# Patient Record
Sex: FEMALE | Race: WHITE | NOT HISPANIC OR LATINO | Employment: OTHER | URBAN - METROPOLITAN AREA
[De-identification: names, ages, dates, MRNs, and addresses within clinical notes are randomized per-mention and may not be internally consistent; named-entity substitution may affect disease eponyms.]

---

## 2017-01-01 DIAGNOSIS — E11.9 TYPE 2 DIABETES MELLITUS WITHOUT COMPLICATION: ICD-10-CM

## 2017-01-01 DIAGNOSIS — E11.8 DIABETES MELLITUS WITH COMPLICATION: ICD-10-CM

## 2017-01-01 RX ORDER — METFORMIN HYDROCHLORIDE 500 MG/1
TABLET ORAL
Qty: 180 TABLET | Refills: 0 | Status: SHIPPED | OUTPATIENT
Start: 2017-01-01 | End: 2018-01-01 | Stop reason: SDUPTHER

## 2017-01-27 ENCOUNTER — OFFICE VISIT (OUTPATIENT)
Dept: INTERNAL MEDICINE | Facility: CLINIC | Age: 82
End: 2017-01-27
Payer: MEDICARE

## 2017-01-27 VITALS
SYSTOLIC BLOOD PRESSURE: 118 MMHG | BODY MASS INDEX: 30.69 KG/M2 | HEIGHT: 66 IN | WEIGHT: 190.94 LBS | HEART RATE: 91 BPM | DIASTOLIC BLOOD PRESSURE: 80 MMHG | OXYGEN SATURATION: 97 %

## 2017-01-27 DIAGNOSIS — E11.65 TYPE 2 DIABETES MELLITUS WITH HYPERGLYCEMIA, WITHOUT LONG-TERM CURRENT USE OF INSULIN: ICD-10-CM

## 2017-01-27 DIAGNOSIS — M54.89 LEFT PARASPINAL BACK PAIN: Primary | ICD-10-CM

## 2017-01-27 DIAGNOSIS — R29.3 POOR POSTURE: ICD-10-CM

## 2017-01-27 PROCEDURE — 1159F MED LIST DOCD IN RCRD: CPT | Mod: S$GLB,,, | Performed by: INTERNAL MEDICINE

## 2017-01-27 PROCEDURE — 99202 OFFICE O/P NEW SF 15 MIN: CPT | Mod: 25,S$GLB,, | Performed by: INTERNAL MEDICINE

## 2017-01-27 PROCEDURE — 96372 THER/PROPH/DIAG INJ SC/IM: CPT | Mod: S$GLB,,, | Performed by: INTERNAL MEDICINE

## 2017-01-27 PROCEDURE — 1125F AMNT PAIN NOTED PAIN PRSNT: CPT | Mod: S$GLB,,, | Performed by: INTERNAL MEDICINE

## 2017-01-27 PROCEDURE — 1157F ADVNC CARE PLAN IN RCRD: CPT | Mod: S$GLB,,, | Performed by: INTERNAL MEDICINE

## 2017-01-27 PROCEDURE — 99999 PR PBB SHADOW E&M-EST. PATIENT-LVL IV: CPT | Mod: PBBFAC,,, | Performed by: INTERNAL MEDICINE

## 2017-01-27 PROCEDURE — 1160F RVW MEDS BY RX/DR IN RCRD: CPT | Mod: S$GLB,,, | Performed by: INTERNAL MEDICINE

## 2017-01-27 RX ORDER — KETOROLAC TROMETHAMINE 30 MG/ML
30 INJECTION, SOLUTION INTRAMUSCULAR; INTRAVENOUS
Status: COMPLETED | OUTPATIENT
Start: 2017-01-27 | End: 2017-01-27

## 2017-01-27 RX ORDER — KETOROLAC TROMETHAMINE 30 MG/ML
30 INJECTION, SOLUTION INTRAMUSCULAR; INTRAVENOUS
Status: DISCONTINUED | OUTPATIENT
Start: 2017-01-27 | End: 2017-01-27

## 2017-01-27 RX ADMIN — KETOROLAC TROMETHAMINE 30 MG: 30 INJECTION, SOLUTION INTRAMUSCULAR; INTRAVENOUS at 11:01

## 2017-01-27 NOTE — PROGRESS NOTES
"Portions of this note are generated with voice recognition software. Typographical errors may exist.     SUBJECTIVE:    This is a/an 81 y.o. female here for primary care visit for  Chief Complaint   Patient presents with    Back Pain     left side     Patient states that since last week after mopping she has been having left flank pain.  Patient states that pain is mild to moderate.  States that there is no radiculopathy.  Denies any motor or sensory or coordination deficits of the lower extremities.  She denies hematuria or dysuria.  Denies constitutional symptoms.  Denies any pleuritic chest pain with inspiration.  Self-care measures have been minimal.  Patient comes to the clinic "requesting a shot to take away the pain."  Patient with documented type 2 diabetes mellitus.  Patient without history of chronic kidney disease.  Patient states that she does not have gastritis symptoms or history of peptic ulcer disease or gastrointestinal bleeding.    Medications Reviewed and Updated    Past medical, family, and social histories were reviewed and updated.    Review of Systems negative unless noted otherwise in history of present illness-  General ROS: negative  Hematological and Lymphatic ROS: negative  Endocrine ROS: negative  Musculoskeletal ROS: negative  Neurological ROS: negative    Allergic:  Review of patient's allergies indicates:  No Known Allergies    OBJECTIVE:  BP: 118/80 Pulse: 91    Wt Readings from Last 3 Encounters:   01/27/17 86.6 kg (190 lb 14.7 oz)   06/06/16 86.2 kg (190 lb 0.6 oz)   03/03/16 85.4 kg (188 lb 4.4 oz)    Body mass index is 30.81 kg/(m^2).  Previous Blood Pressure Readings :   BP Readings from Last 3 Encounters:   01/27/17 118/80   06/06/16 134/72   03/03/16 132/78     GEN: No apparent distress  HEENT: sclera non-icteric, conjunctiva clear  CV: no peripheral edema  PULM: breathing non-labored  ABD: Obese, protuberant abdomen.  PSYCH: appropriate affect  MSK: able to rise from chair " without assistance  · Paraspinal muscle pain lumbosacral region left side just inferior to the rib margin midclavicular line.  No palpable muscle spasms or masses.  · Modest point tenderness over posterior ribs at the lower thoracic region.  Left side    SKIN: normal skin turgor    Pertinent Labs Reviewed       ASSESSMENT/PLAN:    Acute flank pain.  Not optimally controlled.  Etiology likely represents muscle strain.  Radiograph today to exclude rib fracture.  Toradol injection today.  Counseling on [place] measures. patient advised if symptoms change or intensify to seek care in the nearest [facility] center.  Defer narcotic prescription to PCP.    Future Appointments  Date Time Provider Department Center   2/8/2017 3:20 PM Octavio Ferrell MD [facility]       Siva Lynn  1/27/2017  12:11 PM

## 2017-01-27 NOTE — MR AVS SNAPSHOT
[facility]   [place]  [place] [address]  Phone: [phone]  Fax: [phone]                  Natalie Parnell   2017 10:40 AM   Office Visit    Descripción:  Female : 1935   Personal Médico:  Siva Lynn MD   Departamento:  [facility]           Razón de la william     Back Pain     Cough     Sinus Problem     Rhinitis           Diagnósticos de Esta Visita        Comentarios    Left paraspinal back pain    -  Primario     Type 2 diabetes mellitus with hyperglycemia, without long-term current use of insulin         Poor posture                Lista de tareas           Citas próximas        Personal Médico Departamento Tfno del dpto    2017 11:30 AM RAFAL KENNER [facility] [phone]    2017 12:00 PM [facility] XR1 300 LB LIMIT [facility] [phone]    2017 3:20 PM Octavio Ferrell MD [facility] [phone]      Metas (5 Years of Data)     Ninguna      [facility] en [facility]     [facility] En [place] L[facility]nea de Enfermeras - Asistencia   Enfermeras registradas de [facility] pueden ayudarle a reservar ian william, proveer educación para la marcos, asesoría clínica, y otros servicios de asesoramiento.   Llame para goyo servicio gratuito a [phone].             Medicamentos           Mensaje sobre Medicamentos     Verificar los cambios y / o adiciones a yancey régimen de medicación son los mismos que discutir con yancey médico. Si cualquiera de estos cambios o adiciones son incorrectos, por favor notifique a yancey proveedor de atención médica.        These medications were administered today        Dose Freq    ketorolac injection 30 mg 30 mg [facility] 1 time    Sig: Inject 30 mg into the muscle one time.    Categoría: Normal    Vía: Intramuscular      DEJAR de prem estos medicamentos     triamcinolone acetonide 0.1% (KENALOG) 0.1 % cream Apply topically 2 (two) times daily.          "  Verifique que la siguiente lista de medicamentos es ian representación exacta de los medicamentos que está tomando actualmente. Si no hay ningunos reportados, la lista puede estar en mckeon. Si no es correcta, por favor póngase en contacto con yancey proveedor de atención médica. Lleve esta lista con usted en eri de emergencia.           Medicamentos Actuales     blood sugar diagnostic (ACCU-CHEK SMARTVIEW TEST STRIP) Strp Inject 1 strip into the skin once daily.    blood-glucose meter Misc 1 Units by Misc.(Non-Drug; Combo Route) route once daily.    lancets (ACCU-CHEK FASTCLIX) Misc Inject 1 lancet into the skin once daily.    metformin (GLUCOPHAGE) 500 MG tablet TOME 1 TABLETA DOS VECES AL RAQUEL CON COMIDAS    albuterol 90 mcg/actuation inhaler Inhale 2 puffs into the lungs every 6 (six) hours as needed for Wheezing.    ammonium lactate 12 % Crea Apply twice daily to lower extremities    benzonatate (TESSALON) 100 MG capsule TOME 1 CAPSULA QUIQUE VECES AL RAQUEL SARIKA SEA NECESARIO PARA LA TOS    calcium carbonate-vitamin D3 600 mg (1,500 mg)-800 unit Chew Take 1 tablet by mouth 2 (two) times daily.    diclofenac sodium 1 % Gel Apply 2 g topically once daily.    fluticasone-salmeterol 100-50 mcg/dose (ADVAIR) 100-50 mcg/dose diskus inhaler Inhale 1 puff into the lungs 2 (two) times daily.    gabapentin (NEURONTIN) 100 MG capsule Take 1 capsule (100 mg total) by mouth 2 (two) times daily.    glipiZIDE (GLUCOTROL) 2.5 MG TR24 TOME IAN TABLETA DIARIAMENTE CON DESAYUNO     silver sulfADIAZINE 1% (SILVADENE) 1 % cream Apply topically 2 (two) times daily.           Información de referencia clínica           Signos vitales - más recientes  Última actualización: 1/27/2017 10:47 AM por Anabel Dey, MA    PS Pulso Turner Peso SpO2 BMI (IM)    118/80 (BP Location: Left arm, Patient Position: Sitting, BP Method: Manual) 91 5' 6" (1.676 m) 86.6 kg (190 lb 14.7 oz) 97% 30.81 kg/m2      Blood Pressure          Most Recent Value "    BP  118/80      Alergias     A partir del:  1/27/2017        No Known Allergies      Vacunas     Administradas en la fecha de la visita:  1/27/2017        None      Orders Placed During Today's Visit      Órdenes normales de esta visita    Ambulatory Referral to Physical/Occupational Therapy     Exámenes/Procedimientos futuros Se espera el Vence    Comprehensive metabolic panel  1/27/2017 4/27/2017    X-Ray Ribs 2 View Left  1/27/2017 1/27/2018      Registrarse para [facility]     La activación de yancey cuenta [facility] es tan fácil jessica 1-2-3!    1) Ir a [email or web address], seleccione Registrarse Ahora, meter el código de activación y yancey fecha de nacimiento, y seleccione Próximo.    [id number]  Expires: 3/13/2017 11:29 AM      2) Crear un nombre de usuario y contraseña para usar cuando se visita [facility] en el futuro y selecciona ian pregunta de seguridad en rei de que pierda yancey contraseña y seleccione Próximo.    3) Introduzca yancey dirección de correo electrónico y mena maya en Registrarse!    Información Adicional  Si tiene alguna pregunta, por favor, e-mail [email or web address] o llame al [phone] para hablar con nuestro personal. Recuerde, Janetruskayleigh no debe ser usada para necesidades urgentes. En eri de emergencia médica, llame al [phone].        Instrucciones      Contracción De Espalda [Sin Lesión] [Back Spasm, No Injury]    La contracción de los músculos de la espalda puede ocurrir luego de ian torcedura mauro de ligamento o torcedura muscular por un movimiento de giro o ian agachada brusca. También la causa puede ser dormir en ian posición deep o en un colchón de deep calidad. Algunas personas responden a la tensión emocional tensando los músculos de la espalda.  El tratamiento descrito abajo generalmente contribuirá que el dolor desaparezca en 5-7 días. El dolor que continua puede requerir ian evaluación mayor u otro tipo de tratamiento jessica la terapia física.  A menos que usted haya tenido ian lesión  física (por ejemplo, ian caída o un accidente de automóvil), no suelen pedirse radiografías (X-rays) para la evaluación inicial del dolor de espalda. Si el dolor persiste y no responde al tratamiento médico, es posible que le soliciten radiografías (X-rays) y otras pruebas más adelante.  Cuidado En [place]:  1. Es posible que necesite permanecer en cama los primeros días. Vivian, tan pronto jessica le sea posible, comience a sentarse o pararse para evitar los problemas que pueden aparecer cuando mesha está en cama mucho tiempo (debilidad de los músculos, mayor dolor y rigidez de la espalda, coágulos de purvi en las piernas).    2. Mientras esté en cama, intente buscar ian posición que le resulte cómoda. Lo mejor es utilizar un colchón firme. Intente acostarse de espalda con almohadas debajo de las rodillas. También puede intentar acostarse de costado con las rodillas flexionadas cerca del pecho y ian almohada entre las rodillas.    3. Evite estar mucho tiempo sentado. Eso causa más tensión en la parte inferior de la espalda que estando de pie o caminando.      4. Chucho los dos primeros días después de la lesión, aplíquese ian COMPRESA DE HIELO sobre el área dolorida chucho 20 minutos cada 2 a 4 horas. Ello reducirá la hinchazón y el dolor. El CALOR (ian ducha caliente, un baño caliente o ian almohadilla térmica) funciona sean para los espasmos musculares. Puede comenzar con el hielo y luego pasar al calor después de dos días. Algunos pacientes se sienten mejor alternando los tratamientos con hielo y calor. Emplee el método que mejor le resulte.    5. Puede usar acetaminofén (acetaminophen) [Tylenol] o ibuprofeno (ibuprofen) [Motrin o Advil] para controlar el dolor, a menos que le hayan recetado otro medicamento. [NOTA: Si tiene ian enfermedad hepática o renal crónica (chronic liver or kidney disease), o ha tenido alguna vez ian úlcera estomacal (stomach ulcer) o sangrado gastrointestinal (GI bleeding), consulte con yancey  médico antes de prem estos medicamentos.]    6. Los estiramientos suaves ayudarán a que yancey espalda sane más rápido. Surendra esta simple rutina de 2-3 veces al día hasta que sienta que yancey espalda está mejor.  ¨ ESTIRAMIENTOS DE LA PARTE BAJA DE LA ESPALDA  § Acuéstese boca ariba con rosa rodillas dobladas y ambos pies sobre el piso.  § Lentamente levante yancey rodilla izquierda hacia yancey pecho aplanando la parte baja de yancey espalda sobre el piso. Sostenga chucho 5 segundos.  § Relájese y repita el ejercicio con yancey rodilla derecha.  § Surendra 10 de estos ejercicios con cada pierna.  § Repita abrazando ambas rodillas y llevándolas hacia yancey pecho al mismo tiempo.      7. Infórmese sobre los métodos que se utilizan para levantar cosas pesadas de manera campuzano y utilícelos. No levante nada que pese más de 15 libras (7 kilos) hasta que haya desaparecido el dolor.  Seguimiento  con yancey médico o en esta institución si rosa síntomas no empiezan a mejorar luego de ian semana. Puede necesitar terapia física.  [NOTA: Si le hicieron ian radiografía, la va a revisar un radiólogo. Le avisarán si encuentran algo que pueda afectar yancey atención]  Busque Prontamente Atención Médica  si algo de lo siguiente ocurre:  · El dolor empeora o se extiende a rosa piernas  · Debilidad o entumecimiento en ian o ambas piernas  · Pérdida del control intestinal o de la vejiga  · Entumecimiento en el área de la paul  · Fiebre repentina superior a 100.4°F (38.0°C)  · Ardor o dolor al orinar  © 9727-2464 [employer]. [address] [place] [address], [place] [address]. Todos los derechos reservados. Esta información no pretende sustituir la atención médica profesional. Sólo yancey médico puede diagnosticar y tratar un problema de marcos.                      Natalie Parnell   2017 10:40 AM   Office Visit    Description:  Female : 1935   Provider:  Siva Lynn MD   Department:  [place] [facility]           Reason for Visit     Back  Pain     Cough     Sinus Problem     Rhinitis           Diagnoses this Visit        Comments    Left paraspinal back pain    -  Primary     Type 2 diabetes mellitus with hyperglycemia, without long-term current use of insulin         Poor posture                To Do List           Future Appointments        Provider Department Dept Phone    1/27/2017 11:30 AM LABDOUG [facility] [phone]    1/27/2017 12:00 PM KEN XR1 300 LB LIMIT [facility] [phone]    2/8/2017 3:20 PM Octavio Ferrell MD [facility] [phone]      Goals     None      [facility]     [facility] Nurse Care Line - 24/7 Assistance  Registered nurses in the [facility] provide clinical advisement, health education, appointment booking, and other advisory services.  Call for this free service at [phone].             Medications           Message regarding Medications     Verify the changes and/or additions to your medication regime listed below are the same as discussed with your clinician today.  If any of these changes or additions are incorrect, please notify your healthcare provider.        These medications were administered today        Dose Freq    ketorolac injection 30 mg 30 mg [facility] 1 time    Sig: Inject 30 mg into the muscle one time.    Class: Normal    Route: Intramuscular      STOP taking these medications     triamcinolone acetonide 0.1% (KENALOG) 0.1 % cream Apply topically 2 (two) times daily.           Verify that the below list of medications is an accurate representation of the medications you are currently taking.  If none reported, the list may be blank. If incorrect, please contact your healthcare provider. Carry this list with you in case of emergency.           Current Medications     blood sugar diagnostic (ACCU-CHEK SMARTVIEW TEST STRIP) Strp Inject 1 strip into the skin once daily.    blood-glucose meter Misc 1 Units by  "Misc.(Non-Drug; Combo Route) route once daily.    lancets (ACCU-CHEK FASTCLIX) Misc Inject 1 lancet into the skin once daily.    metformin (GLUCOPHAGE) 500 MG tablet TOME 1 TABLETA DOS VECES AL RAQUEL CON COMIDAS    albuterol 90 mcg/actuation inhaler Inhale 2 puffs into the lungs every 6 (six) hours as needed for Wheezing.    ammonium lactate 12 % Crea Apply twice daily to lower extremities    benzonatate (TESSALON) 100 MG capsule TOME 1 CAPSULA QUIQUE VECES AL RAQUEL SARIKA SEA NECESARIO PARA LA TOS    calcium carbonate-vitamin D3 600 mg (1,500 mg)-800 unit Chew Take 1 tablet by mouth 2 (two) times daily.    diclofenac sodium 1 % Gel Apply 2 g topically once daily.    fluticasone-salmeterol 100-50 mcg/dose (ADVAIR) 100-50 mcg/dose diskus inhaler Inhale 1 puff into the lungs 2 (two) times daily.    gabapentin (NEURONTIN) 100 MG capsule Take 1 capsule (100 mg total) by mouth 2 (two) times daily.    glipiZIDE (GLUCOTROL) 2.5 MG TR24 TOME INOCENCIA TABLETA DIARIAMENTE CON DESAYUNO     silver sulfADIAZINE 1% (SILVADENE) 1 % cream Apply topically 2 (two) times daily.           Clinical Reference Information           Vital Signs - Last Recorded  Most recent update: 1/27/2017 10:47 AM by Anabel Dey MA    BP Pulse Ht Wt SpO2 BMI    118/80 (BP Location: Left arm, Patient Position: Sitting, BP Method: Manual) 91 5' 6" (1.676 m) 86.6 kg (190 lb 14.7 oz) 97% 30.81 kg/m2      Blood Pressure          Most Recent Value    BP  118/80      Allergies as of 1/27/2017     No Known Allergies      Immunizations Administered on Date of Encounter - 1/27/2017     None      Orders Placed During Today's Visit      Normal Orders This Visit    Ambulatory Referral to Physical/Occupational Therapy     Future Labs/Procedures Expected by Expires    Comprehensive metabolic panel  1/27/2017 4/27/2017    X-Ray Ribs 2 View Left  1/27/2017 1/27/2018      [facility]sk[facility]leigh Sign-Up     Activating your [facility] account is as easy as 1-2-3!     1) Visit [email or web address], " select Sign Up Now, enter this activation code and your date of birth, then select Next.  [email or web address]  Expires: 3/13/2017 11:29 AM      2) Create a username and password to use when you visit [facility] in the future and select a security question in case you lose your password and select Next.    3) Enter your e-mail address and click Sign Up!    Additional Information  If you have questions, please e-mail [email or web address] or call [phone] to talk to our [facility] staff. Remember, [facility] is NOT to be used for urgent needs. For medical emergencies, dial 911.         Instructions      Contracción De Espalda [Sin Lesión] [Back Spasm, No Injury]    La contracción de los músculos de la espalda puede ocurrir luego de ian torcedura mauro de ligamento o torcedura muscular por un movimiento de giro o ian agachada brusca. También la causa puede ser dormir en ian posición deep o en un colchón de deep calidad. Algunas personas responden a la tensión emocional tensando los músculos de la espalda.  El tratamiento descrito abajo generalmente contribuirá que el dolor desaparezca en 5-7 días. El dolor que continua puede requerir ian evaluación mayor u otro tipo de tratamiento jessica la terapia física.  A menos que usted haya tenido ian lesión física (por ejemplo, ian caída o un accidente de automóvil), no suelen pedirse radiografías (X-rays) para la evaluación inicial del dolor de espalda. Si el dolor persiste y no responde al tratamiento médico, es posible que le soliciten radiografías (X-rays) y otras pruebas más adelante.  Cuidado En [place]:  1. Es posible que necesite permanecer en cama los primeros días. Vivian, tan pronto jessica le sea posible, comience a sentarse o pararse para evitar los problemas que pueden aparecer cuando mesha está en cama mucho tiempo (debilidad de los músculos, mayor dolor y rigidez de la espalda, coágulos de purvi en las piernas).    2. Mientras esté en cama, intente buscar ian posición que le  resulte cómoda. Lo mejor es utilizar un colchón firme. Intente acostarse de espalda con almohadas debajo de las rodillas. También puede intentar acostarse de costado con las rodillas flexionadas cerca del pecho y ian almohada entre las rodillas.    3. Evite estar mucho tiempo sentado. Eso causa más tensión en la parte inferior de la espalda que estando de pie o caminando.      4. Chucho los dos primeros días después de la lesión, aplíquese ian COMPRESA DE HIELO sobre el área dolorida chucho 20 minutos cada 2 a 4 horas. Ello reducirá la hinchazón y el dolor. El CALOR (ian ducha caliente, un baño caliente o ian almohadilla térmica) funciona sean para los espasmos musculares. Puede comenzar con el hielo y luego pasar al calor después de dos días. Algunos pacientes se sienten mejor alternando los tratamientos con hielo y calor. Emplee el método que mejor le resulte.    5. Puede usar acetaminofén (acetaminophen) [Tylenol] o ibuprofeno (ibuprofen) [Motrin o Advil] para controlar el dolor, a menos que le hayan recetado otro medicamento. [NOTA: Si tiene ian enfermedad hepática o renal crónica (chronic liver or kidney disease), o ha tenido alguna vez ian úlcera estomacal (stomach ulcer) o sangrado gastrointestinal (GI bleeding), consulte con yancey médico antes de prem estos medicamentos.]    6. Los estiramientos suaves ayudarán a que yancey espalda sane más rápido. Surendra esta simple rutina de 2-3 veces al día hasta que sienta que yancey espalda está mejor.  ¨ ESTIRAMIENTOS DE LA PARTE BAJA DE LA ESPALDA  § Acuéstese boca ariba con rosa rodillas dobladas y ambos pies sobre el piso.  § Lentamente levante yancey rodilla izquierda hacia yancey pecho aplanando la parte baja de yancey espalda sobre el piso. Sostenga chucho 5 segundos.  § Relájese y repita el ejercicio con yancey rodilla derecha.  § Surendra 10 de estos ejercicios con cada pierna.  § Repita abrazando ambas rodillas y llevándolas hacia yancey pecho al mismo tiempo.      7. Infórmese sobre los  métodos que se utilizan para levantar cosas pesadas de manera campuzano y utilícelos. No levante nada que pese más de 15 libras (7 kilos) hasta que haya desaparecido el dolor.  Seguimiento  con yancey médico o en esta institución si rosa síntomas no empiezan a mejorar luego de ian semana. Puede necesitar terapia física.  [NOTA: Si le hicieron ian radiografía, la va a revisar un radiólogo. Le avisarán si encuentran algo que pueda afectar yancey atención]  Busque Prontamente Atención Médica  si algo de lo siguiente ocurre:  · El dolor empeora o se extiende a rosa piernas  · Debilidad o entumecimiento en ian o ambas piernas  · Pérdida del control intestinal o de la vejiga  · Entumecimiento en el área de la paul  · Fiebre repentina superior a 100.4°F (38.0°C)  · Ardor o dolor al orinar  © 8086-4172 [employer]. [address] [place] [address], [place] [address]. Todos los derechos reservados. Esta información no pretende sustituir la atención médica profesional. Sólo yancey médico puede diagnosticar y tratar un problema de mracos.

## 2017-01-28 ENCOUNTER — TELEPHONE (OUTPATIENT)
Dept: FAMILY MEDICINE | Facility: CLINIC | Age: 82
End: 2017-01-28

## 2017-01-28 NOTE — TELEPHONE ENCOUNTER
----- Message from Siva Lynn MD sent at 1/28/2017 11:44 AM CST -----  Regarding: Diabetes follow up  Saw patient for back pain and reevaluated her kidney function. Glucose very elevated. Not sure if we scheduled her to follow up with you.

## 2017-03-08 ENCOUNTER — HOSPITAL ENCOUNTER (OUTPATIENT)
Dept: RADIOLOGY | Facility: HOSPITAL | Age: 82
Discharge: HOME | End: 2017-03-08
Attending: FAMILY MEDICINE
Payer: MEDICARE

## 2017-03-08 ENCOUNTER — OFFICE VISIT (OUTPATIENT)
Dept: FAMILY MEDICINE | Facility: CLINIC | Age: 82
End: 2017-03-08
Payer: MEDICARE

## 2017-03-08 VITALS
WEIGHT: 189.81 LBS | OXYGEN SATURATION: 97 % | SYSTOLIC BLOOD PRESSURE: 138 MMHG | BODY MASS INDEX: 30.51 KG/M2 | HEIGHT: 66 IN | HEART RATE: 80 BPM | DIASTOLIC BLOOD PRESSURE: 76 MMHG

## 2017-03-08 DIAGNOSIS — E11.65 TYPE 2 DIABETES MELLITUS WITH HYPERGLYCEMIA, WITHOUT LONG-TERM CURRENT USE OF INSULIN: ICD-10-CM

## 2017-03-08 DIAGNOSIS — J42 CHRONIC BRONCHITIS, UNSPECIFIED CHRONIC BRONCHITIS TYPE: Primary | ICD-10-CM

## 2017-03-08 DIAGNOSIS — J42 CHRONIC BRONCHITIS, UNSPECIFIED CHRONIC BRONCHITIS TYPE: ICD-10-CM

## 2017-03-08 DIAGNOSIS — J98.01 ACUTE BRONCHOSPASM: ICD-10-CM

## 2017-03-08 DIAGNOSIS — M25.512 SUBSCAPULAR PAIN, LEFT: ICD-10-CM

## 2017-03-08 DIAGNOSIS — Z23 NEED FOR VACCINATION AGAINST STREPTOCOCCUS PNEUMONIAE: ICD-10-CM

## 2017-03-08 PROCEDURE — 99214 OFFICE O/P EST MOD 30 MIN: CPT | Mod: 25,S$GLB,, | Performed by: FAMILY MEDICINE

## 2017-03-08 PROCEDURE — 99499 UNLISTED E&M SERVICE: CPT | Mod: S$GLB,,, | Performed by: FAMILY MEDICINE

## 2017-03-08 PROCEDURE — 1126F AMNT PAIN NOTED NONE PRSNT: CPT | Mod: S$GLB,,, | Performed by: FAMILY MEDICINE

## 2017-03-08 PROCEDURE — 99999 PR PBB SHADOW E&M-EST. PATIENT-LVL IV: CPT | Mod: PBBFAC,,, | Performed by: FAMILY MEDICINE

## 2017-03-08 PROCEDURE — 1160F RVW MEDS BY RX/DR IN RCRD: CPT | Mod: S$GLB,,, | Performed by: FAMILY MEDICINE

## 2017-03-08 PROCEDURE — 1157F ADVNC CARE PLAN IN RCRD: CPT | Mod: S$GLB,,, | Performed by: FAMILY MEDICINE

## 2017-03-08 PROCEDURE — 90670 PCV13 VACCINE IM: CPT | Mod: S$GLB,,, | Performed by: FAMILY MEDICINE

## 2017-03-08 PROCEDURE — 71020 XR CHEST PA AND LATERAL: CPT | Mod: 26,,, | Performed by: RADIOLOGY

## 2017-03-08 PROCEDURE — 71020 XR CHEST PA AND LATERAL: CPT | Mod: TC,PO

## 2017-03-08 PROCEDURE — G0009 ADMIN PNEUMOCOCCAL VACCINE: HCPCS | Mod: S$GLB,,, | Performed by: FAMILY MEDICINE

## 2017-03-08 PROCEDURE — 1159F MED LIST DOCD IN RCRD: CPT | Mod: S$GLB,,, | Performed by: FAMILY MEDICINE

## 2017-03-08 RX ORDER — TIZANIDINE 2 MG/1
2 TABLET ORAL EVERY 8 HOURS PRN
Qty: 30 TABLET | Refills: 0 | Status: SHIPPED | OUTPATIENT
Start: 2017-03-08 | End: 2017-03-18

## 2017-03-08 NOTE — PROGRESS NOTES
Subjective:       Patient ID: Natalie Parnell is a 82 y.o. female.    Chief Complaint: Hospital Follow Up    HPI Comments: 82 years old female who came to the clinic with several episodes of bronchitis for the last several months associated with bronchospasm sometimes.  Patient is doing better for the last couple of weeks.  She was recently evaluated at the emergency room in [place].  Patient is concerned about possible COPD.  She reports smoke secondhand exposure .  Patient with no recent A1c.  She is not able to tolerate glipizide.  No polyuria polydipsia or polyphagia.    Review of Systems   Constitutional: Negative.  Negative for chills and fever.   HENT: Negative.    Eyes: Negative.    Respiratory: Positive for cough and wheezing.    Cardiovascular: Negative.  Negative for chest pain, palpitations and leg swelling.   Gastrointestinal: Negative.    Endocrine: Negative for cold intolerance, heat intolerance, polydipsia, polyphagia and polyuria.   Genitourinary: Negative.    Musculoskeletal: Negative.    Skin: Negative.    Neurological: Negative.    Psychiatric/Behavioral: Negative.        Objective:      Physical Exam   Constitutional: She is oriented to person, place, and time. She appears well-developed and well-nourished. No distress.   HENT:   Head: Normocephalic and atraumatic.   Right Ear: External ear normal.   Left Ear: External ear normal.   Nose: Nose normal.   Mouth/Throat: Oropharynx is clear and moist. No oropharyngeal exudate.   Eyes: Conjunctivae and EOM are normal. Pupils are equal, round, and reactive to light. Right eye exhibits no discharge. Left eye exhibits no discharge. No scleral icterus.   Neck: Normal range of motion. Neck supple. No JVD present. No tracheal deviation present. No thyromegaly present.   Cardiovascular: Normal rate, regular rhythm, normal heart sounds and intact distal pulses.  Exam reveals no gallop and no friction rub.    No murmur heard.  Pulmonary/Chest: Effort normal and  breath sounds normal. No stridor. No respiratory distress. She has no wheezes. She has no rales. She exhibits no tenderness.   Abdominal: Soft. Bowel sounds are normal. She exhibits no distension and no mass. There is no tenderness. There is no rebound and no guarding.   Musculoskeletal: Normal range of motion. She exhibits no edema or tenderness.        Arms:  Lymphadenopathy:     She has no cervical adenopathy.   Neurological: She is alert and oriented to person, place, and time. She has normal reflexes. No cranial nerve deficit. She exhibits normal muscle tone. Coordination normal.   Skin: Skin is warm and dry. No rash noted. She is not diaphoretic. No erythema. No pallor.   Psychiatric: She has a normal mood and affect. Her behavior is normal. Judgment and thought content normal.       Assessment:       1. Chronic bronchitis, unspecified chronic bronchitis type    2. Type 2 diabetes mellitus with hyperglycemia, without long-term current use of insulin    3. Need for vaccination against Streptococcus pneumoniae    4. Subscapular pain, left    5. Acute bronchospasm         Plan:         Natalie was seen today for hospital follow up.    Diagnoses and all orders for this visit:    Chronic bronchitis, unspecified chronic bronchitis type  -     X-Ray Chest PA And Lateral; Future  -     Complete PFT with bronchodilator; Future  -     Ambulatory referral to Pulmonology    Type 2 diabetes mellitus with hyperglycemia, without long-term current use of insulin  -     Comprehensive metabolic panel; Future  -     Lipid panel; Future  -     Hemoglobin A1c; Future  -     Microalbumin/creatinine urine ratio; Future    Need for vaccination against Streptococcus pneumoniae  -     Pneumococcal Conjugate Vaccine (13 Valent) (IM)    Subscapular pain, left  -     tizanidine (ZANAFLEX) 2 MG tablet; Take 1 tablet (2 mg total) by mouth every 8 (eight) hours as needed.    Acute bronchospasm   -     Complete PFT with bronchodilator;  Future    Continue monitoring blood sugar at home,ADA diet.

## 2017-03-08 NOTE — PATIENT INSTRUCTIONS
¿Qué es la bronquitis aguda?     La bronquitis aguda o de corta duración dura dìas o semanas. Se produce cuando los bronquios (vías respiratorias situadas en los pulmones) se irritan a causa de virus, bacterias o alergenos. Esta irritación genera ian tos aguda (de corta duración) o crónica (de larga duración o recurrente) que produce flema amarilla o verdosa.   El interior de unos pulmones sanos    El aire entra y sale de los pulmones a través de las vías respiratorias. El revestimiento de las vías respiratorias produce flema, ian sustancia pegajosa que atrapa las partículas que entran en los pulmones. Unas diminutas estructuras llamadas cilios se encargan de barrer las partículas para expulsarlas de las vías respiratorias.                                Vía respiratoria mark: Las vías respiratorias normalmente están abiertas y wu entrar y salir el aire fácilmente.      Cilios sanos: Los diminutos cilios, que parecen pelos, barren la flema y las partículas hacia arriba para expulsarlas de las vías respiratorias.   Pulmones con bronquitis  La bronquitis suele producirse cuando ian persona tiene un resfriado o gripe. Las vías respiratorias se inflaman (enrojecen y se hinchan) y se forma un exceso de flema, lo que desencadena ian tos profunda y perruna. Otros síntomas pueden incluir:  · sibilancias o un yovana de silbido al respirar  · molestia en el pecho  · dificultad para respirar  · fiebre baja  En estas circunstancias puede producirse ian segunda infección, esta vez de origen bacteriano. Las vías respiratorias irritadas por alergenos o el humo son más propensas a infectarse.     Vía respiratoria inflamada: La inflamación y el exceso de flema estrechan la vía respiratoria y provocan falta de aliento.      Cilios deteriorados: El exceso de flema deteriora los cilios y provoca congestión y sibilancias (silbidos al respirar). El cigarrillo empeora el problema.                                 Cómo se  diagnostica  Un chequeo, preguntas sobre rosa antecedentes de marcos y ciertas pruebas ayudan a yancey proveedor de atención médica a llegar a un diagnóstico.  Antecedentes de marcos  Yancey proveedor de atención médica le hará preguntas sobre rosa síntomas.  El chequeo  Yancey proveedor le escuchará el pecho para cristobal si está congestionado, y quizás le revise también rosa oídos, yancey nariz y yancey garganta.  Posibles pruebas  · Ian prueba de esputo para detectar bacterias; requiere ian muestra de flema expulsada de los pulmones.  · Un exudado nasal o faríngeo (de la garganta) para detectar el virus de la gripe.  · Ian radiografía de tórax, si yancey proveedor de atención médica sospecha que usted tiene neumonía.  · Pruebas para detectar enfermedades que podrían causar bronquitis, jessica alergias, asma o EPOC. Quizás lo remitan a un especialista para estas pruebas.  Tratamiento de la tos  El tratamiento principal de la bronquitis consiste en aliviar los síntomas. Evitar el humo, los alergenos y demás factores que desencadenan la tos suele mejorar la bronquitis. Si la infección es de origen bacteriano, podrían usarse antibióticos. Jacek la enfermedad, es importante y dormir mucho. Para aliviar los síntomas:  · No fume y evite el humo de segunda mano.  · Use un humidificador o inhale vapor de ian ducha caliente para ayudar a aflojar la flema.  · Estee mucha agua y jugos, porque pueden calmar la irritación de la garganta y ayudar a diluir la flema.  · Siéntese o use almohadas adicionales cuando esté en la cama para aliviar la tos y la congestión.  · Pregunte a yancey proveedor sobre el uso de medicamentos para la tos, el dolor y la fiebre, o un descongestionante.  Antibióticos  La mayoría de los casos de bronquitis son causados por virus del resfriado o la gripe. Los antibióticos no tratan las enfermedades virales, y tomarlos cuando no son necesarios podría fomentar la producción de bacterias que son más difíciles de matar. Yancey proveedor le recetará  antibióticos si la causa de la infección fueron bacterias. Si se los recetan:  · [place] rosa antibióticos hasta que se le terminen, aunque le hayan estelle los síntomas. Si no lo hace, la bronquitis podría reaparecer.  · [place] estos medicamentos según las indicaciones. Por ejemplo, algunos medicamentos deben tomarse con la comida.  · Consulte con yancey proveedor o farmacéutico para averiguar los efectos secundarios frecuentes y lo que debe hacer si se le presentan.  Visita de control  Debe visitar de nuevo a yancey proveedor en 2-3 semanas; para adrienne momento rosa síntomas deberían marilin estelle. Ian infección que dura más tiempo podría ser señal de que existe un problema más grave.  Prevención  · Evite el humo del tabaco. Si fuma, abandone el hábito. Aléjese de los ambientes llenos de humo. Pida a rosa amigos y familiares que no fumen en rosa alrededores, ni en yancey hogar o yancey dash.  · Hágase pruebas para detectar alergias.  · Consulte con yancey proveedor sobre la posibilidad de ponerse ian vacuna antigripal (flu shot) todos los años, y probablemente también la antineumocócica.  · Lave rosa eduardo a menudo, para tratar de reducir la posibilidad de contagiarse con virus que causan resfriado y gripe.     Llame a yancey proveedor de atención médica si:  · Le empeoran los síntomas o le aparecen unos nuevos.  · Los problemas respiratorios se vuelven graves.  · Los síntomas no le mejoran en un plazo de ian semana, o al cabo de 3 días de estar tomando antibióticos.   Date Last Reviewed: 6/18/2014  © 7861-6514 [employer]. [address] [place], [place] [address], [place] [address]. Todos los derechos reservados. Esta información no pretende sustituir la atención médica profesional. Sólo yancey médico puede diagnosticar y tratar un problema de marcos.

## 2017-03-08 NOTE — MR AVS SNAPSHOT
[facility]   [place] [address]  Phone: [phone]  Fax: [phone]                  Natalie Parnell   3/8/2017 11:20 AM   Office Visit    Descripción:  Female : 1935   Personal Médico:  Octavio Ferrell MD   Departamento:  [facility]           Razón de la william     Hospital Follow Up           Diagnósticos de Esta Visita        Comentarios    Chronic bronchitis, unspecified chronic bronchitis type    -  Primario     Type 2 diabetes mellitus with hyperglycemia, without long-term current use of insulin         Need for vaccination against Streptococcus pneumoniae         Subscapular pain, left         Acute bronchospasm                Lista de tareas           Citas próximas        Personal Médico Departamento Tfno del dpto    3/8/2017 1:30 PM [facility] XR1 [address] LIMIT [facility] [phone]    3/11/2017 10:30 AM [facility] KENNER [facility] [phone]    3/14/2017 1:00 PM Octavio Ferrell MD [facility] [phone]      Metas (5 Years of Data)     Ninguna      Recetas para recoger        Disp Refills Start End    tizanidine (ZANAFLEX) 2 MG tablet 30 tablet 0 3/8/2017 3/18/2017    Take 1 tablet (2 mg total) by mouth every 8 (eight) hours as needed. - Oral    Farmacia: [id number] - [place] - [address] AT [facility] [address]: #: [phone]         [facility]     [facility] [place] de Enfermeras - Asistencia   Enfermeras registradas de [facility] pueden ayudarle a reservar ian william, proveer educación para la marcos, asesoría clínica, y otros servicios de asesoramiento.   Llame para goyo servicio gratuito a [phone].             Medicamentos           Mensaje sobre Medicamentos     Verificar los cambios y / o adiciones a yancey régimen de medicación son los mismos que discutir con yancey médico. Si cualquiera de estos cambios o  adiciones son incorrectos, por favor notifique a yancey proveedor de atención médica.        EMPEZAR a prem estos medicamentos NUEVOS        Refills    tizanidine (ZANAFLEX) 2 MG tablet 0    Sig: Take 1 tablet (2 mg total) by mouth every 8 (eight) hours as needed.    Categoría: Print    Vía: Oral      DEJAR de prem estos medicamentos     glipiZIDE (GLUCOTROL) 2.5 MG TR24 TOME IAN TABLETA DIARIAMENTE CON DESAYUNO            Verifique que la siguiente lista de medicamentos es ain representación exacta de los medicamentos que está tomando actualmente. Si no hay ningunos reportados, la lista puede estar en mckeon. Si no es correcta, por favor póngase en contacto con yancey proveedor de atención médica. Lleve esta lista con usted en eri de emergencia.           Medicamentos Actuales     albuterol 90 mcg/actuation inhaler Inhale 2 puffs into the lungs every 6 (six) hours as needed for Wheezing.    ammonium lactate 12 % Crea Apply twice daily to lower extremities    benzonatate (TESSALON) 100 MG capsule TOME 1 CAPSULA QUIQUE VECES AL RAQUEL SARIKA SEA NECESARIO PARA LA TOS    blood sugar diagnostic (ACCU-CHEK SMARTVIEW TEST STRIP) Strp Inject 1 strip into the skin once daily.    blood-glucose meter Misc 1 Units by Misc.(Non-Drug; Combo Route) route once daily.    calcium carbonate-vitamin D3 600 mg (1,500 mg)-800 unit Chew Take 1 tablet by mouth 2 (two) times daily.    diclofenac sodium 1 % Gel Apply 2 g topically once daily.    lancets (ACCU-CHEK FASTCLIX) Misc Inject 1 lancet into the skin once daily.    metformin (GLUCOPHAGE) 500 MG tablet TOME 1 TABLETA DOS VECES AL RAQUEL CON COMIDAS    silver sulfADIAZINE 1% (SILVADENE) 1 % cream Apply topically 2 (two) times daily.    fluticasone-salmeterol 100-50 mcg/dose (ADVAIR) 100-50 mcg/dose diskus inhaler Inhale 1 puff into the lungs 2 (two) times daily.    gabapentin (NEURONTIN) 100 MG capsule Take 1 capsule (100 mg total) by mouth 2 (two) times daily.    tizanidine (ZANAFLEX) 2 MG tablet  "Take 1 tablet (2 mg total) by mouth every 8 (eight) hours as needed.           Información de referencia clínica           Ama signos vitales dwayne     PS [place] SpO2 BMI (IMC)    138/76 (BP Location: Right arm, Patient Position: Sitting, BP Method: Manual) 80 5' 6" (1.676 m) 86.1 kg (189 lb 13.1 oz) 97% 30.64 kg/m2      Blood Pressure          Most Recent Value    BP  138/76      Alergias     A partir del:  3/8/2017        No Known Allergies      Vacunas     Administradas en la fecha de la visita:  3/8/2017        Nombre Fecha Dosis Fecha del VIS Vía    Pneumococcal Conjugate - 13 Valent  Incomplete 0.5 mL 2015 Intramuscular      Orders Placed During Today's Visit      Órdenes normales de esta visita    Ambulatory referral to Pulmonology     Pneumococcal Conjugate Vaccine (13 Valent) (IM)     Exámenes/Procedimientos futuros Se espera el Vence    Comprehensive metabolic panel  3/8/2017 2017    Hemoglobin A1c  3/8/2017 2017    Lipid panel  3/8/2017 2017    Microalbumin/creatinine urine ratio  3/8/2017 3/8/2018    X-Ray Chest PA And Lateral  3/8/2017 3/8/2018    Complete PFT with bronchodilator  As directed 2017      Language Assistance Services     ATTENTION: Language assistance services are available, free of charge. Please call [phone].      ATENCIÓN: Si habla español, tiene a yancey disposición servicios gratuitos de asistencia lingüística. Llame al [phone].     CHÚ Ý: N?u b?n nói Ti?ng Vi?t, có các d?ch v? h? tr? ngôn ng? mi?n phí dành cho b?n. G?i s? [phone].         [facility] cumple con las leyes federales aplicables de derechos civiles y no discrimina por motivos de alexandria, color, origen nacional, edad, discapacidad, o sexo.                 Natalie Parnell   3/8/2017 11:20 AM   Office Visit    Description:  Female : 1935   Provider:  Octavio Ferrell MD   Department:  [facility]           Reason for Visit     Hospital " Follow Up           Diagnoses this Visit        Comments    Chronic bronchitis, unspecified chronic bronchitis type    -  Primary     Type 2 diabetes mellitus with hyperglycemia, without long-term current use of insulin         Need for vaccination against Streptococcus pneumoniae         Subscapular pain, left         Acute bronchospasm                To Do List           Future Appointments        Provider Department Dept Phone    3/8/2017 1:30 PM JOHANNE XR1 [address] LIMIT [facility] [phone]    3/11/2017 10:30 AM [place], [place] [facility] [phone]    3/14/2017 1:00 PM Octavio Ferrell MD [place] [facility] [phone]      Goals     None       These Medications        Disp Refills Start End    tizanidine (ZANAFLEX) 2 MG tablet 30 tablet 0 3/8/2017 3/18/2017    Take 1 tablet (2 mg total) by mouth every 8 (eight) hours as needed. - Oral    Pharmacy: [id number] - [place]  [address] AT [facility] Ph #: [phone]         [facility]     [facility] Nurse Care Line - 24/7 Assistance  Registered nurses in the [facility] Center provide clinical advisement, health education, appointment booking, and other advisory services.  Call for this free service at [phone].             Medications           Message regarding Medications     Verify the changes and/or additions to your medication regime listed below are the same as discussed with your clinician today.  If any of these changes or additions are incorrect, please notify your healthcare provider.        START taking these NEW medications        Refills    tizanidine (ZANAFLEX) 2 MG tablet 0    Sig: Take 1 tablet (2 mg total) by mouth every 8 (eight) hours as needed.    Class: Print    Route: Oral      STOP taking these medications     glipiZIDE (GLUCOTROL) 2.5 MG TR24 TOME INOCENCIA TABLETA DIARIAMENTE CON DESAYUNO            Verify that the  "below list of medications is an accurate representation of the medications you are currently taking.  If none reported, the list may be blank. If incorrect, please contact your healthcare provider. Carry this list with you in case of emergency.           Current Medications     albuterol 90 mcg/actuation inhaler Inhale 2 puffs into the lungs every 6 (six) hours as needed for Wheezing.    ammonium lactate 12 % Crea Apply twice daily to lower extremities    benzonatate (TESSALON) 100 MG capsule TOME 1 CAPSULA QUIQUE VECES AL RAQUEL SARIKA SEA NECESARIO PARA LA TOS    blood sugar diagnostic (ACCU-CHEK SMARTVIEW TEST STRIP) Strp Inject 1 strip into the skin once daily.    blood-glucose meter Misc 1 Units by Misc.(Non-Drug; Combo Route) route once daily.    calcium carbonate-vitamin D3 600 mg (1,500 mg)-800 unit Chew Take 1 tablet by mouth 2 (two) times daily.    diclofenac sodium 1 % Gel Apply 2 g topically once daily.    lancets (ACCU-CHEK FASTCLIX) Misc Inject 1 lancet into the skin once daily.    metformin (GLUCOPHAGE) 500 MG tablet TOME 1 TABLETA DOS VECES AL RAQUEL CON COMIDAS    silver sulfADIAZINE 1% (SILVADENE) 1 % cream Apply topically 2 (two) times daily.    fluticasone-salmeterol 100-50 mcg/dose (ADVAIR) 100-50 mcg/dose diskus inhaler Inhale 1 puff into the lungs 2 (two) times daily.    gabapentin (NEURONTIN) 100 MG capsule Take 1 capsule (100 mg total) by mouth 2 (two) times daily.    tizanidine (ZANAFLEX) 2 MG tablet Take 1 tablet (2 mg total) by mouth every 8 (eight) hours as needed.           Clinical Reference Information           Your Vitals Were     BP Pulse Height Weight SpO2 BMI    138/76 (BP Location: Right arm, Patient Position: Sitting, BP Method: Manual) 80 5' 6" (1.676 m) 86.1 kg (189 lb 13.1 oz) 97% 30.64 kg/m2      Blood Pressure          Most Recent Value    BP  138/76      Allergies as of 3/8/2017     No Known Allergies      Immunizations Administered on Date of Encounter - 3/8/2017     Name Date " Dose VIS Date Route    Pneumococcal Conjugate - 13 Valent  Incomplete 0.5 mL 11/5/2015 Intramuscular      Orders Placed During Today's Visit      Normal Orders This Visit    Ambulatory referral to Pulmonology     Pneumococcal Conjugate Vaccine (13 Valent) (IM)     Future Labs/Procedures Expected by Expires    Comprehensive metabolic panel  3/8/2017 6/6/2017    Hemoglobin A1c  3/8/2017 6/6/2017    Lipid panel  3/8/2017 6/6/2017    Microalbumin/creatinine urine ratio  3/8/2017 3/8/2018    X-Ray Chest PA And Lateral  3/8/2017 3/8/2018    Complete PFT with bronchodilator  As directed 6/6/2017      Language Assistance Services     ATTENTION: Language assistance services are available, free of charge. Please call [phone].      ATENCIÓN: Si habla mary ellen, tiene a yancey disposición servicios gratuitos de asistencia lingüística. Llame al [phone].     CHÚ Ý: N?u b?n nói Ti?ng Vi?t, có các d?ch v? h? tr? ngôn ng? mi?n phí dành cho b?n. G?i s? [phone].         [facility] complies with applicable Federal civil rights laws and does not discriminate on the basis of race, color, national origin, age, disability, or sex.

## 2017-03-09 ENCOUNTER — HOSPITAL ENCOUNTER (OUTPATIENT)
Dept: PULMONOLOGY | Facility: CLINIC | Age: 82
Discharge: HOME | End: 2017-03-09
Payer: MEDICARE

## 2017-03-09 ENCOUNTER — OFFICE VISIT (OUTPATIENT)
Dept: PULMONOLOGY | Facility: CLINIC | Age: 82
End: 2017-03-09
Payer: MEDICARE

## 2017-03-09 VITALS
BODY MASS INDEX: 30.16 KG/M2 | HEIGHT: 66 IN | DIASTOLIC BLOOD PRESSURE: 66 MMHG | OXYGEN SATURATION: 97 % | WEIGHT: 187.63 LBS | SYSTOLIC BLOOD PRESSURE: 132 MMHG | HEART RATE: 93 BPM

## 2017-03-09 DIAGNOSIS — R05.9 COUGH: Primary | ICD-10-CM

## 2017-03-09 DIAGNOSIS — J45.20 MILD INTERMITTENT ASTHMA WITHOUT COMPLICATION: Primary | ICD-10-CM

## 2017-03-09 DIAGNOSIS — R06.02 SHORTNESS OF BREATH: Primary | ICD-10-CM

## 2017-03-09 DIAGNOSIS — J45.20 MILD INTERMITTENT ASTHMA WITHOUT COMPLICATION: ICD-10-CM

## 2017-03-09 PROCEDURE — 1126F AMNT PAIN NOTED NONE PRSNT: CPT | Mod: S$GLB,,, | Performed by: INTERNAL MEDICINE

## 2017-03-09 PROCEDURE — 99204 OFFICE O/P NEW MOD 45 MIN: CPT | Mod: S$GLB,,, | Performed by: INTERNAL MEDICINE

## 2017-03-09 PROCEDURE — 1160F RVW MEDS BY RX/DR IN RCRD: CPT | Mod: S$GLB,,, | Performed by: INTERNAL MEDICINE

## 2017-03-09 PROCEDURE — 99499 UNLISTED E&M SERVICE: CPT | Mod: S$GLB,,, | Performed by: INTERNAL MEDICINE

## 2017-03-09 PROCEDURE — 99999 PR PBB SHADOW E&M-EST. PATIENT-LVL III: CPT | Mod: PBBFAC,,, | Performed by: INTERNAL MEDICINE

## 2017-03-09 PROCEDURE — 1159F MED LIST DOCD IN RCRD: CPT | Mod: S$GLB,,, | Performed by: INTERNAL MEDICINE

## 2017-03-09 PROCEDURE — 1157F ADVNC CARE PLAN IN RCRD: CPT | Mod: S$GLB,,, | Performed by: INTERNAL MEDICINE

## 2017-03-09 NOTE — LETTER
March 12, 2017      Octavio Ferrell MD  [address] [place] [address]           [facility]  [address]  [place] [address]  Phone: [phone]          Patient: Natalie Parnell   MR Number: [id number]   YOB: 1935   Date of Visit: 3/9/2017       Dear Dr. Octavio Ferrell:    Thank you for referring Natalie Parnell to me for evaluation. Attached you will find relevant portions of my assessment and plan of care.    If you have questions, please do not hesitate to call me. I look forward to following Natalie Parnell along with you.    Sincerely,    Jorge Bhatia MD    Enclosure  CC:  No Recipients    If you would like to receive this communication electronically, please contact [email or web address] or [phone] to request more information on [employer] Link access.    For providers and/or their staff who would like to refer a patient to [facility], please contact us through our one-stop-shop provider referral line, [facility] , at [phone].    If you feel you have received this communication in error or would no longer like to receive these types of communications, please e-mail [email or web address]

## 2017-03-11 ENCOUNTER — TELEPHONE (OUTPATIENT)
Dept: FAMILY MEDICINE | Facility: CLINIC | Age: 82
End: 2017-03-11

## 2017-03-11 ENCOUNTER — LAB VISIT (OUTPATIENT)
Dept: LAB | Facility: HOSPITAL | Age: 82
End: 2017-03-11
Attending: FAMILY MEDICINE
Payer: MEDICARE

## 2017-03-11 DIAGNOSIS — Z79.4 TYPE 2 DIABETES MELLITUS WITHOUT COMPLICATION, WITH LONG-TERM CURRENT USE OF INSULIN: Primary | ICD-10-CM

## 2017-03-11 DIAGNOSIS — E11.65 TYPE 2 DIABETES MELLITUS WITH HYPERGLYCEMIA, WITHOUT LONG-TERM CURRENT USE OF INSULIN: ICD-10-CM

## 2017-03-11 DIAGNOSIS — E11.9 TYPE 2 DIABETES MELLITUS WITHOUT COMPLICATION: ICD-10-CM

## 2017-03-11 DIAGNOSIS — E11.9 TYPE 2 DIABETES MELLITUS WITHOUT COMPLICATION, WITH LONG-TERM CURRENT USE OF INSULIN: Primary | ICD-10-CM

## 2017-03-11 LAB
ALBUMIN SERPL BCP-MCNC: 3.3 G/DL
ALP SERPL-CCNC: 167 U/L
ALT SERPL W/O P-5'-P-CCNC: 29 U/L
ANION GAP SERPL CALC-SCNC: 9 MMOL/L
AST SERPL-CCNC: 49 U/L
BILIRUB SERPL-MCNC: 1 MG/DL
BUN SERPL-MCNC: 17 MG/DL
CALCIUM SERPL-MCNC: 10 MG/DL
CHLORIDE SERPL-SCNC: 104 MMOL/L
CHOLEST/HDLC SERPL: 4.9 {RATIO}
CHOLEST/HDLC SERPL: 4.9 {RATIO}
CO2 SERPL-SCNC: 26 MMOL/L
CREAT SERPL-MCNC: 1 MG/DL
EST. GFR  (AFRICAN AMERICAN): >60 ML/MIN/1.73 M^2
EST. GFR  (NON AFRICAN AMERICAN): 53.3 ML/MIN/1.73 M^2
GLUCOSE SERPL-MCNC: 234 MG/DL
HDL/CHOLESTEROL RATIO: 20.4 %
HDL/CHOLESTEROL RATIO: 20.4 %
HDLC SERPL-MCNC: 191 MG/DL
HDLC SERPL-MCNC: 191 MG/DL
HDLC SERPL-MCNC: 39 MG/DL
HDLC SERPL-MCNC: 39 MG/DL
LDLC SERPL CALC-MCNC: 126.8 MG/DL
LDLC SERPL CALC-MCNC: 126.8 MG/DL
NONHDLC SERPL-MCNC: 152 MG/DL
NONHDLC SERPL-MCNC: 152 MG/DL
POTASSIUM SERPL-SCNC: 4.7 MMOL/L
PROT SERPL-MCNC: 7 G/DL
SODIUM SERPL-SCNC: 139 MMOL/L
TRIGL SERPL-MCNC: 126 MG/DL
TRIGL SERPL-MCNC: 126 MG/DL

## 2017-03-11 PROCEDURE — 36415 COLL VENOUS BLD VENIPUNCTURE: CPT | Mod: PO

## 2017-03-11 PROCEDURE — 80053 COMPREHEN METABOLIC PANEL: CPT

## 2017-03-11 PROCEDURE — 80061 LIPID PANEL: CPT

## 2017-03-11 PROCEDURE — 83036 HEMOGLOBIN GLYCOSYLATED A1C: CPT

## 2017-03-13 LAB
ESTIMATED AVG GLUCOSE: 240 MG/DL
ESTIMATED AVG GLUCOSE: 240 MG/DL
HBA1C MFR BLD HPLC: 10 %
HBA1C MFR BLD HPLC: 10 %

## 2017-04-03 ENCOUNTER — DOCUMENTATION ONLY (OUTPATIENT)
Dept: ADMINISTRATIVE | Facility: HOSPITAL | Age: 82
End: 2017-04-03

## 2017-04-03 NOTE — PROGRESS NOTES
"Received fax from [facility] for patients eye exam results from 3/22/17. Among results it is stated "there is no evidence of any diabetic retinopathy on exam today". Will send record for scanning.  "

## 2017-04-28 DIAGNOSIS — E11.9 DIABETES MELLITUS WITHOUT COMPLICATION: ICD-10-CM

## 2017-06-06 ENCOUNTER — OFFICE VISIT (OUTPATIENT)
Dept: FAMILY MEDICINE | Facility: CLINIC | Age: 82
End: 2017-06-06
Payer: MEDICARE

## 2017-06-06 ENCOUNTER — HOSPITAL ENCOUNTER (OUTPATIENT)
Dept: RADIOLOGY | Facility: HOSPITAL | Age: 82
Discharge: HOME | End: 2017-06-06
Attending: NURSE PRACTITIONER
Payer: MEDICARE

## 2017-06-06 VITALS
OXYGEN SATURATION: 95 % | SYSTOLIC BLOOD PRESSURE: 146 MMHG | BODY MASS INDEX: 29.94 KG/M2 | TEMPERATURE: 98 F | WEIGHT: 185.5 LBS | DIASTOLIC BLOOD PRESSURE: 78 MMHG | RESPIRATION RATE: 16 BRPM | HEART RATE: 77 BPM

## 2017-06-06 DIAGNOSIS — M25.461 SWELLING OF JOINT, KNEE, RIGHT: ICD-10-CM

## 2017-06-06 DIAGNOSIS — M25.561 ACUTE PAIN OF RIGHT KNEE: ICD-10-CM

## 2017-06-06 DIAGNOSIS — M17.11 TRICOMPARTMENT OSTEOARTHRITIS OF RIGHT KNEE: Primary | ICD-10-CM

## 2017-06-06 PROCEDURE — 73562 X-RAY EXAM OF KNEE 3: CPT | Mod: 26,RT,, | Performed by: RADIOLOGY

## 2017-06-06 PROCEDURE — 99214 OFFICE O/P EST MOD 30 MIN: CPT | Mod: S$GLB,,, | Performed by: NURSE PRACTITIONER

## 2017-06-06 PROCEDURE — 73562 X-RAY EXAM OF KNEE 3: CPT | Mod: TC,PO,RT

## 2017-06-06 PROCEDURE — 1159F MED LIST DOCD IN RCRD: CPT | Mod: S$GLB,,, | Performed by: NURSE PRACTITIONER

## 2017-06-06 PROCEDURE — 1125F AMNT PAIN NOTED PAIN PRSNT: CPT | Mod: S$GLB,,, | Performed by: NURSE PRACTITIONER

## 2017-06-06 PROCEDURE — 99999 PR PBB SHADOW E&M-EST. PATIENT-LVL V: CPT | Mod: PBBFAC,,, | Performed by: NURSE PRACTITIONER

## 2017-06-06 RX ORDER — TRAMADOL HYDROCHLORIDE 50 MG/1
50 TABLET ORAL EVERY 6 HOURS PRN
Qty: 30 TABLET | Refills: 0 | Status: SHIPPED | OUTPATIENT
Start: 2017-06-06 | End: 2017-06-16

## 2017-06-06 NOTE — PATIENT INSTRUCTIONS
Artralgia [Arthralgia]    Artralgia es el término para definir el dolor en o alrededor de las articulaciones. No es ian enfermedad  sino un síntoma. Puede abarcar ian o más articulaciones. Algunas veces las artralgias se mudan de  articulación en articulación.  Hay muchas causas del dolor de articulaciones. Éstas incluyen:  · Lesión  · Osteoartritis (por el desgaste de la superficie de la articulación)  · Artritis reumatoidea (ian enfermedad auto inmune)  · Gota (inflamación de la articulación por mu en el líquido de la articulación)  · Infección dentro de la articulación  · Infección en otras partes del cuerpo  · Bursitis (inflamación de los sacos de líquido alrededor de la articulación)  · Lupus y otras enfermedades Colágeno-Vascular  [place]:  1. Descanse la articulación o articulaciones afectadas hasta que mejoren los síntomas.  2. Puede usar acetaminofén (Tylenol) o ibuprofeno (Motrin, Advil) para controlar el dolor, a menos que  3. le receten otro medicamento para el dolor. [NOTA: Si sufre de enfermedad del hígado o riñones, o alguna vez tuvo ian úlcera estomacal o sangrado gastrointestinal, hable con yancey médico antes de usar estos medicamentos.]  Seguimiento  con yancey médico o de acuerdo a lo indicado por nuestro personal.  Busque Prontamente Atención Médica Si Algo De Lo Siguiente Ocurre:  · El dolor aumenta  · El dolor se muda a otras articulaciones  · Aparece ian nueva erupción  · Fiebre de 100.4°F (38°C) o más liborio, o jessica le haya indicado yancey proveedor de atención médica  Date Last Reviewed: 4/26/2015  © 5460-7553 [employer]. [address] [place] [address], [place] [address]. Todos los derechos reservados. Esta información no pretende sustituir la atención médica profesional. Sólo yancey médico puede diagnosticar y tratar un problema de marcos.

## 2017-06-06 NOTE — PROGRESS NOTES
Subjective:       Patient ID: Natalie Parnell is a 82 y.o. female.    Chief Complaint: Knee Pain (with swelling x's 12 days)    Knee Pain    The incident occurred more than 1 week ago. The incident occurred at home. There was no injury mechanism. The pain is present in the right knee. The quality of the pain is described as aching. The pain is at a severity of 7/10. The pain is moderate. The pain has been constant since onset. Pertinent negatives include no inability to bear weight, loss of motion, loss of sensation, muscle weakness, numbness or tingling. She reports no foreign bodies present. The symptoms are aggravated by movement, palpation and weight bearing. She has tried nothing for the symptoms.     Review of Systems   Constitutional: Negative for chills, diaphoresis, fatigue and fever.   HENT: Negative for congestion, nosebleeds, postnasal drip, rhinorrhea, sinus pressure and sneezing.    Respiratory: Negative for cough, chest tightness, shortness of breath and wheezing.    Cardiovascular: Negative for chest pain, palpitations and leg swelling.   Gastrointestinal: Negative for abdominal pain, constipation, diarrhea, nausea and vomiting.   Genitourinary: Negative for dysuria, vaginal discharge and vaginal pain.   Musculoskeletal: Positive for arthralgias (right knee), gait problem (hurts to walk on right knee) and joint swelling (right knee). Negative for back pain and myalgias.   Skin: Negative for color change and rash.   Neurological: Negative for dizziness, tingling, weakness, light-headedness, numbness and headaches.       Objective:      Physical Exam   Constitutional: She is oriented to person, place, and time. Vital signs are normal. She appears well-developed and well-nourished.   Cardiovascular: Normal rate, regular rhythm and normal heart sounds.    Pulmonary/Chest: Effort normal and breath sounds normal.   Musculoskeletal:        Right knee: She exhibits swelling and bony tenderness. She exhibits  normal range of motion, no effusion, no deformity, no laceration, no erythema and normal alignment. Tenderness found. Medial joint line and lateral joint line tenderness noted.   Neurological: She is alert and oriented to person, place, and time.   Skin: Skin is warm, dry and intact.   Psychiatric: She has a normal mood and affect.       Assessment:       1. Tricompartment osteoarthritis of right knee    2. Acute pain of right knee    3. Swelling of joint, knee, right        Plan:       Natalie was seen today for knee pain.    Diagnoses and all orders for this visit:    Tricompartment osteoarthritis of right knee  -     tramadol (ULTRAM) 50 mg tablet; Take 1 tablet (50 mg total) by mouth every 6 (six) hours as needed for Pain.  -     Ambulatory referral to Orthopedics    Acute pain of right knee  -     tramadol (ULTRAM) 50 mg tablet; Take 1 tablet (50 mg total) by mouth every 6 (six) hours as needed for Pain.  -     X-Ray Knee 3 View Right; Future  -     Ambulatory referral to Orthopedics    Swelling of joint, knee, right  -     tramadol (ULTRAM) 50 mg tablet; Take 1 tablet (50 mg total) by mouth every 6 (six) hours as needed for Pain.  -     X-Ray Knee 3 View Right; Future  -     Ambulatory referral to Orthopedics        Patient discharged to home in stable condition with instructions to:   1. Please take all meds as prescribed.  2. Follow-up with your primary care doctor   3. Return precautions discussed and patient and/or family/caretaker understands to return to the emergency room for any concerns including worsening of your current symptoms, fever, chills, night sweats, worsening pain, chest pain, shortness of breath, nausea, vomiting, diarrhea, bleeding, headache, difficulty talking, visual disturbances, weakness, numbness or any other acute concerns

## 2017-06-08 ENCOUNTER — TELEPHONE (OUTPATIENT)
Dept: FAMILY MEDICINE | Facility: CLINIC | Age: 82
End: 2017-06-08

## 2017-06-08 NOTE — TELEPHONE ENCOUNTER
Was trying to get in touch with patient and reference about X-ray results , no one answer , left message .

## 2017-06-08 NOTE — TELEPHONE ENCOUNTER
SPOKE WITH PATIENT CONCERNING XRAY RESULTS. PATIENT DOES NOT SPEAK ENGLISH AND PUT HER GRANDSON ON THE PHONE. PATIENT WAS REMINDED TO KEEP APPT WITH ORTHOPEDICS TOMORROW. PATIENT VERBALIZED UNDERSTANDING. INSTRUCTED PATIENT TO CALL THE CLINIC IF SHE HAS ANY FURTHER QUESTIONS OR CONCERNS.

## 2017-06-08 NOTE — TELEPHONE ENCOUNTER
----- Message from Alberto Kathleen NP sent at 6/6/2017  3:12 PM CDT -----  Please Notify patient that  Her xray show mild tricompartmental degenerative changes.  please continue with current recommendation

## 2017-06-09 ENCOUNTER — HOSPITAL ENCOUNTER (OUTPATIENT)
Dept: RADIOLOGY | Facility: HOSPITAL | Age: 82
Discharge: HOME | End: 2017-06-09
Attending: ORTHOPAEDIC SURGERY
Payer: MEDICARE

## 2017-06-09 ENCOUNTER — OFFICE VISIT (OUTPATIENT)
Dept: ORTHOPEDICS | Facility: CLINIC | Age: 82
End: 2017-06-09
Payer: MEDICARE

## 2017-06-09 VITALS — WEIGHT: 183.31 LBS | BODY MASS INDEX: 29.46 KG/M2 | HEIGHT: 66 IN

## 2017-06-09 DIAGNOSIS — M17.11 PRIMARY OSTEOARTHRITIS OF RIGHT KNEE: Primary | ICD-10-CM

## 2017-06-09 DIAGNOSIS — M25.561 RIGHT KNEE PAIN, UNSPECIFIED CHRONICITY: ICD-10-CM

## 2017-06-09 PROCEDURE — 99999 PR PBB SHADOW E&M-EST. PATIENT-LVL III: CPT | Mod: PBBFAC,,, | Performed by: PHYSICIAN ASSISTANT

## 2017-06-09 PROCEDURE — 1159F MED LIST DOCD IN RCRD: CPT | Mod: S$GLB,,, | Performed by: PHYSICIAN ASSISTANT

## 2017-06-09 PROCEDURE — 73560 X-RAY EXAM OF KNEE 1 OR 2: CPT | Mod: 26,50,, | Performed by: RADIOLOGY

## 2017-06-09 PROCEDURE — 99203 OFFICE O/P NEW LOW 30 MIN: CPT | Mod: 25,S$GLB,, | Performed by: PHYSICIAN ASSISTANT

## 2017-06-09 PROCEDURE — 20610 DRAIN/INJ JOINT/BURSA W/O US: CPT | Mod: RT,S$GLB,, | Performed by: PHYSICIAN ASSISTANT

## 2017-06-09 PROCEDURE — 73560 X-RAY EXAM OF KNEE 1 OR 2: CPT | Mod: 50,TC

## 2017-06-09 RX ORDER — METHYLPREDNISOLONE ACETATE 80 MG/ML
80 INJECTION, SUSPENSION INTRA-ARTICULAR; INTRALESIONAL; INTRAMUSCULAR; SOFT TISSUE
Status: COMPLETED | OUTPATIENT
Start: 2017-06-09 | End: 2017-06-09

## 2017-06-09 RX ORDER — MELOXICAM 15 MG/1
15 TABLET ORAL DAILY
Qty: 30 TABLET | Refills: 1 | Status: SHIPPED | OUTPATIENT
Start: 2017-06-09 | End: 2017-07-09

## 2017-06-09 RX ADMIN — METHYLPREDNISOLONE ACETATE 80 MG: 80 INJECTION, SUSPENSION INTRA-ARTICULAR; INTRALESIONAL; INTRAMUSCULAR; SOFT TISSUE at 09:06

## 2017-06-09 NOTE — LETTER
June 9, 2017      Alberto Kathleen, AMBIKA  [address]  [place] [address]           [facility]  [address]  [place] [address]  Phone: [phone]          Patient: Natalie Parnell   MR Number: [id number]   YOB: 1935   Date of Visit: 6/9/2017       Dear Alberto Kathleen:    Thank you for referring Natalie Parnell to me for evaluation. Attached you will find relevant portions of my assessment and plan of care.    If you have questions, please do not hesitate to call me. I look forward to following Natalie Parnell along with you.    Sincerely,    Brianne Locke PA-C    Enclosure  CC:  No Recipients    If you would like to receive this communication electronically, please contact [email or web address] or [phone] to request more information on [employer] access.    For providers and/or their staff who would like to refer a patient to [facility], please contact us through our one-stop-shop provider referral line, [facility], at [phone].    If you feel you have received this communication in error or would no longer like to receive these types of communications, please e-mail [email or web address]

## 2017-06-09 NOTE — PROGRESS NOTES
Subjective:      Patient ID: Natalie Parnell is a 82 y.o. female.    Chief Complaint: No chief complaint on file.    HPI  82 year old female presents with chief complaint of right knee pain x 12 days. She denies trauma. Pain is medial. It is worse with walking. She is taking tramadol. She does not take any nsaids. She reports swelling and cracking. She does not use assistive devices.   Review of Systems   Constitution: Negative for chills, fever and night sweats.   Cardiovascular: Negative for chest pain.   Respiratory: Negative for cough and shortness of breath.    Hematologic/Lymphatic: Does not bruise/bleed easily.   Skin: Negative for color change.   Gastrointestinal: Negative for heartburn.   Genitourinary: Negative for dysuria.   Neurological: Negative for numbness and paresthesias.   Psychiatric/Behavioral: Negative for altered mental status.   Allergic/Immunologic: Negative for persistent infections.         Objective:            General    Vitals reviewed.  Constitutional: She is oriented to person, place, and time. She appears well-developed and well-nourished.   Cardiovascular: Normal rate.    Neurological: She is alert and oriented to person, place, and time.             Right Knee Exam:  ROM 0-120 degrees  +crepitus  No effusion  TTP medial and lateral joint line      X-ray: ordered and reviewed by myself. DJD present.       Assessment:       Encounter Diagnosis   Name Primary?    Primary osteoarthritis of right knee Yes          Plan:       Discussed treatment options with patient. She would like to try a cortisone injection. Prescription for mobic sent to pharmacy. RTC prn.     PROCEDURE:  I have explained the risks, benefits, and alternatives of the procedure in detail.  The patient voices understanding and all questions have been answered.  The patient agrees to proceed as planned. So after I performed a sterile prep of the skin in the normal fashion the right knee is injected using a 22 gauge needle  from the anteromedial approach with a combination of 4cc 1% plain lidocaine and 80 mg of depo medrol.  The patient is cautioned and immediate relief of pain is secondary to the local anesthetic and will be temporary.  After the anesthetic wears off there may be a increase in pain that may last for a few hours or a few days and they should use ice to help alleviate this flair up of pain.

## 2017-06-19 ENCOUNTER — OFFICE VISIT (OUTPATIENT)
Dept: FAMILY MEDICINE | Facility: CLINIC | Age: 82
End: 2017-06-19
Payer: MEDICARE

## 2017-06-19 VITALS
DIASTOLIC BLOOD PRESSURE: 32 MMHG | SYSTOLIC BLOOD PRESSURE: 128 MMHG | HEIGHT: 66 IN | TEMPERATURE: 98 F | BODY MASS INDEX: 29.48 KG/M2 | HEART RATE: 76 BPM | OXYGEN SATURATION: 96 % | WEIGHT: 183.44 LBS

## 2017-06-19 DIAGNOSIS — H10.13 ALLERGIC CONJUNCTIVITIS, BILATERAL: Primary | ICD-10-CM

## 2017-06-19 DIAGNOSIS — E11.65 TYPE 2 DIABETES MELLITUS WITH HYPERGLYCEMIA, WITHOUT LONG-TERM CURRENT USE OF INSULIN: ICD-10-CM

## 2017-06-19 PROCEDURE — 99214 OFFICE O/P EST MOD 30 MIN: CPT | Mod: S$GLB,,, | Performed by: NURSE PRACTITIONER

## 2017-06-19 PROCEDURE — 99999 PR PBB SHADOW E&M-EST. PATIENT-LVL V: CPT | Mod: PBBFAC,,, | Performed by: NURSE PRACTITIONER

## 2017-06-19 PROCEDURE — 99499 UNLISTED E&M SERVICE: CPT | Mod: S$GLB,,, | Performed by: NURSE PRACTITIONER

## 2017-06-19 PROCEDURE — 1159F MED LIST DOCD IN RCRD: CPT | Mod: S$GLB,,, | Performed by: NURSE PRACTITIONER

## 2017-06-19 RX ORDER — NEOMYCIN SULFATE, POLYMYXIN B SULFATE AND DEXAMETHASONE 3.5; 10000; 1 MG/ML; [USP'U]/ML; MG/ML
SUSPENSION/ DROPS OPHTHALMIC
Refills: 2 | COMMUNITY
Start: 2017-06-10 | End: 2018-01-01

## 2017-06-19 RX ORDER — OLOPATADINE HYDROCHLORIDE 1 MG/ML
1 SOLUTION/ DROPS OPHTHALMIC 2 TIMES DAILY
Qty: 5 ML | Refills: 0 | Status: SHIPPED | OUTPATIENT
Start: 2017-06-19 | End: 2018-01-01

## 2017-06-19 NOTE — PROGRESS NOTES
"History of Present Illness   Natalie Parnell is a 82 y.o. woman with history of Diabetes Mellitus Type 2 who presents today for evaluation of itchy, watery eyes. Symptoms have been present for about one week. She complains of morning crusting with watery drainage throughout the day. She also complains of eye redness with itching and foreign body sensation in eyes. She was seen by PCP for similar symptoms and was given antibiotic drops with no relief. She has no additional complaints and is otherwise healthy on today's visit.    History reviewed. No pertinent past medical history.    History reviewed. No pertinent surgical history.    Social History     Social History    Marital status: Single     Spouse name: N/A    Number of children: N/A    Years of education: N/A     Social History Main Topics    Smoking status: Never Smoker    Smokeless tobacco: Never Used    Alcohol use None    Drug use: Unknown    Sexual activity: Not Asked     Other Topics Concern    None     Social History Narrative    None       History reviewed. No pertinent family history.    Review of Systems  Review of Systems   Constitutional: Negative for chills and fever.   HENT: Negative for congestion, ear pain and sore throat.    Eyes: Positive for pain, discharge and redness. Negative for blurred vision.   Endo/Heme/Allergies: Positive for environmental allergies.     A complete review of systems was otherwise negative.    Physical Exam  BP (!) 128/32 (BP Location: Right arm, Patient Position: Sitting, BP Method: Manual)   Pulse 76   Temp 98.2 °F (36.8 °C) (Oral)   Ht 5' 6" (1.676 m)   Wt 83.2 kg (183 lb 6.8 oz)   SpO2 96%   BMI 29.61 kg/m²   General appearance: alert, appears stated age, cooperative and no distress  Eyes: positive findings: conjunctiva: 2+ allergic conjunctivitis  Nose: Nares normal. Septum midline. Mucosa normal. No drainage or sinus tenderness.  Throat: lips, mucosa, and tongue normal; teeth and gums normal  Skin: " Skin color, texture, turgor normal. No rashes or lesions  Lymph nodes: Cervical, supraclavicular, and axillary nodes normal.  Neurologic: Grossly normal    Assessment/Plan  Allergic conjunctivitis, bilateral  Appears allergic.  Treat with Patanol twice daily.  May use Zyrtec, Claritin, or Allegra as needed.  -     olopatadine (PATANOL) 0.1 % ophthalmic solution; Place 1 drop into both eyes 2 (two) times daily.  Dispense: 5 mL; Refill: 0  -     Ambulatory referral to Ophthalmology    Type 2 diabetes mellitus with hyperglycemia, without long-term current use of insulin  The current medical regimen is effective;  continue present plan and medications.  Due for diabetic eye exam, referral placed today.  -     Ambulatory referral to Ophthalmology    She has verbalized understanding and is in agreement with plan of care.  Return if symptoms worsen or fail to improve.

## 2017-06-19 NOTE — PATIENT INSTRUCTIONS
Conjunctivitis, Allergic    Conjunctivitis is an irritation of a thin membrane in the eye. This membrane is called the conjunctiva. It covers the white of the eye and the inside of the eyelid. The condition is often known as pink eye or red eye because the eye looks pink or red. The eye can also be swollen. A thick fluid may leak from the eyelid. The eye may itch and burn, and feel gritty or scratchy.  Allergic conjunctivitis is caused by an allergen. Allergens are substances that cause the body to react with certain symptoms. Allergens that cause eye irritation include things such as house dust or pollen in the air. This can occur seasonally, most often in the spring.  Home care  · Eye drops may be prescribed to reduce itching and redness. Use these as directed. Otherwise, over-the-counter decongestant eye drops may be used.  · Apply a cool compress (towel soaked in cool water) to the affected eye 3 to 4 times a day to reduce swelling and itching.  · It is common to have mucus drainage during the night, causing the eyelids to become crusted by morning. Use a warm, wet cloth to wipe this away. You may also use saline irrigating solution or artificial tears to rinse away mucus in the eye. Do not patch the eye.  · You may use acetaminophen or ibuprofen to control pain, unless another medicine was prescribed. (Note: If you have chronic liver or kidney disease, or if you have ever had a stomach ulcer or gastrointestinal bleeding, talk with your healthcare provider before using these medicines.)  · Do not wear contact lenses until your eyes have healed and all symptoms are gone.  Follow-up care  Follow up with your healthcare provider, or as advised.  When to seek medical advice  Call your healthcare provider right away if any of these occur:  · Increased eyelid swelling  · New or worsening drainage from the eye  · Increasing redness around the eye  · Facial swelling  Date Last Reviewed: 6/14/2015  © 0863-0509 [employer]. [address] [place] [address], [place] [address]. All rights reserved. This information is not intended as a substitute for professional medical care. Always follow your healthcare professional's instructions.

## 2017-06-28 ENCOUNTER — OFFICE VISIT (OUTPATIENT)
Dept: OPTOMETRY | Facility: CLINIC | Age: 82
End: 2017-06-28
Payer: MEDICARE

## 2017-06-28 DIAGNOSIS — E11.9 TYPE 2 DIABETES MELLITUS WITHOUT RETINOPATHY: ICD-10-CM

## 2017-06-28 DIAGNOSIS — H52.7 REFRACTIVE ERROR: ICD-10-CM

## 2017-06-28 DIAGNOSIS — H18.413 ARCUS SENILIS OF BOTH CORNEAS: ICD-10-CM

## 2017-06-28 DIAGNOSIS — Z96.1 PSEUDOPHAKIA, BOTH EYES: ICD-10-CM

## 2017-06-28 DIAGNOSIS — H10.13 ALLERGIC CONJUNCTIVITIS, BILATERAL: Primary | ICD-10-CM

## 2017-06-28 PROCEDURE — 92004 COMPRE OPH EXAM NEW PT 1/>: CPT | Mod: S$GLB,,, | Performed by: OPTOMETRIST

## 2017-06-28 PROCEDURE — 99499 UNLISTED E&M SERVICE: CPT | Mod: S$GLB,,, | Performed by: OPTOMETRIST

## 2017-06-28 PROCEDURE — 92015 DETERMINE REFRACTIVE STATE: CPT | Mod: S$GLB,,, | Performed by: OPTOMETRIST

## 2017-06-28 PROCEDURE — 99999 PR PBB SHADOW E&M-EST. PATIENT-LVL II: CPT | Mod: PBBFAC,,, | Performed by: OPTOMETRIST

## 2017-06-28 RX ORDER — AZELASTINE HYDROCHLORIDE 0.5 MG/ML
1 SOLUTION/ DROPS OPHTHALMIC 2 TIMES DAILY
Qty: 6 ML | Refills: 6 | Status: ON HOLD | OUTPATIENT
Start: 2017-06-28 | End: 2018-01-01 | Stop reason: HOSPADM

## 2017-06-28 NOTE — LETTER
June 28, 2017      Caroline Hernandez, AMBIKA  [address]  [place] [address]           [facility]  [address]  [place] [address]  Phone: [phone]  Fax: [phone]          Patient: Natalie Parnell   MR Number: [id number]   YOB: 1935   Date of Visit: 6/28/2017       Dear Caroline Hernandez:    Thank you for referring Natalie Parnell to me for evaluation. Attached you will find relevant portions of my assessment and plan of care.    If you have questions, please do not hesitate to call me. I look forward to following Natalie Parnell along with you.    Sincerely,    Florence Jacques, OD    Enclosure  CC:  No Recipients    If you would like to receive this communication electronically, please contact [email or web address] or [phone] to request more information on [employer] Link access.    For providers and/or their staff who would like to refer a patient to [facility], please contact us through our one-stop-shop provider referral line, [facility], at [phone].    If you feel you have received this communication in error or would no longer like to receive these types of communications, please e-mail [email or web address]

## 2017-06-28 NOTE — PROGRESS NOTES
Subjective:       Patient ID: Natalie Parnell is a 82 y.o. female      Chief Complaint   Patient presents with    Eye Problem    Diabetic Eye Exam     NIDDM 2, A1c = 10.0 (3/11/17), LBS unknown     History of Present Illness  Dls: 6 months Dr. max    Pt Faroese speaking, daughter here as     Pt states x 3 months  itching os > od mucous (white) ou red ou pain ou   9-10. Pt was seen by Dr. Max x 3 months ago was given kenny/poly/ dex gtts ou qid no improvement pt stopped gtts x 2weeks ago. Pt saw NP last week, was rx patanol drops, did not . Was also recommended to use oral antihistamine PRN.     Eye meds:   None    Hemoglobin A1C       Date                     Value               Ref Range           Status                03/11/2017               10.0 (H)            4.5 - 6.2 %         Final                  03/11/2017               10.0 (H)            4.5 - 6.2 %         Final                  06/01/2016               8.2 (H)             4.5 - 6.2 %         Final            ----------        Assessment/Plan:     1. Allergic conjunctivitis, bilateral  Discussed with pt. Was given patanol BID OU by NP but did not fill (? Cost per patient). Optivar BID OU sent to pharmacy, if pt unable to get drops, can use alaway/zaditor BID OU OTC PRN for itchiness. Pt to supplement with AT QID OU, especially if taking oral anti-histamines. Daughter verbalized understanding.   - azelastine (OPTIVAR) 0.05 % ophthalmic solution; Place 1 drop into both eyes 2 (two) times daily.  Dispense: 6 mL; Refill: 6    2. Type 2 diabetes mellitus without retinopathy  No diabetic retinopathy. Educated patient on importance of good blood sugar control, compliance with meds, and follow up care with PCP. Return in 1 year for dilated eye exam, sooner PRN.    3. Arcus senilis of both corneas  Reassured pt. Monitor.    4. Pseudophakia, both eyes  Monovision IOL. OD near, OS distance. Well-centered. Clear.     5. Refractive error  Pt  requested glasses. Discussed with her that she has monovision IOL. Educated patient on refractive error and discussed lens options. Dispensed updated spectacle Rx. Educated about adaptation period to new specs.    Eyeglass Final Rx     Eyeglass Final Rx       Sphere Cylinder Axis Add    Right -1.50 +1.00 020 +2.50    Left -0.50 +1.00 180 +2.50    Type:  Bifocal    Expiration Date:  6/29/2018                Return in about 1 year (around 6/28/2018) for Diabetic Eye Exam.

## 2017-07-07 DIAGNOSIS — E11.8 DIABETES MELLITUS WITH COMPLICATION: ICD-10-CM

## 2017-07-07 RX ORDER — METFORMIN HYDROCHLORIDE 500 MG/1
500 TABLET ORAL 2 TIMES DAILY WITH MEALS
Qty: 180 TABLET | Refills: 3 | Status: SHIPPED | OUTPATIENT
Start: 2017-07-07 | End: 2017-01-01 | Stop reason: SDUPTHER

## 2017-07-07 NOTE — TELEPHONE ENCOUNTER
----- Message from Vida Laguerre sent at 7/7/2017  8:08 AM CDT -----  Contact: [phone]  Pt requesting a refill on rx metformin (GLUCOPHAGE) 500 MG tablet sent to [facility] [phone] . Pt needs medication today , states she is out of medication .Please advise

## 2018-01-01 ENCOUNTER — TELEPHONE (OUTPATIENT)
Dept: CARDIOLOGY | Facility: CLINIC | Age: 83
End: 2018-01-01

## 2018-01-01 ENCOUNTER — TELEPHONE (OUTPATIENT)
Dept: NEUROLOGY | Facility: HOSPITAL | Age: 83
End: 2018-01-01

## 2018-01-01 ENCOUNTER — TELEPHONE (OUTPATIENT)
Dept: PRIMARY CARE CLINIC | Facility: CLINIC | Age: 83
End: 2018-01-01

## 2018-01-01 ENCOUNTER — TELEPHONE (OUTPATIENT)
Dept: ADMINISTRATIVE | Facility: CLINIC | Age: 83
End: 2018-01-01

## 2018-01-01 ENCOUNTER — HOSPITAL ENCOUNTER (OUTPATIENT)
Facility: HOSPITAL | Age: 83
Discharge: HOME | End: 2018-03-02
Attending: EMERGENCY MEDICINE | Admitting: INTERNAL MEDICINE
Payer: MEDICARE

## 2018-01-01 ENCOUNTER — HOSPITAL ENCOUNTER (INPATIENT)
Facility: HOSPITAL | Age: 83
LOS: 1 days | DRG: 293 | End: 2018-09-11
Attending: FAMILY MEDICINE | Admitting: FAMILY MEDICINE
Payer: OTHER MISCELLANEOUS

## 2018-01-01 ENCOUNTER — OFFICE VISIT (OUTPATIENT)
Dept: PRIMARY CARE CLINIC | Facility: CLINIC | Age: 83
End: 2018-01-01
Payer: MEDICARE

## 2018-01-01 ENCOUNTER — TELEPHONE (OUTPATIENT)
Dept: HEPATOLOGY | Facility: CLINIC | Age: 83
End: 2018-01-01

## 2018-01-01 ENCOUNTER — CONFERENCE (OUTPATIENT)
Dept: TRANSPLANT | Facility: CLINIC | Age: 83
End: 2018-01-01

## 2018-01-01 ENCOUNTER — CLINICAL SUPPORT (OUTPATIENT)
Dept: DIABETES | Facility: CLINIC | Age: 83
End: 2018-01-01
Payer: MEDICARE

## 2018-01-01 ENCOUNTER — TELEPHONE (OUTPATIENT)
Dept: FAMILY MEDICINE | Facility: CLINIC | Age: 83
End: 2018-01-01

## 2018-01-01 ENCOUNTER — OFFICE VISIT (OUTPATIENT)
Dept: FAMILY MEDICINE | Facility: CLINIC | Age: 83
End: 2018-01-01
Payer: MEDICARE

## 2018-01-01 ENCOUNTER — INITIAL CONSULT (OUTPATIENT)
Dept: CARDIOLOGY | Facility: CLINIC | Age: 83
End: 2018-01-01
Payer: MEDICARE

## 2018-01-01 ENCOUNTER — OFFICE VISIT (OUTPATIENT)
Dept: CARDIOLOGY | Facility: CLINIC | Age: 83
End: 2018-01-01
Payer: MEDICARE

## 2018-01-01 ENCOUNTER — DOCUMENTATION ONLY (OUTPATIENT)
Dept: TRANSPLANT | Facility: CLINIC | Age: 83
End: 2018-01-01

## 2018-01-01 ENCOUNTER — HOSPITAL ENCOUNTER (INPATIENT)
Facility: HOSPITAL | Age: 83
LOS: 2 days | Discharge: HOME HEALTH | DRG: 280 | End: 2018-07-17
Attending: EMERGENCY MEDICINE | Admitting: INTERNAL MEDICINE
Payer: MEDICARE

## 2018-01-01 ENCOUNTER — PATIENT MESSAGE (OUTPATIENT)
Dept: CARDIOLOGY | Facility: CLINIC | Age: 83
End: 2018-01-01

## 2018-01-01 ENCOUNTER — OFFICE VISIT (OUTPATIENT)
Dept: NEUROLOGY | Facility: HOSPITAL | Age: 83
End: 2018-01-01
Attending: INTERNAL MEDICINE
Payer: MEDICARE

## 2018-01-01 ENCOUNTER — PES CALL (OUTPATIENT)
Dept: ADMINISTRATIVE | Facility: CLINIC | Age: 83
End: 2018-01-01

## 2018-01-01 ENCOUNTER — ANESTHESIA EVENT (OUTPATIENT)
Dept: ENDOSCOPY | Facility: HOSPITAL | Age: 83
DRG: 280 | End: 2018-01-01
Payer: MEDICARE

## 2018-01-01 ENCOUNTER — ANESTHESIA (OUTPATIENT)
Dept: ENDOSCOPY | Facility: HOSPITAL | Age: 83
DRG: 280 | End: 2018-01-01
Payer: MEDICARE

## 2018-01-01 ENCOUNTER — ANESTHESIA (OUTPATIENT)
Dept: ENDOSCOPY | Facility: HOSPITAL | Age: 83
DRG: 377 | End: 2018-01-01
Payer: MEDICARE

## 2018-01-01 ENCOUNTER — HOSPITAL ENCOUNTER (INPATIENT)
Facility: HOSPITAL | Age: 83
LOS: 2 days | Discharge: HOME HEALTH | DRG: 377 | End: 2018-07-11
Attending: EMERGENCY MEDICINE | Admitting: INTERNAL MEDICINE
Payer: MEDICARE

## 2018-01-01 ENCOUNTER — TELEPHONE (OUTPATIENT)
Dept: CARDIOTHORACIC SURGERY | Facility: CLINIC | Age: 83
End: 2018-01-01

## 2018-01-01 ENCOUNTER — CLINICAL SUPPORT (OUTPATIENT)
Dept: CARDIOLOGY | Facility: CLINIC | Age: 83
End: 2018-01-01
Attending: INTERNAL MEDICINE
Payer: MEDICARE

## 2018-01-01 ENCOUNTER — HOSPITAL ENCOUNTER (INPATIENT)
Facility: HOSPITAL | Age: 83
LOS: 7 days | Discharge: HOSPICE | DRG: 280 | End: 2018-09-11
Attending: EMERGENCY MEDICINE | Admitting: INTERNAL MEDICINE
Payer: MEDICARE

## 2018-01-01 ENCOUNTER — PATIENT OUTREACH (OUTPATIENT)
Dept: ADMINISTRATIVE | Facility: CLINIC | Age: 83
End: 2018-01-01

## 2018-01-01 ENCOUNTER — HOSPITAL ENCOUNTER (OUTPATIENT)
Dept: PULMONOLOGY | Facility: CLINIC | Age: 83
Discharge: HOME | End: 2018-03-23
Payer: MEDICARE

## 2018-01-01 ENCOUNTER — HOSPITAL ENCOUNTER (INPATIENT)
Facility: HOSPITAL | Age: 83
LOS: 6 days | Discharge: HOME HEALTH | DRG: 216 | End: 2018-08-02
Admitting: INTERNAL MEDICINE
Payer: MEDICARE

## 2018-01-01 ENCOUNTER — OUTPATIENT CASE MANAGEMENT (OUTPATIENT)
Dept: ADMINISTRATIVE | Facility: OTHER | Age: 83
End: 2018-01-01

## 2018-01-01 ENCOUNTER — NURSE TRIAGE (OUTPATIENT)
Dept: ADMINISTRATIVE | Facility: CLINIC | Age: 83
End: 2018-01-01

## 2018-01-01 ENCOUNTER — TELEPHONE (OUTPATIENT)
Dept: TRANSPLANT | Facility: CLINIC | Age: 83
End: 2018-01-01

## 2018-01-01 ENCOUNTER — LAB VISIT (OUTPATIENT)
Dept: LAB | Facility: HOSPITAL | Age: 83
End: 2018-01-01
Attending: FAMILY MEDICINE
Payer: MEDICARE

## 2018-01-01 ENCOUNTER — HOSPITAL ENCOUNTER (INPATIENT)
Facility: HOSPITAL | Age: 83
LOS: 18 days | Discharge: HOME HEALTH | DRG: 280 | End: 2018-08-21
Attending: EMERGENCY MEDICINE | Admitting: HOSPITALIST
Payer: MEDICARE

## 2018-01-01 ENCOUNTER — ANESTHESIA EVENT (OUTPATIENT)
Dept: ENDOSCOPY | Facility: HOSPITAL | Age: 83
DRG: 377 | End: 2018-01-01
Payer: MEDICARE

## 2018-01-01 VITALS
DIASTOLIC BLOOD PRESSURE: 80 MMHG | HEART RATE: 85 BPM | SYSTOLIC BLOOD PRESSURE: 120 MMHG | OXYGEN SATURATION: 97 % | WEIGHT: 176.38 LBS | HEIGHT: 64 IN | BODY MASS INDEX: 30.11 KG/M2

## 2018-01-01 VITALS
RESPIRATION RATE: 16 BRPM | SYSTOLIC BLOOD PRESSURE: 93 MMHG | OXYGEN SATURATION: 98 % | HEIGHT: 66 IN | WEIGHT: 160.5 LBS | HEART RATE: 80 BPM | DIASTOLIC BLOOD PRESSURE: 51 MMHG | BODY MASS INDEX: 25.79 KG/M2 | TEMPERATURE: 98 F

## 2018-01-01 VITALS
BODY MASS INDEX: 29.17 KG/M2 | HEART RATE: 103 BPM | WEIGHT: 175.06 LBS | HEIGHT: 65 IN | SYSTOLIC BLOOD PRESSURE: 84 MMHG | DIASTOLIC BLOOD PRESSURE: 48 MMHG

## 2018-01-01 VITALS
SYSTOLIC BLOOD PRESSURE: 114 MMHG | DIASTOLIC BLOOD PRESSURE: 53 MMHG | WEIGHT: 181.69 LBS | HEART RATE: 84 BPM | OXYGEN SATURATION: 95 % | HEIGHT: 64 IN | BODY MASS INDEX: 31.02 KG/M2

## 2018-01-01 VITALS
BODY MASS INDEX: 30.48 KG/M2 | WEIGHT: 178.56 LBS | DIASTOLIC BLOOD PRESSURE: 60 MMHG | HEART RATE: 82 BPM | HEIGHT: 64 IN | SYSTOLIC BLOOD PRESSURE: 110 MMHG

## 2018-01-01 VITALS
HEART RATE: 80 BPM | WEIGHT: 173 LBS | HEIGHT: 65 IN | BODY MASS INDEX: 28.82 KG/M2 | DIASTOLIC BLOOD PRESSURE: 60 MMHG | SYSTOLIC BLOOD PRESSURE: 90 MMHG

## 2018-01-01 VITALS
HEIGHT: 62 IN | OXYGEN SATURATION: 99 % | HEART RATE: 80 BPM | WEIGHT: 182 LBS | SYSTOLIC BLOOD PRESSURE: 96 MMHG | BODY MASS INDEX: 33.49 KG/M2 | DIASTOLIC BLOOD PRESSURE: 64 MMHG

## 2018-01-01 VITALS
WEIGHT: 180.75 LBS | HEIGHT: 64 IN | WEIGHT: 181 LBS | HEART RATE: 84 BPM | HEART RATE: 97 BPM | BODY MASS INDEX: 30.9 KG/M2 | BODY MASS INDEX: 30.86 KG/M2 | SYSTOLIC BLOOD PRESSURE: 119 MMHG | DIASTOLIC BLOOD PRESSURE: 66 MMHG | SYSTOLIC BLOOD PRESSURE: 123 MMHG | DIASTOLIC BLOOD PRESSURE: 68 MMHG | RESPIRATION RATE: 20 BRPM | HEIGHT: 64 IN | TEMPERATURE: 98 F

## 2018-01-01 VITALS
HEART RATE: 81 BPM | TEMPERATURE: 99 F | BODY MASS INDEX: 29.12 KG/M2 | RESPIRATION RATE: 20 BRPM | WEIGHT: 181.19 LBS | DIASTOLIC BLOOD PRESSURE: 62 MMHG | SYSTOLIC BLOOD PRESSURE: 126 MMHG | HEIGHT: 66 IN | OXYGEN SATURATION: 95 %

## 2018-01-01 VITALS
HEIGHT: 66 IN | WEIGHT: 173.5 LBS | HEART RATE: 91 BPM | OXYGEN SATURATION: 96 % | WEIGHT: 176.38 LBS | RESPIRATION RATE: 18 BRPM | TEMPERATURE: 97 F | BODY MASS INDEX: 27.88 KG/M2 | DIASTOLIC BLOOD PRESSURE: 52 MMHG | BODY MASS INDEX: 28.34 KG/M2 | DIASTOLIC BLOOD PRESSURE: 59 MMHG | OXYGEN SATURATION: 98 % | HEART RATE: 90 BPM | RESPIRATION RATE: 16 BRPM | TEMPERATURE: 98 F | HEIGHT: 66 IN | SYSTOLIC BLOOD PRESSURE: 105 MMHG | SYSTOLIC BLOOD PRESSURE: 95 MMHG

## 2018-01-01 VITALS
OXYGEN SATURATION: 97 % | HEART RATE: 89 BPM | HEIGHT: 64 IN | BODY MASS INDEX: 30.39 KG/M2 | DIASTOLIC BLOOD PRESSURE: 72 MMHG | WEIGHT: 178 LBS | SYSTOLIC BLOOD PRESSURE: 113 MMHG

## 2018-01-01 VITALS
OXYGEN SATURATION: 98 % | BODY MASS INDEX: 29.49 KG/M2 | WEIGHT: 177 LBS | HEIGHT: 65 IN | HEART RATE: 53 BPM | DIASTOLIC BLOOD PRESSURE: 46 MMHG | SYSTOLIC BLOOD PRESSURE: 67 MMHG

## 2018-01-01 VITALS
WEIGHT: 188.25 LBS | HEART RATE: 80 BPM | BODY MASS INDEX: 34.64 KG/M2 | HEIGHT: 62 IN | RESPIRATION RATE: 25 BRPM | TEMPERATURE: 98 F | SYSTOLIC BLOOD PRESSURE: 118 MMHG | DIASTOLIC BLOOD PRESSURE: 58 MMHG | OXYGEN SATURATION: 95 %

## 2018-01-01 VITALS
WEIGHT: 185.19 LBS | SYSTOLIC BLOOD PRESSURE: 93 MMHG | OXYGEN SATURATION: 89 % | HEIGHT: 65 IN | TEMPERATURE: 98 F | BODY MASS INDEX: 30.85 KG/M2 | HEART RATE: 123 BPM | DIASTOLIC BLOOD PRESSURE: 56 MMHG | RESPIRATION RATE: 41 BRPM

## 2018-01-01 VITALS
OXYGEN SATURATION: 94 % | SYSTOLIC BLOOD PRESSURE: 110 MMHG | DIASTOLIC BLOOD PRESSURE: 58 MMHG | BODY MASS INDEX: 28.11 KG/M2 | WEIGHT: 174.19 LBS | HEART RATE: 80 BPM | TEMPERATURE: 99 F

## 2018-01-01 VITALS — SYSTOLIC BLOOD PRESSURE: 66 MMHG | DIASTOLIC BLOOD PRESSURE: 40 MMHG | OXYGEN SATURATION: 54 %

## 2018-01-01 VITALS — WEIGHT: 181 LBS | HEIGHT: 63 IN | BODY MASS INDEX: 32.07 KG/M2

## 2018-01-01 VITALS — WEIGHT: 182 LBS | BODY MASS INDEX: 31.24 KG/M2

## 2018-01-01 DIAGNOSIS — I50.30 HEART FAILURE WITH PRESERVED EJECTION FRACTION: ICD-10-CM

## 2018-01-01 DIAGNOSIS — E11.65 TYPE 2 DIABETES MELLITUS WITH HYPERGLYCEMIA, WITHOUT LONG-TERM CURRENT USE OF INSULIN: ICD-10-CM

## 2018-01-01 DIAGNOSIS — R57.0 CARDIOGENIC SHOCK: Primary | ICD-10-CM

## 2018-01-01 DIAGNOSIS — I82.412 DVT OF DEEP FEMORAL VEIN, LEFT: ICD-10-CM

## 2018-01-01 DIAGNOSIS — J98.01 ACUTE BRONCHOSPASM: Primary | ICD-10-CM

## 2018-01-01 DIAGNOSIS — E11.8 DIABETES MELLITUS WITH COMPLICATION: ICD-10-CM

## 2018-01-01 DIAGNOSIS — I50.9 CONGESTIVE HEART FAILURE, UNSPECIFIED CONGESTIVE HEART FAILURE CHRONICITY, UNSPECIFIED CONGESTIVE HEART FAILURE TYPE: ICD-10-CM

## 2018-01-01 DIAGNOSIS — I50.9 ACUTE ON CHRONIC CONGESTIVE HEART FAILURE, UNSPECIFIED HEART FAILURE TYPE: Primary | ICD-10-CM

## 2018-01-01 DIAGNOSIS — I25.10 CORONARY ARTERY DISEASE INVOLVING NATIVE CORONARY ARTERY OF NATIVE HEART WITHOUT ANGINA PECTORIS: ICD-10-CM

## 2018-01-01 DIAGNOSIS — K92.2 GASTROINTESTINAL HEMORRHAGE, UNSPECIFIED GASTROINTESTINAL HEMORRHAGE TYPE: ICD-10-CM

## 2018-01-01 DIAGNOSIS — R06.02 SHORTNESS OF BREATH: ICD-10-CM

## 2018-01-01 DIAGNOSIS — I50.23 ACUTE ON CHRONIC SYSTOLIC CONGESTIVE HEART FAILURE: ICD-10-CM

## 2018-01-01 DIAGNOSIS — I50.22 CHRONIC SYSTOLIC CONGESTIVE HEART FAILURE: ICD-10-CM

## 2018-01-01 DIAGNOSIS — I35.0 SEVERE AORTIC VALVE STENOSIS: Primary | ICD-10-CM

## 2018-01-01 DIAGNOSIS — K74.60 CIRRHOSIS OF LIVER WITHOUT ASCITES, UNSPECIFIED HEPATIC CIRRHOSIS TYPE: ICD-10-CM

## 2018-01-01 DIAGNOSIS — I50.20 SYSTOLIC CONGESTIVE HEART FAILURE, UNSPECIFIED CONGESTIVE HEART FAILURE CHRONICITY: Primary | ICD-10-CM

## 2018-01-01 DIAGNOSIS — E11.65 TYPE 2 DIABETES MELLITUS WITH HYPERGLYCEMIA, WITHOUT LONG-TERM CURRENT USE OF INSULIN: Primary | ICD-10-CM

## 2018-01-01 DIAGNOSIS — R07.9 CHEST PAIN IN ADULT: Primary | ICD-10-CM

## 2018-01-01 DIAGNOSIS — R07.9 ACUTE CHEST PAIN: ICD-10-CM

## 2018-01-01 DIAGNOSIS — K92.1 MELENA: ICD-10-CM

## 2018-01-01 DIAGNOSIS — I35.0 SEVERE AORTIC STENOSIS: Primary | ICD-10-CM

## 2018-01-01 DIAGNOSIS — E11.8 TYPE 2 DIABETES MELLITUS WITH COMPLICATION, WITHOUT LONG-TERM CURRENT USE OF INSULIN: ICD-10-CM

## 2018-01-01 DIAGNOSIS — N17.9 ACUTE RENAL FAILURE SUPERIMPOSED ON STAGE 3 CHRONIC KIDNEY DISEASE, UNSPECIFIED ACUTE RENAL FAILURE TYPE: ICD-10-CM

## 2018-01-01 DIAGNOSIS — I35.0 NONRHEUMATIC AORTIC VALVE STENOSIS: ICD-10-CM

## 2018-01-01 DIAGNOSIS — I50.23 ACUTE ON CHRONIC SYSTOLIC (CONGESTIVE) HEART FAILURE: ICD-10-CM

## 2018-01-01 DIAGNOSIS — R00.0 TACHYCARDIA: ICD-10-CM

## 2018-01-01 DIAGNOSIS — R57.9 SHOCK: Primary | ICD-10-CM

## 2018-01-01 DIAGNOSIS — I21.4 NSTEMI (NON-ST ELEVATED MYOCARDIAL INFARCTION): ICD-10-CM

## 2018-01-01 DIAGNOSIS — R79.89 ELEVATED TROPONIN: ICD-10-CM

## 2018-01-01 DIAGNOSIS — I48.91 A-FIB: ICD-10-CM

## 2018-01-01 DIAGNOSIS — N18.3 ACUTE RENAL FAILURE SUPERIMPOSED ON STAGE 3 CHRONIC KIDNEY DISEASE, UNSPECIFIED ACUTE RENAL FAILURE TYPE: ICD-10-CM

## 2018-01-01 DIAGNOSIS — I50.9 HEART FAILURE: ICD-10-CM

## 2018-01-01 DIAGNOSIS — I35.0 AORTIC VALVE STENOSIS, SEVERE: ICD-10-CM

## 2018-01-01 DIAGNOSIS — M79.89 LEG SWELLING: ICD-10-CM

## 2018-01-01 DIAGNOSIS — R07.9 CHEST PAIN, UNSPECIFIED TYPE: ICD-10-CM

## 2018-01-01 DIAGNOSIS — I35.0 AORTIC VALVE STENOSIS, ETIOLOGY OF CARDIAC VALVE DISEASE UNSPECIFIED: ICD-10-CM

## 2018-01-01 DIAGNOSIS — J81.0 ACUTE PULMONARY EDEMA: ICD-10-CM

## 2018-01-01 DIAGNOSIS — I35.0 NONRHEUMATIC AORTIC VALVE STENOSIS: Primary | ICD-10-CM

## 2018-01-01 DIAGNOSIS — I50.9 ACUTE ON CHRONIC CONGESTIVE HEART FAILURE: ICD-10-CM

## 2018-01-01 DIAGNOSIS — I35.0 SEVERE AORTIC STENOSIS: ICD-10-CM

## 2018-01-01 DIAGNOSIS — K92.2 GI BLEED: ICD-10-CM

## 2018-01-01 DIAGNOSIS — I50.30 DIASTOLIC CONGESTIVE HEART FAILURE, UNSPECIFIED CONGESTIVE HEART FAILURE CHRONICITY: ICD-10-CM

## 2018-01-01 DIAGNOSIS — I35.0 AORTIC VALVE STENOSIS, SEVERE: Primary | ICD-10-CM

## 2018-01-01 DIAGNOSIS — I50.42 CHRONIC COMBINED SYSTOLIC AND DIASTOLIC HEART FAILURE DUE TO VALVULAR DISEASE: Primary | ICD-10-CM

## 2018-01-01 DIAGNOSIS — I50.31 ACUTE DIASTOLIC CONGESTIVE HEART FAILURE: Primary | ICD-10-CM

## 2018-01-01 DIAGNOSIS — R07.9 CHEST PAIN: ICD-10-CM

## 2018-01-01 DIAGNOSIS — K74.60 HEPATIC CIRRHOSIS, UNSPECIFIED HEPATIC CIRRHOSIS TYPE, UNSPECIFIED WHETHER ASCITES PRESENT: ICD-10-CM

## 2018-01-01 DIAGNOSIS — I48.91 ATRIAL FIBRILLATION: ICD-10-CM

## 2018-01-01 DIAGNOSIS — Z86.718 HISTORY OF DVT (DEEP VEIN THROMBOSIS): ICD-10-CM

## 2018-01-01 DIAGNOSIS — R57.0 CARDIOGENIC SHOCK: ICD-10-CM

## 2018-01-01 DIAGNOSIS — I35.0 AORTIC VALVE STENOSIS, ETIOLOGY OF CARDIAC VALVE DISEASE UNSPECIFIED: Primary | ICD-10-CM

## 2018-01-01 DIAGNOSIS — I50.23 ACUTE ON CHRONIC SYSTOLIC (CONGESTIVE) HEART FAILURE: Primary | ICD-10-CM

## 2018-01-01 DIAGNOSIS — Z87.19 H/O: GI BLEED: ICD-10-CM

## 2018-01-01 DIAGNOSIS — I50.30 HEART FAILURE WITH PRESERVED EJECTION FRACTION: Primary | ICD-10-CM

## 2018-01-01 DIAGNOSIS — I95.9 HYPOTENSION, UNSPECIFIED HYPOTENSION TYPE: ICD-10-CM

## 2018-01-01 DIAGNOSIS — E87.1 HYPONATREMIA: Primary | ICD-10-CM

## 2018-01-01 DIAGNOSIS — B96.81 ACUTE H. PYLORI GASTRIC ULCER WITH HEMORRHAGE: ICD-10-CM

## 2018-01-01 DIAGNOSIS — E11.9 TYPE 2 DIABETES MELLITUS WITHOUT COMPLICATIONS: ICD-10-CM

## 2018-01-01 DIAGNOSIS — D62 ACUTE BLOOD LOSS ANEMIA: ICD-10-CM

## 2018-01-01 DIAGNOSIS — K25.0 ACUTE H. PYLORI GASTRIC ULCER WITH HEMORRHAGE: ICD-10-CM

## 2018-01-01 DIAGNOSIS — I82.411 DVT OF DEEP FEMORAL VEIN, RIGHT: ICD-10-CM

## 2018-01-01 DIAGNOSIS — I50.9 CONGESTIVE HEART FAILURE, UNSPECIFIED HF CHRONICITY, UNSPECIFIED HEART FAILURE TYPE: ICD-10-CM

## 2018-01-01 DIAGNOSIS — M15.9 OSTEOARTHRITIS OF MULTIPLE JOINTS, UNSPECIFIED OSTEOARTHRITIS TYPE: ICD-10-CM

## 2018-01-01 DIAGNOSIS — R19.7 DIARRHEA, UNSPECIFIED TYPE: ICD-10-CM

## 2018-01-01 DIAGNOSIS — I38 CHRONIC COMBINED SYSTOLIC AND DIASTOLIC HEART FAILURE DUE TO VALVULAR DISEASE: ICD-10-CM

## 2018-01-01 DIAGNOSIS — J18.9 PNEUMONIA OF RIGHT LUNG DUE TO INFECTIOUS ORGANISM, UNSPECIFIED PART OF LUNG: ICD-10-CM

## 2018-01-01 DIAGNOSIS — I38 CHRONIC COMBINED SYSTOLIC AND DIASTOLIC HEART FAILURE DUE TO VALVULAR DISEASE: Primary | ICD-10-CM

## 2018-01-01 DIAGNOSIS — I50.42 CHRONIC COMBINED SYSTOLIC AND DIASTOLIC HEART FAILURE DUE TO VALVULAR DISEASE: ICD-10-CM

## 2018-01-01 DIAGNOSIS — C22.0 HEPATOCELLULAR CARCINOMA: Primary | ICD-10-CM

## 2018-01-01 DIAGNOSIS — I50.20 SYSTOLIC HEART FAILURE: ICD-10-CM

## 2018-01-01 DIAGNOSIS — I50.30 DIASTOLIC CONGESTIVE HEART FAILURE, UNSPECIFIED CONGESTIVE HEART FAILURE CHRONICITY: Primary | ICD-10-CM

## 2018-01-01 DIAGNOSIS — K92.2 GASTROINTESTINAL HEMORRHAGE, UNSPECIFIED GASTROINTESTINAL HEMORRHAGE TYPE: Primary | ICD-10-CM

## 2018-01-01 DIAGNOSIS — K92.1 GASTROINTESTINAL HEMORRHAGE WITH MELENA: ICD-10-CM

## 2018-01-01 DIAGNOSIS — J98.01 BRONCHOSPASM: ICD-10-CM

## 2018-01-01 DIAGNOSIS — Z51.5 COMFORT MEASURES ONLY STATUS: ICD-10-CM

## 2018-01-01 DIAGNOSIS — R26.81 GAIT INSTABILITY: ICD-10-CM

## 2018-01-01 DIAGNOSIS — I50.9 ACUTE HEART FAILURE, UNSPECIFIED HEART FAILURE TYPE: ICD-10-CM

## 2018-01-01 DIAGNOSIS — K74.60 CIRRHOSIS OF LIVER WITHOUT ASCITES, UNSPECIFIED HEPATIC CIRRHOSIS TYPE: Primary | ICD-10-CM

## 2018-01-01 DIAGNOSIS — E87.1 HYPONATREMIA: ICD-10-CM

## 2018-01-01 DIAGNOSIS — I50.23 ACUTE ON CHRONIC SYSTOLIC CONGESTIVE HEART FAILURE: Primary | ICD-10-CM

## 2018-01-01 LAB
25(OH)D3+25(OH)D2 SERPL-MCNC: 22 NG/ML
ABO + RH BLD: NORMAL
AFP SERPL-MCNC: 572 NG/ML
ALBUMIN SERPL BCP-MCNC: 2.3 G/DL
ALBUMIN SERPL BCP-MCNC: 2.3 G/DL
ALBUMIN SERPL BCP-MCNC: 2.4 G/DL
ALBUMIN SERPL BCP-MCNC: 2.4 G/DL
ALBUMIN SERPL BCP-MCNC: 2.5 G/DL
ALBUMIN SERPL BCP-MCNC: 2.6 G/DL
ALBUMIN SERPL BCP-MCNC: 2.7 G/DL
ALBUMIN SERPL BCP-MCNC: 2.8 G/DL
ALBUMIN SERPL BCP-MCNC: 2.9 G/DL
ALBUMIN SERPL BCP-MCNC: 3 G/DL
ALBUMIN SERPL BCP-MCNC: 3.1 G/DL
ALBUMIN SERPL BCP-MCNC: 3.2 G/DL
ALLENS TEST: ABNORMAL
ALP SERPL-CCNC: 106 U/L
ALP SERPL-CCNC: 107 U/L
ALP SERPL-CCNC: 108 U/L
ALP SERPL-CCNC: 109 U/L
ALP SERPL-CCNC: 110 U/L
ALP SERPL-CCNC: 112 U/L
ALP SERPL-CCNC: 114 U/L
ALP SERPL-CCNC: 115 U/L
ALP SERPL-CCNC: 116 U/L
ALP SERPL-CCNC: 118 U/L
ALP SERPL-CCNC: 118 U/L
ALP SERPL-CCNC: 120 U/L
ALP SERPL-CCNC: 122 U/L
ALP SERPL-CCNC: 124 U/L
ALP SERPL-CCNC: 125 U/L
ALP SERPL-CCNC: 126 U/L
ALP SERPL-CCNC: 130 U/L
ALP SERPL-CCNC: 131 U/L
ALP SERPL-CCNC: 133 U/L
ALP SERPL-CCNC: 134 U/L
ALP SERPL-CCNC: 135 U/L
ALP SERPL-CCNC: 144 U/L
ALP SERPL-CCNC: 146 U/L
ALP SERPL-CCNC: 148 U/L
ALP SERPL-CCNC: 151 U/L
ALP SERPL-CCNC: 152 U/L
ALP SERPL-CCNC: 154 U/L
ALP SERPL-CCNC: 155 U/L
ALP SERPL-CCNC: 156 U/L
ALP SERPL-CCNC: 158 U/L
ALP SERPL-CCNC: 171 U/L
ALP SERPL-CCNC: 173 U/L
ALP SERPL-CCNC: 174 U/L
ALP SERPL-CCNC: 178 U/L
ALP SERPL-CCNC: 198 U/L
ALP SERPL-CCNC: 98 U/L
ALT SERPL W/O P-5'-P-CCNC: 17 U/L
ALT SERPL W/O P-5'-P-CCNC: 18 U/L
ALT SERPL W/O P-5'-P-CCNC: 19 U/L
ALT SERPL W/O P-5'-P-CCNC: 20 U/L
ALT SERPL W/O P-5'-P-CCNC: 21 U/L
ALT SERPL W/O P-5'-P-CCNC: 22 U/L
ALT SERPL W/O P-5'-P-CCNC: 23 U/L
ALT SERPL W/O P-5'-P-CCNC: 24 U/L
ALT SERPL W/O P-5'-P-CCNC: 25 U/L
ALT SERPL W/O P-5'-P-CCNC: 27 U/L
ALT SERPL W/O P-5'-P-CCNC: 29 U/L
ALT SERPL W/O P-5'-P-CCNC: 30 U/L
ALT SERPL W/O P-5'-P-CCNC: 30 U/L
ALT SERPL W/O P-5'-P-CCNC: 34 U/L
ANA SER QL IF: NORMAL
ANION GAP SERPL CALC-SCNC: 10 MMOL/L
ANION GAP SERPL CALC-SCNC: 11 MMOL/L
ANION GAP SERPL CALC-SCNC: 12 MMOL/L
ANION GAP SERPL CALC-SCNC: 13 MMOL/L
ANION GAP SERPL CALC-SCNC: 14 MMOL/L
ANION GAP SERPL CALC-SCNC: 15 MMOL/L
ANION GAP SERPL CALC-SCNC: 15 MMOL/L
ANION GAP SERPL CALC-SCNC: 3 MMOL/L
ANION GAP SERPL CALC-SCNC: 5 MMOL/L
ANION GAP SERPL CALC-SCNC: 6 MMOL/L
ANION GAP SERPL CALC-SCNC: 7 MMOL/L
ANION GAP SERPL CALC-SCNC: 8 MMOL/L
ANION GAP SERPL CALC-SCNC: 9 MMOL/L
AORTIC VALVE REGURGITATION: ABNORMAL
AORTIC VALVE REGURGITATION: ABNORMAL
AORTIC VALVE STENOSIS: ABNORMAL
APTT BLDCRRT: 26.3 SEC
APTT BLDCRRT: 64.4 SEC
AST SERPL-CCNC: 37 U/L
AST SERPL-CCNC: 39 U/L
AST SERPL-CCNC: 41 U/L
AST SERPL-CCNC: 45 U/L
AST SERPL-CCNC: 46 U/L
AST SERPL-CCNC: 47 U/L
AST SERPL-CCNC: 48 U/L
AST SERPL-CCNC: 48 U/L
AST SERPL-CCNC: 49 U/L
AST SERPL-CCNC: 50 U/L
AST SERPL-CCNC: 51 U/L
AST SERPL-CCNC: 52 U/L
AST SERPL-CCNC: 53 U/L
AST SERPL-CCNC: 53 U/L
AST SERPL-CCNC: 54 U/L
AST SERPL-CCNC: 55 U/L
AST SERPL-CCNC: 55 U/L
AST SERPL-CCNC: 56 U/L
AST SERPL-CCNC: 56 U/L
AST SERPL-CCNC: 57 U/L
AST SERPL-CCNC: 58 U/L
AST SERPL-CCNC: 60 U/L
AST SERPL-CCNC: 60 U/L
AST SERPL-CCNC: 76 U/L
AST SERPL-CCNC: 77 U/L
AST SERPL-CCNC: 92 U/L
BACTERIA #/AREA URNS AUTO: ABNORMAL /HPF
BACTERIA #/AREA URNS HPF: ABNORMAL /HPF
BACTERIA #/AREA URNS HPF: NORMAL /HPF
BACTERIA BLD CULT: NORMAL
BASOPHILS # BLD AUTO: 0.01 K/UL
BASOPHILS # BLD AUTO: 0.02 K/UL
BASOPHILS # BLD AUTO: 0.03 K/UL
BASOPHILS # BLD AUTO: 0.04 K/UL
BASOPHILS # BLD AUTO: 0.05 K/UL
BASOPHILS # BLD AUTO: 0.06 K/UL
BASOPHILS # BLD AUTO: 0.07 K/UL
BASOPHILS NFR BLD: 0.1 %
BASOPHILS NFR BLD: 0.2 %
BASOPHILS NFR BLD: 0.3 %
BASOPHILS NFR BLD: 0.4 %
BASOPHILS NFR BLD: 0.5 %
BASOPHILS NFR BLD: 0.6 %
BASOPHILS NFR BLD: 0.7 %
BASOPHILS NFR BLD: 0.8 %
BASOPHILS NFR BLD: 0.9 %
BASOPHILS NFR BLD: 1.1 %
BASOPHILS NFR BLD: 1.2 %
BASOPHILS NFR BLD: 1.3 %
BASOPHILS NFR BLD: 1.5 %
BILIRUB SERPL-MCNC: 0.7 MG/DL
BILIRUB SERPL-MCNC: 0.8 MG/DL
BILIRUB SERPL-MCNC: 0.9 MG/DL
BILIRUB SERPL-MCNC: 1 MG/DL
BILIRUB SERPL-MCNC: 1.1 MG/DL
BILIRUB SERPL-MCNC: 1.2 MG/DL
BILIRUB SERPL-MCNC: 1.3 MG/DL
BILIRUB SERPL-MCNC: 1.4 MG/DL
BILIRUB SERPL-MCNC: 1.5 MG/DL
BILIRUB SERPL-MCNC: 1.6 MG/DL
BILIRUB SERPL-MCNC: 1.6 MG/DL
BILIRUB SERPL-MCNC: 1.8 MG/DL
BILIRUB SERPL-MCNC: 2 MG/DL
BILIRUB SERPL-MCNC: 2.1 MG/DL
BILIRUB UR QL STRIP: NEGATIVE
BLD GP AB SCN CELLS X3 SERPL QL: NORMAL
BLD PROD TYP BPU: NORMAL
BLD PROD TYP BPU: NORMAL
BLOOD UNIT EXPIRATION DATE: NORMAL
BLOOD UNIT EXPIRATION DATE: NORMAL
BLOOD UNIT TYPE CODE: 5100
BLOOD UNIT TYPE CODE: 5100
BLOOD UNIT TYPE: NORMAL
BLOOD UNIT TYPE: NORMAL
BNP SERPL-MCNC: 1210 PG/ML
BNP SERPL-MCNC: 1211 PG/ML
BNP SERPL-MCNC: 1405 PG/ML
BNP SERPL-MCNC: 157 PG/ML
BNP SERPL-MCNC: 1767 PG/ML
BNP SERPL-MCNC: 198 PG/ML
BNP SERPL-MCNC: 299 PG/ML
BNP SERPL-MCNC: 3014 PG/ML
BNP SERPL-MCNC: 754 PG/ML
BNP SERPL-MCNC: 773 PG/ML
BUN SERPL-MCNC: 15 MG/DL
BUN SERPL-MCNC: 16 MG/DL
BUN SERPL-MCNC: 18 MG/DL
BUN SERPL-MCNC: 19 MG/DL
BUN SERPL-MCNC: 20 MG/DL
BUN SERPL-MCNC: 21 MG/DL
BUN SERPL-MCNC: 23 MG/DL
BUN SERPL-MCNC: 23 MG/DL
BUN SERPL-MCNC: 25 MG/DL
BUN SERPL-MCNC: 26 MG/DL
BUN SERPL-MCNC: 27 MG/DL
BUN SERPL-MCNC: 29 MG/DL
BUN SERPL-MCNC: 30 MG/DL
BUN SERPL-MCNC: 31 MG/DL
BUN SERPL-MCNC: 32 MG/DL
BUN SERPL-MCNC: 34 MG/DL
BUN SERPL-MCNC: 36 MG/DL
BUN SERPL-MCNC: 36 MG/DL
BUN SERPL-MCNC: 37 MG/DL
BUN SERPL-MCNC: 43 MG/DL
BUN SERPL-MCNC: 43 MG/DL
BUN SERPL-MCNC: 44 MG/DL
BUN SERPL-MCNC: 44 MG/DL
BUN SERPL-MCNC: 45 MG/DL
BUN SERPL-MCNC: 47 MG/DL
BUN SERPL-MCNC: 50 MG/DL
BUN SERPL-MCNC: 52 MG/DL
BUN SERPL-MCNC: 53 MG/DL
BUN SERPL-MCNC: 54 MG/DL
BUN SERPL-MCNC: 55 MG/DL
BUN SERPL-MCNC: 56 MG/DL
BUN SERPL-MCNC: 58 MG/DL
BUN SERPL-MCNC: 59 MG/DL
BUN SERPL-MCNC: 64 MG/DL
BUN SERPL-MCNC: 67 MG/DL
BUN SERPL-MCNC: 73 MG/DL
BUN SERPL-MCNC: 81 MG/DL
CALCIUM SERPL-MCNC: 8 MG/DL
CALCIUM SERPL-MCNC: 8 MG/DL
CALCIUM SERPL-MCNC: 8.2 MG/DL
CALCIUM SERPL-MCNC: 8.3 MG/DL
CALCIUM SERPL-MCNC: 8.4 MG/DL
CALCIUM SERPL-MCNC: 8.5 MG/DL
CALCIUM SERPL-MCNC: 8.6 MG/DL
CALCIUM SERPL-MCNC: 8.6 MG/DL
CALCIUM SERPL-MCNC: 8.7 MG/DL
CALCIUM SERPL-MCNC: 8.8 MG/DL
CALCIUM SERPL-MCNC: 8.9 MG/DL
CALCIUM SERPL-MCNC: 9 MG/DL
CALCIUM SERPL-MCNC: 9.1 MG/DL
CALCIUM SERPL-MCNC: 9.2 MG/DL
CALCIUM SERPL-MCNC: 9.3 MG/DL
CALCIUM SERPL-MCNC: 9.3 MG/DL
CALCIUM SERPL-MCNC: 9.4 MG/DL
CALCIUM SERPL-MCNC: 9.4 MG/DL
CALCIUM SERPL-MCNC: 9.5 MG/DL
CALCIUM SERPL-MCNC: 9.7 MG/DL
CHLORIDE SERPL-SCNC: 100 MMOL/L
CHLORIDE SERPL-SCNC: 101 MMOL/L
CHLORIDE SERPL-SCNC: 102 MMOL/L
CHLORIDE SERPL-SCNC: 103 MMOL/L
CHLORIDE SERPL-SCNC: 103 MMOL/L
CHLORIDE SERPL-SCNC: 105 MMOL/L
CHLORIDE SERPL-SCNC: 106 MMOL/L
CHLORIDE SERPL-SCNC: 106 MMOL/L
CHLORIDE SERPL-SCNC: 108 MMOL/L
CHLORIDE SERPL-SCNC: 108 MMOL/L
CHLORIDE SERPL-SCNC: 109 MMOL/L
CHLORIDE SERPL-SCNC: 110 MMOL/L
CHLORIDE SERPL-SCNC: 82 MMOL/L
CHLORIDE SERPL-SCNC: 82 MMOL/L
CHLORIDE SERPL-SCNC: 83 MMOL/L
CHLORIDE SERPL-SCNC: 83 MMOL/L
CHLORIDE SERPL-SCNC: 84 MMOL/L
CHLORIDE SERPL-SCNC: 84 MMOL/L
CHLORIDE SERPL-SCNC: 85 MMOL/L
CHLORIDE SERPL-SCNC: 86 MMOL/L
CHLORIDE SERPL-SCNC: 86 MMOL/L
CHLORIDE SERPL-SCNC: 87 MMOL/L
CHLORIDE SERPL-SCNC: 88 MMOL/L
CHLORIDE SERPL-SCNC: 89 MMOL/L
CHLORIDE SERPL-SCNC: 89 MMOL/L
CHLORIDE SERPL-SCNC: 90 MMOL/L
CHLORIDE SERPL-SCNC: 91 MMOL/L
CHLORIDE SERPL-SCNC: 94 MMOL/L
CHLORIDE SERPL-SCNC: 94 MMOL/L
CHLORIDE SERPL-SCNC: 95 MMOL/L
CHLORIDE SERPL-SCNC: 96 MMOL/L
CHLORIDE SERPL-SCNC: 97 MMOL/L
CHLORIDE SERPL-SCNC: 98 MMOL/L
CHOLEST SERPL-MCNC: 108 MG/DL
CHOLEST SERPL-MCNC: 139 MG/DL
CHOLEST SERPL-MCNC: 171 MG/DL
CHOLEST/HDLC SERPL: 4.2 {RATIO}
CHOLEST/HDLC SERPL: 4.6 {RATIO}
CHOLEST/HDLC SERPL: 4.6 {RATIO}
CLARITY UR REFRACT.AUTO: ABNORMAL
CLARITY UR REFRACT.AUTO: CLEAR
CLARITY UR REFRACT.AUTO: CLEAR
CLARITY UR: ABNORMAL
CLARITY UR: CLEAR
CO2 SERPL-SCNC: 20 MMOL/L
CO2 SERPL-SCNC: 21 MMOL/L
CO2 SERPL-SCNC: 22 MMOL/L
CO2 SERPL-SCNC: 23 MMOL/L
CO2 SERPL-SCNC: 23 MMOL/L
CO2 SERPL-SCNC: 24 MMOL/L
CO2 SERPL-SCNC: 25 MMOL/L
CO2 SERPL-SCNC: 26 MMOL/L
CO2 SERPL-SCNC: 27 MMOL/L
CO2 SERPL-SCNC: 27 MMOL/L
CO2 SERPL-SCNC: 28 MMOL/L
CO2 SERPL-SCNC: 29 MMOL/L
CO2 SERPL-SCNC: 30 MMOL/L
CO2 SERPL-SCNC: 31 MMOL/L
CO2 SERPL-SCNC: 32 MMOL/L
CODING SYSTEM: NORMAL
CODING SYSTEM: NORMAL
COLOR UR AUTO: YELLOW
COLOR UR: YELLOW
COLOR UR: YELLOW
CORTIS SERPL-MCNC: 10.9 UG/DL
CREAT SERPL-MCNC: 0.9 MG/DL
CREAT SERPL-MCNC: 1 MG/DL
CREAT SERPL-MCNC: 1.1 MG/DL
CREAT SERPL-MCNC: 1.2 MG/DL
CREAT SERPL-MCNC: 1.3 MG/DL
CREAT SERPL-MCNC: 1.5 MG/DL
CREAT SERPL-MCNC: 1.5 MG/DL
CREAT SERPL-MCNC: 1.6 MG/DL
CREAT SERPL-MCNC: 1.6 MG/DL
CREAT SERPL-MCNC: 1.8 MG/DL
CREAT SERPL-MCNC: 1.9 MG/DL
CREAT SERPL-MCNC: 2 MG/DL
CREAT SERPL-MCNC: 2.1 MG/DL
CREAT SERPL-MCNC: 2.2 MG/DL
CREAT SERPL-MCNC: 2.4 MG/DL
CREAT SERPL-MCNC: 2.5 MG/DL
CREAT SERPL-MCNC: 2.6 MG/DL
CREAT SERPL-MCNC: 2.7 MG/DL
CREAT SERPL-MCNC: 2.8 MG/DL
CREAT SERPL-MCNC: 2.8 MG/DL
CREAT SERPL-MCNC: 2.9 MG/DL
CREAT SERPL-MCNC: 2.9 MG/DL
CREAT SERPL-MCNC: 3 MG/DL
CREAT SERPL-MCNC: 3.3 MG/DL
CREAT SERPL-MCNC: 3.5 MG/DL
D DIMER PPP IA.FEU-MCNC: 1.34 MG/L FEU
D DIMER PPP IA.FEU-MCNC: 1.89 MG/L FEU
DELSYS: ABNORMAL
DIASTOLIC DYSFUNCTION: NO
DIASTOLIC DYSFUNCTION: YES
DIASTOLIC DYSFUNCTION: YES
DIFFERENTIAL METHOD: ABNORMAL
DISPENSE STATUS: NORMAL
DISPENSE STATUS: NORMAL
EOSINOPHIL # BLD AUTO: 0 K/UL
EOSINOPHIL # BLD AUTO: 0 K/UL
EOSINOPHIL # BLD AUTO: 0.1 K/UL
EOSINOPHIL # BLD AUTO: 0.2 K/UL
EOSINOPHIL # BLD AUTO: 0.3 K/UL
EOSINOPHIL # BLD AUTO: 0.4 K/UL
EOSINOPHIL # BLD AUTO: 0.5 K/UL
EOSINOPHIL NFR BLD: 0.2 %
EOSINOPHIL NFR BLD: 0.4 %
EOSINOPHIL NFR BLD: 0.4 %
EOSINOPHIL NFR BLD: 0.6 %
EOSINOPHIL NFR BLD: 0.8 %
EOSINOPHIL NFR BLD: 0.9 %
EOSINOPHIL NFR BLD: 1.2 %
EOSINOPHIL NFR BLD: 1.5 %
EOSINOPHIL NFR BLD: 1.9 %
EOSINOPHIL NFR BLD: 2.1 %
EOSINOPHIL NFR BLD: 2.1 %
EOSINOPHIL NFR BLD: 2.3 %
EOSINOPHIL NFR BLD: 2.3 %
EOSINOPHIL NFR BLD: 2.4 %
EOSINOPHIL NFR BLD: 2.9 %
EOSINOPHIL NFR BLD: 2.9 %
EOSINOPHIL NFR BLD: 3 %
EOSINOPHIL NFR BLD: 3 %
EOSINOPHIL NFR BLD: 3.1 %
EOSINOPHIL NFR BLD: 3.2 %
EOSINOPHIL NFR BLD: 3.3 %
EOSINOPHIL NFR BLD: 3.5 %
EOSINOPHIL NFR BLD: 3.5 %
EOSINOPHIL NFR BLD: 3.6 %
EOSINOPHIL NFR BLD: 3.7 %
EOSINOPHIL NFR BLD: 3.9 %
EOSINOPHIL NFR BLD: 3.9 %
EOSINOPHIL NFR BLD: 4.1 %
EOSINOPHIL NFR BLD: 4.1 %
EOSINOPHIL NFR BLD: 5.3 %
EOSINOPHIL NFR BLD: 5.6 %
EOSINOPHIL NFR BLD: 6 %
EOSINOPHIL NFR BLD: 6 %
EOSINOPHIL NFR BLD: 6.2 %
EOSINOPHIL NFR BLD: 7.4 %
EOSINOPHIL NFR BLD: 8 %
EOSINOPHIL NFR BLD: 8.2 %
EP: 5
ERYTHROCYTE [DISTWIDTH] IN BLOOD BY AUTOMATED COUNT: 14.1 %
ERYTHROCYTE [DISTWIDTH] IN BLOOD BY AUTOMATED COUNT: 14.3 %
ERYTHROCYTE [DISTWIDTH] IN BLOOD BY AUTOMATED COUNT: 14.4 %
ERYTHROCYTE [DISTWIDTH] IN BLOOD BY AUTOMATED COUNT: 14.4 %
ERYTHROCYTE [DISTWIDTH] IN BLOOD BY AUTOMATED COUNT: 14.5 %
ERYTHROCYTE [DISTWIDTH] IN BLOOD BY AUTOMATED COUNT: 14.5 %
ERYTHROCYTE [DISTWIDTH] IN BLOOD BY AUTOMATED COUNT: 14.6 %
ERYTHROCYTE [DISTWIDTH] IN BLOOD BY AUTOMATED COUNT: 14.7 %
ERYTHROCYTE [DISTWIDTH] IN BLOOD BY AUTOMATED COUNT: 14.8 %
ERYTHROCYTE [DISTWIDTH] IN BLOOD BY AUTOMATED COUNT: 14.9 %
ERYTHROCYTE [DISTWIDTH] IN BLOOD BY AUTOMATED COUNT: 15 %
ERYTHROCYTE [DISTWIDTH] IN BLOOD BY AUTOMATED COUNT: 15.2 %
ERYTHROCYTE [DISTWIDTH] IN BLOOD BY AUTOMATED COUNT: 15.3 %
ERYTHROCYTE [DISTWIDTH] IN BLOOD BY AUTOMATED COUNT: 15.4 %
ERYTHROCYTE [DISTWIDTH] IN BLOOD BY AUTOMATED COUNT: 15.7 %
ERYTHROCYTE [DISTWIDTH] IN BLOOD BY AUTOMATED COUNT: 15.9 %
ERYTHROCYTE [DISTWIDTH] IN BLOOD BY AUTOMATED COUNT: 15.9 %
ERYTHROCYTE [DISTWIDTH] IN BLOOD BY AUTOMATED COUNT: 16 %
ERYTHROCYTE [DISTWIDTH] IN BLOOD BY AUTOMATED COUNT: 16.3 %
ERYTHROCYTE [DISTWIDTH] IN BLOOD BY AUTOMATED COUNT: 16.4 %
ERYTHROCYTE [SEDIMENTATION RATE] IN BLOOD BY WESTERGREN METHOD: 14 MM/H
ERYTHROCYTE [SEDIMENTATION RATE] IN BLOOD BY WESTERGREN METHOD: 14 MM/H
ERYTHROCYTE [SEDIMENTATION RATE] IN BLOOD BY WESTERGREN METHOD: 4 MM/H
EST. GFR  (AFRICAN AMERICAN): 13 ML/MIN/1.73 M^2
EST. GFR  (AFRICAN AMERICAN): 14 ML/MIN/1.73 M^2
EST. GFR  (AFRICAN AMERICAN): 16 ML/MIN/1.73 M^2
EST. GFR  (AFRICAN AMERICAN): 17 ML/MIN/1.73 M^2
EST. GFR  (AFRICAN AMERICAN): 17 ML/MIN/1.73 M^2
EST. GFR  (AFRICAN AMERICAN): 18 ML/MIN/1.73 M^2
EST. GFR  (AFRICAN AMERICAN): 19 ML/MIN/1.73 M^2
EST. GFR  (AFRICAN AMERICAN): 20 ML/MIN/1.73 M^2
EST. GFR  (AFRICAN AMERICAN): 21.2 ML/MIN/1.73 M^2
EST. GFR  (AFRICAN AMERICAN): 23.5 ML/MIN/1.73 M^2
EST. GFR  (AFRICAN AMERICAN): 24.9 ML/MIN/1.73 M^2
EST. GFR  (AFRICAN AMERICAN): 26.4 ML/MIN/1.73 M^2
EST. GFR  (AFRICAN AMERICAN): 28.1 ML/MIN/1.73 M^2
EST. GFR  (AFRICAN AMERICAN): 30 ML/MIN/1.73 M^2
EST. GFR  (AFRICAN AMERICAN): 34.6 ML/MIN/1.73 M^2
EST. GFR  (AFRICAN AMERICAN): 34.6 ML/MIN/1.73 M^2
EST. GFR  (AFRICAN AMERICAN): 37.4 ML/MIN/1.73 M^2
EST. GFR  (AFRICAN AMERICAN): 37.4 ML/MIN/1.73 M^2
EST. GFR  (AFRICAN AMERICAN): 44.5 ML/MIN/1.73 M^2
EST. GFR  (AFRICAN AMERICAN): 49 ML/MIN/1.73 M^2
EST. GFR  (AFRICAN AMERICAN): 54 ML/MIN/1.73 M^2
EST. GFR  (AFRICAN AMERICAN): 54.4 ML/MIN/1.73 M^2
EST. GFR  (AFRICAN AMERICAN): >60 ML/MIN/1.73 M^2
EST. GFR  (NON AFRICAN AMERICAN): 12 ML/MIN/1.73 M^2
EST. GFR  (NON AFRICAN AMERICAN): 13 ML/MIN/1.73 M^2
EST. GFR  (NON AFRICAN AMERICAN): 14 ML/MIN/1.73 M^2
EST. GFR  (NON AFRICAN AMERICAN): 15 ML/MIN/1.73 M^2
EST. GFR  (NON AFRICAN AMERICAN): 16 ML/MIN/1.73 M^2
EST. GFR  (NON AFRICAN AMERICAN): 17 ML/MIN/1.73 M^2
EST. GFR  (NON AFRICAN AMERICAN): 17 ML/MIN/1.73 M^2
EST. GFR  (NON AFRICAN AMERICAN): 18.4 ML/MIN/1.73 M^2
EST. GFR  (NON AFRICAN AMERICAN): 20.4 ML/MIN/1.73 M^2
EST. GFR  (NON AFRICAN AMERICAN): 21.6 ML/MIN/1.73 M^2
EST. GFR  (NON AFRICAN AMERICAN): 22.9 ML/MIN/1.73 M^2
EST. GFR  (NON AFRICAN AMERICAN): 24.4 ML/MIN/1.73 M^2
EST. GFR  (NON AFRICAN AMERICAN): 26 ML/MIN/1.73 M^2
EST. GFR  (NON AFRICAN AMERICAN): 30 ML/MIN/1.73 M^2
EST. GFR  (NON AFRICAN AMERICAN): 30 ML/MIN/1.73 M^2
EST. GFR  (NON AFRICAN AMERICAN): 32.4 ML/MIN/1.73 M^2
EST. GFR  (NON AFRICAN AMERICAN): 32.4 ML/MIN/1.73 M^2
EST. GFR  (NON AFRICAN AMERICAN): 38.6 ML/MIN/1.73 M^2
EST. GFR  (NON AFRICAN AMERICAN): 42 ML/MIN/1.73 M^2
EST. GFR  (NON AFRICAN AMERICAN): 42.5 ML/MIN/1.73 M^2
EST. GFR  (NON AFRICAN AMERICAN): 47 ML/MIN/1.73 M^2
EST. GFR  (NON AFRICAN AMERICAN): 47.2 ML/MIN/1.73 M^2
EST. GFR  (NON AFRICAN AMERICAN): 53 ML/MIN/1.73 M^2
EST. GFR  (NON AFRICAN AMERICAN): >60 ML/MIN/1.73 M^2
ESTIMATED AVG GLUCOSE: 154 MG/DL
ESTIMATED AVG GLUCOSE: 194 MG/DL
ESTIMATED AVG GLUCOSE: 214 MG/DL
ESTIMATED PA SYSTOLIC PRESSURE: 40.95
ESTIMATED PA SYSTOLIC PRESSURE: 47.94
ESTIMATED PA SYSTOLIC PRESSURE: 61.58
ESTIMATED PA SYSTOLIC PRESSURE: 61.98
FACT X PPP CHRO-ACNC: 0.31 IU/ML
FACT X PPP CHRO-ACNC: <0.1 IU/ML
FERRITIN SERPL-MCNC: 168 NG/ML
FERRITIN SERPL-MCNC: 172 NG/ML
FIO2: 50
FIO2: 60
FLOW: 2
FOLATE SERPL-MCNC: 10.2 NG/ML
GLUCOSE SERPL-MCNC: 101 MG/DL
GLUCOSE SERPL-MCNC: 108 MG/DL
GLUCOSE SERPL-MCNC: 112 MG/DL
GLUCOSE SERPL-MCNC: 114 MG/DL
GLUCOSE SERPL-MCNC: 114 MG/DL
GLUCOSE SERPL-MCNC: 116 MG/DL
GLUCOSE SERPL-MCNC: 117 MG/DL
GLUCOSE SERPL-MCNC: 123 MG/DL
GLUCOSE SERPL-MCNC: 127 MG/DL
GLUCOSE SERPL-MCNC: 128 MG/DL
GLUCOSE SERPL-MCNC: 129 MG/DL
GLUCOSE SERPL-MCNC: 130 MG/DL
GLUCOSE SERPL-MCNC: 130 MG/DL
GLUCOSE SERPL-MCNC: 135 MG/DL
GLUCOSE SERPL-MCNC: 135 MG/DL
GLUCOSE SERPL-MCNC: 137 MG/DL
GLUCOSE SERPL-MCNC: 141 MG/DL
GLUCOSE SERPL-MCNC: 142 MG/DL
GLUCOSE SERPL-MCNC: 145 MG/DL
GLUCOSE SERPL-MCNC: 148 MG/DL
GLUCOSE SERPL-MCNC: 150 MG/DL
GLUCOSE SERPL-MCNC: 150 MG/DL
GLUCOSE SERPL-MCNC: 152 MG/DL (ref 70–110)
GLUCOSE SERPL-MCNC: 159 MG/DL
GLUCOSE SERPL-MCNC: 160 MG/DL
GLUCOSE SERPL-MCNC: 161 MG/DL
GLUCOSE SERPL-MCNC: 162 MG/DL
GLUCOSE SERPL-MCNC: 163 MG/DL
GLUCOSE SERPL-MCNC: 163 MG/DL
GLUCOSE SERPL-MCNC: 168 MG/DL
GLUCOSE SERPL-MCNC: 169 MG/DL
GLUCOSE SERPL-MCNC: 172 MG/DL
GLUCOSE SERPL-MCNC: 176 MG/DL
GLUCOSE SERPL-MCNC: 176 MG/DL
GLUCOSE SERPL-MCNC: 177 MG/DL
GLUCOSE SERPL-MCNC: 178 MG/DL
GLUCOSE SERPL-MCNC: 179 MG/DL
GLUCOSE SERPL-MCNC: 180 MG/DL
GLUCOSE SERPL-MCNC: 183 MG/DL
GLUCOSE SERPL-MCNC: 189 MG/DL
GLUCOSE SERPL-MCNC: 190 MG/DL
GLUCOSE SERPL-MCNC: 205 MG/DL
GLUCOSE SERPL-MCNC: 206 MG/DL
GLUCOSE SERPL-MCNC: 213 MG/DL
GLUCOSE SERPL-MCNC: 216 MG/DL
GLUCOSE SERPL-MCNC: 220 MG/DL
GLUCOSE SERPL-MCNC: 220 MG/DL
GLUCOSE SERPL-MCNC: 225 MG/DL
GLUCOSE SERPL-MCNC: 244 MG/DL
GLUCOSE SERPL-MCNC: 244 MG/DL
GLUCOSE SERPL-MCNC: 272 MG/DL
GLUCOSE SERPL-MCNC: 72 MG/DL
GLUCOSE SERPL-MCNC: 95 MG/DL
GLUCOSE UR QL STRIP: NEGATIVE
GRAN CASTS #/AREA URNS LPF: 6 /LPF
HAV IGM SERPL QL IA: NEGATIVE
HBA1C MFR BLD HPLC: 7 %
HBA1C MFR BLD HPLC: 8.4 %
HBA1C MFR BLD HPLC: 9.1 %
HBV CORE AB SERPL QL IA: NEGATIVE
HBV CORE IGM SERPL QL IA: NEGATIVE
HBV SURFACE AG SERPL QL IA: NEGATIVE
HCO3 UR-SCNC: 23.1 MMOL/L (ref 24–28)
HCO3 UR-SCNC: 24.8 MMOL/L (ref 24–28)
HCO3 UR-SCNC: 25.2 MMOL/L (ref 24–28)
HCO3 UR-SCNC: 26.1 MMOL/L (ref 24–28)
HCO3 UR-SCNC: 26.5 MMOL/L (ref 24–28)
HCO3 UR-SCNC: 26.9 MMOL/L (ref 24–28)
HCO3 UR-SCNC: 27.5 MMOL/L (ref 24–28)
HCO3 UR-SCNC: 28.4 MMOL/L (ref 24–28)
HCO3 UR-SCNC: 28.4 MMOL/L (ref 24–28)
HCO3 UR-SCNC: 29.2 MMOL/L (ref 24–28)
HCO3 UR-SCNC: 29.7 MMOL/L (ref 24–28)
HCO3 UR-SCNC: 33.3 MMOL/L (ref 24–28)
HCT VFR BLD AUTO: 22.3 %
HCT VFR BLD AUTO: 22.8 %
HCT VFR BLD AUTO: 23.1 %
HCT VFR BLD AUTO: 23.5 %
HCT VFR BLD AUTO: 23.8 %
HCT VFR BLD AUTO: 24 %
HCT VFR BLD AUTO: 24.1 %
HCT VFR BLD AUTO: 24.3 %
HCT VFR BLD AUTO: 24.5 %
HCT VFR BLD AUTO: 24.7 %
HCT VFR BLD AUTO: 24.8 %
HCT VFR BLD AUTO: 25 %
HCT VFR BLD AUTO: 25.1 %
HCT VFR BLD AUTO: 25.1 %
HCT VFR BLD AUTO: 25.3 %
HCT VFR BLD AUTO: 25.4 %
HCT VFR BLD AUTO: 25.8 %
HCT VFR BLD AUTO: 26.2 %
HCT VFR BLD AUTO: 26.4 %
HCT VFR BLD AUTO: 26.6 %
HCT VFR BLD AUTO: 26.7 %
HCT VFR BLD AUTO: 26.9 %
HCT VFR BLD AUTO: 27.2 %
HCT VFR BLD AUTO: 28.2 %
HCT VFR BLD AUTO: 29.2 %
HCT VFR BLD AUTO: 30 %
HCT VFR BLD AUTO: 30.1 %
HCT VFR BLD AUTO: 30.2 %
HCT VFR BLD AUTO: 30.3 %
HCT VFR BLD AUTO: 30.4 %
HCT VFR BLD AUTO: 30.8 %
HCT VFR BLD AUTO: 32.1 %
HCT VFR BLD AUTO: 32.2 %
HCT VFR BLD AUTO: 32.7 %
HCT VFR BLD AUTO: 33.1 %
HCT VFR BLD AUTO: 34.5 %
HCT VFR BLD AUTO: 35.3 %
HCT VFR BLD AUTO: 36.8 %
HCV AB SERPL QL IA: NEGATIVE
HDLC SERPL-MCNC: 26 MG/DL
HDLC SERPL-MCNC: 30 MG/DL
HDLC SERPL-MCNC: 37 MG/DL
HDLC SERPL: 21.6 %
HDLC SERPL: 21.6 %
HDLC SERPL: 24.1 %
HGB BLD-MCNC: 10 G/DL
HGB BLD-MCNC: 10.1 G/DL
HGB BLD-MCNC: 10.1 G/DL
HGB BLD-MCNC: 10.3 G/DL
HGB BLD-MCNC: 10.4 G/DL
HGB BLD-MCNC: 10.7 G/DL
HGB BLD-MCNC: 10.8 G/DL
HGB BLD-MCNC: 10.9 G/DL
HGB BLD-MCNC: 10.9 G/DL
HGB BLD-MCNC: 11.5 G/DL
HGB BLD-MCNC: 12 G/DL
HGB BLD-MCNC: 12.1 G/DL
HGB BLD-MCNC: 7.4 G/DL
HGB BLD-MCNC: 7.7 G/DL
HGB BLD-MCNC: 7.8 G/DL
HGB BLD-MCNC: 7.8 G/DL
HGB BLD-MCNC: 7.9 G/DL
HGB BLD-MCNC: 8 G/DL
HGB BLD-MCNC: 8 G/DL
HGB BLD-MCNC: 8.1 G/DL
HGB BLD-MCNC: 8.2 G/DL
HGB BLD-MCNC: 8.2 G/DL
HGB BLD-MCNC: 8.4 G/DL
HGB BLD-MCNC: 8.6 G/DL
HGB BLD-MCNC: 8.7 G/DL
HGB BLD-MCNC: 8.8 G/DL
HGB BLD-MCNC: 9 G/DL
HGB BLD-MCNC: 9.1 G/DL
HGB BLD-MCNC: 9.1 G/DL
HGB BLD-MCNC: 9.7 G/DL
HGB BLD-MCNC: 9.9 G/DL
HGB UR QL STRIP: ABNORMAL
HGB UR QL STRIP: ABNORMAL
HGB UR QL STRIP: NEGATIVE
HYALINE CASTS #/AREA URNS LPF: 6 /LPF
HYALINE CASTS UR QL AUTO: 1 /LPF
IGG SERPL-MCNC: 940 MG/DL
IMM GRANULOCYTES # BLD AUTO: 0.01 K/UL
IMM GRANULOCYTES # BLD AUTO: 0.02 K/UL
IMM GRANULOCYTES # BLD AUTO: 0.03 K/UL
IMM GRANULOCYTES # BLD AUTO: 0.04 K/UL
IMM GRANULOCYTES # BLD AUTO: 0.07 K/UL
IMM GRANULOCYTES # BLD AUTO: 0.07 K/UL
IMM GRANULOCYTES NFR BLD AUTO: 0.1 %
IMM GRANULOCYTES NFR BLD AUTO: 0.2 %
IMM GRANULOCYTES NFR BLD AUTO: 0.3 %
IMM GRANULOCYTES NFR BLD AUTO: 0.4 %
IMM GRANULOCYTES NFR BLD AUTO: 0.5 %
IMM GRANULOCYTES NFR BLD AUTO: 0.5 %
IMM GRANULOCYTES NFR BLD AUTO: 0.7 %
INR PPP: 1.1
INR PPP: 1.2
INR PPP: 1.3
INR PPP: 1.3
IP: 10
IRON SERPL-MCNC: 15 UG/DL
IRON SERPL-MCNC: 156 UG/DL
IRON SERPL-MCNC: 44 UG/DL
KETONES UR QL STRIP: NEGATIVE
LACTATE SERPL-SCNC: 1.1 MMOL/L
LACTATE SERPL-SCNC: 1.2 MMOL/L
LACTATE SERPL-SCNC: 1.3 MMOL/L
LACTATE SERPL-SCNC: 1.6 MMOL/L
LACTATE SERPL-SCNC: 1.7 MMOL/L
LACTATE SERPL-SCNC: 2.4 MMOL/L
LACTATE SERPL-SCNC: 2.6 MMOL/L
LACTATE SERPL-SCNC: 2.6 MMOL/L
LACTATE SERPL-SCNC: 3 MMOL/L
LACTATE SERPL-SCNC: 3.1 MMOL/L
LACTATE SERPL-SCNC: 3.3 MMOL/L
LACTATE SERPL-SCNC: 4.8 MMOL/L
LDLC SERPL CALC-MCNC: 117.6 MG/DL
LDLC SERPL CALC-MCNC: 66.2 MG/DL
LDLC SERPL CALC-MCNC: 89.2 MG/DL
LEUKOCYTE ESTERASE UR QL STRIP: ABNORMAL
LEUKOCYTE ESTERASE UR QL STRIP: NEGATIVE
LEUKOCYTE ESTERASE UR QL STRIP: NEGATIVE
LIPASE SERPL-CCNC: 84 U/L
LYMPHOCYTES # BLD AUTO: 0.6 K/UL
LYMPHOCYTES # BLD AUTO: 0.6 K/UL
LYMPHOCYTES # BLD AUTO: 0.7 K/UL
LYMPHOCYTES # BLD AUTO: 0.8 K/UL
LYMPHOCYTES # BLD AUTO: 0.9 K/UL
LYMPHOCYTES # BLD AUTO: 1.1 K/UL
LYMPHOCYTES # BLD AUTO: 1.2 K/UL
LYMPHOCYTES # BLD AUTO: 1.2 K/UL
LYMPHOCYTES # BLD AUTO: 1.3 K/UL
LYMPHOCYTES # BLD AUTO: 1.4 K/UL
LYMPHOCYTES # BLD AUTO: 1.5 K/UL
LYMPHOCYTES # BLD AUTO: 1.7 K/UL
LYMPHOCYTES # BLD AUTO: 1.8 K/UL
LYMPHOCYTES # BLD AUTO: 1.9 K/UL
LYMPHOCYTES # BLD AUTO: 2 K/UL
LYMPHOCYTES # BLD AUTO: 2.2 K/UL
LYMPHOCYTES # BLD AUTO: 2.2 K/UL
LYMPHOCYTES # BLD AUTO: 2.3 K/UL
LYMPHOCYTES # BLD AUTO: 2.7 K/UL
LYMPHOCYTES # BLD AUTO: 2.8 K/UL
LYMPHOCYTES # BLD AUTO: 3.1 K/UL
LYMPHOCYTES # BLD AUTO: 4.5 K/UL
LYMPHOCYTES NFR BLD: 14.3 %
LYMPHOCYTES NFR BLD: 14.9 %
LYMPHOCYTES NFR BLD: 16.3 %
LYMPHOCYTES NFR BLD: 16.3 %
LYMPHOCYTES NFR BLD: 17.2 %
LYMPHOCYTES NFR BLD: 18.2 %
LYMPHOCYTES NFR BLD: 20.6 %
LYMPHOCYTES NFR BLD: 21.2 %
LYMPHOCYTES NFR BLD: 22.6 %
LYMPHOCYTES NFR BLD: 22.7 %
LYMPHOCYTES NFR BLD: 23 %
LYMPHOCYTES NFR BLD: 23.3 %
LYMPHOCYTES NFR BLD: 23.4 %
LYMPHOCYTES NFR BLD: 24.3 %
LYMPHOCYTES NFR BLD: 24.6 %
LYMPHOCYTES NFR BLD: 24.9 %
LYMPHOCYTES NFR BLD: 27.2 %
LYMPHOCYTES NFR BLD: 27.9 %
LYMPHOCYTES NFR BLD: 28.3 %
LYMPHOCYTES NFR BLD: 30 %
LYMPHOCYTES NFR BLD: 30.5 %
LYMPHOCYTES NFR BLD: 30.7 %
LYMPHOCYTES NFR BLD: 31.4 %
LYMPHOCYTES NFR BLD: 31.5 %
LYMPHOCYTES NFR BLD: 33 %
LYMPHOCYTES NFR BLD: 33.6 %
LYMPHOCYTES NFR BLD: 34.1 %
LYMPHOCYTES NFR BLD: 35.7 %
LYMPHOCYTES NFR BLD: 35.9 %
LYMPHOCYTES NFR BLD: 40.9 %
LYMPHOCYTES NFR BLD: 43 %
LYMPHOCYTES NFR BLD: 5.5 %
LYMPHOCYTES NFR BLD: 6.3 %
LYMPHOCYTES NFR BLD: 6.8 %
LYMPHOCYTES NFR BLD: 7.2 %
LYMPHOCYTES NFR BLD: 7.5 %
LYMPHOCYTES NFR BLD: 7.8 %
LYMPHOCYTES NFR BLD: 9.1 %
LYMPHOCYTES NFR BLD: 9.6 %
MAGNESIUM SERPL-MCNC: 1.4 MG/DL
MAGNESIUM SERPL-MCNC: 1.6 MG/DL
MAGNESIUM SERPL-MCNC: 1.6 MG/DL
MAGNESIUM SERPL-MCNC: 1.7 MG/DL
MAGNESIUM SERPL-MCNC: 1.8 MG/DL
MAGNESIUM SERPL-MCNC: 1.8 MG/DL
MAGNESIUM SERPL-MCNC: 1.9 MG/DL
MAGNESIUM SERPL-MCNC: 2 MG/DL
MAGNESIUM SERPL-MCNC: 2.1 MG/DL
MAGNESIUM SERPL-MCNC: 2.1 MG/DL
MAGNESIUM SERPL-MCNC: 2.2 MG/DL
MAGNESIUM SERPL-MCNC: 2.4 MG/DL
MAGNESIUM SERPL-MCNC: 2.4 MG/DL
MAGNESIUM SERPL-MCNC: 2.5 MG/DL
MCH RBC QN AUTO: 26.9 PG
MCH RBC QN AUTO: 27.4 PG
MCH RBC QN AUTO: 27.5 PG
MCH RBC QN AUTO: 27.6 PG
MCH RBC QN AUTO: 27.7 PG
MCH RBC QN AUTO: 27.7 PG
MCH RBC QN AUTO: 27.9 PG
MCH RBC QN AUTO: 28.2 PG
MCH RBC QN AUTO: 30 PG
MCH RBC QN AUTO: 30 PG
MCH RBC QN AUTO: 30.2 PG
MCH RBC QN AUTO: 30.5 PG
MCH RBC QN AUTO: 30.5 PG
MCH RBC QN AUTO: 30.6 PG
MCH RBC QN AUTO: 30.7 PG
MCH RBC QN AUTO: 30.7 PG
MCH RBC QN AUTO: 30.8 PG
MCH RBC QN AUTO: 30.8 PG
MCH RBC QN AUTO: 30.9 PG
MCH RBC QN AUTO: 30.9 PG
MCH RBC QN AUTO: 31 PG
MCH RBC QN AUTO: 31.1 PG
MCH RBC QN AUTO: 31.1 PG
MCH RBC QN AUTO: 31.2 PG
MCH RBC QN AUTO: 31.3 PG
MCH RBC QN AUTO: 31.4 PG
MCH RBC QN AUTO: 31.5 PG
MCH RBC QN AUTO: 31.6 PG
MCH RBC QN AUTO: 31.6 PG
MCH RBC QN AUTO: 31.7 PG
MCH RBC QN AUTO: 31.7 PG
MCH RBC QN AUTO: 32.2 PG
MCH RBC QN AUTO: 32.5 PG
MCHC RBC AUTO-ENTMCNC: 32.3 G/DL
MCHC RBC AUTO-ENTMCNC: 32.4 G/DL
MCHC RBC AUTO-ENTMCNC: 32.5 G/DL
MCHC RBC AUTO-ENTMCNC: 32.5 G/DL
MCHC RBC AUTO-ENTMCNC: 32.6 G/DL
MCHC RBC AUTO-ENTMCNC: 32.6 G/DL
MCHC RBC AUTO-ENTMCNC: 32.7 G/DL
MCHC RBC AUTO-ENTMCNC: 32.8 G/DL
MCHC RBC AUTO-ENTMCNC: 32.9 G/DL
MCHC RBC AUTO-ENTMCNC: 33 G/DL
MCHC RBC AUTO-ENTMCNC: 33.1 G/DL
MCHC RBC AUTO-ENTMCNC: 33.2 G/DL
MCHC RBC AUTO-ENTMCNC: 33.3 G/DL
MCHC RBC AUTO-ENTMCNC: 33.4 G/DL
MCHC RBC AUTO-ENTMCNC: 33.5 G/DL
MCHC RBC AUTO-ENTMCNC: 33.5 G/DL
MCHC RBC AUTO-ENTMCNC: 33.6 G/DL
MCHC RBC AUTO-ENTMCNC: 33.8 G/DL
MCHC RBC AUTO-ENTMCNC: 33.9 G/DL
MCHC RBC AUTO-ENTMCNC: 33.9 G/DL
MCHC RBC AUTO-ENTMCNC: 34 G/DL
MCHC RBC AUTO-ENTMCNC: 34 G/DL
MCHC RBC AUTO-ENTMCNC: 34.1 G/DL
MCHC RBC AUTO-ENTMCNC: 34.2 G/DL
MCV RBC AUTO: 81 FL
MCV RBC AUTO: 81 FL
MCV RBC AUTO: 82 FL
MCV RBC AUTO: 83 FL
MCV RBC AUTO: 84 FL
MCV RBC AUTO: 84 FL
MCV RBC AUTO: 85 FL
MCV RBC AUTO: 86 FL
MCV RBC AUTO: 90 FL
MCV RBC AUTO: 92 FL
MCV RBC AUTO: 93 FL
MCV RBC AUTO: 94 FL
MCV RBC AUTO: 95 FL
MCV RBC AUTO: 96 FL
MCV RBC AUTO: 97 FL
MCV RBC AUTO: 97 FL
MICROSCOPIC COMMENT: ABNORMAL
MICROSCOPIC COMMENT: ABNORMAL
MICROSCOPIC COMMENT: NORMAL
MIN VOL: 14
MIN VOL: 9.2
MIN VOL: 9.2
MITRAL VALVE MOBILITY: NORMAL
MITRAL VALVE REGURGITATION: ABNORMAL
MODE: ABNORMAL
MONOCYTES # BLD AUTO: 0.3 K/UL
MONOCYTES # BLD AUTO: 0.4 K/UL
MONOCYTES # BLD AUTO: 0.4 K/UL
MONOCYTES # BLD AUTO: 0.5 K/UL
MONOCYTES # BLD AUTO: 0.6 K/UL
MONOCYTES # BLD AUTO: 0.7 K/UL
MONOCYTES # BLD AUTO: 0.8 K/UL
MONOCYTES # BLD AUTO: 0.9 K/UL
MONOCYTES # BLD AUTO: 1 K/UL
MONOCYTES # BLD AUTO: 1 K/UL
MONOCYTES NFR BLD: 10.2 %
MONOCYTES NFR BLD: 10.5 %
MONOCYTES NFR BLD: 10.6 %
MONOCYTES NFR BLD: 10.8 %
MONOCYTES NFR BLD: 11 %
MONOCYTES NFR BLD: 11.1 %
MONOCYTES NFR BLD: 11.9 %
MONOCYTES NFR BLD: 5.6 %
MONOCYTES NFR BLD: 6.4 %
MONOCYTES NFR BLD: 6.4 %
MONOCYTES NFR BLD: 6.5 %
MONOCYTES NFR BLD: 6.5 %
MONOCYTES NFR BLD: 6.8 %
MONOCYTES NFR BLD: 6.8 %
MONOCYTES NFR BLD: 6.9 %
MONOCYTES NFR BLD: 7.1 %
MONOCYTES NFR BLD: 7.2 %
MONOCYTES NFR BLD: 7.4 %
MONOCYTES NFR BLD: 7.5 %
MONOCYTES NFR BLD: 7.6 %
MONOCYTES NFR BLD: 7.7 %
MONOCYTES NFR BLD: 7.7 %
MONOCYTES NFR BLD: 8 %
MONOCYTES NFR BLD: 8.2 %
MONOCYTES NFR BLD: 8.3 %
MONOCYTES NFR BLD: 8.4 %
MONOCYTES NFR BLD: 8.4 %
MONOCYTES NFR BLD: 8.6 %
MONOCYTES NFR BLD: 8.6 %
MONOCYTES NFR BLD: 8.8 %
MONOCYTES NFR BLD: 8.8 %
MONOCYTES NFR BLD: 9.1 %
MONOCYTES NFR BLD: 9.1 %
MONOCYTES NFR BLD: 9.2 %
MONOCYTES NFR BLD: 9.3 %
MONOCYTES NFR BLD: 9.4 %
MONOCYTES NFR BLD: 9.4 %
MONOCYTES NFR BLD: 9.7 %
MONOCYTES NFR BLD: 9.9 %
NEUTROPHILS # BLD AUTO: 10.1 K/UL
NEUTROPHILS # BLD AUTO: 10.5 K/UL
NEUTROPHILS # BLD AUTO: 10.8 K/UL
NEUTROPHILS # BLD AUTO: 2.5 K/UL
NEUTROPHILS # BLD AUTO: 2.5 K/UL
NEUTROPHILS # BLD AUTO: 2.6 K/UL
NEUTROPHILS # BLD AUTO: 2.8 K/UL
NEUTROPHILS # BLD AUTO: 2.9 K/UL
NEUTROPHILS # BLD AUTO: 2.9 K/UL
NEUTROPHILS # BLD AUTO: 3.1 K/UL
NEUTROPHILS # BLD AUTO: 3.3 K/UL
NEUTROPHILS # BLD AUTO: 3.3 K/UL
NEUTROPHILS # BLD AUTO: 3.4 K/UL
NEUTROPHILS # BLD AUTO: 3.5 K/UL
NEUTROPHILS # BLD AUTO: 3.6 K/UL
NEUTROPHILS # BLD AUTO: 3.7 K/UL
NEUTROPHILS # BLD AUTO: 3.7 K/UL
NEUTROPHILS # BLD AUTO: 3.8 K/UL
NEUTROPHILS # BLD AUTO: 4 K/UL
NEUTROPHILS # BLD AUTO: 4.2 K/UL
NEUTROPHILS # BLD AUTO: 4.2 K/UL
NEUTROPHILS # BLD AUTO: 4.6 K/UL
NEUTROPHILS # BLD AUTO: 4.7 K/UL
NEUTROPHILS # BLD AUTO: 4.7 K/UL
NEUTROPHILS # BLD AUTO: 5.2 K/UL
NEUTROPHILS # BLD AUTO: 5.2 K/UL
NEUTROPHILS # BLD AUTO: 6.2 K/UL
NEUTROPHILS # BLD AUTO: 6.3 K/UL
NEUTROPHILS # BLD AUTO: 6.8 K/UL
NEUTROPHILS # BLD AUTO: 6.9 K/UL
NEUTROPHILS # BLD AUTO: 7 K/UL
NEUTROPHILS # BLD AUTO: 7.4 K/UL
NEUTROPHILS # BLD AUTO: 7.4 K/UL
NEUTROPHILS # BLD AUTO: 7.7 K/UL
NEUTROPHILS # BLD AUTO: 7.8 K/UL
NEUTROPHILS # BLD AUTO: 9.1 K/UL
NEUTROPHILS # BLD AUTO: 9.9 K/UL
NEUTROPHILS NFR BLD: 44.7 %
NEUTROPHILS NFR BLD: 49.1 %
NEUTROPHILS NFR BLD: 49.7 %
NEUTROPHILS NFR BLD: 50 %
NEUTROPHILS NFR BLD: 50.9 %
NEUTROPHILS NFR BLD: 52.3 %
NEUTROPHILS NFR BLD: 53.2 %
NEUTROPHILS NFR BLD: 53.4 %
NEUTROPHILS NFR BLD: 54.6 %
NEUTROPHILS NFR BLD: 55.1 %
NEUTROPHILS NFR BLD: 55.6 %
NEUTROPHILS NFR BLD: 55.9 %
NEUTROPHILS NFR BLD: 57.6 %
NEUTROPHILS NFR BLD: 58.2 %
NEUTROPHILS NFR BLD: 59.2 %
NEUTROPHILS NFR BLD: 59.3 %
NEUTROPHILS NFR BLD: 59.5 %
NEUTROPHILS NFR BLD: 59.6 %
NEUTROPHILS NFR BLD: 60 %
NEUTROPHILS NFR BLD: 60.5 %
NEUTROPHILS NFR BLD: 63.1 %
NEUTROPHILS NFR BLD: 63.4 %
NEUTROPHILS NFR BLD: 63.4 %
NEUTROPHILS NFR BLD: 68.9 %
NEUTROPHILS NFR BLD: 69.4 %
NEUTROPHILS NFR BLD: 70.1 %
NEUTROPHILS NFR BLD: 70.8 %
NEUTROPHILS NFR BLD: 71.7 %
NEUTROPHILS NFR BLD: 75.3 %
NEUTROPHILS NFR BLD: 77.1 %
NEUTROPHILS NFR BLD: 77.3 %
NEUTROPHILS NFR BLD: 78.9 %
NEUTROPHILS NFR BLD: 80.3 %
NEUTROPHILS NFR BLD: 81.1 %
NEUTROPHILS NFR BLD: 81.5 %
NEUTROPHILS NFR BLD: 81.7 %
NEUTROPHILS NFR BLD: 82.5 %
NEUTROPHILS NFR BLD: 83 %
NEUTROPHILS NFR BLD: 87.2 %
NITRITE UR QL STRIP: NEGATIVE
NITRITE UR QL STRIP: POSITIVE
NON-SQ EPI CELLS #/AREA URNS HPF: 4 /HPF
NONHDLC SERPL-MCNC: 109 MG/DL
NONHDLC SERPL-MCNC: 134 MG/DL
NONHDLC SERPL-MCNC: 82 MG/DL
NRBC BLD-RTO: 0 /100 WBC
NUM UNITS TRANS PACKED RBC: NORMAL
OB PNL STL: POSITIVE
OB PNL STL: POSITIVE
OSMOLALITY UR: 473 MOSM/KG
PCO2 BLDA: 35.5 MMHG (ref 35–45)
PCO2 BLDA: 38.7 MMHG (ref 35–45)
PCO2 BLDA: 40.4 MMHG (ref 35–45)
PCO2 BLDA: 40.9 MMHG (ref 35–45)
PCO2 BLDA: 41.5 MMHG (ref 35–45)
PCO2 BLDA: 43.1 MMHG (ref 35–45)
PCO2 BLDA: 43.9 MMHG (ref 35–45)
PCO2 BLDA: 44.1 MMHG (ref 35–45)
PCO2 BLDA: 44.2 MMHG (ref 35–45)
PCO2 BLDA: 45.4 MMHG (ref 35–45)
PCO2 BLDA: 45.9 MMHG (ref 35–45)
PCO2 BLDA: 47 MMHG (ref 35–45)
PH SMN: 7.38 [PH] (ref 7.35–7.45)
PH SMN: 7.39 [PH] (ref 7.35–7.45)
PH SMN: 7.39 [PH] (ref 7.35–7.45)
PH SMN: 7.4 [PH] (ref 7.35–7.45)
PH SMN: 7.41 [PH] (ref 7.35–7.45)
PH SMN: 7.42 [PH] (ref 7.35–7.45)
PH SMN: 7.45 [PH] (ref 7.35–7.45)
PH SMN: 7.5 [PH] (ref 7.35–7.45)
PH UR STRIP: 5 [PH] (ref 5–8)
PH UR STRIP: 6 [PH] (ref 5–8)
PH UR STRIP: 6 [PH] (ref 5–8)
PHOSPHATE SERPL-MCNC: 2.3 MG/DL
PHOSPHATE SERPL-MCNC: 2.9 MG/DL
PHOSPHATE SERPL-MCNC: 4.1 MG/DL
PHOSPHATE SERPL-MCNC: 4.1 MG/DL
PHOSPHATE SERPL-MCNC: 4.3 MG/DL
PHOSPHATE SERPL-MCNC: 4.4 MG/DL
PHOSPHATE SERPL-MCNC: 4.8 MG/DL
PHOSPHATE SERPL-MCNC: 5 MG/DL
PHOSPHATE SERPL-MCNC: 5.1 MG/DL
PHOSPHATE SERPL-MCNC: 5.6 MG/DL
PHOSPHATE SERPL-MCNC: 6 MG/DL
PLATELET # BLD AUTO: 141 K/UL
PLATELET # BLD AUTO: 152 K/UL
PLATELET # BLD AUTO: 160 K/UL
PLATELET # BLD AUTO: 165 K/UL
PLATELET # BLD AUTO: 172 K/UL
PLATELET # BLD AUTO: 173 K/UL
PLATELET # BLD AUTO: 174 K/UL
PLATELET # BLD AUTO: 175 K/UL
PLATELET # BLD AUTO: 176 K/UL
PLATELET # BLD AUTO: 183 K/UL
PLATELET # BLD AUTO: 184 K/UL
PLATELET # BLD AUTO: 184 K/UL
PLATELET # BLD AUTO: 185 K/UL
PLATELET # BLD AUTO: 186 K/UL
PLATELET # BLD AUTO: 190 K/UL
PLATELET # BLD AUTO: 191 K/UL
PLATELET # BLD AUTO: 194 K/UL
PLATELET # BLD AUTO: 196 K/UL
PLATELET # BLD AUTO: 196 K/UL
PLATELET # BLD AUTO: 202 K/UL
PLATELET # BLD AUTO: 202 K/UL
PLATELET # BLD AUTO: 206 K/UL
PLATELET # BLD AUTO: 210 K/UL
PLATELET # BLD AUTO: 211 K/UL
PLATELET # BLD AUTO: 212 K/UL
PLATELET # BLD AUTO: 213 K/UL
PLATELET # BLD AUTO: 214 K/UL
PLATELET # BLD AUTO: 214 K/UL
PLATELET # BLD AUTO: 216 K/UL
PLATELET # BLD AUTO: 220 K/UL
PLATELET # BLD AUTO: 222 K/UL
PLATELET # BLD AUTO: 222 K/UL
PLATELET # BLD AUTO: 231 K/UL
PLATELET # BLD AUTO: 233 K/UL
PLATELET # BLD AUTO: 234 K/UL
PLATELET # BLD AUTO: 238 K/UL
PLATELET # BLD AUTO: 243 K/UL
PLATELET # BLD AUTO: 257 K/UL
PLATELET # BLD AUTO: 263 K/UL
PLATELET # BLD AUTO: 273 K/UL
PLATELET # BLD AUTO: 283 K/UL
PLATELET # BLD AUTO: 291 K/UL
PLATELET BLD QL SMEAR: ABNORMAL
PMV BLD AUTO: 10 FL
PMV BLD AUTO: 10.2 FL
PMV BLD AUTO: 8.1 FL
PMV BLD AUTO: 8.1 FL
PMV BLD AUTO: 8.3 FL
PMV BLD AUTO: 8.4 FL
PMV BLD AUTO: 8.5 FL
PMV BLD AUTO: 8.7 FL
PMV BLD AUTO: 8.8 FL
PMV BLD AUTO: 8.8 FL
PMV BLD AUTO: 9 FL
PMV BLD AUTO: 9.1 FL
PMV BLD AUTO: 9.2 FL
PMV BLD AUTO: 9.2 FL
PMV BLD AUTO: 9.3 FL
PMV BLD AUTO: 9.4 FL
PMV BLD AUTO: 9.5 FL
PMV BLD AUTO: 9.6 FL
PMV BLD AUTO: 9.7 FL
PMV BLD AUTO: 9.9 FL
PMV BLD AUTO: 9.9 FL
PO2 BLDA: 21 MMHG (ref 40–60)
PO2 BLDA: 217 MMHG (ref 80–100)
PO2 BLDA: 29 MMHG (ref 40–60)
PO2 BLDA: 31 MMHG (ref 40–60)
PO2 BLDA: 32 MMHG (ref 40–60)
PO2 BLDA: 36 MMHG (ref 40–60)
PO2 BLDA: 37 MMHG (ref 40–60)
PO2 BLDA: 38 MMHG (ref 40–60)
PO2 BLDA: 41 MMHG (ref 40–60)
PO2 BLDA: 42 MMHG (ref 40–60)
PO2 BLDA: 67 MMHG (ref 80–100)
PO2 BLDA: 74 MMHG (ref 80–100)
POC BE: -2 MMOL/L
POC BE: 0 MMOL/L
POC BE: 1 MMOL/L
POC BE: 10 MMOL/L
POC BE: 2 MMOL/L
POC BE: 3 MMOL/L
POC BE: 4 MMOL/L
POC BE: 4 MMOL/L
POC BE: 5 MMOL/L
POC BE: 5 MMOL/L
POC SATURATED O2: 100 % (ref 95–100)
POC SATURATED O2: 33 % (ref 95–100)
POC SATURATED O2: 57 % (ref 95–100)
POC SATURATED O2: 59 % (ref 95–100)
POC SATURATED O2: 62 % (ref 95–100)
POC SATURATED O2: 67 % (ref 95–100)
POC SATURATED O2: 71 % (ref 95–100)
POC SATURATED O2: 73 % (ref 95–100)
POC SATURATED O2: 77 % (ref 95–100)
POC SATURATED O2: 78 % (ref 95–100)
POC SATURATED O2: 94 % (ref 95–100)
POC SATURATED O2: 95 % (ref 95–100)
POC TCO2: 24 MMOL/L (ref 23–27)
POC TCO2: 26 MMOL/L (ref 23–27)
POC TCO2: 26 MMOL/L (ref 24–29)
POC TCO2: 27 MMOL/L (ref 24–29)
POC TCO2: 28 MMOL/L (ref 24–29)
POC TCO2: 28 MMOL/L (ref 24–29)
POC TCO2: 29 MMOL/L (ref 24–29)
POC TCO2: 30 MMOL/L (ref 24–29)
POC TCO2: 30 MMOL/L (ref 24–29)
POC TCO2: 31 MMOL/L (ref 24–29)
POC TCO2: 31 MMOL/L (ref 24–29)
POC TCO2: 35 MMOL/L (ref 23–27)
POCT GLUCOSE: 102 MG/DL (ref 70–110)
POCT GLUCOSE: 102 MG/DL (ref 70–110)
POCT GLUCOSE: 104 MG/DL (ref 70–110)
POCT GLUCOSE: 105 MG/DL (ref 70–110)
POCT GLUCOSE: 113 MG/DL (ref 70–110)
POCT GLUCOSE: 115 MG/DL (ref 70–110)
POCT GLUCOSE: 125 MG/DL (ref 70–110)
POCT GLUCOSE: 130 MG/DL (ref 70–110)
POCT GLUCOSE: 131 MG/DL (ref 70–110)
POCT GLUCOSE: 132 MG/DL (ref 70–110)
POCT GLUCOSE: 136 MG/DL (ref 70–110)
POCT GLUCOSE: 136 MG/DL (ref 70–110)
POCT GLUCOSE: 137 MG/DL (ref 70–110)
POCT GLUCOSE: 139 MG/DL (ref 70–110)
POCT GLUCOSE: 140 MG/DL (ref 70–110)
POCT GLUCOSE: 141 MG/DL (ref 70–110)
POCT GLUCOSE: 141 MG/DL (ref 70–110)
POCT GLUCOSE: 142 MG/DL (ref 70–110)
POCT GLUCOSE: 144 MG/DL (ref 70–110)
POCT GLUCOSE: 145 MG/DL (ref 70–110)
POCT GLUCOSE: 146 MG/DL (ref 70–110)
POCT GLUCOSE: 146 MG/DL (ref 70–110)
POCT GLUCOSE: 148 MG/DL (ref 70–110)
POCT GLUCOSE: 149 MG/DL (ref 70–110)
POCT GLUCOSE: 150 MG/DL (ref 70–110)
POCT GLUCOSE: 152 MG/DL (ref 70–110)
POCT GLUCOSE: 153 MG/DL (ref 70–110)
POCT GLUCOSE: 153 MG/DL (ref 70–110)
POCT GLUCOSE: 155 MG/DL (ref 70–110)
POCT GLUCOSE: 156 MG/DL (ref 70–110)
POCT GLUCOSE: 156 MG/DL (ref 70–110)
POCT GLUCOSE: 157 MG/DL (ref 70–110)
POCT GLUCOSE: 158 MG/DL (ref 70–110)
POCT GLUCOSE: 158 MG/DL (ref 70–110)
POCT GLUCOSE: 159 MG/DL (ref 70–110)
POCT GLUCOSE: 160 MG/DL (ref 70–110)
POCT GLUCOSE: 163 MG/DL (ref 70–110)
POCT GLUCOSE: 165 MG/DL (ref 70–110)
POCT GLUCOSE: 167 MG/DL (ref 70–110)
POCT GLUCOSE: 168 MG/DL (ref 70–110)
POCT GLUCOSE: 168 MG/DL (ref 70–110)
POCT GLUCOSE: 169 MG/DL (ref 70–110)
POCT GLUCOSE: 171 MG/DL (ref 70–110)
POCT GLUCOSE: 172 MG/DL (ref 70–110)
POCT GLUCOSE: 173 MG/DL (ref 70–110)
POCT GLUCOSE: 173 MG/DL (ref 70–110)
POCT GLUCOSE: 174 MG/DL (ref 70–110)
POCT GLUCOSE: 174 MG/DL (ref 70–110)
POCT GLUCOSE: 175 MG/DL (ref 70–110)
POCT GLUCOSE: 176 MG/DL (ref 70–110)
POCT GLUCOSE: 177 MG/DL (ref 70–110)
POCT GLUCOSE: 178 MG/DL (ref 70–110)
POCT GLUCOSE: 179 MG/DL (ref 70–110)
POCT GLUCOSE: 180 MG/DL (ref 70–110)
POCT GLUCOSE: 181 MG/DL (ref 70–110)
POCT GLUCOSE: 182 MG/DL (ref 70–110)
POCT GLUCOSE: 182 MG/DL (ref 70–110)
POCT GLUCOSE: 183 MG/DL (ref 70–110)
POCT GLUCOSE: 184 MG/DL (ref 70–110)
POCT GLUCOSE: 185 MG/DL (ref 70–110)
POCT GLUCOSE: 186 MG/DL (ref 70–110)
POCT GLUCOSE: 187 MG/DL (ref 70–110)
POCT GLUCOSE: 187 MG/DL (ref 70–110)
POCT GLUCOSE: 188 MG/DL (ref 70–110)
POCT GLUCOSE: 191 MG/DL (ref 70–110)
POCT GLUCOSE: 192 MG/DL (ref 70–110)
POCT GLUCOSE: 193 MG/DL (ref 70–110)
POCT GLUCOSE: 194 MG/DL (ref 70–110)
POCT GLUCOSE: 195 MG/DL (ref 70–110)
POCT GLUCOSE: 196 MG/DL (ref 70–110)
POCT GLUCOSE: 198 MG/DL (ref 70–110)
POCT GLUCOSE: 199 MG/DL (ref 70–110)
POCT GLUCOSE: 199 MG/DL (ref 70–110)
POCT GLUCOSE: 200 MG/DL (ref 70–110)
POCT GLUCOSE: 201 MG/DL (ref 70–110)
POCT GLUCOSE: 201 MG/DL (ref 70–110)
POCT GLUCOSE: 204 MG/DL (ref 70–110)
POCT GLUCOSE: 206 MG/DL (ref 70–110)
POCT GLUCOSE: 207 MG/DL (ref 70–110)
POCT GLUCOSE: 213 MG/DL (ref 70–110)
POCT GLUCOSE: 215 MG/DL (ref 70–110)
POCT GLUCOSE: 216 MG/DL (ref 70–110)
POCT GLUCOSE: 216 MG/DL (ref 70–110)
POCT GLUCOSE: 219 MG/DL (ref 70–110)
POCT GLUCOSE: 219 MG/DL (ref 70–110)
POCT GLUCOSE: 220 MG/DL (ref 70–110)
POCT GLUCOSE: 220 MG/DL (ref 70–110)
POCT GLUCOSE: 222 MG/DL (ref 70–110)
POCT GLUCOSE: 223 MG/DL (ref 70–110)
POCT GLUCOSE: 223 MG/DL (ref 70–110)
POCT GLUCOSE: 224 MG/DL (ref 70–110)
POCT GLUCOSE: 225 MG/DL (ref 70–110)
POCT GLUCOSE: 225 MG/DL (ref 70–110)
POCT GLUCOSE: 230 MG/DL (ref 70–110)
POCT GLUCOSE: 230 MG/DL (ref 70–110)
POCT GLUCOSE: 236 MG/DL (ref 70–110)
POCT GLUCOSE: 238 MG/DL (ref 70–110)
POCT GLUCOSE: 239 MG/DL (ref 70–110)
POCT GLUCOSE: 239 MG/DL (ref 70–110)
POCT GLUCOSE: 240 MG/DL (ref 70–110)
POCT GLUCOSE: 240 MG/DL (ref 70–110)
POCT GLUCOSE: 244 MG/DL (ref 70–110)
POCT GLUCOSE: 245 MG/DL (ref 70–110)
POCT GLUCOSE: 247 MG/DL (ref 70–110)
POCT GLUCOSE: 249 MG/DL (ref 70–110)
POCT GLUCOSE: 251 MG/DL (ref 70–110)
POCT GLUCOSE: 256 MG/DL (ref 70–110)
POCT GLUCOSE: 262 MG/DL (ref 70–110)
POCT GLUCOSE: 262 MG/DL (ref 70–110)
POCT GLUCOSE: 265 MG/DL (ref 70–110)
POCT GLUCOSE: 269 MG/DL (ref 70–110)
POCT GLUCOSE: 272 MG/DL (ref 70–110)
POCT GLUCOSE: 279 MG/DL (ref 70–110)
POCT GLUCOSE: 285 MG/DL (ref 70–110)
POCT GLUCOSE: 287 MG/DL (ref 70–110)
POCT GLUCOSE: 287 MG/DL (ref 70–110)
POCT GLUCOSE: 301 MG/DL (ref 70–110)
POTASSIUM SERPL-SCNC: 3.2 MMOL/L
POTASSIUM SERPL-SCNC: 3.3 MMOL/L
POTASSIUM SERPL-SCNC: 3.5 MMOL/L
POTASSIUM SERPL-SCNC: 3.6 MMOL/L
POTASSIUM SERPL-SCNC: 3.6 MMOL/L
POTASSIUM SERPL-SCNC: 3.7 MMOL/L
POTASSIUM SERPL-SCNC: 3.8 MMOL/L
POTASSIUM SERPL-SCNC: 3.9 MMOL/L
POTASSIUM SERPL-SCNC: 4 MMOL/L
POTASSIUM SERPL-SCNC: 4.1 MMOL/L
POTASSIUM SERPL-SCNC: 4.2 MMOL/L
POTASSIUM SERPL-SCNC: 4.3 MMOL/L
POTASSIUM SERPL-SCNC: 4.4 MMOL/L
POTASSIUM SERPL-SCNC: 4.5 MMOL/L
POTASSIUM SERPL-SCNC: 4.6 MMOL/L
POTASSIUM SERPL-SCNC: 4.7 MMOL/L
POTASSIUM SERPL-SCNC: 4.8 MMOL/L
POTASSIUM SERPL-SCNC: 4.8 MMOL/L
POTASSIUM SERPL-SCNC: 4.9 MMOL/L
POTASSIUM SERPL-SCNC: 4.9 MMOL/L
POTASSIUM SERPL-SCNC: 5 MMOL/L
POTASSIUM SERPL-SCNC: 5.1 MMOL/L
POTASSIUM SERPL-SCNC: 5.1 MMOL/L
POTASSIUM SERPL-SCNC: 5.2 MMOL/L
POTASSIUM SERPL-SCNC: 5.2 MMOL/L
POTASSIUM SERPL-SCNC: 5.3 MMOL/L
POTASSIUM SERPL-SCNC: 5.5 MMOL/L
PROCALCITONIN SERPL IA-MCNC: 0.25 NG/ML
PROCALCITONIN SERPL IA-MCNC: 0.29 NG/ML
PROCALCITONIN SERPL IA-MCNC: 0.62 NG/ML
PROCALCITONIN SERPL IA-MCNC: 0.72 NG/ML
PROT SERPL-MCNC: 5.1 G/DL
PROT SERPL-MCNC: 5.4 G/DL
PROT SERPL-MCNC: 5.4 G/DL
PROT SERPL-MCNC: 5.5 G/DL
PROT SERPL-MCNC: 5.6 G/DL
PROT SERPL-MCNC: 5.6 G/DL
PROT SERPL-MCNC: 5.7 G/DL
PROT SERPL-MCNC: 5.8 G/DL
PROT SERPL-MCNC: 5.9 G/DL
PROT SERPL-MCNC: 6 G/DL
PROT SERPL-MCNC: 6.1 G/DL
PROT SERPL-MCNC: 6.2 G/DL
PROT SERPL-MCNC: 6.3 G/DL
PROT SERPL-MCNC: 6.4 G/DL
PROT SERPL-MCNC: 6.4 G/DL
PROT SERPL-MCNC: 6.6 G/DL
PROT SERPL-MCNC: 6.7 G/DL
PROT SERPL-MCNC: 6.8 G/DL
PROT SERPL-MCNC: 6.8 G/DL
PROT SERPL-MCNC: 7.2 G/DL
PROT UR QL STRIP: ABNORMAL
PROT UR QL STRIP: NEGATIVE
PROTHROMBIN TIME: 11.6 SEC
PROTHROMBIN TIME: 11.7 SEC
PROTHROMBIN TIME: 11.9 SEC
PROTHROMBIN TIME: 12.1 SEC
PROTHROMBIN TIME: 12.4 SEC
PROTHROMBIN TIME: 12.5 SEC
PROTHROMBIN TIME: 13 SEC
PROTHROMBIN TIME: 13.3 SEC
PROTHROMBIN TIME: 13.4 SEC
RBC # BLD AUTO: 2.38 M/UL
RBC # BLD AUTO: 2.46 M/UL
RBC # BLD AUTO: 2.56 M/UL
RBC # BLD AUTO: 2.57 M/UL
RBC # BLD AUTO: 2.6 M/UL
RBC # BLD AUTO: 2.63 M/UL
RBC # BLD AUTO: 2.65 M/UL
RBC # BLD AUTO: 2.66 M/UL
RBC # BLD AUTO: 2.7 M/UL
RBC # BLD AUTO: 2.73 M/UL
RBC # BLD AUTO: 2.74 M/UL
RBC # BLD AUTO: 2.77 M/UL
RBC # BLD AUTO: 2.8 M/UL
RBC # BLD AUTO: 2.82 M/UL
RBC # BLD AUTO: 2.85 M/UL
RBC # BLD AUTO: 2.88 M/UL
RBC # BLD AUTO: 2.91 M/UL
RBC # BLD AUTO: 2.94 M/UL
RBC # BLD AUTO: 2.95 M/UL
RBC # BLD AUTO: 2.95 M/UL
RBC # BLD AUTO: 2.97 M/UL
RBC # BLD AUTO: 3.03 M/UL
RBC # BLD AUTO: 3.07 M/UL
RBC # BLD AUTO: 3.11 M/UL
RBC # BLD AUTO: 3.12 M/UL
RBC # BLD AUTO: 3.13 M/UL
RBC # BLD AUTO: 3.15 M/UL
RBC # BLD AUTO: 3.17 M/UL
RBC # BLD AUTO: 3.24 M/UL
RBC # BLD AUTO: 3.25 M/UL
RBC # BLD AUTO: 3.28 M/UL
RBC # BLD AUTO: 3.29 M/UL
RBC # BLD AUTO: 3.29 M/UL
RBC # BLD AUTO: 3.46 M/UL
RBC # BLD AUTO: 3.5 M/UL
RBC # BLD AUTO: 3.52 M/UL
RBC # BLD AUTO: 3.54 M/UL
RBC # BLD AUTO: 3.57 M/UL
RBC # BLD AUTO: 3.69 M/UL
RBC # BLD AUTO: 3.94 M/UL
RBC #/AREA URNS AUTO: 1 /HPF (ref 0–4)
RBC #/AREA URNS HPF: 2 /HPF (ref 0–4)
RBC #/AREA URNS HPF: 2 /HPF (ref 0–4)
RETIRED EF AND QEF - SEE NOTES: 15 (ref 55–65)
RETIRED EF AND QEF - SEE NOTES: 20 (ref 55–65)
RETIRED EF AND QEF - SEE NOTES: 20 (ref 55–65)
RETIRED EF AND QEF - SEE NOTES: 50 (ref 55–65)
RETIRED EF AND QEF - SEE NOTES: 50 (ref 55–65)
SAMPLE: ABNORMAL
SATURATED IRON: 14 %
SATURATED IRON: 53 %
SITE: ABNORMAL
SMOOTH MUSCLE AB TITR SER IF: ABNORMAL {TITER}
SODIUM SERPL-SCNC: 121 MMOL/L
SODIUM SERPL-SCNC: 121 MMOL/L
SODIUM SERPL-SCNC: 122 MMOL/L
SODIUM SERPL-SCNC: 124 MMOL/L
SODIUM SERPL-SCNC: 124 MMOL/L
SODIUM SERPL-SCNC: 125 MMOL/L
SODIUM SERPL-SCNC: 126 MMOL/L
SODIUM SERPL-SCNC: 127 MMOL/L
SODIUM SERPL-SCNC: 128 MMOL/L
SODIUM SERPL-SCNC: 130 MMOL/L
SODIUM SERPL-SCNC: 130 MMOL/L
SODIUM SERPL-SCNC: 131 MMOL/L
SODIUM SERPL-SCNC: 132 MMOL/L
SODIUM SERPL-SCNC: 132 MMOL/L
SODIUM SERPL-SCNC: 133 MMOL/L
SODIUM SERPL-SCNC: 134 MMOL/L
SODIUM SERPL-SCNC: 135 MMOL/L
SODIUM SERPL-SCNC: 136 MMOL/L
SODIUM SERPL-SCNC: 137 MMOL/L
SODIUM SERPL-SCNC: 138 MMOL/L
SODIUM UR-SCNC: <20 MMOL/L
SODIUM UR-SCNC: <20 MMOL/L
SP GR UR STRIP: 1.01 (ref 1–1.03)
SP GR UR STRIP: 1.02 (ref 1–1.03)
SP02: 100
SP02: 100
SP02: 98
SP02: 99
SPONT RATE: 21
SPONT RATE: 23
SPONT RATE: 32
SQUAMOUS #/AREA URNS AUTO: 1 /HPF
SQUAMOUS #/AREA URNS HPF: 1 /HPF
T4 FREE SERPL-MCNC: 1.29 NG/DL
TOTAL IRON BINDING CAPACITY: 295 UG/DL
TOTAL IRON BINDING CAPACITY: 305 UG/DL
TRANS ERYTHROCYTES VOL PATIENT: NORMAL ML
TRANSFERRIN SERPL-MCNC: 199 MG/DL
TRANSFERRIN SERPL-MCNC: 206 MG/DL
TRANSFERRIN SERPL-MCNC: 206 MG/DL
TRICUSPID VALVE REGURGITATION: ABNORMAL
TRIGL SERPL-MCNC: 79 MG/DL
TRIGL SERPL-MCNC: 82 MG/DL
TRIGL SERPL-MCNC: 99 MG/DL
TROPONIN I SERPL DL<=0.01 NG/ML-MCNC: 0.02 NG/ML
TROPONIN I SERPL DL<=0.01 NG/ML-MCNC: 0.04 NG/ML
TROPONIN I SERPL DL<=0.01 NG/ML-MCNC: 0.04 NG/ML
TROPONIN I SERPL DL<=0.01 NG/ML-MCNC: 0.05 NG/ML
TROPONIN I SERPL DL<=0.01 NG/ML-MCNC: 0.22 NG/ML
TROPONIN I SERPL DL<=0.01 NG/ML-MCNC: 0.23 NG/ML
TROPONIN I SERPL DL<=0.01 NG/ML-MCNC: 0.33 NG/ML
TROPONIN I SERPL DL<=0.01 NG/ML-MCNC: 0.72 NG/ML
TROPONIN I SERPL DL<=0.01 NG/ML-MCNC: 0.85 NG/ML
TROPONIN I SERPL DL<=0.01 NG/ML-MCNC: 0.91 NG/ML
TROPONIN I SERPL DL<=0.01 NG/ML-MCNC: 0.91 NG/ML
TROPONIN I SERPL DL<=0.01 NG/ML-MCNC: 1.1 NG/ML
TROPONIN I SERPL DL<=0.01 NG/ML-MCNC: 1.59 NG/ML
TROPONIN I SERPL DL<=0.01 NG/ML-MCNC: 2.29 NG/ML
TROPONIN I SERPL DL<=0.01 NG/ML-MCNC: 2.78 NG/ML
TROPONIN I SERPL DL<=0.01 NG/ML-MCNC: 4.86 NG/ML
TROPONIN I SERPL DL<=0.01 NG/ML-MCNC: 7.64 NG/ML
TSH SERPL DL<=0.005 MIU/L-ACNC: 3.77 UIU/ML
TSH SERPL DL<=0.005 MIU/L-ACNC: 9.59 UIU/ML
URN SPEC COLLECT METH UR: ABNORMAL
URN SPEC COLLECT METH UR: NORMAL
UROBILINOGEN UR STRIP-ACNC: 1 EU/DL
UROBILINOGEN UR STRIP-ACNC: NEGATIVE EU/DL
VANCOMYCIN SERPL-MCNC: 15.5 UG/ML
VANCOMYCIN SERPL-MCNC: 19.4 UG/ML
VANCOMYCIN TROUGH SERPL-MCNC: 12.6 UG/ML
VANCOMYCIN TROUGH SERPL-MCNC: 17.5 UG/ML
VIT B12 SERPL-MCNC: 488 PG/ML
WBC # BLD AUTO: 10.45 K/UL
WBC # BLD AUTO: 10.56 K/UL
WBC # BLD AUTO: 10.76 K/UL
WBC # BLD AUTO: 10.99 K/UL
WBC # BLD AUTO: 11.05 K/UL
WBC # BLD AUTO: 11.22 K/UL
WBC # BLD AUTO: 12.38 K/UL
WBC # BLD AUTO: 12.44 K/UL
WBC # BLD AUTO: 13.17 K/UL
WBC # BLD AUTO: 13.59 K/UL
WBC # BLD AUTO: 4.62 K/UL
WBC # BLD AUTO: 4.76 K/UL
WBC # BLD AUTO: 4.91 K/UL
WBC # BLD AUTO: 5.03 K/UL
WBC # BLD AUTO: 5.32 K/UL
WBC # BLD AUTO: 5.36 K/UL
WBC # BLD AUTO: 5.5 K/UL
WBC # BLD AUTO: 5.63 K/UL
WBC # BLD AUTO: 5.71 K/UL
WBC # BLD AUTO: 5.77 K/UL
WBC # BLD AUTO: 5.81 K/UL
WBC # BLD AUTO: 5.97 K/UL
WBC # BLD AUTO: 6.05 K/UL
WBC # BLD AUTO: 6.13 K/UL
WBC # BLD AUTO: 6.16 K/UL
WBC # BLD AUTO: 6.19 K/UL
WBC # BLD AUTO: 6.27 K/UL
WBC # BLD AUTO: 6.59 K/UL
WBC # BLD AUTO: 6.62 K/UL
WBC # BLD AUTO: 6.62 K/UL
WBC # BLD AUTO: 6.68 K/UL
WBC # BLD AUTO: 7.03 K/UL
WBC # BLD AUTO: 7.47 K/UL
WBC # BLD AUTO: 7.53 K/UL
WBC # BLD AUTO: 7.66 K/UL
WBC # BLD AUTO: 7.79 K/UL
WBC # BLD AUTO: 8.37 K/UL
WBC # BLD AUTO: 8.48 K/UL
WBC # BLD AUTO: 8.53 K/UL
WBC # BLD AUTO: 8.76 K/UL
WBC # BLD AUTO: 9.56 K/UL
WBC # BLD AUTO: 9.63 K/UL
WBC #/AREA URNS AUTO: 6 /HPF (ref 0–5)
WBC #/AREA URNS HPF: 100 /HPF (ref 0–5)
WBC #/AREA URNS HPF: 3 /HPF (ref 0–5)

## 2018-01-01 PROCEDURE — 63600175 PHARM REV CODE 636 W HCPCS: Performed by: HOSPITALIST

## 2018-01-01 PROCEDURE — A4216 STERILE WATER/SALINE, 10 ML: HCPCS | Performed by: EMERGENCY MEDICINE

## 2018-01-01 PROCEDURE — 02PYXDZ REMOVAL OF INTRALUMINAL DEVICE FROM GREAT VESSEL, EXTERNAL APPROACH: ICD-10-PCS | Performed by: INTERNAL MEDICINE

## 2018-01-01 PROCEDURE — 27000221 HC OXYGEN, UP TO 24 HOURS

## 2018-01-01 PROCEDURE — 99232 SBSQ HOSP IP/OBS MODERATE 35: CPT | Mod: GC,,, | Performed by: INTERNAL MEDICINE

## 2018-01-01 PROCEDURE — 20600001 HC STEP DOWN PRIVATE ROOM

## 2018-01-01 PROCEDURE — 63600175 PHARM REV CODE 636 W HCPCS: Performed by: INTERNAL MEDICINE

## 2018-01-01 PROCEDURE — 99239 HOSP IP/OBS DSCHRG MGMT >30: CPT | Mod: GV,HPC,, | Performed by: FAMILY MEDICINE

## 2018-01-01 PROCEDURE — 99900035 HC TECH TIME PER 15 MIN (STAT)

## 2018-01-01 PROCEDURE — 80053 COMPREHEN METABOLIC PANEL: CPT | Mod: 91

## 2018-01-01 PROCEDURE — 36600 WITHDRAWAL OF ARTERIAL BLOOD: CPT

## 2018-01-01 PROCEDURE — 94640 AIRWAY INHALATION TREATMENT: CPT

## 2018-01-01 PROCEDURE — 85025 COMPLETE CBC W/AUTO DIFF WBC: CPT

## 2018-01-01 PROCEDURE — 25000242 PHARM REV CODE 250 ALT 637 W/ HCPCS: Performed by: HOSPITALIST

## 2018-01-01 PROCEDURE — 94761 N-INVAS EAR/PLS OXIMETRY MLT: CPT

## 2018-01-01 PROCEDURE — 63600175 PHARM REV CODE 636 W HCPCS

## 2018-01-01 PROCEDURE — 63600175 PHARM REV CODE 636 W HCPCS: Performed by: STUDENT IN AN ORGANIZED HEALTH CARE EDUCATION/TRAINING PROGRAM

## 2018-01-01 PROCEDURE — 93010 ELECTROCARDIOGRAM REPORT: CPT | Mod: ,,, | Performed by: INTERNAL MEDICINE

## 2018-01-01 PROCEDURE — 27000202 HC IABP, ADD'L DAY

## 2018-01-01 PROCEDURE — 85730 THROMBOPLASTIN TIME PARTIAL: CPT | Mod: 91

## 2018-01-01 PROCEDURE — 82962 GLUCOSE BLOOD TEST: CPT

## 2018-01-01 PROCEDURE — 86901 BLOOD TYPING SEROLOGIC RH(D): CPT

## 2018-01-01 PROCEDURE — 82803 BLOOD GASES ANY COMBINATION: CPT

## 2018-01-01 PROCEDURE — 25000003 PHARM REV CODE 250

## 2018-01-01 PROCEDURE — 86256 FLUORESCENT ANTIBODY TITER: CPT

## 2018-01-01 PROCEDURE — 84484 ASSAY OF TROPONIN QUANT: CPT | Mod: 91

## 2018-01-01 PROCEDURE — 93306 TTE W/DOPPLER COMPLETE: CPT

## 2018-01-01 PROCEDURE — 25500020 PHARM REV CODE 255: Performed by: HOSPITALIST

## 2018-01-01 PROCEDURE — 36415 COLL VENOUS BLD VENIPUNCTURE: CPT

## 2018-01-01 PROCEDURE — 25000003 PHARM REV CODE 250: Performed by: HOSPITALIST

## 2018-01-01 PROCEDURE — 25000003 PHARM REV CODE 250: Performed by: INTERNAL MEDICINE

## 2018-01-01 PROCEDURE — 80053 COMPREHEN METABOLIC PANEL: CPT

## 2018-01-01 PROCEDURE — 25000242 PHARM REV CODE 250 ALT 637 W/ HCPCS: Performed by: PHYSICIAN ASSISTANT

## 2018-01-01 PROCEDURE — 25000003 PHARM REV CODE 250: Performed by: STUDENT IN AN ORGANIZED HEALTH CARE EDUCATION/TRAINING PROGRAM

## 2018-01-01 PROCEDURE — 86920 COMPATIBILITY TEST SPIN: CPT

## 2018-01-01 PROCEDURE — 76937 US GUIDE VASCULAR ACCESS: CPT

## 2018-01-01 PROCEDURE — 36569 INSJ PICC 5 YR+ W/O IMAGING: CPT

## 2018-01-01 PROCEDURE — 27000190 HC CPAP FULL FACE MASK W/VALVE

## 2018-01-01 PROCEDURE — 84300 ASSAY OF URINE SODIUM: CPT

## 2018-01-01 PROCEDURE — 27201463 HC QUATTRO CATH KIT

## 2018-01-01 PROCEDURE — 82728 ASSAY OF FERRITIN: CPT

## 2018-01-01 PROCEDURE — 63600175 PHARM REV CODE 636 W HCPCS: Performed by: NURSE PRACTITIONER

## 2018-01-01 PROCEDURE — 96375 TX/PRO/DX INJ NEW DRUG ADDON: CPT

## 2018-01-01 PROCEDURE — 20000000 HC ICU ROOM

## 2018-01-01 PROCEDURE — 97116 GAIT TRAINING THERAPY: CPT | Performed by: PHYSICAL THERAPIST

## 2018-01-01 PROCEDURE — 99233 SBSQ HOSP IP/OBS HIGH 50: CPT | Mod: ,,, | Performed by: HOSPITALIST

## 2018-01-01 PROCEDURE — 93005 ELECTROCARDIOGRAM TRACING: CPT

## 2018-01-01 PROCEDURE — 81000 URINALYSIS NONAUTO W/SCOPE: CPT

## 2018-01-01 PROCEDURE — 99291 CRITICAL CARE FIRST HOUR: CPT | Mod: 25

## 2018-01-01 PROCEDURE — 96374 THER/PROPH/DIAG INJ IV PUSH: CPT

## 2018-01-01 PROCEDURE — 02H633Z INSERTION OF INFUSION DEVICE INTO RIGHT ATRIUM, PERCUTANEOUS APPROACH: ICD-10-PCS | Performed by: INTERNAL MEDICINE

## 2018-01-01 PROCEDURE — P9016 RBC LEUKOCYTES REDUCED: HCPCS

## 2018-01-01 PROCEDURE — 83605 ASSAY OF LACTIC ACID: CPT

## 2018-01-01 PROCEDURE — 99285 EMERGENCY DEPT VISIT HI MDM: CPT | Mod: 25

## 2018-01-01 PROCEDURE — 99213 OFFICE O/P EST LOW 20 MIN: CPT | Mod: PBBFAC,25,PO | Performed by: INTERNAL MEDICINE

## 2018-01-01 PROCEDURE — 81003 URINALYSIS AUTO W/O SCOPE: CPT

## 2018-01-01 PROCEDURE — 93010 ELECTROCARDIOGRAM REPORT: CPT | Mod: 77,,, | Performed by: INTERNAL MEDICINE

## 2018-01-01 PROCEDURE — 97116 GAIT TRAINING THERAPY: CPT

## 2018-01-01 PROCEDURE — 99214 OFFICE O/P EST MOD 30 MIN: CPT | Mod: S$GLB,,, | Performed by: FAMILY MEDICINE

## 2018-01-01 PROCEDURE — 37000008 HC ANESTHESIA 1ST 15 MINUTES: Performed by: INTERNAL MEDICINE

## 2018-01-01 PROCEDURE — 36430 TRANSFUSION BLD/BLD COMPNT: CPT

## 2018-01-01 PROCEDURE — 83880 ASSAY OF NATRIURETIC PEPTIDE: CPT

## 2018-01-01 PROCEDURE — 80202 ASSAY OF VANCOMYCIN: CPT | Mod: 91

## 2018-01-01 PROCEDURE — 97168 OT RE-EVAL EST PLAN CARE: CPT

## 2018-01-01 PROCEDURE — 96365 THER/PROPH/DIAG IV INF INIT: CPT

## 2018-01-01 PROCEDURE — 25500020 PHARM REV CODE 255

## 2018-01-01 PROCEDURE — 33967 INSERT I-AORT PERCUT DEVICE: CPT | Mod: ,,, | Performed by: INTERNAL MEDICINE

## 2018-01-01 PROCEDURE — 11000001 HC ACUTE MED/SURG PRIVATE ROOM

## 2018-01-01 PROCEDURE — 84484 ASSAY OF TROPONIN QUANT: CPT

## 2018-01-01 PROCEDURE — 83735 ASSAY OF MAGNESIUM: CPT

## 2018-01-01 PROCEDURE — 99203 OFFICE O/P NEW LOW 30 MIN: CPT | Mod: ,,, | Performed by: INTERNAL MEDICINE

## 2018-01-01 PROCEDURE — G8996 SWALLOW CURRENT STATUS: HCPCS | Mod: CH

## 2018-01-01 PROCEDURE — 97164 PT RE-EVAL EST PLAN CARE: CPT | Performed by: PHYSICAL THERAPIST

## 2018-01-01 PROCEDURE — A4216 STERILE WATER/SALINE, 10 ML: HCPCS | Performed by: INTERNAL MEDICINE

## 2018-01-01 PROCEDURE — 0DB68ZX EXCISION OF STOMACH, VIA NATURAL OR ARTIFICIAL OPENING ENDOSCOPIC, DIAGNOSTIC: ICD-10-PCS | Performed by: INTERNAL MEDICINE

## 2018-01-01 PROCEDURE — 93000 ELECTROCARDIOGRAM COMPLETE: CPT | Mod: S$GLB,,, | Performed by: INTERNAL MEDICINE

## 2018-01-01 PROCEDURE — 25000242 PHARM REV CODE 250 ALT 637 W/ HCPCS: Performed by: EMERGENCY MEDICINE

## 2018-01-01 PROCEDURE — 33967 INSERT I-AORT PERCUT DEVICE: CPT

## 2018-01-01 PROCEDURE — 02HV33Z INSERTION OF INFUSION DEVICE INTO SUPERIOR VENA CAVA, PERCUTANEOUS APPROACH: ICD-10-PCS | Performed by: HOSPITALIST

## 2018-01-01 PROCEDURE — 83036 HEMOGLOBIN GLYCOSYLATED A1C: CPT

## 2018-01-01 PROCEDURE — 25500020 PHARM REV CODE 255: Performed by: EMERGENCY MEDICINE

## 2018-01-01 PROCEDURE — 96361 HYDRATE IV INFUSION ADD-ON: CPT

## 2018-01-01 PROCEDURE — 93010 ELECTROCARDIOGRAM REPORT: CPT | Mod: 76,,, | Performed by: INTERNAL MEDICINE

## 2018-01-01 PROCEDURE — 82105 ALPHA-FETOPROTEIN SERUM: CPT

## 2018-01-01 PROCEDURE — 25000003 PHARM REV CODE 250: Performed by: NURSE PRACTITIONER

## 2018-01-01 PROCEDURE — 99214 OFFICE O/P EST MOD 30 MIN: CPT | Mod: S$GLB,,, | Performed by: INTERNAL MEDICINE

## 2018-01-01 PROCEDURE — 83605 ASSAY OF LACTIC ACID: CPT | Mod: 91

## 2018-01-01 PROCEDURE — 84145 PROCALCITONIN (PCT): CPT

## 2018-01-01 PROCEDURE — C9113 INJ PANTOPRAZOLE SODIUM, VIA: HCPCS | Performed by: HOSPITALIST

## 2018-01-01 PROCEDURE — C9113 INJ PANTOPRAZOLE SODIUM, VIA: HCPCS | Performed by: INTERNAL MEDICINE

## 2018-01-01 PROCEDURE — C1751 CATH, INF, PER/CENT/MIDLINE: HCPCS

## 2018-01-01 PROCEDURE — 83540 ASSAY OF IRON: CPT

## 2018-01-01 PROCEDURE — 27201012 HC FORCEPS, HOT/COLD, DISP: Performed by: INTERNAL MEDICINE

## 2018-01-01 PROCEDURE — 85610 PROTHROMBIN TIME: CPT

## 2018-01-01 PROCEDURE — 25000003 PHARM REV CODE 250: Performed by: PHYSICIAN ASSISTANT

## 2018-01-01 PROCEDURE — 80061 LIPID PANEL: CPT

## 2018-01-01 PROCEDURE — 80202 ASSAY OF VANCOMYCIN: CPT

## 2018-01-01 PROCEDURE — 02QF3ZZ REPAIR AORTIC VALVE, PERCUTANEOUS APPROACH: ICD-10-PCS | Performed by: INTERNAL MEDICINE

## 2018-01-01 PROCEDURE — 85025 COMPLETE CBC W/AUTO DIFF WBC: CPT | Mod: 91

## 2018-01-01 PROCEDURE — 99233 SBSQ HOSP IP/OBS HIGH 50: CPT | Mod: ,,, | Performed by: INTERNAL MEDICINE

## 2018-01-01 PROCEDURE — 43239 EGD BIOPSY SINGLE/MULTIPLE: CPT | Mod: ,,, | Performed by: INTERNAL MEDICINE

## 2018-01-01 PROCEDURE — 83690 ASSAY OF LIPASE: CPT

## 2018-01-01 PROCEDURE — 99999 PR PBB SHADOW E&M-EST. PATIENT-LVL III: CPT | Mod: PBBFAC,,, | Performed by: INTERNAL MEDICINE

## 2018-01-01 PROCEDURE — 51702 INSERT TEMP BLADDER CATH: CPT

## 2018-01-01 PROCEDURE — 82607 VITAMIN B-12: CPT

## 2018-01-01 PROCEDURE — 86850 RBC ANTIBODY SCREEN: CPT

## 2018-01-01 PROCEDURE — G8979 MOBILITY GOAL STATUS: HCPCS | Mod: CJ

## 2018-01-01 PROCEDURE — 85027 COMPLETE CBC AUTOMATED: CPT

## 2018-01-01 PROCEDURE — 99223 1ST HOSP IP/OBS HIGH 75: CPT | Mod: ,,, | Performed by: NURSE PRACTITIONER

## 2018-01-01 PROCEDURE — G8997 SWALLOW GOAL STATUS: HCPCS | Mod: CH

## 2018-01-01 PROCEDURE — 25000003 PHARM REV CODE 250: Performed by: EMERGENCY MEDICINE

## 2018-01-01 PROCEDURE — 84443 ASSAY THYROID STIM HORMONE: CPT

## 2018-01-01 PROCEDURE — 92986 REVISION OF AORTIC VALVE: CPT

## 2018-01-01 PROCEDURE — 99231 SBSQ HOSP IP/OBS SF/LOW 25: CPT | Mod: ,,, | Performed by: INTERNAL MEDICINE

## 2018-01-01 PROCEDURE — 84100 ASSAY OF PHOSPHORUS: CPT

## 2018-01-01 PROCEDURE — D9220A PRA ANESTHESIA: Mod: CRNA,,, | Performed by: NURSE ANESTHETIST, CERTIFIED REGISTERED

## 2018-01-01 PROCEDURE — 94618 PULMONARY STRESS TESTING: CPT | Mod: S$GLB,,, | Performed by: INTERNAL MEDICINE

## 2018-01-01 PROCEDURE — G8980 MOBILITY D/C STATUS: HCPCS | Mod: CJ

## 2018-01-01 PROCEDURE — 85379 FIBRIN DEGRADATION QUANT: CPT

## 2018-01-01 PROCEDURE — 85520 HEPARIN ASSAY: CPT

## 2018-01-01 PROCEDURE — 36600 WITHDRAWAL OF ARTERIAL BLOOD: CPT | Mod: S$GLB,,, | Performed by: INTERNAL MEDICINE

## 2018-01-01 PROCEDURE — 97165 OT EVAL LOW COMPLEX 30 MIN: CPT

## 2018-01-01 PROCEDURE — 96367 TX/PROPH/DG ADDL SEQ IV INF: CPT

## 2018-01-01 PROCEDURE — 12000002 HC ACUTE/MED SURGE SEMI-PRIVATE ROOM

## 2018-01-01 PROCEDURE — 85014 HEMATOCRIT: CPT

## 2018-01-01 PROCEDURE — 83935 ASSAY OF URINE OSMOLALITY: CPT

## 2018-01-01 PROCEDURE — 36556 INSERT NON-TUNNEL CV CATH: CPT

## 2018-01-01 PROCEDURE — 83880 ASSAY OF NATRIURETIC PEPTIDE: CPT | Mod: 91

## 2018-01-01 PROCEDURE — 88305 TISSUE EXAM BY PATHOLOGIST: CPT | Mod: 26,,, | Performed by: PATHOLOGY

## 2018-01-01 PROCEDURE — 02HV33Z INSERTION OF INFUSION DEVICE INTO SUPERIOR VENA CAVA, PERCUTANEOUS APPROACH: ICD-10-PCS | Performed by: INTERNAL MEDICINE

## 2018-01-01 PROCEDURE — C1769 GUIDE WIRE: HCPCS

## 2018-01-01 PROCEDURE — 94660 CPAP INITIATION&MGMT: CPT

## 2018-01-01 PROCEDURE — 51701 INSERT BLADDER CATHETER: CPT

## 2018-01-01 PROCEDURE — 86038 ANTINUCLEAR ANTIBODIES: CPT

## 2018-01-01 PROCEDURE — 99285 EMERGENCY DEPT VISIT HI MDM: CPT | Mod: ,,, | Performed by: PHYSICIAN ASSISTANT

## 2018-01-01 PROCEDURE — G8978 MOBILITY CURRENT STATUS: HCPCS | Mod: CL

## 2018-01-01 PROCEDURE — G8987 SELF CARE CURRENT STATUS: HCPCS | Mod: CK

## 2018-01-01 PROCEDURE — 80074 ACUTE HEPATITIS PANEL: CPT

## 2018-01-01 PROCEDURE — 63600175 PHARM REV CODE 636 W HCPCS: Performed by: PHYSICIAN ASSISTANT

## 2018-01-01 PROCEDURE — 99233 SBSQ HOSP IP/OBS HIGH 50: CPT | Mod: GC,,, | Performed by: INTERNAL MEDICINE

## 2018-01-01 PROCEDURE — 99233 SBSQ HOSP IP/OBS HIGH 50: CPT | Mod: 24,,, | Performed by: INTERNAL MEDICINE

## 2018-01-01 PROCEDURE — 97110 THERAPEUTIC EXERCISES: CPT

## 2018-01-01 PROCEDURE — S0171 BUMETANIDE 0.5 MG: HCPCS | Performed by: INTERNAL MEDICINE

## 2018-01-01 PROCEDURE — 93306 TTE W/DOPPLER COMPLETE: CPT | Mod: 26,,, | Performed by: INTERNAL MEDICINE

## 2018-01-01 PROCEDURE — 99233 SBSQ HOSP IP/OBS HIGH 50: CPT | Mod: 25,,, | Performed by: INTERNAL MEDICINE

## 2018-01-01 PROCEDURE — 82803 BLOOD GASES ANY COMBINATION: CPT | Mod: S$GLB,,, | Performed by: INTERNAL MEDICINE

## 2018-01-01 PROCEDURE — D9220A PRA ANESTHESIA: Mod: ANES,,, | Performed by: ANESTHESIOLOGY

## 2018-01-01 PROCEDURE — 94618 PULMONARY STRESS TESTING: CPT | Performed by: INTERNAL MEDICINE

## 2018-01-01 PROCEDURE — 96376 TX/PRO/DX INJ SAME DRUG ADON: CPT

## 2018-01-01 PROCEDURE — 99239 HOSP IP/OBS DSCHRG MGMT >30: CPT | Mod: ,,, | Performed by: HOSPITALIST

## 2018-01-01 PROCEDURE — 80048 BASIC METABOLIC PNL TOTAL CA: CPT

## 2018-01-01 PROCEDURE — B2111ZZ FLUOROSCOPY OF MULTIPLE CORONARY ARTERIES USING LOW OSMOLAR CONTRAST: ICD-10-PCS | Performed by: INTERNAL MEDICINE

## 2018-01-01 PROCEDURE — 43255 EGD CONTROL BLEEDING ANY: CPT | Performed by: INTERNAL MEDICINE

## 2018-01-01 PROCEDURE — 37000009 HC ANESTHESIA EA ADD 15 MINS: Performed by: INTERNAL MEDICINE

## 2018-01-01 PROCEDURE — 27200188 HC TRANSDUCER, ART ADULT/PEDS

## 2018-01-01 PROCEDURE — 99152 MOD SED SAME PHYS/QHP 5/>YRS: CPT | Mod: ,,, | Performed by: INTERNAL MEDICINE

## 2018-01-01 PROCEDURE — C9113 INJ PANTOPRAZOLE SODIUM, VIA: HCPCS | Performed by: EMERGENCY MEDICINE

## 2018-01-01 PROCEDURE — 63600175 PHARM REV CODE 636 W HCPCS: Performed by: EMERGENCY MEDICINE

## 2018-01-01 PROCEDURE — 99223 1ST HOSP IP/OBS HIGH 75: CPT | Mod: 25,GC,, | Performed by: INTERNAL MEDICINE

## 2018-01-01 PROCEDURE — G8980 MOBILITY D/C STATUS: HCPCS | Mod: CL

## 2018-01-01 PROCEDURE — A4216 STERILE WATER/SALINE, 10 ML: HCPCS | Performed by: STUDENT IN AN ORGANIZED HEALTH CARE EDUCATION/TRAINING PROGRAM

## 2018-01-01 PROCEDURE — 97802 MEDICAL NUTRITION INDIV IN: CPT

## 2018-01-01 PROCEDURE — 33968 REMOVE AORTIC ASSIST DEVICE: CPT | Mod: 51,,, | Performed by: INTERNAL MEDICINE

## 2018-01-01 PROCEDURE — 51702 INSERT TEMP BLADDER CATH: CPT | Mod: 59

## 2018-01-01 PROCEDURE — 99215 OFFICE O/P EST HI 40 MIN: CPT | Mod: S$PBB,,, | Performed by: INTERNAL MEDICINE

## 2018-01-01 PROCEDURE — 99291 CRITICAL CARE FIRST HOUR: CPT | Mod: 24,,, | Performed by: INTERNAL MEDICINE

## 2018-01-01 PROCEDURE — 99223 1ST HOSP IP/OBS HIGH 75: CPT | Mod: ,,, | Performed by: INTERNAL MEDICINE

## 2018-01-01 PROCEDURE — 88305 TISSUE EXAM BY PATHOLOGIST: CPT | Performed by: PATHOLOGY

## 2018-01-01 PROCEDURE — 97161 PT EVAL LOW COMPLEX 20 MIN: CPT

## 2018-01-01 PROCEDURE — 99152 MOD SED SAME PHYS/QHP 5/>YRS: CPT

## 2018-01-01 PROCEDURE — G8978 MOBILITY CURRENT STATUS: HCPCS | Mod: CK

## 2018-01-01 PROCEDURE — C9113 INJ PANTOPRAZOLE SODIUM, VIA: HCPCS | Performed by: STUDENT IN AN ORGANIZED HEALTH CARE EDUCATION/TRAINING PROGRAM

## 2018-01-01 PROCEDURE — 1101F PT FALLS ASSESS-DOCD LE1/YR: CPT | Mod: CPTII,,, | Performed by: INTERNAL MEDICINE

## 2018-01-01 PROCEDURE — 43270 EGD LESION ABLATION: CPT | Mod: ,,, | Performed by: INTERNAL MEDICINE

## 2018-01-01 PROCEDURE — 27200959 HC CATHETER, ERBE, ARGON PLASMA: Performed by: INTERNAL MEDICINE

## 2018-01-01 PROCEDURE — 96366 THER/PROPH/DIAG IV INF ADDON: CPT

## 2018-01-01 PROCEDURE — 25000003 PHARM REV CODE 250: Performed by: NURSE ANESTHETIST, CERTIFIED REGISTERED

## 2018-01-01 PROCEDURE — 82962 GLUCOSE BLOOD TEST: CPT | Performed by: INTERNAL MEDICINE

## 2018-01-01 PROCEDURE — 82306 VITAMIN D 25 HYDROXY: CPT

## 2018-01-01 PROCEDURE — 25000003 PHARM REV CODE 250: Performed by: ANESTHESIOLOGY

## 2018-01-01 PROCEDURE — 99285 EMERGENCY DEPT VISIT HI MDM: CPT | Mod: ,,, | Performed by: EMERGENCY MEDICINE

## 2018-01-01 PROCEDURE — 94010 BREATHING CAPACITY TEST: CPT | Mod: 53,S$GLB,, | Performed by: INTERNAL MEDICINE

## 2018-01-01 PROCEDURE — 93454 CORONARY ARTERY ANGIO S&I: CPT | Mod: 26,59,, | Performed by: INTERNAL MEDICINE

## 2018-01-01 PROCEDURE — P9021 RED BLOOD CELLS UNIT: HCPCS

## 2018-01-01 PROCEDURE — G8978 MOBILITY CURRENT STATUS: HCPCS | Mod: CJ

## 2018-01-01 PROCEDURE — 99223 1ST HOSP IP/OBS HIGH 75: CPT | Mod: AI,,, | Performed by: HOSPITALIST

## 2018-01-01 PROCEDURE — 99215 OFFICE O/P EST HI 40 MIN: CPT | Performed by: INTERNAL MEDICINE

## 2018-01-01 PROCEDURE — 99223 1ST HOSP IP/OBS HIGH 75: CPT | Mod: GC,,, | Performed by: INTERNAL MEDICINE

## 2018-01-01 PROCEDURE — 87040 BLOOD CULTURE FOR BACTERIA: CPT | Mod: 59

## 2018-01-01 PROCEDURE — 99999 PR PBB SHADOW E&M-EST. PATIENT-LVL III: CPT | Mod: PBBFAC,,, | Performed by: FAMILY MEDICINE

## 2018-01-01 PROCEDURE — 86704 HEP B CORE ANTIBODY TOTAL: CPT

## 2018-01-01 PROCEDURE — 82272 OCCULT BLD FECES 1-3 TESTS: CPT

## 2018-01-01 PROCEDURE — 85730 THROMBOPLASTIN TIME PARTIAL: CPT

## 2018-01-01 PROCEDURE — 63600175 PHARM REV CODE 636 W HCPCS: Performed by: NURSE ANESTHETIST, CERTIFIED REGISTERED

## 2018-01-01 PROCEDURE — 83735 ASSAY OF MAGNESIUM: CPT | Mod: 91

## 2018-01-01 PROCEDURE — 96360 HYDRATION IV INFUSION INIT: CPT | Mod: 59

## 2018-01-01 PROCEDURE — 1101F PT FALLS ASSESS-DOCD LE1/YR: CPT | Mod: CPTII,S$GLB,, | Performed by: INTERNAL MEDICINE

## 2018-01-01 PROCEDURE — 99222 1ST HOSP IP/OBS MODERATE 55: CPT | Mod: GC,,, | Performed by: INTERNAL MEDICINE

## 2018-01-01 PROCEDURE — 84439 ASSAY OF FREE THYROXINE: CPT

## 2018-01-01 PROCEDURE — 43239 EGD BIOPSY SINGLE/MULTIPLE: CPT | Performed by: INTERNAL MEDICINE

## 2018-01-01 PROCEDURE — G0378 HOSPITAL OBSERVATION PER HR: HCPCS

## 2018-01-01 PROCEDURE — 51798 US URINE CAPACITY MEASURE: CPT

## 2018-01-01 PROCEDURE — 97535 SELF CARE MNGMENT TRAINING: CPT

## 2018-01-01 PROCEDURE — G8988 SELF CARE GOAL STATUS: HCPCS | Mod: CI

## 2018-01-01 PROCEDURE — 82784 ASSAY IGA/IGD/IGG/IGM EACH: CPT

## 2018-01-01 PROCEDURE — 97530 THERAPEUTIC ACTIVITIES: CPT

## 2018-01-01 PROCEDURE — 82533 TOTAL CORTISOL: CPT

## 2018-01-01 PROCEDURE — 92986 REVISION OF AORTIC VALVE: CPT | Mod: ,,, | Performed by: INTERNAL MEDICINE

## 2018-01-01 PROCEDURE — G0108 DIAB MANAGE TRN  PER INDIV: HCPCS | Mod: S$GLB,,, | Performed by: INTERNAL MEDICINE

## 2018-01-01 PROCEDURE — 93306 TTE W/DOPPLER COMPLETE: CPT | Mod: S$GLB,,, | Performed by: INTERNAL MEDICINE

## 2018-01-01 PROCEDURE — 99223 1ST HOSP IP/OBS HIGH 75: CPT | Mod: AI,GC,, | Performed by: INTERNAL MEDICINE

## 2018-01-01 PROCEDURE — 96374 THER/PROPH/DIAG INJ IV PUSH: CPT | Mod: 59

## 2018-01-01 PROCEDURE — 99496 TRANSJ CARE MGMT HIGH F2F 7D: CPT | Mod: S$GLB,,, | Performed by: INTERNAL MEDICINE

## 2018-01-01 PROCEDURE — 5A02210 ASSISTANCE WITH CARDIAC OUTPUT USING BALLOON PUMP, CONTINUOUS: ICD-10-PCS | Performed by: INTERNAL MEDICINE

## 2018-01-01 PROCEDURE — 0W3P8ZZ CONTROL BLEEDING IN GASTROINTESTINAL TRACT, VIA NATURAL OR ARTIFICIAL OPENING ENDOSCOPIC: ICD-10-PCS | Performed by: INTERNAL MEDICINE

## 2018-01-01 PROCEDURE — 36415 COLL VENOUS BLD VENIPUNCTURE: CPT | Mod: PO

## 2018-01-01 PROCEDURE — 84145 PROCALCITONIN (PCT): CPT | Mod: 91

## 2018-01-01 PROCEDURE — 99999 PR PBB SHADOW E&M-EST. PATIENT-LVL I: CPT | Mod: PBBFAC,,,

## 2018-01-01 PROCEDURE — 85018 HEMOGLOBIN: CPT

## 2018-01-01 PROCEDURE — 82746 ASSAY OF FOLIC ACID SERUM: CPT

## 2018-01-01 PROCEDURE — 81001 URINALYSIS AUTO W/SCOPE: CPT

## 2018-01-01 PROCEDURE — 5A1213Z PERFORMANCE OF CARDIAC PACING, INTERMITTENT: ICD-10-PCS | Performed by: INTERNAL MEDICINE

## 2018-01-01 PROCEDURE — 92610 EVALUATE SWALLOWING FUNCTION: CPT

## 2018-01-01 RX ORDER — DIPHENHYDRAMINE HCL 25 MG
50 CAPSULE ORAL ONCE
Status: CANCELLED | OUTPATIENT
Start: 2018-01-01 | End: 2018-01-01

## 2018-01-01 RX ORDER — LANOLIN ALCOHOL/MO/W.PET/CERES
400 CREAM (GRAM) TOPICAL ONCE
Status: COMPLETED | OUTPATIENT
Start: 2018-01-01 | End: 2018-01-01

## 2018-01-01 RX ORDER — SODIUM CHLORIDE 0.9 % (FLUSH) 0.9 %
3 SYRINGE (ML) INJECTION EVERY 8 HOURS
Status: DISCONTINUED | OUTPATIENT
Start: 2018-01-01 | End: 2018-01-01 | Stop reason: HOSPADM

## 2018-01-01 RX ORDER — HYDROCODONE BITARTRATE AND ACETAMINOPHEN 500; 5 MG/1; MG/1
TABLET ORAL
Status: DISCONTINUED | OUTPATIENT
Start: 2018-01-01 | End: 2018-01-01 | Stop reason: HOSPADM

## 2018-01-01 RX ORDER — NOREPINEPHRINE BITARTRATE/D5W 16MG/250ML
0.05 PLASTIC BAG, INJECTION (ML) INTRAVENOUS CONTINUOUS
Status: DISCONTINUED | OUTPATIENT
Start: 2018-01-01 | End: 2018-01-01

## 2018-01-01 RX ORDER — AMOXICILLIN 500 MG/1
1000 CAPSULE ORAL EVERY 12 HOURS
Qty: 52 CAPSULE | Refills: 0 | Status: SHIPPED | OUTPATIENT
Start: 2018-01-01 | End: 2018-01-01

## 2018-01-01 RX ORDER — LOSARTAN POTASSIUM 25 MG/1
25 TABLET ORAL DAILY
Qty: 90 TABLET | Refills: 3 | Status: SHIPPED | OUTPATIENT
Start: 2018-01-01 | End: 2018-01-01

## 2018-01-01 RX ORDER — ATORVASTATIN CALCIUM 40 MG/1
40 TABLET, FILM COATED ORAL DAILY
Status: CANCELLED | OUTPATIENT
Start: 2018-09-12

## 2018-01-01 RX ORDER — ASPIRIN 81 MG/1
81 TABLET ORAL DAILY
Qty: 90 TABLET | Refills: 1 | Status: SHIPPED | OUTPATIENT
Start: 2018-01-01 | End: 2018-01-01

## 2018-01-01 RX ORDER — PANTOPRAZOLE SODIUM 40 MG/1
40 TABLET, DELAYED RELEASE ORAL DAILY
Status: DISCONTINUED | OUTPATIENT
Start: 2018-01-01 | End: 2018-01-01

## 2018-01-01 RX ORDER — GLUCAGON 1 MG
1 KIT INJECTION
Status: DISCONTINUED | OUTPATIENT
Start: 2018-01-01 | End: 2018-01-01 | Stop reason: HOSPADM

## 2018-01-01 RX ORDER — SIMETHICONE 125 MG
125 TABLET,CHEWABLE ORAL 4 TIMES DAILY PRN
Status: DISCONTINUED | OUTPATIENT
Start: 2018-01-01 | End: 2018-01-01 | Stop reason: HOSPADM

## 2018-01-01 RX ORDER — ASPIRIN 81 MG/1
81 TABLET ORAL DAILY
Qty: 90 TABLET | Refills: 3 | Status: ON HOLD | OUTPATIENT
Start: 2018-01-01 | End: 2018-01-01 | Stop reason: HOSPADM

## 2018-01-01 RX ORDER — IBUPROFEN 200 MG
16 TABLET ORAL
Status: DISCONTINUED | OUTPATIENT
Start: 2018-01-01 | End: 2018-01-01 | Stop reason: HOSPADM

## 2018-01-01 RX ORDER — CLARITHROMYCIN 500 MG/1
500 TABLET, FILM COATED ORAL EVERY 12 HOURS
Status: DISCONTINUED | OUTPATIENT
Start: 2018-01-01 | End: 2018-01-01

## 2018-01-01 RX ORDER — POTASSIUM CHLORIDE 20 MEQ/15ML
40 SOLUTION ORAL ONCE
Status: COMPLETED | OUTPATIENT
Start: 2018-01-01 | End: 2018-01-01

## 2018-01-01 RX ORDER — ATORVASTATIN CALCIUM 20 MG/1
20 TABLET, FILM COATED ORAL DAILY
Status: CANCELLED | OUTPATIENT
Start: 2018-01-01

## 2018-01-01 RX ORDER — AMIODARONE HYDROCHLORIDE 200 MG/1
400 TABLET ORAL 2 TIMES DAILY
Status: CANCELLED | OUTPATIENT
Start: 2018-01-01

## 2018-01-01 RX ORDER — IPRATROPIUM BROMIDE AND ALBUTEROL SULFATE 2.5; .5 MG/3ML; MG/3ML
3 SOLUTION RESPIRATORY (INHALATION)
Status: COMPLETED | OUTPATIENT
Start: 2018-01-01 | End: 2018-01-01

## 2018-01-01 RX ORDER — MORPHINE SULFATE 4 MG/ML
INJECTION, SOLUTION INTRAMUSCULAR; INTRAVENOUS
Status: DISCONTINUED
Start: 2018-01-01 | End: 2018-09-12 | Stop reason: HOSPADM

## 2018-01-01 RX ORDER — BENZONATATE 100 MG/1
100 CAPSULE ORAL 3 TIMES DAILY PRN
Qty: 30 CAPSULE | Refills: 0 | Status: SHIPPED | OUTPATIENT
Start: 2018-01-01 | End: 2018-01-01

## 2018-01-01 RX ORDER — MORPHINE SULFATE 2 MG/ML
2 INJECTION, SOLUTION INTRAMUSCULAR; INTRAVENOUS
Status: DISCONTINUED | OUTPATIENT
Start: 2018-01-01 | End: 2018-09-12 | Stop reason: HOSPADM

## 2018-01-01 RX ORDER — POTASSIUM CHLORIDE 20 MEQ/1
20 TABLET, EXTENDED RELEASE ORAL ONCE
Status: COMPLETED | OUTPATIENT
Start: 2018-01-01 | End: 2018-01-01

## 2018-01-01 RX ORDER — LANOLIN ALCOHOL/MO/W.PET/CERES
800 CREAM (GRAM) TOPICAL EVERY 4 HOURS
Status: COMPLETED | OUTPATIENT
Start: 2018-01-01 | End: 2018-01-01

## 2018-01-01 RX ORDER — POLYETHYLENE GLYCOL 3350 17 G/17G
17 POWDER, FOR SOLUTION ORAL 2 TIMES DAILY PRN
Status: DISCONTINUED | OUTPATIENT
Start: 2018-01-01 | End: 2018-01-01 | Stop reason: HOSPADM

## 2018-01-01 RX ORDER — ENOXAPARIN SODIUM 100 MG/ML
40 INJECTION SUBCUTANEOUS EVERY 24 HOURS
Status: DISCONTINUED | OUTPATIENT
Start: 2018-01-01 | End: 2018-01-01

## 2018-01-01 RX ORDER — MORPHINE SULFATE 2 MG/ML
5 INJECTION, SOLUTION INTRAMUSCULAR; INTRAVENOUS
Status: COMPLETED | OUTPATIENT
Start: 2018-01-01 | End: 2018-01-01

## 2018-01-01 RX ORDER — SODIUM CHLORIDE 0.9 % (FLUSH) 0.9 %
3 SYRINGE (ML) INJECTION
Status: DISCONTINUED | OUTPATIENT
Start: 2018-01-01 | End: 2018-01-01 | Stop reason: HOSPADM

## 2018-01-01 RX ORDER — ATORVASTATIN CALCIUM 40 MG/1
40 TABLET, FILM COATED ORAL DAILY
Status: DISCONTINUED | OUTPATIENT
Start: 2018-01-01 | End: 2018-01-01 | Stop reason: HOSPADM

## 2018-01-01 RX ORDER — NOREPINEPHRINE BITARTRATE/D5W 4MG/250ML
0.04 PLASTIC BAG, INJECTION (ML) INTRAVENOUS CONTINUOUS
Status: DISCONTINUED | OUTPATIENT
Start: 2018-01-01 | End: 2018-01-01

## 2018-01-01 RX ORDER — DOBUTAMINE HYDROCHLORIDE 400 MG/100ML
2.5 INJECTION, SOLUTION INTRAVENOUS CONTINUOUS
Qty: 250 ML | Status: ON HOLD
Start: 2018-01-01 | End: 2018-01-01 | Stop reason: HOSPADM

## 2018-01-01 RX ORDER — MORPHINE SULFATE 2 MG/ML
1 INJECTION, SOLUTION INTRAMUSCULAR; INTRAVENOUS EVERY 4 HOURS PRN
Status: DISCONTINUED | OUTPATIENT
Start: 2018-01-01 | End: 2018-01-01 | Stop reason: HOSPADM

## 2018-01-01 RX ORDER — NOREPINEPHRINE BITARTRATE/D5W 16MG/250ML
0.02 PLASTIC BAG, INJECTION (ML) INTRAVENOUS CONTINUOUS
Status: DISCONTINUED | OUTPATIENT
Start: 2018-01-01 | End: 2018-01-01

## 2018-01-01 RX ORDER — FUROSEMIDE 10 MG/ML
40 INJECTION INTRAMUSCULAR; INTRAVENOUS 2 TIMES DAILY
Status: DISCONTINUED | OUTPATIENT
Start: 2018-01-01 | End: 2018-01-01

## 2018-01-01 RX ORDER — BUMETANIDE 1 MG/1
1 TABLET ORAL 2 TIMES DAILY
Qty: 60 TABLET | Refills: 11 | Status: ON HOLD | OUTPATIENT
Start: 2018-01-01 | End: 2018-01-01 | Stop reason: HOSPADM

## 2018-01-01 RX ORDER — CLARITHROMYCIN 500 MG/1
500 TABLET, FILM COATED ORAL DAILY
Qty: 13 TABLET | Refills: 0 | Status: SHIPPED | OUTPATIENT
Start: 2018-01-01 | End: 2018-01-01

## 2018-01-01 RX ORDER — IBUPROFEN 200 MG
24 TABLET ORAL
Status: DISCONTINUED | OUTPATIENT
Start: 2018-01-01 | End: 2018-01-01 | Stop reason: HOSPADM

## 2018-01-01 RX ORDER — TRAMADOL HYDROCHLORIDE 50 MG/1
50 TABLET ORAL EVERY 8 HOURS PRN
Status: DISCONTINUED | OUTPATIENT
Start: 2018-01-01 | End: 2018-01-01 | Stop reason: HOSPADM

## 2018-01-01 RX ORDER — FUROSEMIDE 10 MG/ML
40 INJECTION INTRAMUSCULAR; INTRAVENOUS ONCE
Status: COMPLETED | OUTPATIENT
Start: 2018-01-01 | End: 2018-01-01

## 2018-01-01 RX ORDER — POTASSIUM CHLORIDE 20 MEQ/1
40 TABLET, EXTENDED RELEASE ORAL ONCE
Status: COMPLETED | OUTPATIENT
Start: 2018-01-01 | End: 2018-01-01

## 2018-01-01 RX ORDER — INSULIN GLARGINE 100 [IU]/ML
12 INJECTION, SOLUTION SUBCUTANEOUS DAILY
Qty: 15 ML | Refills: 2 | Status: ON HOLD | OUTPATIENT
Start: 2018-01-01 | End: 2018-01-01 | Stop reason: HOSPADM

## 2018-01-01 RX ORDER — LOSARTAN POTASSIUM 25 MG/1
25 TABLET ORAL DAILY
Status: DISCONTINUED | OUTPATIENT
Start: 2018-01-01 | End: 2018-01-01 | Stop reason: HOSPADM

## 2018-01-01 RX ORDER — ASPIRIN 325 MG
325 TABLET ORAL
Status: COMPLETED | OUTPATIENT
Start: 2018-01-01 | End: 2018-01-01

## 2018-01-01 RX ORDER — CLOPIDOGREL BISULFATE 75 MG/1
75 TABLET ORAL DAILY
Status: DISCONTINUED | OUTPATIENT
Start: 2018-01-01 | End: 2018-01-01

## 2018-01-01 RX ORDER — ALBUTEROL SULFATE 90 UG/1
2 AEROSOL, METERED RESPIRATORY (INHALATION) EVERY 6 HOURS PRN
Status: DISCONTINUED | OUTPATIENT
Start: 2018-01-01 | End: 2018-01-01

## 2018-01-01 RX ORDER — DOBUTAMINE HYDROCHLORIDE 400 MG/100ML
2.5 INJECTION, SOLUTION INTRAVENOUS CONTINUOUS
Status: DISCONTINUED | OUTPATIENT
Start: 2018-01-01 | End: 2018-01-01

## 2018-01-01 RX ORDER — METFORMIN HYDROCHLORIDE 500 MG/1
500 TABLET ORAL 2 TIMES DAILY WITH MEALS
Qty: 180 TABLET | Refills: 0 | Status: ON HOLD | OUTPATIENT
Start: 2018-01-01 | End: 2018-01-01 | Stop reason: HOSPADM

## 2018-01-01 RX ORDER — ASPIRIN 81 MG/1
81 TABLET ORAL DAILY
Status: DISCONTINUED | OUTPATIENT
Start: 2018-01-01 | End: 2018-01-01 | Stop reason: HOSPADM

## 2018-01-01 RX ORDER — SODIUM CHLORIDE 9 MG/ML
3 INJECTION, SOLUTION INTRAVENOUS CONTINUOUS
Status: CANCELLED | OUTPATIENT
Start: 2018-01-01 | End: 2018-01-01

## 2018-01-01 RX ORDER — LANOLIN ALCOHOL/MO/W.PET/CERES
400 CREAM (GRAM) TOPICAL 2 TIMES DAILY
Status: COMPLETED | OUTPATIENT
Start: 2018-01-01 | End: 2018-01-01

## 2018-01-01 RX ORDER — INSULIN ASPART 100 [IU]/ML
0-5 INJECTION, SOLUTION INTRAVENOUS; SUBCUTANEOUS
Status: DISCONTINUED | OUTPATIENT
Start: 2018-01-01 | End: 2018-01-01 | Stop reason: HOSPADM

## 2018-01-01 RX ORDER — MAGNESIUM SULFATE HEPTAHYDRATE 40 MG/ML
2 INJECTION, SOLUTION INTRAVENOUS ONCE
Status: COMPLETED | OUTPATIENT
Start: 2018-01-01 | End: 2018-01-01

## 2018-01-01 RX ORDER — SODIUM CHLORIDE 0.9 % (FLUSH) 0.9 %
5 SYRINGE (ML) INJECTION
Status: DISCONTINUED | OUTPATIENT
Start: 2018-01-01 | End: 2018-01-01 | Stop reason: HOSPADM

## 2018-01-01 RX ORDER — INSULIN ASPART 100 [IU]/ML
3 INJECTION, SOLUTION INTRAVENOUS; SUBCUTANEOUS
Status: DISCONTINUED | OUTPATIENT
Start: 2018-01-01 | End: 2018-01-01

## 2018-01-01 RX ORDER — CEFTRIAXONE 1 G/1
1 INJECTION, POWDER, FOR SOLUTION INTRAMUSCULAR; INTRAVENOUS
Status: DISCONTINUED | OUTPATIENT
Start: 2018-01-01 | End: 2018-01-01

## 2018-01-01 RX ORDER — ATORVASTATIN CALCIUM 40 MG/1
40 TABLET, FILM COATED ORAL DAILY
Qty: 90 TABLET | Refills: 1 | Status: ON HOLD | OUTPATIENT
Start: 2018-01-01 | End: 2018-01-01 | Stop reason: HOSPADM

## 2018-01-01 RX ORDER — TRAMADOL HYDROCHLORIDE 50 MG/1
50 TABLET ORAL 3 TIMES DAILY PRN
Qty: 40 TABLET | Refills: 0 | Status: SHIPPED | OUTPATIENT
Start: 2018-01-01 | End: 2018-01-01

## 2018-01-01 RX ORDER — PANTOPRAZOLE SODIUM 40 MG/10ML
40 INJECTION, POWDER, LYOPHILIZED, FOR SOLUTION INTRAVENOUS EVERY 12 HOURS
Status: DISCONTINUED | OUTPATIENT
Start: 2018-01-01 | End: 2018-01-01

## 2018-01-01 RX ORDER — ACETAMINOPHEN 325 MG/1
650 TABLET ORAL EVERY 4 HOURS PRN
Status: CANCELLED | OUTPATIENT
Start: 2018-01-01

## 2018-01-01 RX ORDER — LOSARTAN POTASSIUM 25 MG/1
25 TABLET ORAL DAILY
Status: ON HOLD | COMMUNITY
End: 2018-01-01 | Stop reason: HOSPADM

## 2018-01-01 RX ORDER — METOLAZONE 5 MG/1
5 TABLET ORAL DAILY
Status: DISCONTINUED | OUTPATIENT
Start: 2018-01-01 | End: 2018-01-01

## 2018-01-01 RX ORDER — FENTANYL CITRATE 50 UG/ML
25 INJECTION, SOLUTION INTRAMUSCULAR; INTRAVENOUS ONCE
Status: COMPLETED | OUTPATIENT
Start: 2018-01-01 | End: 2018-01-01

## 2018-01-01 RX ORDER — LANCETS
1 EACH MISCELLANEOUS DAILY
Qty: 100 EACH | Refills: 6 | Status: ON HOLD | OUTPATIENT
Start: 2018-01-01 | End: 2018-01-01 | Stop reason: HOSPADM

## 2018-01-01 RX ORDER — FUROSEMIDE 20 MG/1
20 TABLET ORAL DAILY
Qty: 30 TABLET | Refills: 11 | Status: ON HOLD | OUTPATIENT
Start: 2018-01-01 | End: 2018-01-01 | Stop reason: HOSPADM

## 2018-01-01 RX ORDER — INSULIN ASPART 100 [IU]/ML
3 INJECTION, SOLUTION INTRAVENOUS; SUBCUTANEOUS
Status: DISCONTINUED | OUTPATIENT
Start: 2018-01-01 | End: 2018-01-01 | Stop reason: HOSPADM

## 2018-01-01 RX ORDER — NAPROXEN SODIUM 220 MG/1
81 TABLET, FILM COATED ORAL DAILY
Status: DISCONTINUED | OUTPATIENT
Start: 2018-01-01 | End: 2018-01-01 | Stop reason: HOSPADM

## 2018-01-01 RX ORDER — MORPHINE SULFATE 2 MG/ML
0.5 INJECTION, SOLUTION INTRAMUSCULAR; INTRAVENOUS ONCE
Status: COMPLETED | OUTPATIENT
Start: 2018-01-01 | End: 2018-01-01

## 2018-01-01 RX ORDER — ASPIRIN 81 MG/1
81 TABLET ORAL DAILY
Status: DISCONTINUED | OUTPATIENT
Start: 2018-01-01 | End: 2018-01-01

## 2018-01-01 RX ORDER — BENZONATATE 100 MG/1
100 CAPSULE ORAL 3 TIMES DAILY PRN
Qty: 30 CAPSULE | Refills: 0 | Status: SHIPPED | OUTPATIENT
Start: 2018-01-01 | End: 2018-01-01 | Stop reason: SDUPTHER

## 2018-01-01 RX ORDER — AMIODARONE HYDROCHLORIDE 200 MG/1
400 TABLET ORAL 2 TIMES DAILY
Status: DISCONTINUED | OUTPATIENT
Start: 2018-01-01 | End: 2018-01-01 | Stop reason: HOSPADM

## 2018-01-01 RX ORDER — ERGOCALCIFEROL 1.25 MG/1
50000 CAPSULE ORAL
Status: DISCONTINUED | OUTPATIENT
Start: 2018-01-01 | End: 2018-01-01 | Stop reason: HOSPADM

## 2018-01-01 RX ORDER — HYDROCODONE BITARTRATE AND ACETAMINOPHEN 7.5; 325 MG/1; MG/1
1 TABLET ORAL EVERY 6 HOURS PRN
Status: DISCONTINUED | OUTPATIENT
Start: 2018-01-01 | End: 2018-01-01 | Stop reason: HOSPADM

## 2018-01-01 RX ORDER — SIMETHICONE 125 MG
125 TABLET,CHEWABLE ORAL 4 TIMES DAILY PRN
Status: CANCELLED | OUTPATIENT
Start: 2018-01-01

## 2018-01-01 RX ORDER — MORPHINE SULFATE 4 MG/ML
2 INJECTION, SOLUTION INTRAMUSCULAR; INTRAVENOUS ONCE
Status: COMPLETED | OUTPATIENT
Start: 2018-01-01 | End: 2018-01-01

## 2018-01-01 RX ORDER — POLYETHYLENE GLYCOL 3350 17 G/17G
17 POWDER, FOR SOLUTION ORAL DAILY PRN
Status: DISCONTINUED | OUTPATIENT
Start: 2018-01-01 | End: 2018-01-01 | Stop reason: HOSPADM

## 2018-01-01 RX ORDER — ALBUTEROL SULFATE 90 UG/1
2 AEROSOL, METERED RESPIRATORY (INHALATION) EVERY 6 HOURS PRN
Qty: 1 EACH | Refills: 11 | Status: ON HOLD | OUTPATIENT
Start: 2018-01-01 | End: 2018-01-01 | Stop reason: HOSPADM

## 2018-01-01 RX ORDER — FUROSEMIDE 10 MG/ML
40 INJECTION INTRAMUSCULAR; INTRAVENOUS
Status: COMPLETED | OUTPATIENT
Start: 2018-01-01 | End: 2018-01-01

## 2018-01-01 RX ORDER — HEPARIN SODIUM 5000 [USP'U]/ML
5000 INJECTION, SOLUTION INTRAVENOUS; SUBCUTANEOUS EVERY 8 HOURS
Status: CANCELLED | OUTPATIENT
Start: 2018-01-01

## 2018-01-01 RX ORDER — MORPHINE SULFATE 2 MG/ML
INJECTION, SOLUTION INTRAMUSCULAR; INTRAVENOUS
Status: COMPLETED
Start: 2018-01-01 | End: 2018-01-01

## 2018-01-01 RX ORDER — INSULIN ASPART 100 [IU]/ML
2 INJECTION, SOLUTION INTRAVENOUS; SUBCUTANEOUS
Status: DISCONTINUED | OUTPATIENT
Start: 2018-01-01 | End: 2018-01-01

## 2018-01-01 RX ORDER — ONDANSETRON 2 MG/ML
4 INJECTION INTRAMUSCULAR; INTRAVENOUS EVERY 8 HOURS PRN
Status: CANCELLED | OUTPATIENT
Start: 2018-01-01

## 2018-01-01 RX ORDER — PANTOPRAZOLE SODIUM 40 MG/1
TABLET, DELAYED RELEASE ORAL
Qty: 60 TABLET | Refills: 3 | Status: ON HOLD | OUTPATIENT
Start: 2018-01-01 | End: 2018-01-01 | Stop reason: HOSPADM

## 2018-01-01 RX ORDER — LANOLIN ALCOHOL/MO/W.PET/CERES
400 CREAM (GRAM) TOPICAL ONCE
Status: DISCONTINUED | OUTPATIENT
Start: 2018-01-01 | End: 2018-01-01

## 2018-01-01 RX ORDER — SODIUM CHLORIDE 9 MG/ML
INJECTION, SOLUTION INTRAVENOUS ONCE
Status: DISCONTINUED | OUTPATIENT
Start: 2018-01-01 | End: 2018-01-01

## 2018-01-01 RX ORDER — ACETAMINOPHEN 500 MG
500 TABLET ORAL ONCE
Status: COMPLETED | OUTPATIENT
Start: 2018-01-01 | End: 2018-01-01

## 2018-01-01 RX ORDER — METHOCARBAMOL 500 MG/1
500 TABLET, FILM COATED ORAL 3 TIMES DAILY PRN
Status: DISCONTINUED | OUTPATIENT
Start: 2018-01-01 | End: 2018-01-01 | Stop reason: HOSPADM

## 2018-01-01 RX ORDER — METOLAZONE 5 MG/1
10 TABLET ORAL DAILY
Status: DISCONTINUED | OUTPATIENT
Start: 2018-01-01 | End: 2018-01-01

## 2018-01-01 RX ORDER — ALBUTEROL SULFATE 90 UG/1
2 AEROSOL, METERED RESPIRATORY (INHALATION) EVERY 6 HOURS PRN
Status: CANCELLED | OUTPATIENT
Start: 2018-01-01

## 2018-01-01 RX ORDER — METOPROLOL SUCCINATE 25 MG/1
25 TABLET, EXTENDED RELEASE ORAL DAILY
Status: DISCONTINUED | OUTPATIENT
Start: 2018-01-01 | End: 2018-01-01 | Stop reason: HOSPADM

## 2018-01-01 RX ORDER — ALBUTEROL SULFATE 90 UG/1
2 AEROSOL, METERED RESPIRATORY (INHALATION) EVERY 6 HOURS PRN
Status: DISCONTINUED | OUTPATIENT
Start: 2018-01-01 | End: 2018-01-01 | Stop reason: HOSPADM

## 2018-01-01 RX ORDER — PANTOPRAZOLE SODIUM 40 MG/1
40 TABLET, DELAYED RELEASE ORAL
Status: DISCONTINUED | OUTPATIENT
Start: 2018-01-01 | End: 2018-01-01 | Stop reason: HOSPADM

## 2018-01-01 RX ORDER — INSULIN ASPART 100 [IU]/ML
1-10 INJECTION, SOLUTION INTRAVENOUS; SUBCUTANEOUS EVERY 6 HOURS PRN
Status: DISCONTINUED | OUTPATIENT
Start: 2018-01-01 | End: 2018-01-01 | Stop reason: HOSPADM

## 2018-01-01 RX ORDER — DOBUTAMINE HYDROCHLORIDE 400 MG/100ML
5 INJECTION, SOLUTION INTRAVENOUS CONTINUOUS
Status: DISCONTINUED | OUTPATIENT
Start: 2018-01-01 | End: 2018-01-01

## 2018-01-01 RX ORDER — ETOMIDATE 2 MG/ML
INJECTION INTRAVENOUS
Status: DISCONTINUED | OUTPATIENT
Start: 2018-01-01 | End: 2018-01-01

## 2018-01-01 RX ORDER — NOREPINEPHRINE BITARTRATE/D5W 4MG/250ML
0.02 PLASTIC BAG, INJECTION (ML) INTRAVENOUS CONTINUOUS
Status: DISCONTINUED | OUTPATIENT
Start: 2018-01-01 | End: 2018-01-01

## 2018-01-01 RX ORDER — AMOXICILLIN 500 MG/1
1000 CAPSULE ORAL EVERY 12 HOURS
Status: DISCONTINUED | OUTPATIENT
Start: 2018-01-01 | End: 2018-01-01 | Stop reason: HOSPADM

## 2018-01-01 RX ORDER — POTASSIUM CHLORIDE 20 MEQ/1
20 TABLET, EXTENDED RELEASE ORAL
Status: COMPLETED | OUTPATIENT
Start: 2018-01-01 | End: 2018-01-01

## 2018-01-01 RX ORDER — DOBUTAMINE HYDROCHLORIDE 400 MG/100ML
2.5 INJECTION, SOLUTION INTRAVENOUS CONTINUOUS
Status: DISCONTINUED | OUTPATIENT
Start: 2018-01-01 | End: 2018-01-01 | Stop reason: HOSPADM

## 2018-01-01 RX ORDER — LIDOCAINE HCL/PF 100 MG/5ML
SYRINGE (ML) INTRAVENOUS
Status: DISCONTINUED | OUTPATIENT
Start: 2018-01-01 | End: 2018-01-01

## 2018-01-01 RX ORDER — CLOPIDOGREL 300 MG/1
600 TABLET, FILM COATED ORAL
Status: COMPLETED | OUTPATIENT
Start: 2018-01-01 | End: 2018-01-01

## 2018-01-01 RX ORDER — CLARITHROMYCIN 500 MG/1
500 TABLET, FILM COATED ORAL DAILY
Status: DISCONTINUED | OUTPATIENT
Start: 2018-01-01 | End: 2018-01-01 | Stop reason: HOSPADM

## 2018-01-01 RX ORDER — ASPIRIN 81 MG/1
81 TABLET ORAL DAILY
Status: CANCELLED | OUTPATIENT
Start: 2018-09-12

## 2018-01-01 RX ORDER — BUMETANIDE 0.25 MG/ML
2 INJECTION INTRAMUSCULAR; INTRAVENOUS 3 TIMES DAILY
Status: DISCONTINUED | OUTPATIENT
Start: 2018-01-01 | End: 2018-01-01

## 2018-01-01 RX ORDER — LIDOCAINE HYDROCHLORIDE AND EPINEPHRINE 10; 10 MG/ML; UG/ML
1 INJECTION, SOLUTION INFILTRATION; PERINEURAL ONCE
Status: COMPLETED | OUTPATIENT
Start: 2018-01-01 | End: 2018-01-01

## 2018-01-01 RX ORDER — NITROGLYCERIN 0.4 MG/1
0.4 TABLET SUBLINGUAL EVERY 5 MIN PRN
Status: DISCONTINUED | OUTPATIENT
Start: 2018-01-01 | End: 2018-01-01 | Stop reason: HOSPADM

## 2018-01-01 RX ORDER — IPRATROPIUM BROMIDE AND ALBUTEROL SULFATE 2.5; .5 MG/3ML; MG/3ML
3 SOLUTION RESPIRATORY (INHALATION)
Status: DISCONTINUED | OUTPATIENT
Start: 2018-01-01 | End: 2018-01-01

## 2018-01-01 RX ORDER — SODIUM CHLORIDE 9 MG/ML
INJECTION, SOLUTION INTRAVENOUS CONTINUOUS PRN
Status: DISCONTINUED | OUTPATIENT
Start: 2018-01-01 | End: 2018-01-01

## 2018-01-01 RX ORDER — TOLVAPTAN 15 MG/1
15 TABLET ORAL ONCE
Status: COMPLETED | OUTPATIENT
Start: 2018-01-01 | End: 2018-01-01

## 2018-01-01 RX ORDER — FENTANYL CITRATE 50 UG/ML
INJECTION, SOLUTION INTRAMUSCULAR; INTRAVENOUS
Status: COMPLETED
Start: 2018-01-01 | End: 2018-01-01

## 2018-01-01 RX ORDER — METOLAZONE 5 MG/1
5 TABLET ORAL ONCE
Status: COMPLETED | OUTPATIENT
Start: 2018-01-01 | End: 2018-01-01

## 2018-01-01 RX ORDER — POTASSIUM CHLORIDE 20 MEQ/1
40 TABLET, EXTENDED RELEASE ORAL ONCE
Status: DISCONTINUED | OUTPATIENT
Start: 2018-01-01 | End: 2018-01-01

## 2018-01-01 RX ORDER — SODIUM CHLORIDE 9 MG/ML
INJECTION, SOLUTION INTRAVENOUS CONTINUOUS
Status: ACTIVE | OUTPATIENT
Start: 2018-01-01 | End: 2018-01-01

## 2018-01-01 RX ORDER — FUROSEMIDE 40 MG/1
40 TABLET ORAL 2 TIMES DAILY
Status: DISCONTINUED | OUTPATIENT
Start: 2018-01-01 | End: 2018-01-01

## 2018-01-01 RX ORDER — MORPHINE SULFATE 2 MG/ML
1 INJECTION, SOLUTION INTRAMUSCULAR; INTRAVENOUS EVERY 4 HOURS PRN
Status: CANCELLED | OUTPATIENT
Start: 2018-01-01

## 2018-01-01 RX ORDER — BUMETANIDE 2 MG/1
2 TABLET ORAL 2 TIMES DAILY
Qty: 60 TABLET | Refills: 11 | Status: SHIPPED | OUTPATIENT
Start: 2018-01-01 | End: 2018-01-01 | Stop reason: SDUPTHER

## 2018-01-01 RX ORDER — METOPROLOL SUCCINATE 25 MG/1
12.5 TABLET, EXTENDED RELEASE ORAL DAILY
Status: ON HOLD | COMMUNITY
End: 2018-01-01 | Stop reason: HOSPADM

## 2018-01-01 RX ORDER — POTASSIUM CHLORIDE 750 MG/1
10 TABLET, EXTENDED RELEASE ORAL ONCE
Status: COMPLETED | OUTPATIENT
Start: 2018-01-01 | End: 2018-01-01

## 2018-01-01 RX ORDER — METOPROLOL SUCCINATE 25 MG/1
25 TABLET, EXTENDED RELEASE ORAL DAILY
Qty: 30 TABLET | Refills: 11 | Status: ON HOLD | OUTPATIENT
Start: 2018-01-01 | End: 2018-01-01 | Stop reason: HOSPADM

## 2018-01-01 RX ORDER — ATORVASTATIN CALCIUM 40 MG/1
40 TABLET, FILM COATED ORAL DAILY
Qty: 90 TABLET | Refills: 1 | Status: SHIPPED | OUTPATIENT
Start: 2018-01-01 | End: 2018-01-01 | Stop reason: SDUPTHER

## 2018-01-01 RX ORDER — ONDANSETRON 2 MG/ML
4 INJECTION INTRAMUSCULAR; INTRAVENOUS EVERY 8 HOURS PRN
Status: DISCONTINUED | OUTPATIENT
Start: 2018-01-01 | End: 2018-01-01 | Stop reason: HOSPADM

## 2018-01-01 RX ORDER — FERROUS SULFATE 325(65) MG
325 TABLET, DELAYED RELEASE (ENTERIC COATED) ORAL DAILY
Status: DISCONTINUED | OUTPATIENT
Start: 2018-01-01 | End: 2018-01-01 | Stop reason: HOSPADM

## 2018-01-01 RX ORDER — FUROSEMIDE 20 MG/1
20 TABLET ORAL DAILY
Status: DISCONTINUED | OUTPATIENT
Start: 2018-01-01 | End: 2018-01-01 | Stop reason: HOSPADM

## 2018-01-01 RX ORDER — PANTOPRAZOLE SODIUM 40 MG/1
40 TABLET, DELAYED RELEASE ORAL DAILY
Status: DISCONTINUED | OUTPATIENT
Start: 2018-01-01 | End: 2018-01-01 | Stop reason: HOSPADM

## 2018-01-01 RX ORDER — LORAZEPAM 2 MG/ML
INJECTION INTRAMUSCULAR
Status: DISCONTINUED
Start: 2018-01-01 | End: 2018-09-12 | Stop reason: HOSPADM

## 2018-01-01 RX ORDER — ATORVASTATIN CALCIUM 20 MG/1
40 TABLET, FILM COATED ORAL DAILY
Status: DISCONTINUED | OUTPATIENT
Start: 2018-01-01 | End: 2018-01-01 | Stop reason: HOSPADM

## 2018-01-01 RX ORDER — ACETAMINOPHEN 325 MG/1
650 TABLET ORAL EVERY 4 HOURS PRN
Status: DISCONTINUED | OUTPATIENT
Start: 2018-01-01 | End: 2018-01-01 | Stop reason: HOSPADM

## 2018-01-01 RX ORDER — METOPROLOL SUCCINATE 25 MG/1
25 TABLET, EXTENDED RELEASE ORAL DAILY
Qty: 30 TABLET | Refills: 11 | Status: SHIPPED | OUTPATIENT
Start: 2018-01-01 | End: 2018-01-01

## 2018-01-01 RX ORDER — LOSARTAN POTASSIUM 25 MG/1
25 TABLET ORAL DAILY
Qty: 90 TABLET | Refills: 3 | Status: SHIPPED | OUTPATIENT
Start: 2018-01-01 | End: 2018-01-01 | Stop reason: SDUPTHER

## 2018-01-01 RX ORDER — FUROSEMIDE 10 MG/ML
60 INJECTION INTRAMUSCULAR; INTRAVENOUS 2 TIMES DAILY
Status: DISCONTINUED | OUTPATIENT
Start: 2018-01-01 | End: 2018-01-01

## 2018-01-01 RX ORDER — DEXTROSE 4 G
TABLET,CHEWABLE ORAL
Qty: 1 EACH | Refills: 0 | Status: ON HOLD | OUTPATIENT
Start: 2018-01-01 | End: 2018-01-01 | Stop reason: HOSPADM

## 2018-01-01 RX ORDER — HEPARIN SODIUM,PORCINE/D5W 25000/250
16 INTRAVENOUS SOLUTION INTRAVENOUS CONTINUOUS
Status: DISCONTINUED | OUTPATIENT
Start: 2018-01-01 | End: 2018-01-01

## 2018-01-01 RX ORDER — ACETAMINOPHEN 10 MG/ML
1000 INJECTION, SOLUTION INTRAVENOUS EVERY 8 HOURS
Status: DISCONTINUED | OUTPATIENT
Start: 2018-01-01 | End: 2018-01-01

## 2018-01-01 RX ORDER — POLYETHYLENE GLYCOL 3350 17 G/17G
17 POWDER, FOR SOLUTION ORAL DAILY
Status: DISCONTINUED | OUTPATIENT
Start: 2018-01-01 | End: 2018-01-01 | Stop reason: HOSPADM

## 2018-01-01 RX ORDER — NAPROXEN SODIUM 220 MG/1
81 TABLET, FILM COATED ORAL DAILY
Status: CANCELLED | OUTPATIENT
Start: 2018-01-01

## 2018-01-01 RX ORDER — ACETAMINOPHEN 10 MG/ML
1000 INJECTION, SOLUTION INTRAVENOUS EVERY 8 HOURS
Status: COMPLETED | OUTPATIENT
Start: 2018-01-01 | End: 2018-01-01

## 2018-01-01 RX ORDER — LISINOPRIL 2.5 MG/1
2.5 TABLET ORAL DAILY
Qty: 90 TABLET | Refills: 1 | Status: SHIPPED | OUTPATIENT
Start: 2018-01-01 | End: 2018-01-01

## 2018-01-01 RX ORDER — IPRATROPIUM BROMIDE AND ALBUTEROL SULFATE 2.5; .5 MG/3ML; MG/3ML
3 SOLUTION RESPIRATORY (INHALATION) EVERY 6 HOURS PRN
Status: DISCONTINUED | OUTPATIENT
Start: 2018-01-01 | End: 2018-01-01 | Stop reason: HOSPADM

## 2018-01-01 RX ORDER — MORPHINE SULFATE 2 MG/ML
1 INJECTION, SOLUTION INTRAMUSCULAR; INTRAVENOUS EVERY 30 MIN PRN
Status: CANCELLED | OUTPATIENT
Start: 2018-01-01

## 2018-01-01 RX ORDER — INSULIN ASPART 100 [IU]/ML
3 INJECTION, SOLUTION INTRAVENOUS; SUBCUTANEOUS 3 TIMES DAILY
Qty: 15 ML | Refills: 2 | Status: ON HOLD | OUTPATIENT
Start: 2018-01-01 | End: 2018-01-01 | Stop reason: SDUPTHER

## 2018-01-01 RX ORDER — MAGNESIUM SULFATE HEPTAHYDRATE 40 MG/ML
2 INJECTION, SOLUTION INTRAVENOUS ONCE
Status: DISCONTINUED | OUTPATIENT
Start: 2018-01-01 | End: 2018-01-01 | Stop reason: RX

## 2018-01-01 RX ORDER — DOBUTAMINE HYDROCHLORIDE 400 MG/100ML
3 INJECTION, SOLUTION INTRAVENOUS CONTINUOUS
Status: DISCONTINUED | OUTPATIENT
Start: 2018-01-01 | End: 2018-01-01 | Stop reason: HOSPADM

## 2018-01-01 RX ORDER — FENTANYL CITRATE 50 UG/ML
25 INJECTION, SOLUTION INTRAMUSCULAR; INTRAVENOUS
Status: DISCONTINUED | OUTPATIENT
Start: 2018-01-01 | End: 2018-01-01

## 2018-01-01 RX ORDER — MORPHINE SULFATE 4 MG/ML
4 INJECTION, SOLUTION INTRAMUSCULAR; INTRAVENOUS EVERY 4 HOURS PRN
Status: DISCONTINUED | OUTPATIENT
Start: 2018-01-01 | End: 2018-01-01

## 2018-01-01 RX ORDER — FUROSEMIDE 10 MG/ML
60 INJECTION INTRAMUSCULAR; INTRAVENOUS ONCE
Status: COMPLETED | OUTPATIENT
Start: 2018-01-01 | End: 2018-01-01

## 2018-01-01 RX ORDER — ACETAMINOPHEN 325 MG/1
650 TABLET ORAL EVERY 6 HOURS PRN
Status: DISCONTINUED | OUTPATIENT
Start: 2018-01-01 | End: 2018-01-01

## 2018-01-01 RX ORDER — FUROSEMIDE 10 MG/ML
40 INJECTION INTRAMUSCULAR; INTRAVENOUS 3 TIMES DAILY
Status: DISCONTINUED | OUTPATIENT
Start: 2018-01-01 | End: 2018-01-01

## 2018-01-01 RX ORDER — ACETAMINOPHEN 325 MG/1
650 TABLET ORAL EVERY 6 HOURS PRN
Status: DISCONTINUED | OUTPATIENT
Start: 2018-01-01 | End: 2018-01-01 | Stop reason: HOSPADM

## 2018-01-01 RX ORDER — LIDOCAINE 50 MG/G
1 PATCH TOPICAL
Status: DISCONTINUED | OUTPATIENT
Start: 2018-01-01 | End: 2018-01-01

## 2018-01-01 RX ORDER — FLUTICASONE FUROATE AND VILANTEROL 100; 25 UG/1; UG/1
1 POWDER RESPIRATORY (INHALATION) DAILY
Status: DISCONTINUED | OUTPATIENT
Start: 2018-01-01 | End: 2018-01-01 | Stop reason: HOSPADM

## 2018-01-01 RX ORDER — BUDESONIDE AND FORMOTEROL FUMARATE DIHYDRATE 160; 4.5 UG/1; UG/1
2 AEROSOL RESPIRATORY (INHALATION) EVERY 12 HOURS
Status: DISCONTINUED | OUTPATIENT
Start: 2018-01-01 | End: 2018-01-01

## 2018-01-01 RX ORDER — SODIUM CHLORIDE 9 MG/ML
INJECTION, SOLUTION INTRAVENOUS ONCE
Status: COMPLETED | OUTPATIENT
Start: 2018-01-01 | End: 2018-01-01

## 2018-01-01 RX ORDER — INSULIN ASPART 100 [IU]/ML
4 INJECTION, SOLUTION INTRAVENOUS; SUBCUTANEOUS
Status: DISCONTINUED | OUTPATIENT
Start: 2018-01-01 | End: 2018-01-01 | Stop reason: HOSPADM

## 2018-01-01 RX ORDER — INSULIN ASPART 100 [IU]/ML
0-5 INJECTION, SOLUTION INTRAVENOUS; SUBCUTANEOUS EVERY 6 HOURS PRN
Status: DISCONTINUED | OUTPATIENT
Start: 2018-01-01 | End: 2018-01-01 | Stop reason: HOSPADM

## 2018-01-01 RX ORDER — LORAZEPAM 1 MG/1
1 TABLET ORAL
Status: DISCONTINUED | OUTPATIENT
Start: 2018-01-01 | End: 2018-09-12 | Stop reason: HOSPADM

## 2018-01-01 RX ORDER — SODIUM CHLORIDE 9 MG/ML
1000 INJECTION, SOLUTION INTRAVENOUS
Status: COMPLETED | OUTPATIENT
Start: 2018-01-01 | End: 2018-01-01

## 2018-01-01 RX ORDER — LISINOPRIL 5 MG/1
5 TABLET ORAL DAILY
Status: DISCONTINUED | OUTPATIENT
Start: 2018-01-01 | End: 2018-01-01 | Stop reason: HOSPADM

## 2018-01-01 RX ORDER — CYCLOBENZAPRINE HCL 5 MG
5 TABLET ORAL 3 TIMES DAILY PRN
Status: DISCONTINUED | OUTPATIENT
Start: 2018-01-01 | End: 2018-01-01

## 2018-01-01 RX ORDER — HEPARIN SODIUM 5000 [USP'U]/ML
5000 INJECTION, SOLUTION INTRAVENOUS; SUBCUTANEOUS EVERY 8 HOURS
Status: DISCONTINUED | OUTPATIENT
Start: 2018-01-01 | End: 2018-01-01

## 2018-01-01 RX ORDER — SODIUM CHLORIDE 0.9 % (FLUSH) 0.9 %
3 SYRINGE (ML) INJECTION EVERY 8 HOURS
Status: CANCELLED | OUTPATIENT
Start: 2018-01-01

## 2018-01-01 RX ORDER — LIDOCAINE HYDROCHLORIDE 40 MG/ML
4 INJECTION, SOLUTION RETROBULBAR ONCE
Status: COMPLETED | OUTPATIENT
Start: 2018-01-01 | End: 2018-01-01

## 2018-01-01 RX ORDER — BUDESONIDE AND FORMOTEROL FUMARATE DIHYDRATE 160; 4.5 UG/1; UG/1
2 AEROSOL RESPIRATORY (INHALATION) EVERY 12 HOURS
Qty: 10.2 INHALER | Refills: 0 | Status: SHIPPED | OUTPATIENT
Start: 2018-01-01 | End: 2018-01-01

## 2018-01-01 RX ORDER — DOBUTAMINE HYDROCHLORIDE 200 MG/100ML
2 INJECTION INTRAVENOUS CONTINUOUS
Status: DISCONTINUED | OUTPATIENT
Start: 2018-01-01 | End: 2018-01-01

## 2018-01-01 RX ORDER — FUROSEMIDE 10 MG/ML
80 INJECTION INTRAMUSCULAR; INTRAVENOUS
Status: COMPLETED | OUTPATIENT
Start: 2018-01-01 | End: 2018-01-01

## 2018-01-01 RX ORDER — DIPHENHYDRAMINE HCL 50 MG
50 CAPSULE ORAL ONCE
Status: DISCONTINUED | OUTPATIENT
Start: 2018-01-01 | End: 2018-01-01

## 2018-01-01 RX ORDER — IBUPROFEN 400 MG/1
800 TABLET ORAL
Status: COMPLETED | OUTPATIENT
Start: 2018-01-01 | End: 2018-01-01

## 2018-01-01 RX ORDER — OCTREOTIDE ACETATE 50 UG/ML
50 INJECTION, SOLUTION INTRAVENOUS; SUBCUTANEOUS
Status: COMPLETED | OUTPATIENT
Start: 2018-01-01 | End: 2018-01-01

## 2018-01-01 RX ORDER — INSULIN ASPART 100 [IU]/ML
4 INJECTION, SOLUTION INTRAVENOUS; SUBCUTANEOUS 3 TIMES DAILY
Qty: 15 ML | Refills: 2 | Status: ON HOLD
Start: 2018-01-01 | End: 2018-01-01 | Stop reason: HOSPADM

## 2018-01-01 RX ORDER — FUROSEMIDE 10 MG/ML
40 INJECTION INTRAMUSCULAR; INTRAVENOUS DAILY
Status: DISCONTINUED | OUTPATIENT
Start: 2018-01-01 | End: 2018-01-01

## 2018-01-01 RX ORDER — METFORMIN HYDROCHLORIDE 500 MG/1
500 TABLET ORAL 2 TIMES DAILY WITH MEALS
Status: ON HOLD | COMMUNITY
End: 2018-01-01 | Stop reason: HOSPADM

## 2018-01-01 RX ORDER — MORPHINE SULFATE 4 MG/ML
5 INJECTION, SOLUTION INTRAMUSCULAR; INTRAVENOUS ONCE
Status: COMPLETED | OUTPATIENT
Start: 2018-01-01 | End: 2018-01-01

## 2018-01-01 RX ORDER — VANCOMYCIN 1.75 GRAM/500 ML IN 0.9 % SODIUM CHLORIDE INTRAVENOUS
1750
Status: COMPLETED | OUTPATIENT
Start: 2018-01-01 | End: 2018-01-01

## 2018-01-01 RX ORDER — LISINOPRIL 10 MG/1
10 TABLET ORAL DAILY
Status: DISCONTINUED | OUTPATIENT
Start: 2018-01-01 | End: 2018-01-01

## 2018-01-01 RX ORDER — HEPARIN SODIUM,PORCINE/D5W 25000/250
17 INTRAVENOUS SOLUTION INTRAVENOUS CONTINUOUS
Status: DISCONTINUED | OUTPATIENT
Start: 2018-01-01 | End: 2018-01-01

## 2018-01-01 RX ADMIN — FUROSEMIDE 40 MG: 40 TABLET ORAL at 02:07

## 2018-01-01 RX ADMIN — INSULIN ASPART 2 UNITS: 100 INJECTION, SOLUTION INTRAVENOUS; SUBCUTANEOUS at 04:08

## 2018-01-01 RX ADMIN — INSULIN ASPART 2 UNITS: 100 INJECTION, SOLUTION INTRAVENOUS; SUBCUTANEOUS at 12:08

## 2018-01-01 RX ADMIN — CYCLOBENZAPRINE HYDROCHLORIDE 5 MG: 5 TABLET, FILM COATED ORAL at 12:08

## 2018-01-01 RX ADMIN — POLYETHYLENE GLYCOL 3350 17 G: 17 POWDER, FOR SOLUTION ORAL at 08:07

## 2018-01-01 RX ADMIN — POTASSIUM CHLORIDE 20 MEQ: 1500 TABLET, EXTENDED RELEASE ORAL at 10:08

## 2018-01-01 RX ADMIN — IPRATROPIUM BROMIDE AND ALBUTEROL SULFATE 3 ML: .5; 3 SOLUTION RESPIRATORY (INHALATION) at 07:08

## 2018-01-01 RX ADMIN — VANCOMYCIN HYDROCHLORIDE 1250 MG: 10 INJECTION, POWDER, LYOPHILIZED, FOR SOLUTION INTRAVENOUS at 04:07

## 2018-01-01 RX ADMIN — INSULIN ASPART 4 UNITS: 100 INJECTION, SOLUTION INTRAVENOUS; SUBCUTANEOUS at 05:08

## 2018-01-01 RX ADMIN — MAGNESIUM OXIDE TAB 400 MG (241.3 MG ELEMENTAL MG) 400 MG: 400 (241.3 MG) TAB at 03:08

## 2018-01-01 RX ADMIN — ETOMIDATE 4 MG: 2 INJECTION, SOLUTION INTRAVENOUS at 03:07

## 2018-01-01 RX ADMIN — IPRATROPIUM BROMIDE AND ALBUTEROL SULFATE 3 ML: .5; 3 SOLUTION RESPIRATORY (INHALATION) at 02:08

## 2018-01-01 RX ADMIN — Medication 3 ML: at 09:09

## 2018-01-01 RX ADMIN — DOBUTAMINE IN DEXTROSE 2.5 MCG/KG/MIN: 200 INJECTION, SOLUTION INTRAVENOUS at 01:08

## 2018-01-01 RX ADMIN — ATORVASTATIN CALCIUM 40 MG: 20 TABLET, FILM COATED ORAL at 08:07

## 2018-01-01 RX ADMIN — HEPARIN SODIUM AND DEXTROSE 17 UNITS/KG/HR: 10000; 5 INJECTION INTRAVENOUS at 11:07

## 2018-01-01 RX ADMIN — ENOXAPARIN SODIUM 40 MG: 100 INJECTION SUBCUTANEOUS at 05:08

## 2018-01-01 RX ADMIN — INSULIN ASPART 4 UNITS: 100 INJECTION, SOLUTION INTRAVENOUS; SUBCUTANEOUS at 10:08

## 2018-01-01 RX ADMIN — ASPIRIN 81 MG: 81 TABLET, COATED ORAL at 09:07

## 2018-01-01 RX ADMIN — MAGNESIUM OXIDE TAB 400 MG (241.3 MG ELEMENTAL MG) 800 MG: 400 (241.3 MG) TAB at 08:08

## 2018-01-01 RX ADMIN — INSULIN DETEMIR 8 UNITS: 100 INJECTION, SOLUTION SUBCUTANEOUS at 08:08

## 2018-01-01 RX ADMIN — MORPHINE SULFATE 5 MG: 2 INJECTION, SOLUTION INTRAMUSCULAR; INTRAVENOUS at 06:09

## 2018-01-01 RX ADMIN — INSULIN ASPART 3 UNITS: 100 INJECTION, SOLUTION INTRAVENOUS; SUBCUTANEOUS at 08:08

## 2018-01-01 RX ADMIN — ONDANSETRON 4 MG: 2 INJECTION INTRAMUSCULAR; INTRAVENOUS at 07:09

## 2018-01-01 RX ADMIN — CLARITHROMYCIN 500 MG: 500 TABLET ORAL at 10:08

## 2018-01-01 RX ADMIN — INSULIN ASPART 1 UNITS: 100 INJECTION, SOLUTION INTRAVENOUS; SUBCUTANEOUS at 09:08

## 2018-01-01 RX ADMIN — POLYETHYLENE GLYCOL 3350 17 G: 17 POWDER, FOR SOLUTION ORAL at 08:08

## 2018-01-01 RX ADMIN — INSULIN ASPART 3 UNITS: 100 INJECTION, SOLUTION INTRAVENOUS; SUBCUTANEOUS at 12:08

## 2018-01-01 RX ADMIN — ONDANSETRON 4 MG: 2 INJECTION INTRAMUSCULAR; INTRAVENOUS at 10:08

## 2018-01-01 RX ADMIN — Medication 0.35 MCG/KG/MIN: at 10:09

## 2018-01-01 RX ADMIN — TRAZODONE HYDROCHLORIDE 25 MG: 50 TABLET ORAL at 09:09

## 2018-01-01 RX ADMIN — INSULIN ASPART 2 UNITS: 100 INJECTION, SOLUTION INTRAVENOUS; SUBCUTANEOUS at 05:08

## 2018-01-01 RX ADMIN — ACETAMINOPHEN 650 MG: 325 TABLET, FILM COATED ORAL at 05:08

## 2018-01-01 RX ADMIN — PIPERACILLIN AND TAZOBACTAM 4.5 G: 4; .5 INJECTION, POWDER, LYOPHILIZED, FOR SOLUTION INTRAVENOUS; PARENTERAL at 01:07

## 2018-01-01 RX ADMIN — AMIODARONE HYDROCHLORIDE 150 MG: 1.5 INJECTION, SOLUTION INTRAVENOUS at 05:09

## 2018-01-01 RX ADMIN — NOREPINEPHRINE BITARTRATE 0.45 MCG/KG/MIN: 1 INJECTION INTRAVENOUS at 12:09

## 2018-01-01 RX ADMIN — ONDANSETRON 4 MG: 2 INJECTION INTRAMUSCULAR; INTRAVENOUS at 10:09

## 2018-01-01 RX ADMIN — IPRATROPIUM BROMIDE AND ALBUTEROL SULFATE 3 ML: .5; 3 SOLUTION RESPIRATORY (INHALATION) at 09:08

## 2018-01-01 RX ADMIN — POTASSIUM CHLORIDE 20 MEQ: 1500 TABLET, EXTENDED RELEASE ORAL at 04:08

## 2018-01-01 RX ADMIN — INSULIN DETEMIR 10 UNITS: 100 INJECTION, SOLUTION SUBCUTANEOUS at 08:08

## 2018-01-01 RX ADMIN — INSULIN ASPART 3 UNITS: 100 INJECTION, SOLUTION INTRAVENOUS; SUBCUTANEOUS at 11:08

## 2018-01-01 RX ADMIN — IPRATROPIUM BROMIDE AND ALBUTEROL SULFATE 3 ML: .5; 3 SOLUTION RESPIRATORY (INHALATION) at 08:08

## 2018-01-01 RX ADMIN — MORPHINE SULFATE 4 MG: 4 INJECTION INTRAVENOUS at 12:09

## 2018-01-01 RX ADMIN — ATORVASTATIN CALCIUM 40 MG: 20 TABLET, FILM COATED ORAL at 09:08

## 2018-01-01 RX ADMIN — INSULIN ASPART 4 UNITS: 100 INJECTION, SOLUTION INTRAVENOUS; SUBCUTANEOUS at 04:08

## 2018-01-01 RX ADMIN — SODIUM CHLORIDE 250 ML: 0.9 INJECTION, SOLUTION INTRAVENOUS at 12:07

## 2018-01-01 RX ADMIN — FUROSEMIDE 10 MG/HR: 10 INJECTION, SOLUTION INTRAMUSCULAR; INTRAVENOUS at 05:08

## 2018-01-01 RX ADMIN — LISINOPRIL 5 MG: 5 TABLET ORAL at 08:03

## 2018-01-01 RX ADMIN — ATORVASTATIN CALCIUM 40 MG: 40 TABLET, FILM COATED ORAL at 08:03

## 2018-01-01 RX ADMIN — Medication 0.1 MCG/KG/MIN: at 02:07

## 2018-01-01 RX ADMIN — FUROSEMIDE 40 MG: 10 INJECTION, SOLUTION INTRAMUSCULAR; INTRAVENOUS at 03:07

## 2018-01-01 RX ADMIN — INSULIN ASPART 3 UNITS: 100 INJECTION, SOLUTION INTRAVENOUS; SUBCUTANEOUS at 11:07

## 2018-01-01 RX ADMIN — FUROSEMIDE 40 MG: 10 INJECTION, SOLUTION INTRAMUSCULAR; INTRAVENOUS at 08:08

## 2018-01-01 RX ADMIN — INSULIN DETEMIR 12 UNITS: 100 INJECTION, SOLUTION SUBCUTANEOUS at 09:08

## 2018-01-01 RX ADMIN — INSULIN ASPART 3 UNITS: 100 INJECTION, SOLUTION INTRAVENOUS; SUBCUTANEOUS at 09:08

## 2018-01-01 RX ADMIN — INSULIN ASPART 4 UNITS: 100 INJECTION, SOLUTION INTRAVENOUS; SUBCUTANEOUS at 12:08

## 2018-01-01 RX ADMIN — BUMETANIDE 2 MG: 0.25 INJECTION INTRAMUSCULAR; INTRAVENOUS at 09:09

## 2018-01-01 RX ADMIN — INSULIN ASPART 1 UNITS: 100 INJECTION, SOLUTION INTRAVENOUS; SUBCUTANEOUS at 08:08

## 2018-01-01 RX ADMIN — SIMETHICONE 125 MG: 125 TABLET, CHEWABLE ORAL at 10:09

## 2018-01-01 RX ADMIN — POTASSIUM CHLORIDE 20 MEQ: 1500 TABLET, EXTENDED RELEASE ORAL at 02:08

## 2018-01-01 RX ADMIN — FERROUS SULFATE TAB EC 325 MG (65 MG FE EQUIVALENT) 325 MG: 325 (65 FE) TABLET DELAYED RESPONSE at 12:09

## 2018-01-01 RX ADMIN — FUROSEMIDE 60 MG: 10 INJECTION, SOLUTION INTRAMUSCULAR; INTRAVENOUS at 05:08

## 2018-01-01 RX ADMIN — FUROSEMIDE 40 MG: 10 INJECTION, SOLUTION INTRAVENOUS at 05:07

## 2018-01-01 RX ADMIN — PANTOPRAZOLE SODIUM 40 MG: 40 TABLET, DELAYED RELEASE ORAL at 06:08

## 2018-01-01 RX ADMIN — FLUTICASONE FUROATE AND VILANTEROL TRIFENATATE 1 PUFF: 100; 25 POWDER RESPIRATORY (INHALATION) at 06:07

## 2018-01-01 RX ADMIN — AMIODARONE HYDROCHLORIDE 1 MG/MIN: 1.8 INJECTION, SOLUTION INTRAVENOUS at 10:09

## 2018-01-01 RX ADMIN — MAGNESIUM SULFATE IN WATER 2 G: 40 INJECTION, SOLUTION INTRAVENOUS at 09:08

## 2018-01-01 RX ADMIN — FERROUS SULFATE TAB EC 325 MG (65 MG FE EQUIVALENT) 325 MG: 325 (65 FE) TABLET DELAYED RESPONSE at 08:09

## 2018-01-01 RX ADMIN — ASPIRIN 325 MG ORAL TABLET 325 MG: 325 PILL ORAL at 10:03

## 2018-01-01 RX ADMIN — ASPIRIN 81 MG: 81 TABLET, COATED ORAL at 08:08

## 2018-01-01 RX ADMIN — FUROSEMIDE 40 MG: 10 INJECTION, SOLUTION INTRAMUSCULAR; INTRAVENOUS at 05:08

## 2018-01-01 RX ADMIN — Medication 0.04 MCG/KG/MIN: at 12:07

## 2018-01-01 RX ADMIN — ATORVASTATIN CALCIUM 40 MG: 20 TABLET, FILM COATED ORAL at 08:08

## 2018-01-01 RX ADMIN — DOBUTAMINE IN DEXTROSE 2.5 MCG/KG/MIN: 200 INJECTION, SOLUTION INTRAVENOUS at 03:08

## 2018-01-01 RX ADMIN — FUROSEMIDE 10 MG/HR: 10 INJECTION, SOLUTION INTRAMUSCULAR; INTRAVENOUS at 10:08

## 2018-01-01 RX ADMIN — HYDROCODONE BITARTRATE AND ACETAMINOPHEN 1 TABLET: 7.5; 325 TABLET ORAL at 08:08

## 2018-01-01 RX ADMIN — SODIUM CHLORIDE, PRESERVATIVE FREE 3 ML: 5 INJECTION INTRAVENOUS at 05:07

## 2018-01-01 RX ADMIN — ATORVASTATIN CALCIUM 40 MG: 40 TABLET, FILM COATED ORAL at 08:09

## 2018-01-01 RX ADMIN — CYCLOBENZAPRINE HYDROCHLORIDE 5 MG: 5 TABLET, FILM COATED ORAL at 09:08

## 2018-01-01 RX ADMIN — INSULIN ASPART 2 UNITS: 100 INJECTION, SOLUTION INTRAVENOUS; SUBCUTANEOUS at 04:09

## 2018-01-01 RX ADMIN — AMIODARONE HYDROCHLORIDE 0.5 MG/MIN: 1.8 INJECTION, SOLUTION INTRAVENOUS at 06:09

## 2018-01-01 RX ADMIN — PANTOPRAZOLE SODIUM 40 MG: 40 TABLET, DELAYED RELEASE ORAL at 09:08

## 2018-01-01 RX ADMIN — METHOCARBAMOL 500 MG: 500 TABLET ORAL at 06:08

## 2018-01-01 RX ADMIN — PANTOPRAZOLE SODIUM 40 MG: 40 TABLET, DELAYED RELEASE ORAL at 09:07

## 2018-01-01 RX ADMIN — Medication 0.04 MCG/KG/MIN: at 04:07

## 2018-01-01 RX ADMIN — INSULIN ASPART 2 UNITS: 100 INJECTION, SOLUTION INTRAVENOUS; SUBCUTANEOUS at 08:08

## 2018-01-01 RX ADMIN — INSULIN ASPART 2 UNITS: 100 INJECTION, SOLUTION INTRAVENOUS; SUBCUTANEOUS at 12:07

## 2018-01-01 RX ADMIN — NOREPINEPHRINE BITARTRATE 0.05 MCG/KG/MIN: 1 INJECTION INTRAVENOUS at 05:09

## 2018-01-01 RX ADMIN — NOREPINEPHRINE BITARTRATE 0.35 MCG/KG/MIN: 1 INJECTION INTRAVENOUS at 05:09

## 2018-01-01 RX ADMIN — POTASSIUM BICARBONATE 50 MEQ: 25 TABLET, EFFERVESCENT ORAL at 03:08

## 2018-01-01 RX ADMIN — IPRATROPIUM BROMIDE AND ALBUTEROL SULFATE 3 ML: .5; 3 SOLUTION RESPIRATORY (INHALATION) at 03:08

## 2018-01-01 RX ADMIN — FUROSEMIDE 40 MG: 10 INJECTION, SOLUTION INTRAMUSCULAR; INTRAVENOUS at 09:08

## 2018-01-01 RX ADMIN — AMOXICILLIN 1000 MG: 500 CAPSULE ORAL at 10:08

## 2018-01-01 RX ADMIN — ETOMIDATE 2 MG: 2 INJECTION, SOLUTION INTRAVENOUS at 02:08

## 2018-01-01 RX ADMIN — DEXTROSE 40 MG: 50 INJECTION, SOLUTION INTRAVENOUS at 01:07

## 2018-01-01 RX ADMIN — FUROSEMIDE 10 MG/HR: 10 INJECTION, SOLUTION INTRAMUSCULAR; INTRAVENOUS at 03:08

## 2018-01-01 RX ADMIN — INSULIN ASPART 3 UNITS: 100 INJECTION, SOLUTION INTRAVENOUS; SUBCUTANEOUS at 07:08

## 2018-01-01 RX ADMIN — Medication 1 CAPSULE: at 08:09

## 2018-01-01 RX ADMIN — FUROSEMIDE 80 MG: 10 INJECTION, SOLUTION INTRAMUSCULAR; INTRAVENOUS at 05:07

## 2018-01-01 RX ADMIN — FUROSEMIDE 60 MG: 10 INJECTION, SOLUTION INTRAMUSCULAR; INTRAVENOUS at 09:08

## 2018-01-01 RX ADMIN — MORPHINE SULFATE 0.5 MG: 2 INJECTION, SOLUTION INTRAMUSCULAR; INTRAVENOUS at 10:09

## 2018-01-01 RX ADMIN — ATORVASTATIN CALCIUM 40 MG: 40 TABLET, FILM COATED ORAL at 09:09

## 2018-01-01 RX ADMIN — CYCLOBENZAPRINE HYDROCHLORIDE 5 MG: 5 TABLET, FILM COATED ORAL at 05:08

## 2018-01-01 RX ADMIN — FUROSEMIDE 5 MG/HR: 10 INJECTION, SOLUTION INTRAMUSCULAR; INTRAVENOUS at 11:08

## 2018-01-01 RX ADMIN — TRAMADOL HYDROCHLORIDE 50 MG: 50 TABLET, FILM COATED ORAL at 04:08

## 2018-01-01 RX ADMIN — INSULIN ASPART 3 UNITS: 100 INJECTION, SOLUTION INTRAVENOUS; SUBCUTANEOUS at 05:08

## 2018-01-01 RX ADMIN — Medication 0.35 MCG/KG/MIN: at 12:09

## 2018-01-01 RX ADMIN — PIPERACILLIN AND TAZOBACTAM 4.5 G: 4; .5 INJECTION, POWDER, LYOPHILIZED, FOR SOLUTION INTRAVENOUS; PARENTERAL at 12:07

## 2018-01-01 RX ADMIN — ACETAMINOPHEN 650 MG: 325 TABLET ORAL at 06:07

## 2018-01-01 RX ADMIN — DOBUTAMINE IN DEXTROSE 2.5 MCG/KG/MIN: 200 INJECTION, SOLUTION INTRAVENOUS at 02:08

## 2018-01-01 RX ADMIN — TRAMADOL HYDROCHLORIDE 50 MG: 50 TABLET, FILM COATED ORAL at 05:08

## 2018-01-01 RX ADMIN — OCTREOTIDE ACETATE 50 MCG/HR: 500 INJECTION, SOLUTION INTRAVENOUS; SUBCUTANEOUS at 03:07

## 2018-01-01 RX ADMIN — INSULIN ASPART 2 UNITS: 100 INJECTION, SOLUTION INTRAVENOUS; SUBCUTANEOUS at 05:03

## 2018-01-01 RX ADMIN — MAGNESIUM SULFATE HEPTAHYDRATE 1 G: 500 INJECTION, SOLUTION INTRAMUSCULAR; INTRAVENOUS at 05:07

## 2018-01-01 RX ADMIN — POTASSIUM CHLORIDE 10 MEQ: 750 TABLET, EXTENDED RELEASE ORAL at 12:09

## 2018-01-01 RX ADMIN — INSULIN ASPART 3 UNITS: 100 INJECTION, SOLUTION INTRAVENOUS; SUBCUTANEOUS at 04:07

## 2018-01-01 RX ADMIN — DOBUTAMINE IN DEXTROSE 3 MCG/KG/MIN: 200 INJECTION, SOLUTION INTRAVENOUS at 03:09

## 2018-01-01 RX ADMIN — ACETAMINOPHEN 650 MG: 325 TABLET, FILM COATED ORAL at 01:08

## 2018-01-01 RX ADMIN — MAGNESIUM OXIDE TAB 400 MG (241.3 MG ELEMENTAL MG) 400 MG: 400 (241.3 MG) TAB at 08:08

## 2018-01-01 RX ADMIN — OCTREOTIDE ACETATE 50 MCG/HR: 500 INJECTION, SOLUTION INTRAVENOUS; SUBCUTANEOUS at 11:07

## 2018-01-01 RX ADMIN — INSULIN ASPART 3 UNITS: 100 INJECTION, SOLUTION INTRAVENOUS; SUBCUTANEOUS at 04:09

## 2018-01-01 RX ADMIN — AMIODARONE HYDROCHLORIDE 400 MG: 200 TABLET ORAL at 09:09

## 2018-01-01 RX ADMIN — MAGNESIUM OXIDE TAB 400 MG (241.3 MG ELEMENTAL MG) 400 MG: 400 (241.3 MG) TAB at 12:09

## 2018-01-01 RX ADMIN — FUROSEMIDE 20 MG/HR: 10 INJECTION, SOLUTION INTRAMUSCULAR; INTRAVENOUS at 11:08

## 2018-01-01 RX ADMIN — Medication 0.08 MCG/KG/MIN: at 03:07

## 2018-01-01 RX ADMIN — TRAZODONE HYDROCHLORIDE 25 MG: 50 TABLET ORAL at 08:09

## 2018-01-01 RX ADMIN — ASPIRIN 81 MG: 81 TABLET, COATED ORAL at 08:07

## 2018-01-01 RX ADMIN — Medication 3 ML: at 01:09

## 2018-01-01 RX ADMIN — DOBUTAMINE IN DEXTROSE 3 MCG/KG/MIN: 200 INJECTION, SOLUTION INTRAVENOUS at 07:09

## 2018-01-01 RX ADMIN — INSULIN ASPART 1 UNITS: 100 INJECTION, SOLUTION INTRAVENOUS; SUBCUTANEOUS at 10:09

## 2018-01-01 RX ADMIN — Medication 3 ML: at 03:09

## 2018-01-01 RX ADMIN — ATORVASTATIN CALCIUM 40 MG: 40 TABLET, FILM COATED ORAL at 04:07

## 2018-01-01 RX ADMIN — Medication 3 ML: at 05:09

## 2018-01-01 RX ADMIN — FLUTICASONE FUROATE AND VILANTEROL TRIFENATATE 1 PUFF: 100; 25 POWDER RESPIRATORY (INHALATION) at 07:07

## 2018-01-01 RX ADMIN — POTASSIUM CHLORIDE 40 MEQ: 20 SOLUTION ORAL at 03:08

## 2018-01-01 RX ADMIN — FUROSEMIDE 40 MG: 10 INJECTION, SOLUTION INTRAMUSCULAR; INTRAVENOUS at 09:07

## 2018-01-01 RX ADMIN — INSULIN ASPART 2 UNITS: 100 INJECTION, SOLUTION INTRAVENOUS; SUBCUTANEOUS at 09:08

## 2018-01-01 RX ADMIN — PANTOPRAZOLE SODIUM 40 MG: 40 TABLET, DELAYED RELEASE ORAL at 04:08

## 2018-01-01 RX ADMIN — AMIODARONE HYDROCHLORIDE 400 MG: 200 TABLET ORAL at 03:09

## 2018-01-01 RX ADMIN — ASPIRIN 325 MG ORAL TABLET 325 MG: 325 PILL ORAL at 05:07

## 2018-01-01 RX ADMIN — ASPIRIN 81 MG: 81 TABLET, COATED ORAL at 03:07

## 2018-01-01 RX ADMIN — Medication 1 CAPSULE: at 12:09

## 2018-01-01 RX ADMIN — PANTOPRAZOLE SODIUM 40 MG: 40 TABLET, DELAYED RELEASE ORAL at 08:08

## 2018-01-01 RX ADMIN — ASPIRIN 81 MG: 81 TABLET, COATED ORAL at 09:08

## 2018-01-01 RX ADMIN — HYDROCODONE BITARTRATE AND ACETAMINOPHEN 1 TABLET: 7.5; 325 TABLET ORAL at 12:08

## 2018-01-01 RX ADMIN — ONDANSETRON 4 MG: 2 INJECTION INTRAMUSCULAR; INTRAVENOUS at 09:09

## 2018-01-01 RX ADMIN — FUROSEMIDE 5 MG/HR: 10 INJECTION, SOLUTION INTRAMUSCULAR; INTRAVENOUS at 02:09

## 2018-01-01 RX ADMIN — ASPIRIN 81 MG: 81 TABLET, COATED ORAL at 09:09

## 2018-01-01 RX ADMIN — MORPHINE SULFATE 2 MG: 4 INJECTION, SOLUTION INTRAMUSCULAR; INTRAVENOUS at 05:08

## 2018-01-01 RX ADMIN — HYDROCODONE BITARTRATE AND ACETAMINOPHEN 1 TABLET: 7.5; 325 TABLET ORAL at 09:08

## 2018-01-01 RX ADMIN — BUMETANIDE 2 MG: 0.25 INJECTION INTRAMUSCULAR; INTRAVENOUS at 03:09

## 2018-01-01 RX ADMIN — ACETAMINOPHEN 1000 MG: 10 INJECTION, SOLUTION INTRAVENOUS at 03:07

## 2018-01-01 RX ADMIN — FUROSEMIDE 10 MG/HR: 10 INJECTION, SOLUTION INTRAMUSCULAR; INTRAVENOUS at 03:09

## 2018-01-01 RX ADMIN — INSULIN DETEMIR 8 UNITS: 100 INJECTION, SOLUTION SUBCUTANEOUS at 09:08

## 2018-01-01 RX ADMIN — ENOXAPARIN SODIUM 40 MG: 100 INJECTION SUBCUTANEOUS at 04:08

## 2018-01-01 RX ADMIN — FUROSEMIDE 10 MG/HR: 10 INJECTION, SOLUTION INTRAMUSCULAR; INTRAVENOUS at 12:08

## 2018-01-01 RX ADMIN — Medication 0.35 MCG/KG/MIN: at 06:09

## 2018-01-01 RX ADMIN — FUROSEMIDE 40 MG: 10 INJECTION, SOLUTION INTRAMUSCULAR; INTRAVENOUS at 05:07

## 2018-01-01 RX ADMIN — IOHEXOL 75 ML: 350 INJECTION, SOLUTION INTRAVENOUS at 05:08

## 2018-01-01 RX ADMIN — INSULIN ASPART 2 UNITS: 100 INJECTION, SOLUTION INTRAVENOUS; SUBCUTANEOUS at 07:08

## 2018-01-01 RX ADMIN — SODIUM CHLORIDE 1000 ML: 0.9 INJECTION, SOLUTION INTRAVENOUS at 11:07

## 2018-01-01 RX ADMIN — VANCOMYCIN HYDROCHLORIDE 1750 MG: 10 INJECTION, POWDER, LYOPHILIZED, FOR SOLUTION INTRAVENOUS at 05:07

## 2018-01-01 RX ADMIN — ASPIRIN 81 MG: 81 TABLET, COATED ORAL at 12:08

## 2018-01-01 RX ADMIN — ETOMIDATE 2 MG: 2 INJECTION, SOLUTION INTRAVENOUS at 01:08

## 2018-01-01 RX ADMIN — SODIUM CHLORIDE, PRESERVATIVE FREE 3 ML: 5 INJECTION INTRAVENOUS at 02:07

## 2018-01-01 RX ADMIN — NOREPINEPHRINE BITARTRATE 0.4 MCG/KG/MIN: 1 INJECTION, SOLUTION, CONCENTRATE INTRAVENOUS at 01:09

## 2018-01-01 RX ADMIN — METOLAZONE 5 MG: 5 TABLET ORAL at 04:08

## 2018-01-01 RX ADMIN — INSULIN ASPART 4 UNITS: 100 INJECTION, SOLUTION INTRAVENOUS; SUBCUTANEOUS at 09:08

## 2018-01-01 RX ADMIN — METOLAZONE 10 MG: 5 TABLET ORAL at 08:09

## 2018-01-01 RX ADMIN — ATORVASTATIN CALCIUM 40 MG: 20 TABLET, FILM COATED ORAL at 10:08

## 2018-01-01 RX ADMIN — INSULIN ASPART 4 UNITS: 100 INJECTION, SOLUTION INTRAVENOUS; SUBCUTANEOUS at 11:08

## 2018-01-01 RX ADMIN — DEXTROSE 40 MG: 50 INJECTION, SOLUTION INTRAVENOUS at 09:07

## 2018-01-01 RX ADMIN — FUROSEMIDE 10 MG/HR: 10 INJECTION, SOLUTION INTRAMUSCULAR; INTRAVENOUS at 09:08

## 2018-01-01 RX ADMIN — INSULIN ASPART 1 UNITS: 100 INJECTION, SOLUTION INTRAVENOUS; SUBCUTANEOUS at 11:07

## 2018-01-01 RX ADMIN — INSULIN DETEMIR 12 UNITS: 100 INJECTION, SOLUTION SUBCUTANEOUS at 10:08

## 2018-01-01 RX ADMIN — IPRATROPIUM BROMIDE AND ALBUTEROL SULFATE 3 ML: .5; 3 SOLUTION RESPIRATORY (INHALATION) at 07:07

## 2018-01-01 RX ADMIN — LIDOCAINE HYDROCHLORIDE 4 ML: 40 INJECTION, SOLUTION RETROBULBAR; TOPICAL at 03:07

## 2018-01-01 RX ADMIN — AMIODARONE HYDROCHLORIDE 400 MG: 200 TABLET ORAL at 08:09

## 2018-01-01 RX ADMIN — FUROSEMIDE 40 MG: 10 INJECTION, SOLUTION INTRAMUSCULAR; INTRAVENOUS at 10:07

## 2018-01-01 RX ADMIN — MAGNESIUM OXIDE TAB 400 MG (241.3 MG ELEMENTAL MG) 800 MG: 400 (241.3 MG) TAB at 01:08

## 2018-01-01 RX ADMIN — Medication 3 ML: at 02:09

## 2018-01-01 RX ADMIN — ATORVASTATIN CALCIUM 40 MG: 20 TABLET, FILM COATED ORAL at 09:07

## 2018-01-01 RX ADMIN — NOREPINEPHRINE BITARTRATE 0.4 MCG/KG/MIN: 1 INJECTION, SOLUTION, CONCENTRATE INTRAVENOUS at 05:09

## 2018-01-01 RX ADMIN — INSULIN ASPART 3 UNITS: 100 INJECTION, SOLUTION INTRAVENOUS; SUBCUTANEOUS at 01:08

## 2018-01-01 RX ADMIN — PANTOPRAZOLE SODIUM 40 MG: 40 TABLET, DELAYED RELEASE ORAL at 04:07

## 2018-01-01 RX ADMIN — ACETAMINOPHEN 1000 MG: 10 INJECTION, SOLUTION INTRAVENOUS at 05:07

## 2018-01-01 RX ADMIN — SODIUM CHLORIDE: 0.9 INJECTION, SOLUTION INTRAVENOUS at 12:08

## 2018-01-01 RX ADMIN — FUROSEMIDE 20 MG/HR: 10 INJECTION, SOLUTION INTRAMUSCULAR; INTRAVENOUS at 03:08

## 2018-01-01 RX ADMIN — FENTANYL CITRATE 25 MCG: 50 INJECTION INTRAMUSCULAR; INTRAVENOUS at 03:07

## 2018-01-01 RX ADMIN — METOPROLOL SUCCINATE 25 MG: 25 TABLET, EXTENDED RELEASE ORAL at 08:07

## 2018-01-01 RX ADMIN — IPRATROPIUM BROMIDE AND ALBUTEROL SULFATE 3 ML: .5; 3 SOLUTION RESPIRATORY (INHALATION) at 01:08

## 2018-01-01 RX ADMIN — INSULIN ASPART 3 UNITS: 100 INJECTION, SOLUTION INTRAVENOUS; SUBCUTANEOUS at 04:08

## 2018-01-01 RX ADMIN — FUROSEMIDE 10 MG/HR: 10 INJECTION, SOLUTION INTRAMUSCULAR; INTRAVENOUS at 08:09

## 2018-01-01 RX ADMIN — INSULIN ASPART 2 UNITS: 100 INJECTION, SOLUTION INTRAVENOUS; SUBCUTANEOUS at 06:07

## 2018-01-01 RX ADMIN — FUROSEMIDE 40 MG: 10 INJECTION, SOLUTION INTRAMUSCULAR; INTRAVENOUS at 07:07

## 2018-01-01 RX ADMIN — ACETAMINOPHEN 650 MG: 325 TABLET ORAL at 09:07

## 2018-01-01 RX ADMIN — PANTOPRAZOLE SODIUM 40 MG: 40 TABLET, DELAYED RELEASE ORAL at 08:09

## 2018-01-01 RX ADMIN — DOBUTAMINE IN DEXTROSE 3 MCG/KG/MIN: 200 INJECTION, SOLUTION INTRAVENOUS at 05:09

## 2018-01-01 RX ADMIN — POTASSIUM CHLORIDE 40 MEQ: 1500 TABLET, EXTENDED RELEASE ORAL at 12:08

## 2018-01-01 RX ADMIN — INSULIN ASPART 1 UNITS: 100 INJECTION, SOLUTION INTRAVENOUS; SUBCUTANEOUS at 09:07

## 2018-01-01 RX ADMIN — DEXTROSE 8 MG/HR: 50 INJECTION, SOLUTION INTRAVENOUS at 09:07

## 2018-01-01 RX ADMIN — POLYETHYLENE GLYCOL 3350 17 G: 17 POWDER, FOR SOLUTION ORAL at 08:09

## 2018-01-01 RX ADMIN — LIDOCAINE 1 PATCH: 50 PATCH TOPICAL at 12:08

## 2018-01-01 RX ADMIN — PANTOPRAZOLE SODIUM 40 MG: 40 TABLET, DELAYED RELEASE ORAL at 05:08

## 2018-01-01 RX ADMIN — SODIUM CHLORIDE: 0.9 INJECTION, SOLUTION INTRAVENOUS at 03:07

## 2018-01-01 RX ADMIN — IBUPROFEN 800 MG: 400 TABLET, FILM COATED ORAL at 10:03

## 2018-01-01 RX ADMIN — INSULIN ASPART 2 UNITS: 100 INJECTION, SOLUTION INTRAVENOUS; SUBCUTANEOUS at 05:09

## 2018-01-01 RX ADMIN — DOBUTAMINE IN DEXTROSE 3 MCG/KG/MIN: 200 INJECTION, SOLUTION INTRAVENOUS at 10:09

## 2018-01-01 RX ADMIN — LIDOCAINE HYDROCHLORIDE 50 MG: 20 INJECTION, SOLUTION INTRAVENOUS at 03:07

## 2018-01-01 RX ADMIN — HYDROCODONE BITARTRATE AND ACETAMINOPHEN 1 TABLET: 7.5; 325 TABLET ORAL at 11:08

## 2018-01-01 RX ADMIN — DOBUTAMINE IN DEXTROSE 2.5 MCG/KG/MIN: 200 INJECTION, SOLUTION INTRAVENOUS at 10:07

## 2018-01-01 RX ADMIN — ETOMIDATE 2 MG: 2 INJECTION, SOLUTION INTRAVENOUS at 03:07

## 2018-01-01 RX ADMIN — POTASSIUM CHLORIDE 20 MEQ: 20 TABLET, EXTENDED RELEASE ORAL at 11:09

## 2018-01-01 RX ADMIN — ATORVASTATIN CALCIUM 40 MG: 20 TABLET, FILM COATED ORAL at 12:08

## 2018-01-01 RX ADMIN — POLYETHYLENE GLYCOL 3350 17 G: 17 POWDER, FOR SOLUTION ORAL at 05:09

## 2018-01-01 RX ADMIN — ASPIRIN 81 MG: 81 TABLET, COATED ORAL at 08:03

## 2018-01-01 RX ADMIN — FUROSEMIDE 40 MG: 10 INJECTION, SOLUTION INTRAMUSCULAR; INTRAVENOUS at 04:08

## 2018-01-01 RX ADMIN — ASPIRIN 81 MG: 81 TABLET, COATED ORAL at 04:08

## 2018-01-01 RX ADMIN — PANTOPRAZOLE SODIUM 40 MG: 40 TABLET, DELAYED RELEASE ORAL at 08:07

## 2018-01-01 RX ADMIN — SODIUM CHLORIDE, PRESERVATIVE FREE 3 ML: 5 INJECTION INTRAVENOUS at 09:07

## 2018-01-01 RX ADMIN — VANCOMYCIN HYDROCHLORIDE 1250 MG: 10 INJECTION, POWDER, LYOPHILIZED, FOR SOLUTION INTRAVENOUS at 06:07

## 2018-01-01 RX ADMIN — INSULIN ASPART 3 UNITS: 100 INJECTION, SOLUTION INTRAVENOUS; SUBCUTANEOUS at 05:07

## 2018-01-01 RX ADMIN — ACETAMINOPHEN 650 MG: 325 TABLET, FILM COATED ORAL at 06:08

## 2018-01-01 RX ADMIN — INSULIN ASPART 2 UNITS: 100 INJECTION, SOLUTION INTRAVENOUS; SUBCUTANEOUS at 11:08

## 2018-01-01 RX ADMIN — ASPIRIN 81 MG: 81 TABLET, COATED ORAL at 08:09

## 2018-01-01 RX ADMIN — HYDROCODONE BITARTRATE AND ACETAMINOPHEN 1 TABLET: 7.5; 325 TABLET ORAL at 06:08

## 2018-01-01 RX ADMIN — ACETAMINOPHEN 650 MG: 325 TABLET, FILM COATED ORAL at 08:07

## 2018-01-01 RX ADMIN — FENTANYL CITRATE 25 MCG: 50 INJECTION, SOLUTION INTRAMUSCULAR; INTRAVENOUS at 03:07

## 2018-01-01 RX ADMIN — LOSARTAN POTASSIUM 25 MG: 25 TABLET ORAL at 03:07

## 2018-01-01 RX ADMIN — MAGNESIUM OXIDE TAB 400 MG (241.3 MG ELEMENTAL MG) 400 MG: 400 (241.3 MG) TAB at 11:09

## 2018-01-01 RX ADMIN — DEXTROSE 8 MG/HR: 50 INJECTION, SOLUTION INTRAVENOUS at 02:07

## 2018-01-01 RX ADMIN — IOHEXOL 100 ML: 350 INJECTION, SOLUTION INTRAVENOUS at 01:07

## 2018-01-01 RX ADMIN — MAGNESIUM SULFATE HEPTAHYDRATE 3 G: 500 INJECTION, SOLUTION INTRAMUSCULAR; INTRAVENOUS at 02:07

## 2018-01-01 RX ADMIN — SODIUM CHLORIDE: 0.9 INJECTION, SOLUTION INTRAVENOUS at 05:07

## 2018-01-01 RX ADMIN — ERGOCALCIFEROL 50000 UNITS: 1.25 CAPSULE ORAL at 08:09

## 2018-01-01 RX ADMIN — LIDOCAINE HYDROCHLORIDE,EPINEPHRINE BITARTRATE 1 ML: 10; .01 INJECTION, SOLUTION INFILTRATION; PERINEURAL at 12:07

## 2018-01-01 RX ADMIN — ASPIRIN 81 MG: 81 TABLET, COATED ORAL at 10:07

## 2018-01-01 RX ADMIN — DEXTROSE 40 MG: 50 INJECTION, SOLUTION INTRAVENOUS at 10:08

## 2018-01-01 RX ADMIN — HEPARIN SODIUM AND DEXTROSE 16 UNITS/KG/HR: 10000; 5 INJECTION INTRAVENOUS at 04:03

## 2018-01-01 RX ADMIN — INSULIN ASPART 3 UNITS: 100 INJECTION, SOLUTION INTRAVENOUS; SUBCUTANEOUS at 06:07

## 2018-01-01 RX ADMIN — ETOMIDATE 6 MG: 2 INJECTION, SOLUTION INTRAVENOUS at 01:08

## 2018-01-01 RX ADMIN — NOREPINEPHRINE BITARTRATE 0.15 MCG/KG/MIN: 1 INJECTION, SOLUTION, CONCENTRATE INTRAVENOUS at 08:09

## 2018-01-01 RX ADMIN — ACETAMINOPHEN 500 MG: 500 TABLET ORAL at 04:03

## 2018-01-01 RX ADMIN — INSULIN ASPART 4 UNITS: 100 INJECTION, SOLUTION INTRAVENOUS; SUBCUTANEOUS at 08:08

## 2018-01-01 RX ADMIN — NOREPINEPHRINE BITARTRATE 0.2 MCG/KG/MIN: 1 INJECTION INTRAVENOUS at 02:09

## 2018-01-01 RX ADMIN — POLYETHYLENE GLYCOL 3350 17 G: 17 POWDER, FOR SOLUTION ORAL at 09:09

## 2018-01-01 RX ADMIN — TRAMADOL HYDROCHLORIDE 50 MG: 50 TABLET, FILM COATED ORAL at 09:08

## 2018-01-01 RX ADMIN — CYCLOBENZAPRINE HYDROCHLORIDE 5 MG: 5 TABLET, FILM COATED ORAL at 08:08

## 2018-01-01 RX ADMIN — PIPERACILLIN AND TAZOBACTAM 4.5 G: 4; .5 INJECTION, POWDER, LYOPHILIZED, FOR SOLUTION INTRAVENOUS; PARENTERAL at 11:07

## 2018-01-01 RX ADMIN — INSULIN DETEMIR 10 UNITS: 100 INJECTION, SOLUTION SUBCUTANEOUS at 09:07

## 2018-01-01 RX ADMIN — INSULIN ASPART 1 UNITS: 100 INJECTION, SOLUTION INTRAVENOUS; SUBCUTANEOUS at 10:03

## 2018-01-01 RX ADMIN — DEXTROSE 40 MG: 50 INJECTION, SOLUTION INTRAVENOUS at 10:07

## 2018-01-01 RX ADMIN — DOBUTAMINE IN DEXTROSE 2.5 MCG/KG/MIN: 200 INJECTION, SOLUTION INTRAVENOUS at 05:08

## 2018-01-01 RX ADMIN — INSULIN ASPART 1 UNITS: 100 INJECTION, SOLUTION INTRAVENOUS; SUBCUTANEOUS at 09:09

## 2018-01-01 RX ADMIN — MAGNESIUM OXIDE TAB 400 MG (241.3 MG ELEMENTAL MG) 800 MG: 400 (241.3 MG) TAB at 03:08

## 2018-01-01 RX ADMIN — AMOXICILLIN 1000 MG: 500 CAPSULE ORAL at 09:08

## 2018-01-01 RX ADMIN — SODIUM CHLORIDE, PRESERVATIVE FREE 3 ML: 5 INJECTION INTRAVENOUS at 06:03

## 2018-01-01 RX ADMIN — SODIUM CHLORIDE, PRESERVATIVE FREE 3 ML: 5 INJECTION INTRAVENOUS at 10:03

## 2018-01-01 RX ADMIN — IOHEXOL 100 ML: 350 INJECTION, SOLUTION INTRAVENOUS at 11:03

## 2018-01-01 RX ADMIN — IPRATROPIUM BROMIDE AND ALBUTEROL SULFATE 3 ML: .5; 3 SOLUTION RESPIRATORY (INHALATION) at 12:08

## 2018-01-01 RX ADMIN — INSULIN DETEMIR 10 UNITS: 100 INJECTION, SOLUTION SUBCUTANEOUS at 08:07

## 2018-01-01 RX ADMIN — PIPERACILLIN AND TAZOBACTAM 4.5 G: 4; .5 INJECTION, POWDER, LYOPHILIZED, FOR SOLUTION INTRAVENOUS; PARENTERAL at 04:07

## 2018-01-01 RX ADMIN — ATORVASTATIN CALCIUM 40 MG: 40 TABLET, FILM COATED ORAL at 10:07

## 2018-01-01 RX ADMIN — ACETAMINOPHEN 650 MG: 325 TABLET, FILM COATED ORAL at 11:08

## 2018-01-01 RX ADMIN — FUROSEMIDE 60 MG: 10 INJECTION, SOLUTION INTRAMUSCULAR; INTRAVENOUS at 10:08

## 2018-01-01 RX ADMIN — MAGNESIUM OXIDE TAB 400 MG (241.3 MG ELEMENTAL MG) 400 MG: 400 (241.3 MG) TAB at 09:08

## 2018-01-01 RX ADMIN — MAGNESIUM OXIDE TAB 400 MG (241.3 MG ELEMENTAL MG) 800 MG: 400 (241.3 MG) TAB at 07:08

## 2018-01-01 RX ADMIN — PANTOPRAZOLE SODIUM 40 MG: 40 TABLET, DELAYED RELEASE ORAL at 09:09

## 2018-01-01 RX ADMIN — POTASSIUM CHLORIDE 40 MEQ: 20 SOLUTION ORAL at 09:08

## 2018-01-01 RX ADMIN — Medication 0.06 MCG/KG/MIN: at 01:07

## 2018-01-01 RX ADMIN — LORAZEPAM 2 MG: 2 INJECTION INTRAMUSCULAR; INTRAVENOUS at 05:09

## 2018-01-01 RX ADMIN — FUROSEMIDE 15 MG/HR: 10 INJECTION, SOLUTION INTRAMUSCULAR; INTRAVENOUS at 01:08

## 2018-01-01 RX ADMIN — Medication 0.02 MCG/KG/MIN: at 12:07

## 2018-01-01 RX ADMIN — ASPIRIN 81 MG 81 MG: 81 TABLET ORAL at 03:07

## 2018-01-01 RX ADMIN — INSULIN ASPART 3 UNITS: 100 INJECTION, SOLUTION INTRAVENOUS; SUBCUTANEOUS at 07:09

## 2018-01-01 RX ADMIN — FUROSEMIDE 10 MG/HR: 10 INJECTION, SOLUTION INTRAMUSCULAR; INTRAVENOUS at 04:08

## 2018-01-01 RX ADMIN — AMIODARONE HYDROCHLORIDE 1 MG/MIN: 1.8 INJECTION, SOLUTION INTRAVENOUS at 05:09

## 2018-01-01 RX ADMIN — TOLVAPTAN 15 MG: 15 TABLET ORAL at 10:09

## 2018-01-01 RX ADMIN — DEXTROSE 8 MG/HR: 50 INJECTION, SOLUTION INTRAVENOUS at 06:07

## 2018-01-01 RX ADMIN — Medication 0.3 MCG/KG/MIN: at 06:09

## 2018-01-01 RX ADMIN — DOBUTAMINE IN DEXTROSE 3 MCG/KG/MIN: 200 INJECTION, SOLUTION INTRAVENOUS at 01:09

## 2018-01-01 RX ADMIN — NOREPINEPHRINE BITARTRATE 0.3 MCG/KG/MIN: 1 INJECTION, SOLUTION, CONCENTRATE INTRAVENOUS at 09:09

## 2018-01-01 RX ADMIN — OCTREOTIDE ACETATE 50 MCG/HR: 500 INJECTION, SOLUTION INTRAVENOUS; SUBCUTANEOUS at 06:07

## 2018-01-01 RX ADMIN — MORPHINE SULFATE 1 MG: 2 INJECTION, SOLUTION INTRAMUSCULAR; INTRAVENOUS at 04:09

## 2018-01-01 RX ADMIN — METOPROLOL SUCCINATE 25 MG: 25 TABLET, EXTENDED RELEASE ORAL at 10:07

## 2018-01-01 RX ADMIN — ATORVASTATIN CALCIUM 40 MG: 40 TABLET, FILM COATED ORAL at 08:07

## 2018-01-01 RX ADMIN — SODIUM CHLORIDE 1.5 ML/KG/HR: 0.9 INJECTION, SOLUTION INTRAVENOUS at 03:07

## 2018-01-01 RX ADMIN — DEXTROSE 40 MG: 50 INJECTION, SOLUTION INTRAVENOUS at 08:08

## 2018-01-01 RX ADMIN — Medication 3 ML: at 11:09

## 2018-01-01 RX ADMIN — SODIUM CHLORIDE: 0.9 INJECTION, SOLUTION INTRAVENOUS at 09:08

## 2018-01-01 RX ADMIN — CLOPIDOGREL BISULFATE 600 MG: 300 TABLET, FILM COATED ORAL at 05:07

## 2018-01-01 RX ADMIN — ACETAMINOPHEN 1000 MG: 10 INJECTION, SOLUTION INTRAVENOUS at 10:07

## 2018-01-01 RX ADMIN — MORPHINE SULFATE 5 MG: 4 INJECTION INTRAVENOUS at 05:09

## 2018-01-01 RX ADMIN — POTASSIUM CHLORIDE 40 MEQ: 20 SOLUTION ORAL at 01:09

## 2018-01-01 RX ADMIN — SODIUM CHLORIDE 2367 ML: 0.9 INJECTION, SOLUTION INTRAVENOUS at 04:07

## 2018-01-01 RX ADMIN — POTASSIUM CHLORIDE 20 MEQ: 1500 TABLET, EXTENDED RELEASE ORAL at 12:08

## 2018-01-01 RX ADMIN — Medication 3 ML: at 06:09

## 2018-01-01 RX ADMIN — FUROSEMIDE 10 MG/HR: 10 INJECTION, SOLUTION INTRAMUSCULAR; INTRAVENOUS at 04:09

## 2018-01-01 RX ADMIN — CYCLOBENZAPRINE HYDROCHLORIDE 5 MG: 5 TABLET, FILM COATED ORAL at 11:08

## 2018-01-01 RX ADMIN — ATORVASTATIN CALCIUM 40 MG: 40 TABLET, FILM COATED ORAL at 09:07

## 2018-01-01 RX ADMIN — VANCOMYCIN HYDROCHLORIDE 1250 MG: 10 INJECTION, POWDER, LYOPHILIZED, FOR SOLUTION INTRAVENOUS at 05:07

## 2018-01-01 RX ADMIN — FUROSEMIDE 40 MG: 10 INJECTION, SOLUTION INTRAMUSCULAR; INTRAVENOUS at 03:08

## 2018-01-01 RX ADMIN — DEXTROSE 8 MG/HR: 50 INJECTION, SOLUTION INTRAVENOUS at 03:07

## 2018-01-01 RX ADMIN — PANTOPRAZOLE SODIUM 40 MG: 40 TABLET, DELAYED RELEASE ORAL at 10:09

## 2018-01-01 RX ADMIN — LIDOCAINE 1 PATCH: 50 PATCH TOPICAL at 11:08

## 2018-01-01 RX ADMIN — NOREPINEPHRINE BITARTRATE 0.35 MCG/KG/MIN: 1 INJECTION, SOLUTION, CONCENTRATE INTRAVENOUS at 10:09

## 2018-01-01 RX ADMIN — LOSARTAN POTASSIUM 25 MG: 25 TABLET ORAL at 10:07

## 2018-01-01 RX ADMIN — SODIUM CHLORIDE, SODIUM GLUCONATE, SODIUM ACETATE, POTASSIUM CHLORIDE, MAGNESIUM CHLORIDE, SODIUM PHOSPHATE, DIBASIC, AND POTASSIUM PHOSPHATE: .53; .5; .37; .037; .03; .012; .00082 INJECTION, SOLUTION INTRAVENOUS at 01:08

## 2018-01-01 RX ADMIN — BUMETANIDE 2 MG: 0.25 INJECTION INTRAMUSCULAR; INTRAVENOUS at 02:09

## 2018-01-01 RX ADMIN — MAGNESIUM SULFATE IN WATER 2 G: 40 INJECTION, SOLUTION INTRAVENOUS at 06:07

## 2018-01-01 RX ADMIN — ACETAMINOPHEN 650 MG: 325 TABLET ORAL at 12:07

## 2018-01-01 RX ADMIN — POLYETHYLENE GLYCOL 3350 17 G: 17 POWDER, FOR SOLUTION ORAL at 09:07

## 2018-01-01 RX ADMIN — FUROSEMIDE 40 MG: 10 INJECTION, SOLUTION INTRAMUSCULAR; INTRAVENOUS at 08:07

## 2018-01-01 RX ADMIN — IRON SUCROSE 100 MG: 20 INJECTION, SOLUTION INTRAVENOUS at 09:09

## 2018-01-01 RX ADMIN — IPRATROPIUM BROMIDE AND ALBUTEROL SULFATE 3 ML: .5; 3 SOLUTION RESPIRATORY (INHALATION) at 05:07

## 2018-01-01 RX ADMIN — MAGNESIUM OXIDE TAB 400 MG (241.3 MG ELEMENTAL MG) 400 MG: 400 (241.3 MG) TAB at 10:08

## 2018-01-01 RX ADMIN — PIPERACILLIN AND TAZOBACTAM 4.5 G: 4; .5 INJECTION, POWDER, LYOPHILIZED, FOR SOLUTION INTRAVENOUS; PARENTERAL at 02:07

## 2018-01-01 RX ADMIN — CLARITHROMYCIN 500 MG: 500 TABLET ORAL at 09:08

## 2018-01-01 RX ADMIN — METOPROLOL SUCCINATE 25 MG: 25 TABLET, EXTENDED RELEASE ORAL at 03:07

## 2018-01-01 RX ADMIN — Medication 0.12 MCG/KG/MIN: at 04:07

## 2018-01-01 RX ADMIN — OCTREOTIDE ACETATE 50 MCG: 50 INJECTION, SOLUTION INTRAVENOUS; SUBCUTANEOUS at 03:07

## 2018-01-01 RX ADMIN — ATORVASTATIN CALCIUM 40 MG: 40 TABLET, FILM COATED ORAL at 03:07

## 2018-01-01 RX ADMIN — FUROSEMIDE 10 MG/HR: 10 INJECTION, SOLUTION INTRAMUSCULAR; INTRAVENOUS at 02:09

## 2018-01-01 RX ADMIN — INSULIN ASPART 1 UNITS: 100 INJECTION, SOLUTION INTRAVENOUS; SUBCUTANEOUS at 08:07

## 2018-01-01 RX ADMIN — Medication 0.35 MCG/KG/MIN: at 03:09

## 2018-01-01 RX ADMIN — METOLAZONE 10 MG: 5 TABLET ORAL at 02:09

## 2018-03-01 PROBLEM — R07.9 CHEST PAIN IN ADULT: Status: ACTIVE | Noted: 2018-01-01

## 2018-03-01 PROBLEM — R79.89 ELEVATED TROPONIN: Status: ACTIVE | Noted: 2018-01-01

## 2018-03-01 NOTE — ED NOTES
Patient identifiers verified and correct for Natalie Parnell.    LOC: The patient is awake, alert and aware of environment with an appropriate affect, the patient is oriented x 3 and speaking appropriately.  APPEARANCE: Patient resting comfortably and in no acute distress, patient is clean and well groomed, patient's clothing is properly fastened.  SKIN: The skin is warm and dry, color consistent with ethnicity, patient has normal skin turgor and moist mucus membranes, skin intact, no breakdown or bruising noted.  MUSCULOSKELETAL: Patient moving all extremities spontaneously, no obvious swelling or deformities noted.  Left back pain, bilateral shoulder pain.  Left chest pain.  RESPIRATORY: Airway is open and patent, respirations are spontaneous, patient has a normal effort and rate, no accessory muscle use noted, bilateral breath sounds clear.  Shortness of breath on exertion.  Coughing.  CARDIAC: Patient has a normal rate and regular rhythm, + 3 periphreal edema noted to right lower leg without tenderness.  Positive distal pulses are present and strong , capillary refill < 3 seconds.  Left chest pain.  ABDOMEN: Soft and non tender to palpation, no distention noted, normoactive bowel sounds present in all four quadrants.  NEUROLOGIC: PERRL, 4 mm bilaterally, eyes open spontaneously, behavior appropriate to situation, follows commands, facial expression symmetrical, bilateral hand grasp equal and even, purposeful motor response noted, normal sensation in all extremities when touched with a finger.

## 2018-03-01 NOTE — ED PROVIDER NOTES
Encounter Date: 3/1/2018    SCRIBE #1 NOTE: I, Alina Ibrahim, am scribing for, and in the presence of,  Dr. Lynn. I have scribed the entire note.       History     Chief Complaint   Patient presents with    Chest Pain     c/o cough, left chest pain, and bilateral shoulder pain x10 days. Also c/o SOB on exertion x10 days     Time patient was seen by the provider:       The patient is a 83 y.o. female with DM who presents to the ED with a complaint of            Review of patient's allergies indicates:  No Known Allergies  Past Medical History:   Diagnosis Date    Diabetes mellitus      Past Surgical History:   Procedure Laterality Date    CATARACT EXTRACTION Bilateral     Dr. Max     Family History   Problem Relation Age of Onset    No Known Problems Mother     No Known Problems Father     No Known Problems Sister     No Known Problems Brother     No Known Problems Maternal Aunt     No Known Problems Maternal Uncle     No Known Problems Paternal Aunt     No Known Problems Paternal Uncle     No Known Problems Maternal Grandmother     No Known Problems Maternal Grandfather     No Known Problems Paternal Grandmother     No Known Problems Paternal Grandfather     Amblyopia Neg Hx     Blindness Neg Hx     Cancer Neg Hx     Cataracts Neg Hx     Diabetes Neg Hx     Glaucoma Neg Hx     Hypertension Neg Hx     Macular degeneration Neg Hx     Retinal detachment Neg Hx     Strabismus Neg Hx     Stroke Neg Hx     Thyroid disease Neg Hx      Social History   Substance Use Topics    Smoking status: Never Smoker    Smokeless tobacco: Never Used    Alcohol use Not on file     Review of Systems   Constitutional: Negative for fever.   HENT: Negative for sore throat.    Respiratory: Negative for shortness of breath.    Cardiovascular: Negative for chest pain.   Gastrointestinal: Negative for nausea.   Genitourinary: Negative for dysuria.   Musculoskeletal: Negative for back pain.   Skin: Negative  for rash.   Neurological: Negative for weakness.   Hematological: Does not bruise/bleed easily.       Physical Exam     Initial Vitals [03/01/18 0847]   BP Pulse Resp Temp SpO2   131/60 82 18 98.2 °F (36.8 °C) 96 %      MAP       83.67         Physical Exam    Nursing note and vitals reviewed.  Constitutional: She appears well-developed.   HENT:   Head: Normocephalic and atraumatic.   Mouth/Throat: Oropharynx is clear and moist.   Eyes: Conjunctivae are normal.   Neck: Neck supple.   Cardiovascular: Normal rate, regular rhythm, normal heart sounds and intact distal pulses. Exam reveals no gallop and no friction rub.    No murmur heard.  Pulmonary/Chest: Breath sounds normal. She has no wheezes. She has no rhonchi. She has no rales.   Abdominal: Soft. She exhibits no distension. There is no tenderness.   Musculoskeletal: Normal range of motion.   Neurological: She is alert and oriented to person, place, and time.   Skin: No rash noted. No erythema.   Psychiatric: She has a normal mood and affect.         ED Course   Procedures  Labs Reviewed   CBC W/ AUTO DIFFERENTIAL   COMPREHENSIVE METABOLIC PANEL   TROPONIN I   B-TYPE NATRIURETIC PEPTIDE     EKG Readings: (Independently Interpreted)   Initial Reading: No STEMI. Rhythm: Normal Sinus Rhythm. Heart Rate: 74. Ectopy: No Ectopy. Conduction: Normal. T Waves: Normal. T Waves Flipped: I.       X-Rays:   Independently Interpreted Readings:   Other Readings:  I have visualized all imaging for this patient, radiology has done the interpretation.      Imaging Results          X-Ray Chest AP Portable (In process)                                      Clinical Impression:     1. Chest pain

## 2018-03-01 NOTE — H&P
[facility] History and Physical - Resident Note    Admitting Team: [facility]  Attending Physician: Dr. Joe Benitez  Resident: Dr. King  Interns: Dr. Zapata    Date of Admit: 3/1/2018    Chief Complaint     Chest Pain (c/o cough, left chest pain, and bilateral shoulder pain x10 days. Also c/o SOB on exertion x10 days)       Subjective:      History of Present Illness:  Natalie Parnell is a 83 y.o. female who  has a past medical history of Diabetes mellitus.. The patient presented to [facility] on 3/1/2018 with a primary complaint of Chest Pain (c/o cough, left chest pain, and bilateral shoulder pain x10 days. Also c/o SOB on exertion x10 days)      The patient was in their usual state of health until 10 days ago. She began to have chest pain on exertion everyday. She has this chest pain and shortness of breath after walking about 1 block. Pain and SOB is relieved with rest.  The pain is across the center and left side of her chest, left shoulder down to her left elbow. The pain lasts about 5 minutes. She does not have diaphoresis, nausea, vomiting, dizziness, numbness, HA, or vision changes when these symptoms occur. She denies any other symptoms. This morning at 5 am she was awoken by the pain which was the most intense it has been in the past ten days and lasted about 10 minutes. She was then brought to the ED. She denies any childhood illnesses. Denies any change in appetite.     In Er, was given Asa 325    Past Medical History:  Past Medical History:   Diagnosis Date    Diabetes mellitus        Past Surgical History:  Past Surgical History:   Procedure Laterality Date    CATARACT EXTRACTION Bilateral     Dr. Max       Allergies:  Review of patient's allergies indicates:  No Known Allergies    Home Medications:  Prior to Admission medications    Medication Sig Start Date End Date Taking? Authorizing Provider   metFORMIN (GLUCOPHAGE) 500 MG tablet TAKE 1 TABLET BY  MOUTH TWICE DAILY WITH MEALS 12/4/17  Yes Octavio Ferrell MD   albuterol 90 mcg/actuation inhaler Inhale 2 puffs into the lungs every 6 (six) hours as needed for Wheezing. 8/5/15   Octavio Ferrell MD   ammonium lactate 12 % Crea Apply twice daily to lower extremities 10/8/15   Min Cotton DPM   azelastine (OPTIVAR) 0.05 % ophthalmic solution Place 1 drop into both eyes 2 (two) times daily. 6/28/17 6/28/18  Florence Vo, OD   benzonatate (TESSALON) 100 MG capsule TOME 1 CAPSULA QUIQUE VECES AL RAQUEL SARIKA SEA NECESARIO PARA LA TOS 10/10/15   Octavio Ferrell MD   blood sugar diagnostic (ACCU-CHEK SMARTVIEW TEST STRIP) Strp Inject 1 strip into the skin once daily. 6/6/16   Octavio Ferrell MD   blood-glucose meter Misc 1 Units by Misc.(Non-Drug; Combo Route) route once daily. 6/6/16 6/6/17  Octavio Ferrell MD   calcium carbonate-vitamin D3 600 mg (1,500 mg)-800 unit Chew Take 1 tablet by mouth 2 (two) times daily. 1/14/15   Octavio Ferrell MD   diclofenac sodium 1 % Gel Apply 2 g topically once daily. 3/3/16   Octavio Ferrell MD   fluticasone-salmeterol 100-50 mcg/dose (ADVAIR) 100-50 mcg/dose diskus inhaler Inhale 1 puff into the lungs 2 (two) times daily. 10/8/15 6/6/17  Octavio Ferrell MD   gabapentin (NEURONTIN) 100 MG capsule Take 1 capsule (100 mg total) by mouth 2 (two) times daily. 6/16/15 6/15/16  Octavio Ferrell MD   lancets (ACCU-CHEK FASTCLIX) Misc Inject 1 lancet into the skin once daily. 6/6/16   Octavio Ferrell MD   neomycin-polymyxin-dexamethasone (MAXITROL) 3.5mg/mL-10,000 unit/mL-0.1 % DrpS INSTILL ONE GTT INTO  OU QID 6/10/17   Historical Provider, MD   olopatadine (PATANOL) 0.1 % ophthalmic solution Place 1 drop into both eyes 2 (two) times daily. 6/19/17 6/19/18  Caroline Hernandez NP   silver sulfADIAZINE 1% (SILVADENE) 1 % cream Apply topically 2 (two) times daily. 6/16/15   Octavio Ferrell MD       Family  "History:  Family History   Problem Relation Age of Onset    No Known Problems Mother     No Known Problems Father     No Known Problems Sister     No Known Problems Brother     No Known Problems Maternal Aunt     No Known Problems Maternal Uncle     No Known Problems Paternal Aunt     No Known Problems Paternal Uncle     No Known Problems Maternal Grandmother     No Known Problems Maternal Grandfather     No Known Problems Paternal Grandmother     No Known Problems Paternal Grandfather     Amblyopia Neg Hx     Blindness Neg Hx     Cancer Neg Hx     Cataracts Neg Hx     Diabetes Neg Hx     Glaucoma Neg Hx     Hypertension Neg Hx     Macular degeneration Neg Hx     Retinal detachment Neg Hx     Strabismus Neg Hx     Stroke Neg Hx     Thyroid disease Neg Hx        Social History:  Social History   Substance Use Topics    Smoking status: Never Smoker    Smokeless tobacco: Never Used    Alcohol use Not on file       Review of Systems:  Pertinent items are noted in HPI. All other systems are reviewed and are negative.    Health Maintaince :   Primary Care Physician: Dr. Ferrell  Immunizations:   Influenza is not up to date.       Objective:   Last 24 Hour Vital Signs:  BP  Min: 125/74  Max: 143/61  Temp  Av.2 °F (36.8 °C)  Min: 98.2 °F (36.8 °C)  Max: 98.2 °F (36.8 °C)  Pulse  Av.8  Min: 68  Max: 82  Resp  Av.1  Min: 15  Max: 25  SpO2  Av.7 %  Min: 96 %  Max: 99 %  Height  Av' 6" (167.6 cm)  Min: 5' 6" (167.6 cm)  Max: 5' 6" (167.6 cm)  Weight  Av.2 kg (181 lb 3.5 oz)  Min: 82.2 kg (181 lb 3.5 oz)  Max: 82.2 kg (181 lb 3.5 oz)  Body mass index is 29.25 kg/m².  No intake/output data recorded.    Physical Examination:  General: NAD, sitting in bed eating  Head: Atraumatic, normocephalic  Eye: PERRL, EOMI  Cv: Distal pulses intact 2+ BUE and 2+ BLE, III/VI systolic ejection murmur heard best at 2nd right intercostal space  Pulm: No increased work of breathing, " lungs clear bilaterally to auscultation, no wheezing or crackles appreciated  Abd: Obese, PBS4Q, soft  Extremities: varicose veins BLE, MAEW  Psych: Calm, cooperative  Skin: no rashes, no bruises noted    Laboratory:  Most Recent Data:  CBC: Lab Results   Component Value Date    WBC 5.77 03/01/2018    HGB 12.1 03/01/2018    HCT 36.8 (L) 03/01/2018     03/01/2018    MCV 93 03/01/2018    RDW 14.1 03/01/2018       BMP: Lab Results   Component Value Date     03/01/2018    K 4.4 03/01/2018     03/01/2018    CO2 24 03/01/2018    BUN 15 03/01/2018    CREATININE 0.9 03/01/2018     (H) 03/01/2018    CALCIUM 9.4 03/01/2018     LFTs: Lab Results   Component Value Date    PROT 6.8 03/01/2018    ALBUMIN 3.2 (L) 03/01/2018    BILITOT 1.1 (H) 03/01/2018    AST 51 (H) 03/01/2018    ALKPHOS 173 (H) 03/01/2018    ALT 22 03/01/2018     Coags:   Lab Results   Component Value Date    INR 1.1 03/01/2018     Cardiac: Lab Results   Component Value Date    TROPONINI 0.044 (H) 03/01/2018     (H) 03/01/2018       Trended Lab Data:    Recent Labs  Lab 03/01/18  0949   WBC 5.77   HGB 12.1   HCT 36.8*      MCV 93   RDW 14.1      K 4.4      CO2 24   BUN 15   CREATININE 0.9   *   PROT 6.8   ALBUMIN 3.2*   BILITOT 1.1*   AST 51*   ALKPHOS 173*   ALT 22       Trended Cardiac Data:    Recent Labs  Lab 03/01/18  0949 03/01/18  1210   TROPONINI 0.040* 0.044*   *  --        Other Results:  EKG (my interpretation): normal EKG, normal sinus rhythm    Radiology:  Imaging Results          CTA Chest Non-Coronary (PE Study) (Final result)  Result time 03/01/18 11:51:07    Final result by Johnathan Jasso DO (03/01/18 11:51:07)                 Impression:     No PE.    There is prominence of the arterial to bronchus ratio in the upper lobes concerning for component of vascular congestion.    Nonspecific prominent soft tissue along the bronchovascular interstitium bilaterally with few scattered  prominent mediastinal lymph nodes without definite adenopathy.    Mild subpleural emphysematous change the lungs without significant lung consolidation. Peripheral opacity suggesting atelectasis versus scarring.    Subcentimeter left lower lobe calcified granuloma.    Morphologic changes of the liver partially visualized concerning for cirrhosis with probable gastrohepatic and esophageal varices. Clinical correlation and followup abdominal imaging recommended.      Electronically signed by: LALY CADENA DO  Date:     03/01/18  Time:    11:51              Narrative:    Procedure: CTA of the thorax     Technique: 1.25-mm axial images of the thorax post 100-ML of Omnipaque 350 intravenous contrast administration.        Comparison: None     Results:There is mild scattered subpleural emphysematous change in the lungs.    There is slight interlobular septal thickening within the periphery of the lungs most prominent involving the lower lobes and inferior lingula and right middle lobes suggestive for atelectasis versus scarring. There is a small calcified granuloma within the left lower lobe.    There is no significant lung consolidation. No pleural effusion or pneumothorax. No intraluminal filling defects within the central or segmental pulmonary arteries to suggest pulmonary artery embolus.  No pneumothorax.    There is slight prominence of the arterial to bronchus ratio in the upper lobes concerning for component of vascular congestion. There is atherosclerotic plaquing of the aorta and in the distribution of the coronary arteries.    There is nonspecific prominent soft tissue in the hilar bronchovascular interstitium with few prominent mediastinal lymph nodes without definite focal adenopathy.    Subcentimeter hypodense lesion within the right thyroid gland bed    Partially visualized hepatomegaly with nodularity of the surface of liver concern for cirrhosis. Splenomegaly with Probable gastrohepatic and esophageal  varicosities..                             US Lower Extremity Veins Right (Final result)  Result time 03/01/18 11:27:11   Procedure changed from US Lower Extremity Veins Left     Final result by Naya Lai MD (03/01/18 11:27:11)                 Impression:      Nonocclusive DVT in the right common femoral vein      Electronically signed by: NAYA LAI  Date:     03/01/18  Time:    11:27              Narrative:    HISTORY:  swelling     TECHNIQUE: Duplex and color flow Doppler evaluation of the right lower extremity.    COMPARISON: No comparison    FINDINGS:    There is echogenic material seen in the right femoral vein with noncompressibility.  It is however not occlusive.  Right lower extremity otherwise has no evidence of acute venous thrombosis in the superficial femoral, greater saphenous, popliteal, peroneal, anterior tibial, and posterior tibial vein(s).                             X-Ray Chest AP Portable (Final result)  Result time 03/01/18 09:42:46    Final result by Johnathan Jasso DO (03/01/18 09:42:46)                 Impression:        See Above       Electronically signed by: JOHNATHAN JASSO DO  Date:     03/01/18  Time:    09:42              Narrative:    Single portable frontal view of the chest    Comparison: 03/08/2017    Results: Smaller lung volumes likely due to poor inspiratory effort. There is fullness of the perihilar vasculature which may represent and endovascular congestion or atelectasis. There is no lung consolidation. Nonspecific elevation of the right lung base. No large pleural effusion or pneumothorax. Further evaluation as warrented clinically.                                 Assessment:     Natalie Parnell is a 83 y.o. female with:  Patient Active Problem List    Diagnosis Date Noted    Chest pain in adult 03/01/2018    Type II or unspecified type diabetes mellitus with neurological manifestations, uncontrolled(250.62) 06/16/2015    Type 2 diabetes mellitus 10/08/2014         Plan:     1. Chest Pain  - Troponin 0.044 on admission, continue to trend  - Admit to telemetry  - Echo ordered   - s/p asa 325 in ER  - started atorvatstain 40 mg qd  - heparin infusion started  - consider cardiac catheterization for tomorrow   - lipid panel, TSH ordered     2. DVT   - Nonocclusive DVT in the right common femoral vein  - continue heparin gtt  - D-dimer 1.89    3. Diabetes Mellitus   - SSI while inpatient  - hold home metformin  - A1C ordered    4. Cirrhosis  - CTA chest revealed concern for cirrhosis   - US abdomen ordered  - hepatitis panel panding    5. HTN  - start Lisinopril 10 mg qd      Code Status:     Full    Perry Zapata  [facility] Internal Medicine/Pediatrics HO-1  [facility] Team B Service    [facility] Medicine Hospitalist Pager numbers:   [facility] Hospitalist Medicine Team A (Iain/Aishwarya): [phone]  [facility] Hospitalist Medicine Team B (Luz/Eli):  [phone]

## 2018-03-01 NOTE — ED NOTES
Assumed care of an 84 y/o HF c/o intermittent left sided  chest pain radiating to left shoulder and elbow that last for around 5 minutes can causes mild sob for the last 3 weeks. She also reports a frequent rattling chest sounds with productive cough ,with thick yellow  Sputum .  Right knee swelling for a few dates with min pain 4/10 . Pedal pulses positive . nontender to palpate . Reports had an injection in the same knee 9 months ago   Cardiac monitor intact .nsl intact without edema or redness noted

## 2018-03-01 NOTE — ED PROVIDER NOTES
Encounter Date: 3/1/2018       History     Chief Complaint   Patient presents with    Chest Pain     c/o cough, left chest pain, and bilateral shoulder pain x10 days. Also c/o SOB on exertion x10 days     Natalie Parnell, a 83 y.o. male PMH significant for DM that presents to the ED for Left sided CP and cough that radiates to back and down left arm.  She states that she's had this pain and cough for about 2 weeks  and has been trying to self treat at home with warm tea.  She states that the shortness of breath and chest pain got significantly worse yesterday while she was walking from store to get her eyeglasses fixed.  She states that this morning she woke up with significantly more pain.  Pain lasts for about 3 minutes and then subsides.  Pain is described as a pressur She states that sitting up in a chair makes the pain better.  She is unsure if anything makes the pain worse.  She has experienced pain like this before in the past and it was associated with a bout of pneumonia about 2 years ago.  She denies any fever, N/V or abdominal pain.          The history is provided by the patient.     Review of patient's allergies indicates:  No Known Allergies  Past Medical History:   Diagnosis Date    Diabetes mellitus      Past Surgical History:   Procedure Laterality Date    CATARACT EXTRACTION Bilateral     Dr. Max     Family History   Problem Relation Age of Onset    No Known Problems Mother     No Known Problems Father     No Known Problems Sister     No Known Problems Brother     No Known Problems Maternal Aunt     No Known Problems Maternal Uncle     No Known Problems Paternal Aunt     No Known Problems Paternal Uncle     No Known Problems Maternal Grandmother     No Known Problems Maternal Grandfather     No Known Problems Paternal Grandmother     No Known Problems Paternal Grandfather     Amblyopia Neg Hx     Blindness Neg Hx     Cancer Neg Hx     Cataracts Neg Hx     Diabetes Neg Hx      Glaucoma Neg Hx     Hypertension Neg Hx     Macular degeneration Neg Hx     Retinal detachment Neg Hx     Strabismus Neg Hx     Stroke Neg Hx     Thyroid disease Neg Hx      Social History   Substance Use Topics    Smoking status: Never Smoker    Smokeless tobacco: Never Used    Alcohol use Not on file     Review of Systems   Constitutional: Negative for fever.   Respiratory: Positive for cough and shortness of breath.    Cardiovascular: Positive for chest pain and leg swelling (left leg ).   Gastrointestinal: Negative for abdominal pain, nausea and vomiting.   Skin: Negative for color change.   Allergic/Immunologic: Negative for immunocompromised state.   Neurological: Negative for dizziness.   Psychiatric/Behavioral: Negative for agitation and confusion.   All other systems reviewed and are negative.      Physical Exam     Initial Vitals [03/01/18 0847]   BP Pulse Resp Temp SpO2   131/60 82 18 98.2 °F (36.8 °C) 96 %      MAP       83.67         Physical Exam    Nursing note and vitals reviewed.  Constitutional: She appears well-developed and well-nourished. She is not diaphoretic. No distress.   HENT:   Head: Normocephalic and atraumatic.   Right Ear: External ear normal.   Left Ear: External ear normal.   Nose: Nose normal.   Mouth/Throat: Oropharynx is clear and moist.   Eyes: Conjunctivae and EOM are normal. Right eye exhibits no discharge. Left eye exhibits no discharge. No scleral icterus.   Neck: Normal range of motion. No tracheal deviation present.   Cardiovascular: Normal rate, regular rhythm and normal heart sounds. Exam reveals no gallop and no friction rub.    No murmur heard.  Pulmonary/Chest: Breath sounds normal. No respiratory distress. She has no wheezes. She has no rhonchi. She has no rales. She exhibits no tenderness.   Abdominal: Soft. Bowel sounds are normal. She exhibits no distension. There is no tenderness. There is no rebound and no guarding.   Musculoskeletal: Normal range of  motion.        Right shoulder: Normal.        Left shoulder: Normal.        Right lower leg: She exhibits swelling.   R Leg: swelling noted to calf which is NTTP.  Negative Nicole's sign.  2 pedal pulse   Neurological: She is alert and oriented to person, place, and time.   Skin: Skin is warm and dry. Capillary refill takes less than 2 seconds. No rash noted. No erythema.   Psychiatric: She has a normal mood and affect. Thought content normal.         ED Course   Procedures  Labs Reviewed   CBC W/ AUTO DIFFERENTIAL - Abnormal; Notable for the following:        Result Value    RBC 3.94 (*)     Hematocrit 36.8 (*)     All other components within normal limits   COMPREHENSIVE METABOLIC PANEL - Abnormal; Notable for the following:     Glucose 206 (*)     Albumin 3.2 (*)     Total Bilirubin 1.1 (*)     Alkaline Phosphatase 173 (*)     AST 51 (*)     Anion Gap 7 (*)     All other components within normal limits   TROPONIN I - Abnormal; Notable for the following:     Troponin I 0.040 (*)     All other components within normal limits   B-TYPE NATRIURETIC PEPTIDE - Abnormal; Notable for the following:      (*)     All other components within normal limits   TROPONIN I - Abnormal; Notable for the following:     Troponin I 0.044 (*)     All other components within normal limits   D DIMER, QUANTITATIVE - Abnormal; Notable for the following:     D-Dimer 1.89 (*)     All other components within normal limits   POCT GLUCOSE - Abnormal; Notable for the following:     POCT Glucose 220 (*)     All other components within normal limits   D DIMER, QUANTITATIVE   APTT   PROTIME-INR   PROTIME-INR   APTT     EKG Readings: (Independently Interpreted)   Initial Reading: No STEMI. Previous EKG: Compared with most recent EKG Previous EKG Date: 10/21/2015. Rhythm: Normal Sinus Rhythm. Heart Rate: 74. Ectopy: No Ectopy. Conduction: Normal.     Imaging Results          CTA Chest Non-Coronary (PE Study) (Final result)  Result time 03/01/18  11:51:07    Final result by Johnathan Jasso DO (03/01/18 11:51:07)                 Impression:     No PE.    There is prominence of the arterial to bronchus ratio in the upper lobes concerning for component of vascular congestion.    Nonspecific prominent soft tissue along the bronchovascular interstitium bilaterally with few scattered prominent mediastinal lymph nodes without definite adenopathy.    Mild subpleural emphysematous change the lungs without significant lung consolidation. Peripheral opacity suggesting atelectasis versus scarring.    Subcentimeter left lower lobe calcified granuloma.    Morphologic changes of the liver partially visualized concerning for cirrhosis with probable gastrohepatic and esophageal varices. Clinical correlation and followup abdominal imaging recommended.      Electronically signed by: JOHNATHAN JASSO DO  Date:     03/01/18  Time:    11:51              Narrative:    Procedure: CTA of the thorax     Technique: 1.25-mm axial images of the thorax post 100-ML of Omnipaque 350 intravenous contrast administration.        Comparison: None     Results:There is mild scattered subpleural emphysematous change in the lungs.    There is slight interlobular septal thickening within the periphery of the lungs most prominent involving the lower lobes and inferior lingula and right middle lobes suggestive for atelectasis versus scarring. There is a small calcified granuloma within the left lower lobe.    There is no significant lung consolidation. No pleural effusion or pneumothorax. No intraluminal filling defects within the central or segmental pulmonary arteries to suggest pulmonary artery embolus.  No pneumothorax.    There is slight prominence of the arterial to bronchus ratio in the upper lobes concerning for component of vascular congestion. There is atherosclerotic plaquing of the aorta and in the distribution of the coronary arteries.    There is nonspecific prominent soft tissue in the  hilar bronchovascular interstitium with few prominent mediastinal lymph nodes without definite focal adenopathy.    Subcentimeter hypodense lesion within the right thyroid gland bed    Partially visualized hepatomegaly with nodularity of the surface of liver concern for cirrhosis. Splenomegaly with Probable gastrohepatic and esophageal varicosities..                             US Lower Extremity Veins Right (Final result)  Result time 03/01/18 11:27:11   Procedure changed from US Lower Extremity Veins Left     Final result by Rodriguez Lai MD (03/01/18 11:27:11)                 Impression:      Nonocclusive DVT in the right common femoral vein      Electronically signed by: RODRIGUEZ LAI  Date:     03/01/18  Time:    11:27              Narrative:    HISTORY:  swelling     TECHNIQUE: Duplex and color flow Doppler evaluation of the right lower extremity.    COMPARISON: No comparison    FINDINGS:    There is echogenic material seen in the right femoral vein with noncompressibility.  It is however not occlusive.  Right lower extremity otherwise has no evidence of acute venous thrombosis in the superficial femoral, greater saphenous, popliteal, peroneal, anterior tibial, and posterior tibial vein(s).                             X-Ray Chest AP Portable (Final result)  Result time 03/01/18 09:42:46    Final result by Johnathan Jasso DO (03/01/18 09:42:46)                 Impression:        See Above       Electronically signed by: JOHNATHAN JASSO DO  Date:     03/01/18  Time:    09:42              Narrative:    Single portable frontal view of the chest    Comparison: 03/08/2017    Results: Smaller lung volumes likely due to poor inspiratory effort. There is fullness of the perihilar vasculature which may represent and endovascular congestion or atelectasis. There is no lung consolidation. Nonspecific elevation of the right lung base. No large pleural effusion or pneumothorax. Further evaluation as warrented  clinically.                            Medications   heparin 25,000 units in dextrose 5% 250 mL (100 units/mL) bolus from bag; PRN BOLUS (not administered)   heparin 25,000 units in dextrose 5% 250 mL (100 units/mL) bolus from bag; PRN BOLUS (not administered)   heparin 25,000 units in dextrose 5% 250 mL (100 units/mL) infusion; FEMALE (not administered)   atorvastatin tablet 40 mg (not administered)   glucose chewable tablet 16 g (not administered)   glucose chewable tablet 24 g (not administered)   dextrose 50% injection 12.5 g (not administered)   dextrose 50% injection 25 g (not administered)   glucagon (human recombinant) injection 1 mg (not administered)   insulin aspart U-100 pen 0-5 Units (not administered)   aspirin EC tablet 81 mg (not administered)   lisinopril tablet 5 mg (not administered)   aspirin tablet 325 mg (325 mg Oral Given 3/1/18 1008)   ibuprofen tablet 800 mg (800 mg Oral Given 3/1/18 1051)   omnipaque 350 iohexol 100 mL (100 mLs Intravenous Given 3/1/18 1136)          Medical Decision Making:   Initial Assessment:   Left sided CP, SOB, right leg swelling   Differential Diagnosis:   Ammonia, bronchitis, PE, acute coronary syndrome, DVT, electrolyte abnormality  ED Management:  Patient presents to ED and NAD, afebrile and nontoxic.  She states that she's got left-sided chest pain that radiates to back and left shoulder through her elbow.  Denies any injury.  Motrin given in ED.  CBC, CMP, EKG show no acute abnormalities.  Troponin elevated at 0.044.  D-dimer elevated at 1.89 Chest x-ray shows here is fullness of the perihilar vasculature which may represent and endovascular congestion or atelectasis. There is no lung consolidation. Nonspecific elevation of the right lung base.  Ultrasound of right leg shows nonocclusive DVT and deep femoral vein.  CTA shows no evidence of PE.  Patient will be admitted to trend troponin enzymes.              Attending Attestation:     Physician Attestation  Statement for NP/PA:   I have conducted a face to face encounter with this patient in addition to the NP/PA, due to NP/PA Request    Other NP/PA Attestation Additions:      Medical Decision Making: Patient here with CP    NAD, RRR.  Troponin elevated.  CTA chest negative for PE   Found DVT on lower ext Us.  Patient will be admitted for further management                    Clinical Impression:   The primary encounter diagnosis was Elevated troponin. Diagnoses of Chest pain, Leg swelling, DVT of deep femoral vein, left, and Chest pain in adult were also pertinent to this visit.                           Jennifer Moffett PA-C  03/01/18 1318       Lilli Lynn MD  03/01/18 4909

## 2018-03-01 NOTE — ED NOTES
Pt ask for something to eat so she can take her medication , reports she didn't take am meds until she eats . md notified . Diet tray ordered

## 2018-03-01 NOTE — ED TRIAGE NOTES
Patient has been short of breath with bilateral shoulder pain, left chest pain, and left back pain for two weeks.  Patient having shortness of breath on exertion.  Also, has right lower leg swelling, without tenderness.

## 2018-03-02 PROBLEM — I35.0 NONRHEUMATIC AORTIC VALVE STENOSIS: Status: ACTIVE | Noted: 2018-01-01

## 2018-03-02 NOTE — CONSULTS
[facility]  Cardiology  Consult Note    Patient Name: Natalie Parnell  MRN: [id number]  Admission Date: 3/1/2018  Hospital Length of Stay: 0 days  Code Status: Full Code   Attending Provider: Joe Benitez MD   Consulting Provider: BIANKA Booker, ANP  Primary Care Physician: Octavio Ferrell MD  Principal Problem:Chest pain    Patient information was obtained from patient and past medical records.     Inpatient consult to [facility]  Consult performed by: PILAR HURT  Consult ordered by: LAURA EUCEDA        Subjective:     Chief Complaint:  Chest pain and SOB      HPI:   82yo female with history of DMII and cirrhosis without ascites who presented to the ER with complaints of chest pain and SOB for the past several days. She was in her USOH until about 1 1/2 weeks ago when she began to experience chest pain and SOB with exertion. Her symptoms would start with minimal exertion ie walking one block and would relieve with rest. She described the pain as left side chest and shoulder pain that typically lasted about 5 minutes and was not associated with diaphoresis, nausea or vomiting. Her most recent episode awoke her from sleep and was more intense than previous ones prompting her to present to the ER for evaluation        Hospital Course:  3/1/2018 Presented to the ER with complaints of chest pain and SOB. Admitted to [facility]. Tropnin .[phone] with . CXR revealed mild pulmonary vascular congestion. CTA negative for PE. BLE venous ultrasound with evidence of nonocclusive DVT to LCFV. Echocardiogram with normal LV function with EF 50-55%, no WMA, SHEA .58cm with mean gradient 58mmHg.   3/2/2018 Cardiology consulted for evaluation of AS with TAVR     Past Medical History:   Diagnosis Date    Diabetes mellitus        Past Surgical History:   Procedure Laterality Date    CATARACT EXTRACTION Bilateral     Dr. Max       Review of patient's allergies  indicates:  No Known Allergies    No current facility-administered medications on file prior to encounter.      Current Outpatient Prescriptions on File Prior to Encounter   Medication Sig    metFORMIN (GLUCOPHAGE) 500 MG tablet TAKE 1 TABLET BY MOUTH TWICE DAILY WITH MEALS    albuterol 90 mcg/actuation inhaler Inhale 2 puffs into the lungs every 6 (six) hours as needed for Wheezing.    ammonium lactate 12 % Crea Apply twice daily to lower extremities    azelastine (OPTIVAR) 0.05 % ophthalmic solution Place 1 drop into both eyes 2 (two) times daily.    blood sugar diagnostic (ACCU-CHEK SMARTVIEW TEST STRIP) Strp Inject 1 strip into the skin once daily.    blood-glucose meter Misc 1 Units by Misc.(Non-Drug; Combo Route) route once daily.    calcium carbonate-vitamin D3 600 mg (1,500 mg)-800 unit Chew Take 1 tablet by mouth 2 (two) times daily.    diclofenac sodium 1 % Gel Apply 2 g topically once daily.    fluticasone-salmeterol 100-50 mcg/dose (ADVAIR) 100-50 mcg/dose diskus inhaler Inhale 1 puff into the lungs 2 (two) times daily.    gabapentin (NEURONTIN) 100 MG capsule Take 1 capsule (100 mg total) by mouth 2 (two) times daily.    lancets (ACCU-CHEK FASTCLIX) Misc Inject 1 lancet into the skin once daily.    neomycin-polymyxin-dexamethasone (MAXITROL) 3.5mg/mL-10,000 unit/mL-0.1 % DrpS INSTILL ONE GTT INTO  OU QID    olopatadine (PATANOL) 0.1 % ophthalmic solution Place 1 drop into both eyes 2 (two) times daily.    silver sulfADIAZINE 1% (SILVADENE) 1 % cream Apply topically 2 (two) times daily.    [DISCONTINUED] benzonatate (TESSALON) 100 MG capsule TOME 1 CAPSULA QUIQUE VECES AL RAQUEL SARIKA SEA NECESARIO PARA LA TOS     Family History     Problem Relation (Age of Onset)    No Known Problems Mother, Father, Sister, Brother, Maternal Aunt, Maternal Uncle, Paternal Aunt, Paternal Uncle, Maternal Grandmother, Maternal Grandfather, Paternal Grandmother, Paternal Grandfather        Social History Main  Topics    Smoking status: Never Smoker    Smokeless tobacco: Never Used    Alcohol use Not on file    Drug use: Unknown    Sexual activity: Not on file     Review of Systems   Constitution: Negative for chills, decreased appetite, diaphoresis, fever and weakness.   Cardiovascular: Positive for chest pain and dyspnea on exertion. Negative for claudication, cyanosis, irregular heartbeat, leg swelling, near-syncope, orthopnea, palpitations, paroxysmal nocturnal dyspnea and syncope.   Respiratory: Negative for cough, hemoptysis, shortness of breath and wheezing.    Gastrointestinal: Negative for bloating, abdominal pain, constipation, diarrhea, melena, nausea and vomiting.   Neurological: Negative for dizziness.     Objective:     Vital Signs (Most Recent):  Temp: 97.3 °F (36.3 °C) (03/02/18 0731)  Pulse: 72 (03/02/18 1226)  Resp: 18 (03/02/18 0731)  BP: (!) 120/58 (03/02/18 0731)  SpO2: 99 % (03/02/18 1128) Vital Signs (24h Range):  Temp:  [97.2 °F (36.2 °C)-98.1 °F (36.7 °C)] 97.3 °F (36.3 °C)  Pulse:  [69-90] 72  Resp:  [12-25] 18  SpO2:  [96 %-99 %] 99 %  BP: (116-173)/(41-89) 120/58     Weight: 82.2 kg (181 lb 3.5 oz)  Body mass index is 29.25 kg/m².    SpO2: 99 %  O2 Device (Oxygen Therapy): room air      Intake/Output Summary (Last 24 hours) at 03/02/18 1240  Last data filed at 03/02/18 1100   Gross per 24 hour   Intake                0 ml   Output              750 ml   Net             -750 ml       Lines/Drains/Airways     Peripheral Intravenous Line                 Peripheral IV - Single Lumen 03/01/18 0945 Left Antecubital 1 day                Physical Exam   Constitutional: She is oriented to person, place, and time. She appears well-developed and well-nourished. No distress.   Cardiovascular: Normal rate and regular rhythm.  Exam reveals no gallop.    Murmur heard.   Systolic murmur is present  radiating to the neck, apex  Pulmonary/Chest: Effort normal and breath sounds normal. No respiratory distress.  She has no wheezes.   Abdominal: Soft. Bowel sounds are normal. She exhibits no distension. There is no tenderness.   Neurological: She is alert and oriented to person, place, and time.   Skin: Skin is warm and dry.       Significant Labs: reviewed     Significant Imaging: Echocardiogram:   2D echo with color flow doppler:   Results for orders placed or performed during the hospital encounter of 03/01/18   2D echo with color flow doppler   Result Value Ref Range    EF 50 55 - 65    Mitral Valve Regurgitation MILD     Diastolic Dysfunction No     Aortic Valve Stenosis SEVERE (A)     Est. PA Systolic Pressure 40.95 (A)     Tricuspid Valve Regurgitation TRIVIAL TO MILD      Assessment and Plan:     * Chest pain    -troponin with minimal elevation  -related to severe AS vs ischemic etiology  -no WMA noted on echocardiogram  -recommend [facility] as an outpatient for ischemic evaluation and in preparation for TAVR         Nonrheumatic aortic valve stenosis    -echo with normal LVEF and severe AS; SHEA .58cm2 and mean gradient 58mmHg  -will need intervention for TAVR  -recommend follow up with Dr. Diaz in clinic for pre TAVR workup then will refer to Dr. Pop Pruett at [facility]   -on ASA, statin and ACEI; would recommend addition of BB  -stable for discharge from cardiac standpoint         DVT of deep femoral vein, left    -?precipitating factor ?sedentary activity given chest pain and SOB  -no significant swelling noted to LLE  -recommend chronic AC for treatment and no invasive intervention given lack of significant symptoms  -CTA negative for PE  -appears to be a good candidate for NOACs with either Eliquis or Xarelto             VTE Risk Mitigation         Ordered     Medium Risk of VTE  Once      03/01/18 9485          Thank you for your consult.   BIANKA Booker, ANP  Cardiology   [facility]          I have seen patient with Nurse Practitioner Kriss Hill. I agree with her assessment and plan  as written in this note.          Seen patient         She severe AS with a preserved EF  She will set up care with Dr. Diaz      She will have LHC then TAVR evaluation later        Thanks        ZN

## 2018-03-02 NOTE — ASSESSMENT & PLAN NOTE
-echo with normal LVEF and severe AS; SHEA .58cm2 and mean gradient 58mmHg  -will need intervention for TAVR  -recommend follow up with Dr. Diaz in clinic for pre TAVR workup then will refer to Dr. Pop Pruett at [facility]   -on ASA, statin and ACEI; would recommend addition of BB  -stable for discharge from cardiac standpoint

## 2018-03-02 NOTE — ASSESSMENT & PLAN NOTE
-troponin with minimal elevation  -related to severe AS vs ischemic etiology  -no WMA noted on echocardiogram  -recommend [facility] as an outpatient for ischemic evaluation and in preparation for TAVR

## 2018-03-02 NOTE — TELEPHONE ENCOUNTER
Clinic appt scheduled with Evangelina, daughter, on Wednesday, March 21, 2018 at 1015am.  Evangelina given clinic address and telephone number.  Evangelina repeated information correctly.

## 2018-03-02 NOTE — DISCHARGE SUMMARY
[facility] Discharge Summary    Primary Team: [facility]  Attending Physician: Joe Benitez MD  Resident: Dr. Luis Manuel King  Intern: Dr. Perry Zapata    Date of Admit: 3/1/2018  Date of Discharge: 3/2/2018    Discharge to: Home  Condition: stable    Discharge Diagnoses     Patient Active Problem List   Diagnosis    Type 2 diabetes mellitus    Type II or unspecified type diabetes mellitus with neurological manifestations, uncontrolled(250.62)    Chest pain    Elevated troponin    Cirrhosis of liver without ascites    DVT of deep femoral vein, left    Nonrheumatic aortic valve stenosis       Consultants and Procedures     Consultants:  Cardiology    Procedures:   none    Brief History of Present Illness      Natalie Parnell is a 83 y.o. female who  has a past medical history of Diabetes mellitus.  The patient presented to [facility] on 3/1/2018 with a primary complaint of Chest Pain (c/o cough, left chest pain, and bilateral shoulder pain x10 days. Also c/o SOB on exertion x10 days)    She was admitted for chest pain and ACS r/o    For the full HPI please refer to the History & Physical from this admission.    Hospital Course By Problem with Pertinent Findings     1. Chest Pain  - Troponin 0.044 on admission, continue to trend, plateaued at 0.046.  - Admit to telemetry  - Echo ordered - Severe aortic valve stenosis (SHEA 0.58 cm2, AVAi 0.30 cm2/m2)  - s/p asa 325 in ER  - started atorvatstain 40 mg qd  - heparin infusion started  - NM cardiac stress test today  - TSH WNL  - Lipid panel - ,   - A1C 9.1  - will go home on atorvastatin, toprol XL 25 mg, asa 81, lisinopril 2.5 mg      2. DVT   - Nonocclusive DVT in the right common femoral vein  - continue heparin gtt  - D-dimer 1.89  - will go home on Eliquis     3. Diabetes Mellitus   - SSI while inpatient  - hold home metformin  - A1C 9.1     4. Cirrhosis  - CTA chest revealed concern for cirrhosis   - US abdomen ordered  - hepatitis  panel negative     5. HTN  - start Lisinopril 2.5 mg qd    Discharge Medications        Medication List      START taking these medications    * apixaban 5 mg Tab  Take 2 tablets (10 mg total) by mouth 2 (two) times daily.     * apixaban 5 mg Tab  Take 1 tablet (5 mg total) by mouth 2 (two) times daily.     aspirin 81 MG EC tablet  Commonly known as:  ECOTRIN  Take 1 tablet (81 mg total) by mouth once daily.  Start taking on:  3/3/2018     atorvastatin 40 MG tablet  Commonly known as:  LIPITOR  Take 1 tablet (40 mg total) by mouth once daily.  Start taking on:  3/3/2018     lisinopril 2.5 MG tablet  Commonly known as:  PRINIVIL,ZESTRIL  Take 1 tablet (2.5 mg total) by mouth once daily.  Start taking on:  3/3/2018     metoprolol succinate 25 MG 24 hr tablet  Commonly known as:  TOPROL-XL  Take 1 tablet (25 mg total) by mouth once daily.        * This list has 2 medication(s) that are the same as other medications prescribed for you. Read the directions carefully, and ask your doctor or other care provider to review them with you.            CONTINUE taking these medications    albuterol 90 mcg/actuation inhaler  Inhale 2 puffs into the lungs every 6 (six) hours as needed for Wheezing.     ammonium lactate 12 % Crea  Apply twice daily to lower extremities     azelastine 0.05 % ophthalmic solution  Commonly known as:  OPTIVAR  Place 1 drop into both eyes 2 (two) times daily.     blood sugar diagnostic Strp  Commonly known as:  ACCU-CHEK SMARTVIEW TEST STRIP  Inject 1 strip into the skin once daily.     blood-glucose meter Misc  1 Units by Misc.(Non-Drug; Combo Route) route once daily.     calcium carbonate-vitamin D3 600 mg (1,500 mg)-800 unit Chew  Take 1 tablet by mouth 2 (two) times daily.     diclofenac sodium 1 % Gel  Apply 2 g topically once daily.     fluticasone-salmeterol 100-50 mcg/dose 100-50 mcg/dose diskus inhaler  Commonly known as:  ADVAIR  Inhale 1 puff into the lungs 2 (two) times daily.     gabapentin  100 MG capsule  Commonly known as:  NEURONTIN  Take 1 capsule (100 mg total) by mouth 2 (two) times daily.     lancets Misc  Commonly known as:  ACCU-CHEK FASTCLIX  Inject 1 lancet into the skin once daily.     metFORMIN 500 MG tablet  Commonly known as:  GLUCOPHAGE  TAKE 1 TABLET BY MOUTH TWICE DAILY WITH MEALS     neomycin-polymyxin-dexamethasone 3.5mg/mL-10,000 unit/mL-0.1 % Drps  Commonly known as:  MAXITROL     olopatadine 0.1 % ophthalmic solution  Commonly known as:  PATANOL  Place 1 drop into both eyes 2 (two) times daily.     silver sulfADIAZINE 1% 1 % cream  Commonly known as:  SILVADENE  Apply topically 2 (two) times daily.        STOP taking these medications    benzonatate 100 MG capsule  Commonly known as:  TESSALON           Where to Get Your Medications      These medications were sent to [facility] - [facility] [address]  [address] [address]    Phone:  [phone]   · metoprolol succinate 25 MG 24 hr tablet     You can get these medications from any pharmacy    Bring a paper prescription for each of these medications  · apixaban 5 mg Tab  · apixaban 5 mg Tab  · aspirin 81 MG EC tablet  · atorvastatin 40 MG tablet  · lisinopril 2.5 MG tablet         Discharge Information:   Diet:  Cardiac    Physical Activity:  As Tolerated    Instructions:  1. Take all medications as prescribed  2. Keep all follow-up appointments  3. Return to the hospital or call your primary care physicians if any worsening symptoms such as SOB, chest pain, dizziness, lightheadedness occur.      Follow-Up Appointments:  PCP - case management will make appointment  Cardiology (Dr. Diaz)- case management will make appointment.     Perry Zapata  [facility] Internal Medicine/Pediatrics, HO-1

## 2018-03-02 NOTE — HPI
82yo female with history of DMII and cirrhosis without ascites who presented to the ER with complaints of chest pain and SOB for the past several days. She was in her USOH until about 1 1/2 weeks ago when she began to experience chest pain and SOB with exertion. Her symptoms would start with minimal exertion ie walking one block and would relieve with rest. She described the pain as left side chest and shoulder pain that typically lasted about 5 minutes and was not associated with diaphoresis, nausea or vomiting. Her most recent episode awoke her from sleep and was more intense than previous ones prompting her to present to the ER for evaluation

## 2018-03-02 NOTE — ASSESSMENT & PLAN NOTE
-?precipitating factor ?sedentary activity given chest pain and SOB  -no significant swelling noted to LLE  -recommend chronic AC for treatment and no invasive intervention given lack of significant symptoms  -CTA negative for PE  -appears to be a good candidate for NOACs with either Eliquis or Xarelto

## 2018-03-02 NOTE — TELEPHONE ENCOUNTER
----- Message from Jaleesa Irvin RN sent at 3/2/2018 12:15 PM CST -----  Hello,       Patient is being discharged today. The doctors placed an ambulatory referral to Dr. West. Can you please call the patient or daughter to schedule follow-up. Daughter Evangelina listed on facesheet speaks English. Thanks!    Thanks!  Jaleesa Irvin RN  Transition Navigator  [phone]

## 2018-03-02 NOTE — TELEPHONE ENCOUNTER
----- Message from Jaleesa Irvin RN sent at 3/2/2018 12:15 PM CST -----  Hello,       Patient is being discharged today. The doctors placed an ambulatory referral to Dr. West. Can you please call the patient or daughter to schedule follow-up. Daughter Evangelina listed on facesheet speaks English. Thanks!    Thanks!  Jaleesa Irvin RN  Transition Navigator  [phone]

## 2018-03-02 NOTE — SUBJECTIVE & OBJECTIVE
Past Medical History:   Diagnosis Date    Diabetes mellitus        Past Surgical History:   Procedure Laterality Date    CATARACT EXTRACTION Bilateral     Dr. Max       Review of patient's allergies indicates:  No Known Allergies    No current facility-administered medications on file prior to encounter.      Current Outpatient Prescriptions on File Prior to Encounter   Medication Sig    metFORMIN (GLUCOPHAGE) 500 MG tablet TAKE 1 TABLET BY MOUTH TWICE DAILY WITH MEALS    albuterol 90 mcg/actuation inhaler Inhale 2 puffs into the lungs every 6 (six) hours as needed for Wheezing.    ammonium lactate 12 % Crea Apply twice daily to lower extremities    azelastine (OPTIVAR) 0.05 % ophthalmic solution Place 1 drop into both eyes 2 (two) times daily.    blood sugar diagnostic (ACCU-CHEK SMARTVIEW TEST STRIP) Strp Inject 1 strip into the skin once daily.    blood-glucose meter Misc 1 Units by Misc.(Non-Drug; Combo Route) route once daily.    calcium carbonate-vitamin D3 600 mg (1,500 mg)-800 unit Chew Take 1 tablet by mouth 2 (two) times daily.    diclofenac sodium 1 % Gel Apply 2 g topically once daily.    fluticasone-salmeterol 100-50 mcg/dose (ADVAIR) 100-50 mcg/dose diskus inhaler Inhale 1 puff into the lungs 2 (two) times daily.    gabapentin (NEURONTIN) 100 MG capsule Take 1 capsule (100 mg total) by mouth 2 (two) times daily.    lancets (ACCU-CHEK FASTCLIX) Misc Inject 1 lancet into the skin once daily.    neomycin-polymyxin-dexamethasone (MAXITROL) 3.5mg/mL-10,000 unit/mL-0.1 % DrpS INSTILL ONE GTT INTO  OU QID    olopatadine (PATANOL) 0.1 % ophthalmic solution Place 1 drop into both eyes 2 (two) times daily.    silver sulfADIAZINE 1% (SILVADENE) 1 % cream Apply topically 2 (two) times daily.    [DISCONTINUED] benzonatate (TESSALON) 100 MG capsule TOME 1 CAPSULA QUIQUE VECES AL RAQUEL SARIKA SEA NECESARIO PARA LA TOS     Family History     Problem Relation (Age of Onset)    No Known Problems Mother,  Father, Sister, Brother, Maternal Aunt, Maternal Uncle, Paternal Aunt, Paternal Uncle, Maternal Grandmother, Maternal Grandfather, Paternal Grandmother, Paternal Grandfather        Social History Main Topics    Smoking status: Never Smoker    Smokeless tobacco: Never Used    Alcohol use Not on file    Drug use: Unknown    Sexual activity: Not on file     Review of Systems   Constitution: Negative for chills, decreased appetite, diaphoresis, fever and weakness.   Cardiovascular: Positive for chest pain and dyspnea on exertion. Negative for claudication, cyanosis, irregular heartbeat, leg swelling, near-syncope, orthopnea, palpitations, paroxysmal nocturnal dyspnea and syncope.   Respiratory: Negative for cough, hemoptysis, shortness of breath and wheezing.    Gastrointestinal: Negative for bloating, abdominal pain, constipation, diarrhea, melena, nausea and vomiting.   Neurological: Negative for dizziness.     Objective:     Vital Signs (Most Recent):  Temp: 97.3 °F (36.3 °C) (03/02/18 0731)  Pulse: 72 (03/02/18 1226)  Resp: 18 (03/02/18 0731)  BP: (!) 120/58 (03/02/18 0731)  SpO2: 99 % (03/02/18 1128) Vital Signs (24h Range):  Temp:  [97.2 °F (36.2 °C)-98.1 °F (36.7 °C)] 97.3 °F (36.3 °C)  Pulse:  [69-90] 72  Resp:  [12-25] 18  SpO2:  [96 %-99 %] 99 %  BP: (116-173)/(41-89) 120/58     Weight: 82.2 kg (181 lb 3.5 oz)  Body mass index is 29.25 kg/m².    SpO2: 99 %  O2 Device (Oxygen Therapy): room air      Intake/Output Summary (Last 24 hours) at 03/02/18 1240  Last data filed at 03/02/18 1100   Gross per 24 hour   Intake                0 ml   Output              750 ml   Net             -750 ml       Lines/Drains/Airways     Peripheral Intravenous Line                 Peripheral IV - Single Lumen 03/01/18 0945 Left Antecubital 1 day                Physical Exam   Constitutional: She is oriented to person, place, and time. She appears well-developed and well-nourished. No distress.   Cardiovascular: Normal rate  and regular rhythm.  Exam reveals no gallop.    Murmur heard.   Systolic murmur is present  radiating to the neck, apex  Pulmonary/Chest: Effort normal and breath sounds normal. No respiratory distress. She has no wheezes.   Abdominal: Soft. Bowel sounds are normal. She exhibits no distension. There is no tenderness.   Neurological: She is alert and oriented to person, place, and time.   Skin: Skin is warm and dry.       Significant Labs: reviewed     Significant Imaging: Echocardiogram:   2D echo with color flow doppler:   Results for orders placed or performed during the hospital encounter of 03/01/18   2D echo with color flow doppler   Result Value Ref Range    EF 50 55 - 65    Mitral Valve Regurgitation MILD     Diastolic Dysfunction No     Aortic Valve Stenosis SEVERE (A)     Est. PA Systolic Pressure 40.95 (A)     Tricuspid Valve Regurgitation TRIVIAL TO MILD

## 2018-03-02 NOTE — PLAN OF CARE
Patient discharged to home with family. No needs noted upon discharge, follow-up appointments scheduled. Per nurse Bee, patient's prescriptions sent to our pharmacy to get filled.    Future Appointments  Date Time Provider Department Center   3/16/2018 10:00 AM Mike Diaz MD [facility] CARDIO [facility]   3/19/2018 11:00 AM Octavio Ferrell MD [facility]   3/21/2018 10:15 AM Jamil West MD [facility] TUMOR [facility]     Follow-up With  Details  Why  Contact Info   Octavio Ferrell MD  Schedule an appointment as soon as possible for a visit   left message for office, they will call you to schedule. Primary Care Physician  [address]  [place] [address]  [phone]   Mike Diaz MD  On 3/16/2018  at 10:00 am--Cardiology Follow-Up  [address]  [address]  [place] [address]  [phone]   Jamil West MD  Schedule an appointment as soon as possible for a visit  Ambulatory Referral Placed-- left message for office, they will call you to schedule. Gastroenterology  [address]  [place] [address]  [phone]      03/02/18 1224   Final Note   Assessment Type Final Discharge Note   Discharge Disposition Home   What phone number can be called within the next 1-3 days to see how you are doing after discharge? [phone]   Hospital Follow Up  Appt(s) scheduled? Yes   Discharge plans and expectations educations in teach back method with documentation complete? Yes   Right Care Referral Info   Post Acute Recommendation No Care     Jaleesa Irvin RN  Transition Navigator  [phone]

## 2018-03-02 NOTE — PLAN OF CARE
Problem: Diabetes, Type 2 (Adult)  Goal: Signs and Symptoms of Listed Potential Problems Will be Absent, Minimized or Managed (Diabetes, Type 2)  Signs and symptoms of listed potential problems will be absent, minimized or managed by discharge/transition of care (reference Diabetes, Type 2 (Adult) CPG).   Outcome: Ongoing (interventions implemented as appropriate)  Accucheck recorded. Insulin coverage given for hyperglycemic episode. Patient educated on complications of Type II diabetes and non-pharmacologic management. Understanding verbalized via granddaughter as .

## 2018-03-02 NOTE — PLAN OF CARE
Patient discharge instructions given and reviewed by ALDO Estrada to pt and pt's daughter in [place] w/shift nurse at bedside.  Med rec reviewed by ALDO Estrada with Patient and pt's daughter. Patient and daughter verbalized understanding. Education provided by ALDO Estrada on new medication and diagnosis and follow-up appointments. PIV d/sarai by shift nurse.  Tip intact. Pt tolerated well.  Tele removed.  Awaiting transportation home.

## 2018-03-02 NOTE — PROGRESS NOTES
"[facility] Team [facility] Resident HO-1 Progress Note    Subjective:      Natalie Parnell is a 83 y.o.  female who is being followed by the [facility] service at [facility] for chest pain. She remained stable overnight without chest pain, SOB, nausea, vomiting. She is ready for stress test this am     Objective:   Last 24 Hour Vital Signs:  BP  Min: 116/56  Max: 173/66  Temp  Av.9 °F (36.6 °C)  Min: 97.2 °F (36.2 °C)  Max: 98.2 °F (36.8 °C)  Pulse  Av.1  Min: 68  Max: 89  Resp  Av.8  Min: 12  Max: 25  SpO2  Av.6 %  Min: 96 %  Max: 99 %  Height  Av' 6" (167.6 cm)  Min: 5' 6" (167.6 cm)  Max: 5' 6" (167.6 cm)  Weight  Av.2 kg (181 lb 3.5 oz)  Min: 82.2 kg (181 lb 3.5 oz)  Max: 82.2 kg (181 lb 3.5 oz)  No intake/output data recorded.    Physical Examination:  General: NAD, sitting in bed eating  Head: Atraumatic, normocephalic  Eye: PERRL, EOMI  Cv: Distal pulses intact 2+ BUE and 2+ BLE, III/VI systolic ejection murmur heard best at 2nd right intercostal space  Pulm: No increased work of breathing, lungs clear bilaterally to auscultation, no wheezing or crackles appreciated  Abd: Obese, PBS4Q, soft  Extremities: varicose veins BLE, MAEW  Psych: Calm, cooperative  Skin: no rashes, no bruises noted      Laboratory:  Laboratory Data Reviewed: yes  Pertinent Findings:  A1C 9.1      Trop 0.046    Microbiology Data Reviewed: yes  Pertinent Findings:  none    Other Results:  EKG (my interpretation): normal EKG, normal sinus rhythm, unchanged from previous tracings.    Radiology Data Reviewed: yes  Pertinent Findings:  Echo - Severe aortic valve stenosis (SHEA 0.58 cm2, AVAi 0.30 cm2/m2, peak aortic jet velocity 4.7 m/s,MG 58 mmHg    Current Medications:     Infusions:       Scheduled:   aspirin  81 mg Oral Daily    atorvastatin  40 mg Oral Daily    lisinopril  5 mg Oral Daily    sodium chloride 0.9%  3 mL Intravenous Q8H        PRN:  dextrose 50%, dextrose 50%, glucagon (human " recombinant), glucose, glucose, insulin aspart U-100, nitroGLYCERIN    Antibiotics and Day Number of Therapy:  none    Lines and Day Number of Therapy:  PIV - 3/1    Assessment:     Natalie Parnell is a 83 y.o.female with  Patient Active Problem List    Diagnosis Date Noted    Chest pain in adult 03/01/2018    Elevated troponin 03/01/2018    Type II or unspecified type diabetes mellitus with neurological manifestations, uncontrolled(250.62) 06/16/2015    Type 2 diabetes mellitus 10/08/2014        Plan:     1. Chest Pain  - Troponin 0.044 on admission, continue to trend, plateaued at 0.046.  - Admit to telemetry  - Echo ordered - Severe aortic valve stenosis (SHEA 0.58 cm2, AVAi 0.30 cm2/m2)  - s/p asa 325 in ER  - started atorvatstain 40 mg qd  - heparin infusion started  - NM cardiac stress test today  - TSH WNL  - Lipid panel - ,   - A1C 9.1     2. DVT   - Nonocclusive DVT in the right common femoral vein  - continue heparin gtt  - D-dimer 1.89     3. Diabetes Mellitus   - SSI while inpatient  - hold home metformin  - A1C 9.1     4. Cirrhosis  - CTA chest revealed concern for cirrhosis   - US abdomen ordered  - hepatitis panel panding     5. HTN  - start Lisinopril 10 mg qd    Perry Zapata  [facility] Internal Medicine HO-1  [facility] Team B Service Team     [facility] Medicine Hospitalist Pager numbers:   [facility] Hospitalist Medicine Team A (Iain/Aishwarya): [phone]  [facility] Hospitalist Medicine Team B (Luz/Eli):  [phone]

## 2018-03-02 NOTE — HOSPITAL COURSE
3/1/2018 Presented to the ER with complaints of chest pain and SOB. Admitted to [facility] Hospital Medicine. Tropnin .[phone] with . CXR revealed mild pulmonary vascular congestion. CTA negative for PE. BLE venous ultrasound with evidence of nonocclusive DVT to LCFV. Echocardiogram with normal LV function with EF 50-55%, no WMA, SHEA .58cm with mean gradient 58mmHg.   3/2/2018 Cardiology consulted for evaluation of AS with TAVR

## 2018-03-02 NOTE — PLAN OF CARE
TN went to meet with patient, family at bedside.  Patient primarily Georgian Speaking. TN offered . Family refused, daughter Evangelina at bedside understands and speaks English. Patient is independent and lives at home with her nephew. Daughter reports patient lives in a duplex connected to her. She does not have any medical equipment, only a glucometer at home. Patient does not drive, but family provides transport home. Patient does not have a cardiologist, agreeable to see Dr. Diaz for follow-up.     03/02/18 1154   Discharge Assessment   Assessment Type Discharge Planning Assessment   Confirmed/corrected address and phone number on facesheet? Yes   Assessment information obtained from? Caregiver   Prior to hospitilization cognitive status: Alert/Oriented   Prior to hospitalization functional status: Independent   Current cognitive status: Alert/Oriented   Current Functional Status: Independent   Facility Arrived From: Home   Lives With other relative(s)  (nephew)   Able to Return to Prior Arrangements yes   Is patient able to care for self after discharge? Yes   Patient's perception of discharge disposition home or selfcare   Readmission Within The Last 30 Days no previous admission in last 30 days   Equipment Currently Used at Home glucometer   Do you have any problems affording any of your prescribed medications? No   Is the patient taking medications as prescribed? yes   Does the patient have transportation home? Yes   Transportation Available family or friend will provide   Dialysis Name and Scheduled days N/A   Does the patient receive services at the [facility]? No   Discharge Plan A Home with family   Discharge Plan B Home with family;Home Health   Patient/Family In Agreement With Plan yes     Jaleesa Irvin RN  Transition Navigator  [phone]

## 2018-03-16 PROBLEM — I35.0 SEVERE AORTIC STENOSIS: Status: ACTIVE | Noted: 2018-01-01

## 2018-03-16 NOTE — PROGRESS NOTES
Subjective:    Patient ID:  Natalie Parnell is a 83 y.o. female who presents for evaluation of Valvular Heart Disease      HPI  82 y/o Telugu speaking female with hx of severe symptomatic AS (SHEA 0.58 cm2, AVAi 0.30 cm2/m2, peak aortic jet velocity 4.7 m/s,MG 58 mmHg), LE DVT on Xarelto, DM, possible liver cirrhosis who presents for hospital f/u. She presented with CP, had elevated trop which plateau at 0.4, 2DE with severe AS. Started on meds including ASA, BB, ACE-I. Has had some intermittent mild chest discomfort since discharge. Also has some DURHAM. Denies orthopnea, PND, syncope, palps, LE edema.     Review of Systems   Constitution: Negative for weakness and malaise/fatigue.   HENT: Negative for congestion.    Eyes: Negative for blurred vision.   Cardiovascular: Positive for chest pain and dyspnea on exertion. Negative for claudication, cyanosis, irregular heartbeat, leg swelling, near-syncope, orthopnea, palpitations, paroxysmal nocturnal dyspnea and syncope.   Respiratory: Negative for shortness of breath.    Endocrine: Negative for polyuria.   Hematologic/Lymphatic: Negative for bleeding problem.   Skin: Negative for itching and rash.   Musculoskeletal: Negative for joint swelling, muscle cramps and muscle weakness.   Gastrointestinal: Negative for abdominal pain, hematemesis, hematochezia, melena, nausea and vomiting.   Genitourinary: Negative for dysuria and hematuria.   Neurological: Negative for dizziness, focal weakness, headaches, light-headedness and loss of balance.   Psychiatric/Behavioral: Negative for depression. The patient is not nervous/anxious.         Objective:    Physical Exam   Constitutional: She is oriented to person, place, and time. She appears well-developed and well-nourished.   HENT:   Head: Normocephalic and atraumatic.   Neck: Neck supple. No JVD present.   Cardiovascular: Normal rate and regular rhythm.    Murmur heard.   Systolic murmur is present with a grade of 4/6   Pulses:        Carotid pulses are 2+ on the right side, and 2+ on the left side.       Radial pulses are 2+ on the right side, and 2+ on the left side.        Femoral pulses are 2+ on the right side, and 2+ on the left side.       Posterior tibial pulses are 1+ on the right side, and 1+ on the left side.   Pulmonary/Chest: Effort normal and breath sounds normal.   Abdominal: Soft. Bowel sounds are normal.   Musculoskeletal: She exhibits no edema.   Neurological: She is alert and oriented to person, place, and time.   Skin: Skin is warm and dry.   Psychiatric: She has a normal mood and affect. Her behavior is normal. Thought content normal.         Assessment:       1. Severe aortic stenosis    2. Nonrheumatic aortic valve stenosis    3. DVT of deep femoral vein, left    4. Type 2 diabetes mellitus with hyperglycemia, without long-term current use of insulin    5. Cirrhosis of liver without ascites, unspecified hepatic cirrhosis type      82 y/o female with hx and presentation as above. Has severe symptomatic AS and will refer to Valve clinic at [facility].        Plan:       -Continue current medical management  -referral to Valve Clinic - Dr Lord

## 2018-03-19 NOTE — PROGRESS NOTES
Subjective:       Patient ID: Natalie Parnell is a 83 y.o. female.    Chief Complaint: Hospital Follow Up    83 years old female came to the clinic for diabetes check.  Last A1c was elevated.  No polyuria polydipsia polyphagia.  Blood pressure today stable.  Patient taking lisinopril in the small dose.  She reports episodic dry cough.  Patient possible candidate for possible valve surgery.  Patient with episodic articular pain.  The pain is 3 out of 10 of intensity on and off aggravated with activity and better with rest.  Patient requests tramadol to help with the pain.      Review of Systems   Constitutional: Negative.    HENT: Negative.    Eyes: Negative.    Respiratory: Positive for cough.    Cardiovascular: Negative.  Negative for chest pain, palpitations and leg swelling.   Gastrointestinal: Negative.    Genitourinary: Negative.    Musculoskeletal: Positive for arthralgias.   Skin: Negative.    Neurological: Negative.    Psychiatric/Behavioral: Negative.        Objective:      Physical Exam   Constitutional: She is oriented to person, place, and time. She appears well-developed and well-nourished. No distress.   HENT:   Head: Normocephalic and atraumatic.   Right Ear: External ear normal.   Left Ear: External ear normal.   Nose: Nose normal.   Mouth/Throat: Oropharynx is clear and moist. No oropharyngeal exudate.   Eyes: Conjunctivae and EOM are normal. Pupils are equal, round, and reactive to light. Right eye exhibits no discharge. Left eye exhibits no discharge. No scleral icterus.   Neck: Normal range of motion. Neck supple. No JVD present. No tracheal deviation present. No thyromegaly present.   Cardiovascular: Normal rate, regular rhythm, normal heart sounds and intact distal pulses.  Exam reveals no gallop and no friction rub.    No murmur heard.  Pulmonary/Chest: Effort normal and breath sounds normal. No stridor. No respiratory distress. She has no wheezes. She has no rales. She exhibits no tenderness.    Abdominal: Soft. Bowel sounds are normal. She exhibits no distension and no mass. There is no tenderness. There is no rebound and no guarding.   Musculoskeletal: Normal range of motion. She exhibits no edema or tenderness.   Lymphadenopathy:     She has no cervical adenopathy.   Neurological: She is alert and oriented to person, place, and time. She has normal reflexes. No cranial nerve deficit. She exhibits normal muscle tone. Coordination and gait abnormal.   Skin: Skin is warm and dry. No rash noted. She is not diaphoretic. No erythema. No pallor.   Psychiatric: She has a normal mood and affect. Her behavior is normal. Judgment and thought content normal.       Assessment:       1. Type 2 diabetes mellitus with hyperglycemia, without long-term current use of insulin    2. Osteoarthritis of multiple joints, unspecified osteoarthritis type        Plan:         Natalie was seen today for hospital follow up.    Diagnoses and all orders for this visit:    Type 2 diabetes mellitus with hyperglycemia, without long-term current use of insulin  -     SITagliptin (JANUVIA) 50 MG Tab; Take 1 tablet (50 mg total) by mouth once daily.  -     blood sugar diagnostic (ACCU-CHEK SMARTVIEW TEST STRIP) Strp; Inject 1 strip into the skin once daily.  -     lancets (ACCU-CHEK FASTCLIX) Misc; Inject 1 lancet into the skin once daily.    Osteoarthritis of multiple joints, unspecified osteoarthritis type    Other orders  -     traMADol (ULTRAM) 50 mg tablet; Take 1 tablet (50 mg total) by mouth 3 (three) times daily as needed for Pain.    Continue monitoring blood sugar at home,ADA diet.

## 2018-03-19 NOTE — PATIENT INSTRUCTIONS
La diabetes: Consejos para hacer actividades físicas    Aumentar yancey nivel de actividad física puede ayudarle a controlar la diabetes. Los consejos de esta página le ayudarán a aprovechar yancey ejercicio al cam y a proteger yancey seguridad.  Benefíciese con brío  Las actividades enérgicas aceleran el ritmo cardíaco, lo que puede ayudarle a mejorar yancey forma física, perder el peso excesivo y controlar yancey nivel de azúcar en la purvi. Pruebe a caminar con energía y brío. Si tiene problemas en los pies o las piernas, pruebe a hacer natación o montar en bicicleta. Puede dividir rosa sesiones de ejercicio en varios intervalos a lo jeovanny del día. Avance gradualmente hasta hacer por lo menos 30 minutos de ejercicio continuo y enérgico soledad todos los francisca.  Mena ejercicios de calentamiento y enfriamiento  Los ejercicios de calentamiento y enfriamiento reducen el riesgo de lesiones y el dolor muscular. Antes y después de yancey rutina de ejercicios, mena ian versión suave de yancey actividad chucho 5 minutos. También puede aprender a hacer estiramientos para mantener los músculos relajados. Yancey proveedor de atención médica puede enseñarle buenos ejercicios de calentamiento y estiramiento.  Mena la prueba del habla-mehdi  La prueba del habla-mehdi es ian manera sencilla de medir el esfuerzo que usted hace chucho yancey ejercicio. Si puede hablar mientras hace ejercicios, significa que yancey actividad tiene el ritmo adecuado; eloina si le falta el aire, disminuya la marcha. Si puede tararear ian canción, significa que debe acelerar el ritmo. Suba ian marbella, aumente la resistencia de yancey bicicleta estacionaria o nade más rápido.  ¿Qué lorna hacer respecto a las comidas?  Es posible que le indiquen que planifique yancey actividad física para 1-2 horas después de ian comida. En la mayoría de los casos, no es necesario que coma mientras hace ejercicio. Si usted se pone insulina o vianney medicamentos que pueden provocar la disminución de los niveles de  azúcar en la purvi, hágase el examen antes de hacer ejercicios. Lleve consigo un azúcar de acción rápida, jessica tabletas de glucosa o caramelos duros, que le elevará rápidamente el nivel de azúcar en la purvi. Si tiene síntomas de hipoglucemia (bajo nivel de azúcar en la purvi), use goyo azúcar.  Consejos de seguridad  Estos consejos le ayudarán a mantener yancey seguridad mientras mejora yancey forma física:  · Mena ejercicios con un amigo o lleve consigo un teléfono celular, si tiene mesha.  · Lleve o póngase ian identificación, jessica ian pulsera o ian alfonso, que indique que usted tiene diabetes.  · Use zapatos y equipo de seguridad apropiados para yancey actividad.  · [place] agua antes, chucho y después del ejercicio.  · Póngase ropa adecuada para el clima.  · No mena ejercicio a la intemperie si hace demasiado calor o demasiado frío.  · No mena ejercicio si está enfermo.  · Si le indican que lo mena, mídase el azúcar en la purvi antes y después de hacer ejercicio. Coma un bocadillo de carbohidrato si yancey nivel de azúcar es bajo antes de iniciar el ejercicio.  Cuándo debe parar y llamar al proveedor de atención médica  Deje de hacer ejercicios y llame a yancey proveedor de atención médica de inmediato si tiene cualquiera de estos síntomas:  · Dolor, opresión, sensación de tirantez o pesadez en el pecho.  · Dolor o pesadez en el radha, los hombros, la espalda, los brazos, las piernas o los pies  · Falta de aire excesiva  · Mareos o aturdimiento  · Pulso excesivamente rápido o lento  · Mayor dolor en las articulaciones o los músculos  · Náuseas o vómito  Date Last Reviewed: 5/1/2016  © [id number] [employer]. [address] [place] [id number]. Todos los derechos reservados. Esta información no pretende sustituir la atención médica profesional. Sólo yancey médico puede diagnosticar y tratar un problema de marcos.

## 2018-03-21 NOTE — PROGRESS NOTES
"[facility]    CC: cirrhosis     HPI 83 y.o. female with a history of HTN, DM, asthma presents as a hospital follow up for a suspected new diagnosis of moderate cirrhosis diagnosed incidentally by CT scan without any associated history of HE, ascites or GI bleeding. She has a long history of DM and obesity suggestive of HERNANDEZ as a potential etiology. She denies vomiting or excessive Etoh intake.       Past Medical History:   Diagnosis Date    Asthma     Diabetes mellitus     HTN (hypertension)          Review of Systems  General ROS: negative for chills, fever or weight loss  Cardiovascular ROS: no chest pain or dyspnea on exertion  Gastrointestinal ROS: no abdominal pain, change in bowel habits, or black/ bloody stools    Physical Examination  /66   Pulse 97   Temp 98 °F (36.7 °C) (Oral)   Resp 20   Ht 5' 3.5" (1.613 m)   Wt 82 kg (180 lb 12.4 oz)   BMI 31.52 kg/m²   General appearance: alert, cooperative, no distress  HENT: Normocephalic, atraumatic, neck symmetrical, no nasal discharge   Lungs: clear to auscultation bilaterally, no dullness to percussion bilaterally  Heart: regular rate and rhythm without rub; no displacement of the PMI   Abdomen: soft, non-tender; bowel sounds normoactive; no organomegaly  Extremities: extremities symmetric; no clubbing, cyanosis, or edema  Neurologic: Alert and oriented X 3, normal strength, normal coordination and gait    Labs:  Lab Results   Component Value Date    WBC 5.77 03/01/2018    HGB 12.1 03/01/2018    HCT 36.8 (L) 03/01/2018    MCV 93 03/01/2018     03/01/2018         Assessment:   Suspected new diagnosis of cirrhosis by CT scan associated with low albumin and elevated AST. Viral hepatitis panel is negative. She denies heavy Etoh intake. It seems that HERNANDEZ is a likely cause but other potential diagnoses include AIH, hemochromatosis.     Plan:  Refer to [facility] for evaluation of new diagnosis of cirrhosis and potential treatment of " HERNANDEZ  Potential removal of lipitor therapy if this is considered to be a potential offending agent   Note that his patient speaks only Mongolian   This patient's best contact is her daughter who can be reached at [phone]      Jamil West MD   [address] [facility], [address]   [place] [address] [phone]

## 2018-03-23 NOTE — PROGRESS NOTES
"INTERVENTIONAL CARDIOLOGY CLINIC  HEART VALVE CENTER    REFERRING PHYSICIAN: Kedar Diaz    CHIEF COMPLIANT:  "I have a tight valve"    HISTORY OF PRESENT ILLNESS  Natalie Parnell is a 83 y.o. female referred by Dr Diaz for evaluation of severe AS (NYHA Class I sx). She had a recent visit to [facility] because of episode of shoulder pain. She was ruled out for MI and a CT showed no evidence of PE. Her pain resolved and has not recurred in the last 2 weeks. During her workup there she was found to have severe aortic stenosis. The patient denies any dyspnea, angina or syncope. She is able to do all of her regular activities without limitation.     The patient has undergone the following TAVR work-up:   ECHO (Date 3.1.18): SHEA= 0.58 cm2, MG= 58mmHg, Peak Jong= 4.7 m/s, EF= 50%.   LHC: Pending  STS: 3.19%   Frailty: 1/4   Iliacs are pending   LVOT area by CTA is pending  Incidental findings on CT: Suggestive of cirrhosis  CT Surgery risk assessment: Pending  Rhythm issues: None  PFTs: Not completed because of language barrier.  Comorbidities: HTN, DM      PAST MEDICAL HISTORY  Past Medical History:   Diagnosis Date    Asthma     Diabetes mellitus     HTN (hypertension)         PAST SURGICAL HISTORY  Past Surgical History:   Procedure Laterality Date    CATARACT EXTRACTION Bilateral     Dr. Max    HERNIA REPAIR      VARICOSE VEIN SURGERY         MEDICATIONS  Current Outpatient Prescriptions on File Prior to Visit   Medication Sig Dispense Refill    albuterol 90 mcg/actuation inhaler Inhale 2 puffs into the lungs every 6 (six) hours as needed for Wheezing. 1 each 11    ammonium lactate 12 % Crea Apply twice daily to lower extremities 140 g 5    atorvastatin (LIPITOR) 40 MG tablet Take 1 tablet (40 mg total) by mouth once daily. 90 tablet 1    azelastine (OPTIVAR) 0.05 % ophthalmic solution Place 1 drop into both eyes 2 (two) times daily. 6 mL 6    lisinopril (PRINIVIL,ZESTRIL) 2.5 MG tablet Take 1 " tablet (2.5 mg total) by mouth once daily. 90 tablet 1    metFORMIN (GLUCOPHAGE) 500 MG tablet TAKE 1 TABLET BY MOUTH TWICE DAILY WITH MEALS 180 tablet 0    blood sugar diagnostic (ACCU-CHEK SMARTVIEW TEST STRIP) Strp Inject 1 strip into the skin once daily. 100 strip 6    blood-glucose meter Misc 1 Units by Misc.(Non-Drug; Combo Route) route once daily. 1 each 0    gabapentin (NEURONTIN) 100 MG capsule Take 1 capsule (100 mg total) by mouth 2 (two) times daily. 60 capsule 0    lancets (ACCU-CHEK FASTCLIX) Misc Inject 1 lancet into the skin once daily. 100 each 6    [DISCONTINUED] olopatadine (PATANOL) 0.1 % ophthalmic solution Place 1 drop into both eyes 2 (two) times daily. 5 mL 0    [DISCONTINUED] traMADol (ULTRAM) 50 mg tablet Take 1 tablet (50 mg total) by mouth 3 (three) times daily as needed for Pain. 40 tablet 0     No current facility-administered medications on file prior to visit.         SOCIAL HISTORY  TOBACCO: Denies  ETOH: Denies  ILLEGAL DRUGS: Denies      HPI    Review of Systems   Constitution: Negative for fever and weakness.   HENT: Negative for congestion and hoarse voice.    Eyes: Negative for blurred vision and double vision.   Cardiovascular: Negative for chest pain, claudication, cyanosis, dyspnea on exertion, irregular heartbeat, leg swelling, near-syncope, orthopnea, palpitations and paroxysmal nocturnal dyspnea.   Respiratory: Negative for cough, hemoptysis and shortness of breath.    Endocrine: Negative for cold intolerance and heat intolerance.   Hematologic/Lymphatic: Negative for bleeding problem. Does not bruise/bleed easily.   Skin: Negative for dry skin and nail changes.   Musculoskeletal: Negative for back pain and falls.   Gastrointestinal: Negative for abdominal pain and anorexia.   Neurological: Negative for brief paralysis and dizziness.        Objective:    Physical Exam   Constitutional: She is oriented to person, place, and time. She appears well-developed and  well-nourished.   Eyes: Pupils are equal, round, and reactive to light.   Neck: No JVD present. No thyromegaly present.   Cardiovascular: Normal rate, regular rhythm and intact distal pulses.    Murmur heard.   Harsh midsystolic murmur is present with a grade of 3/6  at the upper right sternal border radiating to the neck  Pulses:       Carotid pulses are 2+ on the right side, and 2+ on the left side.       Radial pulses are 2+ on the right side, and 2+ on the left side.        Femoral pulses are 2+ on the right side, and 2+ on the left side.       Dorsalis pedis pulses are 2+ on the right side, and 2+ on the left side.        Posterior tibial pulses are 2+ on the right side, and 2+ on the left side.   Pulmonary/Chest: No respiratory distress. She has no wheezes. She exhibits no tenderness.   Abdominal: She exhibits no distension. There is no tenderness. There is no rebound.   Musculoskeletal: She exhibits no edema or tenderness.   Neurological: She is alert and oriented to person, place, and time.   Skin: Skin is warm and dry.   Psychiatric: She has a normal mood and affect.         Assessment and plan:         Aortic valve stenosis  Patient currently without symptoms. There was a visit with chest pain recently but it does not sound like angina. She has not had any symptoms since then. She denies any current dyspnea, angina or syncope. SHe is not on diuretic therapy. I walked with her up 2 flights of stairs and she completed that withot difficulty. I had an extensive discussion (In her native language) of the natural history of severe AS and the treatment options. Currently being asymptomatic I would recommend clinical surveillance. Will see her every 6 months and she has been instructed to contact us should symptoms develop.    TAVR work-up:   ECHO (Date 3.1.18): SHEA= 0.58 cm2, MG= 58mmHg, Peak Jong= 4.7 m/s, EF= 50%.   LHC: Pending  STS: 3.19%   Frailty: 1/4   Iliacs are pending   LVOT area by CTA is  pending  Incidental findings on CT: Suggestive of cirrhosis  CT Surgery risk assessment: Pending  Rhythm issues: None  PFTs: Not completed because of language barrier.  Comorbidities: HTN, DM      Type 2 diabetes mellitus  On metformin    Cirrhosis of liver without ascites  This is an incidental finding on CT scan. Patient does not have any history of liver disease, did not drink and has normal liver enzymes. She saw GI at [place] who recommended hepatology consultation. Will let her see hepatology in the meantime which will help clarify her diagnosis before she needs valve replacement.          Scott Lord MD  Interventional Cardiology  Structural/Valvular heart disease  [phone]

## 2018-03-23 NOTE — ASSESSMENT & PLAN NOTE
This is an incidental finding on CT scan. Patient does not have any history of liver disease, did not drink and has normal liver enzymes. She saw GI at [place] who recommended hepatology consultation. Will let her see hepatology in the meantime which will help clarify her diagnosis before she needs valve replacement.

## 2018-03-23 NOTE — ASSESSMENT & PLAN NOTE
Patient currently without symptoms. There was a visit with chest pain recently but it does not sound like angina. She has not had any symptoms since then. She denies any current dyspnea, angina or syncope. SHe is not on diuretic therapy. I walked with her up 2 flights of stairs and she completed that withot difficulty. I had an extensive discussion (In her native language) of the natural history of severe AS and the treatment options. Currently being asymptomatic I would recommend clinical surveillance. Will see her every 6 months and she has been instructed to contact us should symptoms develop.    TAVR work-up:   ECHO (Date 3.1.18): SHEA= 0.58 cm2, MG= 58mmHg, Peak Jong= 4.7 m/s, EF= 50%.   LHC: Pending  STS: 3.19%   Frailty: 1/4   Iliacs are pending   LVOT area by CTA is pending  Incidental findings on CT: Suggestive of cirrhosis  CT Surgery risk assessment: Pending  Rhythm issues: None  PFTs: Not completed because of language barrier.  Comorbidities: HTN, DM

## 2018-03-23 NOTE — PROCEDURES
Natalie Parnell is a 83 y.o.  female patient, who presents for a 6 minute walk test ordered by Rosales Pruett MD.  The diagnosis is Aortic Valve Disorder.  The patient's BMI is 32.1 kg/m2.  Predicted distance (lower limit of normal) is 205.47 meters.      Test Results:    The test was not completed.  The patient stopped 1 times for a total of 100 seconds.  The total time walked was 260 seconds.  During walking, the patient reported:  Chest pain, Dyspnea, Leg pain. The patient used no assistive devices  during testing.     03/23/2018---------Distance: 243.84 meters (800 feet)     O2 Sat % Supplemental Oxygen Heart Rate Blood Pressure Rita Scale   Pre-exercise  (Resting) 97 % Room Air 82 bpm 130/63 mmHg 0   During Exercise 97 % Room Air 112 bpm 137/63 mmHg 3   Post-exercise  (Recovery) 99 % Room Air  96 bpm   mmHg       Recovery Time: 150 seconds    Performing nurse/tech: NYDIA Fuentes      PREVIOUS STUDY:   The patient has not had a previous study.      CLINICAL INTERPRETATION:  Six minute walk distance is 243.84 meters (800 feet) with moderate dyspnea.  During exercise, there was no significant desaturation while breathing room air.  Blood pressure remained stable and Heart rate increased significantly with walking.  This may represent a tachycardic response to exercise.  The patient reported non-pulmonary symptoms during exercise.  The patient did not complete the study, walking 260 seconds of the 360 second test.  No previous study performed.  Based upon age and body mass index, exercise capacity is normal.

## 2018-04-02 NOTE — TELEPHONE ENCOUNTER
----- Message from Keke Monet sent at 3/29/2018  1:13 PM CDT -----  Contact: Self [phone]  Patient is calling to talk to nurse concerning questions on her medication and test results. Please advice speaks Estonian

## 2018-04-05 NOTE — TELEPHONE ENCOUNTER
----- Message from Benita Crain sent at 4/5/2018  9:29 AM CDT -----  Contact: self, [phone]  Uzbek speaking only patient is returning your call. Please advise.

## 2018-04-24 NOTE — PROGRESS NOTES
Subjective:       Patient ID: Natalie Parnell is a 82 y.o. female.    Chief Complaint: Asthma and Bronchitis    HPI  83 yo  female referred for a cough. She recently made a car trip from [place] to [place] and returned via [place]. She developed a URI .  She if feeling better today, has a brassy non productive cough No history of hemoptysis and no pleuritic pain. No feature of pulmonary emboli. CBC done today is normal. Mild elevation of ESR at 47. Chest x-ray is clear with no active process.   Review of Systems   Constitutional: Negative.    HENT: Negative.    Eyes: Negative.    Respiratory: Positive for cough.         Recent URI   Cardiovascular: Negative.    Genitourinary: Negative.    Endocrine: Type II diabetes   Musculoskeletal: Negative.    Skin: Negative.    Gastrointestinal: Negative.    Neurological: Negative.    Psychiatric/Behavioral: Negative.        Objective:      Physical Exam   Constitutional: She is oriented to person, place, and time. She appears well-developed and well-nourished. No distress.   HENT:   Head: Normocephalic and atraumatic.   Right Ear: External ear normal.   Left Ear: External ear normal.   Nose: Nose normal.   Mouth/Throat: Oropharynx is clear and moist.   Eyes: Conjunctivae and EOM are normal. Pupils are equal, round, and reactive to light.   Neck: Normal range of motion. Neck supple. No JVD present. No thyromegaly present.   Cardiovascular: Normal rate, regular rhythm, normal heart sounds and intact distal pulses.  Exam reveals no gallop.    No murmur heard.  Pulmonary/Chest: Breath sounds normal. No stridor. No respiratory distress. She has no wheezes. She has no rales. She exhibits no tenderness.   Clear without wheezes or rales.   Sa02:97%   Abdominal: Soft. Bowel sounds are normal. She exhibits no distension and no mass. There is no tenderness. There is no rebound and no guarding.   Musculoskeletal: Normal range of motion. She exhibits no edema.    Lymphadenopathy:     She has no cervical adenopathy.   Neurological: She is alert and oriented to person, place, and time. She has normal reflexes. She displays normal reflexes. No cranial nerve deficit.   Skin: Skin is warm and dry. No rash noted.   Psychiatric: She has a normal mood and affect. Her behavior is normal. Judgment and thought content normal.   Nursing note and vitals reviewed.        Assessment:       No diagnosis found.    Outpatient Encounter Prescriptions as of 3/9/2017   Medication Sig Dispense Refill    albuterol 90 mcg/actuation inhaler Inhale 2 puffs into the lungs every 6 (six) hours as needed for Wheezing. 1 each 11    ammonium lactate 12 % Crea Apply twice daily to lower extremities 140 g 5    benzonatate (TESSALON) 100 MG capsule TOME 1 CAPSULA QUIQUE VECES AL RAQUEL SARIKA SEA NECESARIO PARA LA TOS 30 capsule 0    blood sugar diagnostic (ACCU-CHEK SMARTVIEW TEST STRIP) Strp Inject 1 strip into the skin once daily. 100 strip 6    blood-glucose meter Misc 1 Units by Misc.(Non-Drug; Combo Route) route once daily. 1 each 0    calcium carbonate-vitamin D3 600 mg (1,500 mg)-800 unit Chew Take 1 tablet by mouth 2 (two) times daily. 60 tablet 3    diclofenac sodium 1 % Gel Apply 2 g topically once daily. 100 g 0    fluticasone-salmeterol 100-50 mcg/dose (ADVAIR) 100-50 mcg/dose diskus inhaler Inhale 1 puff into the lungs 2 (two) times daily. 60 each 2    gabapentin (NEURONTIN) 100 MG capsule Take 1 capsule (100 mg total) by mouth 2 (two) times daily. 60 capsule 0    lancets (ACCU-CHEK FASTCLIX) Misc Inject 1 lancet into the skin once daily. 100 each 6    metformin (GLUCOPHAGE) 500 MG tablet TOME 1 TABLETA DOS VECES AL RAQUEL CON COMIDAS 180 tablet 4    silver sulfADIAZINE 1% (SILVADENE) 1 % cream Apply topically 2 (two) times daily. 50 g 0    tizanidine (ZANAFLEX) 2 MG tablet Take 1 tablet (2 mg total) by mouth every 8 (eight) hours as needed. 30 tablet 0     No facility-administered  encounter medications on file as of 3/9/2017.      No orders of the defined types were placed in this encounter.    Plan:         Patiebt unable to perform PFT's, In no distress. Will give a medrol 4 mg dospak. She can continue her Advair bid no asthma today.    Daily Assessment

## 2018-05-08 NOTE — PROGRESS NOTES
Subjective:       Patient ID: Natalie Parnell is a 83 y.o. female.    Chief Complaint: Cough    83 years old female came to the clinic with persistent cough for the last couple of months.  Patient currently on lisinopril.  Patient with dry cough no wheezing or cyanosis.  Blood pressure today stable.  No chest pain palpitations orthopnea or PND.  Last A1c was elevated.  Patient is taking only metformin once a day.  Metformin was prescribed twice a day before.  No polyuria polydipsia polyphagia.  Patient needs follow-up with cardiology.      Cough   Pertinent negatives include no chest pain.     Review of Systems   Constitutional: Negative.    HENT: Negative.    Eyes: Negative.    Respiratory: Positive for cough.    Cardiovascular: Negative.  Negative for chest pain, palpitations and leg swelling.   Gastrointestinal: Negative.    Genitourinary: Negative.    Musculoskeletal: Negative.    Skin: Negative.    Neurological: Negative.    Psychiatric/Behavioral: Negative.        Objective:      Physical Exam   Constitutional: She is oriented to person, place, and time. She appears well-developed and well-nourished. No distress.   HENT:   Head: Normocephalic and atraumatic.   Right Ear: External ear normal.   Left Ear: External ear normal.   Nose: Nose normal.   Mouth/Throat: Oropharynx is clear and moist. No oropharyngeal exudate.   Eyes: Conjunctivae and EOM are normal. Pupils are equal, round, and reactive to light. Right eye exhibits no discharge. Left eye exhibits no discharge. No scleral icterus.   Neck: Normal range of motion. Neck supple. No JVD present. No tracheal deviation present. No thyromegaly present.   Cardiovascular: Normal rate, regular rhythm, normal heart sounds and intact distal pulses.  Exam reveals no gallop and no friction rub.    No murmur heard.  Pulmonary/Chest: Effort normal and breath sounds normal. No stridor. No respiratory distress. She has no wheezes. She has no rales. She exhibits no tenderness.    Abdominal: Soft. Bowel sounds are normal. She exhibits no distension and no mass. There is no tenderness. There is no rebound and no guarding.   Musculoskeletal: Normal range of motion. She exhibits no edema or tenderness.   Lymphadenopathy:     She has no cervical adenopathy.   Neurological: She is alert and oriented to person, place, and time. She has normal reflexes. No cranial nerve deficit. She exhibits normal muscle tone. Coordination normal.   Skin: Skin is warm and dry. No rash noted. She is not diaphoretic. No erythema. No pallor.   Psychiatric: She has a normal mood and affect. Her behavior is normal. Judgment and thought content normal.       Assessment:       1. Acute bronchospasm     2. Chronic systolic congestive heart failure    3. Type 2 diabetes mellitus with hyperglycemia, without long-term current use of insulin    4. Diabetes mellitus with complication        Plan:         Natalie was seen today for cough.    Diagnoses and all orders for this visit:    Acute bronchospasm   -     budesonide-formoterol 160-4.5 mcg (SYMBICORT) 160-4.5 mcg/actuation HFAA; Inhale 2 puffs into the lungs every 12 (twelve) hours. Controller  -     benzonatate (TESSALON) 100 MG capsule; Take 1 capsule (100 mg total) by mouth 3 (three) times daily as needed for Cough.    Chronic systolic congestive heart failure  -     losartan (COZAAR) 25 MG tablet; Take 1 tablet (25 mg total) by mouth once daily.  -     Ambulatory referral to Cardiology    Type 2 diabetes mellitus with hyperglycemia, without long-term current use of insulin  -     Comprehensive metabolic panel; Future  -     Lipid panel; Future  -     Hemoglobin A1c; Future    Diabetes mellitus with complication  -     metFORMIN (GLUCOPHAGE) 500 MG tablet; Take 1 tablet (500 mg total) by mouth 2 (two) times daily with meals.    Other orders  -     apixaban 5 mg Tab; Take 1 tablet (5 mg total) by mouth 2 (two) times daily.    Continue monitoring blood sugar at home,ADA  diet.  Discontinue lisinopril.

## 2018-05-08 NOTE — PATIENT INSTRUCTIONS
La diabetes: Consejos para hacer actividades físicas    Aumentar yancey nivel de actividad física puede ayudarle a controlar la diabetes. Los consejos de esta página le ayudarán a aprovechar yancey ejercicio al cam y a proteger yancey seguridad.  Benefíciese con brío  Las actividades enérgicas aceleran el ritmo cardíaco, lo que puede ayudarle a mejorar yancey forma física, perder el peso excesivo y controlar yancey nivel de azúcar en la purvi. Pruebe a caminar con energía y brío. Si tiene problemas en los pies o las piernas, pruebe a hacer natación o montar en bicicleta. Puede dividir rosa sesiones de ejercicio en varios intervalos a lo jeovanny del día. Avance gradualmente hasta hacer por lo menos 30 minutos de ejercicio continuo y enérgico soledad todos los francisca.  Mena ejercicios de calentamiento y enfriamiento  Los ejercicios de calentamiento y enfriamiento reducen el riesgo de lesiones y el dolor muscular. Antes y después de yancey rutina de ejercicios, mena ian versión suave de yancey actividad chucho 5 minutos. También puede aprender a hacer estiramientos para mantener los músculos relajados. Yancey proveedor de atención médica puede enseñarle buenos ejercicios de calentamiento y estiramiento.  Mena la prueba del habla-mehdi  La prueba del habla-mehdi es ian manera sencilla de medir el esfuerzo que usted hace chucho yancey ejercicio. Si puede hablar mientras hace ejercicios, significa que yancey actividad tiene el ritmo adecuado; eloina si le falta el aire, disminuya la marcha. Si puede tararear ian canción, significa que debe acelerar el ritmo. Suba ian marbella, aumente la resistencia de yancey bicicleta estacionaria o nade más rápido.  ¿Qué lorna hacer respecto a las comidas?  Es posible que le indiquen que planifique yancey actividad física para 1-2 horas después de ian comida. En la mayoría de los casos, no es necesario que coma mientras hace ejercicio. Si usted se pone insulina o vianney medicamentos que pueden provocar la disminución de los niveles de  azúcar en la purvi, hágase el examen antes de hacer ejercicios. Lleve consigo un azúcar de acción rápida, jessica tabletas de glucosa o caramelos duros, que le elevará rápidamente el nivel de azúcar en la purvi. Si tiene síntomas de hipoglucemia (bajo nivel de azúcar en la purvi), use goyo azúcar.  Consejos de seguridad  Estos consejos le ayudarán a mantener yancey seguridad mientras mejora yancey forma física:  · Mena ejercicios con un amigo o lleve consigo un teléfono celular, si tiene mesha.  · Lleve o póngase ian identificación, jessica ian pulsera o ian alfonso, que indique que usted tiene diabetes.  · Use zapatos y equipo de seguridad apropiados para yancey actividad.  · De [place] agua antes, chucho y después del ejercicio.  · Póngase ropa adecuada para el clima.  · No mena ejercicio a la intemperie si hace demasiado calor o demasiado frío.  · No mena ejercicio si está enfermo.  · Si le indican que lo mena, mídase el azúcar en la purvi antes y después de hacer ejercicio. Coma un bocadillo de carbohidrato si yancey nivel de azúcar es bajo antes de iniciar el ejercicio.  Cuándo debe parar y llamar al proveedor de atención médica  Deje de hacer ejercicios y llame a yancey proveedor de atención médica de inmediato si tiene cualquiera de estos síntomas:  · Dolor, opresión, sensación de tirantez o pesadez en el pecho.  · Dolor o pesadez en el radha, los hombros, la espalda, los brazos, las piernas o los pies  · Falta de aire excesiva  · Mareos o aturdimiento  · Pulso excesivamente rápido o lento  · Mayor dolor en las articulaciones o los músculos  · Náuseas o vómito  Date Last Reviewed: 5/1/2016  © 5325-3494 [employer]. [address] [place] [id number]. Todos los derechos reservados. Esta información no pretende sustituir la atención médica profesional. Sólo yancey médico puede diagnosticar y tratar un problema de marcos.

## 2018-06-01 NOTE — TELEPHONE ENCOUNTER
----- Message from Beniat Crain sent at 6/1/2018 12:21 PM CDT -----  Contact: self, [phone]  Patient requests to speak with you regarding paperwork she dropped off a month ago to get filled out for her daughter's employment. Please advise.

## 2018-06-15 NOTE — LETTER
Hilda 15, 2018      Octavio Ferrell MD  [address] [address]           [facility]  [address], [address]  [facility] [address]  Phone: [phone]          Patient: Natalie Parnell   MR Number: [id number]   YOB: 1935   Date of Visit: 6/15/2018       Dear Dr. Octavio Ferrell:    Thank you for referring Natalie Parnell to me for evaluation. Attached you will find relevant portions of my assessment and plan of care.    If you have questions, please do not hesitate to call me. I look forward to following Natalie Parnell along with you.    Sincerely,    Mike Diaz MD    Enclosure  CC:  No Recipients    If you would like to receive this communication electronically, please contact [email or web address] or [phone] to request more information on [employer] access.    For providers and/or their staff who would like to refer a patient to [facility], please contact us through our one-stop-shop provider referral line, [facility] , at [phone].    If you feel you have received this communication in error or would no longer like to receive these types of communications, please e-mail [email or web address]

## 2018-06-15 NOTE — PROGRESS NOTES
Subjective:    Patient ID:  Natalie Parnell is a 83 y.o. female who presents for follow-up of Valvular Heart Disease      HPI  82 y/o Kiswahili speaking female with hx of severe asymptomatic AS (SHEA 0.58 cm2, AVAi 0.30 cm2/m2, peak aortic jet velocity 4.7 m/s,MG 58 mmHg), LE DVT on Xarelto, DM, possible liver cirrhosis who presents for f/u.   Last clinic visit was seen after hospitalization for CP and elevated trop, diagnosed with severe AS, was seen in [facility] clinic and deemed asymptomatic, CT with changes of liver cirrhosis, seen by GI who recommended hepatology c/s. She has done well. She denies CP, CHF symptoms, syncope. Compliant with meds.     Review of Systems   Constitution: Negative for weakness and malaise/fatigue.   HENT: Negative for congestion.    Eyes: Negative for blurred vision.   Cardiovascular: Negative for chest pain, claudication, cyanosis, dyspnea on exertion, irregular heartbeat, leg swelling, near-syncope, orthopnea, palpitations, paroxysmal nocturnal dyspnea and syncope.   Respiratory: Negative for shortness of breath.    Endocrine: Negative for polyuria.   Hematologic/Lymphatic: Negative for bleeding problem.   Skin: Negative for itching and rash.   Musculoskeletal: Negative for joint swelling, muscle cramps and muscle weakness.   Gastrointestinal: Negative for abdominal pain, hematemesis, hematochezia, melena, nausea and vomiting.   Genitourinary: Negative for dysuria and hematuria.   Neurological: Negative for dizziness, focal weakness, headaches, light-headedness and loss of balance.   Psychiatric/Behavioral: Negative for depression. The patient is not nervous/anxious.         Objective:    Physical Exam   Constitutional: She is oriented to person, place, and time. She appears well-developed and well-nourished.   HENT:   Head: Normocephalic and atraumatic.   Neck: Neck supple. No JVD present.   Cardiovascular: Normal rate and regular rhythm.    Murmur heard.   Harsh holosystolic murmur is  present with a grade of 3/6  at the upper right sternal border radiating to the neck  Pulses:       Carotid pulses are 2+ on the right side, and 2+ on the left side.       Radial pulses are 2+ on the right side, and 2+ on the left side.        Femoral pulses are 2+ on the right side, and 2+ on the left side.       Posterior tibial pulses are 1+ on the right side, and 1+ on the left side.   Pulmonary/Chest: Effort normal and breath sounds normal.   Abdominal: Soft. Bowel sounds are normal.   Musculoskeletal: She exhibits no edema.   Neurological: She is alert and oriented to person, place, and time.   Skin: Skin is warm and dry.   Psychiatric: She has a normal mood and affect. Her behavior is normal. Thought content normal.         Assessment:       1. Nonrheumatic aortic valve stenosis    2. Hepatic cirrhosis, unspecified hepatic cirrhosis type, unspecified whether ascites present    3. Congestive heart failure, unspecified HF chronicity, unspecified heart failure type    4. Elevated troponin    5. DVT of deep femoral vein, left    6. Type 2 diabetes mellitus with hyperglycemia, without long-term current use of insulin    7. Cirrhosis of liver without ascites, unspecified hepatic cirrhosis type      84 y/o female with hx and presentation as above. Currently asymptomatic regarding AS. Discussed symptoms including angina, syncope, and CHF. Needs to f/u with hepatology. Regarding DVT and anti-coagulation, will continue Eliquis for a total of 6 months for DVT.       Plan:       -Continue current medical management  -Referral to Hepatology  -f/u in 6 months

## 2018-06-15 NOTE — TELEPHONE ENCOUNTER
Pt was seen today and was referred for hepatology consult. Pt daughter was called and informed that a phone call to schedule would come directly from the Hepatology department due to us not being able to schedule that directly for her. Pt daughter stated that she understood.

## 2018-06-17 NOTE — NURSING
Pt records reviewed.   Pt will be referred to Hepatology.    Initial referral received  from the [facility]que.   Referring Provider/diagnosis  TONIO PEARSON Provider:   Diagnosis: Hepatic cirrhosis, unspecified hepatic cirrhosis type, unspecified whether ascites present       Referral letter sent to provider and patient.

## 2018-06-17 NOTE — LETTER
June 17, 2018    Natalie Parnell  [address] [place] [id number]      Dear Natalie Parnell:    Your doctor has referred you to the [facility]. You will be contacted by our office and an initial appointment will then be scheduled for you.    We look forward to seeing you soon. If you have any further questions, please contact us at [phone].       Sincerely,        [facility]   [address] [place] [id number]  [phone]

## 2018-06-26 NOTE — TELEPHONE ENCOUNTER
----- Message from Keke Monet sent at 6/26/2018 11:49 AM CDT -----  Contact: Self [phone]  Patients daughter is calling to talk to nurse in regards to Mrs. Parnell is on vacation but she has been bought to an UC due to chest pain. Please advice

## 2018-06-29 NOTE — TELEPHONE ENCOUNTER
----- Message from Mike Diaz MD sent at 6/29/2018  2:45 PM CDT -----  Please schedule her a clinic visit   Thanks  JEOVANNY

## 2018-07-06 NOTE — PROGRESS NOTES
Diabetes Education  Author: Dilia Walker RN  Date: 7/6/2018    Diabetes Education Visit  Diabetes Education Record Assessment/Progress: Initial  Diabetes Type  Diabetes Type : Type II  Diabetes History  Diabetes Diagnosis: >10 years  Nutrition  Meal Planning: water, eats out seldom  What type of beverages do you drink?: juice, milk, water  Meal Plan 24 Hour Recall - Breakfast: apple, coffee  Meal Plan 24 Hour Recall - Lunch: chicken soup, 1 chicken leg  Meal Plan 24 Hour Recall - Dinner: nothing  Monitoring   Self Monitoring : pt has a meter and is checking 2 times a day- fasting in the am and at bedtime.  Blood Glucose Logs: No  Do you use a personal glucose monitor?: No  In the last month, how often have you had a low blood sugar reaction?: never  Can you tell when your blood sugar is too high?: no  Exercise   Exercise Type: walking  Intensity: Low  Frequency: 3-5 Times per week  Duration: 15 min  Current Diabetes Treatment   Current Treatment: Oral Medication, Diet, Exercise  Social History  Preferred Learning Method: Face to Face, Demonstration, Reading Materials  Primary Support: Self  Educational Level: Grade School  Smoking Status: Never a Smoker  Alcohol Use: Never  DDS-2 Score  ( > 3 = SIGNIFICANT DISTRESS): 2   Barriers to Change  Barriers to Change: None  Learning Challenges : Language  Language spoken: Azeri  Language -  present?: Yes  Readiness to Learn   Readiness to Learn : Acceptance  Cultural Influences  Cultural Influences: Yes  If yes, please list:: [place]zeri foods  Diabetes Education Assessment/Progress  Diabetes Disease Process (diabetes disease process and treatment options): Discussion, Instructed, Individual Session, Demonstrates Understanding/Competency(verbalizes/demonstrates), Written Materials Provided  Nutrition (Incorporating nutritional management into one's lifestyle): Discussion, Instructed, Individual Session, Demonstrates Understanding/Competency  (verbalizes/demonstrates), Written Materials Provided  Physical Activity (incorporating physical activity into one's lifestyle): Discussion, Instructed, Individual Session, Demonstrates Understanding/Competency (verbalizes/demonstrates), Written Materials Provided  Medications (states correct name, dose, onset, peak, duration, side effects & timing of meds): Discussion, Instructed, Individual Session, Demonstrates Understanding/Competency(verbalizes/demonstrates), Written Materials Provided  Monitoring (monitoring blood glucose/other parameters & using results): Discussion, Instructed, Individual Session, Demonstrates Understanding/Competency (verbalizes/demonstrates), Written Materials Provided  Acute Complications (preventing, detecting, and treating acute complications): Discussion, Instructed, Individual Session, Demonstrates Understanding/Competency (verbalizes/demonstrates), Written Materials Provided  Chronic Complications (preventing, detecting, and treating chronic complications): Discussion, Instructed, Individual Session, Demonstrates Understanding/Competency (verbalizes/demonstrates), Written Materials Provided  Clinical (diabetes, other pertinent medical history, and relevant comorbidities reviewed during visit): Discussion, Instructed, Individual Session, Demonstrates Understanding/Competency (verbalizes/demonstrates)  Cognitive (knowledge of self-management skills, functional health literacy): Discussion, Instructed, Individual Session, Demonstrates Understanding/Competency (verbalizes/demonstrates)  Psychosocial (emotional response to diabetes): Discussion, Instructed, Individual Session, Demonstrates Understanding/Competency (verbalizes/demonstrates)  Diabetes Distress and Support Systems: Discussion, Instructed, Individual Session, Demonstrates Understanding/Competency (verbalizes/demonstrates)  Behavioral (readiness for change, lifestyle practices, self-care behaviors): Discussion, Instructed,  Individual Session, Demonstrates Understanding/Competency (verbalizes/demonstrates)  Goals  Patient has selected/evaluated goals during today's session: Yes, selected  Healthy Eating: Set  Start Date: 07/06/18  Target Date: 10/06/18  Diabetes Care Plan/Intervention  Education Plan/Intervention: Foot Exam, Dental Exam  Diabetes Meal Plan  Restrictions: Restricted Carbohydrate  Calories: 1800  Carbohydrate Per Meal: 30-45g  Carbohydrate Per Snack : 15-20g  Instructed pt on the food groups, how to read labels and count carbs. Pt was given sample menus and meal plans as examples. Pt usually sleeps late and does not start eating till around 2pm. She states that she is not hungry and usually skips at least 1 meal per day. Discussed with pt the importance of eating balanced and portioned. Pt had fair understanding of meal plan and compliance is hoped for. Pt will call to schedule fu appt- states that she has issues with rides.  Education Units of Time   Time Spent: 90 min    Health Maintenance was reviewed today with patient. Discussed with patient importance of routine eye exams, foot exams/foot care, blood work (i.e.: A1c, microalbumin, and lipid), dental visits, yearly flu vaccine, and pneumonia vaccine as indicated by PCP. Patient verbalized understanding.     Health Maintenance Topics with due status: Not Due       Topic Last Completion Date    TETANUS VACCINE 12/03/2015    Influenza Vaccine 12/03/2015    Lipid Panel 07/02/2018    Hemoglobin A1c 07/02/2018     Health Maintenance Due   Topic Date Due    Zoster Vaccine  02/16/1997    DEXA SCAN  01/23/2018    Foot Exam  03/08/2018    Eye Exam  06/28/2018

## 2018-07-09 PROBLEM — K92.2 GI BLEED: Status: ACTIVE | Noted: 2018-01-01

## 2018-07-09 PROBLEM — K76.9 LIVER DISEASE: Status: ACTIVE | Noted: 2018-01-01

## 2018-07-09 NOTE — SUBJECTIVE & OBJECTIVE
Past Medical History:   Diagnosis Date    Asthma     Diabetes mellitus     HTN (hypertension)        Past Surgical History:   Procedure Laterality Date    CATARACT EXTRACTION Bilateral     Dr. Max    HERNIA REPAIR      VARICOSE VEIN SURGERY         Review of patient's allergies indicates:  No Known Allergies  Family History     Problem Relation (Age of Onset)    No Known Problems Mother, Father, Sister, Brother, Maternal Aunt, Maternal Uncle, Paternal Aunt, Paternal Uncle, Maternal Grandmother, Maternal Grandfather, Paternal Grandmother, Paternal Grandfather        Social History Main Topics    Smoking status: Never Smoker    Smokeless tobacco: Never Used    Alcohol use No    Drug use: No    Sexual activity: Not on file     Review of Systems   Constitutional: Negative for chills and fever.   HENT: Negative for hearing loss and tinnitus.    Eyes: Negative for photophobia and pain.   Respiratory: Positive for chest tightness and shortness of breath. Negative for cough.    Cardiovascular: Positive for chest pain. Negative for leg swelling.   Gastrointestinal: Positive for abdominal pain, blood in stool and diarrhea. Negative for constipation, nausea and vomiting.   Endocrine: Negative for polydipsia and polyuria.   Genitourinary: Negative for dysuria and hematuria.   Musculoskeletal: Negative for arthralgias and myalgias.   Skin: Negative for rash and wound.   Neurological: Negative for dizziness, syncope and light-headedness.   Psychiatric/Behavioral: Negative for behavioral problems and confusion.     Objective:     Vital Signs (Most Recent):  Temp: 98.3 °F (36.8 °C) (07/09/18 1130)  Pulse: 96 (07/09/18 1331)  Resp: (!) 26 (07/09/18 1331)  BP: (!) 98/44 (07/09/18 1331)  SpO2: 95 % (07/09/18 1331) Vital Signs (24h Range):  Temp:  [98.3 °F (36.8 °C)] 98.3 °F (36.8 °C)  Pulse:  [] 96  Resp:  [20-26] 26  SpO2:  [95 %-98 %] 95 %  BP: (83-98)/(42-55) 98/44     Weight: 80.7 kg (178 lb) (07/09/18  1130)  Body mass index is 30.55 kg/m².      Intake/Output Summary (Last 24 hours) at 07/09/18 1333  Last data filed at 07/09/18 1251   Gross per 24 hour   Intake             1000 ml   Output                0 ml   Net             1000 ml       Lines/Drains/Airways          No matching active lines, drains, or airways          Physical Exam   Constitutional: She is oriented to person, place, and time. She appears well-developed and well-nourished.   HENT:   Head: Normocephalic and atraumatic.   Eyes: Conjunctivae and EOM are normal. Pupils are equal, round, and reactive to light.   Neck: Normal range of motion. Neck supple.   Cardiovascular: Normal rate and regular rhythm.    Murmur heard.  Pulmonary/Chest: Effort normal and breath sounds normal.   Abdominal: Soft. Bowel sounds are normal. She exhibits no distension. There is no tenderness.   No shifting dullness or fluid wave   Musculoskeletal: Normal range of motion. She exhibits no edema.   Neurological: She is alert and oriented to person, place, and time.   Skin: Skin is warm and dry. Capillary refill takes less than 2 seconds.   Psychiatric: She has a normal mood and affect. Her behavior is normal.       Significant Labs:  CBC:   Recent Labs  Lab 07/09/18  1201   WBC 6.62   HGB 10.0*   HCT 30.8*        CMP:   Recent Labs  Lab 07/09/18  1201   *   CALCIUM 9.1   ALBUMIN 2.9*   PROT 6.3   *   K 4.9   CO2 21*      BUN 36*   CREATININE 1.1   ALKPHOS 156*   ALT 30   AST 52*   BILITOT 1.3*     Coagulation:   Recent Labs  Lab 07/09/18  1301   INR 1.3*       Significant Imaging:  CXR: I have reviewed all results within the past 24 hours and my personal findings are:  Pulmonary Edema

## 2018-07-09 NOTE — ED PROVIDER NOTES
Encounter Date: 7/9/2018    SCRIBE #1 NOTE: I, Jaylen Desai, am scribing for, and in the presence of,  Dr. Miller. I have scribed the entire note.       History     Chief Complaint   Patient presents with    Diarrhea     c/o dark diarrhea since Friday. Also c/o generalized abdominal pain, worse on left side    Chest Pain     c/o chest pain that radiates down her left arm and sob for awhile, worse today. Symptoms are worse on exertion. Pt sees Dr. Diaz     Natalie Parnell is a 83 y.o. female who  has a past medical history of Asthma; Diabetes mellitus; and HTN (hypertension).    The patient presents to the ED due to multiple complaints, including CP and abdominal pain.   Patient's relative states that she has had dark stools that began 3 days ago, but she did not mention them until today. She denies any history, but reports is currently on Eliquis.  She also complains of intermittent chest pain that radiates down her left arm, associated with SOB that worsened today. She reports symptoms are worse with exertion. She denies any N/V, fever, headache, syncope, or focal weakness. She denies any other recent illness or sick contacts.      The history is provided by a relative.     Review of patient's allergies indicates:  No Known Allergies  Past Medical History:   Diagnosis Date    Asthma     Diabetes mellitus     HTN (hypertension)      Past Surgical History:   Procedure Laterality Date    CATARACT EXTRACTION Bilateral     Dr. Max    HERNIA REPAIR      VARICOSE VEIN SURGERY       Family History   Problem Relation Age of Onset    No Known Problems Mother     No Known Problems Father     No Known Problems Sister     No Known Problems Brother     No Known Problems Maternal Aunt     No Known Problems Maternal Uncle     No Known Problems Paternal Aunt     No Known Problems Paternal Uncle     No Known Problems Maternal Grandmother     No Known Problems Maternal Grandfather     No Known Problems Paternal  Grandmother     No Known Problems Paternal Grandfather     Amblyopia Neg Hx     Blindness Neg Hx     Cancer Neg Hx     Cataracts Neg Hx     Diabetes Neg Hx     Glaucoma Neg Hx     Hypertension Neg Hx     Macular degeneration Neg Hx     Retinal detachment Neg Hx     Strabismus Neg Hx     Stroke Neg Hx     Thyroid disease Neg Hx      Social History   Substance Use Topics    Smoking status: Never Smoker    Smokeless tobacco: Never Used    Alcohol use No     Review of Systems   Constitutional: Negative for chills and fever.   HENT: Negative for sore throat.    Respiratory: Positive for shortness of breath. Negative for cough.    Cardiovascular: Positive for chest pain (radiating).   Gastrointestinal: Positive for abdominal pain, blood in stool and diarrhea. Negative for nausea and vomiting.   Genitourinary: Negative for dysuria, frequency and urgency.   Musculoskeletal: Negative for back pain.   Skin: Negative for rash and wound.   Neurological: Negative for syncope and weakness.   Hematological: Does not bruise/bleed easily.   Psychiatric/Behavioral: Negative for agitation, behavioral problems and confusion.       Physical Exam     Initial Vitals [07/09/18 1130]   BP Pulse Resp Temp SpO2   (!) 96/48 97 20 98.3 °F (36.8 °C) 98 %      MAP       --         Physical Exam    Nursing note and vitals reviewed.  Constitutional: She appears well-developed and well-nourished. She is not diaphoretic. No distress.   HENT:   Head: Normocephalic and atraumatic.   Mouth/Throat: Oropharynx is clear and moist.   Eyes: EOM are normal. Pupils are equal, round, and reactive to light.   Neck: No tracheal deviation present.   Cardiovascular: Normal rate, regular rhythm, normal heart sounds and intact distal pulses.   Pulmonary/Chest: Breath sounds normal. No stridor. No respiratory distress. She has no wheezes.   Abdominal: Soft. Bowel sounds are normal. She exhibits no distension and no mass. There is no tenderness.    Genitourinary: Rectal exam shows guaiac positive stool. Guaiac positive stool. : Acceptable.  Genitourinary Comments: Dark melena on exam.   Musculoskeletal: Normal range of motion. She exhibits no edema.   Neurological: She is alert and oriented to person, place, and time. She has normal strength. No cranial nerve deficit or sensory deficit.   Skin: Skin is warm and dry. Capillary refill takes less than 2 seconds. No pallor.   Psychiatric: She has a normal mood and affect. Her behavior is normal. Thought content normal.         ED Course   Critical Care  Date/Time: 7/9/2018 12:29 PM  Performed by: SALVATORE KNAPP  Authorized by: SALVATORE KNAPP   Direct patient critical care time: 18 minutes  Additional history critical care time: 5 minutes  Ordering / reviewing critical care time: 10 minutes  Documentation critical care time: 12 minutes  Consulting other physicians critical care time: 10 minutes  Consult with family critical care time: 5 minutes  Total critical care time (exclusive of procedural time) : 60 minutes  Critical care was necessary to treat or prevent imminent or life-threatening deterioration of the following conditions: Acute GI bleed         Labs Reviewed   CBC WITHOUT DIFFERENTIAL - Abnormal; Notable for the following:        Result Value    RBC 3.28 (*)     Hemoglobin 10.0 (*)     Hematocrit 30.8 (*)     All other components within normal limits   COMPREHENSIVE METABOLIC PANEL - Abnormal; Notable for the following:     Sodium 135 (*)     CO2 21 (*)     Glucose 172 (*)     BUN, Bld 36 (*)     Albumin 2.9 (*)     Total Bilirubin 1.3 (*)     Alkaline Phosphatase 156 (*)     AST 52 (*)     eGFR if  54 (*)     eGFR if non  47 (*)     All other components within normal limits   LIPASE - Abnormal; Notable for the following:     Lipase 84 (*)     All other components within normal limits   LACTIC ACID, PLASMA - Abnormal; Notable for the following:      Lactate (Lactic Acid) 2.6 (*)     All other components within normal limits   B-TYPE NATRIURETIC PEPTIDE - Abnormal; Notable for the following:      (*)     All other components within normal limits   OCCULT BLOOD X 1, STOOL - Abnormal; Notable for the following:     Occult Blood Positive (*)     All other components within normal limits   TROPONIN I   URINALYSIS   PROTIME-INR   TYPE & SCREEN   PREPARE RBC SOFT     EKG Readings: (Independently Interpreted)   Rhythm: Normal Sinus Rhythm. Heart Rate: 97. T Waves Flipped: III.   T wave flattening V4-V6, No ST elevation or other signs of ischemia, no reciprocal changes. Compared to prior EKG from 03/2018, no significant changes       Imaging Results          X-Ray Chest AP Portable (Final result)  Result time 07/09/18 12:23:04    Final result by Janak Granados MD (07/09/18 12:23:04)                 Impression:      Findings suggesting pulmonary edema/CHF pattern without focal consolidation.  Interstitial pneumonia not excluded.      Electronically signed by: Janak Granados MD  Date:    07/09/2018  Time:    12:23             Narrative:    EXAMINATION:  XR CHEST AP PORTABLE    CLINICAL HISTORY:  chest pain;    TECHNIQUE:  Single frontal view of the chest was performed.    COMPARISON:  Chest radiograph 03/01/2018 and CT thorax 03/01/2018    FINDINGS:  Patient is somewhat rotated.  Large body habitus.  Right lung apex is partially obscured by overlying chin soft tissues.  Cardiac silhouette is enlarged similar to prior with prominence of the central pulmonary vasculature and bilateral diffuse interstitial coarsening concerning for pulmonary edema/CHF pattern.  Interstitial pneumonia not excluded.  Right costophrenic angle is not well visualized suggesting pleural thickening/scarring versus trace pleural fluid.  No large consolidation or pneumothorax.  Stable mediastinal contours.  No acute osseous process seen.  PA and lateral views can be obtained.                                  Medical Decision Making:   Initial Assessment:   Patient is a 83 y.o. female with history of CAD, aortic stenosis, DVT on Eliquis, presenting to ED with diarrhea and dark stool x3 days. Patient also endorses CP and SOB, worse with exertion.  On arrival, the patient is hypotensive with melena on rectal exam. Plan to give fluids, obtain type and screen, consent and transfuse blood empirically given hypotension and active GI bleed.   Will emergently consult GI and anticipate ICU admission for further management.      Differential Diagnosis:   Differential Diagnosis includes, but is not limited to:  Lower GI bleed (AVM, colitis, colon cancer, polyp, internal/external hemorrhoid, IBS, rectal injury/foreign body, chronic diarrhea, anal fissure), upper GI bleed (PUD, perforated ulcer, esophageal variceal bleed), Crohn's disease, ulcerative colitis, chronic diarrhea, dietary intake    ED Management:  H/H 10/30, however, with acute GI bleed and anginal symptoms, will continue RBC transfusion.  Discussed with GI, who will follow along.  Discussed with U , who requested Cardiology consult.  Discussed with Cardiology, who will follow along.    Patient remained HDS throughout ED course.  Patient and family informed of findings and comfortable with plan.    Upon re-evaluation, the patient's status has remained stable.  At this time, I believe the patient should be admitted to the hospital for further evaluation and management of GI bleed.    [facility] service was contacted and the case was discussed. The consulting physician agrees with plan and will admit under their service. Additional recommendations at this time: none    The patient and family were updated with test results, overall impression, and further plan of care. All questions were answered. The patient expressed understanding and agreed with the current plan.                          Clinical Impression:     1. Gastrointestinal hemorrhage, unspecified  gastrointestinal hemorrhage type    2. Chest pain    3. Diarrhea, unspecified type    4. DVT of deep femoral vein, left    5. Nonrheumatic aortic valve stenosis    6. Chest pain, unspecified type    7. DVT of deep femoral vein, right    8. GI bleed    9. Gastrointestinal hemorrhage with melena    10. NSTEMI (non-ST elevated myocardial infarction)    11. Gait instability           I, Dr. Chris Miller, personally performed the services described in this documentation. All medical record entries made by the scribe were at my direction and in my presence.  I have reviewed the chart and agree that the record reflects my personal performance and is accurate and complete.     Chris Miller MD.                   Chris Miller MD  07/17/18 [id number]

## 2018-07-09 NOTE — PROGRESS NOTES
Budesonide- formoterol 160-4.5 therapeutic interchange for vilanterol/fluticasine (breo) 100-25 mcg  1 puff daily  Per P&[employer] approved pharmacy protocol  [phone]

## 2018-07-09 NOTE — ASSESSMENT & PLAN NOTE
-severe per echo in March with SHEA .58cm2 and MG 58mmHg  -repeat echo pending  -[facility]  -complaints of intermittent chest pain as well possibly related to AS etiology vs anemia vs ischemic etiology   -may proceed with EGD evaluation given concern for AVMs in setting of AS   -bleeding issues need to be fully evaluated before proceeding with TAVR workup

## 2018-07-09 NOTE — ASSESSMENT & PLAN NOTE
-reports of melena  -H&H 10.0&30.8 down from 12.1&36.8 in March  -GI on board with plans for scope in AM per daughter  -management per [facility]; if PRBC transfusion decided may need IV Lasix to prevent volume overload

## 2018-07-09 NOTE — SUBJECTIVE & OBJECTIVE
Past Medical History:   Diagnosis Date    Asthma     Diabetes mellitus     HTN (hypertension)        Past Surgical History:   Procedure Laterality Date    CATARACT EXTRACTION Bilateral     Dr. Max    HERNIA REPAIR      VARICOSE VEIN SURGERY         Review of patient's allergies indicates:  No Known Allergies    No current facility-administered medications on file prior to encounter.      Current Outpatient Prescriptions on File Prior to Encounter   Medication Sig    albuterol 90 mcg/actuation inhaler Inhale 2 puffs into the lungs every 6 (six) hours as needed for Wheezing.    ammonium lactate 12 % Crea Apply twice daily to lower extremities    apixaban (ELIQUIS) 5 mg Tab Take 1 tablet (5 mg total) by mouth 2 (two) times daily.    atorvastatin (LIPITOR) 40 MG tablet Take 1 tablet (40 mg total) by mouth once daily.    azelastine (OPTIVAR) 0.05 % ophthalmic solution Place 1 drop into both eyes 2 (two) times daily.    blood sugar diagnostic (ACCU-CHEK SMARTVIEW TEST STRIP) Strp Inject 1 strip into the skin once daily.    blood-glucose meter Misc 1 Units by Misc.(Non-Drug; Combo Route) route once daily.    budesonide-formoterol 160-4.5 mcg (SYMBICORT) 160-4.5 mcg/actuation HFAA Inhale 2 puffs into the lungs every 12 (twelve) hours. Controller    gabapentin (NEURONTIN) 100 MG capsule Take 1 capsule (100 mg total) by mouth 2 (two) times daily.    lancets (ACCU-CHEK FASTCLIX) Misc Inject 1 lancet into the skin once daily.    losartan (COZAAR) 25 MG tablet Take 1 tablet (25 mg total) by mouth once daily.    metFORMIN (GLUCOPHAGE) 500 MG tablet Take 1 tablet (500 mg total) by mouth 2 (two) times daily with meals.    metoprolol succinate (TOPROL-XL) 25 MG 24 hr tablet Take 1 tablet (25 mg total) by mouth once daily.     Family History     Problem Relation (Age of Onset)    No Known Problems Mother, Father, Sister, Brother, Maternal Aunt, Maternal Uncle, Paternal Aunt, Paternal Uncle, Maternal Grandmother,  Maternal Grandfather, Paternal Grandmother, Paternal Grandfather        Social History Main Topics    Smoking status: Never Smoker    Smokeless tobacco: Never Used    Alcohol use No    Drug use: No    Sexual activity: Not on file     Review of Systems   Constitution: Negative for chills, decreased appetite, diaphoresis, fever and weakness.   Cardiovascular: Positive for chest pain and dyspnea on exertion. Negative for claudication, cyanosis, irregular heartbeat, leg swelling, near-syncope, orthopnea, palpitations, paroxysmal nocturnal dyspnea and syncope.   Respiratory: Negative for cough, hemoptysis, shortness of breath and wheezing.    Gastrointestinal: Positive for melena. Negative for bloating, abdominal pain, constipation, diarrhea, nausea and vomiting.   Neurological: Negative for dizziness.     Objective:     Vital Signs (Most Recent):  Temp: 98.2 °F (36.8 °C) (07/09/18 1409)  Pulse: 101 (07/09/18 1556)  Resp: 18 (07/09/18 1528)  BP: (!) 98/47 (07/09/18 1556)  SpO2: 98 % (07/09/18 1528) Vital Signs (24h Range):  Temp:  [98.2 °F (36.8 °C)-98.3 °F (36.8 °C)] 98.2 °F (36.8 °C)  Pulse:  [] 101  Resp:  [18-26] 18  SpO2:  [94 %-98 %] 98 %  BP: ()/(42-94) 98/47     Weight: 80.7 kg (178 lb)  Body mass index is 30.55 kg/m².    SpO2: 98 %  O2 Device (Oxygen Therapy): nasal cannula      Intake/Output Summary (Last 24 hours) at 07/09/18 1640  Last data filed at 07/09/18 1348   Gross per 24 hour   Intake             1100 ml   Output                0 ml   Net             1100 ml       Lines/Drains/Airways     Peripheral Intravenous Line                 Peripheral IV - Single Lumen 07/09/18 1421 Left Hand less than 1 day         Peripheral IV - Single Lumen 07/09/18 1422 Right Antecubital less than 1 day                Physical Exam   Constitutional: She is oriented to person, place, and time. She appears well-developed and well-nourished. No distress.   Cardiovascular: Normal rate and regular rhythm.  Exam  reveals no gallop.    Murmur heard.  Pulmonary/Chest: Effort normal and breath sounds normal. No respiratory distress. She has no wheezes.   Abdominal: Soft. Bowel sounds are normal. She exhibits no distension. There is no tenderness.   Neurological: She is alert and oriented to person, place, and time.   Skin: Skin is warm and dry.       Significant Labs:       Recent Labs  Lab 07/09/18  1201   CALCIUM 9.1   PROT 6.3   *   K 4.9   CO2 21*      BUN 36*   CREATININE 1.1   ALKPHOS 156*   ALT 30   AST 52*   BILITOT 1.3*       Recent Labs  Lab 07/09/18  1201   WBC 6.62   RBC 3.28*   HGB 10.0*   HCT 30.8*      MCV 94   MCH 30.5   MCHC 32.5       Recent Labs  Lab 07/09/18  1201   TROPONINI 0.021       Significant Imaging: Echocardiogram:   2D echo with color flow doppler:   Results for orders placed or performed during the hospital encounter of 03/01/18   2D echo with color flow doppler   Result Value Ref Range    EF 50 55 - 65    Mitral Valve Regurgitation MILD     Diastolic Dysfunction No     Aortic Valve Stenosis SEVERE (A)     Est. PA Systolic Pressure 40.95 (A)     Tricuspid Valve Regurgitation TRIVIAL TO MILD

## 2018-07-09 NOTE — HOSPITAL COURSE
7/9/2018 Presented with complaints of melena and intermittent chest pain. H&H 10.0&30.8 down from 12.1&36.8 in March 2018. Troponin .021 and . HR stable. BP marginal with SBP 90s. CXR suggestive of pulmonary edema/CHF pattern without focal consolidation-image compared to CXR from 3/2018 with similar appearance.   Echocardiogram today with pending results. Repeat BLE venous ultrasound ordered. GI consulted with plans for EGD tomorrow per daughter. Cardiology consulted due to history of AS and complaints of chest pain   7/10/2018 HR and BP stable overnight. H&H 9.0&26.9 this AM with 1unit PRBC in process. Troponin trended with trend up to 2.293-7.644 and down to 4.8 this AM. Echocardiogram yesterday with normal LVEF and severe AS with SHEA .65cm2 and MG 74mmHg with WMA (anterior septum moderately hypokinetic & apical septum, mid inferoseptum, apical lateral mildly hypokinetic). NPO today for possible EGD   7/11/2018 H&H10.8&32.1 this AM after PRBC transfusion. EGD yesterday with grade I esophageal varices and non bleeding angiodysplastic lesion treated with APC. HR and BP stable.

## 2018-07-09 NOTE — ASSESSMENT & PLAN NOTE
- Suspect cirrhosis  - On exam, no evidence of ascites  - Previous CTA Chest and US Abd: hepatomegaly with nodularity of the surface of liver concern for cirrhosis. No ascites.  - Patient will need outpatient follow-up at [facility]

## 2018-07-09 NOTE — H&P
[facility] Hospitalist History and Physical - Resident Note  Patient: Natalie Parnell   MRN: [id number]      Admitting Team: [facility] Hospitalist Team B  Attending Physician: Dr. Ezequiel Escobar MD  Resident: Tatiana  Interns: Dr. Koo    Date of Admit: 7/9/2018      Chief Complaint and Duration     Diarrhea (c/o dark diarrhea since Friday. Also c/o generalized abdominal pain, worse on left side) and Chest Pain (c/o chest pain that radiates down her left arm and sob for awhile, worse today. Symptoms are worse on exertion. Pt sees Dr. Diaz)    Subjective:      History of Present Illness:  Natalie Parnell is a 83 y.o.  female with Severe Aortic Stenosis (with intermittent chest pain), DVT (on Eliquis), DM Type 2, Cirrhosis, HTN presenting to [facility] by daughter on 7/9/2018 with a 3 day history of melena the past 3 days with progressive generalized abdominal pain and dark tarry stools. Daughter noticed patient was weak and noticed melanotic, tarry stools on day of presentation prompting further along with acute episode of chronic chest pain (described as left chest/shoulder, resolves with rest; known chronic pain) that ultimately resulted in patient presented to ED. Of note, she recently traveled to [place] and had syncopal episode there. She otherwise has not had exposure to new foods and sick contacts.    ED course:   - Found to be FOBT positive in ED  - H/H 10/30.8, Trop 0.021,   - CXR suggestive of pulm edema/CHF pattern without focal consolidation  - GI consulted in ED given presentation  - Started on protonix gtt and octreotide gtt    Baseline: Able to do ADL's but does get dyspneic on exertion. Able to walk about 100-200 meters before needing to rest.    [facility] Hospitalist team consulted for GI Bleed.    History obtained from: Patient, Daughter    Review of Systems:  Pertinent items are noted in HPI. 12 point ROS negative other than noted in HPI and below.  A comprehensive review of systems was negative except for:  Constitutional: positive for fatigue  Respiratory: positive for dyspnea on exertion  Cardiovascular: positive for chest pain, exertional chest pressure/discomfort and fatigue  Gastrointestinal: positive for change in bowel habits, diarrhea and melena  Musculoskeletal: positive for arthralgias and muscle weakness    Denies fevers, chills, night sweats, cough, back pain, abdominal pain, hematuria, dysuria, hematochezia, numbness/tingling in extremities.    Past Medical History:  Past Medical History:   Diagnosis Date    Asthma     Diabetes mellitus     HTN (hypertension)        Past Surgical History:  Past Surgical History:   Procedure Laterality Date    CATARACT EXTRACTION Bilateral     Dr. Max    HERNIA REPAIR      VARICOSE VEIN SURGERY         Allergies: No Known Allergies    Home Medications:  Prior to Admission medications    Medication Sig Start Date End Date Taking? Authorizing Provider   albuterol 90 mcg/actuation inhaler Inhale 2 puffs into the lungs every 6 (six) hours as needed for Wheezing. 6/26/18   Octavio Ferrell MD   ammonium lactate 12 % Crea Apply twice daily to lower extremities 10/8/15   Min Cotton DPM   apixaban (ELIQUIS) 5 mg Tab Take 1 tablet (5 mg total) by mouth 2 (two) times daily. 6/26/18   Mike Diaz MD   atorvastatin (LIPITOR) 40 MG tablet Take 1 tablet (40 mg total) by mouth once daily. 6/26/18 6/26/19  Mike Diaz MD   azelastine (OPTIVAR) 0.05 % ophthalmic solution Place 1 drop into both eyes 2 (two) times daily. 6/28/17 6/28/18  Florence Vo, OD   blood sugar diagnostic (ACCU-CHEK SMARTVIEW TEST STRIP) Strp Inject 1 strip into the skin once daily. 3/19/18   Octavio Ferrell MD   blood-glucose meter Misc 1 Units by Misc.(Non-Drug; Combo Route) route once daily. 6/6/16 6/6/17  Octavio Ferrell MD   budesonide-formoterol 160-4.5 mcg (SYMBICORT) 160-4.5 mcg/actuation HFAA Inhale 2 puffs into the lungs every 12 (twelve) hours. Controller  5/8/18 5/8/19  Octavio Ferrell MD   gabapentin (NEURONTIN) 100 MG capsule Take 1 capsule (100 mg total) by mouth 2 (two) times daily. 6/16/15 3/21/18  Octavio Ferrell MD   lancets (ACCU-CHEK FASTCLIX) Misc Inject 1 lancet into the skin once daily. 3/19/18   Octavio Ferrell MD   losartan (COZAAR) 25 MG tablet Take 1 tablet (25 mg total) by mouth once daily. 6/26/18 6/26/19  Mike Diaz MD   metFORMIN (GLUCOPHAGE) 500 MG tablet Take 1 tablet (500 mg total) by mouth 2 (two) times daily with meals. 5/8/18   Octavio Ferrell MD   metoprolol succinate (TOPROL-XL) 25 MG 24 hr tablet Take 1 tablet (25 mg total) by mouth once daily. 6/26/18 6/26/19  Mike Diaz MD     Family History:  Family History   Problem Relation Age of Onset    No Known Problems Mother     No Known Problems Father     No Known Problems Sister     No Known Problems Brother     No Known Problems Maternal Aunt     No Known Problems Maternal Uncle     No Known Problems Paternal Aunt     No Known Problems Paternal Uncle     No Known Problems Maternal Grandmother     No Known Problems Maternal Grandfather     No Known Problems Paternal Grandmother     No Known Problems Paternal Grandfather     Amblyopia Neg Hx     Blindness Neg Hx     Cancer Neg Hx     Cataracts Neg Hx     Diabetes Neg Hx     Glaucoma Neg Hx     Hypertension Neg Hx     Macular degeneration Neg Hx     Retinal detachment Neg Hx     Strabismus Neg Hx     Stroke Neg Hx     Thyroid disease Neg Hx        Social History:  Social History   Substance Use Topics    Smoking status: Never Smoker    Smokeless tobacco: Never Used    Alcohol use No       Health Maintenance:     Primary Care Physician: Octavio Ferrell MD    Immunizations:   Currently on File with [facility]:   Most Recent Immunizations   Administered Date(s) Administered    Influenza - High Dose 12/03/2015    Pneumococcal Conjugate - 13 Valent 03/08/2017     "Pneumococcal Polysaccharide - 23 Valent 2015    Td (ADULT) 09/15/2005    Tdap 2015    influenza - Quadrivalent 10/08/2014     TDap is up to date.  Influenza is not up to date (per daughter/unsure).  Pneumovax is up to date.    Cancer Screening:  PAP: not indicated  Mammogram: not indicated  Colonoscopy: unclear when last C-scope was per patient.       Objective:     Last 24 Hour Vital Signs:  BP  Min: 83/42  Max: 106/55  Temp  Av.3 °F (36.8 °C)  Min: 98.2 °F (36.8 °C)  Max: 98.3 °F (36.8 °C)  Pulse  Av.7  Min: 85  Max: 101  Resp  Av.7  Min: 18  Max: 33  SpO2  Av.9 %  Min: 94 %  Max: 99 %  Height  Av' 4" (162.6 cm)  Min: 5' 4" (162.6 cm)  Max: 5' 4" (162.6 cm)  Weight  Av.7 kg (178 lb)  Min: 80.7 kg (178 lb)  Max: 80.7 kg (178 lb)  Body mass index is 30.55 kg/m².  I/O last 3 completed shifts:  In: 1168 [I.V.:1168]  Out: -     Physical Examination:  BP (!) 91/52   Pulse 85   Temp 98.2 °F (36.8 °C) (Oral)   Resp (!) 23   Ht 5' 4" (1.626 m)   Wt 80.7 kg (178 lb)   SpO2 97%   BMI 30.55 kg/m²     General appearance: alert, appears stated age, cooperative and obese  HEENT: NCAT, EOMI, PERRLA, OP clear, MMM  Neck: no adenopathy, no carotid bruit, no JVD, supple, symmetrical, trachea midline and thyroid not enlarged, symmetric, no tenderness/mass/nodules  Back: symmetric, no curvature. ROM normal. No CVA tenderness.  Lungs: clear to auscultation bilaterally and no wheezing or crackles appreciated  Chest wall: no tenderness  Heart: RRR, III/VI murmur with radiation to clavicles  Abdomen: Soft, ND, BS+, no TTP  Extremities: no c/c/e  Skin: Skin color, texture, turgor normal. No rashes or lesions  Neurologic: AAO x4 (person, place, time, events), 4/5 muscle strength throughout, sensation intact, CN II-XII grossly intact without focal deficits.    Laboratory:  Most Recent Data:    Recent Labs  Lab 18  1201   WBC 6.62   HGB 10.0*   HCT 30.8*      MCV 94   RDW 14.5 "   *   K 4.9      CO2 21*   BUN 36*   CREATININE 1.1   *   CALCIUM 9.1   PROT 6.3   ALBUMIN 2.9*   BILITOT 1.3*   AST 52*   ALKPHOS 156*   ALT 30       Coags:   Lab Results   Component Value Date    INR 1.3 (H) 07/09/2018       FLP:   Lab Results   Component Value Date    CHOL 139 07/02/2018    HDL 30 (L) 07/02/2018    LDLCALC 89.2 07/02/2018    TRIG 99 07/02/2018    CHOLHDL 21.6 07/02/2018       DM:   Lab Results   Component Value Date    HGBA1C 8.4 (H) 07/02/2018    HGBA1C 9.1 (H) 03/02/2018    HGBA1C 10.0 (H) 03/11/2017    HGBA1C 10.0 (H) 03/11/2017    GLUF 168 (H) 05/16/2005    LDLCALC 89.2 07/02/2018    CREATININE 1.1 07/09/2018       Thyroid:   Lab Results   Component Value Date    TSH 3.770 03/02/2018       Anemia:   Lab Results   Component Value Date    IRON 156 07/09/2018    TIBC 295 07/09/2018    TRANSFERRIN 199 (L) 07/09/2018    FERRITIN 172 07/09/2018       Cardiac:   Lab Results   Component Value Date    TROPONINI 0.021 07/09/2018     (H) 07/09/2018       Urinalysis:   Lab Results   Component Value Date    LABURIN  05/16/2005     MULTIPLE ORGANISMS ISOLATED. NONE IN PREDOMINANCE REPEAT IF CLINICALLY NECESSARY.    COLORU Yellow 07/09/2018    SPECGRAV 1.025 07/09/2018    NITRITE Negative 07/09/2018    KETONESU Negative 07/09/2018    UROBILINOGEN 1.0 07/09/2018    WBCUA 100 (H) 07/09/2018       Trended Cardiac Data:    Recent Labs  Lab 07/09/18  1201   TROPONINI 0.021   *       Microbiology Data:  None    Radiology:  X-ray Chest Ap Portable    Result Date: 7/9/2018  EXAMINATION: XR CHEST AP PORTABLE CLINICAL HISTORY: chest pain; TECHNIQUE: Single frontal view of the chest was performed. COMPARISON: Chest radiograph 03/01/2018 and CT thorax 03/01/2018 FINDINGS: Patient is somewhat rotated.  Large body habitus.  Right lung apex is partially obscured by overlying chin soft tissues.  Cardiac silhouette is enlarged similar to prior with prominence of the central pulmonary  vasculature and bilateral diffuse interstitial coarsening concerning for pulmonary edema/CHF pattern.  Interstitial pneumonia not excluded.  Right costophrenic angle is not well visualized suggesting pleural thickening/scarring versus trace pleural fluid.  No large consolidation or pneumothorax.  Stable mediastinal contours.  No acute osseous process seen.  PA and lateral views can be obtained.     Findings suggesting pulmonary edema/CHF pattern without focal consolidation.  Interstitial pneumonia not excluded. Electronically signed by: Janak Granados MD Date:    07/09/2018 Time:    12:23      Current Medications:     Infusions:   octreotide (SANDOSTATIN) infusion      pantoprazole 40 mg in dextrose 5 % 100 mL infusion (ready to mix system) 8 mg/hr (07/09/18 1552)        Scheduled:   atorvastatin  40 mg Oral Daily    fluticasone-vilanterol  1 puff Inhalation Daily        PRN:  sodium chloride, albuterol-ipratropium, dextrose 50%, glucagon (human recombinant), insulin aspart U-100, sodium chloride 0.9%    Antibiotics and Day Number of Therapy:  None    Lines and Day Number of Therapy:  PIV     Assessment:     Natalie Parnell is a 83 y.o.female with  Present on Admission:   GI bleed   Liver disease   Chest pain   Aortic valve stenosis   DVT of deep femoral vein, right     Plan:     1. GI Bleed / Melena  - Presenting with 3 days of melena, FOBT + in ED  - GI consulted with recs to start octreotide gtt and protonix gtt  - NPO at midnight with plan for scope in AM  - Will continue to monitor H/H closely  - Type and crossed, no PRBC given at this time  - Holding Eliquis    2. Chronic Chest Pain Likely 2/2 Aortic Valve Stenosis  - Severe Aortic Stenosis noted on ECHO in March, Presenting with acute on chronic episode of chest pain  - Resolved during my encounter  - Initial Trop 0.021, will trend  - ECHO pending    3. Known right femoral vein DVT  - Noted on LE US in March 2018 noting non-occlusive CFV DVT  - Initiated  on eliquis at that time  - Eliquis on hold given above    4. Anemia, likely 2/2 chronic disease  - H/H on admit 10/30.8, MCV 94  - Will obtain iron studies and follow up    5. CKD2  - Baseline Cr ~1  - Will continue to monitor in setting of GI bleed    6. Elevated Lactate  - Likely 2/2 acute bleed and issue with perfusion  - Will continue to monitor    7. Abnormal UA  - 1+ Leukocytes, patient without symptoms of UTI  - Will monitor    HM  - PCP: Octavio Ferrell MD  - Immunizations:   Immunization History   Administered Date(s) Administered    Influenza - High Dose 12/03/2015    Pneumococcal Conjugate - 13 Valent 03/08/2017    Pneumococcal Polysaccharide - 23 Valent 12/03/2015    Td (ADULT) 09/15/2005    Tdap 12/03/2015    influenza - Quadrivalent 10/08/2014     Future Appointments  Date Time Provider Department Center   7/10/2018 9:40 AM Shanice Ashraf MD [facility] HEPAT [address]   7/17/2018 9:20 AM Mike Diaz MD [facility] CARDIO [facility]   7/24/2018 11:00 AM Mike Diaz MD [facility] CARDIO [facility]       F: Protonix gtt and Octreotide gtt  E: Replete as needed  N: NPO    Prophylaxis: SCD    Code Status: Full    Disposition: Admit to ICU for GI bleed    Kristian Simpson, DO  [facility] Internal Medicine-Pediatrics PGY-IV  [facility] Hospitalist Service Team B    \[facility] [place] [facility]\"" Medicine Hospitalist Pager numbers:   [facility] Hospitalist Medicine Team A (Iain/Aishwarya): [phone]  [facility] Hospitalist Medicine Team B (Luz/Eli):  [phone]

## 2018-07-09 NOTE — ASSESSMENT & PLAN NOTE
-complaints of intermittent chest pain   -initial troponin .021 and EKG with nonspecific STTWC noted; not present on previous EKG  -chest pain multifactoral and likely related to anemia vs AS vs ischemic etiology  -continue to trend CE and EKG; if H&H trends down further would benefit from PRBC transfusion  -repeat echo pending

## 2018-07-09 NOTE — HPI
83F with a medical hx of Severe AS, DVT on Eliquis, DM2, Cirrhosis (likely 2/2 HERNANDEZ), HTN presents with complaint of Melena x3 days.    Patient was in her usual state of health (can walk 1/2 block before resting due to SOB, manages her own ADLs) until 3 days ago when she began to mave multiple episodes of watery, black, foul smelling stools associated with SOB, fatigue, and worsening chest pain. Patient states she lives with chronic chest pain related to her severe AS however, since her melena started, her chest pain has been worsening with decreased exercise tolerance and fatigue.

## 2018-07-09 NOTE — CONSULTS
[facility]  Gastroenterology  Consult Note    Patient Name: Natalie Parnell  MRN: [id number]  Admission Date: 7/9/2018  Hospital Length of Stay: 0 days  Code Status: Full Code   Attending Provider: Chris Miller MD   Consulting Provider: Chuckie Barclay MD  Primary Care Physician: Octavio Ferrell MD  Principal Problem:<principal problem not specified>    Consults  Subjective:     HPI:  83F with a medical hx of Severe AS, DVT on Eliquis, DM2, Cirrhosis (likely 2/2 HERNANDEZ), HTN presents with complaint of Melena x3 days.    Patient was in her usual state of health (can walk 1/2 block before resting due to SOB, manages her own ADLs) until 3 days ago when she began to mave multiple episodes of watery, black, foul smelling stools associated with SOB, fatigue, and worsening chest pain. Patient states she lives with chronic chest pain related to her severe AS however, since her melena started, her chest pain has been worsening with decreased exercise tolerance and fatigue.    Past Medical History:   Diagnosis Date    Asthma     Diabetes mellitus     HTN (hypertension)        Past Surgical History:   Procedure Laterality Date    CATARACT EXTRACTION Bilateral     Dr. Max    HERNIA REPAIR      VARICOSE VEIN SURGERY         Review of patient's allergies indicates:  No Known Allergies  Family History     Problem Relation (Age of Onset)    No Known Problems Mother, Father, Sister, Brother, Maternal Aunt, Maternal Uncle, Paternal Aunt, Paternal Uncle, Maternal Grandmother, Maternal Grandfather, Paternal Grandmother, Paternal Grandfather        Social History Main Topics    Smoking status: Never Smoker    Smokeless tobacco: Never Used    Alcohol use No    Drug use: No    Sexual activity: Not on file     Review of Systems   Constitutional: Negative for chills and fever.   HENT: Negative for hearing loss and tinnitus.    Eyes: Negative for photophobia and pain.   Respiratory: Positive for chest  tightness and shortness of breath. Negative for cough.    Cardiovascular: Positive for chest pain. Negative for leg swelling.   Gastrointestinal: Positive for abdominal pain, blood in stool and diarrhea. Negative for constipation, nausea and vomiting.   Endocrine: Negative for polydipsia and polyuria.   Genitourinary: Negative for dysuria and hematuria.   Musculoskeletal: Negative for arthralgias and myalgias.   Skin: Negative for rash and wound.   Neurological: Negative for dizziness, syncope and light-headedness.   Psychiatric/Behavioral: Negative for behavioral problems and confusion.     Objective:     Vital Signs (Most Recent):  Temp: 98.3 °F (36.8 °C) (07/09/18 1130)  Pulse: 96 (07/09/18 1331)  Resp: (!) 26 (07/09/18 1331)  BP: (!) 98/44 (07/09/18 1331)  SpO2: 95 % (07/09/18 1331) Vital Signs (24h Range):  Temp:  [98.3 °F (36.8 °C)] 98.3 °F (36.8 °C)  Pulse:  [] 96  Resp:  [20-26] 26  SpO2:  [95 %-98 %] 95 %  BP: (83-98)/(42-55) 98/44     Weight: 80.7 kg (178 lb) (07/09/18 1130)  Body mass index is 30.55 kg/m².      Intake/Output Summary (Last 24 hours) at 07/09/18 1333  Last data filed at 07/09/18 1251   Gross per 24 hour   Intake             1000 ml   Output                0 ml   Net             1000 ml       Lines/Drains/Airways          No matching active lines, drains, or airways          Physical Exam   Constitutional: She is oriented to person, place, and time. She appears well-developed and well-nourished.   HENT:   Head: Normocephalic and atraumatic.   Eyes: Conjunctivae and EOM are normal. Pupils are equal, round, and reactive to light.   Neck: Normal range of motion. Neck supple.   Cardiovascular: Normal rate and regular rhythm.    Murmur heard.  Pulmonary/Chest: Effort normal and breath sounds normal.   Abdominal: Soft. Bowel sounds are normal. She exhibits no distension. There is no tenderness.   No shifting dullness or fluid wave   Musculoskeletal: Normal range of motion. She exhibits no  edema.   Neurological: She is alert and oriented to person, place, and time.   Skin: Skin is warm and dry. Capillary refill takes less than 2 seconds.   Psychiatric: She has a normal mood and affect. Her behavior is normal.       Significant Labs:  CBC:   Recent Labs  Lab 07/09/18  1201   WBC 6.62   HGB 10.0*   HCT 30.8*        CMP:   Recent Labs  Lab 07/09/18  1201   *   CALCIUM 9.1   ALBUMIN 2.9*   PROT 6.3   *   K 4.9   CO2 21*      BUN 36*   CREATININE 1.1   ALKPHOS 156*   ALT 30   AST 52*   BILITOT 1.3*     Coagulation:   Recent Labs  Lab 07/09/18  1301   INR 1.3*       Significant Imaging:  CXR: I have reviewed all results within the past 24 hours and my personal findings are:  Pulmonary Edema    Assessment/Plan:     Liver disease    - Suspect cirrhosis  - On exam, no evidence of ascites  - Previous CTA Chest and US Abd: hepatomegaly with nodularity of the surface of liver concern for cirrhosis. No ascites.  - Patient will need outpatient follow-up at [facility]        GI bleed    - Patient with 3 days of melena  - On admission, Hb 10.0 decreased from baseline Hb 12  - Continue to monitor H/H  - Continue on PPI GTT  - Start on Octreotide GTT due to nodularity of liver, suspected fibrosis  - Transfusion goal Hb >7  - Plan on EGD in AM. NPO at midnight              Thank you for your consult. I will follow-up with patient. Please contact us if you have any additional questions.    Chuckie Barclay MD  Gastroenterology  [facility]

## 2018-07-09 NOTE — PLAN OF CARE
Problem: Diabetes, Type 2 (Adult)  Goal: Signs and Symptoms of Listed Potential Problems Will be Absent, Minimized or Managed (Diabetes, Type 2)  Signs and symptoms of listed potential problems will be absent, minimized or managed by discharge/transition of care (reference Diabetes, Type 2 (Adult) CPG).   Outcome: Ongoing (interventions implemented as appropriate)   07/09/18 1841   Diabetes, Type 2   Problems Assessed (Type 2 Diabetes) all   Problems Present (Type 2 Diabetes) hyperglycemia       Problem: Fall Risk (Adult)  Goal: Identify Related Risk Factors and Signs and Symptoms  Related risk factors and signs and symptoms are identified upon initiation of Human Response Clinical Practice Guideline (CPG)   Outcome: Ongoing (interventions implemented as appropriate)   07/09/18 1841   Fall Risk   Related Risk Factors (Fall Risk) environment unfamiliar     Goal: Absence of Falls  Patient will demonstrate the desired outcomes by discharge/transition of care.   Outcome: Outcome(s) achieved Date Met: 07/09/18 07/09/18 1841   Fall Risk (Adult)   Absence of Falls achieves outcome       Problem: Patient Care Overview  Goal: Plan of Care Review  Outcome: Ongoing (interventions implemented as appropriate)   07/09/18 1841   Coping/Psychosocial   Plan Of Care Reviewed With patient;family     Arrival to ICU from ED. No complications at this time. No BM since arrival. VSS. Family at the bedside for language interpretation.     Problem: Gastrointestinal Bleeding (Adult)  Goal: Signs and Symptoms of Listed Potential Problems Will be Absent, Minimized or Managed (Gastrointestinal Bleeding)  Signs and symptoms of listed potential problems will be absent, minimized or managed by discharge/transition of care (reference Gastrointestinal Bleeding (Adult) CPG).  Outcome: Ongoing (interventions implemented as appropriate)   07/09/18 1841   Gastrointestinal Bleeding   Problems Assessed (GI Bleeding) all   Problems Present (GI Bleeding)  none       Problem: Communication Impaired, Verbal (Adult,Obstetrics,Pediatric)  Goal: Identify Related Risk Factors and Signs and Symptoms  Related risk factors and signs and symptoms are identified upon initiation of Human Response Clinical Practice Guideline (CPG)  Outcome: Ongoing (interventions implemented as appropriate)   07/09/18 1841   Communication Impaired, Verbal   Related Risk Factors (Verbal Communication Impaired) language barrier     Goal: Improved/Effective Communication  Patient will demonstrate the desired outcomes by discharge/transition of care.  Outcome: Ongoing (interventions implemented as appropriate)   07/09/18 1841   Communication Impaired, Verbal (Adult,Obstetrics,Pediatric)   Improved/Effective Communication making progress toward outcome  (Family at the bedside to help communicate)     Patient is primarily Nepali speaking

## 2018-07-09 NOTE — ASSESSMENT & PLAN NOTE
- Patient with 3 days of melena  - On admission, Hb 10.0 decreased from baseline Hb 12  - Continue to monitor H/H  - Continue on PPI GTT  - Start on Octreotide GTT due to nodularity of liver, suspected fibrosis  - Transfusion goal Hb >7  - Plan on EGD in AM. NPO at midnight

## 2018-07-09 NOTE — HPI
84yo Filipino speaking female with history of severe AS, HTN, nonocclusive right CFV DVT 3/1/2018 (on Eliquis), DMII and cirrhosis likely secondary to HERNANDEZ who presented to the ER with complaints of dark tarry stools. She is Filipino speaking only therefore translation was provided by her daughter. She complains of SOB with fast paced walking but never with mild exertion. She denies any dizziness but reports intermittent chest pain recently. Her daughter reports a syncopal episode within the past month while in [place]. She is followed by Dr. Diaz and was in process of TAVR workup for severe AS

## 2018-07-09 NOTE — ASSESSMENT & PLAN NOTE
-per LE venous ultrasound in 3/2018-nonocclusive right CFV DVT present  -treated with Eliquis  -on hold given GIB upon presentation  -will repeat BLE venous ultrasound for re evaluation for DVT  -will re evaluation after GI studies and venous ultrasound completed for final AC recommendations

## 2018-07-10 PROBLEM — I21.4 NSTEMI (NON-ST ELEVATED MYOCARDIAL INFARCTION): Status: ACTIVE | Noted: 2018-01-01

## 2018-07-10 NOTE — ANESTHESIA POSTPROCEDURE EVALUATION
"Anesthesia Post Evaluation    Patient: Natalie Parnell    Procedure(s) Performed: Procedure(s) (LRB):  EGD (ESOPHAGOGASTRODUODENOSCOPY) (N/A)    Final Anesthesia Type: MAC  Patient location during evaluation: PACU  Patient participation: Yes- Able to Participate  Level of consciousness: awake and alert  Post-procedure vital signs: reviewed and stable  Pain management: adequate  Airway patency: patent  PONV status at discharge: No PONV  Anesthetic complications: no      Cardiovascular status: blood pressure returned to baseline  Respiratory status: unassisted  Hydration status: euvolemic  Follow-up not needed.        Visit Vitals  /60   Pulse 87   Temp 37.2 °C (99 °F)   Resp (!) 26   Ht 5' 2" (1.575 m)   Wt 85.4 kg (188 lb 4.4 oz)   SpO2 (!) 94%   Breastfeeding? No   BMI 34.44 kg/m²       Pain/Idania Score: Pain Assessment Performed: Yes (7/10/2018  1:30 PM)  Presence of Pain: denies (7/10/2018  1:30 PM)      "

## 2018-07-10 NOTE — PLAN OF CARE
Plan of Care    Contacted [facility] on call regarding uptrending Trop in setting of acute GI bleed.  Given unable to anticoagulate, patient other asymptomatic, BP stable, continue to monitor.    Trended Cardiac Data/Recent Labs  Lab 07/09/18  1201 07/09/18  2045 07/10/18  0337   TROPONINI 0.021 2.293* 7.644*   * 299*  --      Plan  - Reviewed LE venous US obtained on 7/9 = no evidence of DVT  - Continue to monitor & Continue to trend trops  - If Hgb drops or patient with symptoms, will consider transfusion    Kristian Simpson, DO  [facility] Internal Medicine-Pediatrics PGY-IV  [employer] Hospitalist Service Team B    [facility] Medicine Hospitalist Pager numbers:   [facility] Hospitalist Medicine Team A (Iain/Aishwarya): [phone]  [facility] Hospitalist Medicine Team B (Luz/Eli):  [phone]

## 2018-07-10 NOTE — PROGRESS NOTES
[facility]  Cardiology  Progress Note    Patient Name: Natalie Parnell  MRN: [id number]  Admission Date: 7/9/2018  Hospital Length of Stay: 1 days  Code Status: Full Code   Attending Physician: Ezequiel Escobar MD   Primary Care Physician: Octavio Ferrell MD  Expected Discharge Date:   Principal Problem:GI bleed    Subjective:     Hospital Course:   7/9/2018 Presented with complaints of melena and intermittent chest pain. H&H 10.0&30.8 down from 12.1&36.8 in March 2018. Troponin .021 and . HR stable. BP marginal with SBP 90s. CXR suggestive of pulmonary edema/CHF pattern without focal consolidation-image compared to CXR from 3/2018 with similar appearance.   Echocardiogram today with pending results. Repeat BLE venous ultrasound ordered. GI consulted with plans for EGD tomorrow per daughter. Cardiology consulted due to history of AS and complaints of chest pain   7/10/2018 HR and BP stable overnight. H&H 9.0&26.9 this AM with 1unit PRBC in process. Troponin trended with trend up to 2.293-7.644 and down to 4.8 this AM. Echocardiogram yesterday with normal LVEF and severe AS with SHEA .65cm2 and MG 74mmHg with WMA (anterior septum moderately hypokinetic & apical septum, mid inferoseptum, apical lateral mildly hypokinetic). NPO today for possible EGD         Review of Systems   Constitution: Negative for chills, decreased appetite, diaphoresis, fever and weakness.   Cardiovascular: Positive for chest pain. Negative for claudication, cyanosis, dyspnea on exertion, irregular heartbeat, leg swelling, near-syncope, orthopnea, palpitations, paroxysmal nocturnal dyspnea and syncope.   Respiratory: Negative for cough, hemoptysis, shortness of breath and wheezing.    Gastrointestinal: Positive for melena. Negative for bloating, abdominal pain, constipation, diarrhea, nausea and vomiting.   Neurological: Negative for dizziness.     Objective:     Vital Signs (Most Recent):  Temp: 98.4 °F (36.9 °C)  (07/10/18 1130)  Pulse: 82 (07/10/18 1315)  Resp: (!) 24 (07/10/18 1315)  BP: (!) 110/56 (07/10/18 1315)  SpO2: 96 % (07/10/18 1315) Vital Signs (24h Range):  Temp:  [97.7 °F (36.5 °C)-98.4 °F (36.9 °C)] 98.4 °F (36.9 °C)  Pulse:  [] 82  Resp:  [17-43] 24  SpO2:  [93 %-99 %] 96 %  BP: ()/(42-94) 110/56     Weight: 85.4 kg (188 lb 4.4 oz)  Body mass index is 34.44 kg/m².     SpO2: 96 %  O2 Device (Oxygen Therapy): room air      Intake/Output Summary (Last 24 hours) at 07/10/18 1415  Last data filed at 07/10/18 1111   Gross per 24 hour   Intake              318 ml   Output              400 ml   Net              -82 ml       Lines/Drains/Airways     Peripheral Intravenous Line                 Peripheral IV - Single Lumen 07/09/18 1421 Left Hand less than 1 day         Peripheral IV - Single Lumen 07/09/18 1422 Right Antecubital less than 1 day                Physical Exam   Constitutional: She is oriented to person, place, and time. She appears well-developed and well-nourished. No distress.   Cardiovascular: Normal rate and regular rhythm.  Exam reveals no gallop.    Murmur heard.  Pulmonary/Chest: Effort normal and breath sounds normal. No respiratory distress. She has no wheezes.   Abdominal: Soft. Bowel sounds are normal. She exhibits no distension. There is no tenderness.   Neurological: She is alert and oriented to person, place, and time.   Skin: Skin is warm and dry.       Significant Labs:       Recent Labs  Lab 07/10/18  0352   WBC 7.47   RBC 2.88*   HGB 9.0*   HCT 26.9*      MCV 93   MCH 31.3*   MCHC 33.5       Recent Labs  Lab 07/09/18  2045 07/10/18  0337   CALCIUM 8.7 8.5*   PROT 5.6*  --     138   K 5.5* 4.7   CO2 20* 22*    110   BUN 44* 45*   CREATININE 1.0 1.1   ALKPHOS 126  --    ALT 29  --    AST 60*  --    BILITOT 1.0  --        Recent Labs  Lab 07/10/18  0851   TROPONINI 4.856*       Significant Imaging: Echocardiogram:   2D echo with color flow doppler:   Results  for orders placed or performed during the hospital encounter of 07/09/18   2D echo with color flow doppler   Result Value Ref Range    EF 50 55 - 65    Mitral Valve Regurgitation MILD     Aortic Valve Stenosis SEVERE (A)     Est. PA Systolic Pressure 61.98 (A)     Mitral Valve Mobility NORMAL     Tricuspid Valve Regurgitation MILD      Assessment and Plan:     Brief HPI: Seen on rounds this afternoon with Dr. Diaz with daughter at the bedside. Denies any complaints currently. Reviewed cardiac POC as detailed below-both verbalized understanding and agree with POC via translation by Dr. Diaz     * GI bleed    -H&H 9.0&26.9 this AM; baseline 12.1&36.8 in March  -GI on board with plans for possible scope today   -PRBC transfusion in process       NSTEMI (non-ST elevated myocardial infarction)    -initial troponin .021; trended up overnight and peaked at 7.644 with trend down to 4.856 this AM  -on statin therapy only; no ASA and Plavix due to GIB; no ACEI or BB due to marginal BP  -concerned about ischemic etiology and feel ischemic evaluation with C needed but will need to defer until anemia issues fully evaluated by GI  -echo with normal LVEF and WMA        Aortic valve stenosis    -severe per echo in March with SHEA .58cm2 and MG 58mmHg  -repeat echo with SHEA .65cm2 and mean gradient 74mmHg  -[facility]further discussion today and not convinced related to AS  -complaints of intermittent chest pain a concerning for AS etiology vs anemia vs ischemic etiology   -agree with proceeding with EGD for further bleeding evaluation given concern for AVMs in setting of AS   -seen by [facility] valve clinic previously and felt to be asymptomatic from AS standpoint; recommended follow up in 6 mos (September 2018)        DVT of deep femoral vein, right    -per LE venous ultrasound in 3/2018-nonocclusive right CFV DVT present  -treated with Eliquis  -repeat BLE venous ultrasound with no  evidence of DVT  -will continue to hold Eliquis and anticipate complete discontinuation upon discharge            VTE Risk Mitigation         Ordered     Place CORWIN hose  Until discontinued      07/10/18 1023     Place sequential compression device  Until discontinued      07/10/18 9436          BIANKA Booker, ANP  Cardiology  [facility]

## 2018-07-10 NOTE — PLAN OF CARE
Problem: Syncope (Adult)  Goal: Identify Related Risk Factors and Signs and Symptoms  Related risk factors and signs and symptoms are identified upon initiation of Human Response Clinical Practice Guideline (CPG)   Outcome: Ongoing (interventions implemented as appropriate)  AAOx4 On RA w/ stable O2 sats. One episode of orthostatic hypotension this AM. 1uPRBC given. H/h monitored. Instructed pt on safety, call light within reach, side rails up x2, bed low and locked. Pt verbalized understanding. Will continue to monitor. Pt and family oriented to call light. Rates pain 3/10. Will continue to monitor.

## 2018-07-10 NOTE — PLAN OF CARE
TN met with patient and daughter in ICU. Pt has no HH or DME needs prior. Daughter states patient has been very SOB lately with minimal exertion. TN to follow up on discharge needs. Assessment completed in Filipino.       Future Appointments  Date Time Provider Department Center   7/17/2018 9:20 AM Mike Diaz MD [facility] [place] [facility]   7/24/2018 11:00 AM Mike Diaz MD [facility] [place] [facility]          07/10/18 2536   Discharge Assessment   Assessment Type Discharge Planning Assessment   Confirmed/corrected address and phone number on facesheet? Yes   Assessment information obtained from? Patient;Caregiver   Communicated expected length of stay with patient/caregiver yes   Prior to hospitilization cognitive status: Alert/Oriented   Prior to hospitalization functional status: Independent   Current cognitive status: Alert/Oriented   Current Functional Status: Independent   Lives With alone  (lives in duplex,  daughter on other side. )   Able to Return to Prior Arrangements yes   Is patient able to care for self after discharge? Yes   Who are your caregiver(s) and their phone number(s)? daughter- renan   Patient's perception of discharge disposition home or selfcare   Readmission Within The Last 30 Days no previous admission in last 30 days   Patient currently being followed by outpatient case management? No   Patient currently receives any other outside agency services? No   Equipment Currently Used at Home glucometer   Do you have any problems affording any of your prescribed medications? No   Is the patient taking medications as prescribed? yes   Does the patient have transportation home? Yes   Transportation Available family or friend will provide   Does the patient receive services at the Coumadin Clinic? No   Discharge Plan A Home with family   Discharge Plan B Home with family;Home Health   Patient/Family In Agreement With Plan yes

## 2018-07-10 NOTE — SUBJECTIVE & OBJECTIVE
Review of Systems   Constitution: Negative for chills, decreased appetite, diaphoresis, fever and weakness.   Cardiovascular: Positive for chest pain. Negative for claudication, cyanosis, dyspnea on exertion, irregular heartbeat, leg swelling, near-syncope, orthopnea, palpitations, paroxysmal nocturnal dyspnea and syncope.   Respiratory: Negative for cough, hemoptysis, shortness of breath and wheezing.    Gastrointestinal: Positive for melena. Negative for bloating, abdominal pain, constipation, diarrhea, nausea and vomiting.   Neurological: Negative for dizziness.     Objective:     Vital Signs (Most Recent):  Temp: 98.4 °F (36.9 °C) (07/10/18 1130)  Pulse: 82 (07/10/18 1315)  Resp: (!) 24 (07/10/18 1315)  BP: (!) 110/56 (07/10/18 1315)  SpO2: 96 % (07/10/18 1315) Vital Signs (24h Range):  Temp:  [97.7 °F (36.5 °C)-98.4 °F (36.9 °C)] 98.4 °F (36.9 °C)  Pulse:  [] 82  Resp:  [17-43] 24  SpO2:  [93 %-99 %] 96 %  BP: ()/(42-94) 110/56     Weight: 85.4 kg (188 lb 4.4 oz)  Body mass index is 34.44 kg/m².     SpO2: 96 %  O2 Device (Oxygen Therapy): room air      Intake/Output Summary (Last 24 hours) at 07/10/18 1415  Last data filed at 07/10/18 1111   Gross per 24 hour   Intake              318 ml   Output              400 ml   Net              -82 ml       Lines/Drains/Airways     Peripheral Intravenous Line                 Peripheral IV - Single Lumen 07/09/18 1421 Left Hand less than 1 day         Peripheral IV - Single Lumen 07/09/18 1422 Right Antecubital less than 1 day                Physical Exam   Constitutional: She is oriented to person, place, and time. She appears well-developed and well-nourished. No distress.   Cardiovascular: Normal rate and regular rhythm.  Exam reveals no gallop.    Murmur heard.  Pulmonary/Chest: Effort normal and breath sounds normal. No respiratory distress. She has no wheezes.   Abdominal: Soft. Bowel sounds are normal. She exhibits no distension. There is no  tenderness.   Neurological: She is alert and oriented to person, place, and time.   Skin: Skin is warm and dry.       Significant Labs:       Recent Labs  Lab 07/10/18  0352   WBC 7.47   RBC 2.88*   HGB 9.0*   HCT 26.9*      MCV 93   MCH 31.3*   MCHC 33.5       Recent Labs  Lab 07/09/18  2045 07/10/18  0337   CALCIUM 8.7 8.5*   PROT 5.6*  --     138   K 5.5* 4.7   CO2 20* 22*    110   BUN 44* 45*   CREATININE 1.0 1.1   ALKPHOS 126  --    ALT 29  --    AST 60*  --    BILITOT 1.0  --        Recent Labs  Lab 07/10/18  0851   TROPONINI 4.856*       Significant Imaging: Echocardiogram:   2D echo with color flow doppler:   Results for orders placed or performed during the hospital encounter of 07/09/18   2D echo with color flow doppler   Result Value Ref Range    EF 50 55 - 65    Mitral Valve Regurgitation MILD     Aortic Valve Stenosis SEVERE (A)     Est. PA Systolic Pressure 61.98 (A)     Mitral Valve Mobility NORMAL     Tricuspid Valve Regurgitation MILD

## 2018-07-10 NOTE — PLAN OF CARE
GI Brief Plan of Care Note:    S/p EGD today, see forthcoming procedure note for full details of findings, impressions, and recommendations. In brief:    Impressions:  - Non-bleeding, single angiodysplasia lesion -> s/p APC  - Non-bleeding, Grade I esophageal varices without stigmata of recent bleeding    Recommendations:  - Return to ICU for ongoing care, Cardiology workup of severe AS/pHTN, etc  - Anticoag/antiplatelet management as per primary and cardiology  - PO PPI  - advance diet as tolerated  - monitor Hgb  - will need followup and completion of workup of cirrhosis, which is decompensated by definition at least of presence of varices.    GI will continue to follow post-procedurally. Please call with any questions. Thank you.    Margarito Viera MD PGY-4  [employer] Gastroenterology Fellow  p: [phone]

## 2018-07-10 NOTE — ASSESSMENT & PLAN NOTE
-initial troponin .021; trended up overnight and peaked at 7.644 with trend down to 4.856 this AM  -on statin therapy only; no ASA and Plavix due to GIB; no ACEI or BB due to marginal BP  -concerned about ischemic etiology and feel ischemic evaluation with LHC needed but will need to defer until anemia issues fully evaluated by GI  -echo with normal LVEF and WMA

## 2018-07-10 NOTE — ANESTHESIA PREPROCEDURE EVALUATION
07/10/2018  Natalie Parnell is a 83 y.o., female with GIB/anemia in ICU for EGD. Had episodes of orthostatic hypotension earlier today, now receiving 1 unit PRBC's.     Pt has critical symptomatic aortic stenosis, high risk for cardiopulmonary decompensation with anesthesia. Needs w/u for GIB prior to cardiac intervention.     Review of patient's allergies indicates:  No Known Allergies    Past Medical History:   Diagnosis Date    Asthma     Diabetes mellitus     HTN (hypertension)      Past Surgical History:   Procedure Laterality Date    CATARACT EXTRACTION Bilateral     Dr. Max    HERNIA REPAIR      VARICOSE VEIN SURGERY       Patient Active Problem List   Diagnosis    DM (diabetes mellitus)    Type II or unspecified type diabetes mellitus with neurological manifestations, uncontrolled(250.62)    Chest pain    Elevated troponin    Cirrhosis of liver without ascites    DVT of deep femoral vein, right    Nonrheumatic aortic valve stenosis    Aortic valve stenosis    Congestive heart failure    GI bleed    Liver disease     Wt Readings from Last 3 Encounters:   07/10/18 85.4 kg (188 lb 4.4 oz)   07/05/18 82.6 kg (182 lb)   06/15/18 80.7 kg (178 lb)     Temp Readings from Last 3 Encounters:   07/10/18 36.5 °C (97.7 °F) (Oral)   03/21/18 36.7 °C (98 °F) (Oral)   03/02/18 37.1 °C (98.8 °F)     BP Readings from Last 3 Encounters:   07/10/18 (!) 108/56   06/15/18 113/72   05/08/18 120/80     Pulse Readings from Last 3 Encounters:   07/10/18 88   06/15/18 89   05/08/18 85         Anesthesia Evaluation    I have reviewed the Patient Summary Reports.     I have reviewed the Medications.     Review of Systems      Physical Exam  General:  Well nourished, Obesity    Airway/Jaw/Neck:  Airway Findings: Mouth Opening: Normal Tongue: Normal  General Airway Assessment: Adult  Mallampati: II  Improves to II  with phonation.  TM Distance: Normal, at least 6 cm       Chest/Lungs:  Chest/Lungs Findings: Clear to auscultation, Normal Respiratory Rate     Heart/Vascular:  Heart Findings: Rate: Normal  Rhythm: Regular Rhythm  Sounds: Normal        Mental Status:  Mental Status Findings:  Cooperative, Alert and Oriented       Lab Results   Component Value Date    WBC 7.47 07/10/2018    HGB 9.0 (L) 07/10/2018    HCT 26.9 (L) 07/10/2018    MCV 93 07/10/2018     07/10/2018       Chemistry        Component Value Date/Time     07/10/2018 0337    K 4.7 07/10/2018 0337     07/10/2018 0337    CO2 22 (L) 07/10/2018 0337    BUN 45 (H) 07/10/2018 0337    CREATININE 1.1 07/10/2018 0337     (H) 07/10/2018 0337        Component Value Date/Time    CALCIUM 8.5 (L) 07/10/2018 0337    ALKPHOS 126 07/09/2018 2045    AST 60 (H) 07/09/2018 2045    ALT 29 07/09/2018 2045    BILITOT 1.0 07/09/2018 2045    ESTGFRAFRICA 54 (A) 07/10/2018 0337    EGFRNONAA 47 (A) 07/10/2018 0337          CONCLUSIONS     1 - Concentric hypertrophy.     2 - No wall motion abnormalities.     3 - Low normal to mildly depressed left ventricular systolic function (EF 50-55%).     4 - Normal left ventricular diastolic function.     5 - Normal right ventricular systolic function .     6 - Pulmonary hypertension. The estimated PA systolic pressure is 41 mmHg.     7 - Severe aortic valve stenosis (SHEA 0.58 cm2, AVAi 0.30 cm2/m2, peak aortic jet velocity 4.7 m/s,MG 58 mmHg, ).     8 - Mild mitral regurgitation.     9 - Trivial to mild tricuspid regurgitation.     10 - Intermediate central venous pressure.             This document has been electronically    SIGNED BY: Katerin Boggs MD On: 03/01/2018 15:56    Anesthesia Plan  Type of Anesthesia, risks & benefits discussed:  Anesthesia Type:  MAC  Patient's Preference:   Intra-op Monitoring Plan:   Intra-op Monitoring Plan Comments:   Post Op Pain Control Plan:   Post Op Pain Control Plan Comments:    Induction:   IV  Beta Blocker:         Informed Consent: Patient understands risks and agrees with Anesthesia plan.  Questions answered. Anesthesia consent signed with patient.  ASA Score: 4     Day of Surgery Review of History & Physical: I have interviewed and examined the patient. I have reviewed the patient's H&P dated:  There are no significant changes.  H&P update referred to the provider.  H&P completed by Anesthesiologist.   Anesthesia Plan Notes: Maintain HR and BP        Ready For Surgery From Anesthesia Perspective.

## 2018-07-10 NOTE — TRANSFER OF CARE
"Anesthesia Transfer of Care Note    Patient: Natalie Parnell    Procedure(s) Performed: Procedure(s) (LRB):  EGD (ESOPHAGOGASTRODUODENOSCOPY) (N/A)    Patient location: ICU    Anesthesia Type: MAC    Transport from OR: Transported from OR on room air with adequate spontaneous ventilation. Continuous ECG monitoring in transport. Continuous SpO2 monitoring in transport    Post pain: adequate analgesia    Post assessment: no apparent anesthetic complications and tolerated procedure well    Post vital signs: stable    Level of consciousness: awake, alert and oriented    Nausea/Vomiting: no nausea/vomiting    Complications: none    Transfer of care protocol was followed      Last vitals:   Visit Vitals  /63   Pulse 90   Temp 37.1 °C (98.7 °F) (Oral)   Resp (!) 37   Ht 5' 2" (1.575 m)   Wt 85.4 kg (188 lb 4.4 oz)   SpO2 95%   Breastfeeding? No   BMI 34.44 kg/m²     "

## 2018-07-10 NOTE — PROGRESS NOTES
"[facility] Medicine Resident HO-III Progress Note    Subjective:      Black, tarry BM last night; no other black or bloody bowel movements since. Denies abd pain. Continued left arm and back pain that radiates to left chest; unchanged in character since admit and from previous months. Denies sob, n/v, fevers/chills/sweats. Low BP this AM with decision to transfuse 1 unit pRBC; has yet to receive transfusion at time of interview     Objective:   Last 24 Hour Vital Signs:  BP  Min: 83/42  Max: 132/63  Temp  Av.1 °F (36.7 °C)  Min: 97.7 °F (36.5 °C)  Max: 98.3 °F (36.8 °C)  Pulse  Av.1  Min: 82  Max: 101  Resp  Av.8  Min: 17  Max: 33  SpO2  Av.3 %  Min: 94 %  Max: 99 %  Height  Av' 3.3" (160.8 cm)  Min: 5' 2" (157.5 cm)  Max: 5' 4" (162.6 cm)  Weight  Av.1 kg (183 lb 2.2 oz)  Min: 80.7 kg (178 lb)  Max: 85.4 kg (188 lb 4.4 oz)  I/O last 3 completed shifts:  In: 1168 [I.V.:1168]  Out: -   +orthostatics  Physical Examination:  Gen: lying in bed  nad  HEENT: NCAT, EOMI, PERRLA, OP clear, MMM  Neck: no adenopathy, +cartotid bruit, JVP<4cm, supple, symmetrical, trachea midline and thyroid not enlarged, symmetric, no tenderness/mass/nodules  Back: symmetric, no curvature. ROM normal. No CVA tenderness.  Lungs: clear to auscultation bilaterally and no wheezing or crackles appreciated  Chest wall: no tenderness  Heart: RRR, III/VI murmur with radiation to carotids and brisk upstroke  Abdomen: Soft, ND, BS+, no TTP  Extremities: no c/c/e  Skin: Skin color, texture, turgor normal. No rashes or lesions  Neurologic: AAO x4 (person, place, time, events)  face symmetric  moves all extremities    Laboratory:  Laboratory Data Reviewed: yes  Pertinent Findings:    Recent Labs  Lab 18  1201 18  1807 18  2045 07/10/18  0035 07/10/18  0337 07/10/18  0352   WBC 6.62 7.03  --  7.66  --  7.47   HGB 10.0* 9.1*  --  8.8*  --  9.0*   HCT 30.8* 28.2*  --  26.7*  --  26.9*    165  --  174  --  160 "   MCV 94 95  --  92  --  93   RDW 14.5 14.6*  --  14.3  --  14.4   *  --  136  --  138  --    K 4.9  --  5.5*  --  4.7  --      --  109  --  110  --    CO2 21*  --  20*  --  22*  --    BUN 36*  --  44*  --  45*  --    CREATININE 1.1  --  1.0  --  1.1  --    *  --  160*  --  130*  --    PROT 6.3  --  5.6*  --   --   --    ALBUMIN 2.9*  --  2.6*  --   --   --    BILITOT 1.3*  --  1.0  --   --   --    AST 52*  --  60*  --   --   --    ALKPHOS 156*  --  126  --   --   --    ALT 30  --  29  --   --   --        Microbiology Data Reviewed: yes  Pertinent Findings:  none    Other Results:  EKG (my interpretation): NSR   T wave flattening in precordial leads  no significant ST changes    Radiology Data Reviewed: yes  Pertinent Findings:    TTE    1 - Low normal to mildly depressed left ventricular systolic function (EF 50-55%).     2 - Severe aortic valve stenosis (SHEA 0.65 cm2, AVAi 0.35 cm2/m2, peak aortic jet velocity 5.3 m/s,MG 74 mmHg, ).     3 - Mild mitral regurgitation.     4 - Severe left atrial enlargement.     5 - Indeterminate LV diastolic function.     6 - Pulmonary hypertension. The estimated PA systolic pressure is 62 mmHg.     Current Medications:     Infusions:   octreotide (SANDOSTATIN) infusion 50 mcg/hr (07/09/18 9089)    pantoprazole 40 mg in dextrose 5 % 100 mL infusion (ready to mix system) 8 mg/hr (07/10/18 4306)        Scheduled:   atorvastatin  40 mg Oral Daily    fluticasone-vilanterol  1 puff Inhalation Daily        PRN:  sodium chloride, albuterol-ipratropium, dextrose 50%, glucagon (human recombinant), insulin aspart U-100, sodium chloride 0.9%    Antibiotics and Day Number of Therapy:  none    Lines and Day Number of Therapy:  PIV    Assessment:     Natalie Parnell is a 83 y.o.female with  Patient Active Problem List    Diagnosis Date Noted    GI bleed 07/09/2018    Liver disease 07/09/2018    Congestive heart failure     Aortic valve stenosis 03/16/2018     Nonrheumatic aortic valve stenosis 03/02/2018    Cirrhosis of liver without ascites     DVT of deep femoral vein, right     Chest pain 03/01/2018    Elevated troponin 03/01/2018    Type II or unspecified type diabetes mellitus with neurological manifestations, uncontrolled(250.62) 06/16/2015    DM (diabetes mellitus) 10/08/2014        Plan:     Acute blood loss anemia 2/2 GI hemorrhage  - Presenting with 3 days of melena, FOBT + in ED  - GI consulted  pt with imaging in past concerning for cirrhosis  octreotide gtt and protonix gtt  - given severe AS, 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)  - transfuse 1u pRBC this AM  CBC q6hr  goal Hgb > 10 as concern for ACS  - EGD today if stable     NSTEMI  -troponin 0.021 that peaked at 7.644  -presenting with acute on chronic chest pain  -cards consulted  no intervention at this time    Severe aortic stenosis  -TTE shows SHEA 0.65cm2  -followed by Joe as outpatient and is being considered for TAVR     Right femoral vein DVT, POA  - Noted on LE US in March 2018 noting non-occlusive CFV DVT  - Initiated on eliquis at that time  - Eliquis on hold given GIB     CKD2  - Baseline Cr ~1  at baseline  - Will continue to monitor in setting of GI bleed     Elevated Lactate, resolved  - Likely 2/2 acute bleed and issue with perfusion  - normalized     Bacteruria and pyuria  - asymptomatic, afebrile, no leukocytosis  - monitor    Code: full  Diet: NPO  DVT: SCDs/CORWIN  Dispo: pending EGD with possible intervention by REYNA Cyr  [facility] Internal Medicine HO-III  [facility] Hospitalists Service Team B    [facility] Medicine Hospitalist Pager numbers:   [facility] Hospitalist Medicine Team A (Iain/Aishwarya): [phone]  [facility] Hospitalist Medicine Team B (Luz/Eli):  [phone]

## 2018-07-10 NOTE — [employer] PATIENT INSTRUCTIONS
Discharge Summary/Instructions after an Endoscopic Procedure  Patient Name: Natalie Parnell  Patient MRN: [id number]  Patient YOB: 1935  Tuesday, July 10, 2018  Derian Stoner MD  RESTRICTIONS:  During your procedure today, you received medications for sedation.  These   medications may affect your judgment, balance and coordination.  Therefore,   for 24 hours, you have the following restrictions:   - DO NOT drive a car, operate machinery, make legal/financial decisions,   sign important papers or drink alcohol.    ACTIVITY:  Today: no heavy lifting, straining or running due to procedural   sedation/anesthesia.  The following day: return to full activity including work.  DIET:  Eat and drink normally unless instructed otherwise.     TREATMENT FOR COMMON SIDE EFFECTS:  - Mild abdominal pain, nausea, belching, bloating or excessive gas:  rest,   eat lightly and use a heating pad.  - Sore Throat: treat with throat lozenges and/or gargle with warm salt   water.  - Because air was used during the procedure, expelling large amounts of air   from your rectum or belching is normal.  - If a bowel prep was taken, you may not have a bowel movement for 1-3 days.    This is normal.  SYMPTOMS TO WATCH FOR AND REPORT TO YOUR PHYSICIAN:  1. Abdominal pain or bloating, other than gas cramps.  2. Chest pain.  3. Back pain.  4. Signs of infection such as: chills or fever occurring within 24 hours   after the procedure.  5. Rectal bleeding, which would show as bright red, maroon, or black stools.   (A tablespoon of blood from the rectum is not serious, especially if   hemorrhoids are present.)  6. Vomiting.  7. Weakness or dizziness.  GO DIRECTLY TO THE NEAREST EMERGENCY ROOM IF YOU HAVE ANY OF THE FOLLOWING:      Difficulty breathing              Chills and/or fever over 101 F   Persistent vomiting and/or vomiting blood   Severe abdominal pain   Severe chest pain   Black, tarry stools   Bleeding- more than one  tablespoon   Any other symptom or condition that you feel may need urgent attention  Your doctor recommends these additional instructions:  If any biopsies were taken, your doctors clinic will contact you in 1 to 2   weeks with any results.  - Return patient to ICU for ongoing care.   - Advance diet  - PPI  - Monitor hct  For questions, problems or results please call your physician - Derian Stoner MD at Work:  [phone].  EMERGENCY PHONE NUMBER: [phone],  LAB RESULTS: [phone]  IF A COMPLICATION OR EMERGENCY SITUATION ARISES AND YOU ARE UNABLE TO REACH   YOUR PHYSICIAN - GO DIRECTLY TO THE EMERGENCY ROOM.  Derian Stoner MD  7/10/2018 3:55:05 PM  This report has been verified and signed electronically.  [employer]

## 2018-07-10 NOTE — PLAN OF CARE
Problem: Patient Care Overview  Goal: Plan of Care Review  Outcome: Ongoing (interventions implemented as appropriate)  Pt on RA with documented sats. No resp distress noted. The proper method of use, as well as anticipated side effects, of this metered-dose inhaler are discussed and demonstrated to the patient. Will continue to monitor.

## 2018-07-10 NOTE — ASSESSMENT & PLAN NOTE
-per LE venous ultrasound in 3/2018-nonocclusive right CFV DVT present  -treated with Eliquis  -repeat BLE venous ultrasound with no evidence of DVT  -will continue to hold Eliquis and anticipate complete discontinuation upon discharge

## 2018-07-10 NOTE — ASSESSMENT & PLAN NOTE
-severe per echo in March with SHEA .58cm2 and MG 58mmHg  -repeat echo with SHEA .65cm2 and mean gradient 74mmHg  -syncopal episode reported per daughter in [place]-further discussion today and not convinced related to AS  -complaints of intermittent chest pain a concerning for AS etiology vs anemia vs ischemic etiology   -agree with proceeding with EGD for further bleeding evaluation given concern for AVMs in setting of AS   -seen by [facility] valve clinic previously and felt to be asymptomatic from AS standpoint; recommended follow up in 6 mos (September 2018)

## 2018-07-10 NOTE — PLAN OF CARE
Problem: Gastrointestinal Bleeding (Adult)  Goal: Signs and Symptoms of Listed Potential Problems Will be Absent, Minimized or Managed (Gastrointestinal Bleeding)  Signs and symptoms of listed potential problems will be absent, minimized or managed by discharge/transition of care (reference Gastrointestinal Bleeding (Adult) CPG).   Outcome: Ongoing (interventions implemented as appropriate)  Pt AAOx4, NAD, VSS stable. Pt NPO since midnight for scheduled EGD this am. Pt verbalizes understanding. One tarry stool noted on shift.  Monitored for bleeding, q6 CBC's ordered. Protonix and Octreotide gtt infusing. Pt denies any pain at this time. Freq rounds for safety and pain assessments. Call light in reach, will continue to monitor.

## 2018-07-10 NOTE — PROGRESS NOTES
Pt requesting to get up to BSC. While assisting to edge of bed, BP dropped significantly (60s/50s). Returned patient to lying position. BP increased to 117/54. Continues to complain chest pain that radiates to shoulder. States not a new pain. Dr Simpson notified. Order received for 1u PRBC. Orders carried out. Will continue to monitor.

## 2018-07-11 PROBLEM — R07.9 CHEST PAIN: Status: RESOLVED | Noted: 2018-01-01 | Resolved: 2018-01-01

## 2018-07-11 PROBLEM — K92.2 GASTROINTESTINAL HEMORRHAGE: Status: ACTIVE | Noted: 2018-01-01

## 2018-07-11 PROBLEM — I21.4 NSTEMI (NON-ST ELEVATED MYOCARDIAL INFARCTION): Status: RESOLVED | Noted: 2018-01-01 | Resolved: 2018-01-01

## 2018-07-11 PROBLEM — K92.2 GI BLEED: Status: RESOLVED | Noted: 2018-01-01 | Resolved: 2018-01-01

## 2018-07-11 PROBLEM — K92.2 GASTROINTESTINAL HEMORRHAGE: Status: RESOLVED | Noted: 2018-01-01 | Resolved: 2018-01-01

## 2018-07-11 NOTE — PROGRESS NOTES
[facility] Medicine Resident HO-III Progress Note    Subjective:      Pt doing well this AM; EGD yesterday and tolerated well without complications. Denies cp, sob, abd pain, n/v, black or bloody stool, fevers/chills/sweats. Tolerating PO diet. Voiding wtihout difficulty.     Objective:   Last 24 Hour Vital Signs:  BP  Min: 91/61  Max: 140/71  Temp  Av.8 °F (37.1 °C)  Min: 98.3 °F (36.8 °C)  Max: 99.3 °F (37.4 °C)  Pulse  Av.2  Min: 80  Max: 116  Resp  Av.9  Min: 17  Max: 45  SpO2  Av %  Min: 88 %  Max: 97 %  I/O last 3 completed shifts:  In: 979.7 [P.O.:100; I.V.:629.7; Blood:250]  Out: 1100 [Urine:1100]    Physical Examination:  Gen: lying in bed  nad  HEENT: NCAT, EOMI, PERRLA, OP clear, MMM  Neck: no adenopathy, +cartotid bruit, JVP5cm, supple, symmetrical, trachea midline and thyroid not enlarged, symmetric, no tenderness/mass/nodules  Back: symmetric, no curvature. ROM normal. No CVA tenderness.  Lungs: clear to auscultation bilaterally and no wheezing or crackles appreciated  Chest wall: no tenderness  Heart: RRR, III/VI murmur with radiation to carotids and brisk upstroke  Abdomen: Soft, ND, BS+, no TTP  Extremities: no c/c/e  Skin: Skin color, texture, turgor normal. No rashes or lesions  Neurologic: AAO x4 (person, place, time, events)  face symmetric  moves all extremities    Laboratory:  Laboratory Data Reviewed: yes  Pertinent Findings:    Recent Labs  Lab 18  1201  18  2045  07/10/18  0337 07/10/18  0352 07/10/18  1701 18  0402   WBC 6.62  < >  --   < >  --  7.47 11.05 7.79   HGB 10.0*  < >  --   < >  --  9.0* 10.9* 10.8*   HCT 30.8*  < >  --   < >  --  26.9* 32.7* 32.1*     < >  --   < >  --  160 175 184   MCV 94  < >  --   < >  --  93 93 92   RDW 14.5  < >  --   < >  --  14.4 14.9* 14.8*   *  --  136  --  138  --   --  136   K 4.9  --  5.5*  --  4.7  --   --  4.6     --  109  --  110  --   --  108   CO2 21*  --  20*  --  22*  --   --  25   BUN 36*   --  44*  --  45*  --   --  34*   CREATININE 1.1  --  1.0  --  1.1  --   --  1.1   *  --  160*  --  130*  --   --  142*   PROT 6.3  --  5.6*  --   --   --   --  5.8*   ALBUMIN 2.9*  --  2.6*  --   --   --   --  2.8*   BILITOT 1.3*  --  1.0  --   --   --   --  2.0*   AST 52*  --  60*  --   --   --   --  77*   ALKPHOS 156*  --  126  --   --   --   --  108   ALT 30  --  29  --   --   --   --  34   < > = values in this interval not displayed.    Microbiology Data Reviewed: yes  Pertinent Findings:  none    Other Results:  EKG (my interpretation): NSR   T wave flattening in precordial leads  no significant ST changes    Radiology Data Reviewed: yes  Pertinent Findings:    TTE    1 - Low normal to mildly depressed left ventricular systolic function (EF 50-55%).     2 - Severe aortic valve stenosis (SHEA 0.65 cm2, AVAi 0.35 cm2/m2, peak aortic jet velocity 5.3 m/s,MG 74 mmHg, ).     3 - Mild mitral regurgitation.     4 - Severe left atrial enlargement.     5 - Indeterminate LV diastolic function.     6 - Pulmonary hypertension. The estimated PA systolic pressure is 62 mmHg.     Current Medications:     Infusions:       Scheduled:   atorvastatin  40 mg Oral Daily    fluticasone-vilanterol  1 puff Inhalation Daily    pantoprazole  40 mg Oral Daily        PRN:  sodium chloride, albuterol-ipratropium, dextrose 50%, glucagon (human recombinant), insulin aspart U-100, sodium chloride 0.9%    Antibiotics and Day Number of Therapy:  none    Lines and Day Number of Therapy:  PIV    Assessment:     Natlaie Parnell is a 83 y.o.female with  Patient Active Problem List    Diagnosis Date Noted    NSTEMI (non-ST elevated myocardial infarction) 07/10/2018    Diarrhea     GI bleed 07/09/2018    Liver disease 07/09/2018    Congestive heart failure     Aortic valve stenosis 03/16/2018    Nonrheumatic aortic valve stenosis 03/02/2018    Cirrhosis of liver without ascites     DVT of deep femoral vein, right     Chest pain  03/01/2018    Elevated troponin 03/01/2018    Type II or unspecified type diabetes mellitus with neurological manifestations, uncontrolled(250.62) 06/16/2015    DM (diabetes mellitus) 10/08/2014        Plan:     Acute blood loss anemia 2/2 GI hemorrhage  - Presenting with 3 days of melena, FOBT + in ED  - GI consulted  pt with imaging in past concerning for cirrhosis  octreotide gtt and protonix gtt  - given severe AS, 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)  - transfuse 1u pRBC this AM  CBC q6hr  goal Hgb > 10 as concern for ACS  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 treated with coagulation  - pt ok for LHC and/or DAPT     NSTEMI  -troponin 0.021 that peaked at 7.644  -presenting with acute on chronic chest pain  -cards consulted  [facility] as outpt    Severe aortic stenosis  -TTE shows SHEA 0.65cm2  -followed by [facility] as outpatient and is being considered for TAVR     Right femoral vein DVT, POA  - Noted on LE US in March 2018 noting non-occlusive CFV DVT  - Initiated on eliquis at that time  - Eliquis on hold given GIB  - talked with cards and can d/c AC on discharge     CKD2  - Baseline Cr ~1  at baseline  - Will continue to monitor in setting of GI bleed     Elevated Lactate, resolved  - Likely 2/2 acute bleed and issue with perfusion  - normalized     Bacteruria and pyuria  - asymptomatic, afebrile, no leukocytosis  - monitor    Deconditioning  -PT/OT consulted    Code: full  Diet: NPO  DVT: SCDs/CORWIN  Dispo: pending eval by PT/OT    Andre Cyr  [facility] Internal Medicine HO-III  [facility] Hospitalists Service Team B    [facility] Medicine Hospitalist Pager numbers:   [facility] Hospitalist Medicine Team A (Iain/Aishwarya): [phone]  [facility] Hospitalist Medicine Team B (Luz/Eli):  [phone]

## 2018-07-11 NOTE — PT/OT/SLP EVAL
Physical Therapy Evaluation and Discharge Note    Patient Name:  Natalie Parnell   MRN:  [id number]    Recommendations:     Discharge Recommendations:    home with HH PT and OT for energy conservation techniques  Discharge Equipment Recommendations: bath bench, rollator   Barriers to discharge: None    Assessment:     Natalie Parnell is a 83 y.o. female admitted with a medical diagnosis of GI bleed. .  At this time, patient is functioning at their prior level of function and does not require further acute PT services.     Recent Surgery: Procedure(s) (LRB):  EGD (ESOPHAGOGASTRODUODENOSCOPY) (N/A) 1 Day Post-Op    Plan:     During this hospitalization, patient does not require further acute PT services.  Please re-consult if situation changes.     Plan of Care Reviewed with: patient, daughter    Subjective     Communicated with RN prior to session. Dr. Diaz and Malik in during session to speak with patient for several minutes.  Patient found supine in bed upon PT entry to room, agreeable to evaluation.  Marisa Go to room with me for translation, she was familiar with patient . Daughter Evangelina also present and we determined that Evangelina could translate therapy questions     Chief Complaint: shoulder pain  Patient comments/goals: Daughter provided much of the PLOF with patient agreement.  Daughter's biggest concern is how SOB her mother gets particularly with going up and down the steps. Reports that her mother prefers not to have anyone assist with bathing due to modesty. Daughter reports that she currently has a 19 year old son at home who is sick with meningitis and encephalopathy since April. She is concerned about her mother being alone when she has to return to work in early August as a  at [employer] school   Pain/Comfort:  · Pain Rating 1:  (pt did not rate, but referred to a chronic pain in her L shoulder that radiates back to shoulder blade and then to L chest wall)  · Pain Addressed  1:  (pain did not interfere with patients functions during this assessment)    Patients cultural, spiritual, Sikh conflicts given the current situation: none verbalized    Living Environment:  Lives in front half of duplex with dtr / JASPER and 2 grandchildren (adults) in rear duplex.  There are 7 steps to enter the home at the front and on the side - there are bilateral rails but the patient goes up and down slowly and sideways. She currently does not use any DME.  The JASPER works in construction and has purchased suction cup grab bars for use in the bathtub - shower head is fixed, not hand held.  Bathroom is in close proximity to bedroom.   Prior to admission, patients level of function was independent with baseline SOB with minimal exertion due to AS.  Patient has the following equipment: none.  DME owned (not currently used): none.  Upon discharge, patient will have assistance from daughter when home, grandchildren when home.    Objective:     Patient found with:  (full ICU monitoring, no oxygen)     General Precautions: Standard, fall   Orthopedic Precautions:N/A   Braces: N/A     Exams:  · Cognitive Exam:  Patient is oriented to Person, Place, Time and Situation and follows 100% of all commands   · Gross Motor Coordination:  WFL  · Postural Exam:  Patient presented with the following abnormalities:    · -       Rounded shoulders  · -       Forward head  · Sensation:    · -       Intact  · RLE ROM: WFL  · RLE Strength: WFL  · LLE ROM: WFL  · LLE Strength: WFL   · No resistive strength testing completed  ·     Functional Mobility:  · Bed Mobility:     · Rolling Left:  contact guard assistance  · Scooting: modified independence  · Supine to Sit: contact guard assistance  · Transfers:     · Sit to Stand:  modified independence with rolling walker  · Bed to Chair: contact guard assistance with  no AD  using  Stand Pivot and Step Transfer  · Gait: Patient ambulated 150 feet in ICU hallway with portable monitoring,  using RW, on RA and requiring only SBA and cueing for proper use of RW.  Pt was told to take 2 standing rest breaks and was instructed in pursed lip breathing to maximize o2 sats. No LOB, good use of device for energy conservation, no notable SOB  · Balance: Good with use of RW    AM-PAC 6 CLICK MOBILITY  Total Score:20       Therapeutic Activities and Exercises:   Prior to sitting EOBpt performed 10 reps Ankle pumps, 5 reps heel slides, 5 reps abduction / adduction.  As above for gait exercise.   Vitals:  At rest prior to eval:  HR 83, o2 on RA 96%, /58; RR 20  Upon sitting HR 89, o2 on RA 95%, /53; RR 37  During ambualtion , o2 on RA 95%, /62; RR 36-60  In sitting, recovery HR 84; o2 on RA 94%; /61; RR 25    Pt was transferred from bed to recliner chair with CGA, step transfer technique, all needs in reach Patient instructed to perform LE TE to include ankle pumps and leg kicks while up in chair, to tolerance and within limits of becoming SOB      Patient left up in chair with all lines intact, call button in reach and Family present.    GOALS:    Physical Therapy Goals     Not on file          Multidisciplinary Problems (Resolved)        Problem: Physical Therapy Goal    Goal Priority Disciplines Outcome Goal Variances Interventions   Physical Therapy Goal   (Resolved)     PT/OT, PT Outcome(s) achieved     Description:  Goals to be met by: 7/11/18     Patient will increase functional independence with mobility by performing:    Complete PT eval and make recs for f/u care and DME                      History:     Past Medical History:   Diagnosis Date    Asthma     Diabetes mellitus     HTN (hypertension)        Past Surgical History:   Procedure Laterality Date    CATARACT EXTRACTION Bilateral     Dr. Max    ESOPHAGOGASTRODUODENOSCOPY N/A 7/10/2018    Procedure: EGD (ESOPHAGOGASTRODUODENOSCOPY);  Surgeon: Derian Stoner MD;  Location: [facility];  Service: Endoscopy;   Laterality: N/A;    HERNIA REPAIR      VARICOSE VEIN SURGERY         Clinical Decision Making:     History  Co-morbidities and personal factors that may impact the plan of care Examination  Body Structures and Functions, activity limitations and participation restrictions that may impact the plan of care Clinical Presentation   Decision Making/ Complexity Score   Co-morbidities:   [] Time since onset of injury / illness / exacerbation  [] Status of current condition  []Patient's cognitive status and safety concerns    [x] Multiple Medical Problems (see med hx)  Personal Factors:   [x] Patient's age  [] Prior Level of function   [] Patient's home situation (environment and family support)  [] Patient's level of motivation  [] Expected progression of patient      HISTORY:(criteria)    [] 36425 - no personal factors/history    [x] 92348 - has 1-2 personal factor/comorbidity     [] 68102 - has >3 personal factor/comorbidity     Body Regions:  [] Objective examination findings  [] Head     []  Neck  [] Trunk   [] Upper Extremity  [] Lower Extremity    Body Systems:  [] For communication ability, affect, cognition, language, and learning style: the assessment of the ability to make needs known, consciousness, orientation (person, place, and time), expected emotional /behavioral responses, and learning preferences (eg, learning barriers, education  needs)  [] For the neuromuscular system: a general assessment of gross coordinated movement (eg, balance, gait, locomotion, transfers, and transitions) and motor function  (motor control and motor learning)  [] For the musculoskeletal system: the assessment of gross symmetry, gross range of motion, gross strength, height, and weight  [] For the integumentary system: the assessment of pliability(texture), presence of scar formation, skin color, and skin integrity  [x] For cardiovascular/pulmonary system: the assessment of heart rate, respiratory rate, blood pressure, and edema      Activity limitations:    [] Patient's cognitive status and saf ety concerns          [] Status of current condition      [] Weight bearing restriction  [x] Cardiopulmunary Restriction    Participation Restrictions:   [] Goals and goal agreement with the patient     [] Rehab potential (prognosis) and probable outcome      Examination of Body System: (criteria)    [x] 43722 - addressing 1-2 elements    [] 89954 - addressing a total of 3 or more elements     [] 94197 -  Addressing a total of 4 or more elements         Clinical Presentation: (criteria)  Stable - 91921     On examination of body system using standardized tests and measures patient presents with 1-2 elements from any of the following: body structures and functions, activity limitations, and/or participation restrictions.  Leading to a clinical presentation that is considered stable and/or uncomplicated                              Clinical Decision Making  (Eval Complexity):  Low- 54718     Time Tracking:     PT Received On: 07/11/18  PT Start Time: 1420     PT Stop Time: 1511  PT Total Time (min): 51 min     Billable Minutes: Evaluation 15, Gait Training 15 and Therapeutic Exercise 10 (rest of time spent with Dr. Diaz)      Bernice Poole, PT  07/11/2018

## 2018-07-11 NOTE — PLAN OF CARE
Problem: Fall Risk (Adult)  Goal: Identify Related Risk Factors and Signs and Symptoms  Related risk factors and signs and symptoms are identified upon initiation of Human Response Clinical Practice Guideline (CPG)   Outcome: Ongoing (interventions implemented as appropriate)  Pt remains fall free during hospitalization. Will continue to assist pt as needed to help prevent any unnecessary injuries related to falls.     Problem: Pressure Ulcer Risk (Emiliano Scale) (Adult,Obstetrics,Pediatric)  Goal: Identify Related Risk Factors and Signs and Symptoms  Related risk factors and signs and symptoms are identified upon initiation of Human Response Clinical Practice Guideline (CPG)   Outcome: Ongoing (interventions implemented as appropriate)  Pt turns q 2 hrs or more to promote tissue perfusion and mobility. Ambulates to bedside commode with minimal assistance. VSS, no acute distress noted.

## 2018-07-11 NOTE — NURSING
Discharged instructions reviewed with patient and her daughter, copies given in [place] verbalized understanding with teach back.  Discharged via wheelchair with transport. Pt able to ambulate well to wheelchair. Pt awake and alert. Peripheral IV removed and dressing applied to site.

## 2018-07-11 NOTE — ASSESSMENT & PLAN NOTE
-per LE venous ultrasound in 3/2018-nonocclusive right CFV DVT present  -treated with Eliquis previously   -repeat BLE venous ultrasound with no evidence of DVT  -will hold Eliquis upon discharge given GIB and need for DAPT

## 2018-07-11 NOTE — PLAN OF CARE
Problem: Patient Care Overview  Goal: Plan of Care Review  Outcome: Ongoing (interventions implemented as appropriate)  The proper method of use, as well as anticipated side effects, of this metered-dose inhaler are discussed and demonstrated to the patient. Pt on RA with documented sats.

## 2018-07-11 NOTE — PLAN OF CARE
Pt to be discharged home with home health. TN met with pt and pt's daughter Evangelina who was at bedside. Tn offered to call  free of charge but pt and daughter declined and translated for pt. Pt has no preference on  agency. Tn sent referral for [place]ue[place] speaking nurse, therapist and [facility] able to accept. Tn informed pt nurse from [place] will call tomorrow to set up visit. PT/OT recommending shower chair and rollator. Pt/daughter informed shower chair not covered by insurance and order for rollator sent to [facility] via [facility] and requested it be delivered to house per pt request.Luz, RN ICU nurse informed pt ready for d/c     07/11/18 1546   Final Note   Assessment Type Final Discharge Note   Discharge Disposition Home-Health   What phone number can be called within the next 1-3 days to see how you are doing after discharge? [phone]   Hospital Follow Up  Appt(s) scheduled? Yes   Discharge plans and expectations educations in teach back method with documentation complete? Yes   Right Care Referral Info   Post Acute Recommendation Home-care   Facility Name [facility]

## 2018-07-11 NOTE — PROGRESS NOTES
[facility]  Cardiology  Progress Note    Patient Name: Natalie Parnell  MRN: [id number]  Admission Date: 7/9/2018  Hospital Length of Stay: 2 days  Code Status: Full Code   Attending Physician: Ezequiel Escobar MD   Primary Care Physician: Octavio Ferrell MD  Expected Discharge Date:   Principal Problem:GI bleed    Subjective:     Hospital Course:   7/9/2018 Presented with complaints of melena and intermittent chest pain. H&H 10.0&30.8 down from 12.1&36.8 in March 2018. Troponin .021 and . HR stable. BP marginal with SBP 90s. CXR suggestive of pulmonary edema/CHF pattern without focal consolidation-image compared to CXR from 3/2018 with similar appearance.   Echocardiogram today with pending results. Repeat BLE venous ultrasound ordered. GI consulted with plans for EGD tomorrow per daughter. Cardiology consulted due to history of AS and complaints of chest pain   7/10/2018 HR and BP stable overnight. H&H 9.0&26.9 this AM with 1unit PRBC in process. Troponin trended with trend up to 2.293-7.644 and down to 4.8 this AM. Echocardiogram yesterday with normal LVEF and severe AS with SHEA .65cm2 and MG 74mmHg with WMA (anterior septum moderately hypokinetic & apical septum, mid inferoseptum, apical lateral mildly hypokinetic). NPO today for possible EGD   7/11/2018 H&H10.8&32.1 this AM after PRBC transfusion. EGD yesterday with grade I esophageal varices and non bleeding angiodysplastic lesion treated with APC. HR and BP stable.    No new subjective & objective note has been filed under this hospital service since the last note was generated.    Assessment and Plan:         * GI bleed    -reports of melena  -H&H improved with PRBC   -EGD yesterday with esophageal varices and nonbleeding angidysplastic lesion treated with APC   -DAPT if cleared by GI          NSTEMI (non-ST elevated myocardial infarction)    -initial troponin .021; trended up and peaked at 7.644 with trend down to 4.856   -on statin  therapy only; no ASA and Plavix due to GIB; no ACEI or BB due to marginal BP  -concerned about ischemic etiology and feel ischemic evaluation with [facility] needed but will need to defer to outpatient setting given recent GIB  -recommend ASA, statin, BB and ACEI; ideally would add Plavix as well if okay with GI and primary team   -echo with normal LVEF and WMA        Aortic valve stenosis    -severe per echo in March with SHEA .58cm2 and MG 58mmHg  -repeat echo with SHEA .65cm2 and mean gradient 74mmHg  -complaints of intermittent chest pain a concerning for AS etiology vs anemia vs ischemic etiology   -recommend follow up with [facility] valve clinic as recommended previously  in 6 mos (September 2018)        DVT of deep femoral vein, right    -per LE venous ultrasound in 3/2018-nonocclusive right CFV DVT present  -treated with Eliquis previously   -repeat BLE venous ultrasound with no evidence of DVT  -will hold Eliquis upon discharge given GIB and need for DAPT             VTE Risk Mitigation         Ordered     Place CORWIN hose  Until discontinued      07/10/18 1023     Place sequential compression device  Until discontinued      07/10/18 5845          Kriss Hill APRN, ANP  Cardiology  [facility]

## 2018-07-11 NOTE — ASSESSMENT & PLAN NOTE
-initial troponin .021; trended up and peaked at 7.644 with trend down to 4.856   -on statin therapy only; no ASA and Plavix due to GIB; no ACEI or BB due to marginal BP  -concerned about ischemic etiology and feel ischemic evaluation with [facility] needed but will need to defer to outpatient setting given recent GIB  -recommend ASA, statin, BB and ACEI; ideally would add Plavix as well if okay with GI and primary team   -echo with normal LVEF and WMA

## 2018-07-11 NOTE — PROGRESS NOTES
"[facility]    CC: Melena x3 days.    HPI 83F with a medical hx of Severe AS, DVT on Eliquis, DM2, Cirrhosis (likely 2/2 HERNANDEZ), HTN presents with complaint of Melena x3 days.     Patient was in her usual state of health (can walk 1/2 block before resting due to SOB, manages her own ADLs) until 3 days ago when she began to mave multiple episodes of watery, black, foul smelling stools associated with SOB, fatigue, and worsening chest pain. Patient states she lives with chronic chest pain related to her severe AS however, since her melena started, her chest pain has been worsening with decreased exercise tolerance and fatigue.    Endoscopic Hx  7/10/18 EGD: Grade 1 esophageal varices, 1x gastric AVM, cauterized.    Interval Hx  Patient states she feels well this AM, received 1U PRBC overnight with appropriate response in H/H. No events overnight, 1x dark black/tarry with some red blood stool (as per patient and nursing note) at around 19:30 last night.        Past Medical History:   Diagnosis Date    Asthma     Diabetes mellitus     HTN (hypertension)          Review of Systems  General ROS: negative for chills, fever or weight loss  Cardiovascular ROS: no chest pain or dyspnea on exertion  Gastrointestinal ROS: no abdominal pain, change in bowel habits, or black/ bloody stools    Physical Examination  BP (!) 123/58   Pulse 92   Temp 99 °F (37.2 °C) (Oral)   Resp 17   Ht 5' 2" (1.575 m)   Wt 85.4 kg (188 lb 4.4 oz)   SpO2 (!) 94%   Breastfeeding? No   BMI 34.44 kg/m²   General appearance: alert, cooperative, no distress  HENT: Normocephalic, atraumatic, neck symmetrical, no nasal discharge   Lungs: clear to auscultation bilaterally, no dullness to percussion bilaterally  Heart: regular rate and rhythm without rub; no displacement of the PMI   Abdomen: soft, non-tender; bowel sounds normoactive; no organomegaly  Extremities: extremities symmetric; no clubbing, cyanosis, or edema  Neurologic: Alert " and oriented X 3, normal strength, no focal deficits    Labs:  H/H 10.8/32.1      Imagin18 US LE: no DVTs    Assessment:   83F with a medical hx of Severe AS, DVT on Eliquis, DM2, Cirrhosis (likely 2/2 HERNANDEZ), HTN presents with  Melena and chest pain with elevated troponins. EGD revealed Gr 1 Esophageal Varices and 1x Gastric AVM, cauterized. Patient given 1U PRBC overnight with improvement in H/H.    Plan:  - Anticoag/antiplatelet management as per primary and cardiology  - Continue on PO PPI daily  - Advance diet as tolerated  - Monitor Hgb  - Will need followup at Hepatology clinic and completion of workup of Decompensated Cirrhosis (due to presence of varices)    Chuckie Barclay MD  [facility]  [facility]

## 2018-07-11 NOTE — ASSESSMENT & PLAN NOTE
-severe per echo in March with SHEA .58cm2 and MG 58mmHg  -repeat echo with SHEA .65cm2 and mean gradient 74mmHg  -complaints of intermittent chest pain a concerning for AS etiology vs anemia vs ischemic etiology   -recommend follow up with [facility] valve clinic as recommended previously  in 6 mos (September 2018)

## 2018-07-12 NOTE — PHYSICIAN QUERY
"PT Name: Natalie Parnell  MR #: [id number]    Physician Query Form - Heart  Condition Clarification     CDS/: Haylee Oviedo               Contact information:  This form is a permanent document in the medical record.     Query Date: July 12, 2018    By submitting this query, we are merely seeking further clarification of documentation. Please utilize your independent clinical judgment when addressing the question(s) below.    The medical record contains the following   Indicators     Supporting Clinical Findings Location in Medical Record   x BNP BNP  198   Labs 7/9@1201  Labs 7/9@2045   x EF EF 50-55% ECHO 7/9   x Radiology findings CXR suggestive of pulmonary edema/CHF pattern without focal consolidation-image compared to CXR from 3/2018 with similar appearance CXR 7/9 per cardiology PN 7/11   x Echo Results CONCLUSIONS     1 - Low normal to mildly depressed left ventricular systolic function (EF 50-55%).     2 - Severe aortic valve stenosis (SHEA 0.65 cm2, AVAi 0.35 cm2/m2, peak aortic jet velocity 5.3 m/s,MG 74 mmHg, ).     3 - Mild mitral regurgitation.     4 - Severe left atrial enlargement.     5 - Indeterminate LV diastolic function.     6 - Pulmonary hypertension. The estimated PA systolic pressure is 62 mmHg.  ECHO 7/9    "Ascites" documented     x "SOB" or "DURHAM" documented DURHAM H&P    "Hypoxia" documented     x Heart Failure documented Congestive heart failure Hospital medicine PN 7/10    "Edema" documented     x Diuretics/Meds albuterol 90 mcg/actuation inhaler  losartan (COZAAR) 25 MG   metoprolol succinate (TOPROL-XL) 25 MG H&P Home medication list    Treatment:     x Other:  NSTEMI  Hospital Medicine PN 7/11   Heart failure (HF) can be acute, chronic or both. It is generally further specificed as systolic, diastolic, or combined. Lastly, it is important to identify an underlying etiology if known or suspected.     Common clues to acute exacerbation:  Rapidly progressive symptoms (w/in 2 weeks " of presentation), using IV diuretics to treat, using supplemental O2, pulmonary edema on Xray, MI w/in 4 weeks, and/or BNP >500    Systolic Heart Failure: is defined as chart documentation of a left ventricular ejection fraction (LVEF) less than 40%     Diastolic Heart Failure: is defined as a left ventricular ejection fraction (LVEF) greater than 40%   +      Evidence of diastolic dysfunction on echocardiography OR    Right heart catheterization wedge pressure above 12 mm Hg OR    Left heart catheterization left ventricular end diastolic pressure 18 mm Hg or above.    References: *American Heart Association    The clinical guidelines noted below are only system guidelines, and do not replace the providers clinical judgment.     Provider, please specify the diagnosis associated with above clinical findings    [   ] Acute on Chronic Diastolic Heart Failure -    Pre-existing diastoic HF diagnosis.  EF > 40%  and acute HF symptoms documented                                 [   ] Chronic Diastolic Heart Failure - Pre-existing diastolic HF diagnosis.  EF > 40%  without  acute HF symptoms documented    [   ] Acute on Chronic Combined Systolic and Diastolic Heart Failure                   [   ] Chronic Combined Systolic and Diastolic Heart Failure    [ x  ] Other Type of Heart Failure (please specify type): ___Heart failure in setting of NSTEMI______________________  [   ] Heart Failure Ruled Out  [   ] Other (please specify): ___________________________________  [   ] Clinically Undetermined                          Please document in your progress notes daily for the duration of treatment until resolved and include in your discharge summary.

## 2018-07-13 PROBLEM — Z86.718 HISTORY OF DVT (DEEP VEIN THROMBOSIS): Status: ACTIVE | Noted: 2018-01-01

## 2018-07-13 NOTE — PROGRESS NOTES
Subjective:    Patient ID:  Natalie Parnell is a 83 y.o. female who presents for follow-up of Valvular Heart Disease      HPI  84 y/o Romansh speaking female with hx of severe asymptomatic AS (SHEA 0.58 cm2, AVAi 0.30 cm2/m2, peak aortic jet velocity 4.7 m/s,MG 58 mmHg), LE DVT previously on Xarelto, DM, possible liver cirrhosis who presents for f/u.   Was recently hospitalized for GIB with acute blood loss anemia with drop in Hgb >12 to 8.8 requiring PRBC transfusion. EGD with Grade I esophageal varices, a single non-bleeding angiodysplastic lesion in the stomach treated with argon plasma coagulation. Trop elevated and peaked at >7. 2DE with normal EF and no WMA's. Feels weak and exertional fatigue, worse than baseline. Has left shoulder pain which radiates to left chest which is reproducible, chronic, and not cardiac (musculoskeletal). Denies orthopnea, pND, syncope, LE edema. Compliant with meds and has stopped Xarelto. SBP at home 100-120's. No further episodes of melena or hematochezia.    Review of Systems   Constitution: Positive for weakness and malaise/fatigue.   HENT: Negative for congestion.    Eyes: Negative for blurred vision.   Cardiovascular: Positive for dyspnea on exertion. Negative for chest pain, claudication, cyanosis, irregular heartbeat, leg swelling, near-syncope, orthopnea, palpitations, paroxysmal nocturnal dyspnea and syncope.   Respiratory: Positive for shortness of breath.    Endocrine: Negative for polyuria.   Hematologic/Lymphatic: Negative for bleeding problem.   Skin: Negative for itching and rash.   Musculoskeletal: Positive for arthritis, back pain, joint pain and joint swelling. Negative for muscle cramps and muscle weakness.   Gastrointestinal: Negative for abdominal pain, hematemesis, hematochezia, melena, nausea and vomiting.   Genitourinary: Negative for dysuria and hematuria.   Neurological: Negative for dizziness, focal weakness, headaches, light-headedness and loss of balance.    Psychiatric/Behavioral: Negative for depression. The patient is not nervous/anxious.         Objective:    Physical Exam   Constitutional: She is oriented to person, place, and time. She appears well-developed and well-nourished.   HENT:   Head: Normocephalic and atraumatic.   Neck: Neck supple. No JVD present.   Cardiovascular: Normal rate and regular rhythm.    Murmur heard.   Harsh midsystolic murmur is present with a grade of 4/6  at the upper right sternal border radiating to the neck  Pulses:       Carotid pulses are 2+ on the right side, and 2+ on the left side.       Radial pulses are 2+ on the right side, and 2+ on the left side.        Femoral pulses are 2+ on the right side, and 2+ on the left side.       Posterior tibial pulses are 1+ on the right side, and 1+ on the left side.   Pulmonary/Chest: Effort normal and breath sounds normal.   Abdominal: Soft. Bowel sounds are normal.   Musculoskeletal: She exhibits no edema.   Neurological: She is alert and oriented to person, place, and time.   Skin: Skin is warm and dry.   Psychiatric: She has a normal mood and affect. Her behavior is normal. Thought content normal.         Assessment:       1. Nonrheumatic aortic valve stenosis    2. Congestive heart failure, unspecified HF chronicity, unspecified heart failure type    3. History of DVT (deep vein thrombosis)    4. Gastrointestinal hemorrhage, unspecified gastrointestinal hemorrhage type    5. Cirrhosis of liver without ascites, unspecified hepatic cirrhosis type    6. Type 2 diabetes mellitus with complication, without long-term current use of insulin      82 y/o pt with hx and presentation as above. Doing well from a cardiac perspective and compensated from a HF perspective. GIB resolved and Xarelto discontinued. ASA restarted. Will plan for LHC due to recent NSTEMI (likely demand , however, risk factors and will need prior to TAVR). Spent most of he clinic visit once again explaining her valvular  condition and plan.         Plan:       -C (diagnostic only)  -Right radial access with 4/5 slender  -Consents to be obtained at time of cath  -F/u in 1 month

## 2018-07-13 NOTE — TELEPHONE ENCOUNTER
----- Message from Victoria Dumont sent at 7/13/2018 11:00 AM CDT -----  Contact: [phone]/pt's daughter Umm   Patient's daughter requesting to speak with you regarding  FLMA paperwork . Please advise.

## 2018-07-15 PROBLEM — I50.31 ACUTE DIASTOLIC CONGESTIVE HEART FAILURE: Status: ACTIVE | Noted: 2018-01-01

## 2018-07-15 PROBLEM — Z86.718 HISTORY OF DVT (DEEP VEIN THROMBOSIS): Status: RESOLVED | Noted: 2018-01-01 | Resolved: 2018-01-01

## 2018-07-15 NOTE — H&P
[facility] Internal Medicine History and Physical - Resident Note    Admitting Team: [facility] Internal Medicine Team B  Attending Physician: Dr. Escobar  Resident: Dr. Cyr  Interns: Dr. Hanks    Date of Admit: 7/15/2018    Chief Complaint     Shortness of breath and chest pain x 1 day    Subjective:      History of Present Illness:  83F with combined systolic and diastolic heart failure with recurrent exacerbations, severe AS with pulmHTN, HTN, DM presents with acute onset of dyspnea that started 1 day PTA and is a/w left arm pain that radiates to left chest. Pt endorses orthopnea that worsened last night and PND. The chest pain is similar to her chronic left chest pain. Pt recently admitted to our service for GIB and NSTEMI. Cardiology saw patient while in hospital and 2 days PTA in clinic where clinic notes indicate patient was not in volume overload status. At this clinic visit, [facility] was planned to address recent NSTEMI. Family states that 2 days PTA, pt did not adhere to recommended dietary restrictions. Pt denies cough, fevers/chills/sweats, black or bloody stool, lightheadedness/dizziness/syncope, abd pain, n/v, diarrhea.    Past Medical History:  Past Medical History:   Diagnosis Date    Asthma     Diabetes mellitus     HTN (hypertension)        Past Surgical History:  Past Surgical History:   Procedure Laterality Date    CATARACT EXTRACTION Bilateral     Dr. Max    ESOPHAGOGASTRODUODENOSCOPY N/A 7/10/2018    Procedure: EGD (ESOPHAGOGASTRODUODENOSCOPY);  Surgeon: Derian Stoner MD;  Location: [facility];  Service: Endoscopy;  Laterality: N/A;    HERNIA REPAIR      VARICOSE VEIN SURGERY         Allergies:  Review of patient's allergies indicates:  No Known Allergies    Home Medications:  Prior to Admission medications    Medication Sig Start Date End Date Taking? Authorizing Provider   albuterol 90 mcg/actuation inhaler Inhale 2 puffs into the lungs every 6 (six) hours as needed for Wheezing. 6/26/18   Octavio  AILYN Ferrell MD   ammonium lactate 12 % Crea Apply twice daily to lower extremities 10/8/15   Min Cotton DPM   aspirin (ECOTRIN) 81 MG EC tablet Take 1 tablet (81 mg total) by mouth once daily. 7/11/18 7/11/19  Andre Cyr MD   atorvastatin (LIPITOR) 40 MG tablet Take 1 tablet (40 mg total) by mouth once daily. 6/26/18 6/26/19  Mike Diaz MD   azelastine (OPTIVAR) 0.05 % ophthalmic solution Place 1 drop into both eyes 2 (two) times daily. 6/28/17 6/28/18  Florence Vo, OD   blood sugar diagnostic (ACCU-CHEK SMARTVIEW TEST STRIP) Strp Inject 1 strip into the skin once daily. 3/19/18   Octavio Ferrell MD   blood-glucose meter Misc 1 Units by Misc.(Non-Drug; Combo Route) route once daily. 6/6/16 6/6/17  Octavio Ferrell MD   budesonide-formoterol 160-4.5 mcg (SYMBICORT) 160-4.5 mcg/actuation HFAA Inhale 2 puffs into the lungs every 12 (twelve) hours. Controller 5/8/18 5/8/19  Octavio Ferrell MD   lancets (ACCU-CHEK FASTCLIX) Misc Inject 1 lancet into the skin once daily. 3/19/18   Octavio Ferrell MD   losartan (COZAAR) 25 MG tablet Take 1 tablet (25 mg total) by mouth once daily. 6/26/18 6/26/19  Mike Diaz MD   metFORMIN (GLUCOPHAGE) 500 MG tablet Take 1 tablet (500 mg total) by mouth 2 (two) times daily with meals. 5/8/18   Octavio Ferrell MD   metoprolol succinate (TOPROL-XL) 25 MG 24 hr tablet Take 1 tablet (25 mg total) by mouth once daily. 6/26/18 6/26/19  Mike Diaz MD       Family History:  Family History   Problem Relation Age of Onset    No Known Problems Mother     No Known Problems Father     No Known Problems Sister     No Known Problems Brother     No Known Problems Maternal Aunt     No Known Problems Maternal Uncle     No Known Problems Paternal Aunt     No Known Problems Paternal Uncle     No Known Problems Maternal Grandmother     No Known Problems Maternal Grandfather     No Known Problems Paternal Grandmother      "No Known Problems Paternal Grandfather     Amblyopia Neg Hx     Blindness Neg Hx     Cancer Neg Hx     Cataracts Neg Hx     Diabetes Neg Hx     Glaucoma Neg Hx     Hypertension Neg Hx     Macular degeneration Neg Hx     Retinal detachment Neg Hx     Strabismus Neg Hx     Stroke Neg Hx     Thyroid disease Neg Hx        Social History:  Social History   Substance Use Topics    Smoking status: Never Smoker    Smokeless tobacco: Never Used    Alcohol use No       Review of Systems:  Pertinent positives and negatives listed in HPI. All other systems are reviewed and are negative.    Health Maintaince :   Primary Care Physician: Octavio Ferrell  Immunizations:   TDap is up to date.  Influenza is not up to date (per daughter/unsure).  Pneumovax is up to date.     Cancer Screening:  PAP: not indicated  Mammogram: not indicated  Colonoscopy: unclear when last C-scope was per patient.     Objective:   Last 24 Hour Vital Signs:  BP  Min: 101/54  Max: 142/86  Temp  Av.1 °F (36.7 °C)  Min: 98.1 °F (36.7 °C)  Max: 98.1 °F (36.7 °C)  Pulse  Av.3  Min: 113  Max: 123  Resp  Av  Min: 32  Max: 35  SpO2  Av.1 %  Min: 92 %  Max: 100 %  Height  Av' 2" (157.5 cm)  Min: 5' 2" (157.5 cm)  Max: 5' 2" (157.5 cm)  Weight  Av.6 kg (182 lb)  Min: 82.6 kg (182 lb)  Max: 82.6 kg (182 lb)  Body mass index is 33.29 kg/m².  No intake/output data recorded.    Physical Examination:  Gen: nad  appears stated age  HEENT: NCAT, EOMI, PERRLA, OP clear, MMM  Neck: no adenopathy, +cartotid bruit, JVP8cm, supple, symmetrical, trachea midline and thyroid not enlarged, symmetric, no tenderness/mass/nodules  Back: symmetric, no curvature. ROM normal. No CVA tenderness.  Lungs: bibasilar crackles  no wheezing  no increased wob on 4L NC  Chest wall: no tenderness  Heart: RRR, III/VI murmur with radiation to carotids and brisk upstroke  Abdomen: Soft, ND, BS+, no TTP  Extremities: 2+ pitting LE edema  Skin: Skin " color, texture, turgor normal. No rashes or lesions  Neurologic: AAO x4 (person, place, time, events)  face symmetric  moves all extremities  Psych: pleasant and appropriate    Laboratory:  Most Recent Data:  CBC: Lab Results   Component Value Date    WBC 10.56 07/15/2018    HGB 10.9 (L) 07/15/2018    HCT 33.1 (L) 07/15/2018     07/15/2018    MCV 94 07/15/2018    RDW 15.3 (H) 07/15/2018     BMP: Lab Results   Component Value Date     07/15/2018    K 4.4 07/15/2018     07/15/2018    CO2 20 (L) 07/15/2018    BUN 19 07/15/2018    CREATININE 1.1 07/15/2018     (H) 07/15/2018    CALCIUM 9.0 07/15/2018     LFTs: Lab Results   Component Value Date    PROT 6.8 07/15/2018    ALBUMIN 3.2 (L) 07/15/2018    BILITOT 1.1 (H) 07/15/2018    AST 55 (H) 07/15/2018    ALKPHOS 171 (H) 07/15/2018    ALT 30 07/15/2018     Coags:   Lab Results   Component Value Date    INR 1.1 07/15/2018     FLP: Lab Results   Component Value Date    CHOL 139 07/02/2018    HDL 30 (L) 07/02/2018    LDLCALC 89.2 07/02/2018    TRIG 99 07/02/2018    CHOLHDL 21.6 07/02/2018     DM: Lab Results   Component Value Date    HGBA1C 8.4 (H) 07/02/2018    HGBA1C 9.1 (H) 03/02/2018    HGBA1C 10.0 (H) 03/11/2017    HGBA1C 10.0 (H) 03/11/2017    GLUF 168 (H) 05/16/2005    LDLCALC 89.2 07/02/2018    CREATININE 1.1 07/15/2018     Thyroid: Lab Results   Component Value Date    TSH 3.770 03/02/2018     Anemia: Lab Results   Component Value Date    IRON 156 07/09/2018    TIBC 295 07/09/2018    FERRITIN 172 07/09/2018    QHVEIUZQ33 488 07/09/2018    FOLATE 10.2 07/09/2018     Cardiac: Lab Results   Component Value Date    TROPONINI 0.719 (H) 07/15/2018    BNP 1,210 (H) 07/15/2018     Urinalysis: Lab Results   Component Value Date    LABURIN  05/16/2005     MULTIPLE ORGANISMS ISOLATED. NONE IN PREDOMINANCE REPEAT IF CLINICALLY NECESSARY.    COLORU Yellow 07/09/2018    SPECGRAV 1.025 07/09/2018    NITRITE Negative 07/09/2018    KETONESU Negative  07/09/2018    UROBILINOGEN 1.0 07/09/2018    WBCUA 100 (H) 07/09/2018       Trended Cardiac Data:    Recent Labs  Lab 07/09/18  2045 07/10/18  0337 07/10/18  0851 07/15/18  0721   TROPONINI 2.293* 7.644* 4.856* 0.719*   *  --   --  1,210*       Other Results:  EKG (my interpretation): sinus tach  ST depressions in V3-V6    Radiology:  Imaging Results          X-Ray Chest AP Portable (Final result)  Result time 07/15/18 08:30:56    Final result by Ziyad Corcoran MD (07/15/18 08:30:56)                 Impression:      Worsened pulmonary edema pattern with small to moderate right pleural effusion and associated right basilar airspace disease.      Electronically signed by: Ziyad Corcoran MD  Date:    07/15/2018  Time:    08:30             Narrative:    EXAMINATION:  XR CHEST AP PORTABLE    CLINICAL HISTORY:  CHF;    TECHNIQUE:  Single frontal portable view of the chest was performed.    COMPARISON:  Chest radiograph 07/09/2018    FINDINGS:  Support devices: None    Interstitial markings throughout the lungs are diffusely accentuated, with diffuse hazy airspace opacification throughout the lungs.  There is a small to moderate size right pleural effusion with associated right basilar airspace opacification.  No definite left pleural effusion.  No pneumothorax.    Mild prominence of the cardiac silhouette is unchanged.    Bones are intact.  Surgical clips overlie the gallbladder fossa.                                   Assessment:     Natalie Parnell is a 83 y.o. female with:  Patient Active Problem List    Diagnosis Date Noted    Acute diastolic congestive heart failure 07/15/2018    History of DVT (deep vein thrombosis) 07/13/2018    GIB (gastrointestinal bleeding) 07/11/2018    Liver disease 07/09/2018    Congestive heart failure     Aortic valve stenosis 03/16/2018    Nonrheumatic aortic valve stenosis 03/02/2018    Cirrhosis of liver without ascites     Elevated troponin 03/01/2018    Type II or unspecified  type diabetes mellitus with neurological manifestations, uncontrolled(250.62) 06/16/2015    DM (diabetes mellitus) 10/08/2014        Plan:     Acute hypoxemic respiratory failure 2/2 volume overload 2/2 acute on chronic systolic heart failure  -pt with cp and sob requiring NIPPV on arrival that improved with lasix in ED able to be weaned to supplemental O2 via NC  -non-adherence to low sodium diet 1-2 days prior to symptom onset  -obtain TTE  trend cardiac enzymes and EKG  -educate on proper medicine and diet adherence  -pt with h/o DVT  obtain LE US and CTA to r/o PE/DVT    NSTEMI  -elevated troponin and EKG with ST depression  -continued chest pain with shortness of breath on admit  -suspect 2/2 demand  cards consulted  LHC will be needed; will follow with cardiology if outpt v inpt  -cont ASA    Severe aortic stenosis  -TTE shows SHEA 0.65cm2  -Followed Joe as outpatient and is being considered for TAVR  needs LHC before TAVR procedure    Diabetes Mellitus, type 2 with neuropathy  - A1c 8.4 on 7/2/2018; improved compared to past  - SSI and accuchecks    COPD/Chronic Lung Disease  - Continue Symbicort    CKD2  -Baseline Cr ~1  at baseline  -monitor    HLD  -cont statin    Deconditioning  -PT/OT consulted    Code Status:   Diet: Cardiac  DVT PPX: heparin  Dispo: pending   Est Length of Stay:      Andre Cyr MD  [facility] HO-III  [facility]ospit[facility] Internal Medicine Service Team B    hospitals Medicine Hospitalist Pager numbers:   hospitals Hospitalist Medicine Team A (Iain/Aishwarya): [phone]  hospitals Hospitalist Medicine Team B (Luz/Eli):  [phone]

## 2018-07-15 NOTE — CONSULTS
[facility]               Consult H&P    Reason for Consult:        Assessment:    1. Acute Decompensated CHF secondary to dietary non compliance  2. NSTEMI type 1 vs type 2  3. Severe AS with Diastolic dysfunction and Pulm HTN  4. HTN- controlled        Plan:  Lasix 40 mg IV bid  Strict I/Os  LHC to rule out severe CAD as contributing factor to CHF especially with patient having chest pain in setting of severe AS  BMP daily  Importance of salt restriction explained to patient and family. Patient is mostly Israeli speaking. Daughter was present to rosa Welsh MD  Cardiology Service      HPI: 82 y/o Israeli female with PMH of Severe AS, recurrent CHF, HTN, PH and DM present with acute onset of dyspnea that started yesterday morning associated with chest pressure located in left chest and none radiating. Discomfort is mild. Dyspnea is associated with orthopnea and PND. She was unable to sleep last night. No fever/chills or coughing. She was recently discharge for for GIB with NSTEMI. She was transfused and Hgb has been stable. She saw Dr. Diaz this past Friday and she was not volume overloaded, C planned. Patient and family said she had [employer] fried chicken to eat Friday evening. As per son she had a lot. She then started with symptoms that night.        Review of Systems -Except for what was mentioned above in HPI  Constitution: Negative.   HENT: Negative.   Eyes: Negative.   Cardiovascular: Negative.   Respiratory: Negative.   Endocrine: Negative.   Hematologic/Lymphatic: Negative.   Skin: Negative.   Musculoskeletal: Negative.   Gastrointestinal: Negative.             Past Surgical History:   Procedure Laterality Date    CATARACT EXTRACTION Bilateral     Dr. Max    ESOPHAGOGASTRODUODENOSCOPY N/A 7/10/2018    Procedure: EGD (ESOPHAGOGASTRODUODENOSCOPY);  Surgeon: Derian Stoner MD;  Location: [facility];  Service: Endoscopy;  Laterality: N/A;     HERNIA REPAIR      VARICOSE VEIN SURGERY         Past Medical History:   Diagnosis Date    Asthma     Diabetes mellitus     HTN (hypertension)         reports that she has never smoked. She has never used smokeless tobacco. She reports that she does not drink alcohol or use drugs.       Family History   Problem Relation Age of Onset    No Known Problems Mother     No Known Problems Father     No Known Problems Sister     No Known Problems Brother     No Known Problems Maternal Aunt     No Known Problems Maternal Uncle     No Known Problems Paternal Aunt     No Known Problems Paternal Uncle     No Known Problems Maternal Grandmother     No Known Problems Maternal Grandfather     No Known Problems Paternal Grandmother     No Known Problems Paternal Grandfather     Amblyopia Neg Hx     Blindness Neg Hx     Cancer Neg Hx     Cataracts Neg Hx     Diabetes Neg Hx     Glaucoma Neg Hx     Hypertension Neg Hx     Macular degeneration Neg Hx     Retinal detachment Neg Hx     Strabismus Neg Hx     Stroke Neg Hx     Thyroid disease Neg Hx         Review of patient's allergies indicates:  No Known Allergies    Patient's Medications   New Prescriptions    No medications on file   Previous Medications    ALBUTEROL 90 MCG/ACTUATION INHALER    Inhale 2 puffs into the lungs every 6 (six) hours as needed for Wheezing.    AMMONIUM LACTATE 12 % CREA    Apply twice daily to lower extremities    ASPIRIN (ECOTRIN) 81 MG EC TABLET    Take 1 tablet (81 mg total) by mouth once daily.    ATORVASTATIN (LIPITOR) 40 MG TABLET    Take 1 tablet (40 mg total) by mouth once daily.    AZELASTINE (OPTIVAR) 0.05 % OPHTHALMIC SOLUTION    Place 1 drop into both eyes 2 (two) times daily.    BLOOD SUGAR DIAGNOSTIC (ACCU-CHEK SMARTVIEW TEST STRIP) STRP    Inject 1 strip into the skin once daily.    BLOOD-GLUCOSE METER MISC    1 Units by Misc.(Non-Drug; Combo Route) route once daily.    BUDESONIDE-FORMOTEROL 160-4.5 MCG  (SYMBICORT) 160-4.5 MCG/ACTUATION HFAA    Inhale 2 puffs into the lungs every 12 (twelve) hours. Controller    LANCETS (ACCU-CHEK FASTCLIX) MISC    Inject 1 lancet into the skin once daily.    LOSARTAN (COZAAR) 25 MG TABLET    Take 1 tablet (25 mg total) by mouth once daily.    METFORMIN (GLUCOPHAGE) 500 MG TABLET    Take 1 tablet (500 mg total) by mouth 2 (two) times daily with meals.    METOPROLOL SUCCINATE (TOPROL-XL) 25 MG 24 HR TABLET    Take 1 tablet (25 mg total) by mouth once daily.   Modified Medications    No medications on file   Discontinued Medications    No medications on file       Vitals:    07/15/18 0741 07/15/18 0743 07/15/18 0901 07/15/18 0933   BP: (!) 137/58  (!) 142/86 (!) 101/54   Pulse: (!) 113 (!) 115 (!) 119 (!) 117   Resp:  (!) 32     Temp:       TempSrc:       SpO2: 100% 100% 97% 98%   Weight:       Height:             Physical Examination:  General Appearance: No acute distress  Mental: Alert to person, time and place  HEENT: Perrl  Chest: No pain with palpitations, no chest deformities  Heart: RRR, no murmurs, no gallops or rubs  Lung: decrease breath sounds in bases bilaterally with fine rales  ABDOMEN: Soft, nontender, nondistended with good bowel sounds heard. No mass or hepatosplenomegaly  EXTREMITIES: Without cyanosis, clubbing or edema.   NEUROLOGICAL: Gross nonfocal. Skin: Warm and dry without any rash. There is no costovertebral angle tenderness.   Skin: no rashes lesions or ulcers      Lab Results   Component Value Date     07/15/2018    K 4.4 07/15/2018     07/15/2018    CO2 20 (L) 07/15/2018    BUN 19 07/15/2018    CREATININE 1.1 07/15/2018     (H) 07/15/2018    HGBA1C 8.4 (H) 07/02/2018    AST 55 (H) 07/15/2018    ALT 30 07/15/2018    ALBUMIN 3.2 (L) 07/15/2018    PROT 6.8 07/15/2018    BILITOT 1.1 (H) 07/15/2018    WBC 10.56 07/15/2018    HGB 10.9 (L) 07/15/2018    HCT 33.1 (L) 07/15/2018    MCV 94 07/15/2018     07/15/2018    INR 1.1 07/15/2018     TSH 3.770 03/02/2018    CHOL 139 07/02/2018    HDL 30 (L) 07/02/2018    LDLCALC 89.2 07/02/2018    TRIG 99 07/02/2018         Recent Labs  Lab 07/15/18  0721   TROPONINI 0.719*         Recent Labs  Lab 07/15/18  0721   TROPONINI 0.719*       Lab Results   Component Value Date    TSH 3.770 03/02/2018       Lab Results   Component Value Date    HGBA1C 8.4 (H) 07/02/2018         Images and labs reviewed    ECG: NSR with non specific ST changes  Echo: ef 50% with DD, PH and severe AS  CXR Impression       Worsened pulmonary edema pattern with small to moderate right pleural effusion and associated right basilar airspace disease.       > 30 minutes was spent in the care of this patient for evaluation, critical thinking and decision making. Also discussion with patient as to present condition. Not all time was spent in direct face to face with patient as time was spent reviewing ECGs, CXR, labs, medications and pass studies.

## 2018-07-15 NOTE — ED TRIAGE NOTES
Pt arrived via EMS from home with reported shortness of breath, pt tachypneic and anxious upon arrival, wearing NC at 6lpm.  Room air sats 92%, .  18g PIV to LAC.

## 2018-07-15 NOTE — PLAN OF CARE
Problem: Patient Care Overview  Goal: Plan of Care Review  Plan of care reviewed with the patient and daughter. Utilized Scott Omani speaking as a . Daughter at the bedside. Verbalized clear understanding. Bed alarm set. Bed in lowest position. Pt remain afebrile and free of fall. Call light within reach. Instructed pt to call when getting out of bed. Denies any pain or discomfort. NSR HR 90's on telemetry monitor. No report of SOB or lightheadedness.TEDs and SCDs on. Strict intake and output. Will continue to monitor.

## 2018-07-15 NOTE — PLAN OF CARE
Pt arrived on unit via bed. Pt is AAOx4. She is on 4L NC. Telemetry applied on. Fall risk band applied. Family at the bedside. No lightheadedness or SOB reported. Denies chest pain. SCDs and TEDs are on. Bed in lowest position. Bed alarm set. Pt instructed to call for assistance. VS documented refer to flowsheet. Will continue to monitor.

## 2018-07-15 NOTE — ED PROVIDER NOTES
Encounter Date: 7/15/2018    SCRIBE #1 NOTE: I, Tahmina Miller, am scribing for, and in the presence of,  Dr. Ludwig. I have scribed the entire note.       History     Chief Complaint   Patient presents with    Shortness of Breath     brought by  EMS for CP and sob.      This is a 83 y.o. female who has a past medical history of Asthma; Diabetes mellitus; and HTN (hypertension).   The patient presents to the Emergency Department via EMS with shortness of breath since yesterday.  Patient awoke this morning with worsening symptoms.  Last night she had an episode of SOB that resolved after breathing treatment.  Symptoms are associated with wheezing and cough. Pt denies fever, chest pain, hemoptysis.  Patient has a prior history of similar symptoms.   She also has a recent admission to the hospital for GI bleed and NSTEMI.  Pt has a past surgical history that includes Cataract extraction (Bilateral); Varicose vein surgery; Hernia repair; and Esophagogastroduodenoscopy (N/A, 7/10/2018).        The history is provided by the EMS personnel and a relative.     Review of patient's allergies indicates:  No Known Allergies  Past Medical History:   Diagnosis Date    Asthma     Diabetes mellitus     HTN (hypertension)      Past Surgical History:   Procedure Laterality Date    CATARACT EXTRACTION Bilateral     Dr. Max    ESOPHAGOGASTRODUODENOSCOPY N/A 7/10/2018    Procedure: EGD (ESOPHAGOGASTRODUODENOSCOPY);  Surgeon: Derian Stoner MD;  Location: [facility];  Service: Endoscopy;  Laterality: N/A;    HERNIA REPAIR      VARICOSE VEIN SURGERY       Family History   Problem Relation Age of Onset    No Known Problems Mother     No Known Problems Father     No Known Problems Sister     No Known Problems Brother     No Known Problems Maternal Aunt     No Known Problems Maternal Uncle     No Known Problems Paternal Aunt     No Known Problems Paternal Uncle     No Known Problems Maternal Grandmother     No Known  Problems Maternal Grandfather     No Known Problems Paternal Grandmother     No Known Problems Paternal Grandfather     Amblyopia Neg Hx     Blindness Neg Hx     Cancer Neg Hx     Cataracts Neg Hx     Diabetes Neg Hx     Glaucoma Neg Hx     Hypertension Neg Hx     Macular degeneration Neg Hx     Retinal detachment Neg Hx     Strabismus Neg Hx     Stroke Neg Hx     Thyroid disease Neg Hx      Social History   Substance Use Topics    Smoking status: Never Smoker    Smokeless tobacco: Never Used    Alcohol use No     Review of Systems   Unable to perform ROS: Severe respiratory distress   Constitutional: Negative for fever.   Respiratory: Positive for cough, shortness of breath and wheezing.        Physical Exam     Initial Vitals   BP Pulse Resp Temp SpO2   07/15/18 0714 07/15/18 0714 07/15/18 0731 07/15/18 0714 07/15/18 0714   (!) 136/106 (!) 123 (!) 35 98.1 °F (36.7 °C) (!) 92 %      MAP       --                Physical Exam    Nursing note and vitals reviewed.  Constitutional: She appears well-developed and well-nourished. She is not diaphoretic. She appears distressed.   HENT:   Head: Normocephalic and atraumatic.   Right Ear: External ear normal.   Left Ear: External ear normal.   Nose: Nose normal.   Eyes: Conjunctivae and EOM are normal. Pupils are equal, round, and reactive to light. Right eye exhibits no discharge. Left eye exhibits no discharge. No scleral icterus.   Neck: Normal range of motion.   Cardiovascular: Normal rate and intact distal pulses.   Murmur heard.   Systolic murmur is present   Severe systolic murmur.   Pulmonary/Chest: Accessory muscle usage present. No stridor. She is in respiratory distress. She has wheezes. She has no rhonchi.   Rales at both bases to a quarter of the way up. Fine expiratory wheezing in the mid-lung zones bilaterally.   Musculoskeletal: Normal range of motion.   Scant pitting edema to bilateral lower extremities, but pulses are good.   Neurological:  She is alert. She has normal strength. No cranial nerve deficit.   Skin: Skin is warm and dry. No rash noted. No pallor.   Psychiatric: She has a normal mood and affect.         ED Course   Critical Care  Date/Time: 7/15/2018 8:46 AM  Performed by: LALY TAMAYO  Authorized by: LALY TAMAYO   Direct patient critical care time: 10 minutes  Additional history critical care time: 5 minutes  Ordering / reviewing critical care time: 5 minutes  Documentation critical care time: 5 minutes  Consulting other physicians critical care time: 5 minutes  Consult with family critical care time: 5 minutes  Total critical care time (exclusive of procedural time) : 35 minutes  Critical care time was exclusive of separately billable procedures and treating other patients and teaching time.  Critical care was necessary to treat or prevent imminent or life-threatening deterioration of the following conditions: respiratory failure.  Critical care was time spent personally by me on the following activities: development of treatment plan with patient or surrogate, discussions with consultants, discussions with primary provider, interpretation of cardiac output measurements, evaluation of patient's response to treatment, examination of patient, obtaining history from patient or surrogate, ordering and performing treatments and interventions, ordering and review of laboratory studies, ordering and review of radiographic studies, pulse oximetry, re-evaluation of patient's condition and review of old charts.        Labs Reviewed   CBC W/ AUTO DIFFERENTIAL - Abnormal; Notable for the following:        Result Value    RBC 3.54 (*)     Hemoglobin 10.9 (*)     Hematocrit 33.1 (*)     RDW 15.3 (*)     All other components within normal limits   COMPREHENSIVE METABOLIC PANEL - Abnormal; Notable for the following:     CO2 20 (*)     Glucose 176 (*)     Albumin 3.2 (*)     Total Bilirubin 1.1 (*)     Alkaline Phosphatase 171 (*)     AST 55 (*)      eGFR if  54 (*)     eGFR if non  47 (*)     All other components within normal limits   TROPONIN I - Abnormal; Notable for the following:     Troponin I 0.719 (*)     All other components within normal limits   B-TYPE NATRIURETIC PEPTIDE - Abnormal; Notable for the following:     BNP 1,210 (*)     All other components within normal limits   POCT GLUCOSE - Abnormal; Notable for the following:     POCT Glucose 185 (*)     All other components within normal limits   ISTAT PROCEDURE - Abnormal; Notable for the following:     POC PO2 217 (*)     POC HCO3 23.1 (*)     All other components within normal limits   PROTIME-INR     EKG Readings: (Independently Interpreted)   EKG:  Sinus tachycardia at 126 bpm, nl axis, nl intervals, no hypertrophy, ST depressions in inferolateral leads as read by me (Dr. Ludwig).   Rate has increased and ST changes are new compared to previous EKG on 07/09/2018.  Impression:  Tachycardia with likely rate-related ischemic changes         Imaging Results          X-Ray Chest AP Portable (Final result)  Result time 07/15/18 08:30:56    Final result by Ziyad Corcoran MD (07/15/18 08:30:56)                 Impression:      Worsened pulmonary edema pattern with small to moderate right pleural effusion and associated right basilar airspace disease.      Electronically signed by: Ziyad Corcoran MD  Date:    07/15/2018  Time:    08:30             Narrative:    EXAMINATION:  XR CHEST AP PORTABLE    CLINICAL HISTORY:  CHF;    TECHNIQUE:  Single frontal portable view of the chest was performed.    COMPARISON:  Chest radiograph 07/09/2018    FINDINGS:  Support devices: None    Interstitial markings throughout the lungs are diffusely accentuated, with diffuse hazy airspace opacification throughout the lungs.  There is a small to moderate size right pleural effusion with associated right basilar airspace opacification.  No definite left pleural effusion.  No pneumothorax.    Mild  prominence of the cardiac silhouette is unchanged.    Bones are intact.  Surgical clips overlie the gallbladder fossa.                                 Medical Decision Making:   Initial Assessment:   Emergent evaluation of an 82 y/o female presents with acute SOB with cough/wheezing, history of aortic stenosis, recent MI, history of asthma  Differential Diagnosis:   CHF, Asthma exacerbation, COPD, Pneumonia, PE plural effusuion  Clinical Tests:   Lab Tests: Ordered and Reviewed  Radiological Study: Ordered and Reviewed  Medical Tests: Ordered and Reviewed  ED Management:  Plan for CHF workup including CMP, troponin, BNP, ebg, bipap and admit.    Other:   I have discussed this case with another health care provider.                   ED Course as of Jul 15 0922   Sun Jul 15, 2018   0829 POC PH: 7.385 [NP]   0829 POC PO2: (!) 217 [NP]   0829 POC HCO3: (!) 23.1 [NP]   0829 WBC: 10.56 [NP]   0829 Hemoglobin: (!) 10.9 [NP]   0829 Hematocrit: (!) 33.1 [NP]   0829 Platelets: 222 [NP]   0829 Sodium: 137 [NP]   0830 Potassium: 4.4 [NP]   0830 Chloride: 108 [NP]   0830 CO2: (!) 20 [NP]   0830 Glucose: (!) 176 [NP]   0830 BUN, Bld: 19 [NP]   0830 Creatinine: 1.1 [NP]   0830 BNP: (!) 1,210 [NP]   0830 Troponin I: (!) 0.719 [NP]   0830 BNP is elevated compared to previous.  I believe patient is having an acute CHF exacerbation.  Troponin is elevated, however trending down from previous.  Will need to get serial enzymes to evaluate current trend.    Overall impression is shortness of breath, acute CHF.  Patient will need admission for diuresis, cardiac monitoring, possible repeat echo if needed  [NP]   0832 CXR: This is a(n) AP portable chest exam with adequate penetration, positioning and good air entry. Trachea is midline, mediastinal silhouettes are WNL.  There is mild cardiomegaly.  There are bilateral pulmonary infiltrates, pulmonary vessel dilatation, bilateral pleural effusions.  Impression:  Acute CHF with pulmonary  edema. This exam was independently interpreted by me.   [NP]   6292 Paged Dr. Escobar, [facility]  [NP]   2429 I, Dr. Johnathan Ludwig, personally performed the services described in this documentation.   All medical record entries made by the scribe were at my direction and in my presence.   I have reviewed the chart and agree that the record is accurate and complete.   Johnathan Ludwig MD.   [NP]      ED Course User Index  [NP] Johnathan Ludwig MD     8:38 AM: Spoke to Dr. Escobar who accepts the pt. He requests a cardiology consult with Dr. Diaz.    8:40 AM: Paged Dr. Welsh. Call for Dr. Diaz.    9:02 AM: Discussed the case with Dr Welsh, Dr. Diaz will follow up the pt as consult.      Clinical Impression:     1. Acute diastolic congestive heart failure    2. Shortness of breath    3. Acute pulmonary edema            Disposition:   Disposition: Admitted  Condition: Fair                        Johnathan Ludwig MD  07/15/18 0912

## 2018-07-15 NOTE — DISCHARGE SUMMARY
[facility] Hospital Medicine Discharge Summary    Primary Team: [facility] Hospitalist Team B  Attending Physician:  Dr. Escobar  Resident: Tatiana  Intern: Dr. Koo    Date of Admit: 7/9/2018  Date of Discharge: 7/11/2018    Discharge to: Home with family  Condition: Stable    Discharge Diagnoses     Patient Active Problem List   Diagnosis    DM (diabetes mellitus)    Type II or unspecified type diabetes mellitus with neurological manifestations, uncontrolled(250.62)    Elevated troponin    Cirrhosis of liver without ascites    Nonrheumatic aortic valve stenosis    Aortic valve stenosis    Congestive heart failure    Liver disease    GIB (gastrointestinal bleeding)    History of DVT (deep vein thrombosis)    Acute diastolic congestive heart failure       Consultants and Procedures     Consultants:  - Cardiology ([facility])  - [facility]    Procedures:   -     Brief History of Present Illness      Natalie Parnell is a 83 y.o.  female with Severe Aortic Stenosis (with intermittent chest pain), DVT (on Eliquis), DM Type 2, Cirrhosis, HTN presenting to [facility] by daughter on 7/9/2018 with a 3 day history of melena the past 3 days with progressive generalized abdominal pain and dark tarry stools. Daughter noticed patient was weak and noticed melanotic, tarry stools on day of presentation prompting further along with acute episode of chronic chest pain (described as left chest/shoulder, resolves with rest; known chronic pain) that ultimately resulted in patient presented to ED. Of note, she recently traveled to [place] and had syncopal episode there. She otherwise has not had exposure to new foods and sick contacts.     ED course:   - Found to be FOBT positive in ED  - H/H 10/30.8, Trop 0.021,   - CXR suggestive of pulm edema/CHF pattern without focal consolidation  - GI consulted in ED given presentation  - Started on protonix gtt and octreotide gtt     Baseline: Able to do ADL's but does get dyspneic on  exertion. Able to walk about 100-200 meters before needing to rest.     [facility] Hospitalist team consulted for GI Bleed.    For the full HPI please refer to the History & Physical from this admission.    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 treated with coagulation  - Patient is okay to pursue LHC and/or DAPT per GI  - Given GIB, avoid anticoagulation; discontinued Eliquis and will avoid Plavix  - Per Cardiology and GI, okay to restart ASA     NSTEMI  - troponin 0.021 that peaked at 7.644  - presenting with acute on chronic chest pain  - cards consulted  LHC to be planned as outpt  - Given GIB, avoid anticoagulation; discontinued Eliquis and will avoid Plavix  - Per Cardiology and GI, okay to restart ASA  - Discharged to continue ASA only     Severe aortic stenosis  - TTE shows SHEA 0.65cm2  - Followed Diaz as outpatient and is being considered for TAVR  - Likely will get LHC as outpatient prior to further TAVR discussion  - Discharged to continue ASA, Atorvastatin, Losartan, Metoprolol    Right femoral vein DVT, POA - Resolved on imaging during current admit  - Noted on LE US in March 2018 noting non-occlusive CFV DVT  - Initiated on eliquis at that time  - Eliquis held given GIB during current hospital stay  - Repeat US of LE completed and without DVT.   - Discontinued eliquis all together.    COPD/Chronic Lung Disease  - Continue Symbicort with PRN albuterol    Diabetes Mellitus, type 2 with neuropathy  - A1c 8.4 on 7/2/2018;  improved compared to past  - Resumed home metformin at discharge  - Counseled to continue to monitor blood glucose at home    HLD  - Continue statin    CKD2  - Baseline Cr ~1  at baseline  - Will continue to monitor in setting of GI bleed     Elevated Lactate, resolved  - Likely 2/2 acute bleed and issue with perfusion; normalized soon after admit     Bacteruria and pyuria  - asymptomatic, afebrile, no leukocytosis  - monitor    Deconditioning  - PT/OT consulted; however, patient not able to be seen prior to discharge  - Nursing team walked patient with walker; PT team contacted who recommended we discharge patient with walker and refer to  PT/OT    Discharge Medications        Medication List      START taking these medications    ASPIR-LOW 81 MG EC tablet  Generic drug:  aspirin  Take 1 tablet (81 mg total) by mouth once daily.        CONTINUE taking these medications    albuterol 90 mcg/actuation inhaler  Inhale 2 puffs into the lungs every 6 (six) hours as needed for Wheezing.     ammonium lactate 12 % Crea  Apply twice daily to lower extremities     atorvastatin 40 MG tablet  Commonly known as:  LIPITOR  Take 1 tablet (40 mg total) by mouth once daily.     azelastine 0.05 % ophthalmic solution  Commonly known as:  OPTIVAR  Place 1 drop into both eyes 2 (two) times daily.     blood sugar diagnostic Strp  Commonly known as:  ACCU-CHEK SMARTVIEW TEST STRIP  Inject 1 strip into the skin once daily.     blood-glucose meter Misc  1 Units by Misc.(Non-Drug; Combo Route) route once daily.     budesonide-formoterol 160-4.5 mcg 160-4.5 mcg/actuation Hfaa  Commonly known as:  SYMBICORT  Inhale 2 puffs into the lungs every 12 (twelve) hours. Controller     lancets Misc  Commonly known as:  ACCU-CHEK FASTCLIX LANCING DEV  Inject 1 lancet into the skin once daily.     losartan 25 MG tablet  Commonly known as:  COZAAR  Take 1 tablet (25 mg total) by mouth once daily.     metFORMIN 500 MG tablet  Commonly known as:   GLUCOPHAGE  Take 1 tablet (500 mg total) by mouth 2 (two) times daily with meals.     metoprolol succinate 25 MG 24 hr tablet  Commonly known as:  TOPROL-XL  Take 1 tablet (25 mg total) by mouth once daily.        STOP taking these medications    apixaban 5 mg Tab  Commonly known as:  ELIQUIS     gabapentin 100 MG capsule  Commonly known as:  NEURONTIN           Where to Get Your Medications      These medications were sent to [facility]  [address], [place] [address]    Hours:  Mon-Fri, 8a-5:30p Phone:  [phone]   · ASPIR-LOW 81 MG EC tablet         Discharge Information:     Diet: Diabetic/Cardiac Diet    Physical Activity: As tolerated    Instructions:  1. Take all medications as prescribed  2. Keep all follow-up appointments  3. Return to the hospital or call your primary care physicians if any worsening symptoms such as fever, chest pain, shortness of breath, return of symptoms, or any other concerns.    Follow-Up Appointments:  - Follow up appointment with Dr. Diaz on 7/13/2018    Future Appointments  Date Time Provider Department Center   7/17/2018 3:00 PM Octavio Ferrell MD [facility]     Thank you for allowing us to care for Natalie Parnell.   Please call with any questions.    Kristian Simpson,   [facility] Internal Medicine-Pediatrics PGY-IV  [facility] Hospitalist Service Team B    [facility] Medicine Hospitalist Pager numbers:   [facility] Hospitalist Medicine Team A (Iain/Aishwarya): [phone]  [facility] Hospitalist Medicine Team B (Luz/Eli):  [phone]

## 2018-07-15 NOTE — PLAN OF CARE
Problem: Patient Care Overview  Goal: Plan of Care Review  Outcome: Ongoing (interventions implemented as appropriate)  Cont to monitor

## 2018-07-15 NOTE — ED NOTES
Patient removed from bipap and placed on 4L O2 nasal cannula per MD verbal order. Tolerating well thus far, will continue to monitor.

## 2018-07-16 NOTE — PLAN OF CARE
Patient is Salvad[place] speaking. Family remains at the bedside. Called the  for  to go over the admission assessment and plan of care of the patient.

## 2018-07-16 NOTE — PROGRESS NOTES
[facility]  Cardiology  Progress Note    Patient Name: Natalie Parnell  MRN: [id number]  Admission Date: 7/15/2018  Hospital Length of Stay: 1 days  Code Status: Full Code   Attending Physician: Ezequiel Escobar MD   Primary Care Physician: Octavio Ferrell MD  Expected Discharge Date:   Principal Problem:Acute diastolic congestive heart failure    Subjective:     Hospital Course:   7/15/2018 Presented with SOB x 1 day. BNP elevated at 7821-4717 with stable H&H and BMP WNL. Acute Decompensated CHF secondary to dietary non compliance; NSTEMI type 1 vs type 2; Severe AS with Diastolic dysfunction and Pulm HTN and HTN- controlled Recommended IV Lasix BID along with LHC to rule out severe CAD as contributing factor to CHF especially with patient having chest pain in setting of severe AS.   7/16/2018 SOB improving but not completely resolved. On good medication regimen-HR and BP stable. On IV Lasix BID with good response and 3.6 liters out overnight. NPO for possible LHC today         Review of Systems   Constitution: Negative for chills, decreased appetite, diaphoresis, fever and weakness.   Cardiovascular: Positive for chest pain and dyspnea on exertion. Negative for claudication, cyanosis, irregular heartbeat, leg swelling, near-syncope, orthopnea, palpitations, paroxysmal nocturnal dyspnea and syncope.   Respiratory: Negative for cough, hemoptysis, shortness of breath and wheezing.    Gastrointestinal: Negative for bloating, abdominal pain, constipation, diarrhea, melena, nausea and vomiting.   Neurological: Negative for dizziness.     Objective:     Vital Signs (Most Recent):  Temp: 96.4 °F (35.8 °C) (07/16/18 0825)  Pulse: 71 (07/16/18 0825)  Resp: 20 (07/16/18 0825)  BP: (!) 120/58 (07/16/18 0825)  SpO2: 97 % (07/16/18 0825) Vital Signs (24h Range):  Temp:  [96.4 °F (35.8 °C)-99.1 °F (37.3 °C)] 96.4 °F (35.8 °C)  Pulse:  [] 71  Resp:  [16-26] 20  SpO2:  [94 %-98 %] 97 %  BP:  (105-150)/(52-75) 120/58     Weight: 70.8 kg (156 lb 1.4 oz)  Body mass index is 25.19 kg/m².     SpO2: 97 %  O2 Device (Oxygen Therapy): nasal cannula      Intake/Output Summary (Last 24 hours) at 07/16/18 1022  Last data filed at 07/16/18 1021   Gross per 24 hour   Intake                0 ml   Output             3550 ml   Net            -3550 ml       Lines/Drains/Airways     Peripheral Intravenous Line                 Peripheral IV - Single Lumen 07/15/18 0700 Left Antecubital 1 day         Peripheral IV - Single Lumen 07/15/18 0741 Right Hand 1 day                Physical Exam   Constitutional: She is oriented to person, place, and time. She appears well-developed and well-nourished. No distress.   Cardiovascular: Normal rate and regular rhythm.  Exam reveals no gallop.    Murmur heard.  Pulmonary/Chest: Effort normal. No respiratory distress. She has no wheezes. She has rales in the right middle field, the right lower field, the left middle field and the left lower field.   Abdominal: Soft. Bowel sounds are normal. She exhibits no distension. There is no tenderness.   Neurological: She is alert and oriented to person, place, and time.   Skin: Skin is warm and dry.       Significant Labs:       Recent Labs  Lab 07/16/18  0449   WBC 5.36   RBC 3.29*   HGB 10.1*   HCT 30.2*      MCV 92   MCH 30.7   MCHC 33.4       Recent Labs  Lab 07/16/18  0449   CALCIUM 9.3   PROT 6.0      K 3.8   CO2 30*      BUN 21   CREATININE 1.1   ALKPHOS 122   ALT 25   AST 47*   BILITOT 1.3*       Significant Imaging: Echocardiogram:   2D echo with color flow doppler:   Results for orders placed or performed during the hospital encounter of 07/09/18   2D echo with color flow doppler   Result Value Ref Range    EF 50 55 - 65    Mitral Valve Regurgitation MILD     Aortic Valve Stenosis SEVERE (A)     Est. PA Systolic Pressure 61.98 (A)     Mitral Valve Mobility NORMAL     Tricuspid Valve Regurgitation MILD      Assessment  and Plan:     Brief HPI: Seen this morning on AM NP rounds with daughter at bedside serving as . Patient reports SOB remains although slightly improved. Discussed cardiac POC with patient and daughter as detailed below-both verbalized understanding and agree with POC     * Acute diastolic congestive heart failure    -related to dietary indiscretion of salt intake (consumption of [employer] fried chicken with mashed potatoes/gravy)  -HR and BP stable currently  -on IV Lasix BID with 3.6 liters out overnight  -rales remain mid to base bilaterally with mild improvement in SOB per patient  -continue ARB and BB along with IV Lasix BID for volume removal  -plan as of now to proceed with [facility] for ischemic evaluation         GIB (gastrointestinal bleeding)    -admitted last week with GIB with EGD with esophageal varices and angiodysplastic lesion treated with APC  -H&H stable at 10.1&32.1 after PRBCs last week  -no recurrent melena per patient since recent admission via translation by daughter        Nonrheumatic aortic valve stenosis    -recent echocardiogram with low normal to mildly depressed LVEF 50-55% and severe aortic valve stenosis (SHEA 0.65 cm2 and MG 74 mmHg)  -seen previously by [facility] TAVR clinic with plans for follow up in next 1-2months  -ADHF mainly concerning for dietary indiscretion of salt intake but cannot fully rule out etiology of AS              VTE Risk Mitigation         Ordered     Reason for No Pharmacological VTE Prophylaxis  Once      07/15/18 2008     IP VTE HIGH RISK PATIENT  Once      07/15/18 2008     Place sequential compression device  Until discontinued      07/15/18 1338     Place CORWIN hose  Until discontinued      07/15/18 1338          BIANKA Booker, ANP  Cardiology  [facility]

## 2018-07-16 NOTE — PLAN OF CARE
Problem: Occupational Therapy Goal  Goal: Occupational Therapy Goal  Goals to be met by: 8/16/18     Patient will increase functional independence with ADLs by performing:    LE Dressing with Modified [place].  Grooming while standing with Modified [place].  Toileting from toilet with Modified [place] for hygiene and clothing management.   Supine to sit with Modified [place].  Stand pivot transfers with Modified [place].  Toilet transfer to toilet with Modified [place].  Increased functional strength to WFL for self care skills and functional mobility.  Upper extremity exercise program x10 reps per handout, with independence.    Outcome: Ongoing (interventions implemented as appropriate)  Pt would benefit from cont OT services in order to maximize functional independence. Recommending resume HHOT/PT at d/c and TTB

## 2018-07-16 NOTE — PT/OT/SLP EVAL
Physical Therapy Evaluation    Patient Name:  Natalie Parnell   MRN:  [id number]    Recommendations:     Discharge Recommendations:  home health PT, home health OT   Discharge Equipment Recommendations: bath bench   Barriers to discharge: Decreased caregiver support    Assessment:     Natalie Parnell is a 83 y.o. female admitted with a medical diagnosis of Acute diastolic congestive heart failure.  She presents with the following impairments/functional limitations:  weakness, impaired endurance, impaired self care skills, impaired functional mobilty, gait instability, impaired balance, decreased safety awareness, pain, decreased upper extremity function, decreased lower extremity function, impaired cardiopulmonary response to activity. Recommending HH PT/OT upon d/c. Pt on 3 L/min O2 throughout session and SaO2 was 91-95%. Pt requires mod cueing for safe RW management.    Rehab Prognosis: Good; patient would benefit from acute skilled PT services to address these deficits and reach maximum level of function.      Recent Surgery: Procedure(s) (LRB):  Left heart cath (N/A)      Plan:     During this hospitalization, patient to be seen 6 x/week to address the above listed problems via gait training, therapeutic activities, therapeutic exercises  · Plan of Care Expires:  08/16/18   Plan of Care Reviewed with: patient, daughter    Subjective     Communicated with ALDO Liu prior to session.  Patient found supine with HOB elevated upon PT entry to room, agreeable to evaluation.      Pt is primarily Portuguese speaking, offered language line free or charge and pt and pt's daughter requesting pt's daughter to translate.    Chief Complaint: being hungry  Patient comments/goals: pt reports many times throughout session that she is hungry and hasn't eaten in 2 days  Pain/Comfort:  · Pain Rating 1: 4/10  · Location - Side 1: Left  · Location - Orientation 1: generalized  · Location 1: shoulder  · Pain Addressed 1: Reposition, Distraction,  Cessation of Activity    Patients cultural, spiritual, Methodist conflicts given the current situation: none reported    Living Environment:  Pt lives in a double next to her daughter, son-in-law, and 2 adult grandchildren with 7 SARAH with B rails and tub/shower combo. Pt has suction cup grab bars in tub.  Prior to admission, patients level of function was mod I with rollator.  Patient has the following equipment: rollator.  DME owned (not currently used): none.  Upon discharge, patient will have assistance from her family-- pt's daughter is home for the summer but will be returning to work in the school system in the beginning of August, pt's grandchildren are in college, and her JASPER works. Pt's daughter is available to assist pt as needed at this time until she returns to work.    Objective:     Patient found with: bed alarm, oxygen (3 L/min)     General Precautions: Standard, fall   Orthopedic Precautions:N/A   Braces: N/A     Exams:  · Postural Exam:  Patient presented with the following abnormalities:    · -       Rounded shoulders  · Sensation:    · -       Intact  · Skin Integrity/Edema:      · -       Skin integrity: Visible skin intact  · -       Edema: None noted    · RLE ROM: WFL  · RLE Strength: WFL except hip grossly 4-/5  · LLE ROM: WFL  · LLE Strength: WFL except hip grossly 4-/5    Functional Mobility:  · Bed Mobility:     · Scooting: stand by assistance  · Supine to Sit: stand by assistance  · Sit to Supine: stand by assistance  · Transfers:     · Sit to Stand:  contact guard assistance with rolling walker  · Toilet Transfer: contact guard assistance with  rolling walker  using  Step Transfer  · Gait: 180 ft with RW and CGA/SBA with moderate cueing for RW managment to maintain proper proximity to RW and to maintain RW on the floor as pt picks up the RW throughout gait.    AM-PAC 6 CLICK MOBILITY  Total Score:17       Therapeutic Activities and Exercises:  Pt ambulated around halls then to  toilet.  Pt educated on pacing herself during gait and RW management for safety.  Pt on 3 L/min O2 and SaO2 was 91-95% during gait training.    Patient left HOB elevated with all lines intact, call button in reach, bed alarm on and RN notified.    GOALS:    Physical Therapy Goals        Problem: Physical Therapy Goal    Goal Priority Disciplines Outcome Goal Variances Interventions   Physical Therapy Goal     PT/OT, PT Ongoing (interventions implemented as appropriate)     Description:  Goals to be met by: 18     Patient will increase functional independence with mobility by performin. Supine <> sit with Modified [place]  2. Sit to stand transfer with Modified [place]  3. Bed to chair transfer with Modified [place] using Rolling Walker  4. Gait  x 200 feet with Modified [place] using Rolling Walker.   5. Ascend/descend 7 stairs with bilateral Handrails Contact Guard Assistance                       History:     Past Medical History:   Diagnosis Date    Asthma     Diabetes mellitus     HTN (hypertension)        Past Surgical History:   Procedure Laterality Date    CATARACT EXTRACTION Bilateral     Dr. Max    ESOPHAGOGASTRODUODENOSCOPY N/A 7/10/2018    Procedure: EGD (ESOPHAGOGASTRODUODENOSCOPY);  Surgeon: Derian Stoner MD;  Location: [facility];  Service: Endoscopy;  Laterality: N/A;    HERNIA REPAIR      VARICOSE VEIN SURGERY         Clinical Decision Making:     History  Co-morbidities and personal factors that may impact the plan of care Examination  Body Structures and Functions, activity limitations and participation restrictions that may impact the plan of care Clinical Presentation   Decision Making/ Complexity Score   Co-morbidities:   [] Time since onset of injury / illness / exacerbation  [] Status of current condition  []Patient's cognitive status and safety concerns    [] Multiple Medical Problems (see med hx)  Personal Factors:   [x] Patient's age  [] Prior  Level of function   [] Patient's home situation (environment and family support)  [] Patient's level of motivation  [] Expected progression of patient      HISTORY:(criteria)    [] 17425 - no personal factors/history    [x] 05994 - has 1-2 personal factor/comorbidity     [] 29014 - has >3 personal factor/comorbidity     Body Regions:  [] Objective examination findings  [] Head     []  Neck  [] Trunk   [] Upper Extremity  [] Lower Extremity    Body Systems:  [] For communication ability, affect, cognition, language, and learning style: the assessment of the ability to make needs known, consciousness, orientation (person, place, and time), expected emotional /behavioral responses, and learning preferences (eg, learning barriers, education  needs)  [x] For the neuromuscular system: a general assessment of gross coordinated movement (eg, balance, gait, locomotion, transfers, and transitions) and motor function  (motor control and motor learning)  [x] For the musculoskeletal system: the assessment of gross symmetry, gross range of motion, gross strength, height, and weight  [] For the integumentary system: the assessment of pliability(texture), presence of scar formation, skin color, and skin integrity  [x] For cardiovascular/pulmonary system: the assessment of heart rate, respiratory rate, blood pressure, and edema     Activity limitations:    [] Patient's cognitive status and saf ety concerns          [] Status of current condition      [] Weight bearing restriction  [] Cardiopulmunary Restriction    Participation Restrictions:   [] Goals and goal agreement with the patient     [] Rehab potential (prognosis) and probable outcome      Examination of Body System: (criteria)    [] 07116 - addressing 1-2 elements    [x] 66179 - addressing a total of 3 or more elements     [] 30436 -  Addressing a total of 4 or more elements         Clinical Presentation: (criteria)  Stable - 84751     On examination of body system using  standardized tests and measures patient presents with 3 or more elements from any of the following: body structures and functions, activity limitations, and/or participation restrictions.  Leading to a clinical presentation that is considered stable and/or uncomplicated                              Clinical Decision Making  (Eval Complexity):  Low- 08963     Time Tracking:     PT Received On: 07/16/18  PT Start Time: 0951     PT Stop Time: 1015  PT Total Time (min): 24 min with OT    Billable Minutes: Evaluation 10      Dianna Boo, PT  07/16/2018

## 2018-07-16 NOTE — H&P (VIEW-ONLY)
[facility]  Cardiology  Consult Note    Patient Name: Natalie Parnell  MRN: [id number]  Admission Date: 7/15/2018  Hospital Length of Stay: 0 days  Code Status: Full Code   Attending Provider: Ezequiel Escobar MD   Consulting Provider: BIANKA Booker ANP  Primary Care Physician: Octavio Ferrell MD  Principal Problem:Acute diastolic congestive heart failure      Inpatient consult to Cardiology  Consult performed by: PILAR HURT  Consult ordered by: LALY TAMAYO           See full consult noted dated 7/15/2018 at 1029 by Dr. Sharon Welsh

## 2018-07-16 NOTE — NURSING
Patient arrived back to room from cath lab in [facility] with recovery RN. Family at bedside. Patient AAOx4 and denies any pain. IVs clean, dry, and intact. VSS. Tele monitor placed on patient. 2L of oxygen via NC on patient.  Right radial site clean, dry, and intact with no swelling or hematoma. Patient educated to not bend wrist or place pressure to wrist until recovery is completed at 1930. No acute distress noted.

## 2018-07-16 NOTE — ASSESSMENT & PLAN NOTE
-related to dietary indiscretion of salt intake (consumption of [employer] fried chicken with mashed potatoes/gravy)  -HR and BP stable currently  -on IV Lasix BID with 3.6 liters out overnight  -rales remain mid to base bilaterally with mild improvement in SOB per patient  -continue ARB and BB along with IV Lasix BID for volume removal  -plan as of now to proceed with [facility] for ischemic evaluation

## 2018-07-16 NOTE — PLAN OF CARE
Problem: Cardiac: Heart Failure (Adult)  Goal: Signs and Symptoms of Listed Potential Problems Will be Absent, Minimized or Managed (Cardiac: Heart Failure)  Signs and symptoms of listed potential problems will be absent, minimized or managed by discharge/transition of care (reference Cardiac: Heart Failure (Adult) CPG).  Patient in with CHF exacerbation, 02 at 3lpm satting 97%.  Echo pending today.  On Lasix 40mg BID IV for diuresis.  Up with Physical Therapy this am walking.

## 2018-07-16 NOTE — CONSULTS
Food & Nutrition  Education    Diet Education: CHF  Time Spent: 15 minutes  Learners: pt/family      Nutrition Education provided with handouts: Heart Failure      Comments: Pt does not speak Belgian. Family member at bedside able to interpret. Discussed eliminating salt from diet. Also explained foods high in sodium to avoid and cooking with out salt. Provided handout. Expressed understanding.       All questions and concerns answered. Dietitian's contact information provided.       Follow-Up: 7/23/18    Please Re-consult as needed        Thanks!  Mohini Coronel RD, LDN

## 2018-07-16 NOTE — INTERVAL H&P NOTE
The patient has been examined and the H&P has been reviewed:    I concur with the findings and no changes have occurred since H&P was written.    Anesthesia/Surgery risks, benefits and alternative options discussed and understood by patient/family.          Active Hospital Problems    Diagnosis  POA    *Acute diastolic congestive heart failure [I50.31]  Yes    GIB (gastrointestinal bleeding) [K92.2]  Yes    Nonrheumatic aortic valve stenosis [I35.0]  Yes     Severe AS per last 2DE with SHEA 0.58 cm2, AVAi 0.30 cm2/m2, peak aortic jet velocity 4.7 m/s,MG 58 mmHg        Resolved Hospital Problems    Diagnosis Date Resolved POA   No resolved problems to display.

## 2018-07-16 NOTE — PROCEDURES
Post Procedure Note: [facility]    The pt was brought to the cath lab and under sterile technique, right radial access was obtained without difficulty. Images were obtained in multiple views and nonobstructive CAD noted. Please see full report for details. The pt tolerated the procedure well without complications. Radial band device used with successful hemostasis.     Vitals:    07/16/18 1246 07/16/18 1530 07/16/18 1545 07/16/18 1600   BP:  (!) 125/58 (!) 116/56 (!) 118/58   BP Location:  Left leg Left leg Left leg   Patient Position:  Lying Lying Lying   Pulse:  76 75 74   Resp:       Temp:    97.9 °F (36.6 °C)   TempSrc:    Oral   SpO2: (!) 94% (!) 94% (!) 94% (!) 94%   Weight:       Height:             Gen: NAD  Ext: 2+ radial pulse, no evidence of hematoma    Plan:  -Post cath care per protocol  -ASA, statin, BB  -Medical management of HFpEF

## 2018-07-16 NOTE — ASSESSMENT & PLAN NOTE
-admitted last week with GIB with EGD with esophageal varices and angiodysplastic lesion treated with APC  -H&H stable at 10.1&32.1 after PRBCs last week  -no recurrent melena per patient since recent admission via translation by daughter

## 2018-07-16 NOTE — PLAN OF CARE
Problem: Patient Care Overview  Goal: Plan of Care Review  Outcome: Ongoing (interventions implemented as appropriate)  POC reviewed with patient and family. Patient AAOx4 and denies any pain. Diagnostic left heart cath performed using right radial site with vasc band device used. Recovery will be completed at 1930.  Site is clean, dry, and intact with no bleeding or hematoma. Cardiac diet tolerated well. 2L oxygen via NC in place. Lasix administered for diuresis. Blood glucose monitoring completed ACHS. Tele monitor on patient reading NSR, HR 70s-80s. No red alarms or ectopy noted. Patient and family verbalize clear understanding of POC.

## 2018-07-16 NOTE — PLAN OF CARE
Problem: Physical Therapy Goal  Goal: Physical Therapy Goal  Goals to be met by: 18     Patient will increase functional independence with mobility by performin. Supine <> sit with Modified [place]  2. Sit to stand transfer with Modified [place]  3. Bed to chair transfer with Modified [place] using Rolling Walker  4. Gait  x 200 feet with Modified [place] using Rolling Walker.   5. Ascend/descend 7 stairs with bilateral Handrails Contact Guard Assistance     Outcome: Ongoing (interventions implemented as appropriate)  PT evaluation completed, note to follow. Recommending  PT/OT upon d/c. Pt on 3 L/min O2 throughout session and SaO2 was 91-95%. Pt requires mod cueing for safe RW management.

## 2018-07-16 NOTE — NURSING TRANSFER
Patient transferred to floor on tele monitor. VSS no c/o of pain.  Patient attached to tele monitor upon arrival in room.  Right radial  site reviewed with Ksenia CARLSON.  Gauze with tegaderm in place. CDI, no hematoma no bleeding. All questions answered.  Family  at bedside.

## 2018-07-16 NOTE — SUBJECTIVE & OBJECTIVE
Review of Systems   Constitution: Negative for chills, decreased appetite, diaphoresis, fever and weakness.   Cardiovascular: Positive for chest pain and dyspnea on exertion. Negative for claudication, cyanosis, irregular heartbeat, leg swelling, near-syncope, orthopnea, palpitations, paroxysmal nocturnal dyspnea and syncope.   Respiratory: Negative for cough, hemoptysis, shortness of breath and wheezing.    Gastrointestinal: Negative for bloating, abdominal pain, constipation, diarrhea, melena, nausea and vomiting.   Neurological: Negative for dizziness.     Objective:     Vital Signs (Most Recent):  Temp: 96.4 °F (35.8 °C) (07/16/18 0825)  Pulse: 71 (07/16/18 0825)  Resp: 20 (07/16/18 0825)  BP: (!) 120/58 (07/16/18 0825)  SpO2: 97 % (07/16/18 0825) Vital Signs (24h Range):  Temp:  [96.4 °F (35.8 °C)-99.1 °F (37.3 °C)] 96.4 °F (35.8 °C)  Pulse:  [] 71  Resp:  [16-26] 20  SpO2:  [94 %-98 %] 97 %  BP: (105-150)/(52-75) 120/58     Weight: 70.8 kg (156 lb 1.4 oz)  Body mass index is 25.19 kg/m².     SpO2: 97 %  O2 Device (Oxygen Therapy): nasal cannula      Intake/Output Summary (Last 24 hours) at 07/16/18 1022  Last data filed at 07/16/18 1021   Gross per 24 hour   Intake                0 ml   Output             3550 ml   Net            -3550 ml       Lines/Drains/Airways     Peripheral Intravenous Line                 Peripheral IV - Single Lumen 07/15/18 0700 Left Antecubital 1 day         Peripheral IV - Single Lumen 07/15/18 0741 Right Hand 1 day                Physical Exam   Constitutional: She is oriented to person, place, and time. She appears well-developed and well-nourished. No distress.   Cardiovascular: Normal rate and regular rhythm.  Exam reveals no gallop.    Murmur heard.  Pulmonary/Chest: Effort normal. No respiratory distress. She has no wheezes. She has rales in the right middle field, the right lower field, the left middle field and the left lower field.   Abdominal: Soft. Bowel sounds  are normal. She exhibits no distension. There is no tenderness.   Neurological: She is alert and oriented to person, place, and time.   Skin: Skin is warm and dry.       Significant Labs:       Recent Labs  Lab 07/16/18  0449   WBC 5.36   RBC 3.29*   HGB 10.1*   HCT 30.2*      MCV 92   MCH 30.7   MCHC 33.4       Recent Labs  Lab 07/16/18 0449   CALCIUM 9.3   PROT 6.0      K 3.8   CO2 30*      BUN 21   CREATININE 1.1   ALKPHOS 122   ALT 25   AST 47*   BILITOT 1.3*       Significant Imaging: Echocardiogram:   2D echo with color flow doppler:   Results for orders placed or performed during the hospital encounter of 07/09/18   2D echo with color flow doppler   Result Value Ref Range    EF 50 55 - 65    Mitral Valve Regurgitation MILD     Aortic Valve Stenosis SEVERE (A)     Est. PA Systolic Pressure 61.98 (A)     Mitral Valve Mobility NORMAL     Tricuspid Valve Regurgitation MILD

## 2018-07-16 NOTE — PLAN OF CARE
07/16/18 0817   PRE-TX-O2-ETCO2   O2 Device (Oxygen Therapy) nasal cannula   $ Is the patient on Low Flow Oxygen? Yes   Flow (L/min) 3   SpO2 96 %   $ Pulse Oximetry - Multiple Charge Pulse Oximetry - Multiple

## 2018-07-16 NOTE — PROGRESS NOTES
"[facility] Hospitalist Medicine Team B Resident HO-4 Progress Note  Patient: Natalie Parnell   MRN: [id number]      Primary Team: [facility] Hospitalist Team B  Attending Physician: Dr. Ezequiel Escobar MD  Date of Admit: 7/15/2018    LOS: 1    Subjective:      Afebrile. Resting comfortably this morning on 3L NC. No new complaints this morning.  Awaiting cardiac procedure this AM.     Objective:     Last 24 Hour Vital Signs:  BP  Min: 101/54  Max: 150/75  Temp  Av.9 °F (36.6 °C)  Min: 96.4 °F (35.8 °C)  Max: 99.1 °F (37.3 °C)  Pulse  Av.9  Min: 71  Max: 126  Resp  Av.4  Min: 16  Max: 26  SpO2  Av.4 %  Min: 94 %  Max: 98 %  Height  Av' 6" (167.6 cm)  Min: 5' 6" (167.6 cm)  Max: 5' 6" (167.6 cm)  Weight  Av.8 kg (156 lb 1.4 oz)  Min: 70.8 kg (156 lb 1.4 oz)  Max: 70.8 kg (156 lb 1.4 oz)    24 HR Intake & Output   07/15 0701 -  0700  In: -   Out: 3650 [Urine:3650]   Body mass index is 25.19 kg/m².    Physical Examination:  BP (!) 120/58 (BP Location: Right arm, Patient Position: Lying)   Pulse 71   Temp 96.4 °F (35.8 °C) (Oral)   Resp 20   Ht 5' 6" (1.676 m)   Wt 70.8 kg (156 lb 1.4 oz)   SpO2 97%   Breastfeeding? No   BMI 25.19 kg/m²     General appearance: alert, cooperative, no distress and on 3L NC  HEENT: NCAT, EOMI, PERRLA, OP clear, MMM  Neck: no adenopathy, supple, symmetrical, trachea midline, thyroid not enlarged, symmetric, no tenderness/mass/nodules and JVD ~7-8cm, +Carotid Bruit bilaterally  Lungs: clear to auscultation bilaterally and decreased breath sounds to bases, no wheezing, no crackles appreciated  Chest wall: no tenderness  Heart: RRR, III/VI systolic murmur with radiation to carotids, 1+ pulses throughout, cap refill <2sec  Abdomen: Soft, ND, BS+, no TTP  Extremities: 1-2+ pitting edema to LE, no cyanosis, no clubbing  Skin: Skin color, texture, turgor normal. No rashes or lesions  Neurologic: AAO x4 (person, place, time, events), 4/5 muscle strength throughout, sensation " intact    Laboratory:  Laboratory Data Reviewed: yes    Most Recent Data/Trended Lab Data:    Recent Labs  Lab 07/09/18  2045  07/10/18  0337  07/10/18  1701 07/11/18  0402 07/15/18  0721 07/15/18  1608 07/16/18  0449   WBC  --   < >  --   < > 11.05 7.79 10.56  --  5.36   HGB  --   < >  --   < > 10.9* 10.8* 10.9*  --  10.1*   HCT  --   < >  --   < > 32.7* 32.1* 33.1*  --  30.2*   PLT  --   < >  --   < > 175 184 222  --  183   MCV  --   < >  --   < > 93 92 94  --  92   RDW  --   < >  --   < > 14.9* 14.8* 15.3*  --  15.3*     --  138  --   --  136 137  --  137   K 5.5*  --  4.7  --   --  4.6 4.4  --  3.8     --  110  --   --  108 108  --  100   CO2 20*  --  22*  --   --  25 20*  --  30*   BUN 44*  --  45*  --   --  34* 19  --  21   CREATININE 1.0  --  1.1  --   --  1.1 1.1  --  1.1   *  --  130*  --   --  142* 176*  --  150*   CALCIUM 8.7  --  8.5*  --   --  8.8 9.0  --  9.3   MG  --   --   --   --   --   --   --  1.4*  --    PHOS  --   --   --   --   --   --   --   --  4.1   PROT 5.6*  --   --   --   --  5.8* 6.8  --  6.0   ALBUMIN 2.6*  --   --   --   --  2.8* 3.2*  --  2.9*   BILITOT 1.0  --   --   --   --  2.0* 1.1*  --  1.3*   AST 60*  --   --   --   --  77* 55*  --  47*   ALKPHOS 126  --   --   --   --  108 171*  --  122   ALT 29  --   --   --   --  34 30  --  25   < > = values in this interval not displayed.    Urinalysis: Lab Results   Component Value Date    LABURIN  05/16/2005     MULTIPLE ORGANISMS ISOLATED. NONE IN PREDOMINANCE REPEAT IF CLINICALLY NECESSARY.    COLORU Yellow 07/09/2018    SPECGRAV 1.025 07/09/2018    NITRITE Negative 07/09/2018    KETONESU Negative 07/09/2018    UROBILINOGEN 1.0 07/09/2018    WBCUA 100 (H) 07/09/2018       Microbiology Data:    Microbiology Data Reviewed: yes  Microbiology Results (last 7 days)     ** No results found for the last 168 hours. **          Radiology:  Cta Chest Non Coronary    Result Date: 7/15/2018  EXAMINATION: CTA CHEST NON CORONARY  CLINICAL HISTORY: Chest pain, acute, PE suspected, intermed prob, negative D-dimer; TECHNIQUE: Low dose axial images, sagittal and coronal reformations were obtained from the thoracic inlet to the lung bases following the IV administration of 100 mL of Omnipaque 350.  Contrast timing was optimized to evaluate the pulmonary arteries. COMPARISON: 03/01/2018 FINDINGS: There are no filling defects in the pulmonary arteries to the level of proximal segmental branches.  Distal segmental and subsegmental branches are suboptimally evaluated due to motion artifact.  No CT evidence for right heart strain. There are moderate bilateral pleural effusions.  No pericardial effusion.  Heart is enlarged.  Mildly prominent mediastinal lymph nodes are noted.  Right paratracheal lymph node measures 1.0 cm in short axis.  Subcentimeter right thyroid nodule noted. Bilateral dependent ground-glass opacities noted.  There is bilateral dependent lower lobe atelectasis.  Central airways are patent, without endobronchial lesions.  No pneumothorax. Regional osseous structures demonstrate no aggressive lytic or blastic lesions.  Nodular liver contour in keeping with cirrhosis.     1. No evidence for pulmonary arterial thromboembolism to the level of the proximal segmental branches. 2. Moderate bilateral pleural effusions with bilateral atelectasis. 3. Cardiomegaly. 4. Cirrhosis. Electronically signed by: Ayo France MD Date:    07/15/2018 Time:    13:48    X-ray Chest Ap Portable    Result Date: 7/15/2018  EXAMINATION: XR CHEST AP PORTABLE CLINICAL HISTORY: CHF; TECHNIQUE: Single frontal portable view of the chest was performed. COMPARISON: Chest radiograph 07/09/2018 FINDINGS: Support devices: None Interstitial markings throughout the lungs are diffusely accentuated, with diffuse hazy airspace opacification throughout the lungs.  There is a small to moderate size right pleural effusion with associated right basilar airspace opacification.   No definite left pleural effusion.  No pneumothorax. Mild prominence of the cardiac silhouette is unchanged. Bones are intact.  Surgical clips overlie the gallbladder fossa.     Worsened pulmonary edema pattern with small to moderate right pleural effusion and associated right basilar airspace disease. Electronically signed by: Ziyad Corcoran MD Date:    07/15/2018 Time:    08:30    X-ray Chest Ap Portable    Result Date: 7/9/2018  EXAMINATION: XR CHEST AP PORTABLE CLINICAL HISTORY: chest pain; TECHNIQUE: Single frontal view of the chest was performed. COMPARISON: Chest radiograph 03/01/2018 and CT thorax 03/01/2018 FINDINGS: Patient is somewhat rotated.  Large body habitus.  Right lung apex is partially obscured by overlying chin soft tissues.  Cardiac silhouette is enlarged similar to prior with prominence of the central pulmonary vasculature and bilateral diffuse interstitial coarsening concerning for pulmonary edema/CHF pattern.  Interstitial pneumonia not excluded.  Right costophrenic angle is not well visualized suggesting pleural thickening/scarring versus trace pleural fluid.  No large consolidation or pneumothorax.  Stable mediastinal contours.  No acute osseous process seen.  PA and lateral views can be obtained.     Findings suggesting pulmonary edema/CHF pattern without focal consolidation.  Interstitial pneumonia not excluded. Electronically signed by: Janak Granados MD Date:    07/09/2018 Time:    12:23    Us Lower Extremity Veins Bilateral    Result Date: 7/15/2018  EXAMINATION: US LOWER EXTREMITY VEINS BILATERAL CLINICAL HISTORY: pt with history of DVT off AC presenting with sob and cp; TECHNIQUE: Duplex and color flow Doppler and dynamic compression was performed of the bilateral lower extremity veins was performed. COMPARISON: 07/09/2018 FINDINGS: Right thigh veins: The common femoral, femoral, popliteal, upper greater saphenous, and deep femoral veins are patent and free of thrombus. The veins are  normally compressible and have normal phasic flow and augmentation response. Right calf veins: The visualized calf veins are patent. Left thigh veins: The common femoral, femoral, popliteal, upper greater saphenous, and deep femoral veins are patent and free of thrombus. The veins are normally compressible and have normal phasic flow and augmentation response. Left calf veins: The visualized calf veins are patent. Miscellaneous: None     No evidence of deep venous thrombosis in either lower extremity. Electronically signed by: Ayo France MD Date:    07/15/2018 Time:    14:12    Us Lower Extremity Veins Bilateral    Result Date: 7/9/2018  EXAMINATION: US LOWER EXTREMITY VEINS BILATERAL CLINICAL HISTORY: history of nonocclusive right CFV DVT in 3/2018; Acute embolism and thrombosis of left femoral vein TECHNIQUE: Duplex and color flow Doppler evaluation of the bilateral lower extremity veins was performed. COMPARISON: 03/01/2018. FINDINGS: No evidence of clot involving the bilateral common femoral, greater saphenous, femoral, popliteal, peroneal, anterior and posterior tibial veins.  All venous structures demonstrate normal respiratory phasicity and augment adequately.  No evidence of soft tissue mass or Baker's cyst.     No evidence of lower extremity deep venous thrombosis. Electronically signed by: April Cortez MD Date:    07/09/2018 Time:    17:35      Current Medications:     Infusions:       Scheduled:   aspirin  81 mg Oral Daily    atorvastatin  40 mg Oral Daily    furosemide  40 mg Intravenous BID    losartan  25 mg Oral Daily    metoprolol succinate  25 mg Oral Daily    sodium chloride 0.9%  3 mL Intravenous Q8H        PRN:  acetaminophen, dextrose 50%, dextrose 50%, glucagon (human recombinant), glucose, glucose, insulin aspart U-100    Antibiotics and Day Number of Therapy:  None    Lines and Day Number of Therapy:  PIV     Assessment:     Natalie Parnell is a 83 y.o.female with  Present on  Admission:   Acute diastolic congestive heart failure       Plan:     1. Acute on Chronic Diastolic Heart Failure Exacerbation in Setting of Severe AS  - Most recent ECHO earlier this month noting EF 50% with severe AS and unable to quantify diastolic dysfunction at that time.  - Followed by Dr. Diaz as outpatient, being considered for TAVR  - BNP elevated at 1210 (up from 299 one week ago)  - Repeat ECHO pending  - Continue IV Lasix BID, monitor I/O closely  - Cardiology consulted, plan for LHC today    2. Acute Hypoxemic Respiratory 2/2 Acute on Chronic   - ABG notable for pH 7.385, PCO2 38.7, PO2 217, HCO3 23.1 in ED  - Continue IV Lasix for now, on 3L NC currently, will wean as tolerated    3. Severe Aortic Stenosis  - Most recent ECHO earlier this month noting EF 50% with severe AS and unable to quantify diastolic dysfunction at that time.  - Repeat ECHO pending  - Followed by Dr. Diaz as outpatient, being considered for TAVR  - Cardiology consulted, to do LHC today    3. NSTEMI likely 2/2 demand  - Patient with H/O GI bleed, avoiding anticoagulation at this time  - Continue to trend trop 0.719 --> 0.907 -> 0.913  - Patient going for LHC today    4. Anemia in setting of H/O GI bleed (recent admit this month)  - given severe AS, concerned for AVM (Heyde's syndrome)  - EGD on previous admit showed stomach angiodysplastic lesion treated with coagulation  - Patient is okay to pursue LHC and/or DAPT per GI noted on previous admit  - Given GIB, continue to avoid anticoagulation; discontinued Eliquis on last admit and will avoid Plavix  - Per Cardiology and GI, okay to continue ASA for now  - H/H stable at 10.9/33.1 on admit  - Will continue to monitor    5. Diabetes Mellitus, type 2 with neuropathy  - SSI with accuchecks for now    6. COPD/Chronic Lung Disease  - Continue symbicort  - SOB likely 2/2 volume overload    7. CKD2  - Baseline Cr 1.0-1.1; stable  - Continue to monitor    8. Deconditioned  - PT/OT  consulted      HM  - PCP: Octavio Ferrell MD  - Immunizations:   Immunization History   Administered Date(s) Administered    Influenza - High Dose 12/03/2015    Pneumococcal Conjugate - 13 Valent 03/08/2017    Pneumococcal Polysaccharide - 23 Valent 12/03/2015    Td (ADULT) 09/15/2005    Tdap 12/03/2015    influenza - Quadrivalent 10/08/2014     - Future appointments: Future Appointments  Date Time Provider Department Center   7/17/2018 3:00 PM Octavio Ferrell MD [facility] MED [place]   7/24/2018 11:00 AM Mike Diaz MD [facility] CARDIO [facility]       F: None  E: Replete as needed  N: NPO for procedure    Prophylaxis: SCD    Code Status: FULL    Disposition: Cardiac cath today, IV diuresis    Kristian Simpson, DO  [facility] Internal Medicine-Pediatrics PGY-IV  [facility] Hospitalist Service Team B    [facility] Hospitalist Pager number:   [facility] Hospitalist Medicine Team B (Luz/Eli):  [phone]

## 2018-07-16 NOTE — PLAN OF CARE
Plan of care reviewed with patient, understanding verbalized.  Family at bedside.  Pt remains SR on tele. Bed alarm on, call light in reach, fall precautions in place.  Will continue to monitor.

## 2018-07-16 NOTE — HOSPITAL COURSE
7/15/2018 Presented with SOB x 1 day. BNP elevated at 0712-0784 with stable H&H and BMP WNL. Acute Decompensated CHF secondary to dietary non compliance; NSTEMI type 1 vs type 2; Severe AS with Diastolic dysfunction and Pulm HTN and HTN- controlled Recommended IV Lasix BID along with LHC to rule out severe CAD as contributing factor to CHF especially with patient having chest pain in setting of severe AS.   7/16/2018 SOB improving but not completely resolved. On good medication regimen-HR and BP stable. On IV Lasix BID with good response and 3.6 liters out overnight. NPO for possible LHC today   7/17/2018 LHC yesterday with non-obstructive CAD and no LVEDP or LV gram because valve was not crossed due to known severe aortic stenosis. Right radial site stable with no active bleeding or hematoma. Lasix as well as ARB and BB held this morning due to marginal BP. 875 documented out overnight; 4.4 liters out since admission

## 2018-07-16 NOTE — NURSING
Right radial vasc band removed.  Gauze wityh tegaderm applied. CDI, no hematoma or bleeding noted.  Will continue to monitor until pt is trf to floor.

## 2018-07-16 NOTE — PT/OT/SLP EVAL
Occupational Therapy   Evaluation    Name: Natalie Parnell  MRN: [id number]  Admitting Diagnosis:  Acute diastolic congestive heart failure      Recommendations:     Discharge Recommendations: home health OT, home health PT  Discharge Equipment Recommendations:  bath bench  Barriers to discharge:  Decreased caregiver support    History:     Occupational Profile:  Pt and dghtr present declining use of free language line at this time; dghtr stating she would translate   Living Environment: Pt lives with dghtr and JASPER in duplex, 7 steps to enter, B rail and tub/shower combo   Previous level of function: reports mod I/independence; utilizes rollator for ambulation   Equipment Owned:  rollator  Assistance upon Discharge: pt alone at home at times while dghtr is at work; dghtr currently 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 Max    ESOPHAGOGASTRODUODENOSCOPY N/A 7/10/2018    Procedure: EGD (ESOPHAGOGASTRODUODENOSCOPY);  Surgeon: Derian Stoner MD;  Location: [facility];  Service: Endoscopy;  Laterality: N/A;    HERNIA REPAIR      VARICOSE VEIN SURGERY         Subjective     Chief Complaint: L shoulder pain with movement, however, pt able to use functionally   Patient/Family stated goals: none stated   Communicated with: nsg prior to session.  Pain/Comfort:  · Pain Rating 1: 4/10  · Location - Side 1: Left  · Location - Orientation 1: generalized  · Location 1: shoulder  · Pain Addressed 1: Reposition, Distraction, Cessation of Activity    Patients cultural, spiritual, Christianity conflicts given the current situation:      Objective:     Patient found with:      General Precautions: Standard, fall   Orthopedic Precautions:N/A   Braces: N/A     Occupational Performance:    Bed Mobility:    · Patient  completed Scooting/Bridging with supervision  · Patient completed Supine to Sit with supervision  · Patient completed Sit to Supine with supervision    Functional Mobility/Transfers:  · Patient completed Sit <> Stand Transfer with stand by assistance  with  4 wheeled walker   · Patient completed Toilet Transfer Step Transfer technique with contact guard assistance with  4 wheeled walker  · Functional Mobility: CGA 2/2 impaired safety awareness with use of RW- pt picking up RW off the floor during ambulation, running into objects     Activities of Daily Living:  · Grooming: stand by assistance at sink   · Lower Body Dressing: stand by assistance seated EOB  · Toileting: stand by assistance on toilet     Cognitive/Visual Perceptual:  Cognitive/Psychosocial Skills:     -       Oriented to: Person, Place, Time and Situation   -       Follows Commands/attention:Follows multistep  commands  -       Communication: clear/fluent  -       Memory: No Deficits noted  -       Safety awareness/insight to disability: impaired   -       Mood/Affect/Coping skills/emotional control: Appropriate to situation    Physical Exam:  Balance:    -       WFL sitting balance; CGA- SBA standing balance/ambulation with rollator   Postural examination/scapula alignment:    -       Rounded shoulders  -       Forward head  Skin integrity: Visible skin intact  Edema:  Moderate BLEs   Sensation:    -       Intact  Dominant hand:    -       right  Upper Extremity Range of Motion:  RUE WFL; LUE WFL, however, impaired shoulder flexion 2/2 pain (reports ongoing problem)   Upper Extremity Strength:  RUE WFL; LUE WFL distally; did not assess at shoulder 2/2 pain    Strength:  Good, equal bilaterally   Fine Motor Coordination:    -       Intact    Patient left supine with all lines intact, call button in reach, bed alarm on and nsg notified and dghtr present     [facility] 6 Click:  [facility] Total Score: 21    Treatment & Education:  Skills performed as listed  "above  Impaired safety awareness with all axs   Functional mobility performed in room for ADLs and hallway for endurance trng   O2 monitored during mobility and axs on 3L O2- dropping to 91%   Educated family on recs of TTB   Pt's dghtr given energy conservation handout for home use     Education:    Assessment:     Natalie Parnell is a 83 y.o. female with a medical diagnosis of Acute diastolic congestive heart failure.  She presents with SOB .  Performance deficits affecting function are weakness, impaired self care skills, impaired functional mobilty, impaired endurance, gait instability, impaired balance, decreased ROM, decreased safety awareness, decreased upper extremity function, pain, edema, impaired cardiopulmonary response to activity.  Pt would benefit from cont OT services in order to maximize functional independence. Recommending resume HHOT/PT at d/c and TTB    Rehab Prognosis:  Good ; patient would benefit from acute skilled OT services to address these deficits and reach maximum level of function.         Clinical Decision Makin.  OT Low:  "Pt evaluation falls under low complexity for evaluation coding due to performance deficits noted in 1-3 areas as stated above and 0 co-morbities affecting current functional status. Data obtained from problem focused assessments. No modifications or assistance was required for completion of evaluation. Only brief occupational profile and history review completed."     Plan:     Patient to be seen 5 x/week to address the above listed problems via self-care/home management, therapeutic activities, therapeutic exercises  · Plan of Care Expires: 18  · Plan of Care Reviewed with: patient, daughter    This Plan of care has been discussed with the patient who was involved in its development and understands and is in agreement with the identified goals and treatment plan    GOALS:    Occupational Therapy Goals        Problem: Occupational Therapy Goal    Goal " [facility] Outcome Interventions   Occupational Therapy Goal     OT, PT/OT Ongoing (interventions implemented as appropriate)    Description:  Goals to be met by: 8/16/18     Patient will increase functional independence with ADLs by performing:    LE Dressing with Modified [place].  Grooming while standing with Modified [place].  Toileting from toilet with Modified [place] for hygiene and clothing management.   Supine to sit with Modified [place].  Stand pivot transfers with Modified [place].  Toilet transfer to toilet with Modified [place].  Increased functional strength to WFL for self care skills and functional mobility.  Upper extremity exercise program x10 reps per handout, with independence.                      Time Tracking:     OT Date of Treatment: 07/16/18  OT Start Time: 0950  OT Stop Time: 1015  OT Total Time (min): 25 min    Billable Minutes:Evaluation 10  Self Care/Home Management 15     Jeanine Cochran OT  7/16/2018

## 2018-07-16 NOTE — NURSING
Does the patient report this code is not covered for ? Patient transported downstairs in stretcher to cath lab with cath lab RN. IV clean, dry, and intact. Tele monitor removed from patient and at bedside.

## 2018-07-16 NOTE — ASSESSMENT & PLAN NOTE
-recent echocardiogram with low normal to mildly depressed LVEF 50-55% and severe aortic valve stenosis (SHEA 0.65 cm2 and MG 74 mmHg)  -seen previously by [facility] clinic with plans for follow up in next 1-2months  -ADHF mainly concerning for dietary indiscretion of salt intake but cannot fully rule out etiology of AS

## 2018-07-16 NOTE — PLAN OF CARE
Accompanied by daughter with whom she lives.    Daughter declined  services and is assisting with initial DC Assessment at this time.    Patient expecting likely dc to home with restart with HH, daughter will drive, [place] requested f/u with Nicole and requested cancellation with DR. Ferrell.  Future Appointments  Date Time Provider Department Center   7/17/2018 3:00 PM Octavio Ferrell MD [facility] MED [place]   7/24/2018 11:00 AM Mike Diaz MD [facility] [place] [facility]          07/16/18 1243   Discharge Assessment   Assessment Type Discharge Planning Assessment   Prior to hospitilization cognitive status: Alert/Oriented   Prior to hospitalization functional status: Assistive Equipment   Current cognitive status: Alert/Oriented   Current Functional Status: Assistive Equipment   [facility]   Lives With child(lana), adult   Able to Return to Prior Arrangements yes   Is patient able to care for self after discharge? Yes   Who are your caregiver(s) and their phone number(s)? Evangelina Rocha Daughter [phone]      Patient's perception of discharge disposition home or selfcare   Readmission Within The Last 30 Days previous discharge plan unsuccessful   If yes, most recent facility name: [facility]   Patient currently being followed by outpatient case management? No   Patient currently receives any other outside agency services? Yes   Name and contact number of agency or person providing outside services [facility]   Equipment Currently Used at Home rollator   Do you have any problems affording any of your prescribed medications? No   Is the patient taking medications as prescribed? yes   Does the patient have transportation home? Yes   Transportation Available family or friend will provide   Dialysis Name and Scheduled days n/a   Does the patient receive services at the [facility]? No   Discharge Plan A Home with family;Home Health   Patient/Family In  Agreement With Plan yes   Readmission Questionnaire   At the time of your discharge, did someone talk to you about what your health problems were? Yes   At the time of discharge, did someone talk to you about what to watch out for regarding worsening of your health problem? Yes   At the time of discharge, did someone talk to you about what to do if you experienced worsening of your health problem? Yes   At the time of discharge, did someone talk to you about which medication to take when you left the hospital and which ones to stop taking? Yes   At the time of discharge, did someone talk to you about when and where to follow up with a doctor after you left the hospital? Yes   What do you believe caused you to be sick enough to be re-admitted? short of breath   How often do you need to have someone help you when you read instructions, pamphlets, or other written material from your doctor or pharmacy? Sometimes   Do you have problems taking your medications as prescribed? No   Do you have any problems affording any of  your prescribed medications? No   Do you have problems obtaining/receiving your medications? No   Does the patient have transportation to healthcare appointments? Yes   Living Arrangements house   Does the patient have family/friends to help with healtcare needs after discharge? yes   Does your caregiver provide all the help you need? Yes   Are you currently feeling confused? No   Are you currently having problems thinking? No   Are you currently having memory problems? No   Have you felt down, depressed, or hopeless? 0   Have you felt little interest or pleasure in doing things? 0   In the last 7 days, my sleep quality was: fair

## 2018-07-16 NOTE — PROGRESS NOTES
Pt arrived to cath lab recovery, I am able to speak on pt's native language, Panamanian, pt also able to speak and understand english, refuses need of . Pt aaox4, denies pain or any other discomfort. R radial arteria w vasc band in place, 12ml of air. Site cdi, no bleeding or hematoma. Neuro intact. Site care instruction provided at this time and added to AVS, fall risk precuations review at this time. Will cont to monitor.

## 2018-07-17 PROBLEM — K92.2 GIB (GASTROINTESTINAL BLEEDING): Status: RESOLVED | Noted: 2018-01-01 | Resolved: 2018-01-01

## 2018-07-17 PROBLEM — I50.31 ACUTE DIASTOLIC CONGESTIVE HEART FAILURE: Status: RESOLVED | Noted: 2018-01-01 | Resolved: 2018-01-01

## 2018-07-17 PROBLEM — I95.9 HYPOTENSION: Status: ACTIVE | Noted: 2018-01-01

## 2018-07-17 PROBLEM — R79.89 ELEVATED TROPONIN: Status: RESOLVED | Noted: 2018-01-01 | Resolved: 2018-01-01

## 2018-07-17 NOTE — PLAN OF CARE
Alerted nurse this morning discontinue lasix and hold AM dose of losartan due to low BP this morning.

## 2018-07-17 NOTE — ASSESSMENT & PLAN NOTE
-recent echo with indeterminate diastolic function and EF mildly depressed at 50-55% with severe AS; related to valvular etiology   -volume overload due to dietary indiscretion of salt intake  -aggressively diuresed and negative 4.4 liters since admission  -SOB improving; rales to left base remain although improved from yesterday   -recommend initiation of low dose Lasix upon discharge with 20mg as well as BB and ARB

## 2018-07-17 NOTE — PLAN OF CARE
Problem: Patient Care Overview  Goal: Plan of Care Review  Outcome: Ongoing (interventions implemented as appropriate)  Pt IV and tele box removed. Pt education on diet, medications, and follow up care given. Bilingual adult at bedside to receive dc education with patient. Pt and family verbalized understanding of dc instructions. Medication delivered to bedside. No acute distress noted. Will continue to monitor.

## 2018-07-17 NOTE — DISCHARGE INSTRUCTIONS
Smoking Cessation (Bulgarian) View Edit Remove   Smoking, Tips for Quitting (Cardiovascular) (Bulgarian) View Edit Remove   Heart Failure, What is (Bulgarian) View Edit Remove   Heart Failure, Discharge Instructions for (Bulgarian) View Edit Remove   Heart Failure: Warning Signs of a Flare-Up (Bulgarian) View Edit Remove   Heart Failure: Tracking Your Weight (Bulgarian) View Edit Remove   Heart Failure: Taking Medication to Control (Bulgarian) View Edit Remove   Cardiac Catheterization, Discharge Instructions for (Bulgarian) View Edit Remove   Cardiac Catheterization, Transradial, Understanding (Bulgarian) View Edit Remove   Furosemide tablets (Bulgarian) View Edit Remove

## 2018-07-17 NOTE — NURSING
[facility] RN clinician visited patient at bedside to provide heart failure education. Patient's daughter Evangelina and tiago Winter  were present upon my hospital visit.  Patient's daughter utilized as English  as patient is primarily Emirati speaking.   Information provided on [facility] appointment, details given on purpose of [facility].  Signs and symptoms of heart failure discussed in detail. Informed family to notify [facility] for worsening symptoms or if urgent appointment needed after discharge, rather than seeking care at Emergency department. Discussed importance of taking daily weights and what to do if there is a 2-3 pound weight gain in a day or 5 pound or more weight gain in one week.  Daughter stated patient does not weigh self daily, reinforced importance of daily weights for early detection of heart failure exacerbation. Education provided on low sodium diet and fluid limitation.  Reviewed some high sodium foods and drinks patient should avoid.  Instructions given on performing activities as tolerated, otherwise, instructed by physician.  Urged patient on importance of medication compliance related to heart failure diagnosis. Recommended patient to receive all prescribed medications from [facility] before discharge.  Patient and family given opportunity to ask questions, stated understanding of education provided.  Patient provided with the following [facility] educational handouts:  Green, Yellow, or Red Heart Failure Zone, 8 Step Plan for Heart Failure Patients, [facility] Bedside Delivery, and personal business card.

## 2018-07-17 NOTE — SUBJECTIVE & OBJECTIVE
Review of Systems   Constitution: Negative for chills, decreased appetite, diaphoresis, fever and weakness.   Cardiovascular: Positive for dyspnea on exertion. Negative for chest pain, claudication, cyanosis, irregular heartbeat, leg swelling, near-syncope, orthopnea, palpitations, paroxysmal nocturnal dyspnea and syncope.   Respiratory: Negative for cough, hemoptysis, shortness of breath and wheezing.    Gastrointestinal: Negative for bloating, abdominal pain, constipation, diarrhea, melena, nausea and vomiting.   Neurological: Negative for dizziness.     Objective:     Vital Signs (Most Recent):  Temp: 96.9 °F (36.1 °C) (07/17/18 0701)  Pulse: 77 (07/17/18 0825)  Resp: 17 (07/17/18 0825)  BP: (!) 104/51 (07/17/18 0701)  SpO2: 95 % (07/17/18 0825) Vital Signs (24h Range):  Temp:  [96.8 °F (36 °C)-98.4 °F (36.9 °C)] 96.9 °F (36.1 °C)  Pulse:  [71-86] 77  Resp:  [15-19] 17  SpO2:  [93 %-100 %] 95 %  BP: ()/(50-59) 104/51     Weight: 72.8 kg (160 lb 7.9 oz)  Body mass index is 25.9 kg/m².     SpO2: 95 %  O2 Device (Oxygen Therapy): nasal cannula      Intake/Output Summary (Last 24 hours) at 07/17/18 1018  Last data filed at 07/17/18 0800   Gross per 24 hour   Intake              300 ml   Output              875 ml   Net             -575 ml       Lines/Drains/Airways     Peripheral Intravenous Line                 Peripheral IV - Single Lumen 07/15/18 0700 Left Antecubital 2 days         Peripheral IV - Single Lumen 07/15/18 0741 Right Hand 2 days                Physical Exam   Constitutional: She is oriented to person, place, and time. She appears well-developed and well-nourished. No distress.   Cardiovascular: Normal rate and regular rhythm.  Exam reveals no gallop.    No murmur heard.  Pulmonary/Chest: Effort normal. No respiratory distress. She has no wheezes. She has rales in the left lower field.   Abdominal: Soft. Bowel sounds are normal. She exhibits no distension. There is no tenderness.    Neurological: She is alert and oriented to person, place, and time.   Skin: Skin is warm and dry.       Significant Labs:       Recent Labs  Lab 07/17/18  0434      K 4.0   CL 98   CO2 32*   BUN 25*   CREATININE 1.2   MG 1.7       Recent Labs  Lab 07/17/18  0434   WBC 6.16   RBC 3.46*   HGB 10.7*   HCT 32.2*      MCV 93   MCH 30.9   MCHC 33.2       Significant Imaging: Echocardiogram:   2D echo with color flow doppler:   Results for orders placed or performed during the hospital encounter of 07/09/18   2D echo with color flow doppler   Result Value Ref Range    EF 50 55 - 65    Mitral Valve Regurgitation MILD     Aortic Valve Stenosis SEVERE (A)     Est. PA Systolic Pressure 61.98 (A)     Mitral Valve Mobility NORMAL     Tricuspid Valve Regurgitation MILD

## 2018-07-17 NOTE — PLAN OF CARE
Future Appointments  Date Time Provider Department Center   7/24/2018 11:00 AM Mike Diaz MD [facility] CARDIO [place] [facility]   7/27/2018 10:00 AM Anamaria Rivera MD [facility] IM [facility] [facility]   8/7/2018 10:40 AM Octavio Ferrell MD [facility] FAM MED [place]   8/8/2018 3:20 PM Shanice Ashraf MD [facility] HEPAT [address]   8/17/2018 3:40 PM Scott Pruett MD [facility] CARDVAL [address]     Daughter here to give ride home, [place] sent referral to [facility] with whom patient was already established.       07/17/18 1526   Final Note   Assessment Type Final Discharge Note   Discharge Disposition Home-Health   What phone number can be called within the next 1-3 days to see how you are doing after discharge? [phone]   Hospital Follow Up  Appt(s) scheduled? Yes   Discharge plans and expectations educations in teach back method with documentation complete? Yes   Right Care Referral Info   Post Acute Recommendation Home-care

## 2018-07-17 NOTE — ASSESSMENT & PLAN NOTE
-recent echocardiogram with low normal to mildly depressed LVEF 50-55% and severe aortic valve stenosis (SHEA 0.65 cm2 and MG 74 mmHg)  -ADHF mainly concerning for dietary indiscretion of salt intake but cannot fully rule out etiology of AS  -diuresed with IV Lasix with good response and negative 4.4 liters since admission  -SOB improving per pt and sats 91-92% on room air working with PT this AM  -BB, ARB as well as IV Lasix held due to marginal BP;   -recommend continuation of ARB and BB upon discharge along with initiation of low dose Lasix 20mg  -follow up with Dr. Diaz in 1-2 weeks

## 2018-07-17 NOTE — PLAN OF CARE
[facility] Hospital Medicine - Plan of Care Note  Contacted by nursing staff for patient with hypotension, manual BP 90/54. Per nursing notes, patient prefers translation by family members instead of language line. Went to assess patient in person. Pt denies any weakness, dizziness, pain, chest pain, shortness of breath. Repeat manual BP with systolic 98, unreadable diastolic. Automatic 97/50. Remains asymptomatic. Will hold tx at this time and re-evaluate if symptomatic.    Leandra Aguilera MD  [facility] Internal Medicine, -I

## 2018-07-17 NOTE — PROGRESS NOTES
"[facility] Hospitalist Medicine Team B Resident HO-4 Progress Note  Patient: Natalie Parnell   MRN: [id number]      Primary Team: [facility] Hospitalist Team B  Attending Physician: Dr. 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 Pt endorses orthopnea that worsened last night and PND. The chest pain is similar to her chronic left chest pain. Pt recently admitted to our service for GIB and NSTEMI. Cardiology saw patient while in hospital and 2 days PTA in clinic where clinic notes indicate patient was not in volume overload status. At this clinic visit, [facility] was planned to address recent NSTEMI. Family states that 2 days PTA, pt did not adhere to recommended dietary restrictions. Pt denies cough, fevers/chills/sweats, black or bloody stool, lightheadedness/dizziness/syncope, abd pain, n/v, diarrhea.    Afebrile. Went to [facility] yesterday. Complains of pain, swelling and bruising at medial aspect of right cubital fossa. She also complains of swelling and pain of her left leg, likely due to lasix. Otherwise feeling well, denies chest pain, SOB, n/v.      Objective:     Last 24 Hour Vital Signs:  BP  Min: 90/54  Max: 125/58  Temp  Av.8 °F (36.6 °C)  Min: 96.4 °F (35.8 °C)  Max: 98.4 °F (36.9 °C)  Pulse  Av.8  Min: 69  Max: 86  Resp  Av.5  Min: 15  Max: 20  SpO2  Av.3 %  Min: 93 %  Max: 100 %  Weight  Av.2 kg (159 lb 4.5 oz)  Min: 71.7 kg (158 lb 1.1 oz)  Max: 72.8 kg (160 lb 7.9 oz)    24 HR Intake & Output    0701 -  0700  In: 50 [P.O.:50]  Out: 875 [Urine:875]   Body mass index is 25.9 kg/m².    Physical Examination:  BP (!) 104/51   Pulse 78   Temp 96.8 °F (36 °C)   Resp 17   Ht 5' 6" (1.676 m)   Wt 72.8 kg (160 lb 7.9 oz)   SpO2 (!) 94%   " Breastfeeding? No   BMI 25.90 kg/m²     General appearance: alert, cooperative, no distress and on 3L NC  HEENT: NCAT, EOMI, PERRLA, OP clear, MMM  Neck: no adenopathy, supple, symmetrical, trachea midline, thyroid not enlarged, symmetric, no tenderness/mass/nodules and JVD ~7-8cm, +Carotid Bruit bilaterally  Lungs: clear to auscultation bilaterally and decreased breath sounds to bases, no wheezing, no crackles appreciated  Chest wall: no tenderness  Heart: RRR, III/VI systolic murmur with radiation to carotids, 1+ pulses throughout, cap refill <2sec  Abdomen: Soft, ND, BS+, no TTP  Extremities: pain on palpation of both calves, no edema, no cyanosis, no clubbing  Skin: no edema or ecchymoses - arm(s) bilateral, elbow(s) right  Neurologic: AAO x4 (person, place, time, events), 4/5 muscle strength throughout, sensation intact    Laboratory:  Laboratory Data Reviewed: yes    Most Recent Data/Trended Lab Data:    Recent Labs  Lab 07/11/18  0402 07/15/18  0721 07/15/18  1608 07/16/18  0449 07/16/18  1751 07/17/18  0048 07/17/18  0434   WBC 7.79 10.56  --  5.36  --  6.19 6.16   HGB 10.8* 10.9*  --  10.1*  --  10.4* 10.7*   HCT 32.1* 33.1*  --  30.2*  --  30.8* 32.2*    222  --  183  --  172 186   MCV 92 94  --  92  --  94 93   RDW 14.8* 15.3*  --  15.3*  --  15.4* 15.3*    137  --  137 136  --  137   K 4.6 4.4  --  3.8 5.3*  --  4.0    108  --  100 100  --  98   CO2 25 20*  --  30* 25  --  32*   BUN 34* 19  --  21 20  --  25*   CREATININE 1.1 1.1  --  1.1 1.1  --  1.2   * 176*  --  150* 135*  --  161*   CALCIUM 8.8 9.0  --  9.3 9.5  --  9.2   MG  --   --  1.4*  --   --   --  1.7   PHOS  --   --   --  4.1  --   --   --    PROT 5.8* 6.8  --  6.0  --   --  6.0   ALBUMIN 2.8* 3.2*  --  2.9*  --   --  2.8*   BILITOT 2.0* 1.1*  --  1.3*  --   --  1.2*   AST 77* 55*  --  47*  --   --  49*   ALKPHOS 108 171*  --  122  --   --  125   ALT 34 30  --  25  --   --  24       Urinalysis:   Lab Results    Component Value Date    LABURIN  05/16/2005     MULTIPLE ORGANISMS ISOLATED. NONE IN PREDOMINANCE REPEAT IF CLINICALLY NECESSARY.    COLORU Yellow 07/09/2018    SPECGRAV 1.025 07/09/2018    NITRITE Negative 07/09/2018    KETONESU Negative 07/09/2018    UROBILINOGEN 1.0 07/09/2018    WBCUA 100 (H) 07/09/2018       Microbiology Data:    Microbiology Data Reviewed: yes  Microbiology Results (last 7 days)     ** No results found for the last 168 hours. **          Radiology:  Cta Chest Non Coronary    Result Date: 7/15/2018  EXAMINATION: CTA CHEST NON CORONARY CLINICAL HISTORY: Chest pain, acute, PE suspected, intermed prob, negative D-dimer; TECHNIQUE: Low dose axial images, sagittal and coronal reformations were obtained from the thoracic inlet to the lung bases following the IV administration of 100 mL of Omnipaque 350.  Contrast timing was optimized to evaluate the pulmonary arteries. COMPARISON: 03/01/2018 FINDINGS: There are no filling defects in the pulmonary arteries to the level of proximal segmental branches.  Distal segmental and subsegmental branches are suboptimally evaluated due to motion artifact.  No CT evidence for right heart strain. There are moderate bilateral pleural effusions.  No pericardial effusion.  Heart is enlarged.  Mildly prominent mediastinal lymph nodes are noted.  Right paratracheal lymph node measures 1.0 cm in short axis.  Subcentimeter right thyroid nodule noted. Bilateral dependent ground-glass opacities noted.  There is bilateral dependent lower lobe atelectasis.  Central airways are patent, without endobronchial lesions.  No pneumothorax. Regional osseous structures demonstrate no aggressive lytic or blastic lesions.  Nodular liver contour in keeping with cirrhosis.     1. No evidence for pulmonary arterial thromboembolism to the level of the proximal segmental branches. 2. Moderate bilateral pleural effusions with bilateral atelectasis. 3. Cardiomegaly. 4. Cirrhosis.  Electronically signed by: Ayo France MD Date:    07/15/2018 Time:    13:48    X-ray Chest Ap Portable    Result Date: 7/15/2018  EXAMINATION: XR CHEST AP PORTABLE CLINICAL HISTORY: CHF; TECHNIQUE: Single frontal portable view of the chest was performed. COMPARISON: Chest radiograph 07/09/2018 FINDINGS: Support devices: None Interstitial markings throughout the lungs are diffusely accentuated, with diffuse hazy airspace opacification throughout the lungs.  There is a small to moderate size right pleural effusion with associated right basilar airspace opacification.  No definite left pleural effusion.  No pneumothorax. Mild prominence of the cardiac silhouette is unchanged. Bones are intact.  Surgical clips overlie the gallbladder fossa.     Worsened pulmonary edema pattern with small to moderate right pleural effusion and associated right basilar airspace disease. Electronically signed by: Ziyad Corcoran MD Date:    07/15/2018 Time:    08:30    X-ray Chest Ap Portable    Result Date: 7/9/2018  EXAMINATION: XR CHEST AP PORTABLE CLINICAL HISTORY: chest pain; TECHNIQUE: Single frontal view of the chest was performed. COMPARISON: Chest radiograph 03/01/2018 and CT thorax 03/01/2018 FINDINGS: Patient is somewhat rotated.  Large body habitus.  Right lung apex is partially obscured by overlying chin soft tissues.  Cardiac silhouette is enlarged similar to prior with prominence of the central pulmonary vasculature and bilateral diffuse interstitial coarsening concerning for pulmonary edema/CHF pattern.  Interstitial pneumonia not excluded.  Right costophrenic angle is not well visualized suggesting pleural thickening/scarring versus trace pleural fluid.  No large consolidation or pneumothorax.  Stable mediastinal contours.  No acute osseous process seen.  PA and lateral views can be obtained.     Findings suggesting pulmonary edema/CHF pattern without focal consolidation.  Interstitial pneumonia not excluded. Electronically  signed by: Janak Granados MD Date:    07/09/2018 Time:    12:23    Us Lower Extremity Veins Bilateral    Result Date: 7/15/2018  EXAMINATION: US LOWER EXTREMITY VEINS BILATERAL CLINICAL HISTORY: pt with history of DVT off AC presenting with sob and cp; TECHNIQUE: Duplex and color flow Doppler and dynamic compression was performed of the bilateral lower extremity veins was performed. COMPARISON: 07/09/2018 FINDINGS: Right thigh veins: The common femoral, femoral, popliteal, upper greater saphenous, and deep femoral veins are patent and free of thrombus. The veins are normally compressible and have normal phasic flow and augmentation response. Right calf veins: The visualized calf veins are patent. Left thigh veins: The common femoral, femoral, popliteal, upper greater saphenous, and deep femoral veins are patent and free of thrombus. The veins are normally compressible and have normal phasic flow and augmentation response. Left calf veins: The visualized calf veins are patent. Miscellaneous: None     No evidence of deep venous thrombosis in either lower extremity. Electronically signed by: Ayo France MD Date:    07/15/2018 Time:    14:12    Us Lower Extremity Veins Bilateral    Result Date: 7/9/2018  EXAMINATION: US LOWER EXTREMITY VEINS BILATERAL CLINICAL HISTORY: history of nonocclusive right CFV DVT in 3/2018; Acute embolism and thrombosis of left femoral vein TECHNIQUE: Duplex and color flow Doppler evaluation of the bilateral lower extremity veins was performed. COMPARISON: 03/01/2018. FINDINGS: No evidence of clot involving the bilateral common femoral, greater saphenous, femoral, popliteal, peroneal, anterior and posterior tibial veins.  All venous structures demonstrate normal respiratory phasicity and augment adequately.  No evidence of soft tissue mass or Baker's cyst.     No evidence of lower extremity deep venous thrombosis. Electronically signed by: April Cortez MD Date:    07/09/2018  Time:    17:35      Current Medications:     Infusions:       Scheduled:   aspirin  81 mg Oral Daily    atorvastatin  40 mg Oral Daily    furosemide  40 mg Intravenous BID    losartan  25 mg Oral Daily    metoprolol succinate  25 mg Oral Daily    sodium chloride 0.9%  3 mL Intravenous Q8H        PRN:  acetaminophen, dextrose 50%, dextrose 50%, glucagon (human recombinant), glucose, glucose, insulin aspart U-100    Antibiotics and Day Number of Therapy:  None    Lines and Day Number of Therapy:  PIV     Assessment:     Natalie Parnell is a 83 y.o.female with  Present on Admission:   Acute diastolic congestive heart failure   Nonrheumatic aortic valve stenosis   GIB (gastrointestinal bleeding)       Plan:     1. Acute on Chronic Diastolic Heart Failure Exacerbation in Setting of Severe AS  - Most recent ECHO earlier this month noting EF 50% with severe AS and unable to quantify diastolic dysfunction at that time.  - Followed by Dr. Diaz as outpatient, being considered for TAVR  - BNP elevated at 1210 (up from 299 one week ago)  - disontinue IV Lasix this AM  - Cardiology consulted, [facility] yesterday: nonobstructive CAD     2. Acute Hypoxemic Respiratory 2/2 Acute on Chronic   - ABG notable for pH 7.385, PCO2 38.7, PO2 217, HCO3 23.1 in ED  - disontinue IV Lasix for now 2/2 hypotension this AM    3. Severe Aortic Stenosis  - Most recent ECHO earlier this month noting EF 50% with severe AS and unable to quantify diastolic dysfunction at that time.  - Followed by Dr. Diaz as outpatient, being considered for TAVR  - Cardiology consulted, [facility] yesterday: nonobstructive CAD     3. NSTEMI likely 2/2 demand  - Patient with H/O GI bleed, avoiding anticoagulation at this time  - Trend trop 0.719 --> 0.907 -> 0.913  - [facility] yesterday: nonobstructive CAD     4. Anemia in setting of H/O GI bleed (recent admit this month)  - given severe AS, concerned for AVM (Heyde's syndrome)  - EGD on previous admit showed stomach  angiodysplastic lesion treated with coagulation  - Patient is okay to pursue LHC and/or DAPT per GI noted on previous admit  - Given GIB, continue to avoid anticoagulation; discontinued Eliquis on last admit and will avoid Plavix  - Per Cardiology and GI, okay to continue ASA for now  - H/H stable at 10.9/33.1 on admit  - Will continue to monitor    5. Diabetes Mellitus, type 2 with neuropathy  - SSI with accuchecks for now    6. COPD/Chronic Lung Disease  - Continue symbicort    7. CKD2  - Baseline Cr 1.0-1.1; stable  - Continue to monitor    8. Deconditioned  - PT/OT consulted        - PCP: Octavio Ferrell MD  - Immunizations:   Immunization History   Administered Date(s) Administered    Influenza - High Dose 12/03/2015    Pneumococcal Conjugate - 13 Valent 03/08/2017    Pneumococcal Polysaccharide - 23 Valent 12/03/2015    Td (ADULT) 09/15/2005    Tdap 12/03/2015    influenza - Quadrivalent 10/08/2014     - Future appointments:   Future Appointments  Date Time Provider Department Center   7/20/2018 9:30 AM Anamaria Rivera MD [facility] IM [facility]   7/24/2018 11:00 AM Mike Diaz MD [facility] CARDIO [facility]   8/7/2018 10:40 AM Octavio Ferrell MD [facility]   8/8/2018 3:20 PM Shanice Ashraf MD [facility] HEPAT [address]   8/17/2018 3:40 PM Scott Pruett MD [facility] CARDVAL [facility]       F: None  E: Replete as needed  N: NPO for procedure    Prophylaxis: SCD    Code Status: FULL    Disposition: Cardiac cath today, IV diuresis    Kristian Simpson,   U Internal Medicine-Pediatrics PGY-IV  [facility] Hospitalist Service Team B    [facility] Medicine Hospitalist Pager number:   [facility] Hospitalist Medicine Team B (Luz/Eli):  [phone]

## 2018-07-17 NOTE — PROGRESS NOTES
[facility]  Cardiology  Progress Note    Patient Name: Natalie Parnell  MRN: [id number]  Admission Date: 7/15/2018  Hospital Length of Stay: 2 days  Code Status: Full Code   Attending Physician: Ezequiel Escobar MD   Primary Care Physician: Octavio Ferrell MD  Expected Discharge Date:   Principal Problem:Acute diastolic congestive heart failure    Subjective:     Hospital Course:   7/15/2018 Presented with SOB x 1 day. BNP elevated at 2489-8317 with stable H&H and BMP WNL. Acute Decompensated CHF secondary to dietary non compliance; NSTEMI type 1 vs type 2; Severe AS with Diastolic dysfunction and Pulm HTN and HTN- controlled Recommended IV Lasix BID along with LHC to rule out severe CAD as contributing factor to CHF especially with patient having chest pain in setting of severe AS.   7/16/2018 SOB improving but not completely resolved. On good medication regimen-HR and BP stable. On IV Lasix BID with good response and 3.6 liters out overnight. NPO for possible LHC today   7/17/2018 LHC yesterday with non-obstructive CAD and no LVEDP or LV gram because valve was not crossed due to known severe aortic stenosis. Right radial site stable with no active bleeding or hematoma. Lasix as well as ARB and BB held this morning due to marginal BP. 875 documented out overnight; 4.4 liters out since admission         Review of Systems   Constitution: Negative for chills, decreased appetite, diaphoresis, fever and weakness.   Cardiovascular: Positive for dyspnea on exertion. Negative for chest pain, claudication, cyanosis, irregular heartbeat, leg swelling, near-syncope, orthopnea, palpitations, paroxysmal nocturnal dyspnea and syncope.   Respiratory: Negative for cough, hemoptysis, shortness of breath and wheezing.    Gastrointestinal: Negative for bloating, abdominal pain, constipation, diarrhea, melena, nausea and vomiting.   Neurological: Negative for dizziness.     Objective:     Vital Signs (Most  Recent):  Temp: 96.9 °F (36.1 °C) (07/17/18 0701)  Pulse: 77 (07/17/18 0825)  Resp: 17 (07/17/18 0825)  BP: (!) 104/51 (07/17/18 0701)  SpO2: 95 % (07/17/18 0825) Vital Signs (24h Range):  Temp:  [96.8 °F (36 °C)-98.4 °F (36.9 °C)] 96.9 °F (36.1 °C)  Pulse:  [71-86] 77  Resp:  [15-19] 17  SpO2:  [93 %-100 %] 95 %  BP: ()/(50-59) 104/51     Weight: 72.8 kg (160 lb 7.9 oz)  Body mass index is 25.9 kg/m².     SpO2: 95 %  O2 Device (Oxygen Therapy): nasal cannula      Intake/Output Summary (Last 24 hours) at 07/17/18 1018  Last data filed at 07/17/18 0800   Gross per 24 hour   Intake              300 ml   Output              875 ml   Net             -575 ml       Lines/Drains/Airways     Peripheral Intravenous Line                 Peripheral IV - Single Lumen 07/15/18 0700 Left Antecubital 2 days         Peripheral IV - Single Lumen 07/15/18 0741 Right Hand 2 days                Physical Exam   Constitutional: She is oriented to person, place, and time. She appears well-developed and well-nourished. No distress.   Cardiovascular: Normal rate and regular rhythm.  Exam reveals no gallop.    No murmur heard.  Pulmonary/Chest: Effort normal. No respiratory distress. She has no wheezes. She has rales in the left lower field.   Abdominal: Soft. Bowel sounds are normal. She exhibits no distension. There is no tenderness.   Neurological: She is alert and oriented to person, place, and time.   Skin: Skin is warm and dry.       Significant Labs:       Recent Labs  Lab 07/17/18  0434      K 4.0   CL 98   CO2 32*   BUN 25*   CREATININE 1.2   MG 1.7       Recent Labs  Lab 07/17/18  0434   WBC 6.16   RBC 3.46*   HGB 10.7*   HCT 32.2*      MCV 93   MCH 30.9   MCHC 33.2       Significant Imaging: Echocardiogram:   2D echo with color flow doppler:   Results for orders placed or performed during the hospital encounter of 07/09/18   2D echo with color flow doppler   Result Value Ref Range    EF 50 55 - 65    Mitral  Valve Regurgitation MILD     Aortic Valve Stenosis SEVERE (A)     Est. PA Systolic Pressure 61.98 (A)     Mitral Valve Mobility NORMAL     Tricuspid Valve Regurgitation MILD      Assessment and Plan:     Brief HPI: Seen this morning on AM NP rounds with daughter at the bedside. Reviewed angiogram findings from yesterday. Long discussion with patient with assistance in translation by daughter in regards to importance of strict adherence to low salt diet. Reviewed list of foods high in sodium as well as s/s of CHF/volume overload-all verbalized understanding and agree with POC     Congestive heart failure    -recent echo with indeterminate diastolic function and EF mildly depressed at 50-55% with severe AS; related to valvular etiology   -volume overload due to dietary indiscretion of salt intake  -aggressively diuresed and negative 4.4 liters since admission  -SOB improving; rales to left base remain although improved from yesterday   -recommend initiation of low dose Lasix upon discharge with 20mg as well as BB and ARB           Nonrheumatic aortic valve stenosis    -recent echocardiogram with low normal to mildly depressed LVEF 50-55% and severe aortic valve stenosis (SHEA 0.65 cm2 and MG 74 mmHg)  -ADHF mainly concerning for dietary indiscretion of salt intake but cannot fully rule out etiology of AS  -diuresed with IV Lasix with good response and negative 4.4 liters since admission  -SOB improving per pt and sats 91-92% on room air working with PT this AM  -BB, ARB as well as IV Lasix held due to marginal BP;   -recommend continuation of ARB and BB upon discharge along with initiation of low dose Lasix 20mg  -follow up with Dr. Diaz in 1-2 weeks           Elevated troponin    -troponin peaked at 7 last week  -[facility] yesterday with nonobstructive disease  -elevated troponin related to demand etiology in setting GIB             VTE Risk Mitigation         Ordered     Reason for No Pharmacological VTE Prophylaxis   Once      07/15/18 2008     IP VTE HIGH RISK PATIENT  Once      07/15/18 2008     Place sequential compression device  Until discontinued      07/15/18 1338     Place CORWIN hose  Until discontinued      07/15/18 1338          BIANKA Booker, ANP  Cardiology  [facility]

## 2018-07-17 NOTE — PT/OT/SLP PROGRESS
Physical Therapy Treatment    Patient Name:  Natalie Parnell   MRN:  [id number]    Recommendations:     Discharge Recommendations:      Discharge Equipment Recommendations:     Barriers to discharge: decreased mobility,endurance and strength    Assessment:     Natalie Parnell is a 83 y.o. female admitted with a medical diagnosis of Acute diastolic congestive heart failure.  She presents with the following impairments/functional limitations:  weakness, impaired endurance, impaired functional mobilty, gait instability, impaired balance, decreased upper extremity function, decreased lower extremity function, impaired skin, impaired cardiopulmonary response to activity pt limited by decreased use of R ue as per MD,pt able to ambulate with HHA at this time and will benefit from HH services upon discharge to increase independence and R hand usage as per MD.    Rehab Prognosis:  Good; patient would benefit from acute skilled PT services to address these deficits and reach maximum level of function.      Recent Surgery: Procedure(s) (LRB):  Left heart cath (N/A) 1 Day Post-Op    Plan:     During this hospitalization, patient to be seen 6 x/week to address the above listed problems via gait training, therapeutic activities, therapeutic exercises  · Plan of Care Expires:  08/16/18   Plan of Care Reviewed with: patient, family    Subjective     Communicated with nsg prior to session.  Patient found supine upon PT entry to room, agreeable to treatment.      Chief Complaint: n/a  Patient comments/goals: pt had to use bathroom during rx  Pain/Comfort:  · Pain Rating 1:  (no rating)  · Location - Side 1: Right  · Location - Orientation 1: generalized  · Location 1: hand  · Pain Addressed 1: Cessation of Activity, Reposition    Patients cultural, spiritual, Jainism conflicts given the current situation: none reported    Objective:     Patient found with: oxygen     General Precautions: Standard, fall   Orthopedic Precautions:N/A    Braces: N/A     Functional Mobility:  · Bed Mobility:     · Supine to Sit: minimum assistance  · Transfers:     · Sit to Stand:  minimum assistance with rolling walker  · Toilet Transfer: minimum assistance with  hand-held assist  using  ambulation  · Gait: amb ~8' X 2 and ~80' X 1 with HHA/Min A,pt unable to use R hand  · Balance: requires assistance with standing      AM-PAC 6 CLICK MOBILITY  Turning over in bed (including adjusting bedclothes, sheets and blankets)?: 3  Sitting down on and standing up from a chair with arms (e.g., wheelchair, bedside commode, etc.): 3  Moving from lying on back to sitting on the side of the bed?: 3  Moving to and from a bed to a chair (including a wheelchair)?: 3  Need to walk in hospital room?: 3  Climbing 3-5 steps with a railing?: 2  Basic Mobility Total Score: 17       Therapeutic Activities and Exercises: le supine ex's X 10 reps inc: ap,qs,hs,abd/add,slr,pt's O2 sats on rm air at rest 92%,during gait 90%,O2 remained off as per PA       Patient left EOB with all lines intact, call button in reach, nsg notified and family present..    GOALS: see general POC   Physical Therapy Goals        Problem: Physical Therapy Goal    Goal Priority Disciplines Outcome Goal Variances Interventions   Physical Therapy Goal     PT/OT, PT Ongoing (interventions implemented as appropriate)     Description:  Goals to be met by: 18     Patient will increase functional independence with mobility by performin. Supine <> sit with Modified [place]  2. Sit to stand transfer with Modified [place]  3. Bed to chair transfer with Modified [place] using Rolling Walker  4. Gait  x 200 feet with Modified [place] using Rolling Walker.   5. Ascend/descend 7 stairs with bilateral Handrails Contact Guard Assistance                       Time Tracking:     PT Received On: 18  PT Start Time: 914     PT Stop Time: 944  PT Total Time (min): 30 min     Billable Minutes: Gait  Training 18 and Therapeutic Exercise 12    Treatment Type: Treatment  PT/PTA: PTA     PTA Visit Number: 1     Corey Worthingtno, PTA  07/17/2018

## 2018-07-17 NOTE — PLAN OF CARE
Problem: Physical Therapy Goal  Goal: Physical Therapy Goal  Goals to be met by: 18     Patient will increase functional independence with mobility by performin. Supine <> sit with Modified [place]  2. Sit to stand transfer with Modified [place]  3. Bed to chair transfer with Modified [place] using Rolling Walker  4. Gait  x 200 feet with Modified [place] using Rolling Walker.   5. Ascend/descend 7 stairs with bilateral Handrails Contact Guard Assistance      Outcome: Ongoing (interventions implemented as appropriate)  Goals  ongoing

## 2018-07-17 NOTE — PLAN OF CARE
Plan of care reviewed with patient through translation by the family at the bedside. Through translation, nursing informed patient that a language line was available, free of charge, to translate for the patient. The patient refused and wanted family members to translate. . Fall precautions maintained. Bed in lowest position, locked, call light within reach and bed alarm is on. Side rails up x's 2 with slip resistant socks on. Accuchecks performed. PRN insulin given. Nurse informed provider of abnormal b/p. I/os recorded.Nurse instructed family at bedside to notify staff if the patient needs any assistance and the family member verbalized  understanding. Patient on telemetry throughout shift with no ectopy noted. Will continue to monitor.

## 2018-07-17 NOTE — ASSESSMENT & PLAN NOTE
-troponin peaked at 7 last week  -[facility] yesterday with nonobstructive disease  -elevated troponin related to demand etiology in setting GIB

## 2018-07-18 NOTE — PATIENT INSTRUCTIONS
When Your Child Has Congestive Heart Failure (CHF)  Congestive heart failure (CHF) is a condition in which the heart does not pump as well as it should. When this happens, fluid can build up in the lungs or body tissues (congestion). CHF can cause lung problems, organ failure, and other serious problems in the body. CHF can usually be treated, but it is important to find out the underlying cause. Your childs healthcare provider will evaluate your childs heart and discuss treatment options with you.  What causes congestive heart failure?  CHF often develops in children with certain heart defects present at birth (congenital heart defects). These include defects such as holes in the heart, which cause an increased amount of blood flow from one side of the heart to the other. This changes the dynamics of blood flow and can cause one side of the heart to become weaker. The heart then is unable to support the blood flow resulting in worsening heart function. CHF can also be caused by other types of heart problems such as cardiomyopathy, a condition in which the hearts pumping function is impaired. Some non-heart problems, such as kidney failure, can lead to CHF due to changes in the body's fluid balance or hormone changes that lead to high blood pressure.    What are the symptoms of CHF?  Symptoms vary but may include:  · Swelling (edema) in the face, abdomen, ankles, or feet  · Shortness of breath, rapid breathing, wheezing, or excessive coughing  · Sweating  · Weakness or tiredness  · Poor feeding and weight gain (in infants)  · Racing heartbeat  · Wheezing  · Abdominal pain and nausea  In older children, symptoms may also include:  · Weight loss  · Passing out  · Chest pain  · Tiring easily during exercise  How is the cause of congestive heart failure diagnosed?  Heart problems in children are usually diagnosed and treated by a pediatric cardiologist. This is a doctor who specializes in diagnosing and treating  children's heart disease. The cardiologist will do a physical exam and ask about your childs health history. The following tests may be done to find the underlying cause of CHF:  · Chest X-ray. This test takes a picture of the heart and lungs. The picture can show your childs heart size and shape. This picture also shows the fluid status of your child's lungs, which can be a clue to the heart's function.  · Electrocardiogram (ECG or EKG). During this test, the electrical activity of the heart is recorded to check for heart rhythm problems (arrhythmias) or problems with heart structure.  · Echocardiogram (echo). During this test, sound waves (ultrasound) are used to create a picture of the heart. This test can show problems with heart structure or function. This includes showing how well the heart pumps, if the heart is enlarged, the direction and strength of blood flow, or if there are any valve problems.  · Lab tests. For these tests, blood and urine samples are taken to check for problems in the kidneys or other organs.  How is congestive heart failure treated?  Specific treatment for your child depends on the cause of CHF. If the cause of CHF in your child is a congenital heart defect, a catheter or surgical procedure may be done to repair the defect.  · Medicines are often prescribed to help manage your childs symptoms. These can include:  ¨ Diuretics help rid the body of excess water. This reduces fluid in the lungs and may improve breathing. These are very important in helping manage fluid status in heart failure.  ¨ Digoxin helps the heart pump blood with more force. This improves the hearts performance.  ¨ ACE inhibitors make blood vessels relax and allow blood to flow more easily from the heart.   ¨ Angiotensin receptor blockers or ARBs are very similar to ACE inhibitors. They may be used in a child who can't take an ACE inhibitor.  ¨ Beta-blockers lower blood pressure and slow heart rate by altering  hormones that can damage the heart. Beta-blockers can also improve the hearts pumping action over time.  · Pacemaker. Some children with heart failure need an artificial pacemaker. The pacemaker may help when the heart is not pumping well because of a slow heartbeat.  · Cardiac resynchronization therapy. This uses a special type of pacemaker that paces both pumping chambers of the heart at the same time to coordinate contractions and improve the heart's function. This treatment may be used in some children with long-term heart failure.  · Heart transplant. This is when the diseased heart is replaced with a healthy heart from a donor. This is only an option for very serious disease. And, it often takes some time to find a suitable heart. In some cases, a child may be able to be helped with devices that help the heart pump while he or she waits for a transplant.  Your child may benefit from seeing a nutritionist to help with nutrition for growth problems and to help balance fluids. He or she may also participate in an exercise rehab program to help with the ability to be active.  What are the long-term concerns?  The outcome for a child with CHF depends on many factors, including the underlying heart problem. The cardiologist will discuss your childs condition, treatment options, and potential outcomes with you.  Date Last Reviewed: 3/6/2016  © 5179-2406 [employer]. [address] [place] [address], [place] [address]. All rights reserved. This information is not intended as a substitute for professional medical care. Always follow your healthcare professional's instructions.

## 2018-07-18 NOTE — PT/OT/SLP DISCHARGE
Physical Therapy Discharge Summary    Name: Natalie Parnell  MRN: [id number]   Principal Problem: Acute diastolic congestive heart failure     Patient Discharged from acute Physical Therapy on 2018.  Please refer to prior PT noted date on 2018 for functional status.     Assessment:     Patient appropriate for care in another setting.    Objective:     GOALS:    Physical Therapy Goals        Problem: Physical Therapy Goal    Goal Priority Disciplines Outcome Goal Variances Interventions   Physical Therapy Goal     PT/OT, PT Ongoing (interventions implemented as appropriate)     Description:  Goals to be met by: 18     Patient will increase functional independence with mobility by performin. Supine <> sit with Modified [place]  2. Sit to stand transfer with Modified [place]  3. Bed to chair transfer with Modified [place] using Rolling Walker  4. Gait  x 200 feet with Modified [place] using Rolling Walker.   5. Ascend/descend 7 stairs with bilateral Handrails Contact Guard Assistance                       Reasons for Discontinuation of Therapy Services  Transfer to alternate level of care.      Plan:     Patient Discharged to: Home with Home Health Service.    Jamil Escobar, PT  2018

## 2018-07-19 NOTE — PROGRESS NOTES
Home Health SOC with [facility]. Dr. Octavio Washington. SN, PT and OT services.

## 2018-07-20 PROBLEM — Z87.19 H/O: GI BLEED: Status: ACTIVE | Noted: 2018-01-01

## 2018-07-20 PROBLEM — I21.4 NSTEMI (NON-ST ELEVATED MYOCARDIAL INFARCTION): Status: RESOLVED | Noted: 2018-01-01 | Resolved: 2018-01-01

## 2018-07-20 NOTE — PHYSICIAN QUERY
PT Name: Natalie Parnell  MR #: [id number]    Physician Query Form - CardioPulmonary Clarification      CDS/: Leobardo Lang Jr, RN               Contact information: [email or web address]    This form is a permanent document in the medical record.    Query Date: July 20, 2018    By submitting this query, we are merely seeking further clarification of documentation. Please utilize your independent clinical judgment when addressing the question(s) below.    The Medical record contains the following:   Indicators   Supporting Clinical Findings Location in Medical Record   x Pulmonary Hypertension documented 83F with combined systolic and diastolic heart failure with recurrent exacerbations, severe AS with pulmHTN   7/15 H&P   x Acute/Chronic Illness Acute hypoxemic respiratory failure 2/2 volume overload 2/2 acute on chronic systolic heart failure    COPD/Chronic Lung Disease 7/15 H&P      7/15 H&P   x Echo and/or Heart Cath Findings 2D echo with color flow doppler:   Results for orders placed or performed during the hospital encounter of 07/09/18    2D echo with color flow doppler  Result Value Ref Range   EF 50 55 - 65   Mitral Valve Regurgitation MILD     Aortic Valve Stenosis SEVERE (A)     Est. PA Systolic Pressure 61.98 (A)  7/16 Cardiology Progress Note    BiPAP/Intubation/Supplemental O2     x SOB, DURHAM, Fatigue, Dizziness, LE Edema, Cyanosis, Chest Pain, Respiratory Distress, Hypoxia, etc. Shortness of breath and chest pain x 1 day 7/15 H&P    Treatment         Medication      Other     Provider, please specify the type of pulmonary hypertension:    [   ]  Group 2:  Pulmonary Hypertension due to Left Heart Disease, including left heart failure and/or left heart valve disease    [  yes ]  Group 3:  Pulmonary Hypertension due to Lung Disease    [   ]  Pulmonary Hypertension, unspecified    [   ]  Other Cardiopulmonary Condition (please specify):  _____________________________________    [   ]  Clinically  Undetermined    Please document in your progress notes daily for the duration of treatment, until resolved, and include in your discharge summary.

## 2018-07-20 NOTE — PHYSICIAN QUERY
PT Name: Natalie Parnell  MR #: [id number]     Physician Query Form - Documentation Clarification      CDS/: Leobardo Lang Jr, RN              Contact information:[email or web address]    This form is a permanent document in the medical record.     Query Date: July 20, 2018    By submitting this query, we are merely seeking further clarification of documentation. Please utilize your independent clinical judgment when addressing the question(s) below.    The Medical record reflects the following:    Supporting Clinical Findings Location in Medical Record   HGBA1C 8.4 (H) 07/02/2018    Diabetes Mellitus, type 2 with neuropathy  - SSI with accuchecks for now    Glucose=176-->150-->135-->161    Type II or unspecified type diabetes mellitus with neurological manifestations, uncontrolled   7/15 H&P    7/15 Hospital Medicine Progress Note      7/15-7/17 Labs    7/17 Discharge Summary   (Patient Active Problem List)                                                                                      Doctor, Please specify diagnosis or diagnoses associated with above clinical findings.  Further Specify the Glucose Level Associated with the Type II Diabetes Mellitus       Provider Use Only    (   yes )Type II Diabetes Uncontrolled with Hyperglycemia    (    )Other______________________________________________                                                                                                             [  ] Clinically undetermined

## 2018-07-20 NOTE — DISCHARGE SUMMARY
[facility] Hospital Medicine Discharge Summary    Primary Team: [facility] Hospitalist Team B  Attending Physician at Discharge: Dr. Benitez  Resident: Tatiana  Intern: Dr. Koo    Date of Admit: 7/15/2018  Date of Discharge: 7/17/2018    Discharge to: Home with Home Health  Condition: Stable    Discharge Diagnoses     Patient Active Problem List   Diagnosis    DM (diabetes mellitus)    Type II or unspecified type diabetes mellitus with neurological manifestations, uncontrolled(250.62)    Cirrhosis of liver without ascites    Nonrheumatic aortic valve stenosis    Aortic valve stenosis    Congestive heart failure    Liver disease    NSTEMI (non-ST elevated myocardial infarction)       Consultants and Procedures     Consultants:  - [facility] (Dr. Welsh)  - Nutrition: Low salt diet and foods to avoid  - PT/OT: recommends home health PT/OT    Procedures:   - Left Heart Cath on 7/16/2018 via Right Radial Artery (by Dr. Diaz): Non-obstructive CAD. No LVEDP or LV gram because valve was not crossed due to known severe aortic stenosis.  · OSCAR 3 flow for Left Main, LAD, Left Circumflex, RCA    Imaging During Admit:    Cta Chest Non Coronary - Result Date: 7/15/2018  1. No evidence for pulmonary arterial thromboembolism to the level of the proximal segmental branches.   2. Moderate bilateral pleural effusions with bilateral atelectasis.   3. Cardiomegaly.   4. Cirrhosis    X-ray Chest Ap Portable - Result Date: 7/15/2018  Worsened pulmonary edema pattern with small to moderate right pleural effusion and associated right basilar airspace disease.    Us Lower Extremity Veins Bilateral - Result Date: 7/15/2018  No evidence of deep venous thrombosis in either lower extremity.    Brief History of Present Illness      83F with combined systolic and diastolic heart failure with recurrent exacerbations, severe AS with pulmHTN, HTN, DM presents with acute onset of dyspnea that started 1 day PTA and is a/w left arm pain that  radiates to left chest. Pt endorses orthopnea that worsened last night and PND. The chest pain is similar to her chronic left chest pain. Pt recently admitted to our service for GIB and NSTEMI. Cardiology saw patient while in hospital and 2 days PTA in clinic where clinic notes indicate patient was not in volume overload status. At this clinic visit, [facility] was planned to address recent NSTEMI. Family states that 2 days PTA, pt did not adhere to recommended dietary restrictions. Pt denies cough, fevers/chills/sweats, black or bloody stool, lightheadedness/dizziness/syncope, abd pain, n/v, diarrhea.    For the full HPI please refer to the History & Physical from this admission.    Hospital Course By Problem with Pertinent Findings     1. Acute on Chronic Diastolic Heart Failure (HFpEF) in Setting of Severe Aortic Stenosis  - Most recent ECHO earlier this month noting EF 50% with severe AS and unable to quantify diastolic dysfunction at that time.  - Followed by Dr. Diaz ([facility]) as outpatient, being considered for TAVR  - Presented with SOB and chest pain, BNP elevated at 1210 (up from 299 one week prior to admit) on admit  - [facility] consulted who recommended IV diuresis with [facility] to further evaluate  - Underwent IV Diuresis with Lasix; Net negative about 4.4 Liters since admit  - Left Heart Cath on 7/16/2018 via Right Radial Artery (by Dr. Diaz): Non-obstructive CAD. No LVEDP or LV gram because valve was not crossed due to known severe aortic stenosis.  - Transitioned to PO Lasix on day of discharge  - Discharged with PO Lasix 20 mg daily with instruction to continue Atorvastatin 40mg PO daily, ASA 81 mg PO daily, Losartan 25mg PO daily, Metoprolol succinate (Toprol XL) 25mg PO daily  - Discharged home with strict diet instructions (low salt) and need for medication compliance  - Close follow up to be arranged as outpatient with TAVR discussion in near future     2. Acute Hypoxemic  Respiratory Failure 2/2 Volume Overload (above) - resolved  - ABG notable for pH 7.385, PCO2 38.7, PO2 217, HCO3 23.1 in ED  - Patient placed on oxygen (2-3L NC) due to volume overload  - Underwent IV diuresis with gradual improvement in respiratory status and weaning to room air  - Discharged home with strict diet instructions (low salt) and need for medication compliance    3. Severe Non-Rheumatic Aortic Stenosis  - Most recent ECHO earlier this month noting EF 50% with severe AS and unable to quantify diastolic dysfunction at that time.  - Followed by Dr. Diaz ([facility]) as outpatient, being considered for TAVR  - Left Heart Cath on 7/16/2018 via Right Radial Artery (by Dr. Diaz): Non-obstructive CAD. No LVEDP or LV gram because valve was not crossed due to known severe aortic stenosis.  - Close follow up to be arranged as outpatient with TAVR discussion in near future    4. 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 (Severe Aortic Stenosis & H/O GI bleed)  - Given severe AS, concerned for AVM (Heyde's syndrome)  - EGD on previous admit showed stomach angiodysplastic lesion treated with coagulation  - Patient was stable to pursue LHC and/or DAPT per GI noted on previous admit  - Given GIB, continue to avoid anticoagulation; discontinued Eliquis on last admit and will avoid Plavix  - Per Cardiology and GI, okay to continue ASA for now  - H/H stable at 10.9/33.1 on admit  - H/H monitored closely during admit and without issues during brief hospital stay  - However, given severe aortic stenosis and angiodysplastic lesions, highly suspect GI bleeds to re-occur until Aortic Stenosis is addressed; recommend TAVR as soon as patient is able to tolerate    6. NSTEMI likely 2/2 demand related to acute on chronic diastolic heart failure - resolved  - Patient with H/O GI bleed, recently Eliquis and Plavix discontinued given significance of bleeding, held anticoagulation given NSTEMI likely related to demand  - Trend  trop 0.719 --> 0.907 -> 0.913 --> 0.846  - Left Heart Cath on 7/16/2018 via Right Radial Artery (by Dr. Diaz): Non-obstructive CAD. No LVEDP or LV gram because valve was not crossed due to known severe aortic stenosis.  - Close follow up to be arranged as outpatient with TAVR discussion in near future    6. Diabetes Mellitus Type 2 with Neuropathy  - On Metformin at home, held during hospital stay  - Blood glucose levels monitored closely with SSI and accuchecks during hospital stay   - Patient instructed to resume Metformin at discharge with close monitoring of glucoses at home  - Counseled on lifestyle modifications    7. COPD / Chronic Lung Disease - stable  - Continued on home Symbicort with PRN albuterol  - No issues during current hospital stay, discharged with instruction to continue Symbicort and PRN albuterol    8. CKD2 - stable  - Patient with baseline Cr 1.0 - 1.1   - No issues during current hospital stay    9. Deconditioned with Decreased Mobility  - Patient with frequent hospital admissions and dyspnea on exertion  - PT/OT consulted; Recommendations: Home health with PT/OT and DME for bath bench  - Home Health PT/OT ordered prior to discharge    Discharge Medications        Medication List      START taking these medications    furosemide 20 MG tablet  Commonly known as:  LASIX  [place] ian tableta (20 mg en total) por vía oral diariamente.  (Take 1 tablet (20 mg total) by mouth once daily.)        CONTINUE taking these medications    albuterol 90 mcg/actuation inhaler  Inhale 2 puffs into the lungs every 6 (six) hours as needed for Wheezing.     ammonium lactate 12 % Crea  Apply twice daily to lower extremities     ASPIR-LOW 81 MG EC tablet  Generic drug:  aspirin  Take 1 tablet (81 mg total) by mouth once daily.     atorvastatin 40 MG tablet  Commonly known as:  LIPITOR  Take 1 tablet (40 mg total) by mouth once daily.     azelastine 0.05 % ophthalmic solution  Commonly known as:  OPTIVAR  Place 1  drop into both eyes 2 (two) times daily.     blood sugar diagnostic Strp  Commonly known as:  ACCU-CHEK SMARTVIEW TEST STRIP  Inject 1 strip into the skin once daily.     blood-glucose meter Misc  1 Units by Misc.(Non-Drug; Combo Route) route once daily.     budesonide-formoterol 160-4.5 mcg 160-4.5 mcg/actuation Hfaa  Commonly known as:  SYMBICORT  Inhale 2 puffs into the lungs every 12 (twelve) hours. Controller     lancets Misc  Commonly known as:  ACCU-CHEK FASTCLIX LANCING DEV  Inject 1 lancet into the skin once daily.     losartan 25 MG tablet  Commonly known as:  COZAAR  Take 1 tablet (25 mg total) by mouth once daily.     metFORMIN 500 MG tablet  Commonly known as:  GLUCOPHAGE  Take 1 tablet (500 mg total) by mouth 2 (two) times daily with meals.     metoprolol succinate 25 MG 24 hr tablet  Commonly known as:  TOPROL-XL  Take 1 tablet (25 mg total) by mouth once daily.           Where to Get Your Medications      These medications were sent to [facility]  [address], [place] [address]    Hours:  Mon-Fri, 8a-5:30p Phone:  [phone]   · furosemide 20 MG tablet         Discharge Information:     Diet: Diabetic Cardiac 2000 calorie / Low Salt Diet    Physical Activity: As tolerated    Instructions:  1. Take all medications as prescribed  2. Keep all follow-up appointments  3. Return to the hospital or call your primary care physicians if any worsening symptoms such as fever, chest pain, shortness of breath, return of symptoms, or any other concerns.    Follow-Up Appointments:    Date Time Provider Department Center   7/24/2018 11:00 AM Mike Diaz MD [facility] CARDIO [facility]   7/27/2018 10:00 AM Anamaria Rivera MD [facility] IM JUAN JOSE [facility]   7/30/2018 10:30 AM Shilo Perez MD [facility] CARDVAS [place]   7/30/2018 12:30 PM [facility] CT1 64- LIMIT 450 LBS [facility] CT SCAN [facility]   8/7/2018 10:40 AM Octavio Ferrell MD [facility] MED [place]   8/8/2018 3:20 PM Shanice  MD Andriy [facility] HEPAT [place]   8/10/2018 2:00 PM [facility] Pruett [facility] [place]       Thank you for allowing us to care for Naatlie Parnell.   Please call with any questions.    Kristian Simpson,   [facility] Internal Medicine-Pediatrics PGY-IV  [facility] Hospitalist Service Team B    [facility] Medicine Hospitalist Pager numbers:   [facility] Hospitalist Medicine Team A (Iain/Aishwarya): [phone]  [facility] Hospitalist Medicine Team B (Luz/Eli):  [phone]

## 2018-07-20 NOTE — TELEPHONE ENCOUNTER
Spoke to patient's granddaughter Kelsie regarding Ms. Parnell's TAVR CT on 7/30/18-confirmed that the CT is not the TAVR procedure itself, that it is part of cardiac workup prior to surgery. Patient's granddaughter is a nurse, verbalized understanding. Granddaughter also informed that any questions related to patient's surgery date will be answered at her consult with Dr. Perez on 7/30/18.      ----- Message from Nemo Schwab sent at 7/20/2018  2:21 PM CDT -----  Contact: Kelsie (grand daughter)  Grand daughter would like clarification on pts procedure that is scheduled for July 30th    Pt contact number [phone]  Thanks

## 2018-07-20 NOTE — PROGRESS NOTES
[facility]  New Visit Progress Note   Recent Hospital Discharge     PRESENTING HISTORY     Chief Complaint/Reason for Admission:  Follow up Hospital Discharge     PCP: Octavio Ferrell MD    History of Present Illness:  Ms. Natalie Parnell is a 83 y.o. female who was recently admitted to the hospital.    [facility] Hospital Medicine Discharge Summary     Primary Team: [facility] Hospitalist Team B  Attending Physician at Discharge: Dr. Benitez  Resident: Tatiana  Intern: Dr. Koo     Date of Admit: 7/15/2018  Date of Discharge: 7/17/2018     Discharge to: Home with Home Health  Condition: Stable    __________________________________________________________________    Today:  [facility].  Recently hospitalized for acute on chronic heart failure in setting of severe AS.   Condition further complicated by hypoxemic respiratory failure due to volume overload.  Had LHC 7/16/18 which revealed non obstructive coronary artery disease.   Patient effectively diuresed about 4.4 Liters.  Medical management optimized with Aspirin, Losartan, Toprol XL.  Avoiding dual antiplatelet therapy and anticoagulation due to anemia and recent hx of GI bleed.  Patient discharged to home.     Recent medical history significant for hospitalization 7/9/18 - 7/11/18 for  GI bleed due to gastric andiodysplastic lesion- treated with APC.   Transfused 1 Unit PRBC during that admission.   Previously on anticoagulation for LE DVT, but repeat imaging showed resolution of this DVT and anticoagulation discontinued during this hospitalization.     Accompanied today by family.  Granddaughter acting as .   Patient ambulatory and independent with ADL's, but somewhat inhibited by dyspnea.  Having dyspnea with exertion.  Some fatigue.  Has been sleeping upright in recliner since hospital discharge, but unclear if she is actually experiencing orthopnea- she has not attempted to lay supine out of fear of  experiencing the symptoms she had prior to hospitalization.  No LE swelling.  No cough.  No syncope.  Some intermittent Left sided chest discomfort with radiation to left arm.  Occurs both at rest and with exertion. Resolves spontaneously.     Reports hypotension at home with automatic arm cuff.   Family has held both Losartan and Toprol XL for three days now due to low blood pressure readings.      Review of Systems  General ROS: negative for chills, fever or weight loss  + generalized weakness   Psychological ROS: negative for hallucination, depression or suicidal ideation  Ophthalmic ROS: negative for blurry vision, photophobia or eye pain  ENT ROS: negative for epistaxis, sore throat or rhinorrhea  Respiratory ROS: no cough, , or wheezing +shortness of breath  Cardiovascular ROS: no chest pain + dyspnea on exertion  + chest discomfort Left chest with radiation to left arm   Gastrointestinal ROS: no abdominal pain, change in bowel habits, or black/ bloody stools  Genito-Urinary ROS: no dysuria, trouble voiding, or hematuria  Musculoskeletal ROS: negative for gait disturbance or muscular weakness  Neurological ROS: no syncope or seizures; no ataxia  Dermatological ROS: negative for pruritis, rash and jaundice      PAST HISTORY:     Past Medical History:   Diagnosis Date    Asthma     Diabetes mellitus     HTN (hypertension)        Past Surgical History:   Procedure Laterality Date    CATARACT EXTRACTION Bilateral     Dr. Max    ESOPHAGOGASTRODUODENOSCOPY N/A 7/10/2018    Procedure: EGD (ESOPHAGOGASTRODUODENOSCOPY);  Surgeon: Derian Stoner MD;  Location[facility];  Service: Endoscopy;  Laterality: N/A;    HERNIA REPAIR      VARICOSE VEIN SURGERY         History reviewed. No pertinent family history.    Social History     Social History    Marital status: Single     Spouse name: N/A    Number of children: N/A    Years of education: N/A     Social History Main Topics    Smoking status: Never Smoker     Smokeless tobacco: Never Used    Alcohol use No    Drug use: No    Sexual activity: Not on file     Other Topics Concern    Not on file     Social History Narrative    No narrative on file       MEDICATIONS & ALLERGIES:     Current Outpatient Prescriptions on File Prior to Visit   Medication Sig Dispense Refill    albuterol 90 mcg/actuation inhaler Inhale 2 puffs into the lungs every 6 (six) hours as needed for Wheezing. 1 each 11    ammonium lactate 12 % Crea Apply twice daily to lower extremities 140 g 5    aspirin (ECOTRIN) 81 MG EC tablet Take 1 tablet (81 mg total) by mouth once daily. 90 tablet 3    atorvastatin (LIPITOR) 40 MG tablet Take 1 tablet (40 mg total) by mouth once daily. 90 tablet 1    azelastine (OPTIVAR) 0.05 % ophthalmic solution Place 1 drop into both eyes 2 (two) times daily. 6 mL 6    blood sugar diagnostic (ACCU-CHEK SMARTVIEW TEST STRIP) Strp Inject 1 strip into the skin once daily. 100 strip 6    blood-glucose meter Misc 1 Units by Misc.(Non-Drug; Combo Route) route once daily. 1 each 0    budesonide-formoterol 160-4.5 mcg (SYMBICORT) 160-4.5 mcg/actuation HFAA Inhale 2 puffs into the lungs every 12 (twelve) hours. Controller 10.2 Inhaler 0    furosemide (LASIX) 20 MG tablet Take 1 tablet (20 mg total) by mouth once daily. 30 tablet 11    lancets (ACCU-CHEK FASTCLIX) Misc Inject 1 lancet into the skin once daily. 100 each 6    losartan (COZAAR) 25 MG tablet Take 1 tablet (25 mg total) by mouth once daily. 90 tablet 3    metFORMIN (GLUCOPHAGE) 500 MG tablet Take 1 tablet (500 mg total) by mouth 2 (two) times daily with meals. 180 tablet 0    metoprolol succinate (TOPROL-XL) 25 MG 24 hr tablet Take 1 tablet (25 mg total) by mouth once daily. 30 tablet 11     No current facility-administered medications on file prior to visit.         Review of patient's allergies indicates:  No Known Allergies    OBJECTIVE:     Vital Signs:  Vitals:    07/20/18 1034   BP: (!) 110/58    Pulse:    Temp:      Wt Readings from Last 1 Encounters:   07/17/18 0630 72.8 kg (160 lb 7.9 oz)   07/16/18 0825 71.7 kg (158 lb 1.1 oz)   07/15/18 1435 70.8 kg (156 lb 1.4 oz)   07/15/18 0714 82.6 kg (182 lb)     Body mass index is 28.11 kg/m².        Physical Exam:  BP (!) 110/58 (BP Location: Right arm, Patient Position: Sitting, BP Method: Medium (Automatic))   Pulse 80   Temp 99.4 °F (37.4 °C) (Oral)   Wt 79 kg (174 lb 2.6 oz)   SpO2 (!) 94%   BMI 28.11 kg/m²   General appearance: alert, cooperative, no distress  Constitutional:Oriented to person, place, and time  + appears well-developed and well-nourished.   HEENT: Normocephalic, atraumatic, neck symmetrical, no nasal discharge   Eyes: conjunctivae/corneas clear, PERRL, EOM's intact  Lungs: clear to auscultation bilaterally, no dullness to percussion bilaterally  Heart: regular rate and rhythm without rub; no displacement of the PMI  + murmur    Abdomen: soft, non-tender; bowel sounds normoactive; no organomegaly  Extremities: extremities symmetric; no clubbing, cyanosis, or edema  Integument: Skin color, texture, turgor normal; no rashes; hair distrubution normal  Neurologic: Alert and oriented X 3, normal strength, normal coordination and gait  Psychiatric: no pressured speech; normal affect; no evidence of impaired cognition     Laboratory  Lab Results   Component Value Date    WBC 6.16 07/17/2018    HGB 10.7 (L) 07/17/2018    HCT 32.2 (L) 07/17/2018    MCV 93 07/17/2018     07/17/2018     BMP  Lab Results   Component Value Date     07/17/2018    K 4.0 07/17/2018    CL 98 07/17/2018    CO2 32 (H) 07/17/2018    BUN 25 (H) 07/17/2018    CREATININE 1.2 07/17/2018    CALCIUM 9.2 07/17/2018    ANIONGAP 7 (L) 07/17/2018    ESTGFRAFRICA 49 (A) 07/17/2018    EGFRNONAA 42 (A) 07/17/2018     Lab Results   Component Value Date    ALT 24 07/17/2018    AST 49 (H) 07/17/2018    ALKPHOS 125 07/17/2018    BILITOT 1.2 (H) 07/17/2018     Lab Results    Component Value Date    INR 1.1 07/15/2018    INR 1.2 07/10/2018    INR 1.3 (H) 07/09/2018     Lab Results   Component Value Date    HGBA1C 8.4 (H) 07/02/2018     Recent Labs      07/17/18   1154   POCTGLUCOSE  173*       Diagnostic Results:  2 D echo 7/9/18:  CONCLUSIONS     1 - Low normal to mildly depressed left ventricular systolic function (EF 50-55%).     2 - Severe aortic valve stenosis (SHEA 0.65 cm2, AVAi 0.35 cm2/m2, peak aortic jet velocity 5.3 m/s,MG 74 mmHg, ).     3 - Mild mitral regurgitation.     4 - Severe left atrial enlargement.     5 - Indeterminate LV diastolic function.     6 - Pulmonary hypertension. The estimated PA systolic pressure is 62 mmHg.       TRANSITION OF CARE:     [facility] On Call Contact Note: 7/18/18  Family and/or Caretaker present at visit?  Yes.  Diagnostic tests reviewed/disposition: I have reviewed all completed as well as pending diagnostic tests at the time of discharge.  Disease/illness education: Yes   Home health/community services discussion/referrals: Patient does not have home health established from hospital visit.  They do not need home health.  If needed, we will set up home health for the patient.   Establishment or re-establishment of referral orders for community resources: No other necessary community resources.   Discussion with other health care providers: No discussion with other health care providers necessary.     ASSESSMENT & PLAN:     Heart failure with preserved ejection fraction  Aortic valve stenosis, severe  - Prescribed Lasix, Bblocker, and ARB- although family holding Bblocker and ARB due to recorded low blood pressures at home- patient seems to be only minimally symptomatic, if at all, from the reported low blood pressures   - CT surgery consult pending 7/30/18    Cirrhosis of liver without ascites, unspecified hepatic cirrhosis type  - note made of abnormal liver findings on recent CT lungs, although no direct liver imaging has been performed as  of yet  - NEG viral hepatitis serologies, no hx EtOH abuse  - may be related to heart falure  - has upcoming hepatology consultation    Type 2 diabetes mellitus with complication, without long-term current use of insulin  - HgBA1C 8.4  - patient on Metformin; given recent hypoxic respiratory failure in setting of heart failure, and possible liver cirrhosis, Metformin may not be the most appropriate medication for her- patient will be holding Metformin for upcoming CT scan and then will continue to hold it until next [facility] appt at which point we will discuss further treatment plan an options   - patient not following diabetic diet- counseling and education provided    H/O: GI bleed  - patient with esophageal varices and gastric angiodysplastic lesion on EGD 7/10/18  - remains anemic on recent labs, but stable    I will [facility] in [facility] 8/2/18.   Patient and family counseled on discontinuing Metformin 3 days prior to upcoming CT scan.     Instructions for the patient:      Scheduled Follow-up :  Future Appointments  Date Time Provider Department Center   7/24/2018 11:00 AM Mike Diaz MD [facility] CARDIO [facility]   7/30/2018 10:30 AM Shilo Perez MD [facility] CARDVAS [facility]   7/30/2018 12:30 PM [facility] CT1 64- LIMIT 450 LBS [facility] CT SCAN [facility]   8/2/2018 9:30 AM Anamaria Rivera MD [facility] IM [facility]   8/8/2018 3:20 PM Shanice Ashraf MD [facility] HEPAT [facility]   8/10/2018 2:00 PM Scott Pruett MD [facility] CARDVAL [facility]   10/9/2018 10:20 AM Octavio Ferrell MD [facility]       Post Visit Medication List:     Medication List          Accurate as of 7/20/18 11:59 PM. If you have any questions, ask your nurse or doctor.               CONTINUE taking these medications    albuterol 90 mcg/actuation inhaler  Inhale 2 puffs into the lungs every 6 (six) hours as needed for Wheezing.     ammonium lactate 12 % Crea  Apply twice daily to lower extremities      ASPIR-LOW 81 MG EC tablet  Generic drug:  aspirin  Take 1 tablet (81 mg total) by mouth once daily.     atorvastatin 40 MG tablet  Commonly known as:  LIPITOR  Take 1 tablet (40 mg total) by mouth once daily.     azelastine 0.05 % ophthalmic solution  Commonly known as:  OPTIVAR  Place 1 drop into both eyes 2 (two) times daily.     blood sugar diagnostic Strp  Commonly known as:  ACCU-CHEK SMARTVIEW TEST STRIP  Inject 1 strip into the skin once daily.     blood-glucose meter Misc  1 Units by Misc.(Non-Drug; Combo Route) route once daily.     budesonide-formoterol 160-4.5 mcg 160-4.5 mcg/actuation Hfaa  Commonly known as:  SYMBICORT  Inhale 2 puffs into the lungs every 12 (twelve) hours. Controller     furosemide 20 MG tablet  Commonly known as:  LASIX  [place] ian tableta (20 mg en total) por vía oral diariamente.  (Take 1 tablet (20 mg total) by mouth once daily.)     lancets Misc  Commonly known as:  ACCU-CHEK FASTCLIX LANCING DEV  Inject 1 lancet into the skin once daily.     losartan 25 MG tablet  Commonly known as:  COZAAR  Take 1 tablet (25 mg total) by mouth once daily.     metFORMIN 500 MG tablet  Commonly known as:  GLUCOPHAGE  Take 1 tablet (500 mg total) by mouth 2 (two) times daily with meals.     metoprolol succinate 25 MG 24 hr tablet  Commonly known as:  TOPROL-XL  Take 1 tablet (25 mg total) by mouth once daily.            Signing Physician:  Anamaria Rivera MD

## 2018-07-24 PROBLEM — I25.10 CORONARY ARTERY DISEASE INVOLVING NATIVE CORONARY ARTERY OF NATIVE HEART WITHOUT ANGINA PECTORIS: Status: ACTIVE | Noted: 2018-01-01

## 2018-07-24 NOTE — PROGRESS NOTES
Subjective:    Patient ID:  Natalie Parnell is a 83 y.o. female who presents for follow-up of Valvular Heart Disease      HPI     82 y/o Prydeinig speaking female with hx of severe AS (SHEA 0.65 cm2, AVAi 0.35 cm2/m2, peak aortic jet velocity 5.25 m/s,MG 74 mmHg, EF 50-55%), HFpEF, LE DVT previously on Xarelto, DM, hx of GIB, possible liver cirrhosis who presents for f/u.   Was recently hospitalized for GIB with acute blood loss anemia with drop in Hgb >12 to 8.8 requiring PRBC transfusion. EGD with Grade I esophageal varices, a single non-bleeding angiodysplastic lesion in the stomach treated with argon plasma coagulation. Trop elevated and peaked at >7. 2DE with normal EF and no WMA's. Was discharged and had weakness and exertional fatigue. Was hospitalized again recently, this time for ADHF. Had worsening SOB and orthopnea and presented to ED, diuresed 4.4 L net negative, LHC with nonobstructive CAD, improved, and discharged home. Continues to have left shoulder pain which radiates to left chest which is reproducible, chronic, and not cardiac (musculoskeletal). Denies orthopnea, PND, syncope, LE edema. Compliant with meds and has stopped Xarelto. SBP at home 's. No further episodes of melena or hematochezia.    Review of Systems   Constitution: Positive for weakness and malaise/fatigue.   HENT: Negative for congestion.    Eyes: Negative for blurred vision.   Cardiovascular: Positive for chest pain and dyspnea on exertion. Negative for claudication, cyanosis, irregular heartbeat, leg swelling, near-syncope, orthopnea, palpitations, paroxysmal nocturnal dyspnea and syncope.   Respiratory: Negative for shortness of breath.    Endocrine: Negative for polyuria.   Hematologic/Lymphatic: Negative for bleeding problem.   Skin: Negative for itching and rash.   Musculoskeletal: Negative for joint swelling, muscle cramps and muscle weakness.   Gastrointestinal: Negative for abdominal pain, hematemesis, hematochezia, melena,  nausea and vomiting.   Genitourinary: Negative for dysuria and hematuria.   Neurological: Negative for dizziness, focal weakness, headaches, light-headedness and loss of balance.   Psychiatric/Behavioral: Negative for depression. The patient is not nervous/anxious.         Objective:    Physical Exam   Constitutional: She is oriented to person, place, and time. She appears well-developed and well-nourished.   HENT:   Head: Normocephalic and atraumatic.   Neck: Neck supple. No JVD present.   Cardiovascular: Normal rate and regular rhythm.    Murmur heard.   Harsh midsystolic murmur is present with a grade of 4/6  at the upper right sternal border radiating to the neck  Pulses:       Carotid pulses are 2+ on the right side, and 2+ on the left side.       Radial pulses are 2+ on the right side, and 2+ on the left side.        Femoral pulses are 2+ on the right side, and 2+ on the left side.       Posterior tibial pulses are 1+ on the right side, and 1+ on the left side.   Pulmonary/Chest: Effort normal and breath sounds normal.   Abdominal: Soft. Bowel sounds are normal.   Musculoskeletal: She exhibits no edema.   Neurological: She is alert and oriented to person, place, and time.   Skin: Skin is warm and dry.   Psychiatric: She has a normal mood and affect. Her behavior is normal. Thought content normal.         Assessment:       1. Aortic valve stenosis, severe    2. Heart failure with preserved ejection fraction    3. Nonrheumatic aortic valve stenosis    4. Type 2 diabetes mellitus with complication, without long-term current use of insulin    5. Coronary artery disease involving native coronary artery of native heart without angina pectoris    6. Cirrhosis of liver without ascites, unspecified hepatic cirrhosis type    7. H/O: GI bleed      84 y/o female with hx and presentation as above. Improved clinically from a HF perspective. BP log with hypotension and will stop losartan for now. Continue metoprolol. Has  appt for TAVR and with TAVR team.        Plan:       -D/C losartan  -f/u in 1 month'[-

## 2018-07-27 PROBLEM — R07.9 CHEST PAIN: Status: ACTIVE | Noted: 2018-01-01

## 2018-07-27 NOTE — SUBJECTIVE & OBJECTIVE
Past Medical History:   Diagnosis Date    Asthma     Diabetes mellitus     HTN (hypertension)     Hyperlipidemia        Past Surgical History:   Procedure Laterality Date    CATARACT EXTRACTION Bilateral     Dr. Max    COLON SURGERY      ESOPHAGOGASTRODUODENOSCOPY N/A 7/10/2018    Procedure: EGD (ESOPHAGOGASTRODUODENOSCOPY);  Surgeon: Derian Stoner MD;  Location: [facility];  Service: Endoscopy;  Laterality: N/A;    HERNIA REPAIR      VARICOSE VEIN SURGERY         Review of patient's allergies indicates:  No Known Allergies    No current facility-administered medications on file prior to encounter.      Current Outpatient Prescriptions on File Prior to Encounter   Medication Sig    albuterol 90 mcg/actuation inhaler Inhale 2 puffs into the lungs every 6 (six) hours as needed for Wheezing.    aspirin (ECOTRIN) 81 MG EC tablet Take 1 tablet (81 mg total) by mouth once daily.    atorvastatin (LIPITOR) 40 MG tablet Take 1 tablet (40 mg total) by mouth once daily.    azelastine (OPTIVAR) 0.05 % ophthalmic solution Place 1 drop into both eyes 2 (two) times daily.    benzonatate (TESSALON) 100 MG capsule Take 1 capsule (100 mg total) by mouth 3 (three) times daily as needed for Cough.    budesonide-formoterol 160-4.5 mcg (SYMBICORT) 160-4.5 mcg/actuation HFAA Inhale 2 puffs into the lungs every 12 (twelve) hours. Controller    furosemide (LASIX) 20 MG tablet Take 1 tablet (20 mg total) by mouth once daily.    metFORMIN (GLUCOPHAGE) 500 MG tablet Take 1 tablet (500 mg total) by mouth 2 (two) times daily with meals.    metoprolol succinate (TOPROL-XL) 25 MG 24 hr tablet Take 1 tablet (25 mg total) by mouth once daily.    ammonium lactate 12 % Crea Apply twice daily to lower extremities    blood sugar diagnostic (ACCU-CHEK SMARTVIEW TEST STRIP) Strp Inject 1 strip into the skin once daily.    blood-glucose meter Misc 1 Units by Misc.(Non-Drug; Combo Route) route once daily.    lancets (ACCU-CHEK  FASTCLIX) Misc Inject 1 lancet into the skin once daily.     Family History     None        Social History Main Topics    Smoking status: Never Smoker    Smokeless tobacco: Never Used    Alcohol use No    Drug use: No    Sexual activity: Not on file     Review of Systems   Constitution: Negative for decreased appetite, diaphoresis, fever, night sweats, weight gain and weight loss.   HENT: Negative for congestion, nosebleeds, sore throat and tinnitus.    Eyes: Negative for blurred vision, double vision, photophobia and visual disturbance.   Cardiovascular: Positive for chest pain. Negative for claudication, dyspnea on exertion, irregular heartbeat, leg swelling, orthopnea, palpitations, paroxysmal nocturnal dyspnea and syncope.   Respiratory: Positive for cough, hemoptysis and shortness of breath. Negative for snoring and wheezing.    Endocrine: Negative for cold intolerance, heat intolerance, polydipsia, polyphagia and polyuria.   Hematologic/Lymphatic: Does not bruise/bleed easily.   Skin: Negative for color change and rash.   Musculoskeletal: Negative for joint pain, muscle weakness, myalgias and stiffness.   Gastrointestinal: Negative for abdominal pain, change in bowel habit, heartburn, hematemesis, hematochezia, melena, nausea and vomiting.   Genitourinary: Negative for bladder incontinence, dysuria, frequency, hematuria, hesitancy and urgency.   Neurological: Negative for dizziness, focal weakness, headaches, numbness, paresthesias, tremors and vertigo.   Psychiatric/Behavioral: Negative for depression. The patient does not have insomnia and is not nervous/anxious.      Objective:     Vital Signs (Most Recent):  Temp: 98.6 °F (37 °C) (07/27/18 1412)  Pulse: (!) 138 (07/27/18 1752)  Resp: (!) 31 (07/27/18 1752)  BP: 137/65 (07/27/18 1752)  SpO2: 96 % (07/27/18 1752) Vital Signs (24h Range):  Temp:  [98.6 °F (37 °C)] 98.6 °F (37 °C)  Pulse:  [106-145] 138  Resp:  [0-37] 31  SpO2:  [86 %-97 %] 96 %  BP:  ()/() 137/65     Weight: 78.9 kg (174 lb)  Body mass index is 28.08 kg/m².    SpO2: 96 %  O2 Device (Oxygen Therapy): BiPAP    No intake or output data in the 24 hours ending 07/27/18 1846    Lines/Drains/Airways     Peripheral Intravenous Line                 Peripheral IV - Single Lumen 07/27/18 1550 Right Antecubital less than 1 day         Peripheral IV - Single Lumen 07/27/18 1605 Right Hand less than 1 day                Physical Exam   Constitutional: She is oriented to person, place, and time. She appears well-developed. She appears distressed.   HENT:   Head: Normocephalic and atraumatic.   Eyes: Conjunctivae and EOM are normal. Pupils are equal, round, and reactive to light. No scleral icterus.   Neck: Normal range of motion. Neck supple. JVD present. No thyromegaly present.   Cardiovascular: Normal rate, regular rhythm, S1 normal, S2 normal and intact distal pulses.  Exam reveals no gallop and no friction rub.    Murmur heard.  Pulses:       Dorsalis pedis pulses are 2+ on the right side, and 2+ on the left side.   Pulmonary/Chest: Effort normal. She has no wheezes. She has rales. She exhibits no tenderness.   No Skin Lesions   Abdominal: Normal appearance and bowel sounds are normal. She exhibits no distension, no ascites, no pulsatile midline mass and no mass. There is no hepatosplenomegaly. There is no tenderness. There is no rigidity, no rebound and no guarding. No hernia.   Musculoskeletal: She exhibits edema.   No Tenderness. Normal ROM   Lymphadenopathy:     She has no cervical adenopathy.   Neurological: She is alert and oriented to person, place, and time. She has normal strength. No cranial nerve deficit or sensory deficit. Coordination normal.   Skin: No lesion and no rash noted. She is diaphoretic. No cyanosis. No pallor. Nails show no clubbing.   Cold extremeties   Psychiatric: She has a normal mood and affect. Her mood appears not anxious. She does not exhibit a depressed mood.    Vitals reviewed.

## 2018-07-27 NOTE — ED NOTES
LOC: The patient is awake and alert; oriented x 3 and speaking appropriately.  APPEARANCE: Patient resting comfortably, patient is clean and well groomed  SKIN: warm and dry, normal skin turgor & moist mucus membranes, skin intact, no breakdown noted.  MUSCULOSKELETAL: Patient moving all extremities well, no obvious swelling or deformities noted  RESPIRATORY: Airway is open and patent, breath sounds with expiratory wheezing in lower feilds respirations are spontaneous, normal effort and rate, has CP w/ deep ispiration  CARDIAC: Patient has a normal rate,  peripheral edema noted to rt lower leg/ ankle., capillary refill < 3 seconds;  complaints of sternal  chest pain radiating to left arm - worse w/ coughing and deep inspiration  ABDOMEN: Soft and non tender to palpation, no distention noted. Bowel sounds present x 4

## 2018-07-27 NOTE — PROVIDER PROGRESS NOTES - EMERGENCY DEPT.
Encounter Date: 7/27/2018    ED Physician Progress Notes        Physician Note:   I, Erna Patel, am scribing for, and in the presence of, Dr. Joyce. I performed the above scribed service and the documentation accurately describes the services I performed. I attest to the accuracy of the note.      EKG - STEMI Decision  Initial Reading: No STEMI present.

## 2018-07-27 NOTE — ASSESSMENT & PLAN NOTE
Admit to CCU  ACS NSTEMI.  Loaded with ASA and clopidogrel 600, not started on heparin yet.  Plan for LHC and IABP.  Will place Central line afterward in the CCU to monitor SVO2.    Plan discussed with CCU and Inteventional Cardiology staff.     Tosha Goodman MD  Cardiology Fellow

## 2018-07-27 NOTE — ED NOTES
Pt placed on cardiac monitor, continuous pulse ox, cycling blood pressures. Side rails up x2, call bell in reach, bed in low position with brake engaged. Family at  Bedside.

## 2018-07-27 NOTE — ED TRIAGE NOTES
Sternal chest pain radiating to left arm - onset CP today at  11am while sitting on a couch. Having a cough  X 4 days. Saw cards and he prescribed a cough med.  Having difficulty expectorating her mucus when she coughs. Denies fever or dysuria, denies n/v/d/constipation. Has SOB w/ chest pain is worse w/ coughing. Pt states she fainted and fell in [place] one month ago.

## 2018-07-27 NOTE — CONSULTS
[facility]  Cardiology  Consult Note    Patient Name: Natalie Parnell  MRN: [id number]  Admission Date: 7/27/2018  Hospital Length of Stay: 0 days  Code Status: Prior   Attending Provider: Maame Rabago MD   Consulting Provider: Tosha Sanchez MD  Primary Care Physician: Octavio Ferrell MD  Principal Problem:<principal problem not specified>    Patient information was obtained from patient and ER records.     Inpatient consult to Cardiology  Consult performed by: TOSHA SANCHEZ  Consult ordered by: KATHRYN MCGINNIS        Subjective:     Chief Complaint:  Chest pain and SOB.     HPI:   82 Y/O female with PMH signficant for severe Aortic stenosis with SHEA 0.65cm2, mean gradients 75mmhg, peak velocity 5.3m/sec with recent admission for GIB (requiring 1 unit of PRBCs EGD with non bleeding angectasia S/P APC) and NSTEMI (trop peak of 7). She underwent LHC that showed non obstructive disease. Patient was discharged on 7/17 2 days ago patient reported chest pain radiating to back associated with SOB and orthopnea. Today her SOB worsened and came to the ER in the ER patient was SOB, diaphoretic, tachycardic, she was given 300 cc of NS and Nebs. She developed worsening tachypnea and placed on Bipap. Troponin was checked and found elevated 2.7 and BNP of 1700, cardiology was consulted. patient was tachycardic , diaphoretic in respiratory distress.   I performed limited bedside echo that revealed new drop in her EF now 25% with anterolateral wall   Patient was given lasix 80 mgs and clopidogrel 600 mgs and ASA  Cath lab called for LHC and IABP and arrived in the bedside.    Patient was on Eliquis for DVT of the R leg that ws diagnosed in March, now stopped after her episode of GIb.     Past Medical History:   Diagnosis Date    Asthma     Diabetes mellitus     HTN (hypertension)     Hyperlipidemia        Past Surgical History:   Procedure Laterality Date    CATARACT EXTRACTION Bilateral      Dr. Max    COLON SURGERY      ESOPHAGOGASTRODUODENOSCOPY N/A 7/10/2018    Procedure: EGD (ESOPHAGOGASTRODUODENOSCOPY);  Surgeon: Derian Stoner MD;  Location: [facility];  Service: Endoscopy;  Laterality: N/A;    HERNIA REPAIR      VARICOSE VEIN SURGERY         Review of patient's allergies indicates:  No Known Allergies    No current facility-administered medications on file prior to encounter.      Current Outpatient Prescriptions on File Prior to Encounter   Medication Sig    albuterol 90 mcg/actuation inhaler Inhale 2 puffs into the lungs every 6 (six) hours as needed for Wheezing.    aspirin (ECOTRIN) 81 MG EC tablet Take 1 tablet (81 mg total) by mouth once daily.    atorvastatin (LIPITOR) 40 MG tablet Take 1 tablet (40 mg total) by mouth once daily.    azelastine (OPTIVAR) 0.05 % ophthalmic solution Place 1 drop into both eyes 2 (two) times daily.    benzonatate (TESSALON) 100 MG capsule Take 1 capsule (100 mg total) by mouth 3 (three) times daily as needed for Cough.    budesonide-formoterol 160-4.5 mcg (SYMBICORT) 160-4.5 mcg/actuation HFAA Inhale 2 puffs into the lungs every 12 (twelve) hours. Controller    furosemide (LASIX) 20 MG tablet Take 1 tablet (20 mg total) by mouth once daily.    metFORMIN (GLUCOPHAGE) 500 MG tablet Take 1 tablet (500 mg total) by mouth 2 (two) times daily with meals.    metoprolol succinate (TOPROL-XL) 25 MG 24 hr tablet Take 1 tablet (25 mg total) by mouth once daily.    ammonium lactate 12 % Crea Apply twice daily to lower extremities    blood sugar diagnostic (ACCU-CHEK SMARTVIEW TEST STRIP) Strp Inject 1 strip into the skin once daily.    blood-glucose meter Misc 1 Units by Misc.(Non-Drug; Combo Route) route once daily.    lancets (ACCU-CHEK FASTCLIX) Misc Inject 1 lancet into the skin once daily.     Family History     None        Social History Main Topics    Smoking status: Never Smoker    Smokeless tobacco: Never Used    Alcohol use No    Drug  use: No    Sexual activity: Not on file     Review of Systems   Constitution: Negative for decreased appetite, diaphoresis, fever, night sweats, weight gain and weight loss.   HENT: Negative for congestion, nosebleeds, sore throat and tinnitus.    Eyes: Negative for blurred vision, double vision, photophobia and visual disturbance.   Cardiovascular: Positive for chest pain. Negative for claudication, dyspnea on exertion, irregular heartbeat, leg swelling, orthopnea, palpitations, paroxysmal nocturnal dyspnea and syncope.   Respiratory: Positive for cough, hemoptysis and shortness of breath. Negative for snoring and wheezing.    Endocrine: Negative for cold intolerance, heat intolerance, polydipsia, polyphagia and polyuria.   Hematologic/Lymphatic: Does not bruise/bleed easily.   Skin: Negative for color change and rash.   Musculoskeletal: Negative for joint pain, muscle weakness, myalgias and stiffness.   Gastrointestinal: Negative for abdominal pain, change in bowel habit, heartburn, hematemesis, hematochezia, melena, nausea and vomiting.   Genitourinary: Negative for bladder incontinence, dysuria, frequency, hematuria, hesitancy and urgency.   Neurological: Negative for dizziness, focal weakness, headaches, numbness, paresthesias, tremors and vertigo.   Psychiatric/Behavioral: Negative for depression. The patient does not have insomnia and is not nervous/anxious.      Objective:     Vital Signs (Most Recent):  Temp: 98.6 °F (37 °C) (07/27/18 1412)  Pulse: (!) 138 (07/27/18 1752)  Resp: (!) 31 (07/27/18 1752)  BP: 137/65 (07/27/18 1752)  SpO2: 96 % (07/27/18 1752) Vital Signs (24h Range):  Temp:  [98.6 °F (37 °C)] 98.6 °F (37 °C)  Pulse:  [106-145] 138  Resp:  [0-37] 31  SpO2:  [86 %-97 %] 96 %  BP: ()/() 137/65     Weight: 78.9 kg (174 lb)  Body mass index is 28.08 kg/m².    SpO2: 96 %  O2 Device (Oxygen Therapy): BiPAP    No intake or output data in the 24 hours ending 07/27/18  1846    Lines/Drains/Airways     Peripheral Intravenous Line                 Peripheral IV - Single Lumen 07/27/18 1550 Right Antecubital less than 1 day         Peripheral IV - Single Lumen 07/27/18 1605 Right Hand less than 1 day                Physical Exam   Constitutional: She is oriented to person, place, and time. She appears well-developed. She appears distressed.   HENT:   Head: Normocephalic and atraumatic.   Eyes: Conjunctivae and EOM are normal. Pupils are equal, round, and reactive to light. No scleral icterus.   Neck: Normal range of motion. Neck supple. JVD present. No thyromegaly present.   Cardiovascular: Normal rate, regular rhythm, S1 normal, S2 normal and intact distal pulses.  Exam reveals no gallop and no friction rub.    Murmur heard.  Pulses:       Dorsalis pedis pulses are 1+ on the right side, and 1+ on the left side.   Pulmonary/Chest: Effort normal. She has no wheezes. She has rales. She exhibits no tenderness.   No Skin Lesions   Abdominal: Normal appearance and bowel sounds are normal. She exhibits no distension, no ascites, no pulsatile midline mass and no mass. There is no hepatosplenomegaly. There is no tenderness. There is no rigidity, no rebound and no guarding. No hernia.   Musculoskeletal: She exhibits edema.   No Tenderness. Normal ROM   Lymphadenopathy:     She has no cervical adenopathy.   Neurological: She is alert and oriented to person, place, and time. She has normal strength. No cranial nerve deficit or sensory deficit. Coordination normal.   Skin: No lesion and no rash noted. She is diaphoretic. No cyanosis. No pallor. Nails show no clubbing.   Cold extremeties   Psychiatric: She has a normal mood and affect. Her mood appears not anxious. She does not exhibit a depressed mood.   Vitals reviewed.      Assessment and Plan:     NSTEMI (non-ST elevated myocardial infarction)    Admit to CCU  ACS NSTEMI.  Loaded with ASA and clopidogrel 600, not started on heparin yet.  Plan  for LHC and IABP.  Will place Central line afterward in the CCU to monitor SVO2.    Plan discussed with CCU and Inteventional Cardiology staff.     Tosha Goodman MD  Cardiology Fellow              VTE Risk Mitigation         Ordered     heparin 25,000 units in dextrose 5% 250 mL (100 units/mL) infusion  Continuous      07/27/18 1739     heparin 25,000 units in dextrose 5% 250 mL (100 units/mL) bolus from bag; INITIAL BOLUS DOSE  Once      07/27/18 1739          Thank you for your consult. I will follow-up with patient. Please contact us if you have any additional questions.    Tosha Goodman MD  Cardiology   [facility]

## 2018-07-27 NOTE — TELEPHONE ENCOUNTER
Spoke with patients daughter who states that her mother was having chest pains. She asked what should she do since her mother was not feeling well, instructed her to bring her to either [place] [facility] or [facility]. Told her that she could do to whichever is easier for her to get to. She verbalized understanding.

## 2018-07-27 NOTE — SUBJECTIVE & OBJECTIVE
Past Medical History:   Diagnosis Date    Asthma     Diabetes mellitus     HTN (hypertension)     Hyperlipidemia        Past Surgical History:   Procedure Laterality Date    CATARACT EXTRACTION Bilateral     Dr. Max    COLON SURGERY      ESOPHAGOGASTRODUODENOSCOPY N/A 7/10/2018    Procedure: EGD (ESOPHAGOGASTRODUODENOSCOPY);  Surgeon: Derain Stoner MD;  Location: [facility];  Service: Endoscopy;  Laterality: N/A;    HERNIA REPAIR      VARICOSE VEIN SURGERY         Review of patient's allergies indicates:  No Known Allergies    No current facility-administered medications on file prior to encounter.      Current Outpatient Prescriptions on File Prior to Encounter   Medication Sig    albuterol 90 mcg/actuation inhaler Inhale 2 puffs into the lungs every 6 (six) hours as needed for Wheezing.    aspirin (ECOTRIN) 81 MG EC tablet Take 1 tablet (81 mg total) by mouth once daily.    atorvastatin (LIPITOR) 40 MG tablet Take 1 tablet (40 mg total) by mouth once daily.    azelastine (OPTIVAR) 0.05 % ophthalmic solution Place 1 drop into both eyes 2 (two) times daily.    benzonatate (TESSALON) 100 MG capsule Take 1 capsule (100 mg total) by mouth 3 (three) times daily as needed for Cough.    budesonide-formoterol 160-4.5 mcg (SYMBICORT) 160-4.5 mcg/actuation HFAA Inhale 2 puffs into the lungs every 12 (twelve) hours. Controller    furosemide (LASIX) 20 MG tablet Take 1 tablet (20 mg total) by mouth once daily.    metFORMIN (GLUCOPHAGE) 500 MG tablet Take 1 tablet (500 mg total) by mouth 2 (two) times daily with meals.    metoprolol succinate (TOPROL-XL) 25 MG 24 hr tablet Take 1 tablet (25 mg total) by mouth once daily.    ammonium lactate 12 % Crea Apply twice daily to lower extremities    blood sugar diagnostic (ACCU-CHEK SMARTVIEW TEST STRIP) Strp Inject 1 strip into the skin once daily.    blood-glucose meter Misc 1 Units by Misc.(Non-Drug; Combo Route) route once daily.    lancets (ACCU-CHEK  FASTCLIX) Misc Inject 1 lancet into the skin once daily.     Family History     None        Social History Main Topics    Smoking status: Never Smoker    Smokeless tobacco: Never Used    Alcohol use No    Drug use: No    Sexual activity: Not on file     ROS  Objective:     Constitution: Negative for decreased appetite, diaphoresis, fever, night sweats, weight gain and weight loss.   HENT: Negative for congestion, nosebleeds, sore throat and tinnitus.    Eyes: Negative for blurred vision, double vision, photophobia and visual disturbance.   Cardiovascular: Positive for chest pain. Negative for claudication, dyspnea on exertion, irregular heartbeat, leg swelling, orthopnea, palpitations, paroxysmal nocturnal dyspnea and syncope.   Respiratory: Positive for cough, hemoptysis and shortness of breath. Negative for snoring and wheezing.    Endocrine: Negative for cold intolerance, heat intolerance, polydipsia, polyphagia and polyuria.   Hematologic/Lymphatic: Does not bruise/bleed easily.   Skin: Negative for color change and rash.   Musculoskeletal: Negative for joint pain, muscle weakness, myalgias and stiffness.   Gastrointestinal: Negative for abdominal pain, change in bowel habit, heartburn, hematemesis, hematochezia, melena, nausea and vomiting.   Genitourinary: Negative for bladder incontinence, dysuria, frequency, hematuria, hesitancy and urgency.   Neurological: Negative for dizziness, focal weakness, headaches, numbness, paresthesias, tremors and vertigo.   Psychiatric/Behavioral: Negative for depression. The patient does not have insomnia and is not nervous/anxious.         Vital Signs (Most Recent):  Temp: 98.6 °F (37 °C) (07/27/18 1412)  Pulse: (!) 138 (07/27/18 1752)  Resp: (!) 31 (07/27/18 1752)  BP: 137/65 (07/27/18 1752)  SpO2: 96 % (07/27/18 1752) Vital Signs (24h Range):  Temp:  [98.6 °F (37 °C)] 98.6 °F (37 °C)  Pulse:  [106-145] 138  Resp:  [0-37] 31  SpO2:  [86 %-97 %] 96 %  BP:  ()/() 137/65     Weight: 78.9 kg (174 lb)  Body mass index is 28.08 kg/m².    SpO2: 96 %  O2 Device (Oxygen Therapy): BiPAP    No intake or output data in the 24 hours ending 07/27/18 1859    Lines/Drains/Airways     Drain                 Urethral Catheter 07/27/18 1848 Latex 12 Fr. less than 1 day          Peripheral Intravenous Line                 Peripheral IV - Single Lumen 07/27/18 1550 Right Antecubital less than 1 day         Peripheral IV - Single Lumen 07/27/18 1605 Right Hand less than 1 day                Physical Exam  Constitutional: She is oriented to person, place, and time. She appears well-developed. She appears distressed.   HENT:   Head: Normocephalic and atraumatic.   Eyes: Conjunctivae and EOM are normal. Pupils are equal, round, and reactive to light. No scleral icterus.   Neck: Normal range of motion. Neck supple. JVD present. No thyromegaly present.   Cardiovascular: Normal rate, regular rhythm, S1 normal, S2 normal and intact distal pulses.  Exam reveals no gallop and no friction rub.    Murmur heard.  Pulses:       Dorsalis pedis pulses are 2+ on the right side, and 2+ on the left side.   Pulmonary/Chest: Effort normal. She has no wheezes. She has rales. She exhibits no tenderness.   No Skin Lesions   Abdominal: Normal appearance and bowel sounds are normal. She exhibits no distension, no ascites, no pulsatile midline mass and no mass. There is no hepatosplenomegaly. There is no tenderness. There is no rigidity, no rebound and no guarding. No hernia.   Musculoskeletal: She exhibits edema.   No Tenderness. Normal ROM   Lymphadenopathy:     She has no cervical adenopathy.   Neurological: She is alert and oriented to person, place, and time. She has normal strength. No cranial nerve deficit or sensory deficit. Coordination normal.   Skin: No lesion and no rash noted. She is diaphoretic. No cyanosis. No pallor. Nails show no clubbing.   Cold extremeties   Psychiatric: She has a  normal mood and affect. Her mood appears not anxious. She does not exhibit a depressed mood.   Vitals reviewed.

## 2018-07-27 NOTE — HPI
83 year old female with PMHx signficant for severe Aortic stenosis with SHAE 0.65cm2, mean gradients 75mmhg, peak velocity 5.3m/sec with recent admission for GIB (requiring 1 unit of PRBCs EGD with non bleeding angectasia S/P APC) and NSTEMI (trop peak of 7). She underwent LHC that showed non obstructive disease. Patient was discharged on 7/17, 2 days ago patient reported chest pain radiating to back associated with SOB and orthopnea. On the day of presentation to the hospital, here SOB worsened and she came to the ER diaphoretic and tachycardic. She developed worsening tachypnea and placed on Bipap. Troponin was checked and found to be elevated at 2.7 and BNP of 1700. Cardiology was then consulted. Bedside echo revealed new drop in her EF now 25% within anterolateral wall. She was loaded with Plavix and ASA, taken to cath lab for LHC that revealed clean coronaries. Pt was placed on IABP for advanced mechanical support.

## 2018-07-28 PROBLEM — R07.9 CHEST PAIN: Status: RESOLVED | Noted: 2018-01-01 | Resolved: 2018-01-01

## 2018-07-28 PROBLEM — I21.4 NSTEMI (NON-ST ELEVATED MYOCARDIAL INFARCTION): Status: RESOLVED | Noted: 2018-01-01 | Resolved: 2018-01-01

## 2018-07-28 PROBLEM — R57.9 SHOCK: Status: ACTIVE | Noted: 2018-01-01

## 2018-07-28 NOTE — PROCEDURES
POST CATH NOTE    Procedure:  LHC, IABP  Referring MD:  Dr Short  Indication: Cardiogenic shock  Access: Right Common Femoral    Findings:  Non obstructive CAD    Intervention:  IABP 1:1    Patient tolerated the procedure well, no complications    Post Cath Exam:  Vitals:    07/27/18 1905   BP:    Pulse: 109   Resp: (!) 37   Temp:      No unusual pain, hematoma or thrill at vascular access site.  Distal pulse present without signs of ischemia.    Post-procedure orders as per EPIC    Recommendation:  -IABP 1:1, neurovascular checks every hour.   -Heparin ggt for IABP  -Diuresis and Management of heart failure.    Tosha Goodman MD  Cardiology Fellow

## 2018-07-28 NOTE — ASSESSMENT & PLAN NOTE
- Pt presented with Cardiogenic shock and acute pulmonary edema.She is now oxygenating better and BP and HR improved post IABP and small dose of Levo. Her CVP is actually 5mmhg so agree with holing off on aggressive diuresis now that she has IABP as she is somewhat preload dependent given severe AS. Also cont workup and empiric treatment for possible sepsis as CO=10.3L/min with , but UA and Blood Cx are negative so far  - She was seen in March in Valve clinic and Asx at that time but obviously now she has become symptomatic with 3 admits in last month for CHF so hopefully we can stabilize her over the weekend and plan on possible BAV on Monday as bridge to TAVR in the future  - Keep NPO Sunday at midnight

## 2018-07-28 NOTE — PROGRESS NOTES
Patient transported per Bipap on current settings. Transported without incident. Report given to BRENT LIMA

## 2018-07-28 NOTE — CARE UPDATE
Pt's Hemodynamic #'s  CVP 7  Sv02 71  CO  6.5  CI 3.4      Pt Update:   Pt became hypotensive with MAP in 50's. Levophed gtt was started. CBC was drawn to monitor H/H. Hb dropped from 12 to 10. Will send type and screen and serially trend H/H.     Suzan Galvin, PGY-4 Cardiology Fellow

## 2018-07-28 NOTE — PROGRESS NOTES
[id number] patient transported from ED 13 to Cath Lab via Bipap with oxygen. Ambu bag and mask at hospitals. Patient tolerated well no complications noted. Report and care of patient turned over to RT Pankaj.

## 2018-07-28 NOTE — SUBJECTIVE & OBJECTIVE
Interval History: Off Bipap and not complaining of Chest pain this AM.    Objective:     Vital Signs (Most Recent):  Temp: 97.4 °F (36.3 °C) (07/28/18 0715)  Pulse: 82 (07/28/18 0900)  Resp: (!) 35 (07/28/18 0900)  BP: 122/65 (07/28/18 0900)  SpO2: 96 % (07/28/18 0900) Vital Signs (24h Range):  Temp:  [97.4 °F (36.3 °C)-98.6 °F (37 °C)] 97.4 °F (36.3 °C)  Pulse:  [] 82  Resp:  [0-37] 35  SpO2:  [86 %-100 %] 96 %  BP: ()/() 122/65     Weight: 78.9 kg (173 lb 15.1 oz)  Body mass index is 28.08 kg/m².    SpO2: 96 %  O2 Device (Oxygen Therapy): BiPAP      Intake/Output Summary (Last 24 hours) at 07/28/18 0956  Last data filed at 07/28/18 0900   Gross per 24 hour   Intake           996.24 ml   Output             1945 ml   Net          -948.76 ml       Lines/Drains/Airways     Central Venous Catheter Line                 Percutaneous Central Line Insertion/Assessment - triple lumen  07/27/18 2145 right internal jugular less than 1 day          Drain                 Urethral Catheter 07/27/18 1848 Latex 12 Fr. less than 1 day          Line                 IABP 07/27/18 1930 50 mL less than 1 day          Peripheral Intravenous Line                 Peripheral IV - Single Lumen 07/27/18 1550 Right Antecubital less than 1 day                Physical Exam   Constitutional: She is oriented to person, place, and time. She appears well-developed and well-nourished.   HENT:   Head: Normocephalic and atraumatic.   Mouth/Throat: No oropharyngeal exudate.   Eyes: EOM are normal. Pupils are equal, round, and reactive to light.   Neck: Normal range of motion. Neck supple. No JVD present.   Cardiovascular: Normal rate and regular rhythm.  Exam reveals no friction rub.    Murmur heard.   Harsh midsystolic murmur is present  at the upper right sternal border radiating to the neck Derick to doppler BL DP but not PT  Pulses:       Carotid pulses are 2+ on the right side, and 2+ on the left side.       Radial pulses are 2+ on  the right side, and 2+ on the left side.        Femoral pulses are 2+ on the right side, and 2+ on the left side.  Pulmonary/Chest: Effort normal and breath sounds normal. No respiratory distress.   Abdominal: Soft. Bowel sounds are normal. She exhibits no distension.   Musculoskeletal: Normal range of motion. She exhibits no edema.   Neurological: She is alert and oriented to person, place, and time.   Skin: No rash noted.       Significant Labs: All pertinent lab results from the last 24 hours have been reviewed.    Significant Imaging: EKG: pending

## 2018-07-28 NOTE — PROGRESS NOTES
[facility]  Interventional Cardiology  Progress Note    Patient Name: Natalie Parnell  MRN: [id number]  Admission Date: 7/27/2018  Hospital Length of Stay: 1 days  Code Status: Full Code   Attending Physician: Siva Short MD   Primary Care Physician: Octavio Ferrell MD  Principal Problem:<principal problem not specified>    Subjective:     Interval History: Off Bipap and not complaining of Chest pain this AM.    Objective:     Vital Signs (Most Recent):  Temp: 97.4 °F (36.3 °C) (07/28/18 0715)  Pulse: 82 (07/28/18 0900)  Resp: (!) 35 (07/28/18 0900)  BP: 122/65 (07/28/18 0900)  SpO2: 96 % (07/28/18 0900) Vital Signs (24h Range):  Temp:  [97.4 °F (36.3 °C)-98.6 °F (37 °C)] 97.4 °F (36.3 °C)  Pulse:  [] 82  Resp:  [0-37] 35  SpO2:  [86 %-100 %] 96 %  BP: ()/() 122/65     Weight: 78.9 kg (173 lb 15.1 oz)  Body mass index is 28.08 kg/m².    SpO2: 96 %  O2 Device (Oxygen Therapy): BiPAP      Intake/Output Summary (Last 24 hours) at 07/28/18 0956  Last data filed at 07/28/18 0900   Gross per 24 hour   Intake           996.24 ml   Output             1945 ml   Net          -948.76 ml       Lines/Drains/Airways     Central Venous Catheter Line                 Percutaneous Central Line Insertion/Assessment - triple lumen  07/27/18 2145 right internal jugular less than 1 day          Drain                 Urethral Catheter 07/27/18 1848 Latex 12 Fr. less than 1 day          Line                 IABP 07/27/18 1930 50 mL less than 1 day          Peripheral Intravenous Line                 Peripheral IV - Single Lumen 07/27/18 1550 Right Antecubital less than 1 day                Physical Exam   Constitutional: She is oriented to person, place, and time. She appears well-developed and well-nourished.   HENT:   Head: Normocephalic and atraumatic.   Mouth/Throat: No oropharyngeal exudate.   Eyes: EOM are normal. Pupils are equal, round, and reactive to light.   Neck: Normal range of  motion. Neck supple. No JVD present.   Cardiovascular: Normal rate and regular rhythm.  Exam reveals no friction rub.    Murmur heard.   Harsh midsystolic murmur is present  at the upper right sternal border radiating to the neck Derick to doppler BL DP but not PT  Pulses:       Carotid pulses are 2+ on the right side, and 2+ on the left side.       Radial pulses are 2+ on the right side, and 2+ on the left side.        Femoral pulses are 2+ on the right side, and 2+ on the left side.  Pulmonary/Chest: Effort normal and breath sounds normal. No respiratory distress.   Abdominal: Soft. Bowel sounds are normal. She exhibits no distension.   Musculoskeletal: Normal range of motion. She exhibits no edema.   Neurological: She is alert and oriented to person, place, and time.   Skin: No rash noted.       Significant Labs: All pertinent lab results from the last 24 hours have been reviewed.    Significant Imaging: EKG: pending    Assessment and Plan:     Patient is a 83 y.o. female with HTN, DM, severe AS(EF 50%; SHEA 0.65 cm2; PV 5.25m/s; MG 74mmhg on 7/9/18), recent GI 2/2 gastric AVM s/p APC on 7/9/18  presenting with cardiogenic shock    Aortic valve stenosis, severe    - Pt presented with Cardiogenic shock and acute pulmonary edema.She is now oxygenating better and BP and HR improved post IABP and small dose of Levo. Her CVP is actually 5mmhg so agree with holing off on aggressive diuresis now that she has IABP as she is somewhat preload dependent given severe AS. Also cont workup and empiric treatment for possible sepsis as CO=10.3L/min with , but UA and Blood Cx are negative so far  - She was seen in March in Valve clinic and Asx at that time but obviously now she has become symptomatic with 3 admits in last month for CHF so hopefully we can stabilize her over the weekend and plan on possible BAV on Monday as bridge to TAVR in the future  - F/U repeat echo as there defiantly was a drop in EF on bedside echo last  night compared to echo on 7/9/18  - Will get CTS consult, PFT's, frailty, and TAVR CTA sometime next week to complete TAVR workup when IABP out. Had [facility] with nonobstructive disease on 7/16/18  - Keep NPO Sunday at midnight for BAV            VTE Risk Mitigation         Ordered     IP VTE HIGH RISK PATIENT  Once      07/27/18 2158     Place sequential compression device  Until discontinued      07/27/18 2158     heparin 25,000 units in dextrose 5% 250 mL (100 units/mL) infusion  Continuous      07/27/18 1739     heparin 25,000 units in dextrose 5% 250 mL (100 units/mL) bolus from bag; INITIAL BOLUS DOSE  Once      07/27/18 1739          Harman Frias MD  Interventional Cardiology  [facility]

## 2018-07-28 NOTE — H&P
[facility]  Cardiology  History and Physical     Patient Name: Natalie Parnell  MRN: [id number]  Admission Date: 7/27/2018  Code Status: Prior   Attending Provider: Maame Rabago MD   Primary Care Physician: Octavio Ferrell MD  Principal Problem:<principal problem not specified>    Patient information was obtained from patient and ER records.     Subjective:     Chief Complaint:  SOB and Chest pain.      HPI:  84 Y/O female with PMH signficant for severe Aortic stenosis with SHEA 0.65cm2, mean gradients 75mmhg, peak velocity 5.3m/sec with recent admission for GIB (requiring 1 unit of PRBCs EGD with non bleeding angectasia S/P APC) and NSTEMI (trop peak of 7). She underwent LHC that showed non obstructive disease. Patient was discharged on 7/17 2 days ago patient reported chest pain radiating to back associated with SOB and orthopnea. Today her SOB worsened and came to the ER in the ER patient was SOB, diaphoretic, tachycardic, she was given 300 cc of NS and Nebs. She developed worsening tachypnea and placed on Bipap. Troponin was checked and found elevated 2.7 and BNP of 1700, cardiology was consulted. patient was tachycardic , diaphoretic in respiratory distress.   I performed limited bedside echo that revealed new drop in her EF now 25% with anterolateral wall   Patient was given lasix 80 mgs and clopidogrel 600 mgs and ASA  Cath lab called for LHC and IABP and arrived in the bedside.    Patient was on Eliquis for DVT of the R leg that ws diagnosed in March, now stopped after her episode of GIb.     LHC revealed non obstuctive disease   IABP placed via R femoral artery.     Past Medical History:   Diagnosis Date    Asthma     Diabetes mellitus     HTN (hypertension)     Hyperlipidemia        Past Surgical History:   Procedure Laterality Date    CATARACT EXTRACTION Bilateral     Dr. Max    COLON SURGERY      ESOPHAGOGASTRODUODENOSCOPY N/A 7/10/2018    Procedure: EGD  (ESOPHAGOGASTRODUODENOSCOPY);  Surgeon: Derian Stoner MD;  [facility];  Service: Endoscopy;  Laterality: N/A;    HERNIA REPAIR      VARICOSE VEIN SURGERY         Review of patient's allergies indicates:  No Known Allergies    No current facility-administered medications on file prior to encounter.      Current Outpatient Prescriptions on File Prior to Encounter   Medication Sig    albuterol 90 mcg/actuation inhaler Inhale 2 puffs into the lungs every 6 (six) hours as needed for Wheezing.    aspirin (ECOTRIN) 81 MG EC tablet Take 1 tablet (81 mg total) by mouth once daily.    atorvastatin (LIPITOR) 40 MG tablet Take 1 tablet (40 mg total) by mouth once daily.    azelastine (OPTIVAR) 0.05 % ophthalmic solution Place 1 drop into both eyes 2 (two) times daily.    benzonatate (TESSALON) 100 MG capsule Take 1 capsule (100 mg total) by mouth 3 (three) times daily as needed for Cough.    budesonide-formoterol 160-4.5 mcg (SYMBICORT) 160-4.5 mcg/actuation HFAA Inhale 2 puffs into the lungs every 12 (twelve) hours. Controller    furosemide (LASIX) 20 MG tablet Take 1 tablet (20 mg total) by mouth once daily.    metFORMIN (GLUCOPHAGE) 500 MG tablet Take 1 tablet (500 mg total) by mouth 2 (two) times daily with meals.    metoprolol succinate (TOPROL-XL) 25 MG 24 hr tablet Take 1 tablet (25 mg total) by mouth once daily.    ammonium lactate 12 % Crea Apply twice daily to lower extremities    blood sugar diagnostic (ACCU-CHEK SMARTVIEW TEST STRIP) Strp Inject 1 strip into the skin once daily.    blood-glucose meter Misc 1 Units by Misc.(Non-Drug; Combo Route) route once daily.    lancets (ACCU-CHEK FASTCLIX) Misc Inject 1 lancet into the skin once daily.     Family History     None        Social History Main Topics    Smoking status: Never Smoker    Smokeless tobacco: Never Used    Alcohol use No    Drug use: No    Sexual activity: Not on file     ROS  Objective:     Constitution: Negative for  decreased appetite, diaphoresis, fever, night sweats, weight gain and weight loss.   HENT: Negative for congestion, nosebleeds, sore throat and tinnitus.    Eyes: Negative for blurred vision, double vision, photophobia and visual disturbance.   Cardiovascular: Positive for chest pain. Negative for claudication, dyspnea on exertion, irregular heartbeat, leg swelling, orthopnea, palpitations, paroxysmal nocturnal dyspnea and syncope.   Respiratory: Positive for cough, hemoptysis and shortness of breath. Negative for snoring and wheezing.    Endocrine: Negative for cold intolerance, heat intolerance, polydipsia, polyphagia and polyuria.   Hematologic/Lymphatic: Does not bruise/bleed easily.   Skin: Negative for color change and rash.   Musculoskeletal: Negative for joint pain, muscle weakness, myalgias and stiffness.   Gastrointestinal: Negative for abdominal pain, change in bowel habit, heartburn, hematemesis, hematochezia, melena, nausea and vomiting.   Genitourinary: Negative for bladder incontinence, dysuria, frequency, hematuria, hesitancy and urgency.   Neurological: Negative for dizziness, focal weakness, headaches, numbness, paresthesias, tremors and vertigo.   Psychiatric/Behavioral: Negative for depression. The patient does not have insomnia and is not nervous/anxious.         Vital Signs (Most Recent):  Temp: 98.6 °F (37 °C) (07/27/18 1412)  Pulse: (!) 138 (07/27/18 1752)  Resp: (!) 31 (07/27/18 1752)  BP: 137/65 (07/27/18 1752)  SpO2: 96 % (07/27/18 1752) Vital Signs (24h Range):  Temp:  [98.6 °F (37 °C)] 98.6 °F (37 °C)  Pulse:  [106-145] 138  Resp:  [0-37] 31  SpO2:  [86 %-97 %] 96 %  BP: ()/() 137/65     Weight: 78.9 kg (174 lb)  Body mass index is 28.08 kg/m².    SpO2: 96 %  O2 Device (Oxygen Therapy): BiPAP    No intake or output data in the 24 hours ending 07/27/18 1859    Lines/Drains/Airways     Drain                 Urethral Catheter 07/27/18 1848 Latex 12 Fr. less than 1 day           Peripheral Intravenous Line                 Peripheral IV - Single Lumen 07/27/18 1550 Right Antecubital less than 1 day         Peripheral IV - Single Lumen 07/27/18 1605 Right Hand less than 1 day                Physical Exam  Constitutional: She is oriented to person, place, and time. She appears well-developed. She appears distressed.   HENT:   Head: Normocephalic and atraumatic.   Eyes: Conjunctivae and EOM are normal. Pupils are equal, round, and reactive to light. No scleral icterus.   Neck: Normal range of motion. Neck supple. JVD present. No thyromegaly present.   Cardiovascular: Normal rate, regular rhythm, S1 normal, S2 normal and intact distal pulses.  Exam reveals no gallop and no friction rub.    Murmur heard.  Pulses:       Dorsalis pedis pulses are 1+ on the right side, and 1+ on the left side.   Pulmonary/Chest: Effort normal. She has no wheezes. She has rales. She exhibits no tenderness.   No Skin Lesions   Abdominal: Normal appearance and bowel sounds are normal. She exhibits no distension, no ascites, no pulsatile midline mass and no mass. There is no hepatosplenomegaly. There is no tenderness. There is no rigidity, no rebound and no guarding. No hernia.   Musculoskeletal: She exhibits edema.   No Tenderness. Normal ROM   Lymphadenopathy:     She has no cervical adenopathy.   Neurological: She is alert and oriented to person, place, and time. She has normal strength. No cranial nerve deficit or sensory deficit. Coordination normal.   Skin: No lesion and no rash noted. She is diaphoretic. No cyanosis. No pallor. Nails show no clubbing.   Cold extremeties   Psychiatric: She has a normal mood and affect. Her mood appears not anxious. She does not exhibit a depressed mood.   Vitals reviewed.      Assessment and Plan:     NSTEMI (non-ST elevated myocardial infarction)  Acute systolic heart failure      [facility] with non obstructive CAD.   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  IABP placed for mechanical  support.   Will place Central line afterward in the CCU to monitor SVO2.  Acute systolic heart failure,   Continue with IV diuresis  Hold BB  Official echo in the morning.     Plan discussed with CCU and Inteventional Cardiology staff.     Tosha Goodman MD  Cardiology Fellow              VTE Risk Mitigation         Ordered     heparin 25,000 units in dextrose 5% 250 mL (100 units/mL) infusion  Continuous      07/27/18 1739     heparin 25,000 units in dextrose 5% 250 mL (100 units/mL) bolus from bag; INITIAL BOLUS DOSE  Once      07/27/18 1739          Tosha Goodman MD  Cardiology   [facility]

## 2018-07-28 NOTE — PLAN OF CARE
Problem: Patient Care Overview  Goal: Plan of Care Review  Outcome: Ongoing (interventions implemented as appropriate)  Pt. Remained  on Bipap overnight. Right IJ central line placed. Uop 100-150 cc/hr,  CVP 6-7, SVO2 ( 71, 78 ). Hypotensive overnight,  cc bolus administered per MD orders, Levophed gtt started to keep MAP > 65. Heparin gtt infusing. Right fem. IABP in place, 1:1 MAP 70s, AUG 110s. Frequent vitals and complete assessment per flowsheet, plan of care reviewed with Mrs. Parnell and her family, questions/concerns addressed, will continue to monitor pt.

## 2018-07-28 NOTE — PROCEDURES
"Natalie Parnell is a 83 y.o. female patient.    Temp: 98.2 °F (36.8 °C) (07/27/18 2010)  Pulse: 99 (07/27/18 2155)  Resp: 20 (07/27/18 2155)  BP: (!) 112/59 (07/27/18 2129)  SpO2: 99 % (07/27/18 2155)  Weight: 78.9 kg (173 lb 15.1 oz) (07/27/18 2129)  Height: 5' 6" (167.6 cm) (07/27/18 2129)  Mallampati Scale: Class II  ASA Classification: Class 3    Central Line  Date/Time: 7/27/2018 10:00 PM  Location procedure was performed: [facility] CARDIOLOGY  Performed by: THAI LÓPEZ  Pre-operative Diagnosis: pulmonary edema, heart failure   Consent Done: Yes  Time out: Immediately prior to procedure a "time out" was called to verify the correct patient, procedure, equipment, support staff and site/side marked as required.  Preparation: skin prepped with ChloraPrep  Skin prep agent dried: skin prep agent completely dried prior to procedure  Sterile barriers: all five maximum sterile barriers used - cap, mask, sterile gown, sterile gloves, and large sterile sheet  Hand hygiene: hand hygiene performed prior to central venous catheter insertion  Location details: right internal jugular  Catheter type: triple lumen  Catheter size: 7 Fr  Ultrasound guidance: yes  Vessel Caliber: medium, patent, compressibility normal  Needle advanced into vessel with real time Ultrasound guidance.  Guidewire confirmed in vessel.  Image recorded and saved.  Sterile sheath used.  Manometry: Yes  Number of attempts: 3  Complications: none  Specimens: No  Implants: No  Post-procedure: line sutured,  chlorhexidine patch,  sterile dressing applied and blood return through all ports  Complications: No          Thai López  7/27/2018  "

## 2018-07-28 NOTE — PLAN OF CARE
Problem: Patient Care Overview  Goal: Plan of Care Review  Outcome: Ongoing (interventions implemented as appropriate)    No acute events throughout day. See vital signs and assessments in flowsheets. See below for updates on today's progress.     Pulmonary: 3L NC sats > 95%    Cardiovascular: IABP right femoral artery; aug 110s-130s, SBP 60s-80s, MAP 60s-80s, HR 70s-90s. Left IJ TLC placed at 1430; biopatch in place, clean dry intact     Neurological: AAO x4; afebrile     Gastrointestinal: Active bowel sounds x4 quadrants, no BM today. Advanced to cardiac diet, tolerating well     Genitourinary: Nobles cath in place, hourly UO ~100cc    Endocrine: normoglycemic throughout shift    Integumentary/Other: clean, dry, intact     Infusions: KVO, levo    Patient progressing towards goals as tolerated, plan of care communicated and reviewed with Natalie Parnell and family. All concerns addressed. Will continue to monitor.

## 2018-07-29 NOTE — PROGRESS NOTES
[facility]  Cardiology  Progress Note    Patient Name: Natalie Parnell  MRN: [id number]  Admission Date: 7/27/2018  Hospital Length of Stay: 2 days  Code Status: Full Code   Attending Physician: Siva Short MD   Primary Care Physician: Octavio Ferrell MD  Expected Discharge Date:   Principal Problem:Severe aortic stenosis    Subjective:     Hospital Course:   7/28: Patient MAPs slowly improving, still on levophed. Blood cultures drawn, on IABP.  CVP lower end. Afebrile, Line changed due to blood oozing.\  7/29: On less levophed, feels well, afebrile. Denies chest pain, dyspnea, cough, dysuria, or other new symptoms.    Interval History: Feels well today. Denies dyspnea, chest pain.    Review of Systems   Constitution: Negative for decreased appetite, diaphoresis, fever, night sweats, weight gain and weight loss.   HENT: Negative for congestion, nosebleeds, sore throat and tinnitus.    Eyes: Negative for blurred vision, double vision, photophobia and visual disturbance.   Cardiovascular: Negative for chest pain, claudication, dyspnea on exertion, irregular heartbeat, leg swelling, orthopnea, palpitations, paroxysmal nocturnal dyspnea and syncope.   Respiratory: Positive for shortness of breath. Negative for cough, hemoptysis, snoring and wheezing.    Endocrine: Negative for cold intolerance, heat intolerance, polydipsia, polyphagia and polyuria.   Hematologic/Lymphatic: Does not bruise/bleed easily.   Skin: Negative for color change and rash.   Musculoskeletal: Negative for joint pain, muscle weakness, myalgias and stiffness.   Gastrointestinal: Negative for abdominal pain, change in bowel habit, heartburn, hematemesis, hematochezia, melena, nausea and vomiting.   Genitourinary: Negative for bladder incontinence, dysuria, frequency, hematuria, hesitancy and urgency.   Neurological: Negative for dizziness, focal weakness, headaches, numbness, paresthesias, tremors and vertigo.    Psychiatric/Behavioral: Negative for depression. The patient does not have insomnia and is not nervous/anxious.      Objective:     Vital Signs (Most Recent):  Temp: 98 °F (36.7 °C) (07/29/18 1500)  Pulse: 84 (07/29/18 1500)  Resp: (!) 30 (07/29/18 1500)  BP: 127/60 (07/29/18 1500)  SpO2: 98 % (07/29/18 1500) Vital Signs (24h Range):  Temp:  [97.5 °F (36.4 °C)-98.6 °F (37 °C)] 98 °F (36.7 °C)  Pulse:  [] 84  Resp:  [12-34] 30  SpO2:  [95 %-100 %] 98 %  BP: (122-134)/(58-96) 127/60     Weight: 78.9 kg (173 lb 15.1 oz)  Body mass index is 28.08 kg/m².     SpO2: 98 %  O2 Device (Oxygen Therapy): nasal cannula      Intake/Output Summary (Last 24 hours) at 07/29/18 1541  Last data filed at 07/29/18 1500   Gross per 24 hour   Intake          1580.04 ml   Output             2560 ml   Net          -979.96 ml       Lines/Drains/Airways     Central Venous Catheter Line                 Percutaneous Central Line Insertion/Assessment - triple lumen  07/28/18 1430 left internal jugular 1 day          Drain                 Urethral Catheter 07/27/18 1848 Latex 12 Fr. 1 day          Line                 IABP 07/27/18 1930 50 mL 1 day          Peripheral Intravenous Line                 Peripheral IV - Single Lumen 07/27/18 1550 Right Antecubital 1 day                Physical Exam   Constitutional: She is oriented to person, place, and time. She appears well-developed and well-nourished.   HENT:   Head: Normocephalic and atraumatic.   Mouth/Throat: No oropharyngeal exudate.   Eyes: EOM are normal. Pupils are equal, round, and reactive to light.   Neck: Normal range of motion. Neck supple. No JVD present.   Cardiovascular: Normal rate and regular rhythm.  Exam reveals no friction rub.    Murmur heard.   Harsh midsystolic murmur is present  at the upper right sternal border radiating to the neck  Pulses:       Carotid pulses are 2+ on the right side, and 2+ on the left side.       Radial pulses are 2+ on the right side, and 2+  on the left side.        Femoral pulses are 2+ on the right side, and 2+ on the left side.  Pulmonary/Chest: Effort normal and breath sounds normal. No respiratory distress.   Abdominal: Soft. Bowel sounds are normal. She exhibits no distension.   Musculoskeletal: Normal range of motion. She exhibits no edema.   Neurological: She is alert and oriented to person, place, and time.   Skin: No rash noted.       Significant Labs:   CMP   Recent Labs  Lab 07/27/18  1550 07/28/18  0305 07/28/18  1125 07/29/18  0400   * 137 137 134*   K 4.8 4.3 4.0 3.7   CL 96 103 103 100   CO2 23 24 24 26   * 216* 183* 189*   BUN 27* 26* 25* 23   CREATININE 1.3 1.2 1.1 1.2   CALCIUM 9.7 8.6* 8.5* 8.3*   PROT 7.2  --  5.7*  --    ALBUMIN 3.2*  --  2.7*  --    BILITOT 1.2*  --  1.3*  --    ALKPHOS 198*  --  118  --    AST 76*  --  92*  --    ALT 29  --  25  --    ANIONGAP 12 10 10 8   ESTGFRAFRICA 44.5* 49.0* 54.4* 49.0*   EGFRNONAA 38.6* 42.5* 47.2* 42.5*   , CBC   Recent Labs  Lab 07/28/18  1125 07/28/18  2107 07/29/18  0400   WBC 10.76 8.76 10.45   HGB 9.9* 9.9* 10.3*   HCT 30.3* 29.2* 30.4*    191 211    and Troponin   Recent Labs  Lab 07/27/18  1550   TROPONINI 2.776*       Significant Imaging: X-Ray: CXR: X-Ray Chest 1 View (CXR):   Results for orders placed or performed during the hospital encounter of 07/27/18   X-Ray Chest 1 View    Narrative    EXAMINATION:  XR CHEST 1 VIEW    CLINICAL HISTORY:  IABP placment;    TECHNIQUE:  Single frontal view of the chest was performed.    COMPARISON:  July 28, 2018.    FINDINGS:  Left central venous catheter IABP and monitoring leads remain.  Heart remains enlarged with increase in the pulmonary vascular interstitial and alveolar markings.  No pneumothorax.      Impression    Abnormal study similar.      Electronically signed by: Jamil Cottrell MD  Date:    07/29/2018  Time:    09:11     Assessment and Plan:     Brief HPI: 83F with severe AS admitted for Type II NSTEMI and  valvular heart failure with IABP in place and mild vasopressor support. Plan for BAV 07/30    * Severe aortic stenosis    - Severe SHEA 0.6, very high gradient, TAVR workup planned for future  - Likely cause of flash pulm edema and cardiogenic shock  - BAV planned for Monday if continues to be stable        Shock    - Hemodynamics: CO 3.32L/min, CI 1.74 L/min/sq meter, SVR 1951 (on levophed)  --- On broad spectrum antibiotics: previously high CO / CI and low SVR with low CVP; CVP still low-normal but no culture data. Will stay broad until culture final results negative   - IABP for cardiogenic component        Coronary artery disease involving native coronary artery of native heart without angina pectoris    - Elevated troponin, cardiac cath with no real blockages, likely from severe AS and cardiogenic shock from pulmonary edema        Type 2 diabetes mellitus, without long-term current use of insulin    - On sliding scale  -Get HBA1C            VTE Risk Mitigation         Ordered     IP VTE HIGH RISK PATIENT  Once      07/27/18 2158     Place sequential compression device  Until discontinued      07/27/18 2158          Héctor Corbin MD  Cardiology  [facility]

## 2018-07-29 NOTE — PROGRESS NOTES
[facility]  Cardiology  Progress Note    Patient Name: Natalie Parnell  MRN: [id number]  Admission Date: 7/27/2018  Hospital Length of Stay: 1 days  Code Status: Full Code   Attending Physician: Siva Short MD   Primary Care Physician: Octavio Ferrell MD  Expected Discharge Date:   Principal Problem:<principal problem not specified>    Subjective:     Hospital Course:   7/28: Patient MAPs slowly improving, still on levophed. Blood cultures drawn, on IABP.  CVP lower end. Afebrile, Line changed due to blood oozing.    Interval History: Patient laying in bed no CP or SOB, Afebrile.    Review of Systems   Constitution: Negative for decreased appetite, diaphoresis, fever, night sweats, weight gain and weight loss.   HENT: Negative for congestion, nosebleeds, sore throat and tinnitus.    Eyes: Negative for blurred vision, double vision, photophobia and visual disturbance.   Cardiovascular: Negative for chest pain, claudication, dyspnea on exertion, irregular heartbeat, leg swelling, orthopnea, palpitations, paroxysmal nocturnal dyspnea and syncope.   Respiratory: Positive for shortness of breath. Negative for cough, hemoptysis, snoring and wheezing.    Endocrine: Negative for cold intolerance, heat intolerance, polydipsia, polyphagia and polyuria.   Hematologic/Lymphatic: Does not bruise/bleed easily.   Skin: Negative for color change and rash.   Musculoskeletal: Negative for joint pain, muscle weakness, myalgias and stiffness.   Gastrointestinal: Negative for abdominal pain, change in bowel habit, heartburn, hematemesis, hematochezia, melena, nausea and vomiting.   Genitourinary: Negative for bladder incontinence, dysuria, frequency, hematuria, hesitancy and urgency.   Neurological: Negative for dizziness, focal weakness, headaches, numbness, paresthesias, tremors and vertigo.   Psychiatric/Behavioral: Negative for depression. The patient does not have insomnia and is not nervous/anxious.          Objective:     Vital Signs (Most Recent):  Temp: 97.5 °F (36.4 °C) (07/28/18 1600)  Pulse: 90 (07/28/18 1800)  Resp: (!) 26 (07/28/18 1800)  BP: 133/89 (07/28/18 1800)  SpO2: 97 % (07/28/18 1800) Vital Signs (24h Range):  Temp:  [97.4 °F (36.3 °C)-98.2 °F (36.8 °C)] 97.5 °F (36.4 °C)  Pulse:  [72-99] 90  Resp:  [18-29] 26  SpO2:  [95 %-100 %] 97 %  BP: (101-133)/(56-89) 133/89     Weight: 78.9 kg (173 lb 15.1 oz)  Body mass index is 28.08 kg/m².     SpO2: 97 %  O2 Device (Oxygen Therapy): nasal cannula      Intake/Output Summary (Last 24 hours) at 07/28/18 1954  Last data filed at 07/28/18 1800   Gross per 24 hour   Intake          1699.08 ml   Output             2470 ml   Net          -770.92 ml       Lines/Drains/Airways     Central Venous Catheter Line                 Percutaneous Central Line Insertion/Assessment - triple lumen  07/28/18 1430 left internal jugular less than 1 day          Drain                 Urethral Catheter 07/27/18 1848 Latex 12 Fr. 1 day          Line                 IABP 07/27/18 1930 50 mL 1 day          Peripheral Intravenous Line                 Peripheral IV - Single Lumen 07/27/18 1550 Right Antecubital 1 day                Physical Exam   Constitutional: She is oriented to person, place, and time. She appears well-developed and well-nourished.   HENT:   Head: Normocephalic and atraumatic.   Mouth/Throat: No oropharyngeal exudate.   Eyes: EOM are normal. Pupils are equal, round, and reactive to light.   Neck: Normal range of motion. Neck supple. No JVD present.   Cardiovascular: Normal rate and regular rhythm.  Exam reveals no friction rub.    Murmur heard.   Harsh midsystolic murmur is present  at the upper right sternal border radiating to the neck Derick to doppler BL DP but not PT  Pulses:       Carotid pulses are 2+ on the right side, and 2+ on the left side.       Radial pulses are 2+ on the right side, and 2+ on the left side.        Femoral pulses are 2+ on the right  side, and 2+ on the left side.  Pulmonary/Chest: Effort normal and breath sounds normal. No respiratory distress.   Abdominal: Soft. Bowel sounds are normal. She exhibits no distension.   Musculoskeletal: Normal range of motion. She exhibits no edema.   Neurological: She is alert and oriented to person, place, and time.   Skin: No rash noted.          Significant Labs:   BMP:   Recent Labs  Lab 07/27/18  1550 07/28/18  0305 07/28/18  1125   * 216* 183*   * 137 137   K 4.8 4.3 4.0   CL 96 103 103   CO2 23 24 24   BUN 27* 26* 25*   CREATININE 1.3 1.2 1.1   CALCIUM 9.7 8.6* 8.5*   MG  --  1.7  --    , CMP   Recent Labs  Lab 07/27/18  1550 07/28/18  0305 07/28/18  1125   * 137 137   K 4.8 4.3 4.0   CL 96 103 103   CO2 23 24 24   * 216* 183*   BUN 27* 26* 25*   CREATININE 1.3 1.2 1.1   CALCIUM 9.7 8.6* 8.5*   PROT 7.2  --  5.7*   ALBUMIN 3.2*  --  2.7*   BILITOT 1.2*  --  1.3*   ALKPHOS 198*  --  118   AST 76*  --  92*   ALT 29  --  25   ANIONGAP 12 10 10   ESTGFRAFRICA 44.5* 49.0* 54.4*   EGFRNONAA 38.6* 42.5* 47.2*   , CBC   Recent Labs  Lab 07/28/18  0022 07/28/18  0305 07/28/18  1125   WBC 11.22 13.17* 10.76   HGB 9.9* 10.1* 9.9*   HCT 30.1* 30.8* 30.3*    216 206    and Troponin   Recent Labs  Lab 07/27/18  1550   TROPONINI 2.776*       Significant Imaging: Echocardiogram:   2D echo with color flow doppler:   Results for orders placed or performed during the hospital encounter of 07/09/18   2D echo with color flow doppler   Result Value Ref Range    EF 50 55 - 65    Mitral Valve Regurgitation MILD     Aortic Valve Stenosis SEVERE (A)     Est. PA Systolic Pressure 61.98 (A)     Mitral Valve Mobility NORMAL     Tricuspid Valve Regurgitation MILD      Assessment and Plan:       Shock    - Hemodynamics - High CO, CI on IABP and low SVR questionable sepsis, blood cultures, urine cultures, broad sepctrum  -CVP low, I think this is a mixed picture septic and cardiogenic component   - Leave  IABP in, cont antibiotics, CXR looks to have pulm edema           Coronary artery disease involving native coronary artery of native heart without angina pectoris    - Elevated troponin, cardiac cath with no real blockages, likely from severe AS and cardiogenic shock from pulmonary edema        Aortic valve stenosis, severe    - Severe SHEA 0.6, very high gradient, TAVR workup planned for future  - Likely cause of flash pulm edema and cardiogenic shock  - BAV planned for Monday if continues to be stable        Type 2 diabetes mellitus, without long-term current use of insulin    - On sliding scale  -Get HBA1C            VTE Risk Mitigation         Ordered     IP VTE HIGH RISK PATIENT  Once      07/27/18 2158     Place sequential compression device  Until discontinued      07/27/18 2158     heparin 25,000 units in dextrose 5% 250 mL (100 units/mL) infusion  Continuous      07/27/18 1739     heparin 25,000 units in dextrose 5% 250 mL (100 units/mL) bolus from bag; INITIAL BOLUS DOSE  Once      07/27/18 1739          Kellen Mtz MD  Cardiology  [facility]

## 2018-07-29 NOTE — PLAN OF CARE
Problem: Patient Care Overview  Goal: Plan of Care Review  Outcome: Ongoing (interventions implemented as appropriate)  See flowsheets for VS and assessments. See below for updates on progress made.    Neuro: AAOx4. Slept well between care.     Cardiovascular: NSR; HR 80s-100; CVPs ~4-6; IABP L Fem 1:1 MAPs low 60s. Augmentation in 110s. Levo titrated to keep MAP >65, now ~65-70    Pulmonary: 3 L NC     GI: No BM     :  Nobles catheter in place. Voided about 1600 cc clear yellow urine this shift     Endocrine:  no ss insulin needed    Integumentary: L TLC Insertion site CDI; R Fem IABP insertion site CDI. Pt reports soreness at site of previous L sided TLC    Infusions: Levo 0.12, KVO     Patient progressing toward goals as tolerated. POC communicated and reviewed with Natalie Parnell and family. All concerns addressed. VENKAT.

## 2018-07-29 NOTE — SUBJECTIVE & OBJECTIVE
Interval History: Feels well today. Denies dyspnea, chest pain.    Review of Systems   Constitution: Negative for decreased appetite, diaphoresis, fever, night sweats, weight gain and weight loss.   HENT: Negative for congestion, nosebleeds, sore throat and tinnitus.    Eyes: Negative for blurred vision, double vision, photophobia and visual disturbance.   Cardiovascular: Negative for chest pain, claudication, dyspnea on exertion, irregular heartbeat, leg swelling, orthopnea, palpitations, paroxysmal nocturnal dyspnea and syncope.   Respiratory: Positive for shortness of breath. Negative for cough, hemoptysis, snoring and wheezing.    Endocrine: Negative for cold intolerance, heat intolerance, polydipsia, polyphagia and polyuria.   Hematologic/Lymphatic: Does not bruise/bleed easily.   Skin: Negative for color change and rash.   Musculoskeletal: Negative for joint pain, muscle weakness, myalgias and stiffness.   Gastrointestinal: Negative for abdominal pain, change in bowel habit, heartburn, hematemesis, hematochezia, melena, nausea and vomiting.   Genitourinary: Negative for bladder incontinence, dysuria, frequency, hematuria, hesitancy and urgency.   Neurological: Negative for dizziness, focal weakness, headaches, numbness, paresthesias, tremors and vertigo.   Psychiatric/Behavioral: Negative for depression. The patient does not have insomnia and is not nervous/anxious.      Objective:     Vital Signs (Most Recent):  Temp: 98 °F (36.7 °C) (07/29/18 1500)  Pulse: 84 (07/29/18 1500)  Resp: (!) 30 (07/29/18 1500)  BP: 127/60 (07/29/18 1500)  SpO2: 98 % (07/29/18 1500) Vital Signs (24h Range):  Temp:  [97.5 °F (36.4 °C)-98.6 °F (37 °C)] 98 °F (36.7 °C)  Pulse:  [] 84  Resp:  [12-34] 30  SpO2:  [95 %-100 %] 98 %  BP: (122-134)/(58-96) 127/60     Weight: 78.9 kg (173 lb 15.1 oz)  Body mass index is 28.08 kg/m².     SpO2: 98 %  O2 Device (Oxygen Therapy): nasal cannula      Intake/Output Summary (Last 24 hours) at  07/29/18 1541  Last data filed at 07/29/18 1500   Gross per 24 hour   Intake          1580.04 ml   Output             2560 ml   Net          -979.96 ml       Lines/Drains/Airways     Central Venous Catheter Line                 Percutaneous Central Line Insertion/Assessment - triple lumen  07/28/18 1430 left internal jugular 1 day          Drain                 Urethral Catheter 07/27/18 1848 Latex 12 Fr. 1 day          Line                 IABP 07/27/18 1930 50 mL 1 day          Peripheral Intravenous Line                 Peripheral IV - Single Lumen 07/27/18 1550 Right Antecubital 1 day                Physical Exam   Constitutional: She is oriented to person, place, and time. She appears well-developed and well-nourished.   HENT:   Head: Normocephalic and atraumatic.   Mouth/Throat: No oropharyngeal exudate.   Eyes: EOM are normal. Pupils are equal, round, and reactive to light.   Neck: Normal range of motion. Neck supple. No JVD present.   Cardiovascular: Normal rate and regular rhythm.  Exam reveals no friction rub.    Murmur heard.   Harsh midsystolic murmur is present  at the upper right sternal border radiating to the neck  Pulses:       Carotid pulses are 2+ on the right side, and 2+ on the left side.       Radial pulses are 2+ on the right side, and 2+ on the left side.        Femoral pulses are 2+ on the right side, and 2+ on the left side.  Pulmonary/Chest: Effort normal and breath sounds normal. No respiratory distress.   Abdominal: Soft. Bowel sounds are normal. She exhibits no distension.   Musculoskeletal: Normal range of motion. She exhibits no edema.   Neurological: She is alert and oriented to person, place, and time.   Skin: No rash noted.       Significant Labs:   CMP   Recent Labs  Lab 07/27/18  1550 07/28/18  0305 07/28/18  1125 07/29/18  0400   * 137 137 134*   K 4.8 4.3 4.0 3.7   CL 96 103 103 100   CO2 23 24 24 26   * 216* 183* 189*   BUN 27* 26* 25* 23   CREATININE 1.3 1.2 1.1  1.2   CALCIUM 9.7 8.6* 8.5* 8.3*   PROT 7.2  --  5.7*  --    ALBUMIN 3.2*  --  2.7*  --    BILITOT 1.2*  --  1.3*  --    ALKPHOS 198*  --  118  --    AST 76*  --  92*  --    ALT 29  --  25  --    ANIONGAP 12 10 10 8   ESTGFRAFRICA 44.5* 49.0* 54.4* 49.0*   EGFRNONAA 38.6* 42.5* 47.2* 42.5*   , CBC   Recent Labs  Lab 07/28/18  1125 07/28/18  2107 07/29/18  0400   WBC 10.76 8.76 10.45   HGB 9.9* 9.9* 10.3*   HCT 30.3* 29.2* 30.4*    191 211    and Troponin   Recent Labs  Lab 07/27/18  1550   TROPONINI 2.776*       Significant Imaging: X-Ray: CXR: X-Ray Chest 1 View (CXR):   Results for orders placed or performed during the hospital encounter of 07/27/18   X-Ray Chest 1 View    Narrative    EXAMINATION:  XR CHEST 1 VIEW    CLINICAL HISTORY:  IABP placment;    TECHNIQUE:  Single frontal view of the chest was performed.    COMPARISON:  July 28, 2018.    FINDINGS:  Left central venous catheter IABP and monitoring leads remain.  Heart remains enlarged with increase in the pulmonary vascular interstitial and alveolar markings.  No pneumothorax.      Impression    Abnormal study similar.      Electronically signed by: Jamil Cottrell MD  Date:    07/29/2018  Time:    09:11

## 2018-07-29 NOTE — ASSESSMENT & PLAN NOTE
- Hemodynamics: CO 3.32L/min, CI 1.74 L/min/sq meter, SVR 1951 (on levophed)  --- On broad spectrum antibiotics: previously high CO / CI and low SVR with low CVP; CVP still low-normal but no culture data. Will stay broad until culture final results negative   - IABP for cardiogenic component

## 2018-07-29 NOTE — HOSPITAL COURSE
She was diuresed effectively with IV lasix gtt, seen by interventional cards team and tolerated BAV. She had her IABP removed shortly after. Her mixed venous sat have been 60%. She is actively getting a TAVR workup. BP low at 80/40, pt's baseline. Pt is orthostatic negative.

## 2018-07-29 NOTE — ASSESSMENT & PLAN NOTE
- Severe SHEA 0.6, very high gradient, TAVR workup planned for future  - Likely cause of flash pulm edema and cardiogenic shock  - BAV planned for Monday if continues to be stable

## 2018-07-29 NOTE — ASSESSMENT & PLAN NOTE
- Hemodynamics - High CO, CI on IABP and low SVR questionable sepsis, blood cultures, urine cultures, broad sepctrum  -CVP low, I think this is a mixed picture septic and cardiogenic component   - Leave IABP in, cont antibiotics, CXR looks to have pulm edema

## 2018-07-29 NOTE — SUBJECTIVE & OBJECTIVE
Interval History: Patient laying in bed no CP or SOB, Afebrile.    Review of Systems   Constitution: Negative for decreased appetite, diaphoresis, fever, night sweats, weight gain and weight loss.   HENT: Negative for congestion, nosebleeds, sore throat and tinnitus.    Eyes: Negative for blurred vision, double vision, photophobia and visual disturbance.   Cardiovascular: Negative for chest pain, claudication, dyspnea on exertion, irregular heartbeat, leg swelling, orthopnea, palpitations, paroxysmal nocturnal dyspnea and syncope.   Respiratory: Positive for shortness of breath. Negative for cough, hemoptysis, snoring and wheezing.    Endocrine: Negative for cold intolerance, heat intolerance, polydipsia, polyphagia and polyuria.   Hematologic/Lymphatic: Does not bruise/bleed easily.   Skin: Negative for color change and rash.   Musculoskeletal: Negative for joint pain, muscle weakness, myalgias and stiffness.   Gastrointestinal: Negative for abdominal pain, change in bowel habit, heartburn, hematemesis, hematochezia, melena, nausea and vomiting.   Genitourinary: Negative for bladder incontinence, dysuria, frequency, hematuria, hesitancy and urgency.   Neurological: Negative for dizziness, focal weakness, headaches, numbness, paresthesias, tremors and vertigo.   Psychiatric/Behavioral: Negative for depression. The patient does not have insomnia and is not nervous/anxious.         Objective:     Vital Signs (Most Recent):  Temp: 97.5 °F (36.4 °C) (07/28/18 1600)  Pulse: 90 (07/28/18 1800)  Resp: (!) 26 (07/28/18 1800)  BP: 133/89 (07/28/18 1800)  SpO2: 97 % (07/28/18 1800) Vital Signs (24h Range):  Temp:  [97.4 °F (36.3 °C)-98.2 °F (36.8 °C)] 97.5 °F (36.4 °C)  Pulse:  [72-99] 90  Resp:  [18-29] 26  SpO2:  [95 %-100 %] 97 %  BP: (101-133)/(56-89) 133/89     Weight: 78.9 kg (173 lb 15.1 oz)  Body mass index is 28.08 kg/m².     SpO2: 97 %  O2 Device (Oxygen Therapy): nasal cannula      Intake/Output Summary (Last 24  hours) at 07/28/18 1954  Last data filed at 07/28/18 1800   Gross per 24 hour   Intake          1699.08 ml   Output             2470 ml   Net          -770.92 ml       Lines/Drains/Airways     Central Venous Catheter Line                 Percutaneous Central Line Insertion/Assessment - triple lumen  07/28/18 1430 left internal jugular less than 1 day          Drain                 Urethral Catheter 07/27/18 1848 Latex 12 Fr. 1 day          Line                 IABP 07/27/18 1930 50 mL 1 day          Peripheral Intravenous Line                 Peripheral IV - Single Lumen 07/27/18 1550 Right Antecubital 1 day                Physical Exam   Constitutional: She is oriented to person, place, and time. She appears well-developed and well-nourished.   HENT:   Head: Normocephalic and atraumatic.   Mouth/Throat: No oropharyngeal exudate.   Eyes: EOM are normal. Pupils are equal, round, and reactive to light.   Neck: Normal range of motion. Neck supple. No JVD present.   Cardiovascular: Normal rate and regular rhythm.  Exam reveals no friction rub.    Murmur heard.   Harsh midsystolic murmur is present  at the upper right sternal border radiating to the neck Derick to doppler BL DP but not PT  Pulses:       Carotid pulses are 2+ on the right side, and 2+ on the left side.       Radial pulses are 2+ on the right side, and 2+ on the left side.        Femoral pulses are 2+ on the right side, and 2+ on the left side.  Pulmonary/Chest: Effort normal and breath sounds normal. No respiratory distress.   Abdominal: Soft. Bowel sounds are normal. She exhibits no distension.   Musculoskeletal: Normal range of motion. She exhibits no edema.   Neurological: She is alert and oriented to person, place, and time.   Skin: No rash noted.          Significant Labs:   BMP:   Recent Labs  Lab 07/27/18  1550 07/28/18  0305 07/28/18  1125   * 216* 183*   * 137 137   K 4.8 4.3 4.0   CL 96 103 103   CO2 23 24 24   BUN 27* 26* 25*    CREATININE 1.3 1.2 1.1   CALCIUM 9.7 8.6* 8.5*   MG  --  1.7  --    , CMP   Recent Labs  Lab 07/27/18  1550 07/28/18  0305 07/28/18  1125   * 137 137   K 4.8 4.3 4.0   CL 96 103 103   CO2 23 24 24   * 216* 183*   BUN 27* 26* 25*   CREATININE 1.3 1.2 1.1   CALCIUM 9.7 8.6* 8.5*   PROT 7.2  --  5.7*   ALBUMIN 3.2*  --  2.7*   BILITOT 1.2*  --  1.3*   ALKPHOS 198*  --  118   AST 76*  --  92*   ALT 29  --  25   ANIONGAP 12 10 10   ESTGFRAFRICA 44.5* 49.0* 54.4*   EGFRNONAA 38.6* 42.5* 47.2*   , CBC   Recent Labs  Lab 07/28/18  0022 07/28/18  0305 07/28/18  1125   WBC 11.22 13.17* 10.76   HGB 9.9* 10.1* 9.9*   HCT 30.1* 30.8* 30.3*    216 206    and Troponin   Recent Labs  Lab 07/27/18  1550   TROPONINI 2.776*       Significant Imaging: Echocardiogram:   2D echo with color flow doppler:   Results for orders placed or performed during the hospital encounter of 07/09/18   2D echo with color flow doppler   Result Value Ref Range    EF 50 55 - 65    Mitral Valve Regurgitation MILD     Aortic Valve Stenosis SEVERE (A)     Est. PA Systolic Pressure 61.98 (A)     Mitral Valve Mobility NORMAL     Tricuspid Valve Regurgitation MILD

## 2018-07-29 NOTE — PLAN OF CARE
Problem: Patient Care Overview  Goal: Plan of Care Review  Outcome: Ongoing (interventions implemented as appropriate)    No acute events throughout day. See vital signs and assessments in flowsheets. See below for updates on today's progress.     Pulmonary: 3L NC sats > 95%    Cardiovascular: IABP R fem art; 1:1, sbp 60-90, map 60s-80s, aug 100s-120s, HR 80s-90s    Neurological: AAOx4, afebrile throughout shift     Gastrointestinal: 1 medium, formed BM today; active bowel sounds x4 quadrants, tolerating cardiac diet      Genitourinary: Nobles cath remains, hourly UO ~ 30-75    Endocrine: 3 units insulin given before lunch and before dinner; sliding scale dosing utilized     Integumentary/Other: clean, dry, intact     Infusions: KVO, Vanc, Levo     Patient progressing towards goals as tolerated, plan of care communicated and reviewed with Natalie Parnell and family. All concerns addressed. Will continue to monitor.

## 2018-07-29 NOTE — ASSESSMENT & PLAN NOTE
- Elevated troponin, cardiac cath with no real blockages, likely from severe AS and cardiogenic shock from pulmonary edema

## 2018-07-29 NOTE — PROCEDURES
"Natalie Parnell is a 83 y.o. female patient.    Temp: 97.5 °F (36.4 °C) (07/28/18 1600)  Pulse: 90 (07/28/18 1800)  Resp: (!) 26 (07/28/18 1800)  BP: 133/89 (07/28/18 1800)  SpO2: 97 % (07/28/18 1800)  Weight: 78.9 kg (173 lb 15.1 oz) (07/27/18 2129)  Height: 5' 6" (167.6 cm) (07/27/18 2129)  Mallampati Scale: Class II  ASA Classification: Class 3    Central Line  Date/Time: 7/28/2018 7:42 PM  Location procedure was performed: [facility] ([facility])  Performed by: KELLEN PEDERSON  Pre-operative Diagnosis: CARDIOGENIC SHOCK, SEPSIS  Post-operative diagnosis: SAME  Consent Done: Yes  Time out: Immediately prior to procedure a "time out" was called to verify the correct patient, procedure, equipment, support staff and site/side marked as required.  Indications: med administration  Anesthesia: local infiltration    Anesthesia:  Local Anesthetic: lidocaine 1% without epinephrine  Preparation: skin prepped with ChloraPrep  Location details: left internal jugular  Catheter type: triple lumen  Catheter size: 8 Fr  Ultrasound guidance: yes  Vessel Caliber: medium, patent, compressibility normal  Needle advanced into vessel with real time Ultrasound guidance.  Guidewire confirmed in vessel.  Manometry: Yes  Number of attempts: 2  Assessment: placement verified by x-ray  Technical procedures used: SELDINGER  Complications: none  Specimens: No  Implants: No  Post-procedure: line sutured,  chlorhexidine patch,  blood return through all ports and sterile dressing applied  Complications: No          Kellen Mtz  7/28/2018  "

## 2018-07-29 NOTE — PROGRESS NOTES
Notified MD Anaya and MD Short of pts drop in ScVO2 from 78 to 59. No other significant changes noted to pt. No new orders at this time. Will continue to monitor.

## 2018-07-30 NOTE — SIGNIFICANT EVENT
POST CATH NOTE    Procedure:  BAV with 24mm Lev Baloon   Indication:  Severe AS with cardiogenic Shock  Access:  L CFA with 14F sheath        Intervention:  - BAV with 24mm Balloon. Mean gradient decreased from 66mmhg -->36mmhg post procedure  - R CFA IABP removed and hemostasis achieved via perclose device.  shut off as  post procedure    Patient tolerated the procedure well, no complications    Post Cath Exam:  Vitals:    07/30/18 1432   BP: (!) 104/54   Pulse: 89   Resp: 20   Temp:      No unusual pain, hematoma or thrill at vascular access site.  Distal pulse present without signs of ischemia.    Recommendation:  - Continue IV diuresis as LVEDP 20mmhg  - Would trend scvo2 and CVP to ensure pt is tolerating removal of IABP and    - I will order CTA TAVR protocol, CTS consult, Frailty, and PFT's to complete TAVR workup in house

## 2018-07-30 NOTE — PROGRESS NOTES
Pharmacist Intervention IV to PO Note    Natalie Parnell is a 83 y.o. female being treated with IV medication pantoprazole    Patient Data:    Vital Signs (Most Recent):  Temp: 98.4 °F (36.9 °C) (07/30/18 0743)  Pulse: 95 (07/30/18 1039)  Resp: 20 (07/30/18 1039)  BP: (!) 126/58 (07/30/18 1039)  SpO2: 100 % (07/30/18 1039)   Vital Signs (72h Range):  Temp:  [97.4 °F (36.3 °C)-98.6 °F (37 °C)]   Pulse:  []   Resp:  [0-37]   BP: ()/()   SpO2:  [86 %-100 %]      CBC:    Recent Labs     Lab 07/28/18  2107 07/29/18  0400 07/30/18  0325   WBC 8.76 10.45 5.50   RBC 3.13* 3.25* 2.77*   HGB 9.9* 10.3* 8.7*   HCT 29.2* 30.4* 26.2*    211 141*   MCV 93 94 95   MCH 31.6* 31.7* 31.4*   MCHC 33.9 33.9 33.2     CMP:     Recent Labs     Lab 07/27/18  1550  07/28/18  1125 07/29/18  0400 07/30/18  0325   * < > 183* 189* 220*   CALCIUM 9.7 < > 8.5* 8.3* 8.0*   ALBUMIN 3.2* --  2.7* --  --    PROT 7.2 --  5.7* --  --    * < > 137 134* 134*   K 4.8 < > 4.0 3.7 3.8   CO2 23 < > 24 26 25   CL 96 < > 103 100 101   BUN 27* < > 25* 23 18   CREATININE 1.3 < > 1.1 1.2 1.1   ALKPHOS 198* --  118 --  --    ALT 29 --  25 --  --    AST 76* --  92* --  --    BILITOT 1.2* --  1.3* --  --    < > = values in this interval not displayed.       Dietary Orders:  Diet Orders            Diet NPO Except for: Medication: NPO starting at 07/30 0001            Based on the following criteria, this patient qualifies for intravenous to oral conversion:  [x] The patients gastrointestinal tract is functioning (tolerating medications via oral or enteral route for 24 hours and tolerating food or enteral feeds for 24 hours).  [x] The patient is hemodynamically stable for 24 hours (heart rate <100 beats per minute, systolic blood pressure >99 mm Hg, and respiratory rate <20 breaths per minute).  [x] The patient shows clinical improvement (afebrile for at least 24 hours and white blood cell count downtrending or normalized).  Additionally, the patient must be non-neutropenic (absolute neutrophil count >500 cells/mm3).  [x] For antimicrobials, the patient has received IV therapy for at least 24 hours.    IV medication pantoprazole will be changed to oral medication pantoprazole.    Pharmacist's Name: Lizzeth Diamond  Pharmacist's Extension: [phone]

## 2018-07-30 NOTE — PLAN OF CARE
Problem: Patient Care Overview  Goal: Plan of Care Review  Outcome: Ongoing (interventions implemented as appropriate)  See flowsheets for VS and assessments. See below for updates on progress made.    Neuro: Slept between care.    Cardiovascular: NSR HR 80-low 100s. MAPs 60s-70s. CVPs 5-6    Pulmonary: 3 L NC     GI: No BM     : Voids clear yellow urine via [place]. ~ cc/hr    Endocrine: . 1 U ss insulin given.     Integumentary: IABP R Fem site CDI. L IJ TLC site CDI    Infusions: Levo currently at 0.002,  2.5   Mag 1.7. Currently being replaced with 1g in 50mL Mag sulfate    POC: Wean off Levo    Patient progressing toward goals as tolerated. POC communicated and reviewed with Natalie Soheila and family. All concerns addressed. WCJOYA.

## 2018-07-30 NOTE — SIGNIFICANT EVENT
A/P:  - BAV today as bridge to TAVR whiel cont workup  - L CFA access with 6F sheath    Discussed with Entire family who provided translation and pt.  The risks (including death, heart attack, limb loss, paralysis, emergency surgery, bleeding, renal failure, and stroke), the potential benefits of the procedure, and the alternatives to the procedure, were discussed in detail and accepted by the patient. All questions were answered. Patient agrees to proceed.

## 2018-07-30 NOTE — PROGRESS NOTES
Patient arrived back to room 6075 post cath lab procedure. Vs stable,  Bilateral groin sites with gauze and tegaderm, so signs of bleeding. See flowsheet for assessment.

## 2018-07-31 PROBLEM — I21.4 NSTEMI (NON-ST ELEVATED MYOCARDIAL INFARCTION): Status: ACTIVE | Noted: 2018-01-01

## 2018-07-31 PROBLEM — A41.9 SEPSIS: Status: ACTIVE | Noted: 2018-01-01

## 2018-07-31 NOTE — ASSESSMENT & PLAN NOTE
-previously required IABP for hemodynamic support in the setting of acute respiratory distress from flash pulmonary edema secondary to severe AV stenosis   - s/p successful balloon aortic valvuloplasty with 24 mm true balloon aortic mean gradient improved from 70 to 36 mmHg  -currently getting worked up for TAVR ([facility] negative for obstructive CAD)

## 2018-07-31 NOTE — HPI
84 Y/O female with PMH signficant for severe Aortic stenosis with SHEA 0.65cm2, mean gradients 75mmhg, peak velocity 5.3m/sec with recent admission for GIB (requiring 1 unit of PRBCs EGD with non bleeding angectasia S/P APC) and NSTEMI (trop peak of 7). She underwent LHC that showed non obstructive disease. Patient was discharged on 7/17 2 days ago patient reported chest pain radiating to back associated with SOB and orthopnea. Today her SOB worsened and came to the ER in the ER patient was SOB, diaphoretic, tachycardic, she was given 300 cc of NS and Nebs. She developed worsening tachypnea and placed on Bipap. Troponin was checked and found elevated 2.7 and BNP of 1700, cardiology was consulted. patient was tachycardic , diaphoretic in respiratory distress.   I performed limited bedside echo that revealed new drop in her EF now 25% with anterolateral wall.

## 2018-07-31 NOTE — PROGRESS NOTES
[facility]  Cardiology  Progress Note    Patient Name: Natalie Parnell  MRN: [id number]  Admission Date: 7/27/2018  Hospital Length of Stay: 4 days  Code Status: Full Code   Attending Physician: Siva Short MD   Primary Care Physician: Octavio Ferrell MD  Expected Discharge Date: 8/6/2018  Principal Problem:Severe aortic stenosis    Subjective:     Hospital Course:   She was diuresed effectively with IV lasix gtt, seen by interventional cards team and tolerated BAV. She had her IABP removed shortly after. Her mixed venous sat have been 60%. She is actively getting a TAVR workup. BP low at 80/40, pt's baseline. Pt is orthostatic negative.    Interval History: No acute events overnight.     Review of Systems   Constitution: Negative for chills and fever.   HENT: Negative for congestion.    Eyes: Negative for blurred vision and pain.   Cardiovascular: Negative for chest pain, dyspnea on exertion, irregular heartbeat, leg swelling, palpitations and paroxysmal nocturnal dyspnea.   Respiratory: Negative for shortness of breath.    Gastrointestinal: Negative for bloating, diarrhea, nausea and vomiting.   Neurological: Negative for dizziness, headaches and light-headedness.     Objective:     Vital Signs (Most Recent):  Temp: 98.4 °F (36.9 °C) (07/31/18 0315)  Pulse: 93 (07/31/18 1212)  Resp: (!) 22 (07/31/18 1212)  BP: 112/87 (07/31/18 1212)  SpO2: 97 % (07/31/18 1212) Vital Signs (24h Range):  Temp:  [98.2 °F (36.8 °C)-98.4 °F (36.9 °C)] 98.4 °F (36.9 °C)  Pulse:  [76-94] 93  Resp:  [12-38] 22  SpO2:  [94 %-100 %] 97 %  BP: ()/(43-87) 112/87     Weight: 78.9 kg (173 lb 15.1 oz)  Body mass index is 28.08 kg/m².     SpO2: 97 %  O2 Device (Oxygen Therapy): room air      Intake/Output Summary (Last 24 hours) at 07/31/18 1410  Last data filed at 07/31/18 0800   Gross per 24 hour   Intake            767.5 ml   Output              750 ml   Net             17.5 ml       Lines/Drains/Airways      Central Venous Catheter Line                 Percutaneous Central Line Insertion/Assessment - triple lumen  07/28/18 1430 left internal jugular 2 days          Peripheral Intravenous Line                 Peripheral IV - Single Lumen 07/31/18 0806 Left Forearm less than 1 day         Peripheral IV - Single Lumen 07/31/18 0807 Right Forearm less than 1 day                Physical Exam   Constitutional: She is oriented to person, place, and time.   HENT:   Mouth/Throat: Oropharynx is clear and moist.   Eyes: Conjunctivae are normal.   Neck: Neck supple.   Cardiovascular: Normal rate.    Murmur heard.  Pulmonary/Chest: Effort normal and breath sounds normal.   Abdominal: Soft. Bowel sounds are normal.   Musculoskeletal: She exhibits no edema.   Neurological: She is alert and oriented to person, place, and time.   Skin: Skin is warm and dry.   Vitals reviewed.      Significant Labs:  Reviewed    Significant Imaging: Reviewed    Assessment and Plan:     Brief HPI: 84 yo F with hx severe AS, DM2 and recent GIB who presents with SOB/orthopnea, found to have acute respiratory distress secondary to pulmonary edema from cardiogenic shock.     * Severe aortic stenosis    -previously required IABP for hemodynamic support in the setting of acute respiratory distress from flash pulmonary edema secondary to severe AV stenosis   - s/p successful balloon aortic valvuloplasty with 24 mm true balloon aortic mean gradient improved from 70 to 36 mmHg  -currently getting worked up for TAVR ([facility] negative for obstructive CAD)             Type 2 diabetes mellitus, without long-term current use of insulin    -HbA1C 7%, c/w basal/bolus insulin regimen, diabetic diet        Sepsis    -unclear etiology  -BCx/UCx pending, c/w broad spectrum antibiotics for now          VTE Risk Mitigation         Ordered     IP VTE HIGH RISK PATIENT  Once      07/27/18 2158     Place sequential compression device  Until discontinued      07/27/18 2158        Plan  was discussed with Dr. Deja Galvin MD  Cardiology Fellow, PGY-4   [facility]

## 2018-07-31 NOTE — RESIDENT HANDOFF
Handoff     Primary Team: Networked reference to record PCT  Room Number: 6075/6075 A     Patient Name: Natalie Parnell MRN: [id number]     Date of Birth: [id number] Allergies: Patient has no known allergies.     Age: 83 y.o. Admit Date: 7/27/2018     Sex: female  BMI: Body mass index is 28.08 kg/m².     Code Status: Full Code        Illness Level (current clinical status): Stable    Reason for Admission: Severe aortic stenosis    Brief HPI/Hospital Course: 83 year old female with PMHx signficant for severe Aortic stenosis with SHEA 0.65cm2, mean gradients 75mmhg, peak velocity 5.3m/sec with recent admission for GIB (requiring 1 unit of PRBCs EGD with non bleeding angectasia S/P APC) and NSTEMI (trop peak of 7). She underwent LHC that showed non obstructive disease. Patient was discharged on 7/17, 2 days ago patient reported chest pain radiating to back associated with SOB and orthopnea. On the day of presentation to the hospital, here SOB worsened and she came to the ER diaphoretic and tachycardic. She developed worsening tachypnea and placed on Bipap. Troponin was checked and found to be elevated at 2.7 and BNP of 1700. Cardiology was then consulted. Bedside echo revealed new drop in her EF now 25% within anterolateral wall. She was loaded with Plavix and ASA, taken to cath lab for LHC that revealed clean coronaries. Pt was placed on IABP for advanced mechanical support. She was diuresed effectively with IV lasix gtt, seen by interventional cards team and tolerated BAV. She had her IABP removed shortly after. Her mixed venous sat have been 60%. She is actively getting a TAVR workup. BP low at 80/40, pt's baseline. Pt is orthostatic negative. Sepsis workup being pursued, currently on broad spectrum antibiotics.     Procedure Date:   7/31: s/p BAV   7/27: placement of IABP    Tasks:  (1) Determine source of sepsis, need for Abx  (2) TAVR workup   (3) PT consult for need for post hospital discharge placement     Suzan  Kamar, PGY-4 (Cardiology Fellow)

## 2018-07-31 NOTE — SUBJECTIVE & OBJECTIVE
Interval History: Doing well today, more awake, tolerated BAV and removal of balloon pump and , needed some fluid later in the day post diureses.    Review of Systems   Constitution: Negative for decreased appetite, diaphoresis, fever, night sweats, weight gain and weight loss.   HENT: Negative for congestion, nosebleeds, sore throat and tinnitus.    Eyes: Negative for blurred vision, double vision, photophobia and visual disturbance.   Cardiovascular: Negative for chest pain, claudication, dyspnea on exertion, irregular heartbeat, leg swelling, orthopnea, palpitations, paroxysmal nocturnal dyspnea and syncope.   Respiratory: Positive for shortness of breath. Negative for cough, hemoptysis, snoring and wheezing.    Endocrine: Negative for cold intolerance, heat intolerance, polydipsia, polyphagia and polyuria.   Hematologic/Lymphatic: Does not bruise/bleed easily.   Skin: Negative for color change and rash.   Musculoskeletal: Negative for joint pain, muscle weakness, myalgias and stiffness.   Gastrointestinal: Negative for abdominal pain, change in bowel habit, heartburn, hematemesis, hematochezia, melena, nausea and vomiting.   Genitourinary: Negative for bladder incontinence, dysuria, frequency, hematuria, hesitancy and urgency.   Neurological: Negative for dizziness, focal weakness, headaches, numbness, paresthesias, tremors and vertigo.   Psychiatric/Behavioral: Negative for depression. The patient does not have insomnia and is not nervous/anxious.         Objective:     Vital Signs (Most Recent):  Temp: 97.7 °F (36.5 °C) (07/30/18 1402)  Pulse: 80 (07/30/18 1951)  Resp: (!) 22 (07/30/18 1951)  BP: (!) 98/55 (07/30/18 1951)  SpO2: 97 % (07/30/18 1951) Vital Signs (24h Range):  Temp:  [97.7 °F (36.5 °C)-98.5 °F (36.9 °C)] 97.7 °F (36.5 °C)  Pulse:  [] 80  Resp:  [14-26] 22  SpO2:  [97 %-100 %] 97 %  BP: ()/(44-72) 98/55     Weight: 78.9 kg (173 lb 15.1 oz)  Body mass index is 28.08 kg/m².     SpO2:  97 %  O2 Device (Oxygen Therapy): room air      Intake/Output Summary (Last 24 hours) at 07/30/18 2144  Last data filed at 07/30/18 1834   Gross per 24 hour   Intake           845.01 ml   Output             1195 ml   Net          -349.99 ml       Lines/Drains/Airways     Central Venous Catheter Line                 Percutaneous Central Line Insertion/Assessment - triple lumen  07/28/18 1430 left internal jugular 2 days          Peripheral Intravenous Line                 Peripheral IV - Single Lumen 07/27/18 1550 Right Antecubital 3 days                Physical Exam   Constitutional: She is oriented to person, place, and time. She appears well-developed and well-nourished.   HENT:   Head: Normocephalic and atraumatic.   Mouth/Throat: No oropharyngeal exudate.   Eyes: EOM are normal. Pupils are equal, round, and reactive to light.   Neck: Normal range of motion. Neck supple. No JVD present.   Cardiovascular: Normal rate and regular rhythm.  Exam reveals no friction rub.    Murmur heard.   Harsh midsystolic murmur is present  at the upper right sternal border radiating to the neck  Pulses:       Carotid pulses are 2+ on the right side, and 2+ on the left side.       Radial pulses are 2+ on the right side, and 2+ on the left side.        Femoral pulses are 2+ on the right side, and 2+ on the left side.  Pulmonary/Chest: Effort normal and breath sounds normal. No respiratory distress.   Abdominal: Soft. Bowel sounds are normal. She exhibits no distension.   Musculoskeletal: Normal range of motion. She exhibits no edema.   Neurological: She is alert and oriented to person, place, and time.   Skin: No rash noted.          Significant Labs:   BMP:   Recent Labs  Lab 07/29/18  0400 07/30/18  0325 07/30/18  1140   * 220*  --    * 134*  --    K 3.7 3.8  --     101  --    CO2 26 25  --    BUN 23 18  --    CREATININE 1.2 1.1  --    CALCIUM 8.3* 8.0*  --    MG 1.9 1.7 2.2   , CMP   Recent Labs  Lab  07/29/18  0400 07/30/18  0325   * 134*   K 3.7 3.8    101   CO2 26 25   * 220*   BUN 23 18   CREATININE 1.2 1.1   CALCIUM 8.3* 8.0*   ANIONGAP 8 8   ESTGFRAFRICA 49.0* 54.4*   EGFRNONAA 42.5* 47.2*   , CBC   Recent Labs  Lab 07/29/18  0400 07/30/18  0325 07/30/18  1712   WBC 10.45 5.50  --    HGB 10.3* 8.7* 8.2*   HCT 30.4* 26.2* 25.4*    141*  --     and Troponin No results for input(s): TROPONINI in the last 48 hours.    Significant Imaging: Echocardiogram:   2D echo with color flow doppler:   Results for orders placed or performed during the hospital encounter of 07/27/18   2D echo with color flow doppler   Result Value Ref Range    EF 20 (A) 55 - 65    Mitral Valve Regurgitation MODERATE (A)     Aortic Valve Stenosis MODERATE TO SEVERE (A)

## 2018-07-31 NOTE — CONSULTS
[facility]  Cardiothoracic Surgery  Consult Note    Patient Name: Natalie Parnell  MRN: [id number]  Admission Date: 7/27/2018  Attending Physician: Siva Short MD  Referring Provider: Self, Aaareferral    Patient information was obtained from past medical records.     Inpatient consult to Cardiothoracic Surgery  Consult performed by: EMILY LOGAN  Consult ordered by: ANGELA CAMPBELL  Reason for consult: Aortic stenosis         Subjective:     Principal Problem: Severe aortic stenosis    History of Present Illness: 84 Y/O female with PMH signficant for severe Aortic stenosis with SHEA 0.65cm2, mean gradients 75mmhg, peak velocity 5.3m/sec with recent admission for GIB (requiring 1 unit of PRBCs EGD with non bleeding angectasia S/P APC) and NSTEMI (trop peak of 7). She underwent LHC that showed non obstructive disease. Patient was discharged on 7/17 2 days ago patient reported chest pain radiating to back associated with SOB and orthopnea. Today her SOB worsened and came to the ER in the ER patient was SOB, diaphoretic, tachycardic, she was given 300 cc of NS and Nebs. She developed worsening tachypnea and placed on Bipap. Troponin was checked and found elevated 2.7 and BNP of 1700, cardiology was consulted. patient was tachycardic , diaphoretic in respiratory distress.   I performed limited bedside echo that revealed new drop in her EF now 25% with anterolateral wall.       No current facility-administered medications on file prior to encounter.      Current Outpatient Prescriptions on File Prior to Encounter   Medication Sig    albuterol 90 mcg/actuation inhaler Inhale 2 puffs into the lungs every 6 (six) hours as needed for Wheezing.    aspirin (ECOTRIN) 81 MG EC tablet Take 1 tablet (81 mg total) by mouth once daily.    atorvastatin (LIPITOR) 40 MG tablet Take 1 tablet (40 mg total) by mouth once daily.    azelastine (OPTIVAR) 0.05 % ophthalmic solution Place 1 drop into both  eyes 2 (two) times daily.    benzonatate (TESSALON) 100 MG capsule Take 1 capsule (100 mg total) by mouth 3 (three) times daily as needed for Cough.    budesonide-formoterol 160-4.5 mcg (SYMBICORT) 160-4.5 mcg/actuation HFAA Inhale 2 puffs into the lungs every 12 (twelve) hours. Controller    furosemide (LASIX) 20 MG tablet Take 1 tablet (20 mg total) by mouth once daily.    metFORMIN (GLUCOPHAGE) 500 MG tablet Take 1 tablet (500 mg total) by mouth 2 (two) times daily with meals.    metoprolol succinate (TOPROL-XL) 25 MG 24 hr tablet Take 1 tablet (25 mg total) by mouth once daily.    ammonium lactate 12 % Crea Apply twice daily to lower extremities    blood sugar diagnostic (ACCU-CHEK SMARTVIEW TEST STRIP) Strp Inject 1 strip into the skin once daily.    blood-glucose meter Misc 1 Units by Misc.(Non-Drug; Combo Route) route once daily.    lancets (ACCU-CHEK FASTCLIX) Misc Inject 1 lancet into the skin once daily.       Review of patient's allergies indicates:  No Known Allergies    Past Medical History:   Diagnosis Date    Asthma     Diabetes mellitus     HTN (hypertension)     Hyperlipidemia      Past Surgical History:   Procedure Laterality Date    CATARACT EXTRACTION Bilateral     Dr. Max    COLON SURGERY      ESOPHAGOGASTRODUODENOSCOPY N/A 7/10/2018    Procedure: EGD (ESOPHAGOGASTRODUODENOSCOPY);  Surgeon: Derian Stoner MD;  Location: [facility] ENDO;  Service: Endoscopy;  Laterality: N/A;    HERNIA REPAIR      PERCUTANEOUS TRANSLUMINAL BALLOON ANGIOPLASTY OF AORTA N/A 7/30/2018    Procedure: PTA, AORTA, USING BALLOON;  Surgeon: Scott Pruett MD;  Location: [facility] CATH LAB;  Service: Cardiology;  Laterality: N/A;    VARICOSE VEIN SURGERY       Family History     None        Social History Main Topics    Smoking status: Never Smoker    Smokeless tobacco: Never Used    Alcohol use No    Drug use: No    Sexual activity: Not on file     Review of Systems   Constitutional: Negative for  activity change and fatigue.   Respiratory: Positive for cough and shortness of breath.    Cardiovascular: Positive for chest pain. Negative for palpitations and leg swelling.   Gastrointestinal: Negative for abdominal pain, nausea and vomiting.   Endocrine: Negative for polydipsia, polyphagia and polyuria.   Genitourinary: Negative for dysuria.   Musculoskeletal: Negative for gait problem.   Skin: Negative for rash.   Allergic/Immunologic: Negative for immunocompromised state.   Neurological: Negative for dizziness, syncope and weakness.   Hematological: Does not bruise/bleed easily.   Psychiatric/Behavioral: Negative for behavioral problems.     Objective:     Vital Signs (Most Recent):  Temp: 98.4 °F (36.9 °C) (07/31/18 0315)  Pulse: 93 (07/31/18 1212)  Resp: (!) 22 (07/31/18 1212)  BP: 112/87 (07/31/18 1212)  SpO2: 97 % (07/31/18 1212) Vital Signs (24h Range):  Temp:  [98.2 °F (36.8 °C)-98.4 °F (36.9 °C)] 98.4 °F (36.9 °C)  Pulse:  [76-94] 93  Resp:  [12-38] 22  SpO2:  [94 %-100 %] 97 %  BP: ()/(43-87) 112/87     Weight: 78.9 kg (173 lb 15.1 oz)  Body mass index is 28.08 kg/m².    SpO2: 97 %  O2 Device (Oxygen Therapy): room air     Intake/Output - Last 3 Shifts       07/29 0700 - 07/30 0659 07/30 0700 - 07/31 0659 07/31 0700 - 08/01 0659    P.O. 660      I.V. (mL/kg) 833.8 (10.6) 89.6 (1.1)     IV Piggyback 450 750     Total Intake(mL/kg) 1943.8 (24.6) 839.6 (10.6)     Urine (mL/kg/hr) 1250 (0.7) 610 (0.3) 350 (0.6)    Total Output 1250 610 350    Net +693.8 +229.6 -350           Urine Occurrence  3 x     Stool Occurrence 1 x 3 x 1 x           Lines/Drains/Airways     Central Venous Catheter Line                 Percutaneous Central Line Insertion/Assessment - triple lumen  07/28/18 1430 left internal jugular 2 days          Peripheral Intravenous Line                 Peripheral IV - Single Lumen 07/31/18 0806 Left Forearm less than 1 day         Peripheral IV - Single Lumen 07/31/18 0807 Right Forearm  less than 1 day                 STS Risk Score: 7.3%  Any morbidity/Mortality 40.7%    Physical Exam   Constitutional: She is oriented to person, place, and time. She appears well-developed and well-nourished.   Cardiovascular: Normal rate and regular rhythm.    Murmur heard.  Pulmonary/Chest: Effort normal and breath sounds normal.   Abdominal: Soft.   Neurological: She is alert and oriented to person, place, and time.   Skin: Skin is warm, dry and intact.   Psychiatric: She has a normal mood and affect.       Significant Labs:  BMP:   Recent Labs  Lab 07/31/18  0302   *   *   K 3.8      CO2 26   BUN 16   CREATININE 1.0   CALCIUM 8.0*   MG 1.7     CBC:   Recent Labs  Lab 07/31/18  0300   WBC 5.81   RBC 2.63*   HGB 8.2*   HCT 25.1*      MCV 95   MCH 31.2*   MCHC 32.7     Significant Diagnostics:  [facility] Angiographic Results Diagnostic:      - Left Main Coronary Artery:             The LM has luminal irregularities. There is OSCAR 3 flow.     - Left Anterior Descending Artery:             The LAD has luminal irregularities. There is OSCAR 3 flow.     - Left Circumflex Artery:             The LCX has luminal irregularities. There is OSCAR 3 flow.     - Right Coronary Artery:             The RCA was not studied.    ECHO CONCLUSIONS     1 - Moderate left ventricular enlargement.     2 - Severely depressed left ventricular systolic function (EF 20-25%).     3 - Left atrial enlargement.     4 - Low normal to mildly depressed right ventricular systolic function .     5 - Moderate to severe aortic stenosis.     6 - Moderate mitral regurgitation.     7 - Increased central venous pressure.     8 - If clinically indicated, a full Doppler study can be performed.     Assessment/Plan:     NYHA Score: NYHA III: marked limitation of physical activity, comfortable at rest    * Severe aortic stenosis    Due to patient's history of liver disease, frailty and age. Patient is high risk for SAVR. Please continue with  TAVR work up. Dr. Frey to staff.          Thank you for your consult. I will sign off. Please contact us if you have any additional questions.    Jennifer Harrell NP  Cardiothoracic Surgery  [facility] Attending Note:    I have personally taken the history and agree with the NP's note as stated above. High risk for SAVR

## 2018-07-31 NOTE — PROGRESS NOTES
[facility]  Cardiology  Progress Note    Patient Name: Natalie Parnell  MRN: [id number]  Admission Date: 7/27/2018  Hospital Length of Stay: 3 days  Code Status: Full Code   Attending Physician: Siva Short MD   Primary Care Physician: Octavio Ferrell MD  Expected Discharge Date: 8/6/2018  Principal Problem:Severe aortic stenosis    Subjective:     Hospital Course:   7/28: Patient MAPs slowly improving, still on levophed. Blood cultures drawn, on IABP.  CVP lower end. Afebrile, Line changed due to blood oozing.    7/29: On less levophed, feels well, afebrile. Denies chest pain, dyspnea, cough, dysuria, or other new symptoms.    7/30: Doing well today, more awake, tolerated BAV and removal of balloon pump and , needed some fluid later in the day post diureses.    Interval History: Doing well today, more awake, tolerated BAV and removal of balloon pump and , needed some fluid later in the day post diureses.    Review of Systems   Constitution: Negative for decreased appetite, diaphoresis, fever, night sweats, weight gain and weight loss.   HENT: Negative for congestion, nosebleeds, sore throat and tinnitus.    Eyes: Negative for blurred vision, double vision, photophobia and visual disturbance.   Cardiovascular: Negative for chest pain, claudication, dyspnea on exertion, irregular heartbeat, leg swelling, orthopnea, palpitations, paroxysmal nocturnal dyspnea and syncope.   Respiratory: Positive for shortness of breath. Negative for cough, hemoptysis, snoring and wheezing.    Endocrine: Negative for cold intolerance, heat intolerance, polydipsia, polyphagia and polyuria.   Hematologic/Lymphatic: Does not bruise/bleed easily.   Skin: Negative for color change and rash.   Musculoskeletal: Negative for joint pain, muscle weakness, myalgias and stiffness.   Gastrointestinal: Negative for abdominal pain, change in bowel habit, heartburn, hematemesis, hematochezia, melena, nausea and  vomiting.   Genitourinary: Negative for bladder incontinence, dysuria, frequency, hematuria, hesitancy and urgency.   Neurological: Negative for dizziness, focal weakness, headaches, numbness, paresthesias, tremors and vertigo.   Psychiatric/Behavioral: Negative for depression. The patient does not have insomnia and is not nervous/anxious.         Objective:     Vital Signs (Most Recent):  Temp: 97.7 °F (36.5 °C) (07/30/18 1402)  Pulse: 80 (07/30/18 1951)  Resp: (!) 22 (07/30/18 1951)  BP: (!) 98/55 (07/30/18 1951)  SpO2: 97 % (07/30/18 1951) Vital Signs (24h Range):  Temp:  [97.7 °F (36.5 °C)-98.5 °F (36.9 °C)] 97.7 °F (36.5 °C)  Pulse:  [] 80  Resp:  [14-26] 22  SpO2:  [97 %-100 %] 97 %  BP: ()/(44-72) 98/55     Weight: 78.9 kg (173 lb 15.1 oz)  Body mass index is 28.08 kg/m².     SpO2: 97 %  O2 Device (Oxygen Therapy): room air      Intake/Output Summary (Last 24 hours) at 07/30/18 2144  Last data filed at 07/30/18 1834   Gross per 24 hour   Intake           845.01 ml   Output             1195 ml   Net          -349.99 ml       Lines/Drains/Airways     Central Venous Catheter Line                 Percutaneous Central Line Insertion/Assessment - triple lumen  07/28/18 1430 left internal jugular 2 days          Peripheral Intravenous Line                 Peripheral IV - Single Lumen 07/27/18 1550 Right Antecubital 3 days                Physical Exam   Constitutional: She is oriented to person, place, and time. She appears well-developed and well-nourished.   HENT:   Head: Normocephalic and atraumatic.   Mouth/Throat: No oropharyngeal exudate.   Eyes: EOM are normal. Pupils are equal, round, and reactive to light.   Neck: Normal range of motion. Neck supple. No JVD present.   Cardiovascular: Normal rate and regular rhythm.  Exam reveals no friction rub.    Murmur heard.   Harsh midsystolic murmur is present  at the upper right sternal border radiating to the neck  Pulses:       Carotid pulses are 2+ on  the right side, and 2+ on the left side.       Radial pulses are 2+ on the right side, and 2+ on the left side.        Femoral pulses are 2+ on the right side, and 2+ on the left side.  Pulmonary/Chest: Effort normal and breath sounds normal. No respiratory distress.   Abdominal: Soft. Bowel sounds are normal. She exhibits no distension.   Musculoskeletal: Normal range of motion. She exhibits no edema.   Neurological: She is alert and oriented to person, place, and time.   Skin: No rash noted.          Significant Labs:   BMP:   Recent Labs  Lab 07/29/18  0400 07/30/18  0325 07/30/18  1140   * 220*  --    * 134*  --    K 3.7 3.8  --     101  --    CO2 26 25  --    BUN 23 18  --    CREATININE 1.2 1.1  --    CALCIUM 8.3* 8.0*  --    MG 1.9 1.7 2.2   , CMP   Recent Labs  Lab 07/29/18  0400 07/30/18  0325   * 134*   K 3.7 3.8    101   CO2 26 25   * 220*   BUN 23 18   CREATININE 1.2 1.1   CALCIUM 8.3* 8.0*   ANIONGAP 8 8   ESTGFRAFRICA 49.0* 54.4*   EGFRNONAA 42.5* 47.2*   , CBC   Recent Labs  Lab 07/29/18  0400 07/30/18  0325 07/30/18  1712   WBC 10.45 5.50  --    HGB 10.3* 8.7* 8.2*   HCT 30.4* 26.2* 25.4*    141*  --     and Troponin No results for input(s): TROPONINI in the last 48 hours.    Significant Imaging: Echocardiogram:   2D echo with color flow doppler:   Results for orders placed or performed during the hospital encounter of 07/27/18   2D echo with color flow doppler   Result Value Ref Range    EF 20 (A) 55 - 65    Mitral Valve Regurgitation MODERATE (A)     Aortic Valve Stenosis MODERATE TO SEVERE (A)      Assessment and Plan:       * Severe aortic stenosis    - s/p BAV gradient reduced,  Successful balloon aortic valvuloplasty with 24 mm true balloon.    Mean gradient improved from 70 to 36 mmHg.    RCFA IABP removal using perclose device.          Shock    - Mixed cardiogenic and septic picture  - Compelete broad spectrum antibiotics  - CO improved post BAV,  SVO2 improving        Coronary artery disease involving native coronary artery of native heart without angina pectoris    - Elevated troponin, cardiac cath with no real blockages, likely from severe AS and cardiogenic shock from pulmonary edema        Type 2 diabetes mellitus, without long-term current use of insulin    - On sliding scale  -Get HBA1C            VTE Risk Mitigation         Ordered     IP VTE HIGH RISK PATIENT  Once      07/27/18 2158     Place sequential compression device  Until discontinued      07/27/18 2158          Kellen Mtz MD  Cardiology  [facility]

## 2018-07-31 NOTE — ASSESSMENT & PLAN NOTE
- s/p BAV gradient reduced,  Successful balloon aortic valvuloplasty with 24 mm true balloon.    Mean gradient improved from 70 to 36 mmHg.    RCFA IABP removal using perclose device.

## 2018-07-31 NOTE — PLAN OF CARE
Problem: Patient Care Overview  Goal: Plan of Care Review  Outcome: Ongoing (interventions implemented as appropriate)  Pt has had NAD this evening. Has been up to the BRx2 without any s/s of exertion. Pt tolerated well. Pt BP has been in the 80's-100's systolic and 40's-60's diastolic. Pt denies cp, SOB, pain at all.  cvp reading 8-10. teds and scd's on. No drainage noted at op sites. Daughter voiced she can tell the t is feeling better because she is acting herself again.

## 2018-07-31 NOTE — ASSESSMENT & PLAN NOTE
- Mixed cardiogenic and septic picture  - Compelete broad spectrum antibiotics  - CO improved post BAV, SVO2 improving

## 2018-07-31 NOTE — ASSESSMENT & PLAN NOTE
Due to patient's history of liver disease, frailty and age patient is high risk for SAVR. Please continue with TAVR work up. Dr. Frey to staff.

## 2018-07-31 NOTE — SUBJECTIVE & OBJECTIVE
Interval History: No acute events overnight.     Review of Systems   Constitution: Negative for chills and fever.   HENT: Negative for congestion.    Eyes: Negative for blurred vision and pain.   Cardiovascular: Negative for chest pain, dyspnea on exertion, irregular heartbeat, leg swelling, palpitations and paroxysmal nocturnal dyspnea.   Respiratory: Negative for shortness of breath.    Gastrointestinal: Negative for bloating, diarrhea, nausea and vomiting.   Neurological: Negative for dizziness, headaches and light-headedness.     Objective:     Vital Signs (Most Recent):  Temp: 98.4 °F (36.9 °C) (07/31/18 0315)  Pulse: 93 (07/31/18 1212)  Resp: (!) 22 (07/31/18 1212)  BP: 112/87 (07/31/18 1212)  SpO2: 97 % (07/31/18 1212) Vital Signs (24h Range):  Temp:  [98.2 °F (36.8 °C)-98.4 °F (36.9 °C)] 98.4 °F (36.9 °C)  Pulse:  [76-94] 93  Resp:  [12-38] 22  SpO2:  [94 %-100 %] 97 %  BP: ()/(43-87) 112/87     Weight: 78.9 kg (173 lb 15.1 oz)  Body mass index is 28.08 kg/m².     SpO2: 97 %  O2 Device (Oxygen Therapy): room air      Intake/Output Summary (Last 24 hours) at 07/31/18 1410  Last data filed at 07/31/18 0800   Gross per 24 hour   Intake            767.5 ml   Output              750 ml   Net             17.5 ml       Lines/Drains/Airways     Central Venous Catheter Line                 Percutaneous Central Line Insertion/Assessment - triple lumen  07/28/18 1430 left internal jugular 2 days          Peripheral Intravenous Line                 Peripheral IV - Single Lumen 07/31/18 0806 Left Forearm less than 1 day         Peripheral IV - Single Lumen 07/31/18 0807 Right Forearm less than 1 day                Physical Exam   Constitutional: She is oriented to person, place, and time.   HENT:   Mouth/Throat: Oropharynx is clear and moist.   Eyes: Conjunctivae are normal.   Neck: Neck supple.   Cardiovascular: Normal rate.    Murmur heard.  Pulmonary/Chest: Effort normal and breath sounds normal.   Abdominal:  Soft. Bowel sounds are normal.   Musculoskeletal: She exhibits no edema.   Neurological: She is alert and oriented to person, place, and time.   Skin: Skin is warm and dry.   Vitals reviewed.      Significant Labs:  Reviewed    Significant Imaging: Reviewed

## 2018-08-01 NOTE — PLAN OF CARE
08/01/18 0946   Discharge Assessment   Assessment Type Discharge Planning Assessment   Confirmed/corrected address and phone number on facesheet? Yes   Assessment information obtained from? Caregiver;Medical Record   Expected Length of Stay (days) 6   Communicated expected length of stay with patient/caregiver yes   Prior to hospitilization cognitive status: Alert/Oriented   Prior to hospitalization functional status: Independent;Assistive Equipment   Current cognitive status: Alert/Oriented   Current Functional Status: Assistive Equipment;Needs Assistance   Lives With child(lana), adult   Able to Return to Prior Arrangements no   Is patient able to care for self after discharge? No   Patient's perception of discharge disposition home or selfcare;home health   Readmission Within The Last 30 Days previous discharge plan unsuccessful   Patient currently being followed by outpatient case management? No   Patient currently receives any other outside agency services? Yes   Equipment Currently Used at Home rollator   Do you have any problems affording any of your prescribed medications? No   Is the patient taking medications as prescribed? yes   Does the patient have transportation home? Yes   Transportation Available family or friend will provide   Does the patient receive services at the [facility]? No   Discharge Plan A Home with family;Home Health   Discharge Plan B Skilled Nursing Facility   Readmission Questionnaire   At the time of your discharge, did someone talk to you about what your health problems were? Yes   At the time of discharge, did someone talk to you about what to watch out for regarding worsening of your health problem? Yes   At the time of discharge, did someone talk to you about what to do if you experienced worsening of your health problem? Yes   At the time of discharge, did someone talk to you about which medication to take when you left the hospital and which ones to stop taking? Yes   At  the time of discharge, did someone talk to you about when and where to follow up with a doctor after you left the hospital? Yes   How often do you need to have someone help you when you read instructions, pamphlets, or other written material from your doctor or pharmacy? Sometimes   Do you have problems taking your medications as prescribed? No   Do you have any problems affording any of  your prescribed medications? No   Do you have problems obtaining/receiving your medications? No   Does the patient have transportation to healthcare appointments? Yes   Living Arrangements house   Does the patient have family/friends to help with healtcare needs after discharge? yes   Does your caregiver provide all the help you need? Yes   Are you currently feeling confused? No   Are you currently having problems thinking? No   Are you currently having memory problems? No   Admitted with AS. Post BAV. Previously lived alone and was independent in her ADLs. Most recently has lived with her daughter. Plan is to DC home with daughter.    [facility] with [facility]    Will DC home with daughter, SUGAR  [address]  [id number]

## 2018-08-01 NOTE — SUBJECTIVE & OBJECTIVE
Review of Systems   Constitutional: Negative for appetite change, chills and fever.   HENT: Negative for trouble swallowing.    Gastrointestinal: Negative for abdominal distention, abdominal pain, diarrhea, nausea and vomiting.   Genitourinary: Negative for dysuria.   Musculoskeletal: Negative for arthralgias and myalgias.   Neurological: Negative for dizziness and light-headedness.   Psychiatric/Behavioral: Negative for confusion.       Past Medical History:   Diagnosis Date    Asthma     Diabetes mellitus     HTN (hypertension)     Hyperlipidemia        Past Surgical History:   Procedure Laterality Date    CATARACT EXTRACTION Bilateral     Dr. Max    COLON SURGERY      ESOPHAGOGASTRODUODENOSCOPY N/A 7/10/2018    Procedure: EGD (ESOPHAGOGASTRODUODENOSCOPY);  Surgeon: Derian Stoner MD;  Location: [facility] ENDO;  Service: Endoscopy;  Laterality: N/A;    HERNIA REPAIR      PERCUTANEOUS TRANSLUMINAL BALLOON ANGIOPLASTY OF AORTA N/A 7/30/2018    Procedure: PTA, AORTA, USING BALLOON;  Surgeon: Scott Pruett MD;  Location: [facility] CATH LAB;  Service: Cardiology;  Laterality: N/A;    VARICOSE VEIN SURGERY         Family history of liver disease: No. Both parents lived to >90 years of age. Two siblings with cardiac disease.    Review of patient's allergies indicates:  No Known Allergies    Social History Main Topics    Smoking status: Never Smoker    Smokeless tobacco: Never Used    Alcohol use No    Drug use: No    Sexual activity: Not on file       Prescriptions Prior to Admission   Medication Sig Dispense Refill Last Dose    albuterol 90 mcg/actuation inhaler Inhale 2 puffs into the lungs every 6 (six) hours as needed for Wheezing. 1 each 11 7/27/2018    aspirin (ECOTRIN) 81 MG EC tablet Take 1 tablet (81 mg total) by mouth once daily. 90 tablet 3 7/27/2018    atorvastatin (LIPITOR) 40 MG tablet Take 1 tablet (40 mg total) by mouth once daily. 90 tablet 1 7/27/2018    azelastine (OPTIVAR) 0.05 %  ophthalmic solution Place 1 drop into both eyes 2 (two) times daily. 6 mL 6 7/27/2018    benzonatate (TESSALON) 100 MG capsule Take 1 capsule (100 mg total) by mouth 3 (three) times daily as needed for Cough. 30 capsule 0 7/27/2018    budesonide-formoterol 160-4.5 mcg (SYMBICORT) 160-4.5 mcg/actuation HFAA Inhale 2 puffs into the lungs every 12 (twelve) hours. Controller 10.2 Inhaler 0 7/27/2018    furosemide (LASIX) 20 MG tablet Take 1 tablet (20 mg total) by mouth once daily. 30 tablet 11 7/27/2018    metFORMIN (GLUCOPHAGE) 500 MG tablet Take 1 tablet (500 mg total) by mouth 2 (two) times daily with meals. 180 tablet 0 7/27/2018    metoprolol succinate (TOPROL-XL) 25 MG 24 hr tablet Take 1 tablet (25 mg total) by mouth once daily. 30 tablet 11 7/27/2018    multivitamin capsule Take 1 capsule by mouth once daily.   7/27/2018    ammonium lactate 12 % Crea Apply twice daily to lower extremities 140 g 5 Taking    blood sugar diagnostic (ACCU-CHEK SMARTVIEW TEST STRIP) Strp Inject 1 strip into the skin once daily. 100 strip 6 Taking    blood-glucose meter Misc 1 Units by Misc.(Non-Drug; Combo Route) route once daily. 1 each 0 Taking    lancets (ACCU-CHEK FASTCLIX) Misc Inject 1 lancet into the skin once daily. 100 each 6 Taking       Objective:     Vital Signs (Most Recent):  Temp: 98.7 °F (37.1 °C) (08/01/18 1133)  Pulse: 98 (08/01/18 1200)  Resp: 17 (08/01/18 1133)  BP: (!) 105/55 (08/01/18 1133)  SpO2: (!) 92 % (08/01/18 1133) Vital Signs (24h Range):  Temp:  [98 °F (36.7 °C)-99.1 °F (37.3 °C)] 98.7 °F (37.1 °C)  Pulse:  [] 98  Resp:  [16-23] 17  SpO2:  [91 %-97 %] 92 %  BP: ()/(42-78) 105/55     Weight: 80 kg (176 lb 6.4 oz) (08/01/18 0735)  Body mass index is 28.47 kg/m².    Physical Exam   Constitutional: She is oriented to person, place, and time. She appears well-developed and well-nourished. No distress.   Sitting on couch with family. Wearing a knitted beanie with country [place] on it.  Appears comfortable and in no distress.   HENT:   Head: Normocephalic and atraumatic.   Eyes: No scleral icterus.   Cardiovascular: Normal rate and regular rhythm.    Murmur heard.  Pulmonary/Chest: Effort normal and breath sounds normal. No respiratory distress.   Abdominal: Soft. Bowel sounds are normal. She exhibits no distension. There is no tenderness. There is no rebound and no guarding.   Lymphadenopathy:     She has no cervical adenopathy.   Neurological: She is alert and oriented to person, place, and time.   Skin: Skin is warm.   Psychiatric: She has a normal mood and affect. Her behavior is normal. Judgment and thought content normal.   Nursing note and vitals reviewed.      MELD-Na score: 8 at 7/31/2018  3:02 AM  MELD score: 8 at 7/31/2018  3:02 AM  Calculated from:  Serum Creatinine: 1 mg/dL at 7/31/2018  3:02 AM  Serum Sodium: 134 mmol/L at 7/31/2018  3:02 AM  Total Bilirubin: 0.7 mg/dL (Rounded to 1) at 7/31/2018  3:02 AM  INR(ratio): 1.2 at 7/30/2018  3:25 AM  Age: 83 years    Significant Labs:  CBC:   Recent Labs  Lab 08/01/18  0500   WBC 5.63   RBC 2.65*   HGB 8.4*   HCT 25.3*        CMP:   Recent Labs  Lab 08/01/18  0500   *   CALCIUM 8.2*   ALBUMIN 2.5*   PROT 5.4*   *   K 3.9   CO2 26      BUN 16   CREATININE 1.1   ALKPHOS 130   ALT 18   AST 51*   BILITOT 1.0     Coagulation:   Recent Labs  Lab 07/30/18  0325   INR 1.2       Significant Imaging:  Labs: Reviewed

## 2018-08-01 NOTE — PLAN OF CARE
Problem: Patient Care Overview  Goal: Plan of Care Review  Outcome: Revised  Plan of care discussed with patient. Patient is free of fall/trauma/injury. Denies CP, SOB, or pain/discomfort. LIJ TLC DC'd.  IV abx DC'd.  Abd US completed this shift, and pt awaiting CT of abdomen.  All questions addressed. Will continue to monitor

## 2018-08-01 NOTE — PROGRESS NOTES
LIJ removed per MD order.  Patient tolerated well.  No signs of bleeding or hematoma noted at time of removal.  Patient instructed to lay flat for 30 minutes.  Patient and family verbalized understanding.  Will continue to monitor.

## 2018-08-01 NOTE — CONSULTS
[facility]  Hepatology  Consult Note    Patient Name: Natalie Parnell  MRN: [id number]  Admission Date: 7/27/2018  Hospital Length of Stay: 5 days  Attending Provider: Neida Davidson MD   Primary Care Physician: Octavio Ferrell MD  Principal Problem:Severe aortic stenosis    Inpatient consult to Hepatology  Consult performed by: KATHY FLOOD  Consult ordered by: ANGELA CAMPBELL        Subjective:     Transplant status: No    HPI:  Ms Parnell is an 83 year old woman with history of severe aortic stenosis, HFrEF, HTN, DM, asthma, who is presenting to the hospital on 7/27 with dyspnea and chest pain due to NSTEMI and aortic stenosis who is being worked up for a TAVR; hepatology is consulted due to concern for HCC on imaging.    Ms Parnell was recently evaluated in the hospital (3/1 - 3/2/18) due to chest pain. During hospitalization she had a CT which was concerning for cirrhotic changes of the liver with gastro-esophageal varices (3/1/18). She was subsequently admitted (7/9 - 7/15/18 at [facility]) with melena. She underwent an EGD which was remarkable for angiodysplasia of the stomach s/p APC, grade 1 varices (7/10/18). She was seen by GI as outpatient (Dr. West, 3/21/18) and was recommended for referral to hepatology (pending appointment with Dr. Ashraf 8/8).    She initially required ICU hospitalization due to IABP support, which has since been discontinued and she was transferred to the floor while being evaluated for TAVR. She was interviewed in the attendance of her family. She notes that currently she is feeling well and denies any complaints. Denies any abdominal pain, n/v/d. Denies any other complaints on review of systems.        EGD (7/10/18) grade 1 varices, angiodysplasia of stomach s/p APC    US Abdomen (3/1/18) nodular liver, no discretemass, no ascites  CTA (7/31/18) cirrhotic liver. Hypodense 2.5cm lesion in hepatic segment V. Post-contrast focal hypoenhancing 1cm lesion within the  2.5cm area.  US (8/1/18) 3x1.4x2.5cm lesion    Shx:  - rare, social ETOH. Never daily drinker.  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   HENT: Negative for trouble swallowing.    Gastrointestinal: Negative for abdominal distention, abdominal pain, diarrhea, nausea and vomiting.   Genitourinary: Negative for dysuria.   Musculoskeletal: Negative for arthralgias and myalgias.   Neurological: Negative for dizziness and light-headedness.   Psychiatric/Behavioral: Negative for confusion.       Past Medical History:   Diagnosis Date    Asthma     Diabetes mellitus     HTN (hypertension)     Hyperlipidemia        Past Surgical History:   Procedure Laterality Date    CATARACT EXTRACTION Bilateral     Dr. Max    COLON SURGERY      ESOPHAGOGASTRODUODENOSCOPY N/A 7/10/2018    Procedure: EGD (ESOPHAGOGASTRODUODENOSCOPY);  Surgeon: Derian Stoner MD;  Location: [facility] ENDO;  Service: Endoscopy;  Laterality: N/A;    HERNIA REPAIR      PERCUTANEOUS TRANSLUMINAL BALLOON ANGIOPLASTY OF AORTA N/A 7/30/2018    Procedure: PTA, AORTA, USING BALLOON;  Surgeon: Scott Pruett MD;  Location: [facility] CATH LAB;  Service: Cardiology;  Laterality: N/A;    VARICOSE VEIN SURGERY         Family history of liver disease: No. Both parents lived to >90 years of age. Two siblings with cardiac disease.    Review of patient's allergies indicates:  No Known Allergies    Social History Main Topics    Smoking status: Never Smoker    Smokeless tobacco: Never Used    Alcohol use No    Drug use: No    Sexual activity: Not on file       Prescriptions Prior to Admission   Medication Sig Dispense Refill Last Dose    albuterol 90 mcg/actuation inhaler Inhale 2 puffs into the lungs every 6 (six)  hours as needed for Wheezing. 1 each 11 7/27/2018    aspirin (ECOTRIN) 81 MG EC tablet Take 1 tablet (81 mg total) by mouth once daily. 90 tablet 3 7/27/2018    atorvastatin (LIPITOR) 40 MG tablet Take 1 tablet (40 mg total) by mouth once daily. 90 tablet 1 7/27/2018    azelastine (OPTIVAR) 0.05 % ophthalmic solution Place 1 drop into both eyes 2 (two) times daily. 6 mL 6 7/27/2018    benzonatate (TESSALON) 100 MG capsule Take 1 capsule (100 mg total) by mouth 3 (three) times daily as needed for Cough. 30 capsule 0 7/27/2018    budesonide-formoterol 160-4.5 mcg (SYMBICORT) 160-4.5 mcg/actuation HFAA Inhale 2 puffs into the lungs every 12 (twelve) hours. Controller 10.2 Inhaler 0 7/27/2018    furosemide (LASIX) 20 MG tablet Take 1 tablet (20 mg total) by mouth once daily. 30 tablet 11 7/27/2018    metFORMIN (GLUCOPHAGE) 500 MG tablet Take 1 tablet (500 mg total) by mouth 2 (two) times daily with meals. 180 tablet 0 7/27/2018    metoprolol succinate (TOPROL-XL) 25 MG 24 hr tablet Take 1 tablet (25 mg total) by mouth once daily. 30 tablet 11 7/27/2018    multivitamin capsule Take 1 capsule by mouth once daily.   7/27/2018    ammonium lactate 12 % Crea Apply twice daily to lower extremities 140 g 5 Taking    blood sugar diagnostic (ACCU-CHEK SMARTVIEW TEST STRIP) Strp Inject 1 strip into the skin once daily. 100 strip 6 Taking    blood-glucose meter Misc 1 Units by Misc.(Non-Drug; Combo Route) route once daily. 1 each 0 Taking    lancets (ACCU-CHEK FASTCLIX) Misc Inject 1 lancet into the skin once daily. 100 each 6 Taking       Objective:     Vital Signs (Most Recent):  Temp: 98.7 °F (37.1 °C) (08/01/18 1133)  Pulse: 98 (08/01/18 1200)  Resp: 17 (08/01/18 1133)  BP: (!) 105/55 (08/01/18 1133)  SpO2: (!) 92 % (08/01/18 1133) Vital Signs (24h Range):  Temp:  [98 °F (36.7 °C)-99.1 °F (37.3 °C)] 98.7 °F (37.1 °C)  Pulse:  [] 98  Resp:  [16-23] 17  SpO2:  [91 %-97 %] 92 %  BP: ()/(42-78) 105/55      Weight: 80 kg (176 lb 6.4 oz) (08/01/18 0735)  Body mass index is 28.47 kg/m².    Physical Exam   Constitutional: She is oriented to person, place, and time. She appears well-developed and well-nourished. No distress.   Sitting on couch with family. Wearing a knitted beanie with country [place] on it. Appears comfortable and in no distress.   HENT:   Head: Normocephalic and atraumatic.   Eyes: No scleral icterus.   Cardiovascular: Normal rate and regular rhythm.    Murmur heard.  Pulmonary/Chest: Effort normal and breath sounds normal. No respiratory distress.   Abdominal: Soft. Bowel sounds are normal. She exhibits no distension. There is no tenderness. There is no rebound and no guarding.   Lymphadenopathy:     She has no cervical adenopathy.   Neurological: She is alert and oriented to person, place, and time.   Skin: Skin is warm.   Psychiatric: She has a normal mood and affect. Her behavior is normal. Judgment and thought content normal.   Nursing note and vitals reviewed.      MELD-Na score: 8 at 7/31/2018  3:02 AM  MELD score: 8 at 7/31/2018  3:02 AM  Calculated from:  Serum Creatinine: 1 mg/dL at 7/31/2018  3:02 AM  Serum Sodium: 134 mmol/L at 7/31/2018  3:02 AM  Total Bilirubin: 0.7 mg/dL (Rounded to 1) at 7/31/2018  3:02 AM  INR(ratio): 1.2 at 7/30/2018  3:25 AM  Age: 83 years    Significant Labs:  CBC:   Recent Labs  Lab 08/01/18  0500   WBC 5.63   RBC 2.65*   HGB 8.4*   HCT 25.3*        CMP:   Recent Labs  Lab 08/01/18  0500   *   CALCIUM 8.2*   ALBUMIN 2.5*   PROT 5.4*   *   K 3.9   CO2 26      BUN 16   CREATININE 1.1   ALKPHOS 130   ALT 18   AST 51*   BILITOT 1.0     Coagulation:   Recent Labs  Lab 07/30/18  0325   INR 1.2       Significant Imaging:  Labs: Reviewed    Assessment/Plan:     Cirrhosis of liver without ascites    83F Hx of HTN, DM, severe AS, overweight (BMI 28) ESLD with grade 1 varices; hepatology is consulted for evaluation of hepatic lesion in patient  being evaluated for TAVR. Her cirrhosis was first identified incidentally on imaging during evaluation for chest pain (3/2018) and is pending an outpatient hepatology referral. Etiology is likely HERNANDEZ given risk factors (overweight, DM), negative hep panel (3/2018, hep B non-immune), and negative ETOH history.    Recent imaging was unremarkable for hepatic lesions in 3/2018, but on recent imaging (7/31/18) was remarkable for new segment V 2.5cm lesion which in patient with concern for ESLD is concerning for HCC.     Plan  - Given the patient's normal LFT, INR, MELD 9, albumin 2.5; her hepatic disease would not contraindicate her from a surgical procedure (TAVR).  - Hepatic lesion concerning for HCC can be evaluated as an outpatient regarding therapeutic options. Please obtain MRI or CT 3phase while hospitalized for further evaluation. Please check AFP.  - Concern for cirrhosis on imaging likely secondary to HERNANDEZ. We will continue her workup as an outpatient with modalities such as fibroscan. Please send additional workup: SUGAR, IGG, ASMA, transferrin saturation  - if she is discharged before 8/8 she can follow-up in her previously scheduled appointment with Dr. Ashraf. If she is discharged later than this, please inform hepatology so we can reschedule her appointment.            Thank you for your consult. I will follow-up with patient. Please contact us if you have any additional questions.    Jamil Escalante MD  Hepatology  [facility]

## 2018-08-01 NOTE — NURSING TRANSFER
Nursing Transfer Note      7/31/2018     Transfer To:     Transfer via wheelchair    Transfer with cardiac monitoring    Transported by RN, PCT x 2    Medicines sent: No    Chart send with patient: Yes    Notified: daughter, son, friend    Patient reassessed at: 07/31/2018  7:06 PM    Upon arrival to floor: cardiac monitor applied, patient oriented to room, call bell in reach and bed in lowest position

## 2018-08-01 NOTE — HPI
Ms Parnell is an 83 year old woman with history of severe aortic stenosis, HFrEF, HTN, DM, asthma, who is presenting to the hospital on 7/27 with dyspnea and chest pain due to NSTEMI and aortic stenosis who is being worked up for a TAVR; hepatology is consulted due to concern for HCC on imaging.    Ms Parnell was recently evaluated in the hospital (3/1 - 3/2/18) due to chest pain. During hospitalization she had a CT which was concerning for cirrhotic changes of the liver with gastro-esophageal varices (3/1/18). She was subsequently admitted (7/9 - 7/15/18 at [facility]) with melena. She underwent an EGD which was remarkable for angiodysplasia of the stomach s/p APC, grade 1 varices (7/10/18). She was seen by GI as outpatient (Dr. West, 3/21/18) and was recommended for referral to hepatology (pending appointment with Dr. Ashraf 8/8).    She initially required ICU hospitalization due to IABP support, which has since been discontinued and she was transferred to the floor while being evaluated for TAVR. She was interviewed in the attendance of her family. She notes that currently she is feeling well and denies any complaints. Denies any abdominal pain, n/v/d. Denies any other complaints on review of systems.        EGD (7/10/18) grade 1 varices, angiodysplasia of stomach s/p APC    US Abdomen (3/1/18) nodular liver, no discretemass, no ascites  CTA (7/31/18) cirrhotic liver. Hypodense 2.5cm lesion in hepatic segment V. Post-contrast focal hypoenhancing 1cm lesion within the 2.5cm area.  US (8/1/18) 3x1.4x2.5cm lesion    Shx:  - rare, social ETOH. Never daily drinker.  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

## 2018-08-01 NOTE — SIGNIFICANT EVENT
Natalei Parnell is a 83 y.o. female referred by Dr Diaz for evaluation of severe AS (NYHA Class IV sx).    The patient has undergone the following TAVR work-up:   ECHO (Date 7/9/18 ): SHEA= 0.65cm2, MG= 74mmHg, Peak Jong= 5.25 m/s, EF= 50%.   LHC (Date 7/27/18):Nonobstructive Disease  STS: 7.3 %   Frailty: 1/4(albumin)  Iliacs are >8mm on R and > 7mm on L   LVOT area by CTA is 5.02 cm2 (29.9 mm X 21.6 mm) and Avg Diameter is 25.3 per Dr Lord  Incidental findings on CT: Liver lesion and cirrhosis which is being worked up by [facility]  CT Surgery risk assessment: High risk, per Dr Frey due to liver disease, frailty and age.  Rhythm issues: None  PFTs: Pt unable to perform  Comorbidities: Cirrhosis, DM, HTN, AVM's s/p APC 7/9/18      A/P:  - Pt presented in Cardiogenic Shock with severe AS and new drop in EF to 25% so BAV with 24mm Balloon performed on 7/30/18 with excellent pt recovery. Mean gradient decreased from 66mmhg -->36mmhg post procedure. She did undergo Repeat LHC 7/27/18 given drop in EF with nonobstructive disease. She was found to have a liver lesion on CT and has hx of cirrhosis so hepatology has been consulted and they are working this up. She is going to cont PT/OT. Once her liver lesion has been worked up will schedule for TAVR as an outpatient.   - Will have her f/u in Valve Clinic  - Natalie Parnell is a 29 mm  EvolutR valve candidate via RTF access.

## 2018-08-01 NOTE — ASSESSMENT & PLAN NOTE
83F Hx of HTN, DM, severe AS, overweight (BMI 28) ESLD with grade 1 varices; hepatology is consulted for evaluation of hepatic lesion in patient being evaluated for TAVR. Her cirrhosis was first identified incidentally on imaging during evaluation for chest pain (3/2018) and is pending an outpatient hepatology referral. Etiology is likely HERNANDEZ given risk factors (overweight, DM), negative hep panel (3/2018, hep B non-immune), and negative ETOH history.    Recent imaging was unremarkable for hepatic lesions in 3/2018, but on recent imaging (7/31/18) was remarkable for new segment V 2.5cm lesion which in patient with concern for ESLD is concerning for HCC.     Plan  - Given the patient's normal LFT, INR, MELD 9, albumin 2.5; her hepatic disease would not contraindicate her from a surgical procedure (TAVR).  - Hepatic lesion concerning for HCC can be evaluated as an outpatient regarding therapeutic options. Please obtain MRI or CT 3phase while hospitalized for further evaluation. Please check AFP.  - Concern for cirrhosis on imaging likely secondary to HERNANDEZ. We will continue her workup as an outpatient with modalities such as fibroscan. Please send additional workup: SUGAR, IGG, ASMA, transferrin saturation  - if she is discharged before 8/8 she can follow-up in her previously scheduled appointment with Dr. Ashraf. If she is discharged later than this, please inform hepatology so we can reschedule her appointment.

## 2018-08-01 NOTE — PROGRESS NOTES
Progress Note  Hospital Medicine    Primary Team: [facility]  Admit Date: 7/27/2018   Length of Stay:  LOS: 5 days   SUBJECTIVE:   Reason for Admission:  Severe aortic stenosis    HPI:  83 year old female with PMHx signficant for severe Aortic stenosis with SHEA 0.65cm2, mean gradients 75mmhg, peak velocity 5.3m/sec with recent admission for GIB (requiring 1 unit of PRBCs EGD with non bleeding angectasia S/P APC) and NSTEMI (trop peak of 7). She underwent LHC that showed non obstructive disease. Patient was discharged on 7/17, 2 days ago patient reported chest pain radiating to back associated with SOB and orthopnea. On the day of presentation to the hospital, here SOB worsened and she came to the ER diaphoretic and tachycardic. She developed worsening tachypnea and placed on Bipap. Troponin was checked and found to be elevated at 2.7 and BNP of 1700. Cardiology was then consulted. Bedside echo revealed new drop in her EF now 25% within anterolateral wall. She was loaded with Plavix and ASA, taken to cath lab for LHC that revealed clean coronaries. Pt was placed on IABP for advanced mechanical support. She was diuresed effectively with IV lasix gtt, seen by interventional cards team and tolerated BAV. She had her IABP removed shortly after. Her mixed venous sat have been 60%. She is actively getting a TAVR workup. BP low at 80/40, pt's baseline. Pt is orthostatic negative. Sepsis workup being pursued, so pt was placed on broad spectrum antibiotics but cultures have been negative.     Interval history:    Pt stepped down from CCU overnight.  Today she feels well, denying SOB or chest pain.  Reviewed above history with family.    Review of Systems:  Constitutional: no fever or chills  Respiratory: no cough or shortness of breath  Cardiovascular: no chest pain or palpitations  Gastrointestinal: no nausea or vomiting, no abdominal pain or change in bowel habits  Musculoskeletal: no arthralgias or myalgias      OBJECTIVE:     Temp:  [98 °F (36.7 °C)-99.1 °F (37.3 °C)]   Pulse:  []   Resp:  [16-23]   BP: ()/(42-78)   SpO2:  [91 %-97 %]  Body mass index is 28.47 kg/m².  Intake/Outake:  This Shift:  No intake/output data recorded.    Net I/O past 24h:     Intake/Output Summary (Last 24 hours) at 08/01/18 1353  Last data filed at 08/01/18 0400   Gross per 24 hour   Intake              375 ml   Output              700 ml   Net             -325 ml             Physical Exam:  Gen- well-developed, well-nourished, NAD  CVS- S1 and S2 present, RRR, 4/6 systolic murmur  Resp- CTA b/l, no work of breathing  Abd- BS+, soft, NT, ND  Ext- no clubbing, cyanosis, or edema    Laboratory:  CBC/Anemia Labs: Coags:      Recent Labs  Lab 07/30/18  0325 07/30/18  1712 07/31/18  0300 08/01/18  0500   WBC 5.50  --  5.81 5.63   HGB 8.7* 8.2* 8.2* 8.4*   HCT 26.2* 25.4* 25.1* 25.3*   *  --  152 173   MCV 95  --  95 96   RDW 14.7*  --  14.8* 14.9*      Recent Labs  Lab 07/27/18  1550 07/30/18  0325   INR 1.2 1.2        Chemistries:     Recent Labs  Lab 07/28/18  0305 07/28/18  1125 07/29/18  0400 07/30/18  0325 07/30/18  1140 07/31/18  0302 08/01/18  0500    137 134* 134*  --  134* 132*   K 4.3 4.0 3.7 3.8  --  3.8 3.9    103 100 101  --  101 101   CO2 24 24 26 25  --  26 26   BUN 26* 25* 23 18  --  16 16   CREATININE 1.2 1.1 1.2 1.1  --  1.0 1.1   CALCIUM 8.6* 8.5* 8.3* 8.0*  --  8.0* 8.2*   PROT  --  5.7*  --   --   --  5.1* 5.4*   BILITOT  --  1.3*  --   --   --  0.7 1.0   ALKPHOS  --  118  --   --   --  98 130   ALT  --  25  --   --   --  20 18   AST  --  92*  --   --   --  58* 51*   MG 1.7  --  1.9 1.7 2.2 1.7  --    PHOS 4.1  --  2.9 2.3*  --   --   --           Cardiac Enzymes: Ejection Fractions:    No results for input(s): CPK, CPKMB, MB, TROPONINI in the last 72 hours. EF   Date Value Ref Range Status   07/27/2018 20 (A) 55 - 65    07/09/2018 50 55 - 65         POCT Glucose: HbA1c:      Recent Labs  Lab  07/30/18  2242 07/31/18  0637 07/31/18  1213 07/31/18  1705 07/31/18  2116 08/01/18  1150   POCTGLUCOSE 194* 125* 204* 279* 239* 301*    Hemoglobin A1C   Date Value Ref Range Status   07/28/2018 7.0 (H) 4.0 - 5.6 % Final     Comment:     ADA Screening Guidelines:  5.7-6.4%  Consistent with prediabetes  >or=6.5%  Consistent with diabetes  High levels of fetal hemoglobin interfere with the HbA1C  assay. Heterozygous hemoglobin variants (HbS, HgC, etc)do  not significantly interfere with this assay.   However, presence of multiple variants may affect accuracy.     07/02/2018 8.4 (H) 4.0 - 5.6 % Final     Comment:     ADA Screening Guidelines:  5.7-6.4%  Consistent with prediabetes  >or=6.5%  Consistent with diabetes  High levels of fetal hemoglobin interfere with the HbA1C  assay. Heterozygous hemoglobin variants (HbS, HgC, etc)do  not significantly interfere with this assay.   However, presence of multiple variants may affect accuracy.     03/02/2018 9.1 (H) 4.0 - 5.6 % Final     Comment:     According to ADA guidelines, hemoglobin A1c <7.0% represents  optimal control in non-pregnant diabetic patients. Different  metrics may apply to specific patient populations.   Standards of Medical Care in Diabetes-2016.  For the purpose of screening for the presence of diabetes:  <5.7%     Consistent with the absence of diabetes  5.7-6.4%  Consistent with increasing risk for diabetes   (prediabetes)  >or=6.5%  Consistent with diabetes  Currently, no consensus exists for use of hemoglobin A1c  for diagnosis of diabetes for children.  This Hemoglobin A1c assay has significant interference with fetal   hemoglobin   (HbF). The results are invalid for patients with abnormal amounts of   HbF,   including those with known Hereditary Persistence   of Fetal Hemoglobin. Heterozygous hemoglobin variants (HbAS, HbAC,   HbAD, HbAE, HbA2) do not significantly interfere with this assay;   however, presence of multiple variants in a sample may  impact the %   interference.           Medications:  Scheduled Meds:   aspirin  81 mg Oral Daily    atorvastatin  40 mg Oral Daily    insulin aspart U-100  3 Units Subcutaneous TIDWM    [START ON 8/2/2018] insulin detemir U-100  12 Units Subcutaneous Daily    pantoprazole  40 mg Oral Daily    polyethylene glycol  17 g Oral Daily                             Continuous Infusions:  PRN Meds:.dextrose 50%, glucagon (human recombinant), insulin aspart U-100, sodium chloride 0.9%     ASSESSMENT/PLAN:     Active Hospital Problems    Diagnosis  POA    *Severe aortic stenosis [I35.0]  Yes    Sepsis [A41.9]  Yes    NSTEMI (non-ST elevated myocardial infarction) [I21.4]  Yes    Type 2 diabetes mellitus, without long-term current use of insulin [E11.9]  Yes      Resolved Hospital Problems    Diagnosis Date Resolved POA    Chest pain [R07.9] 07/28/2018 Yes    NSTEMI (non-ST elevated myocardial infarction) [I21.4] 07/28/2018 Yes     Severe Aortic Stenosis  -previously required IABP for hemodynamic support in the setting of acute respiratory distress from flash pulmonary edema 2/2 severe AV stenosis  -s/p successful balloon aortic valvuloplasty with 24mm true balloon aortic mean gradient improved from 70 to 36mmHg  -currently undergoing TAVR w/u: CT chest performed, PFTs to be done today, [facility] negative for obstructive CAD    NSTEMI  -pt initially loaded with Plavix and ASA and taken to cath lab  -[facility] revealed clean coronaries    Right Hepatic Lobe Lesion  Suspected Cirrhosis  -lesion seen incidentally on CT for TAVR  -followed up with US abd today- notes 3cm; triple phase CT pending to further characterize  -AFP ordered  -Hepatology consulted and agree with above w/u; no recs for cirrhosis w/u now, pt to f/u in clinic next week  -pt denies hx of alcohol, has received transfusions    Suspected Sepsis  -blood cultures NGTD  -pt afebrile and asymptomatic  -will d/c antibiotics and monitor closely  -if any decompensation,  recommend resuming Vanc and Rocephin and pan-culture    DM-II  -uncontrolled  -will change to diabetic diet  -increase to Levemir 12units qHS and Aspart 3units TIDWM    DVT ppx- no pharmacologic ppx with recent GI bleed  CODE Status- FULL    Dispo- home pending Hepatology recs and needs close outpatient f/u with TAVR team    Neida Davidson MD  Hospital Medicine Staff

## 2018-08-02 NOTE — NURSING
Patient complaining of right hand soreness from Lab sticks.  Placed hot pack on hand.  Will continue to monitor.

## 2018-08-02 NOTE — PLAN OF CARE
Problem: Patient Care Overview  Goal: Plan of Care Review  Outcome: Ongoing (interventions implemented as appropriate)  Reviewed plan of care with patient and daughter.  Patient's primary language is Malian, daughter is at bedside.  Consults for Cardiac Rehab and Case Management ordered.  VTE High Risk, ambulation promoted.  Right/left groin sites are clean, dry, and intact, and dressed with gauze and tegaderm. Blood glucose monitoring will be completed 4 times daily before meals and at bedtime (BS = 131).  Patient has remained free of falls/trauma/injury by using appropriate lighting, nonskid socks, and by keeping area free of debris.  Patient and family verbalized understanding of all instructions.

## 2018-08-02 NOTE — NURSING TRANSFER
Nursing Transfer Note      8/2/2018     Transfer From: CT abd/pelvis    Transfer via wheelchair    Transfer with cardiac monitoring    Transported by transport    Medicines sent: none    Chart send with patient: No    Notified: daughter    Patient reassessed at: 0600, 8/2    Upon arrival to floor: cardiac monitor applied, patient oriented to room, call bell in reach and bed in lowest position

## 2018-08-02 NOTE — PHYSICIAN QUERY
PT Name: Natalie Parnell  MR #: [id number]     Physician Query Form - Diagnosis Clarification      CDS/: Yolanda Max RN, CCDS             Contact information: [email or web address]    This form is a permanent document in the medical record.     Query Date: August 2, 2018    By submitting this query, we are merely seeking further clarification of documentation.  Please utilize your independent clinical judgment when addressing the question(s) below.     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  Acute systolic HF  IABP placed for mechanical support.  bedside echo that revealed new drop in her EF now 25% with anterolateral wall           CONCLUSIONS     1 - Moderate left ventricular enlargement.     2 - Severely depressed left ventricular systolic function (EF 20-25%).     3 - Left atrial enlargement.     4 - Low normal to mildly depressed right ventricular systolic function .     5 - Moderate to severe aortic stenosis.     6 - Moderate mitral regurgitation.     7 - Increased central venous pressure.     8 - If clinically indicated, a full Doppler study can be performed 7/27 h/p            7/27 echo     Please clarify if the __Stress Induced Cardiomyopathy________ diagnosis has been:    [X  ] Ruled In  [  ] Ruled In, Now Resolved  [  ] Ruled Out  [  ] Clinically insignificant  [  ] Clinically undetermined  [  ] Other/Clarification of findings (please specify)_______________________________    Please document in your progress notes daily for the duration of treatment, until resolved, and include in your discharge summary.

## 2018-08-02 NOTE — PLAN OF CARE
Problem: Patient Care Overview  Goal: Plan of Care Review  Outcome: Ongoing (interventions implemented as appropriate)  POC reviewed with pt. VS stable. Pt scheduled for CT of abdomen/pelvis.  Pt remains free from falls, trauma, injury; pt tolerating POC well. Will continue to monitor.

## 2018-08-02 NOTE — ASSESSMENT & PLAN NOTE
83F Hx of HTN, DM, severe AS, overweight (BMI 28) ESLD with grade 1 varices; hepatology is consulted for evaluation of hepatic lesion in patient being evaluated for TAVR. Her cirrhosis was first identified incidentally on imaging during evaluation for chest pain (3/2018) and is pending an outpatient hepatology referral. Etiology is likely HERNANDEZ given risk factors (overweight, DM), negative hep panel (3/2018, hep B non-immune), and negative ETOH history.    Recent imaging was unremarkable for hepatic lesions in 3/2018, but on recent imaging (7/31/18) was remarkable for new segment V 2.5cm lesion which in patient with concern for ESLD is concerning for HCC.     Plan  - Given the patient's normal LFT, INR, MELD 9, albumin 2.5; her hepatic disease would not contraindicate her from a surgical procedure (TAVR) - pending TAVR outpatient.  - Hepatic lesion concerning for HCC can be evaluated as an outpatient regarding therapeutic options. AFP elevated (572) which supports this as likely HCC. CT 3 phase consistent with HCC.  - Concern for cirrhosis on imaging likely secondary to HERNANDEZ. We will continue her workup as an outpatient with modalities such as fibroscan. Iron panel negative. , SUGAR pending, ASMA pending.  - if she is discharged before 8/8 she can follow-up in her previously scheduled appointment with Dr. Ashraf. If she is discharged later than this, please inform hepatology so we can reschedule her appointment.

## 2018-08-02 NOTE — PROGRESS NOTES
Progress Note  Hospital Medicine    Primary Team: [facility]  Admit Date: 7/27/2018   Length of Stay:  LOS: 6 days   SUBJECTIVE:   Reason for Admission:  Severe aortic stenosis    HPI:  83 year old female with PMHx signficant for severe Aortic stenosis with SHEA 0.65cm2, mean gradients 75mmhg, peak velocity 5.3m/sec with recent admission for GIB (requiring 1 unit of PRBCs EGD with non bleeding angectasia S/P APC) and NSTEMI (trop peak of 7). She underwent LHC that showed non obstructive disease. Patient was discharged on 7/17, 2 days ago patient reported chest pain radiating to back associated with SOB and orthopnea. On the day of presentation to the hospital, here SOB worsened and she came to the ER diaphoretic and tachycardic. She developed worsening tachypnea and placed on Bipap. Troponin was checked and found to be elevated at 2.7 and BNP of 1700. Cardiology was then consulted. Bedside echo revealed new drop in her EF now 25% within anterolateral wall. She was loaded with Plavix and ASA, taken to cath lab for LHC that revealed clean coronaries. Pt was placed on IABP for advanced mechanical support. She was diuresed effectively with IV lasix gtt, seen by interventional cards team and tolerated BAV. She had her IABP removed shortly after. Her mixed venous sat have been 60%. She is actively getting a TAVR workup. BP low at 80/40, pt's baseline. Pt is orthostatic negative. Sepsis workup being pursued, so pt was placed on broad spectrum antibiotics but cultures have been negative.     Interval history:    No acute events overnight.  Pt still feeling well, denies chest pain or SOB.  Plan and imaging results discussed in detail.    Review of Systems:  Constitutional: no fever or chills  Respiratory: no cough or shortness of breath  Cardiovascular: no chest pain or palpitations  Gastrointestinal: no nausea or vomiting, no abdominal pain or change in bowel habits  Musculoskeletal: no arthralgias or myalgias      OBJECTIVE:     Temp:  [97.8 °F (36.6 °C)-98.5 °F (36.9 °C)]   Pulse:  []   Resp:  [16]   BP: ()/(50-56)   SpO2:  [91 %-95 %]  Body mass index is 28.47 kg/m².  Intake/Outake:  This Shift:  I/O this shift:  In: 320 [P.O.:320]  Out: 900 [Urine:900]    Net I/O past 24h:     Intake/Output Summary (Last 24 hours) at 08/02/18 1425  Last data filed at 08/02/18 1400   Gross per 24 hour   Intake              740 ml   Output              900 ml   Net             -160 ml             Physical Exam:  Gen- well-developed, well-nourished, NAD  CVS- S1 and S2 present, RRR, 4/6 systolic murmur  Resp- CTA b/l, no work of breathing  Abd- BS+, soft, NT, ND  Ext- no clubbing, cyanosis, or edema    Laboratory:  CBC/Anemia Labs: Coags:      Recent Labs  Lab 07/31/18  0300 08/01/18  0500 08/02/18  0352 08/02/18  0907   WBC 5.81 5.63 6.13  --    HGB 8.2* 8.4* 8.6*  --    HCT 25.1* 25.3* 26.4*  --     173 184  --    MCV 95 96 96  --    RDW 14.8* 14.9* 15.0*  --    IRON  --   --   --  44   FERRITIN  --   --   --  168      Recent Labs  Lab 07/27/18  1550 07/30/18  0325   INR 1.2 1.2        Chemistries:     Recent Labs  Lab 07/28/18  0305  07/29/18  0400 07/30/18  0325 07/30/18  1140 07/31/18  0302 08/01/18  0500 08/02/18  0352     < > 134* 134*  --  134* 132* 133*   K 4.3  < > 3.7 3.8  --  3.8 3.9 4.4     < > 100 101  --  101 101 102   CO2 24  < > 26 25  --  26 26 24   BUN 26*  < > 23 18  --  16 16 16   CREATININE 1.2  < > 1.2 1.1  --  1.0 1.1 1.2   CALCIUM 8.6*  < > 8.3* 8.0*  --  8.0* 8.2* 8.4*   PROT  --   < >  --   --   --  5.1* 5.4* 5.4*   BILITOT  --   < >  --   --   --  0.7 1.0 0.9   ALKPHOS  --   < >  --   --   --  98 130 120   ALT  --   < >  --   --   --  20 18 17   AST  --   < >  --   --   --  58* 51* 49*   MG 1.7  --  1.9 1.7 2.2 1.7  --   --    PHOS 4.1  --  2.9 2.3*  --   --   --   --    < > = values in this interval not displayed.       Cardiac Enzymes: Ejection Fractions:    No results for  input(s): CPK, CPKMB, MB, TROPONINI in the last 72 hours. EF   Date Value Ref Range Status   07/27/2018 20 (A) 55 - 65    07/09/2018 50 55 - 65          Medications:  Scheduled Meds:   aspirin  81 mg Oral Daily    atorvastatin  40 mg Oral Daily    insulin aspart U-100  3 Units Subcutaneous TIDWM    insulin detemir U-100  12 Units Subcutaneous Daily    pantoprazole  40 mg Oral Daily    polyethylene glycol  17 g Oral Daily                             Continuous Infusions:  PRN Meds:.dextrose 50%, glucagon (human recombinant), insulin aspart U-100, sodium chloride 0.9%     ASSESSMENT/PLAN:     Active Hospital Problems    Diagnosis  POA    *Severe aortic stenosis [I35.0]  Yes    Sepsis [A41.9]  Yes    NSTEMI (non-ST elevated myocardial infarction) [I21.4]  Yes    Cirrhosis of liver without ascites [K74.60]  Yes    Type 2 diabetes mellitus, without long-term current use of insulin [E11.9]  Yes      Resolved Hospital Problems    Diagnosis Date Resolved POA    Chest pain [R07.9] 07/28/2018 Yes    NSTEMI (non-ST elevated myocardial infarction) [I21.4] 07/28/2018 Yes     Severe Aortic Stenosis  -previously required IABP for hemodynamic support in the setting of acute respiratory distress from flash pulmonary edema 2/2 severe AV stenosis  -s/p successful balloon aortic valvuloplasty with 24mm true balloon aortic mean gradient improved from 70 to 36mmHg  -currently undergoing TAVR w/u: CT chest performed, [facility] negative for obstructive CAD  -PFTs attempted today with Ivorian speaking technicians, but pt not able to understand instructions; will reschedule as outpatient with family assistance  -Pt will f/u with Interventional Cardiology as outpatient  -resume Lasix 20mg daily on discharge    NSTEMI  -pt initially loaded with Plavix and ASA and taken to cath lab  -[facility] revealed clean coronaries so these were stopped    Right Hepatic Lobe Lesion  Suspected Cirrhosis  -lesion seen incidentally on CT for TAVR  -followed  up with US abd- notes 3cm  -triple phase CT shows 2.4 x 1.6cm irregular enhancing mass in right lobe of liver; 1cm focal nonenhancing areas centrally; with anterior washout, arterial enhancement- LI-RADS 5 lesion  -  -Hepatology consulted; will be discussing case in IR conference 8/7 and then f/u in Hepatology clinic 8/8  -labs for w/u of underlying cirrhosis pending    Suspected Sepsis  -blood cultures NGTD  -pt afebrile and asymptomatic  -d/c antibiotics and no evidence of decompensation    DM-II  -A1C 7  -per daughter, pt's blood sugar is poorly controlled at home, reaching 300s  -will avoid Metformin given recent contrast use and lactic acidosis on admission; will discharge with Lantus 12units qHS and Aspart 3units TIDWM  -amb referral to Endocrinology  -insulin teaching prior to discharge    DVT ppx- no pharmacologic ppx with recent GI bleed  CODE Status- FULL    Dispo- home with  today    Neida Davidson MD  Hospital Medicine Staff

## 2018-08-02 NOTE — PT/OT/SLP EVAL
Occupational Therapy   Co-Evaluation with PT    Name: Natalie Parnell  MRN: [id number]  Admitting Diagnosis:  Severe aortic stenosis 3 Days Post-Op    Recommendations:     Discharge Recommendations: home with home health  Discharge Equipment Recommendations:  none  Barriers to discharge:  None    History:     Occupational Profile:  Living Environment: Pt lives in a duplex next door to her daughter with 7STE and b/l accessible HR's. Bathroom has a tub/shower combo.   Previous level of function: PTA, pt was independent with ADLs and was utilizing a rollator for functional mobility.   Roles and Routines: Pt is a mother and greatly values her independence.   Equipment Owned:  rollator  Assistance upon Discharge: Pt's daughter works, but is able to assist before and after work.     Past Medical History:   Diagnosis Date    Asthma     Diabetes mellitus     HTN (hypertension)     Hyperlipidemia        Past Surgical History:   Procedure Laterality Date    CATARACT EXTRACTION Bilateral     Dr. Max    COLON SURGERY      ESOPHAGOGASTRODUODENOSCOPY N/A 7/10/2018    Procedure: EGD (ESOPHAGOGASTRODUODENOSCOPY);  Surgeon: Derian Stoner MD;  Location: [facility] ENDO;  Service: Endoscopy;  Laterality: N/A;    HERNIA REPAIR      PERCUTANEOUS TRANSLUMINAL BALLOON ANGIOPLASTY OF AORTA N/A 7/30/2018    Procedure: PTA, AORTA, USING BALLOON;  Surgeon: Scott Pruett MD;  Location: [facility] CATH LAB;  Service: Cardiology;  Laterality: N/A;    VARICOSE VEIN SURGERY         Subjective     Chief Complaint: pain with motion of LUE   Patient/Family stated goals: return home and to PLOF with decreased LUE pain  Communicated with: RN Mya) prior to session. Pt agreeable to OT treatment session.   Pain/Comfort:  · Pain Rating 1:  (pt reporting 4/10 pain at IV sites)  · Pain Addressed 1: Distraction  · Pain Rating Post-Intervention 1:  (4/10 pain )    Patients cultural, spiritual, Yazidism conflicts given the current situation: none  reported     Objective:     Patient found with: telemetry    General Precautions: Standard, fall   Orthopedic Precautions:N/A   Braces: N/A     Occupational Performance:    Bed Mobility:    · NT as pt found seated UIC at start of session     Functional Mobility/Transfers:  · Sit<>stand: CGA  · From/onto bedside chair utilizing RW with min vc's for hand placement   · Functional Mobility: Pt engaging in functional mobility to simulate household distances with CGA utilizing RW in order to assess functional activity tolerance and standing balance required for engagement in occupations of choice.  · Decreased safety with RW as pt lifting UE off     Activities of Daily Living:  · LB Dressing: CGA  · Pt able to access BLE's via anterior trunk flexion, however requiring CGA for standing balance   · Feeding: independence    Cognitive/Visual Perceptual:  · Pt alert and oriented x4  · Pt with good insight into condition and with good motivation to engage in therapy session  · Pt able to follow 2-step commands 100% of the time  · Effective verbal communication    · Intact short and long memory  · Impaired safety awareness  · No visual deficits present    Physical Exam:  Balance:  · Sitting: independent supported sitting in bedside chair   · Standing: CGA   Postural Examination: no deviations noted   Skin Integrity: intact   Edema: none noted   Sensation: intact to light touch   UE ROM:  · Right: WFL  · Left: impaired shoulder AROM limited by pain to approx 90 degrees; full PROM  UE Strength:  · Right: WFL  · Left: 3+/5   Strength:  · Right: WFL  · Left: WFL  Fine Motor Control: WFL  Gross Motor Control: WFL    Patient left up in chair with all lines intact, call button in reach, RN notified and daughter present    [facility] 6 Click:  [facility] Total Score: 19    Treatment & Education:  · Pt's daughter present throughout session and assisting with communication as pt primarily [place]ish speaking  · Pt with increased pain at  "origin/insertion and biceps muscle belly with no pain present at shoulder girdle   · Communicated OT POC  · Updated communication board    Education:    Assessment:     Natalie Parnell is a 83 y.o. female with a medical diagnosis of Severe aortic stenosis. Performance deficits affecting function are weakness, impaired endurance, impaired self care skills, impaired functional mobilty, decreased safety awareness, pain. Pt requiring CGA for functional mobility and transfers, CGA for LB Dressing, and independence with feeding.       Rehab Prognosis:  good; patient would benefit from acute skilled OT services to address these deficits and reach maximum level of function.         Clinical Decision Makin.  OT Low:  "Pt evaluation falls under low complexity for evaluation coding due to performance deficits noted in 1-3 areas as stated above and 0 co-morbities affecting current functional status. Data obtained from problem focused assessments. No modifications or assistance was required for completion of evaluation. Only brief occupational profile and history review completed."     Plan:     Patient to be seen 3 x/week to address the above listed problems via self-care/home management, therapeutic activities, therapeutic exercises  · Plan of Care Expires: 18  · Plan of Care Reviewed with: patient, caregiver    This Plan of care has been discussed with the patient who was involved in its development and understands and is in agreement with the identified goals and treatment plan    GOALS:    Occupational Therapy Goals        Problem: Occupational Therapy Goal    Goal Priority Disciplines Outcome Interventions   Occupational Therapy Goal     OT, PT/OT Ongoing (interventions implemented as appropriate)    Description:  Goals to be met by: 2018    Pt will complete grooming while standing at sink with supervision.  Pt will complete LB dressing with SBA.  Pt will complete toilet transfer with supervision.  Pt will " complete toilet transfer with SBA.  Pt will engage in BUE AROM exercise plan to increased LUE strength to 4/5 and minimize pain in order to increased functional use during ADLs.                    Time Tracking:     OT Date of Treatment: 08/02/18  OT Start Time: 0941  OT Stop Time: 1004  OT Total Time (min): 23 min    Billable Minutes:Evaluation 23 minutes    Jaelyn Hendrix, OT  8/2/2018

## 2018-08-02 NOTE — NURSING TRANSFER
Nursing Transfer Note      8/2/2018     Transfer To: Pulmonary Lab, 9th Floor    Transfer via wheelchair    Transfer with cardiac monitoring    Transported by Hayder Golden    Medicines sent: No    Chart send with patient: No    Notified: daughter

## 2018-08-02 NOTE — NURSING
Patient's family has questions about medical equipment, delivery times, etc.  Left a message with Rose Hwang RN Case Manager.  Family notified.

## 2018-08-02 NOTE — SUBJECTIVE & OBJECTIVE
Interval History:   - fees well denies any complaints, no abdominal pain  - AFP elevated 572  - CT 3phase completed, read pending    Current Facility-Administered Medications   Medication    aspirin EC tablet 81 mg    atorvastatin tablet 40 mg    dextrose 50% injection 12.5 g    glucagon (human recombinant) injection 1 mg    insulin aspart U-100 pen 0-5 Units    insulin aspart U-100 pen 3 Units    insulin detemir U-100 pen 12 Units    pantoprazole EC tablet 40 mg    polyethylene glycol packet 17 g    sodium chloride 0.9% flush 3 mL       Objective:     Vital Signs (Most Recent):  Temp: 97.8 °F (36.6 °C) (08/02/18 1139)  Pulse: 92 (08/02/18 1200)  Resp: 16 (08/02/18 1139)  BP: (!) 104/51 (08/02/18 1139)  SpO2: 95 % (08/02/18 1139) Vital Signs (24h Range):  Temp:  [97.8 °F (36.6 °C)-98.5 °F (36.9 °C)] 97.8 °F (36.6 °C)  Pulse:  [] 92  Resp:  [16] 16  SpO2:  [91 %-95 %] 95 %  BP: ()/(50-56) 104/51     Weight: 80 kg (176 lb 6.4 oz) (08/01/18 0735)  Body mass index is 28.47 kg/m².    Physical Exam   Constitutional: She is oriented to person, place, and time. She appears well-developed and well-nourished.   Sitting on couch, family at bedside, wearing [place] knit hat, smiling   HENT:   Head: Normocephalic and atraumatic.   Eyes: No scleral icterus.   Pulmonary/Chest: Effort normal. No respiratory distress.   Abdominal: She exhibits no distension.   Neurological: She is alert and oriented to person, place, and time.   Psychiatric: She has a normal mood and affect. Her behavior is normal. Judgment and thought content normal.   Nursing note and vitals reviewed.      MELD-Na score: 8 at 7/31/2018  3:02 AM  MELD score: 8 at 7/31/2018  3:02 AM  Calculated from:  Serum Creatinine: 1 mg/dL at 7/31/2018  3:02 AM  Serum Sodium: 134 mmol/L at 7/31/2018  3:02 AM  Total Bilirubin: 0.7 mg/dL (Rounded to 1) at 7/31/2018  3:02 AM  INR(ratio): 1.2 at 7/30/2018  3:25 AM  Age: 83 years    Significant Labs:  CBC:    Recent Labs  Lab 08/02/18  0352   WBC 6.13   RBC 2.74*   HGB 8.6*   HCT 26.4*        CMP:   Recent Labs  Lab 08/02/18  0352   *   CALCIUM 8.4*   ALBUMIN 2.5*   PROT 5.4*   *   K 4.4   CO2 24      BUN 16   CREATININE 1.2   ALKPHOS 120   ALT 17   AST 49*   BILITOT 0.9     Coagulation:   Recent Labs  Lab 07/30/18  0325   INR 1.2       Significant Imaging:  Labs: Reviewed  CT: Reviewed

## 2018-08-02 NOTE — NURSING
ALDO Iglesias, granddaughter, called, [phone] to inquire about patient's status prior to discharge today.  Granddaughter would like results of CT prior to discharge for family planning.  Additionally, patient takes Metformin at home for blood glucose control however, blood glucose remains in 250's and above.  Patient had contrast (CT) today and granddaughter stated that patient cannot take Metformin until Monday and would like script for better blood glucose control prior to discharge, Endocrinology Consult, if possible.  Erik Davidson M.D.

## 2018-08-02 NOTE — PLAN OF CARE
Problem: Occupational Therapy Goal  Goal: Occupational Therapy Goal  Goals to be met by: 8/16/2018    Pt will complete grooming while standing at sink with supervision.  Pt will complete LB dressing with SBA.  Pt will complete toilet transfer with supervision.  Pt will complete toilet transfer with SBA.  Pt will engage in BUE AROM exercise plan to increased LUE strength to 4/5 and minimize pain in order to increased functional use during ADLs.  Outcome: Ongoing (interventions implemented as appropriate)  OT evaluation completed and POC initiated 8/2/2018. Pt would benefit form HH at d/c.

## 2018-08-02 NOTE — PLAN OF CARE
Problem: Physical Therapy Goal  Goal: Physical Therapy Goal  Goals to be met by: 2018    Patient will increase functional independence with mobility by performin. Sit to stand transfer with Supervision using RW - not met  2. Gait  x 250 feet with Supervision using Rolling Walker. - not met  3. Ascend/descend 7 stair with bilateral Handrails Supervision - not met    Outcome: Ongoing (interventions implemented as appropriate)  Eval completed and goals appropriate.

## 2018-08-02 NOTE — TELEPHONE ENCOUNTER
..  Patient: Natalie Parnell       MRN: [id number]      : 1935     Age: 83 y.o.  [address]  [place] [address]    Provider: Hepatologist Nelia Ashraf    Urgency of review: urgent    Patient Transplant Status: Not a candidate    Reason for presentation: Non-Transplant    Clinical Summary:83F Hx of HTN, DM, severe AS, overweight (BMI 28) ESLD with grade 1 varices; hepatology is consulted for evaluation of hepatic lesion in patient being evaluated for TAVR.    Recent imaging was unremarkable for hepatic lesions in 3/2018, but on recent imaging (18) was remarkable for new segment V 2.5cm lesion which in patient with concern for ESLD is concerning for HCC.      Plan  - Given the patient's normal LFT, INR, MELD 9, albumin 2.5; her hepatic disease would not contraindicate her from a surgical procedure (TAVR) - pending TAVR outpatient.  - Hepatic lesion concerning for HCC can be evaluated as an outpatient regarding therapeutic options. AFP elevated (572) which supports this as likely HCC. CT 3 phase pending.  - Concern for cirrhosis on imaging likely secondary to HERNANDEZ. We will continue her workup as an outpatient with modalities such as fibroscan. Iron panel negative. , SUGAR pending, ASMA pending.  - if she is discharged before  she can follow-up in her previously scheduled appointment with Dr. Ashraf. If she is discharged later than this, please inform hepatology so we can reschedule her appointment.    Imaging to be reviewed: CT SCAN Abdomen/Pelvis    HCC Treatment History: None    ABO: O POS    Platelets:   Lab Results   Component Value Date/Time     2018 03:52 AM     Creatinine:   Lab Results   Component Value Date/Time    CREATININE 1.2 2018 03:52 AM     Bilirubin:   Lab Results   Component Value Date/Time    BILITOT 0.9 2018 03:52 AM     AFP Last 3 each if available:   Lab Results   Component Value Date/Time     (H) 2018 07:42 PM       MELD: MELD-Na score: 8 at  7/31/2018  3:02 AM  MELD score: 8 at 7/31/2018  3:02 AM  Calculated from:  Serum Creatinine: 1 mg/dL at 7/31/2018  3:02 AM  Serum Sodium: 134 mmol/L at 7/31/2018  3:02 AM  Total Bilirubin: 0.7 mg/dL (Rounded to 1) at 7/31/2018  3:02 AM  INR(ratio): 1.2 at 7/30/2018  3:25 AM  Age: 83 years    Plan:     Follow-up Provider:

## 2018-08-02 NOTE — NURSING
Received and acknowledged discharge instructions ordered at 1438 hours.  Ordered to hold discharged for delivery of insulin from pharmacy.  Provided a copy of AVS to patient.  Discharge instructions explained to patient.   Discontinued PIVs in right forearm and left antecubital.  Tips intact.  Removed telemetry.  Informed patient of future follow-up appointments.  Administered all ordered medications.  Explained medication regime to include new, continued, and discontinued medications.  Patient informed when next dose is due for all medications. Completed MIS.  Discussed when to call the doctor. Discussed heart failure tips, diagnosing heart failure, treatment plan, diet, procedures, tracking weight, signs of a flare-up to include:  swelling, shortness of breath dizziness, chest pain and cough.  Diabetes education was completed.  No bleeding, swelling or discoloration noted at right and left groin sites.  Patient/daughter acknowledged understanding of all instructions. Will continue to monitor patient until off unit. Transport requested.

## 2018-08-02 NOTE — CONSULTS
"Cardiac Rehab     Natalie Parnell   [id number]   8/2/2018       Activity taught: Yes    Cardiac Rehab Phase Taught: Phase 1 & 2    Risk Factors-Modifiable: diabetes, hypertension, sedentary lifestyle    Risk Factors-Non modifiable: age    Teaching Method: Verbal, Living with Heart Disease    Response: Patient is Citizen of the [place] speaking, daughter at bedside to translate.  Patient will undergo a TAVR in the next few days.  Questions answered & encouraged for family to contact rehab team with any additional questions.  Gave patient information on heart disease, risk factors, Mediterranean diet & cardiac rehab program written in Citizen of the [place].    Comments: S/P TAVR in few days. Discussed cardiac rehab and risk factor modification. Team to refer patient to [facility] Phase II. Educational materials were used in the process and given to the patient. They included "Your Guide to Living with Heart Disease", Phase Two Cardiac Rehabilitation information along with a sample Mediterranean diet.    Kalpana Reyes RN  Cardiac Rehab Nurse  "

## 2018-08-02 NOTE — PROGRESS NOTES
[facility]  Hepatology  Progress Note    Patient Name: Natalie Parnell  MRN: [id number]  Admission Date: 7/27/2018  Hospital Length of Stay: 6 days  Attending Provider: Neida Davidson MD   Primary Care Physician: Octavio Ferrell MD  Principal Problem:Severe aortic stenosis    Subjective:     Transplant status: No    HPI: Ms Parnell is an 83 year old woman with history of severe aortic stenosis, HFrEF, HTN, DM, asthma, who is presenting to the hospital on 7/27 with dyspnea and chest pain due to NSTEMI and aortic stenosis who is being worked up for a TAVR; hepatology is consulted due to concern for HCC on imaging.    Ms Parnell was recently evaluated in the hospital (3/1 - 3/2/18) due to chest pain. During hospitalization she had a CT which was concerning for cirrhotic changes of the liver with gastro-esophageal varices (3/1/18). She was subsequently admitted (7/9 - 7/15/18 at [facility]) with melena. She underwent an EGD which was remarkable for angiodysplasia of the stomach s/p APC, grade 1 varices (7/10/18). She was seen by GI as outpatient (Dr. West, 3/21/18) and was recommended for referral to hepatology (pending appointment with Dr. Ashraf 8/8).    She initially required ICU hospitalization due to IABP support, which has since been discontinued and she was transferred to the floor while being evaluated for TAVR. She was interviewed in the attendance of her family. She notes that currently she is feeling well and denies any complaints. Denies any abdominal pain, n/v/d. Denies any other complaints on review of systems.        EGD (7/10/18) grade 1 varices, angiodysplasia of stomach s/p APC    US Abdomen (3/1/18) nodular liver, no discretemass, no ascites  CTA (7/31/18) cirrhotic liver. Hypodense 2.5cm lesion in hepatic segment V. Post-contrast focal hypoenhancing 1cm lesion within the 2.5cm area.  US (8/1/18) 3x1.4x2.5cm lesion    Shx:  - rare, social ETOH. Never daily drinker.  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, pineapples, tomatoes, etc)  - born in [facility], moved [place]    Interval History:   - fees well denies any complaints, no abdominal pain  - AFP elevated 572  - CT 3phase completed, read pending    Current Facility-Administered Medications   Medication    aspirin EC tablet 81 mg    atorvastatin tablet 40 mg    dextrose 50% injection 12.5 g    glucagon (human recombinant) injection 1 mg    insulin aspart U-100 pen 0-5 Units    insulin aspart U-100 pen 3 Units    insulin detemir U-100 pen 12 Units    pantoprazole EC tablet 40 mg    polyethylene glycol packet 17 g    sodium chloride 0.9% flush 3 mL       Objective:     Vital Signs (Most Recent):  Temp: 97.8 °F (36.6 °C) (08/02/18 1139)  Pulse: 92 (08/02/18 1200)  Resp: 16 (08/02/18 1139)  BP: (!) 104/51 (08/02/18 1139)  SpO2: 95 % (08/02/18 1139) Vital Signs (24h Range):  Temp:  [97.8 °F (36.6 °C)-98.5 °F (36.9 °C)] 97.8 °F (36.6 °C)  Pulse:  [] 92  Resp:  [16] 16  SpO2:  [91 %-95 %] 95 %  BP: ()/(50-56) 104/51     Weight: 80 kg (176 lb 6.4 oz) (08/01/18 0735)  Body mass index is 28.47 kg/m².    Physical Exam   Constitutional: She is oriented to person, place, and time. She appears well-developed and well-nourished.   Sitting on couch, family at bedside, wearing [place] knit hat, smiling   HENT:   Head: Normocephalic and atraumatic.   Eyes: No scleral icterus.   Pulmonary/Chest: Effort normal. No respiratory distress.   Abdominal: She exhibits no distension.   Neurological: She is alert and oriented to person, place, and time.   Psychiatric: She has a normal mood and affect. Her behavior is normal. Judgment and thought content normal.   Nursing note and vitals reviewed.      MELD-Na score: 8 at 7/31/2018  3:02 AM  MELD score: 8 at 7/31/2018  3:02 AM  Calculated from:  Serum Creatinine: 1 mg/dL at 7/31/2018  3:02 AM  Serum  Sodium: 134 mmol/L at 7/31/2018  3:02 AM  Total Bilirubin: 0.7 mg/dL (Rounded to 1) at 7/31/2018  3:02 AM  INR(ratio): 1.2 at 7/30/2018  3:25 AM  Age: 83 years    Significant Labs:  CBC:   Recent Labs  Lab 08/02/18  0352   WBC 6.13   RBC 2.74*   HGB 8.6*   HCT 26.4*        CMP:   Recent Labs  Lab 08/02/18  0352   *   CALCIUM 8.4*   ALBUMIN 2.5*   PROT 5.4*   *   K 4.4   CO2 24      BUN 16   CREATININE 1.2   ALKPHOS 120   ALT 17   AST 49*   BILITOT 0.9     Coagulation:   Recent Labs  Lab 07/30/18  0325   INR 1.2       Significant Imaging:  Labs: Reviewed  CT: Reviewed    Assessment/Plan:     Cirrhosis of liver without ascites    83F Hx of HTN, DM, severe AS, overweight (BMI 28) ESLD with grade 1 varices; hepatology is consulted for evaluation of hepatic lesion in patient being evaluated for TAVR. Her cirrhosis was first identified incidentally on imaging during evaluation for chest pain (3/2018) and is pending an outpatient hepatology referral. Etiology is likely HERNANDEZ given risk factors (overweight, DM), negative hep panel (3/2018, hep B non-immune), and negative ETOH history.    Recent imaging was unremarkable for hepatic lesions in 3/2018, but on recent imaging (7/31/18) was remarkable for new segment V 2.5cm lesion which in patient with concern for ESLD is concerning for HCC.     Plan  - Given the patient's normal LFT, INR, MELD 9, albumin 2.5; her hepatic disease would not contraindicate her from a surgical procedure (TAVR) - pending TAVR outpatient.  - Hepatic lesion concerning for HCC can be evaluated as an outpatient regarding therapeutic options. AFP elevated (572) which supports this as likely HCC. CT 3 phase consistent with HCC.  - Concern for cirrhosis on imaging likely secondary to HERNANDEZ. We will continue her workup as an outpatient with modalities such as fibroscan. Iron panel negative. , SUGAR pending, ASMA pending.  - if she is discharged before 8/8 she can follow-up in  her previously scheduled appointment with Dr. Ashraf. If she is discharged later than this, please inform hepatology so we can reschedule her appointment.            Thank you for your consult. I will follow-up with patient. Please contact us if you have any additional questions.    Jamil Escalante MD  Hepatology  [facility]

## 2018-08-02 NOTE — NURSING TRANSFER
Nursing Transfer Note      8/2/2018     Transfer To: CT abd/pelvis    Transfer via wheelchair    Transfer with cardiac monitoring    Transported by transport    Medicines sent: none    Chart send with patient: No    Notified: daughter

## 2018-08-02 NOTE — PT/OT/SLP EVAL
Physical Therapy Evaluation    Patient Name:  Natalie Parnell   MRN:  [id number]     Co-eval with OT  Pt's primary language is Guamanian. Pt's daughter present to assist with translation     Recommendations:     Discharge Recommendations:  home with home health   Discharge Equipment Recommendations: none   Barriers to discharge: Decreased caregiver support (home alone periodically)     Assessment:     Natalie Parnell is a 83 y.o. female admitted with a medical diagnosis of Severe aortic stenosis.  She presents with the following impairments/functional limitations:  gait instability, decreased safety awareness, pain.    Rehab Prognosis:  good; patient would benefit from acute skilled PT services to address these deficits and reach maximum level of function.      Recent Surgery: Procedure(s) (LRB):  PTA, AORTA, USING BALLOON (N/A) 3 Days Post-Op    Plan:     During this hospitalization, patient to be seen 3 x/week to address the above listed problems via gait training, therapeutic activities, neuromuscular re-education, therapeutic exercises  · Plan of Care Expires:  08/31/18   Plan of Care Reviewed with: patient, daughter    Subjective     Communicated with RN prior to session and daughter present in room.  Patient found in chair  upon PT entry to room, agreeable to evaluation.      Chief Complaint: L UE pain  Patient comments/goals: to get better and return home   Pain/Comfort:  · Pain Rating 1: 4/10 (IV placement )  · Pain Addressed 1: Distraction  · Pain Rating Post-Intervention 1: 4/10    Patients cultural, spiritual, Sikh conflicts given the current situation: none reported     Living Environment:  Pt lives in a duplex with daughter as neighbor.   Prior to admission, patients level of function was mod indep with ambulation (rollator) and indep with ADL's.  Patient has the following equipment: rollator.  DME owned (not currently used): none.  Upon discharge, patient will have assistance from daughter (daughter not  always home).    Objective:     Patient found with: telemetry     General Precautions: Standard, fall   Orthopedic Precautions:N/A   Braces: N/A     Exams:  · Cognitive Exam:   · AAOx4  · Follows multistep commands  · communication clear/fluent  · Primary language: Palestinian   · RLE ROM: WFL  · RLE Strength: WFL  · LLE ROM: WFL  · LLE Strength: WFL    Functional Mobility:  · Bed Mobility: NT 2nd to pt found in chair   · Transfers:     · Sit to Stand:  contact guard assistance with rolling walker from chair   · Gait: ~350ft with CGA using RW   · Cuing for RW management   · No LOB   · No SOB    AM-PAC 6 CLICK MOBILITY  Total Score:18       Therapeutic Activities and Exercises:  Educated pt on PT role/POC  Educated pt on safety with functional mobility  Pt verbalized understanding    Pt started ambulation with standard walker. Pt not safe with standard walker (not staying within walker and picking up walker too far off the ground). PT gave pt a RW and educated pt on RW management. Pt continued ambulation with RW.     L UE with tenderness to palpation on bicep origin, insertion, and muscle belly. Pt reported pain exacerbated with use of RW. No pain with contraction or stretch of bicep.     Patient left up in chair with all lines intact, call button in reach, RN notified and daughter present.    GOALS:    Physical Therapy Goals        Problem: Physical Therapy Goal    Goal Priority Disciplines Outcome Goal Variances Interventions   Physical Therapy Goal     PT/OT, PT Ongoing (interventions implemented as appropriate)     Description:  Goals to be met by: 2018    Patient will increase functional independence with mobility by performin. Sit to stand transfer with Supervision using RW - not met  2. Gait  x 250 feet with Supervision using Rolling Walker. - not met  3. Ascend/descend 7 stair with bilateral Handrails Supervision - not met                      History:     Past Medical History:   Diagnosis Date     Asthma     Diabetes mellitus     HTN (hypertension)     Hyperlipidemia        Past Surgical History:   Procedure Laterality Date    CATARACT EXTRACTION Bilateral     Dr. Max    COLON SURGERY      ESOPHAGOGASTRODUODENOSCOPY N/A 7/10/2018    Procedure: EGD (ESOPHAGOGASTRODUODENOSCOPY);  Surgeon: Derian Stoner MD;  Location: [facility] ENDO;  Service: Endoscopy;  Laterality: N/A;    HERNIA REPAIR      PERCUTANEOUS TRANSLUMINAL BALLOON ANGIOPLASTY OF AORTA N/A 7/30/2018    Procedure: PTA, AORTA, USING BALLOON;  Surgeon: Scott Pruett MD;  Location: [facility] CATH LAB;  Service: Cardiology;  Laterality: N/A;    VARICOSE VEIN SURGERY         Time Tracking:     PT Received On: 08/02/18  PT Start Time: 0941     PT Stop Time: 1005  PT Total Time (min): 24 min     Billable Minutes: Evaluation 10 and Gait Training 8    Destini Chaudhry, PT, DPT  8/2/2018  [phone]

## 2018-08-02 NOTE — PLAN OF CARE
[facility]    HOME HEALTH ORDERS  FACE TO FACE ENCOUNTER    Patient Name: Natalie Parnell  YOB: 1935    PCP: Octavio Ferrell MD   PCP Address: [address] / [place] [address]  PCP Phone Number: [phone]  PCP Fax: [phone]    Encounter Date: 08/02/2018    Admit to Home Health    Diagnoses:  Active Hospital Problems    Diagnosis  POA    *Severe aortic stenosis [I35.0]  Yes    Sepsis [A41.9]  Yes    NSTEMI (non-ST elevated myocardial infarction) [I21.4]  Yes    Cirrhosis of liver without ascites [K74.60]  Yes    Type 2 diabetes mellitus, without long-term current use of insulin [E11.9]  Yes      Resolved Hospital Problems    Diagnosis Date Resolved POA    Chest pain [R07.9] 07/28/2018 Yes    NSTEMI (non-ST elevated myocardial infarction) [I21.4] 07/28/2018 Yes       Future Appointments  Date Time Provider Department Center   8/2/2018 2:45 PM PULMONARY FUNCTION [facility] PULAB [facility]   8/2/2018 3:15 PM PULMONARY FUNCTION [facility] PULMLAB [facility]   8/8/2018 3:20 PM Shanice Ashraf MD [facility] HEPAT [facility]   9/4/2018 2:20 PM Mike Diaz MD [facility] CARDIO [facility]   10/9/2018 10:20 AM Octavio Ferrell MD [facility]           I have seen and examined this patient face to face today. My clinical findings that support the need for the home health skilled services and home bound status are the following:  Weakness/numbness causing balance and gait disturbance due to Weakness/Debility making it taxing to leave home.    Allergies:Review of patient's allergies indicates:  No Known Allergies    Diet: cardiac diet, diabetic diet: 2000 calorie and fluid restriction: 1.5L    Activities: activity as tolerated    Nursing:   SN to complete comprehensive assessment including routine vital signs. Instruct on disease process and s/s of complications to report to MD. Review/verify medication list sent home with the patient at time of discharge  and instruct  patient/caregiver as needed. Frequency may be adjusted depending on start of care date.    Notify MD if SBP > 160 or < 90; DBP > 90 or < 50; HR > 120 or < 50; Temp > 101      CONSULTS:    Physical Therapy to evaluate and treat. Evaluate for home safety and equipment needs; Establish/upgrade home exercise program. Perform / instruct on therapeutic exercises, gait training, transfer training, and Range of Motion.  Occupational Therapy to evaluate and treat. Evaluate home environment for safety and equipment needs. Perform/Instruct on transfers, ADL training, ROM, and therapeutic exercises.    MISCELLANEOUS CARE:  Diabetic Care:   SN to perform and educate Diabetic management with blood glucose monitoring:, Fingerstick blood sugar AC and HS and Report CBG < 60 or > 350 to physician.      Medications: Review discharge medications with patient and family and provide education.      Current Discharge Medication List      START taking these medications    Details   insulin aspart U-100 (NOVOLOG) 100 unit/mL InPn pen Inject 3 Units into the skin 3 (three) times daily.  Qty: 15 mL, Refills: 2      insulin glargine (LANTUS SOLOSTAR) 100 unit/mL (3 mL) InPn pen Inject 12 Units into the skin once daily.  Qty: 15 mL, Refills: 2         CONTINUE these medications which have NOT CHANGED    Details   albuterol 90 mcg/actuation inhaler Inhale 2 puffs into the lungs every 6 (six) hours as needed for Wheezing.  Qty: 1 each, Refills: 11    Associated Diagnoses: Bronchospasm      aspirin (ECOTRIN) 81 MG EC tablet Take 1 tablet (81 mg total) by mouth once daily.  Qty: 90 tablet, Refills: 3      atorvastatin (LIPITOR) 40 MG tablet Take 1 tablet (40 mg total) by mouth once daily.  Qty: 90 tablet, Refills: 1      benzonatate (TESSALON) 100 MG capsule Take 1 capsule (100 mg total) by mouth 3 (three) times daily as needed for Cough.  Qty: 30 capsule, Refills: 0      budesonide-formoterol 160-4.5 mcg (SYMBICORT) 160-4.5 mcg/actuation HFAA  Inhale 2 puffs into the lungs every 12 (twelve) hours. Controller  Qty: 10.2 Inhaler, Refills: 0    Associated Diagnoses: Acute bronchospasm      furosemide (LASIX) 20 MG tablet Take 1 tablet (20 mg total) by mouth once daily.  Qty: 30 tablet, Refills: 11      multivitamin capsule Take 1 capsule by mouth once daily.      blood sugar diagnostic (ACCU-CHEK SMARTVIEW TEST STRIP) Strp Inject 1 strip into the skin once daily.  Qty: 100 strip, Refills: 6    Associated Diagnoses: Type 2 diabetes mellitus with hyperglycemia, without long-term current use of insulin      blood-glucose meter Misc 1 Units by Misc.(Non-Drug; Combo Route) route once daily.  Qty: 1 each, Refills: 0    Associated Diagnoses: Type 2 diabetes mellitus with hyperglycemia      lancets (ACCU-CHEK FASTCLIX) Misc Inject 1 lancet into the skin once daily.  Qty: 100 each, Refills: 6    Associated Diagnoses: Type 2 diabetes mellitus with hyperglycemia, without long-term current use of insulin         STOP taking these medications       azelastine (OPTIVAR) 0.05 % ophthalmic solution Comments:   Reason for Stopping:         metFORMIN (GLUCOPHAGE) 500 MG tablet Comments:   Reason for Stopping:         metoprolol succinate (TOPROL-XL) 25 MG 24 hr tablet Comments:   Reason for Stopping:         ammonium lactate 12 % Crea Comments:   Reason for Stopping:               I certify that this patient is confined to her home and needs intermittent skilled nursing care, physical therapy and occupational therapy.

## 2018-08-03 PROBLEM — I50.9 ACUTE ON CHRONIC CONGESTIVE HEART FAILURE: Status: ACTIVE | Noted: 2018-01-01

## 2018-08-03 NOTE — ED NOTES
Patient identifiers verified and correct for Natalie Parnell.   LOC: The patient is awake, alert and aware of environment with an appropriate affect, the patient is oriented x 3 and speaking appropriately.   APPEARANCE: Patient appears to be short of breath, patient is clean and well groomed.  SKIN: The skin is warm and dry, color consistent with ethnicity, patient has normal skin turgor and moist mucus membranes, skin intact, no breakdown or bruising noted.   MUSCULOSKELETAL: Patient moving all extremities spontaneously. Swelling noted to bilateral lower legs.  RESPIRATORY: Airway is open and patent, respirations are tachypneic. pt placed on continuous pulse ox with O2 sats noted at 91% on room air.   CARDIAC: Pt placed on cardiac monitor. Patient has a tachycardia and regular rhythm, capillary refill < 3 seconds.   GASTRO: Soft and non tender to palpation, no distention noted, normoactive bowel sounds present in all four quadrants. Pt states bowel movements have been regular.  : Pt denies any pain or frequency with urination.  NEURO: Pt opens eyes spontaneously, behavior appropriate to situation, follows commands, facial expression symmetrical, bilateral hand grasp equal and even, purposeful motor response noted, normal sensation in all extremities when touched with a finger.

## 2018-08-03 NOTE — PLAN OF CARE
08/03/18 1221   Discharge Assessment   Assessment Type Discharge Planning Assessment   Assessment information obtained from? Medical Record   Expected Length of Stay (days) 3   Prior to hospitilization cognitive status: Alert/Oriented   Prior to hospitalization functional status: Independent;Assistive Equipment   Current cognitive status: Alert/Oriented   Current Functional Status: Assistive Equipment;Needs Assistance   Lives With child(lana), adult   Able to Return to Prior Arrangements unable to determine at this time (comments)   Is patient able to care for self after discharge? Unable to determine at this time (comments)   Patient's perception of discharge disposition home or selfcare;home health   Readmission Within The Last 30 Days previous discharge plan unsuccessful   Patient currently being followed by outpatient case management? Yes  (ordered last admission only days ago)   If yes, name of outpatient case management following: [facility] outpatient case management   Patient currently receives any other outside agency services? Yes   Name and contact number of agency or person providing outside services [facility]   Equipment Currently Used at Home rollator;bedside commode   Do you have any problems affording any of your prescribed medications? No   Is the patient taking medications as prescribed? yes   Does the patient have transportation home? Yes   Transportation Available family or friend will provide   Does the patient receive services at the [facility]? No   Discharge Plan A Home with family;Home Health   Readmission Questionnaire   At the time of your discharge, did someone talk to you about what your health problems were? Yes   At the time of discharge, did someone talk to you about what to watch out for regarding worsening of your health problem? Yes   At the time of discharge, did someone talk to you about what to do if you experienced worsening of your health problem? Yes   At the time of  discharge, did someone talk to you about which medication to take when you left the hospital and which ones to stop taking? Yes   At the time of discharge, did someone talk to you about when and where to follow up with a doctor after you left the hospital? Yes   How often do you need to have someone help you when you read instructions, pamphlets, or other written material from your doctor or pharmacy? Sometimes   Do you have problems taking your medications as prescribed? No   Do you have any problems affording any of  your prescribed medications? No   Do you have problems obtaining/receiving your medications? No   Does the patient have transportation to healthcare appointments? Yes   Living Arrangements house   Does the patient have family/friends to help with healtcare needs after discharge? yes   Does your caregiver provide all the help you need? Yes   Are you currently feeling confused? No   Are you currently having problems thinking? No   Are you currently having memory problems? No   Readmitted with CHF. Known to this CM from admission only days ago. Lives with daughter who assists with ADLs. Plan is to DC home and continue [facility]

## 2018-08-03 NOTE — PROGRESS NOTES
[facility]  Cardiology Consult Note.     Patient Name: Natalie Parnell  MRN: [id number]  Admission Date: 8/3/2018  Hospital Length of Stay: 0 days  Code Status: Full Code   Attending Physician: Joselito Thomason MD   Primary Care Physician: Octavio Ferrell MD  Expected Discharge Date: 8/6/2018  Principal Problem:Acute on chronic congestive heart failure    Subjective:     Hospital Course:   No notes on file    Past Medical History:   Diagnosis Date    Asthma     Diabetes mellitus     HTN (hypertension)     Hyperlipidemia        Past Surgical History:   Procedure Laterality Date    CATARACT EXTRACTION Bilateral     Dr. Max    COLON SURGERY      ESOPHAGOGASTRODUODENOSCOPY N/A 7/10/2018    Procedure: EGD (ESOPHAGOGASTRODUODENOSCOPY);  Surgeon: Derian Stoner MD;  [facility] ENDO;  Service: Endoscopy;  Laterality: N/A;    HERNIA REPAIR      PERCUTANEOUS TRANSLUMINAL BALLOON ANGIOPLASTY OF AORTA N/A 7/30/2018    Procedure: PTA, AORTA, USING BALLOON;  Surgeon: Scott Pruett MD;  Location: [facility] CATH LAB;  Service: Cardiology;  Laterality: N/A;    VARICOSE VEIN SURGERY         Review of patient's allergies indicates:  No Known Allergies    Current Facility-Administered Medications on File Prior to Encounter   Medication    [DISCONTINUED] aspirin EC tablet 81 mg    [DISCONTINUED] atorvastatin tablet 40 mg    [DISCONTINUED] dextrose 50% injection 12.5 g    [DISCONTINUED] glucagon (human recombinant) injection 1 mg    [DISCONTINUED] insulin aspart U-100 pen 0-5 Units    [DISCONTINUED] insulin aspart U-100 pen 3 Units    [DISCONTINUED] insulin detemir U-100 pen 12 Units    [DISCONTINUED] pantoprazole EC tablet 40 mg    [DISCONTINUED] polyethylene glycol packet 17 g    [DISCONTINUED] sodium chloride 0.9% flush 3 mL     Current Outpatient Prescriptions on File Prior to Encounter   Medication Sig    albuterol 90 mcg/actuation inhaler Inhale 2 puffs into the lungs every 6 (six)  hours as needed for Wheezing.    aspirin (ECOTRIN) 81 MG EC tablet Take 1 tablet (81 mg total) by mouth once daily.    atorvastatin (LIPITOR) 40 MG tablet Take 1 tablet (40 mg total) by mouth once daily.    blood sugar diagnostic (ACCU-CHEK SMARTVIEW TEST STRIP) Strp Inject 1 strip into the skin once daily.    blood-glucose meter Misc Use as instructed    furosemide (LASIX) 20 MG tablet Take 1 tablet (20 mg total) by mouth once daily.    insulin aspart U-100 (NOVOLOG) 100 unit/mL InPn pen Inject 3 Units into the skin 3 (three) times daily.    insulin glargine (LANTUS SOLOSTAR) 100 unit/mL (3 mL) InPn pen Inject 12 Units into the skin once daily.    lancets (ACCU-CHEK FASTCLIX) Misc Inject 1 lancet into the skin once daily.    multivitamin capsule Take 1 capsule by mouth once daily.    [DISCONTINUED] benzonatate (TESSALON) 100 MG capsule Take 1 capsule (100 mg total) by mouth 3 (three) times daily as needed for Cough.    [DISCONTINUED] budesonide-formoterol 160-4.5 mcg (SYMBICORT) 160-4.5 mcg/actuation HFAA Inhale 2 puffs into the lungs every 12 (twelve) hours. Controller     Family History     None        Social History Main Topics    Smoking status: Never Smoker    Smokeless tobacco: Never Used    Alcohol use No    Drug use: No    Sexual activity: Not on file     Review of Systems   Constitution: Negative for decreased appetite, diaphoresis, fever, night sweats, weight gain and weight loss.   HENT: Negative for congestion, nosebleeds, sore throat and tinnitus.    Eyes: Negative for blurred vision, double vision, photophobia and visual disturbance.   Cardiovascular: Positive for leg swelling, orthopnea and paroxysmal nocturnal dyspnea. Negative for chest pain, claudication, dyspnea on exertion, irregular heartbeat, palpitations and syncope.   Respiratory: Positive for shortness of breath. Negative for cough, snoring and wheezing.    Endocrine: Negative for cold intolerance, heat intolerance,  polydipsia, polyphagia and polyuria.   Hematologic/Lymphatic: Does not bruise/bleed easily.   Skin: Negative for color change and rash.   Musculoskeletal: Negative for joint pain, muscle weakness, myalgias and stiffness.   Gastrointestinal: Negative for abdominal pain, change in bowel habit, heartburn, hematemesis, hematochezia, melena, nausea and vomiting.   Genitourinary: Negative for bladder incontinence, dysuria, frequency, hematuria, hesitancy and urgency.   Neurological: Negative for dizziness, focal weakness, headaches, numbness, paresthesias, tremors and vertigo.   Psychiatric/Behavioral: Negative for depression. The patient does not have insomnia and is not nervous/anxious.      Objective:     Vital Signs (Most Recent):  Temp: 97 °F (36.1 °C) (08/03/18 1243)  Pulse: 105 (08/03/18 1400)  Resp: 15 (08/03/18 1400)  BP: (!) 96/51 (08/03/18 1400)  SpO2: 97 % (08/03/18 1400) Vital Signs (24h Range):  Temp:  [97 °F (36.1 °C)-98.8 °F (37.1 °C)] 97 °F (36.1 °C)  Pulse:  [] 105  Resp:  [15-20] 15  SpO2:  [91 %-99 %] 97 %  BP: ()/(48-60) 96/51     Weight: 79.4 kg (175 lb)  Body mass index is 28.25 kg/m².    SpO2: 97 %  O2 Device (Oxygen Therapy): nasal cannula      Intake/Output Summary (Last 24 hours) at 08/03/18 1434  Last data filed at 08/03/18 1130   Gross per 24 hour   Intake                0 ml   Output              200 ml   Net             -200 ml       Lines/Drains/Airways     Peripheral Intravenous Line                 Peripheral IV - Single Lumen 08/03/18 0746 Left Antecubital less than 1 day         Peripheral IV - Single Lumen 08/03/18 0754 Right Antecubital less than 1 day                Physical Exam   Constitutional: She is oriented to person, place, and time. No distress.   Eyes: Right eye exhibits no discharge. Left eye exhibits no discharge.   Neck: JVD present.   Cardiovascular: Normal rate.  Exam reveals no gallop and no friction rub.    Murmur heard.  Tachycardic   Pulmonary/Chest: No  respiratory distress. She has no wheezes. She has rales. She exhibits no tenderness.   Musculoskeletal: She exhibits edema.   Neurological: She is alert and oriented to person, place, and time.   Skin: Skin is warm. She is not diaphoretic.         Assessment and Plan:     * Acute on chronic congestive heart failure    Admit to team C for decompensated heart failure  Warm and wet on exam  -Recommend IV diuresis, agree with lasix ggt.   -If still oliguric, will benefit from addition of metolazone to improve diuresis.   -Will benefit from ACE for afterload reduction.  -Do not start BB till she is euvolemic.     Discussed with Dr Davis, staff Cardiologist.             VTE Risk Mitigation         Ordered     IP VTE HIGH RISK PATIENT  Once      08/03/18 1146     Place CORWIN hose  Until discontinued      08/03/18 1146     Place sequential compression device  Until discontinued      08/03/18 1146     Place CORWIN hose  Until discontinued      08/03/18 1146          Tosha Goodman MD  Cardiology  [facility]

## 2018-08-03 NOTE — ED NOTES
Dr Davidson with IM C notified of pt BP 87/48 - states pt baseline BP is low and pt okay to go to CSU.

## 2018-08-03 NOTE — PLAN OF CARE
08/03/18 0719   Final Note   Assessment Type Final Discharge Note   Discharge Disposition Home-Health   Hospital Follow Up  Appt(s) scheduled? Yes

## 2018-08-03 NOTE — NURSING TRANSFER
Nursing Transfer Note      8/3/2018     Transfer From: ed    Transfer via stretcher    Transfer with  to O2, cardiac monitoring    Transported by RN    Medicines sent: lasix and inhaler     Chart send with patient: Yes    Notified: family at bedside    Upon arrival to floor: cardiac monitor applied, patient oriented to room, call bell in reach and bed in lowest position

## 2018-08-03 NOTE — ED TRIAGE NOTES
Pt brought by family who state that patient was tachycardiac at home when they checked her vitals this morning. Heart rate in the 130s. She also complains of left upper back pain. She had cardiac procedure Friday and Monday. She denies chest pain, nausea/vomiting.

## 2018-08-03 NOTE — TELEPHONE ENCOUNTER
Patient: Natalie Parnell       MRN: [id number]      : 1935     Age: 83 y.o.  [address]  [place] [address]    Provider: Hepatologist Nelia Ashraf    Urgency of review: urgent    Patient Transplant Status: Not a candidate    Reason for presentation: Non-Transplant    Clinical Summary:83F Hx of HTN, DM, severe AS, overweight (BMI 28) ESLD with grade 1 varices; hepatology is consulted for evaluation of hepatic lesion in patient being evaluated for TAVR.    Recent imaging was unremarkable for hepatic lesions in 3/2018, but on recent imaging (18) was remarkable for new segment V 2.5cm lesion which in patient with concern for ESLD is concerning for HCC.      Plan  - Given the patient's normal LFT, INR, MELD 9, albumin 2.5; her hepatic disease would not contraindicate her from a surgical procedure (TAVR) - pending TAVR outpatient.  - Hepatic lesion concerning for HCC can be evaluated as an outpatient regarding therapeutic options. AFP elevated (572) which supports this as likely HCC. CT 3 phase pending.  - Concern for cirrhosis on imaging likely secondary to HERNANDEZ. We will continue her workup as an outpatient with modalities such as fibroscan. Iron panel negative. , SUGAR pending, ASMA pending.  - if she is discharged before  she can follow-up in her previously scheduled appointment with Dr. Ashraf. If she is discharged later than this, please inform hepatology so we can reschedule her appointment.    Imaging to be reviewed: CT SCAN Abdomen/Pelvis    HCC Treatment History: None    ABO: O POS    Platelets:   Lab Results   Component Value Date/Time     2018 07:49 AM     Creatinine:   Lab Results   Component Value Date/Time    CREATININE 1.3 2018 07:49 AM     Bilirubin:   Lab Results   Component Value Date/Time    BILITOT 1.1 (H) 2018 07:49 AM     AFP Last 3 each if available:   Lab Results   Component Value Date/Time     (H) 2018 07:42 PM       MELD: MELD-Na score: 10 at  8/3/2018  7:49 AM  MELD score: 10 at 8/3/2018  7:49 AM  Calculated from:  Serum Creatinine: 1.3 mg/dL at 8/3/2018  7:49 AM  Serum Sodium: 133 mmol/L at 8/3/2018  7:49 AM  Total Bilirubin: 1.1 mg/dL at 8/3/2018  7:49 AM  INR(ratio): 1.1 at 8/3/2018  7:49 AM  Age: 83 years    Plan: CT shows a 2.7 cm lesion in segments 5/6 with enhancement and washout on a background of cirrhosis with associated elevated AFP c/w HCC. Rec treatment if pt can tolerate it due to her AS.    ---IR for ablation but Dr. sAhraf will discuss with cardiology first to see if it's ok to move forward with ablation in presence of AS.    Referral to IR entered.    Follow-up Provider: Shanice Ashraf MD

## 2018-08-03 NOTE — ED NOTES
Hourly rounding complete. Patient resting in stretcher and is in NAD at this time. Pt is awake alert and oriented x4, respirations even and unlabored. Pt denies pain at this time. Assist pt. on bed pan, 200 ml urine output yellow and clear, no odor.  Pt updated on POC. Bed low and locked with side rails up x2, call bell in pt reach. Pt voices no needs at this time.

## 2018-08-03 NOTE — SUBJECTIVE & OBJECTIVE
Past Medical History:   Diagnosis Date    Asthma     Diabetes mellitus     HTN (hypertension)     Hyperlipidemia        Past Surgical History:   Procedure Laterality Date    CATARACT EXTRACTION Bilateral     Dr. Max    COLON SURGERY      ESOPHAGOGASTRODUODENOSCOPY N/A 7/10/2018    Procedure: EGD (ESOPHAGOGASTRODUODENOSCOPY);  Surgeon: Derian Stoner MD;  Location: [facility] ENDO;  Service: Endoscopy;  Laterality: N/A;    HERNIA REPAIR      PERCUTANEOUS TRANSLUMINAL BALLOON ANGIOPLASTY OF AORTA N/A 7/30/2018    Procedure: PTA, AORTA, USING BALLOON;  Surgeon: Scott Pruett MD;  Location: [facility] CATH LAB;  Service: Cardiology;  Laterality: N/A;    VARICOSE VEIN SURGERY         Review of patient's allergies indicates:  No Known Allergies    Current Facility-Administered Medications on File Prior to Encounter   Medication    [DISCONTINUED] aspirin EC tablet 81 mg    [DISCONTINUED] atorvastatin tablet 40 mg    [DISCONTINUED] dextrose 50% injection 12.5 g    [DISCONTINUED] glucagon (human recombinant) injection 1 mg    [DISCONTINUED] insulin aspart U-100 pen 0-5 Units    [DISCONTINUED] insulin aspart U-100 pen 3 Units    [DISCONTINUED] insulin detemir U-100 pen 12 Units    [DISCONTINUED] pantoprazole EC tablet 40 mg    [DISCONTINUED] polyethylene glycol packet 17 g    [DISCONTINUED] sodium chloride 0.9% flush 3 mL     Current Outpatient Prescriptions on File Prior to Encounter   Medication Sig    albuterol 90 mcg/actuation inhaler Inhale 2 puffs into the lungs every 6 (six) hours as needed for Wheezing.    aspirin (ECOTRIN) 81 MG EC tablet Take 1 tablet (81 mg total) by mouth once daily.    atorvastatin (LIPITOR) 40 MG tablet Take 1 tablet (40 mg total) by mouth once daily.    blood sugar diagnostic (ACCU-CHEK SMARTVIEW TEST STRIP) Strp Inject 1 strip into the skin once daily.    blood-glucose meter Misc Use as instructed    furosemide (LASIX) 20 MG tablet Take 1 tablet (20 mg total) by mouth  once daily.    insulin aspart U-100 (NOVOLOG) 100 unit/mL InPn pen Inject 3 Units into the skin 3 (three) times daily.    insulin glargine (LANTUS SOLOSTAR) 100 unit/mL (3 mL) InPn pen Inject 12 Units into the skin once daily.    lancets (ACCU-CHEK FASTCLIX) Misc Inject 1 lancet into the skin once daily.    multivitamin capsule Take 1 capsule by mouth once daily.    [DISCONTINUED] benzonatate (TESSALON) 100 MG capsule Take 1 capsule (100 mg total) by mouth 3 (three) times daily as needed for Cough.    [DISCONTINUED] budesonide-formoterol 160-4.5 mcg (SYMBICORT) 160-4.5 mcg/actuation HFAA Inhale 2 puffs into the lungs every 12 (twelve) hours. Controller     Family History     None        Social History Main Topics    Smoking status: Never Smoker    Smokeless tobacco: Never Used    Alcohol use No    Drug use: No    Sexual activity: Not on file     Review of Systems   Constitution: Negative for decreased appetite, diaphoresis, fever, night sweats, weight gain and weight loss.   HENT: Negative for congestion, nosebleeds, sore throat and tinnitus.    Eyes: Negative for blurred vision, double vision, photophobia and visual disturbance.   Cardiovascular: Positive for leg swelling, orthopnea and paroxysmal nocturnal dyspnea. Negative for chest pain, claudication, dyspnea on exertion, irregular heartbeat, palpitations and syncope.   Respiratory: Positive for shortness of breath. Negative for cough, snoring and wheezing.    Endocrine: Negative for cold intolerance, heat intolerance, polydipsia, polyphagia and polyuria.   Hematologic/Lymphatic: Does not bruise/bleed easily.   Skin: Negative for color change and rash.   Musculoskeletal: Negative for joint pain, muscle weakness, myalgias and stiffness.   Gastrointestinal: Negative for abdominal pain, change in bowel habit, heartburn, hematemesis, hematochezia, melena, nausea and vomiting.   Genitourinary: Negative for bladder incontinence, dysuria, frequency,  hematuria, hesitancy and urgency.   Neurological: Negative for dizziness, focal weakness, headaches, numbness, paresthesias, tremors and vertigo.   Psychiatric/Behavioral: Negative for depression. The patient does not have insomnia and is not nervous/anxious.      Objective:     Vital Signs (Most Recent):  Temp: 97 °F (36.1 °C) (08/03/18 1243)  Pulse: 105 (08/03/18 1400)  Resp: 15 (08/03/18 1400)  BP: (!) 96/51 (08/03/18 1400)  SpO2: 97 % (08/03/18 1400) Vital Signs (24h Range):  Temp:  [97 °F (36.1 °C)-98.8 °F (37.1 °C)] 97 °F (36.1 °C)  Pulse:  [] 105  Resp:  [15-20] 15  SpO2:  [91 %-99 %] 97 %  BP: ()/(48-60) 96/51     Weight: 79.4 kg (175 lb)  Body mass index is 28.25 kg/m².    SpO2: 97 %  O2 Device (Oxygen Therapy): nasal cannula      Intake/Output Summary (Last 24 hours) at 08/03/18 1434  Last data filed at 08/03/18 1130   Gross per 24 hour   Intake                0 ml   Output              200 ml   Net             -200 ml       Lines/Drains/Airways     Peripheral Intravenous Line                 Peripheral IV - Single Lumen 08/03/18 0746 Left Antecubital less than 1 day         Peripheral IV - Single Lumen 08/03/18 0754 Right Antecubital less than 1 day                Physical Exam   Constitutional: She is oriented to person, place, and time. No distress.   Eyes: Right eye exhibits no discharge. Left eye exhibits no discharge.   Neck: JVD present.   Cardiovascular: Normal rate.  Exam reveals no gallop and no friction rub.    Murmur heard.  Tachycardic   Pulmonary/Chest: No respiratory distress. She has no wheezes. She has rales. She exhibits no tenderness.   Musculoskeletal: She exhibits edema.   Neurological: She is alert and oriented to person, place, and time.   Skin: Skin is warm. She is not diaphoretic.

## 2018-08-03 NOTE — ED NOTES
Hourly rounding complete. Patient resting in stretcher and is in NAD at this time. Pt is awake alert and oriented x4, respirations even and unlabored. Pt denies pain at this time. Pt updated on POC. Bed low and locked with side rails up x2, call bell in pt reach. Pt voices no needs at this time.

## 2018-08-03 NOTE — HPI
83 y.o. female with PMH of severe AS with SHEA 0.65cm2, mean gradients 75mmhg, peak velocity 5.3m/sec, recent admission for GI bleed (requiring 1 unit of PRBC and EGD showing non-bleeding angioectasia s/p APC), recent admission for NSTEMI (non obstructive disease on LHC), and discharge yesterday after stay for cardiogenic shock and ADHF. Patient was admitted with cardiogenic shock and discharged yesterday. Over the past week patient was in the CCU required IABP. Repeat LHC was performed given drop in her EF and revealed non obstructive disease. She underwent BAV with improvement in her transvalvular mean gradients to 36mmhg. She was stepped down and discharged yesterday, she went home and was SOB and couldn't sleep all night in the AM patient was found to be tachycardic by her daughter who is a nurse and came back to the ER. In the ER patient was volume overloaded, warm and wet on exam and with pulmonary edema on CXR.

## 2018-08-03 NOTE — TELEPHONE ENCOUNTER
"    Reason for Disposition   [1] Request for URGENT new prescription or refill of "essential" medication (i.e., likelihood of harm to patient if not taken) AND [2] triager unable to fill per unit policy    Protocols used: ST MEDICATION QUESTION CALL-A-BRANDON Estrada's granddaughter Kelsie said she was discharged an hour ago from [facility] after 5 days. She was sent home without any pen needle orders for her Lantus Solostar pens or for her Novolog U-100 pens.  Called  for page to Neida Davidson MD, who did write orders for her.  Preferred pharmacy is [facility] at [phone] for needles to be called in by provider.  Explained problem to Dr Davidson, connected her to Kelsie, and she will give her the name of pen needles requested.  Message to Dr Davidson. Please contact caller directly with any additional care advice.    "

## 2018-08-03 NOTE — H&P
History & Physical  [facility] CARDIOLOGY TEAM A - CARDIOLOGY CONSULT STEPDOWN    SUBJECTIVE:   Chief Complaint/Reason for Admission: shortness of breath    History of Present Illness:  Patient is a 83 y.o. female with PMH of severe AS with SHEA 0.65cm2, mean gradients 75mmhg, peak velocity 5.3m/sec, recent admission for GI bleed (requiring 1 unit of PRBC and EGD showing non-bleeding angioectasia s/p APC), recent admission for NSTEMI (with clean LHC), and discharge yesterday after stay for cardiogenic shock and ADHF, presents to ED today with acute onset of SOB.  Pt reports feeling well upon discharge and in the evening, but awoke at 0100 SOB and gasping for air.  She urinated twice overnight.  Also notes LE edema has developed.    Old chart was reviewed.    Past Medical History:   Diagnosis Date    Asthma     Diabetes mellitus     HTN (hypertension)     Hyperlipidemia        Past Surgical History:   Procedure Laterality Date    CATARACT EXTRACTION Bilateral     Dr. Max    COLON SURGERY      ESOPHAGOGASTRODUODENOSCOPY N/A 7/10/2018    Procedure: EGD (ESOPHAGOGASTRODUODENOSCOPY);  Surgeon: Derian Stoner MD;  Location: [facility] ENDO;  Service: Endoscopy;  Laterality: N/A;    HERNIA REPAIR      PERCUTANEOUS TRANSLUMINAL BALLOON ANGIOPLASTY OF AORTA N/A 7/30/2018    Procedure: PTA, AORTA, USING BALLOON;  Surgeon: Scott Pruett MD;  Location: [facility] CATH LAB;  Service: Cardiology;  Laterality: N/A;    VARICOSE VEIN SURGERY        History reviewed. No pertinent family history.    Social History   Substance Use Topics    Smoking status: Never Smoker    Smokeless tobacco: Never Used    Alcohol use No      Review of patient's allergies indicates:  No Known Allergies    Current Facility-Administered Medications on File Prior to Encounter   Medication Dose Route Frequency Provider Last Rate Last Dose    [DISCONTINUED] aspirin EC tablet 81 mg  81 mg Oral Daily Kellen Mtz MD   81 mg at 08/02/18 4445     [DISCONTINUED] atorvastatin tablet 40 mg  40 mg Oral Daily Kellen Mtz MD   40 mg at 08/02/18 0855    [DISCONTINUED] dextrose 50% injection 12.5 g  12.5 g Intravenous PRN Kellen Mtz MD        [DISCONTINUED] glucagon (human recombinant) injection 1 mg  1 mg Intramuscular PRN Kellen Mtz MD        [DISCONTINUED] insulin aspart U-100 pen 0-5 Units  0-5 Units Subcutaneous Q6H PRN Kellen Mtz MD   2 Units at 08/02/18 1625    [DISCONTINUED] insulin aspart U-100 pen 3 Units  3 Units Subcutaneous TIDWM Neida Davidson MD   3 Units at 08/02/18 1626    [DISCONTINUED] insulin detemir U-100 pen 12 Units  12 Units Subcutaneous Daily Neida Davidson MD   12 Units at 08/02/18 0855    [DISCONTINUED] pantoprazole EC tablet 40 mg  40 mg Oral Daily Siva Short MD   40 mg at 08/02/18 0855    [DISCONTINUED] polyethylene glycol packet 17 g  17 g Oral Daily Héctor Corbin MD   17 g at 08/02/18 0855    [DISCONTINUED] sodium chloride 0.9% flush 3 mL  3 mL Intravenous PRN Suzan Galvin MD         Current Outpatient Prescriptions on File Prior to Encounter   Medication Sig Dispense Refill    albuterol 90 mcg/actuation inhaler Inhale 2 puffs into the lungs every 6 (six) hours as needed for Wheezing. 1 each 11    aspirin (ECOTRIN) 81 MG EC tablet Take 1 tablet (81 mg total) by mouth once daily. 90 tablet 3    atorvastatin (LIPITOR) 40 MG tablet Take 1 tablet (40 mg total) by mouth once daily. 90 tablet 1    blood sugar diagnostic (ACCU-CHEK SMARTVIEW TEST STRIP) Strp Inject 1 strip into the skin once daily. 100 strip 6    blood-glucose meter Misc Use as instructed 1 each 0    furosemide (LASIX) 20 MG tablet Take 1 tablet (20 mg total) by mouth once daily. 30 tablet 11    insulin aspart U-100 (NOVOLOG) 100 unit/mL InPn pen Inject 3 Units into the skin 3 (three) times daily. 15 mL 2    insulin glargine (LANTUS SOLOSTAR) 100 unit/mL (3 mL) InPn pen Inject 12 Units into  the skin once daily. 15 mL 2    lancets (ACCU-CHEK FASTCLIX) Misc Inject 1 lancet into the skin once daily. 100 each 6    multivitamin capsule Take 1 capsule by mouth once daily.      [DISCONTINUED] benzonatate (TESSALON) 100 MG capsule Take 1 capsule (100 mg total) by mouth 3 (three) times daily as needed for Cough. 30 capsule 0    [DISCONTINUED] budesonide-formoterol 160-4.5 mcg (SYMBICORT) 160-4.5 mcg/actuation HFAA Inhale 2 puffs into the lungs every 12 (twelve) hours. Controller 10.2 Inhaler 0        Review of Systems:  Constitutional: no fever or chills  Respiratory: positive for dyspnea on exertion  Cardiovascular: positive for dyspnea, lower extremity edema, orthopnea and paroxysmal nocturnal dyspnea  Gastrointestinal: no nausea or vomiting, no abdominal pain or change in bowel habits  Musculoskeletal: no arthralgias or myalgias     OBJECTIVE:     Vital Signs (Most Recent)   Temp: 98.8 °F (37.1 °C) (08/03/18 0727)  Pulse: (!) 116 (08/03/18 0951)  Resp: 20 (08/03/18 0951)  BP: 107/60 (08/03/18 0951)  SpO2: 97 % (08/03/18 0951)  Body mass index is 28.25 kg/m².      Physical Exam:  Gen- well-developed, well-nourished, NAD  Head- NC/AT, PERRL, EOMI, no oral lesions  Neck-supple, trachea midline  CVS- S1 and S2 present, 3/6 systolic murmur with radiation to carotids, tachycardic, regular rhythm.  +JVD  Resp- diffuse crackles b/l, mild work of breathing  Abd- BS+, soft, NT, ND  Ext- no clubbing, or cyanosis, 1+ pitting edema of LE  Skin- cool hands and feet, no lesions  Neuro- alert and appropriate, 5/5 strength in all extremities, no focal deficits    Laboratory:  CBC/Anemia Labs: Coags:      Recent Labs  Lab 08/01/18  0500 08/02/18  0352 08/02/18  0907 08/03/18  0749   WBC 5.63 6.13  --  7.53   HGB 8.4* 8.6*  --  9.7*   HCT 25.3* 26.4*  --  30.0*    184  --  257   MCV 96 96  --  97   RDW 14.9* 15.0*  --  15.2*   IRON  --   --  44  --    FERRITIN  --   --  168  --       Recent Labs  Lab 07/27/18  7402  07/30/18  0325 08/03/18  0749   INR 1.2 1.2 1.1        Chemistries: ABG:     Recent Labs  Lab 07/28/18  0305  07/29/18  0400 07/30/18  0325 07/30/18  1140 07/31/18  0302 08/01/18  0500 08/02/18  0352 08/03/18  0749     < > 134* 134*  --  134* 132* 133* 133*   K 4.3  < > 3.7 3.8  --  3.8 3.9 4.4 4.2     < > 100 101  --  101 101 102 101   CO2 24  < > 26 25  --  26 26 24 20*   BUN 26*  < > 23 18  --  16 16 16 20   CREATININE 1.2  < > 1.2 1.1  --  1.0 1.1 1.2 1.3   CALCIUM 8.6*  < > 8.3* 8.0*  --  8.0* 8.2* 8.4* 9.0   PROT  --   < >  --   --   --  5.1* 5.4* 5.4* 6.6   BILITOT  --   < >  --   --   --  0.7 1.0 0.9 1.1*   ALKPHOS  --   < >  --   --   --  98 130 120 154*   ALT  --   < >  --   --   --  20 18 17 21   AST  --   < >  --   --   --  58* 51* 49* 56*   MG 1.7  --  1.9 1.7 2.2 1.7  --   --  2.1   PHOS 4.1  --  2.9 2.3*  --   --   --   --   --    < > = values in this interval not displayed.   Recent Labs  Lab 07/31/18  0802 08/01/18  0956 08/03/18  0747   PH 7.412 7.413 7.386   PCO2 45.9* 40.9 44.1   PO2 32* 29* 21*   HCO3 29.2* 26.1 26.5   POCSATURATED 62* 57* 33*   BE 5 1 1          Cardiac Enzymes: Ejection Fractions:    Recent Labs      08/03/18   0749   TROPONINI  0.223*    EF   Date Value Ref Range Status   07/27/2018 20 (A) 55 - 65    07/09/2018 50 55 - 65           Diagnostic Results:  Labs: Reviewed  ECG: Reviewed  X-Ray: Reviewed      ASSESSMENT/PLAN:     Active Hospital Problems    Diagnosis  POA    *Acute on chronic congestive heart failure [I50.9]  Yes    Coronary artery disease involving native coronary artery of native heart without angina pectoris [I25.10]  Yes     nonobstructive      H/O: GI bleed [Z87.19]  Not Applicable    Severe aortic stenosis [I35.0]  Yes    Cirrhosis of liver without ascites [K74.60]  Yes    Type 2 diabetes mellitus, without long-term current use of insulin [E11.9]  Yes      Resolved Hospital Problems    Diagnosis Date Resolved POA   No resolved problems to  display.     Acute on Chronic Systolic Heart Failure  -Pt's Lasix had been held for several days during hospitalization due to cardiogenic shock  -Now volume overloaded  -Will diurese with Lasix 40mg IV BID, closely monitor urine output  -strict I/o, low salt diet, fluid restriction, daily weights  -when fully diuresed, will start beta-blocker and ACEI as tolerated by bp    Severe Aortic Stenosis  -during prior hospitalization required IABP for hemodynamic support in the setting of acute respiratory distress from flash pulmonary edema 2/2 severe AV stenosis  -s/p successful balloon aortic valvuloplasty with 24mm true balloon aortic mean gradient improved from 70 to 36mmHg  -currently undergoing TAVR w/u: CT chest performed, LHC negative for obstructive CAD  -PFTs ordered for outpatient  -Pt will f/u with Interventional Cardiology as outpatient     NSTEMI  -Trop lower now that previous hospitalization  -LHC revealed clean coronaries   -continue ASA alone     Right Hepatic Lobe Lesion  Suspected Cirrhosis  -lesion seen incidentally on CT for TAVR  -followed up with US abd- notes 3cm  -triple phase CT shows 2.4 x 1.6cm irregular enhancing mass in right lobe of liver; 1cm focal nonenhancing areas centrally; with anterior washout, arterial enhancement- LI-RADS 5 lesion  -  -Hepatology consulted last admission; will be discussing case in IR conference 8/7 and then f/u in Hepatology clinic 8/8  -labs for w/u of underlying cirrhosis pending     DM-II  -A1C 7  -per daughter, pt's blood sugar is poorly controlled at home, reaching 300s  -will start Levemir 12units nightly and Aspart 3units TIDWM as during last stay; titrate accordingly  -amb referral to Endocrinology    DVT ppx- no pharmacologic ppx with recent GI Bleed; CORWIN hose  CODE status- FULL    Dispo- home in 2-3 days pending clinical improvement    Neida Davidson MD  Hospital Medicine Staff

## 2018-08-03 NOTE — DISCHARGE SUMMARY
DISCHARGE SUMMARY  Hospital Medicine    [place] [facility]    Patient Name: Natalie Parnell  YOB: 1935    Admit Date: 7/27/2018    Discharge Date: 8/2/2018    Discharge Attending Physician: Neida Davidson MD    Principal Diagnoses:  Active Hospital Problems    Diagnosis  POA    *Severe aortic stenosis [I35.0]  Yes    Sepsis [A41.9]  Yes    NSTEMI (non-ST elevated myocardial infarction) [I21.4]  Yes    Cirrhosis of liver without ascites [K74.60]  Yes    Type 2 diabetes mellitus, without long-term current use of insulin [E11.9]  Yes      Resolved Hospital Problems    Diagnosis Date Resolved POA    Chest pain [R07.9] 07/28/2018 Yes    NSTEMI (non-ST elevated myocardial infarction) [I21.4] 07/28/2018 Yes       Discharged Condition: stable    HOSPITAL COURSE:      Initial Presentation:    83 year old female with PMHx signficant for severe Aortic stenosis with SHEA 0.65cm2, mean gradients 75mmhg, peak velocity 5.3m/sec with recent admission for GIB (requiring 1 unit of PRBCs EGD with non bleeding angectasia S/P APC) and NSTEMI (trop peak of 7). She underwent LHC that showed non obstructive disease. Patient was discharged on 7/17, 2 days ago patient reported chest pain radiating to back associated with SOB and orthopnea. On the day of presentation to the hospital, here SOB worsened and she came to the ER diaphoretic and tachycardic.     Course of Principle Problem for Admission:    Pt was diagnosed with cardiogenic shock and decompensated CHF related to severe AS and admitted to CCU.  She developed worsening tachypnea and placed on Bipap. Troponin was checked and found to be elevated at 2.7 and BNP of 1700. Cardiology was then consulted. Bedside echo revealed new drop in her EF now 25% within anterolateral wall. She was loaded with Plavix and ASA, taken to cath lab for LHC that revealed clean coronaries. Pt was placed on IABP for advanced mechanical support. She was diuresed effectively with IV lasix  gtt, seen by interventional cards team and tolerated BAV. She had her IABP removed shortly after and was stepped down to the floor 8/1.    Other Medical Problems Addressed in the Hospital:    Severe Aortic Stenosis  -previously required IABP for hemodynamic support in the setting of acute respiratory distress from flash pulmonary edema 2/2 severe AV stenosis  -s/p successful balloon aortic valvuloplasty with 24mm true balloon aortic mean gradient improved from 70 to 36mmHg  -currently undergoing TAVR w/u: CT chest performed, [facility] negative for obstructive CAD  -PFTs attempted today with [place]ian speaking technicians, but pt not able to understand instructions; will reschedule as outpatient with family assistance  -Pt will f/u with Interventional Cardiology as outpatient  -resume Lasix 20mg daily on discharge    NSTEMI  -pt initially loaded with Plavix and ASA and taken to cath lab  -[facility] revealed clean coronaries so these were stopped     Right Hepatic Lobe Lesion  Suspected Cirrhosis  -lesion seen incidentally on CT for TAVR  -followed up with US abd- notes 3cm  -triple phase CT shows 2.4 x 1.6cm irregular enhancing mass in right lobe of liver; 1cm focal nonenhancing areas centrally; with anterior washout, arterial enhancement- LI-RADS 5 lesion  -  -Hepatology consulted; will be discussing case in IR conference 8/7 and then f/u in Hepatology clinic 8/8  -labs for w/u of underlying cirrhosis pending     Suspected Sepsis  -blood cultures NGTD  -pt afebrile and asymptomatic  -d/c antibiotics and no evidence of decompensation     DM-II  -A1C 7  -per daughter, pt's blood sugar is poorly controlled at home, reaching 300s  -will avoid Metformin given recent contrast use and lactic acidosis on admission; will discharge with Lantus 12units qHS and Aspart 3units TIDWM  -amb referral to Endocrinology  -insulin teaching prior to discharge    Consults: Cardiology and Hepatology    Last CBC/BMP:    CBC/Anemia Labs: Coags:       Recent Labs  Lab 08/01/18  0500 08/02/18  0352 08/02/18  0907 08/03/18  0749   WBC 5.63 6.13  --  7.53   HGB 8.4* 8.6*  --  9.7*   HCT 25.3* 26.4*  --  30.0*    184  --  257   MCV 96 96  --  97   RDW 14.9* 15.0*  --  15.2*   IRON  --   --  44  --    FERRITIN  --   --  168  --       Recent Labs  Lab 07/27/18  1550 07/30/18  0325 08/03/18  0749   INR 1.2 1.2 1.1        Chemistries:     Recent Labs  Lab 07/28/18  0305  07/29/18  0400 07/30/18  0325 07/30/18  1140 07/31/18  0302 08/01/18  0500 08/02/18  0352 08/03/18  0749     < > 134* 134*  --  134* 132* 133* 133*   K 4.3  < > 3.7 3.8  --  3.8 3.9 4.4 4.2     < > 100 101  --  101 101 102 101   CO2 24  < > 26 25  --  26 26 24 20*   BUN 26*  < > 23 18  --  16 16 16 20   CREATININE 1.2  < > 1.2 1.1  --  1.0 1.1 1.2 1.3   CALCIUM 8.6*  < > 8.3* 8.0*  --  8.0* 8.2* 8.4* 9.0   PROT  --   < >  --   --   --  5.1* 5.4* 5.4* 6.6   BILITOT  --   < >  --   --   --  0.7 1.0 0.9 1.1*   ALKPHOS  --   < >  --   --   --  98 130 120 154*   ALT  --   < >  --   --   --  20 18 17 21   AST  --   < >  --   --   --  58* 51* 49* 56*   MG 1.7  --  1.9 1.7 2.2 1.7  --   --  2.1   PHOS 4.1  --  2.9 2.3*  --   --   --   --   --    < > = values in this interval not displayed.       Significant Diagnostic Studies: as above    Special Treatments/Procedures:   Procedure(s) (LRB):  PTA, AORTA, USING BALLOON (N/A)     Disposition: Home-Health Care [facility] [place]      Future Scheduled Appointments:  Future Appointments  Date Time Provider Department Center   8/8/2018 3:20 PM Shanice Ashraf MD [facility] HEPAT [facility]   8/10/2018 11:40 AM Scott Pruett MD [facility] CARDVAL [facility]   9/4/2018 2:20 PM Mike Diaz MD [facility] CARDIO [facility]   10/9/2018 10:20 AM Octavio Ferrell MD [facility]       Discharge Medication List:       Natalie Parnell   [place] Medication Instructions RACHEL:[id number]    Printed on:08/03/18 1056   Medication Information                      albuterol  "90 mcg/actuation inhaler  Inhale 2 puffs into the lungs every 6 (six) hours as needed for Wheezing.             aspirin (ECOTRIN) 81 MG EC tablet  Take 1 tablet (81 mg total) by mouth once daily.             atorvastatin (LIPITOR) 40 MG tablet  Take 1 tablet (40 mg total) by mouth once daily.             blood sugar diagnostic (ACCU-CHEK SMARTVIEW TEST STRIP) Strp  Inject 1 strip into the skin once daily.             blood-glucose meter Misc  1 Units by Misc.(Non-Drug; Combo Route) route once daily.             blood-glucose meter Misc  Use as instructed             furosemide (LASIX) 20 MG tablet  Take 1 tablet (20 mg total) by mouth once daily.             insulin aspart U-100 (NOVOLOG) 100 unit/mL InPn pen  Inject 3 Units into the skin 3 (three) times daily.             insulin glargine (LANTUS SOLOSTAR) 100 unit/mL (3 mL) InPn pen  Inject 12 Units into the skin once daily.             lancets (ACCU-CHEK FASTCLIX) Misc  Inject 1 lancet into the skin once daily.             multivitamin capsule  Take 1 capsule by mouth once daily.                 Patient Instructions:    Discharge Procedure Orders  COMMODE FOR HOME USE   Order Comments: Deliver BSC to [address] [place], [address].  Call daughterEvangelina at [phone] to arrange delivery.   Order Specific Question Answer Comments   Type: Standard    Height: 5' 6" (1.676 m)    Weight: 80 kg (176 lb 6.4 oz)    Does patient have medical equipment at home? rollator    Length of need (1-99 months): 99    Vendor: [facility]    Expected Date of Delivery: 8/2/2018      Ambulatory referral to Outpatient Case Management   Referral Priority: Routine Referral Type: Consultation   Referral Reason: Specialty Services Required    Number of Visits Requested: 1      Ambulatory referral to Outpatient Case Management   Referral Priority: Routine Referral Type: Consultation   Referral Reason: Specialty Services Required    Number of Visits Requested: 1      Notify your health care " provider if you experience any of the following:  increased confusion or weakness     Notify your health care provider if you experience any of the following:  persistent dizziness, light-headedness, or visual disturbances     Notify your health care provider if you experience any of the following:  difficulty breathing or increased cough     Complete PFT w/o bronchodilator   Standing Status: Future  Standing Exp. Date: 08/05/19     Activity as tolerated         At the time of discharge patient was told to take all medications as prescribed, to keep all followup appointments, and to call their primary care physician or return to the emergency room if they have any worsening or concerning symptoms.    Signing Physician:  Neida Davidson MD

## 2018-08-03 NOTE — ED PROVIDER NOTES
Encounter Date: 8/3/2018    SCRIBE #1 NOTE: I, Jyotsna Landin, am scribing for, and in the presence of,  Dr. Thomason. I have scribed the entire note.       History     Chief Complaint   Patient presents with    Tachycardia     angiogram on friday     Time seen by provider: 7:40 AM    This is a 83 y.o. female with medical conditions including DM, HTN, asthma, HLD, and recent hospitalization for NSTEMI/cardiogenic shock who presents with complaint of new onset tachycardia and SOB. Patient discharged last night after NSTEMI. Patient noticed high pulse reading around 6:30AM associated with SOB and slight cough. She reports worsening shortness of breath since discharge last night. Denies CP. Patient denies fever, or chills. Patent has hx of blood clots and received surgery 30 years ago per daughter. Patient received angiogram july 30th underwent sucessful aortic valvuloplasty and removal of balloon pump for cardiogenic shock. Patient reports she was given insulin but she did not take it because she had no needle to inject it with.  Translation was provided by the patient's daughter.  We offered a  phone but the patient declined.      The history is provided by a relative. The history is limited by a language barrier.     Review of patient's allergies indicates:  No Known Allergies  Past Medical History:   Diagnosis Date    Asthma     Diabetes mellitus     HTN (hypertension)     Hyperlipidemia      Past Surgical History:   Procedure Laterality Date    CATARACT EXTRACTION Bilateral     Dr. Max    COLON SURGERY      ESOPHAGOGASTRODUODENOSCOPY N/A 7/10/2018    Procedure: EGD (ESOPHAGOGASTRODUODENOSCOPY);  Surgeon: Derian Stoner MD;  [facility];  Service: Endoscopy;  Laterality: N/A;    HERNIA REPAIR      PERCUTANEOUS TRANSLUMINAL BALLOON ANGIOPLASTY OF AORTA N/A 7/30/2018    Procedure: PTA, AORTA, USING BALLOON;  Surgeon: Scott Pruett MD;  Location: [facility] CATH LAB;  Service:  Cardiology;  Laterality: N/A;    VARICOSE VEIN SURGERY       History reviewed. No pertinent family history.  Social History   Substance Use Topics    Smoking status: Never Smoker    Smokeless tobacco: Never Used    Alcohol use No     Review of Systems   Constitutional: Positive for fatigue. Negative for chills, diaphoresis and fever.   HENT: Negative for facial swelling, rhinorrhea, sinus pain, sore throat, trouble swallowing and voice change.    Eyes: Negative for visual disturbance.   Respiratory: Positive for cough and shortness of breath. Negative for choking.    Cardiovascular: Negative for chest pain and leg swelling.        Tachycardia   Gastrointestinal: Negative for abdominal pain, diarrhea, nausea and vomiting.   Genitourinary: Negative for dysuria, flank pain and hematuria.   Musculoskeletal: Positive for back pain. Negative for gait problem.   Neurological: Negative for seizures, light-headedness, numbness and headaches.   Psychiatric/Behavioral: Negative for behavioral problems and confusion.       Physical Exam     Initial Vitals [08/03/18 0727]   BP Pulse Resp Temp SpO2   (!) 103/55 (!) 124 18 98.8 °F (37.1 °C) (!) 91 %      MAP       --         Physical Exam    Nursing note and vitals reviewed.  Constitutional: She appears well-developed and well-nourished. She appears distressed.   HENT:   Head: Normocephalic and atraumatic.   Tachypneic on nasal cannula  Dry mucus membranes   Eyes: Conjunctivae and EOM are normal.   Neck: Normal range of motion. Neck supple. JVD present.   Well healed right neck puncture wound   Cardiovascular:   Murmur heard.  Tachycardic heart sounds  Systolic ejection murmur   Pulmonary/Chest: Breath sounds normal. No respiratory distress. She has no wheezes. She has no rhonchi. She has no rales. She exhibits no tenderness.   Abdominal: Soft. Bowel sounds are normal. She exhibits no mass. There is no rebound and no guarding.   Musculoskeletal: Normal range of motion. She  exhibits no tenderness.   Lymphadenopathy:     She has no cervical adenopathy.   Neurological: She is alert and oriented to person, place, and time. She has normal strength. No sensory deficit.   Skin: Capillary refill takes more than 3 seconds.   cool   Psychiatric: She has a normal mood and affect.         ED Course   Procedures  Labs Reviewed   CBC W/ AUTO DIFFERENTIAL - Abnormal; Notable for the following:        Result Value    RBC 3.11 (*)     Hemoglobin 9.7 (*)     Hematocrit 30.0 (*)     MCH 31.2 (*)     RDW 15.2 (*)     All other components within normal limits   COMPREHENSIVE METABOLIC PANEL - Abnormal; Notable for the following:     Sodium 133 (*)     CO2 20 (*)     Glucose 168 (*)     Albumin 3.0 (*)     Total Bilirubin 1.1 (*)     Alkaline Phosphatase 154 (*)     AST 56 (*)     eGFR if  44.5 (*)     eGFR if non  38.6 (*)     All other components within normal limits   LACTIC ACID, PLASMA - Abnormal; Notable for the following:     Lactate (Lactic Acid) 3.0 (*)     All other components within normal limits   URINALYSIS, REFLEX TO URINE CULTURE - Abnormal; Notable for the following:     Leukocytes, UA 1+ (*)     All other components within normal limits    Narrative:     Preferred Collection Type->Urine, Clean Catch   B-TYPE NATRIURETIC PEPTIDE - Abnormal; Notable for the following:      (*)     All other components within normal limits   TROPONIN I - Abnormal; Notable for the following:     Troponin I 0.223 (*)     All other components within normal limits   PROCALCITONIN - Abnormal; Notable for the following:     Procalcitonin 0.25 (*)     All other components within normal limits   URINALYSIS MICROSCOPIC - Abnormal; Notable for the following:     WBC, UA 6 (*)     All other components within normal limits    Narrative:     Preferred Collection Type->Urine, Clean Catch   LACTIC ACID, PLASMA - Abnormal; Notable for the following:     Lactate (Lactic Acid) 2.6 (*)      All other components within normal limits   ISTAT PROCEDURE - Abnormal; Notable for the following:     POC PO2 21 (*)     POC SATURATED O2 33 (*)     All other components within normal limits   POCT GLUCOSE - Abnormal; Notable for the following:     POCT Glucose 186 (*)     All other components within normal limits   POCT GLUCOSE - Abnormal; Notable for the following:     POCT Glucose 188 (*)     All other components within normal limits   CULTURE, BLOOD    Narrative:     Aerobic and anaerobic   CULTURE, BLOOD    Narrative:     Aerobic and anaerobic   PROTIME-INR   MAGNESIUM   LACTIC ACID, PLASMA   POCT GLUCOSE MONITORING CONTINUOUS     EKG Readings: (Independently Interpreted)   Rhythm: Sinus Tachycardia. Heart Rate: 124.   Right axis deviation. 0.5 mm ST elevation in leads 1 and AVL. Twave inversions in leads 2, 3, AVF, and v3. ST depressions and T wave inversion in v4, v6. Early repolarizations. LVH.       Imaging Results          X-Ray Chest AP Portable (Final result)  Result time 08/03/18 08:43:26    Final result by Moy Carpio MD (08/03/18 08:43:26)                 Impression:      Abnormal chest radiograph, with pronounced accentuation of interstitial markings bilaterally and some patchy airspace consolidation, particularly in the right lower lung zone, as well as small amounts of pleural fluid on each side.  These findings are consistent with interstitial/alveolar pulmonary edema, though clinical correlation is essential.      Electronically signed by: Moy Carpio MD  Date:    08/03/2018  Time:    08:43             Narrative:    EXAMINATION:  XR CHEST AP PORTABLE    CLINICAL HISTORY:  Sepsis;    COMPARISON:  Comparison is made to the most recent prior chest radiograph of 07/30/2018.    FINDINGS:  Previously present intra-aortic balloon pump and left jugular origin vascular catheter are no longer seen.  The heart does not appear significantly enlarged.  Pulmonary vascularity is prominent, however, and  parenchymal opacity relating to a combination of diffuse accentuation of interstitial markings and some patchy airspace consolidation is seen bilaterally, the airspace consolidation particularly evident in the right lower lung zone.  These changes are similar to, though more pronounced than, the study of 07/30/2018.  Some blunting of the both costophrenic sulci is now observed, consistent with the interval development of small amounts of pleural fluid, greater on the right than the left.  No pneumothorax.  Right upper quadrant surgical clips are incidentally observed.  Clinical correlation is essential, but the current appearance of the chest is consistent with interstitial/alveolar pulmonary edema.                                 Medical Decision Making:   History:   Old Medical Records: I decided to obtain old medical records.  Initial Assessment:   83 y.o.female discharged last night after NSTEMI , newly dx cirrhosis with varices, DM, HTN, CHF(ef 20-25%), and severe aortic stenosis presents with SOB and tachycardia. My differential diagnosis includes, but is not limited to: CHF exacerbation, sepsis, ACS, arrhythmia, electrolyte abnormality, PE.    Will obtain serum, urine labs, chest x ray, and continue to monitor patient.   Patient's EKG concerning given ST changes. Due to patient lack of cp, sx are inconsistent with STEMI. Cards consulted for further recommendations. Per cards, not STEMI given unchanged from previous EKG.  Independently Interpreted Test(s):   I have ordered and independently interpreted EKG Reading(s) - see prior notes  Clinical Tests:   Lab Tests: Ordered and Reviewed  Radiological Study: Ordered and Reviewed  Medical Tests: Ordered and Reviewed  ED Management:  8:03 AM  Discussed with cards fellow patient EKG similar to ekg prior. Will not activate cath lab at this time. Cards consulted for further recommendations.  9:21 AM  Serum labs show no leukocytosis. Hemoglobin is 9.7 at baseline. AST  elevated at 56 but this is unchanged. Lactic acid is 3 with bnp elevated at 753. Troponin positive at 0.223, however decreased from previous. Procalcitonin elevated to 0.25. Patient chest xray concerning for severe pulmonary edema. VBG without significant acidiosis. Patient evaluated by cards who feels presentation is concerning for acute heart failure exacerbation. Patient will be given 40 iv furosemide with close blood pressure monitoring. Per cards, they feel pt's symptoms are inconsistent with cardiogenic shock. Will admit to IM  in serious condition.    9:39AM  Patient is borderline ICU given the borderline blood pressure and lactic acid of 3, however well alert, interactive, and joking on exam. Patient stable on nasal cannula. Per cards, she is stable for [facility] floor at this time.    Other:   I have discussed this case with another health care provider.              Attending Attestation:           Physician Attestation for Scribe:      Comments: I, Dr. Joselito Thomason, personally performed the services described in this documentation. All medical record entries made by the scribe were at my direction and in my presence.  I have reviewed the chart and agree that the record reflects my personal performance and is accurate and complete. Joselito Thomason MD.  3:45 PM 08/03/2018                 Clinical Impression:   The primary encounter diagnosis was Acute on chronic congestive heart failure, unspecified heart failure type. A diagnosis of Tachycardia was also pertinent to this visit.      Disposition:   Disposition: Admitted  Condition: Stable                        Joselito Thomason MD  08/03/18 6094

## 2018-08-04 PROBLEM — J96.01 ACUTE HYPOXEMIC RESPIRATORY FAILURE: Status: ACTIVE | Noted: 2018-01-01

## 2018-08-04 NOTE — SUBJECTIVE & OBJECTIVE
Interval History: Overnight, she was found to be net negative 800cc. This AM, patient was hypotensive with BP 70/40. She was ambulating and asymptomatic. Repeat BP showed improvement with reading of 90/50. Lactate down-trended from 3 to 1.1    Review of Systems   Cardiovascular: Negative for chest pain, irregular heartbeat, leg swelling, orthopnea, palpitations, paroxysmal nocturnal dyspnea and syncope.   Respiratory: Positive for shortness of breath. Negative for cough and wheezing.    Gastrointestinal: Negative for bloating, abdominal pain, nausea and vomiting.   Neurological: Negative for dizziness, headaches and light-headedness.     Objective:     Vital Signs (Most Recent):  Temp: 98.3 °F (36.8 °C) (08/04/18 1708)  Pulse: 102 (08/04/18 1708)  Resp: 18 (08/04/18 1708)  BP: (!) 90/51 (08/04/18 1708)  SpO2: (!) 94 % (08/04/18 1708) Vital Signs (24h Range):  Temp:  [97.3 °F (36.3 °C)-98.4 °F (36.9 °C)] 98.3 °F (36.8 °C)  Pulse:  [] 102  Resp:  [16-20] 18  SpO2:  [90 %-95 %] 94 %  BP: ()/(48-57) 90/51     Weight: 79.1 kg (174 lb 4.8 oz)  Body mass index is 28.13 kg/m².     SpO2: (!) 94 %  O2 Device (Oxygen Therapy): room air      Intake/Output Summary (Last 24 hours) at 08/04/18 1854  Last data filed at 08/04/18 1300   Gross per 24 hour   Intake           376.17 ml   Output             1200 ml   Net          -823.83 ml       Lines/Drains/Airways     Peripheral Intravenous Line                 Peripheral IV - Single Lumen 08/03/18 0746 Left Antecubital 1 day         Peripheral IV - Single Lumen 08/03/18 0754 Right Antecubital 1 day                Physical Exam   Constitutional: She is oriented to person, place, and time. No distress.   Eyes: Pupils are equal, round, and reactive to light.   Neck: Neck supple. JVD present.   Cardiovascular: Normal rate, regular rhythm and intact distal pulses.    Pulmonary/Chest: She has rales.   Abdominal: Soft. Bowel sounds are normal.   Musculoskeletal: She exhibits no  edema.   Neurological: She is alert and oriented to person, place, and time.   Skin: Skin is warm and dry.       Significant Labs:   CMP   Recent Labs  Lab 08/03/18  0749 08/04/18  0816   * 137   K 4.2 4.2    101   CO2 20* 27   * 117*   BUN 20 20   CREATININE 1.3 1.2   CALCIUM 9.0 8.8   PROT 6.6  --    ALBUMIN 3.0*  --    BILITOT 1.1*  --    ALKPHOS 154*  --    AST 56*  --    ALT 21  --    ANIONGAP 12 9   ESTGFRAFRICA 44.5* 49.0*   EGFRNONAA 38.6* 42.5*       Significant Imaging:   CXR (8/3): pulmonary vascular congestion

## 2018-08-04 NOTE — ASSESSMENT & PLAN NOTE
-pt remains warm and wt on exam  -still volume overloaded, +JVD, bilateral crackles in lung fields, requiring supplemental oxygen via NC   -c/w lasix ggt at 10 mg/hr, monitor UOP, strict I/O's and daily weights   -bolus with IV lasix and increase ggt as pt's BP tolerates, consider metolazone as another option as well   -hold off on starting BB till pt is euvolemic

## 2018-08-04 NOTE — PROGRESS NOTES
Progress Note  Hospital Medicine    Primary Team: [facility] CARDIOLOGY TEAM A - CARDIOLOGY CONSULT STEPDOWN  Admit Date: 8/3/2018   Length of Stay:  LOS: 1 day   SUBJECTIVE:   Reason for Admission:  Acute on chronic congestive heart failure    HPI:  Patient is a 83 y.o. female with PMH of severe AS with SHEA 0.65cm2, mean gradients 75mmhg, peak velocity 5.3m/sec, recent admission for GI bleed (requiring 1 unit of PRBC and EGD showing non-bleeding angioectasia s/p APC), recent admission for NSTEMI (with clean LHC), and discharge yesterday after stay for cardiogenic shock and ADHF, presents to ED today with acute onset of SOB.  Pt reports feeling well upon discharge and in the evening, but awoke at 0100 SOB and gasping for air.  She urinated twice overnight.  Also notes LE edema has developed.    Interval history:    No acute events overnight.  Pt reports ample urine output but is still requiring oxygen and short of breath.    Review of Systems:  Constitutional: no fever or chills  Respiratory: positive for dyspnea on exertion  Cardiovascular: no chest pain or palpitations  Gastrointestinal: no nausea or vomiting, no abdominal pain or change in bowel habits  Musculoskeletal: no arthralgias or myalgias     OBJECTIVE:     Temp:  [97.3 °F (36.3 °C)-98.4 °F (36.9 °C)]   Pulse:  []   Resp:  [16-20]   BP: ()/(48-57)   SpO2:  [90 %-98 %]  Body mass index is 28.13 kg/m².  Intake/Outake:  This Shift:  No intake/output data recorded.    Net I/O past 24h:     Intake/Output Summary (Last 24 hours) at 08/04/18 1426  Last data filed at 08/04/18 0322   Gross per 24 hour   Intake            16.17 ml   Output              650 ml   Net          -633.83 ml             Physical Exam:  Gen- well-developed, well-nourished, fatigued  CVS- S1 and S2 present, 3/6 systolic murmur, RRR  Resp- diffuse crackles, poor air entrgy  Abd- BS+, soft, NT, ND  Ext- 1+ pitting LE edema, no clubbing or cyanosis    Laboratory:  CBC/Anemia Labs: Coags:       Recent Labs  Lab 08/02/18  0352 08/02/18  0907 08/03/18  0749 08/04/18  0816   WBC 6.13  --  7.53 6.27   HGB 8.6*  --  9.7* 8.8*   HCT 26.4*  --  30.0* 26.6*     --  257 213   MCV 96  --  97 95   RDW 15.0*  --  15.2* 15.0*   IRON  --  44  --   --    FERRITIN  --  168  --   --       Recent Labs  Lab 07/30/18  0325 08/03/18  0749   INR 1.2 1.1        Chemistries:     Recent Labs  Lab 07/29/18  0400 07/30/18  0325  07/31/18  0302 08/01/18  0500 08/02/18  0352 08/03/18  0749 08/04/18  0816   * 134*  --  134* 132* 133* 133* 137   K 3.7 3.8  --  3.8 3.9 4.4 4.2 4.2    101  --  101 101 102 101 101   CO2 26 25  --  26 26 24 20* 27   BUN 23 18  --  16 16 16 20 20   CREATININE 1.2 1.1  --  1.0 1.1 1.2 1.3 1.2   CALCIUM 8.3* 8.0*  --  8.0* 8.2* 8.4* 9.0 8.8   PROT  --   --   < > 5.1* 5.4* 5.4* 6.6  --    BILITOT  --   --   < > 0.7 1.0 0.9 1.1*  --    ALKPHOS  --   --   < > 98 130 120 154*  --    ALT  --   --   < > 20 18 17 21  --    AST  --   --   < > 58* 51* 49* 56*  --    MG 1.9 1.7  < > 1.7  --   --  2.1 1.9   PHOS 2.9 2.3*  --   --   --   --   --   --    < > = values in this interval not displayed.     ABG:     Recent Labs  Lab 07/31/18  0802 08/01/18  0956 08/03/18  0747   PH 7.412 7.413 7.386   PCO2 45.9* 40.9 44.1   PO2 32* 29* 21*   HCO3 29.2* 26.1 26.5   POCSATURATED 62* 57* 33*   BE 5 1 1        Cardiac Enzymes: Ejection Fractions:    Recent Labs      08/03/18   0749   TROPONINI  0.223*    EF   Date Value Ref Range Status   07/27/2018 20 (A) 55 - 65    07/09/2018 50 55 - 65            Medications:  Scheduled Meds:   albuterol-ipratropium  3 mL Nebulization Q6H WAKE    aspirin  81 mg Oral Daily    atorvastatin  40 mg Oral Daily    insulin aspart U-100  3 Units Subcutaneous TIDWM    insulin detemir U-100  12 Units Subcutaneous QHS                             Continuous Infusions:   furosemide (LASIX) 2 mg/mL infusion (non-titrating) 10 mg/hr (08/04/18 0551)     PRN Meds:.acetaminophen,  dextrose 50%, dextrose 50%, glucagon (human recombinant), glucose, glucose, insulin aspart U-100, ondansetron, polyethylene glycol, sodium chloride 0.9%     ASSESSMENT/PLAN:     Active Hospital Problems    Diagnosis  POA    *Acute on chronic congestive heart failure [I50.9]  Yes    Coronary artery disease involving native coronary artery of native heart without angina pectoris [I25.10]  Yes     nonobstructive      H/O: GI bleed [Z87.19]  Not Applicable    Severe aortic stenosis [I35.0]  Yes    Cirrhosis of liver without ascites [K74.60]  Yes    Type 2 diabetes mellitus, without long-term current use of insulin [E11.9]  Yes      Resolved Hospital Problems    Diagnosis Date Resolved POA   No resolved problems to display.     Acute on Chronic Systolic Heart Failure  Acute Hypoxemic Respiratory Failure 2/2 ADHF  -Pt's Lasix had been held for several days during hospitalization due to cardiogenic shock  -Now volume overloaded  -Continue diuresis with Lasix gtt at 10/h  -strict I/o, low salt diet, fluid restriction, daily weights  -when fully diuresed, will start beta-blocker and ACEI as tolerated by bp     Severe Aortic Stenosis  -during prior hospitalization required IABP for hemodynamic support in the setting of acute respiratory distress from flash pulmonary edema 2/2 severe AV stenosis  -s/p successful balloon aortic valvuloplasty with 24mm true balloon aortic mean gradient improved from 70 to 36mmHg  -currently undergoing TAVR w/u: CT chest performed, [facility] negative for obstructive CAD  -PFTs ordered for outpatient  -Pt will f/u with Interventional Cardiology as outpatient     NSTEMI  -Trop lower now that previous hospitalization  -LHC revealed clean coronaries   -continue ASA alone     Right Hepatic Lobe Lesion  Suspected Cirrhosis  -lesion seen incidentally on CT for TAVR  -followed up with US abd- notes 3cm  -triple phase CT shows 2.4 x 1.6cm irregular enhancing mass in right lobe of liver; 1cm focal  nonenhancing areas centrally; with anterior washout, arterial enhancement- LI-RADS 5 lesion  -  -Hepatology consulted last admission; will be discussing case in IR conference 8/7 and then f/u in Hepatology clinic 8/8  -labs for w/u of underlying cirrhosis pending     DM-II  -A1C 7  -per daughter, pt's blood sugar is poorly controlled at home, reaching 300s  -will continue Levemir 12units nightly and Aspart 3units TIDWM as during last stay; titrate accordingly  -amb referral to Endocrinology     DVT ppx- no pharmacologic ppx with recent GI Bleed; CORWIN hose  CODE status- FULL     Dispo- home in 2-3 days pending clinical improvement     Neida Davidson MD  Hospital Medicine Staff

## 2018-08-04 NOTE — PLAN OF CARE
Problem: Patient Care Overview  Goal: Plan of Care Review  Outcome: Ongoing (interventions implemented as appropriate)  Pt remaina free from falls and injury during shift. Pt on lasix gtt 5ml/hr. Blood glucose WNL. Bps low, normal for pt. Pt voice no complaints, POC updaated with pt, will continue to monitor.

## 2018-08-05 PROBLEM — E87.6 HYPOKALEMIA: Status: ACTIVE | Noted: 2018-01-01

## 2018-08-05 PROBLEM — E83.42 HYPOMAGNESEMIA: Status: ACTIVE | Noted: 2018-01-01

## 2018-08-05 PROBLEM — R57.9 SHOCK: Status: RESOLVED | Noted: 2018-01-01 | Resolved: 2018-01-01

## 2018-08-05 NOTE — PROGRESS NOTES
[facility]  Cardiology  Progress Note    Patient Name: Natalie Parnell  MRN: [id number]  Admission Date: 8/3/2018  Hospital Length of Stay: 2 days  Code Status: Full Code   Attending Physician: Neida Davidson MD   Primary Care Physician: Octavio Ferrell MD  Expected Discharge Date: 8/6/2018  Principal Problem:Acute on chronic congestive heart failure    Subjective:     Hospital Course:   No notes on file    Interval History: Still short of breath, on NC; rales on exam    Review of Systems   Cardiovascular: Negative for chest pain, irregular heartbeat, leg swelling, orthopnea, palpitations, paroxysmal nocturnal dyspnea and syncope.   Respiratory: Positive for shortness of breath. Negative for cough and wheezing.    Gastrointestinal: Negative for bloating, abdominal pain, nausea and vomiting.   Neurological: Negative for dizziness, headaches and light-headedness.     Objective:     Vital Signs (Most Recent):  Temp: 98.6 °F (37 °C) (08/05/18 1206)  Pulse: 96 (08/05/18 1206)  Resp: 16 (08/05/18 1206)  BP: (!) 92/50 (08/05/18 1206)  SpO2: 96 % (08/05/18 1206) Vital Signs (24h Range):  Temp:  [98 °F (36.7 °C)-98.6 °F (37 °C)] 98.6 °F (37 °C)  Pulse:  [] 96  Resp:  [16-18] 16  SpO2:  [93 %-98 %] 96 %  BP: (90-99)/(50-53) 92/50     Weight: 77.7 kg (171 lb 4.8 oz)  Body mass index is 27.65 kg/m².     SpO2: 96 %  O2 Device (Oxygen Therapy): nasal cannula      Intake/Output Summary (Last 24 hours) at 08/05/18 1243  Last data filed at 08/05/18 0600   Gross per 24 hour   Intake              360 ml   Output             1075 ml   Net             -715 ml       Lines/Drains/Airways     Peripheral Intravenous Line                 Peripheral IV - Single Lumen 08/03/18 0746 Left Antecubital 2 days                Physical Exam   Constitutional: She is oriented to person, place, and time. No distress.   Eyes: Pupils are equal, round, and reactive to light.   Neck: Neck supple. JVD present.   Cardiovascular:  Normal rate, regular rhythm and intact distal pulses.    Pulmonary/Chest: She has rales.   Abdominal: Soft. Bowel sounds are normal.   Musculoskeletal: She exhibits no edema.   Neurological: She is alert and oriented to person, place, and time.   Skin: Skin is warm and dry.       Significant Labs:   BMP:   Recent Labs  Lab 08/04/18  0816 08/05/18  0752   * 101    138   K 4.2 3.3*    101   CO2 27 29   BUN 20 23   CREATININE 1.2 1.3   CALCIUM 8.8 8.5*   MG 1.9 1.6   , CMP   Recent Labs  Lab 08/04/18  0816 08/05/18  0752    138   K 4.2 3.3*    101   CO2 27 29   * 101   BUN 20 23   CREATININE 1.2 1.3   CALCIUM 8.8 8.5*   PROT  --  5.5*   ALBUMIN  --  2.6*   BILITOT  --  0.9   ALKPHOS  --  118   AST  --  50*   ALT  --  18   ANIONGAP 9 8   ESTGFRAFRICA 49.0* 44.5*   EGFRNONAA 42.5* 38.6*         Assessment and Plan:     Brief HPI: 83F with HFrEF, severe AS s/p BAV awaiting outpatient TAVR presents with ADCHF after recent hospitalization for cardiogenic shock 2/2 ADCHF + AS. Now on lasix gtt    * Acute on chronic congestive heart failure    pt remains warm and wt on exam  -still volume overloaded, +JVD, bilateral crackles in lung fields, requiring supplemental oxygen via NC   -Agree with management: c/w lasix gtt , monitor UOP, strict I/O's and daily weights   --- may bolus with IV lasix when increasing gtt as pt's BP tolerates, consider metolazone if high-dose gtt fails to produce adequate UO  -hold off on starting BB till pt is euvolemic         Severe aortic stenosis    -s/p BAV, awaiting O/P TAVR             VTE Risk Mitigation         Ordered     IP VTE HIGH RISK PATIENT  Once      08/03/18 1146     Place CORWIN hose  Until discontinued      08/03/18 1146     Place sequential compression device  Until discontinued      08/03/18 1146     Place CORWIN hose  Until discontinued      08/03/18 1146          Héctor Corbin MD  Cardiology  [facility]

## 2018-08-05 NOTE — SUBJECTIVE & OBJECTIVE
Interval History: Still short of breath, on NC; rales on exam    Review of Systems   Cardiovascular: Negative for chest pain, irregular heartbeat, leg swelling, orthopnea, palpitations, paroxysmal nocturnal dyspnea and syncope.   Respiratory: Positive for shortness of breath. Negative for cough and wheezing.    Gastrointestinal: Negative for bloating, abdominal pain, nausea and vomiting.   Neurological: Negative for dizziness, headaches and light-headedness.     Objective:     Vital Signs (Most Recent):  Temp: 98.6 °F (37 °C) (08/05/18 1206)  Pulse: 96 (08/05/18 1206)  Resp: 16 (08/05/18 1206)  BP: (!) 92/50 (08/05/18 1206)  SpO2: 96 % (08/05/18 1206) Vital Signs (24h Range):  Temp:  [98 °F (36.7 °C)-98.6 °F (37 °C)] 98.6 °F (37 °C)  Pulse:  [] 96  Resp:  [16-18] 16  SpO2:  [93 %-98 %] 96 %  BP: (90-99)/(50-53) 92/50     Weight: 77.7 kg (171 lb 4.8 oz)  Body mass index is 27.65 kg/m².     SpO2: 96 %  O2 Device (Oxygen Therapy): nasal cannula      Intake/Output Summary (Last 24 hours) at 08/05/18 1243  Last data filed at 08/05/18 0600   Gross per 24 hour   Intake              360 ml   Output             1075 ml   Net             -715 ml       Lines/Drains/Airways     Peripheral Intravenous Line                 Peripheral IV - Single Lumen 08/03/18 0746 Left Antecubital 2 days                Physical Exam   Constitutional: She is oriented to person, place, and time. No distress.   Eyes: Pupils are equal, round, and reactive to light.   Neck: Neck supple. JVD present.   Cardiovascular: Normal rate, regular rhythm and intact distal pulses.    Pulmonary/Chest: She has rales.   Abdominal: Soft. Bowel sounds are normal.   Musculoskeletal: She exhibits no edema.   Neurological: She is alert and oriented to person, place, and time.   Skin: Skin is warm and dry.       Significant Labs:   BMP:   Recent Labs  Lab 08/04/18  0816 08/05/18  0752   * 101    138   K 4.2 3.3*    101   CO2 27 29   BUN 20 23    CREATININE 1.2 1.3   CALCIUM 8.8 8.5*   MG 1.9 1.6   , CMP   Recent Labs  Lab 08/04/18  0816 08/05/18  0752    138   K 4.2 3.3*    101   CO2 27 29   * 101   BUN 20 23   CREATININE 1.2 1.3   CALCIUM 8.8 8.5*   PROT  --  5.5*   ALBUMIN  --  2.6*   BILITOT  --  0.9   ALKPHOS  --  118   AST  --  50*   ALT  --  18   ANIONGAP 9 8   ESTGFRAFRICA 49.0* 44.5*   EGFRNONAA 42.5* 38.6*

## 2018-08-05 NOTE — PROGRESS NOTES
Progress Note  Hospital Medicine    Primary Team: [facility] CARDIOLOGY TEAM A - CARDIOLOGY CONSULT STEPDOWN  Admit Date: 8/3/2018   Length of Stay:  LOS: 2 days   SUBJECTIVE:   Reason for Admission:  Acute on chronic congestive heart failure    HPI:  Patient is a 83 y.o. female with PMH of severe AS with SHEA 0.65cm2, mean gradients 75mmhg, peak velocity 5.3m/sec, recent admission for GI bleed (requiring 1 unit of PRBC and EGD showing non-bleeding angioectasia s/p APC), recent admission for NSTEMI (with clean LHC), and discharge yesterday after stay for cardiogenic shock and ADHF, presents to ED today with acute onset of SOB.  Pt reports feeling well upon discharge and in the evening, but awoke at 0100 SOB and gasping for air.  She urinated twice overnight.  Also notes LE edema has developed.    Interval history:    No acute events overnight.  Pt reports good urine output.  She is eager to leave hospital, but family at bedside notes she was very out of breath when ambulating back from bathroom.  Has mild improvement with nebs.    Review of Systems:  Constitutional: no fever or chills  Respiratory: positive for dyspnea on exertion  Cardiovascular: no chest pain or palpitations  Gastrointestinal: no nausea or vomiting, no abdominal pain or change in bowel habits  Musculoskeletal: no arthralgias or myalgias     OBJECTIVE:     Temp:  [97.3 °F (36.3 °C)-98.6 °F (37 °C)]   Pulse:  []   Resp:  [16-18]   BP: (90-99)/(51-55)   SpO2:  [91 %-98 %]  Body mass index is 27.65 kg/m².  Intake/Outake:  This Shift:  No intake/output data recorded.    Net I/O past 24h:     Intake/Output Summary (Last 24 hours) at 08/05/18 0917  Last data filed at 08/05/18 0600   Gross per 24 hour   Intake              360 ml   Output             1075 ml   Net             -715 ml             Physical Exam:  Gen- well-developed, well-nourished, fatigued  CVS- S1 and S2 present, 3/6 systolic murmur, RRR  Resp- diffuse crackles, poor air entry  Abd- BS+,  soft, NT, ND  Ext- 1+ pitting LE edema R>L, no clubbing or cyanosis    Laboratory:  CBC/Anemia Labs: Coags:      Recent Labs  Lab 08/02/18 0352 08/02/18  0907 08/03/18  0749 08/04/18  0816   WBC 6.13  --  7.53 6.27   HGB 8.6*  --  9.7* 8.8*   HCT 26.4*  --  30.0* 26.6*     --  257 213   MCV 96  --  97 95   RDW 15.0*  --  15.2* 15.0*   IRON  --  44  --   --    FERRITIN  --  168  --   --       Recent Labs  Lab 07/30/18 0325 08/03/18  0749   INR 1.2 1.1        Chemistries:     Recent Labs  Lab 07/30/18 0325 08/02/18 0352 08/03/18  0749 08/04/18  0816 08/05/18  0752   *  < > 133* 133* 137 138   K 3.8  < > 4.4 4.2 4.2 3.3*     < > 102 101 101 101   CO2 25  < > 24 20* 27 29   BUN 18  < > 16 20 20 23   CREATININE 1.1  < > 1.2 1.3 1.2 1.3   CALCIUM 8.0*  < > 8.4* 9.0 8.8 8.5*   PROT  --   < > 5.4* 6.6  --  5.5*   BILITOT  --   < > 0.9 1.1*  --  0.9   ALKPHOS  --   < > 120 154*  --  118   ALT  --   < > 17 21  --  18   AST  --   < > 49* 56*  --  50*   MG 1.7  < >  --  2.1 1.9 1.6   PHOS 2.3*  --   --   --   --   --    < > = values in this interval not displayed.     ABG:     Recent Labs  Lab 07/31/18  0802 08/01/18  0956 08/03/18  0747   PH 7.412 7.413 7.386   PCO2 45.9* 40.9 44.1   PO2 32* 29* 21*   HCO3 29.2* 26.1 26.5   POCSATURATED 62* 57* 33*   BE 5 1 1        Cardiac Enzymes: Ejection Fractions:    Recent Labs      08/03/18   0749   TROPONINI  0.223*    EF   Date Value Ref Range Status   07/27/2018 20 (A) 55 - 65    07/09/2018 50 55 - 65            Medications:  Scheduled Meds:   albuterol-ipratropium  3 mL Nebulization Q6H WAKE    aspirin  81 mg Oral Daily    atorvastatin  40 mg Oral Daily    insulin aspart U-100  3 Units Subcutaneous TIDWM    insulin detemir U-100  12 Units Subcutaneous QHS                             Continuous Infusions:   furosemide (LASIX) 2 mg/mL infusion (non-titrating) 10 mg/hr (08/04/18 2237)     PRN Meds:.acetaminophen, dextrose 50%, dextrose 50%, glucagon (human  recombinant), glucose, glucose, insulin aspart U-100, ondansetron, polyethylene glycol, sodium chloride 0.9%     ASSESSMENT/PLAN:     Active Hospital Problems    Diagnosis  POA    *Acute on chronic congestive heart failure [I50.9]  Yes    Acute hypoxemic respiratory failure [J96.01]  Yes    Severe aortic stenosis [I35.0]  Yes      Resolved Hospital Problems    Diagnosis Date Resolved POA   No resolved problems to display.     Acute on Chronic Systolic Heart Failure  Acute Hypoxemic Respiratory Failure 2/2 ADHF  -Pt's Lasix had been held for several days during hospitalization due to cardiogenic shock  -Now volume overloaded  -Continue diuresis with Lasix gtt at 10/h; will discuss with Cardiology to increase regimen  -strict I/o, low salt diet, fluid restriction, daily weights  -when fully diuresed, will start beta-blocker and ACEI as tolerated by bp  -check CXR and LE US with asymmetric edema     Severe Aortic Stenosis  -during prior hospitalization required IABP for hemodynamic support in the setting of acute respiratory distress from flash pulmonary edema 2/2 severe AV stenosis  -s/p successful balloon aortic valvuloplasty with 24mm true balloon aortic mean gradient improved from 70 to 36mmHg  -currently undergoing TAVR w/u: CT chest performed, LHC negative for obstructive CAD  -PFTs ordered for outpatient  -Pt will f/u with Interventional Cardiology as outpatient     NSTEMI  -Trop lower now that previous hospitalization  -LHC revealed clean coronaries   -continue ASA alone     Right Hepatic Lobe Lesion  Suspected Cirrhosis  -lesion seen incidentally on CT for TAVR  -followed up with US abd- notes 3cm  -triple phase CT shows 2.4 x 1.6cm irregular enhancing mass in right lobe of liver; 1cm focal nonenhancing areas centrally; with anterior washout, arterial enhancement- LI-RADS 5 lesion  -  -Hepatology consulted last admission; will be discussing case in IR conference 8/7 and then f/u in Hepatology clinic  8/8  -labs for w/u of underlying cirrhosis pending     DM-II  -A1C 7  -per daughter, pt's blood sugar is poorly controlled at home, reaching 300s  -decrease to Levemir 8units nightly and Aspart 2units TIDWM   -amb referral to Endocrinology    Hypokalemia  Hypomagnesemia  -Repleted     DVT ppx- no pharmacologic ppx with recent GI Bleed; CORWIN hose  CODE status- FULL     Dispo- home in 2-3 days pending clinical improvement     Neida Davidson MD  Hospital Medicine Staff

## 2018-08-05 NOTE — PLAN OF CARE
Problem: Patient Care Overview  Goal: Plan of Care Review  Outcome: Ongoing (interventions implemented as appropriate)  Pt remains free from falls and injury during shift. Pt on lasix gtt 5ml/hr.  Pt went for chest xray and US of LE today. K+ 3.3 supplement given. Mag 1.6 supplement given.  Blood glucose WNL. VSS, Pt voice no complaints, POC updaated with pt, will continue to monitor.

## 2018-08-05 NOTE — PROGRESS NOTES
[facility]  Cardiology  Progress Note    Patient Name: Natalie Parnell  MRN: [id number]  Admission Date: 8/3/2018  Hospital Length of Stay: 1 days  Code Status: Full Code   Attending Physician: Neida Davisdon MD   Primary Care Physician: Octavio Ferrell MD  Expected Discharge Date: 8/6/2018  Principal Problem:Acute on chronic congestive heart failure    Subjective:     Interval History: Overnight, she was found to be net negative 800cc. This AM, patient was hypotensive with BP 70/40. She was ambulating and asymptomatic. Repeat BP showed improvement with reading of 90/50. Lactate down-trended from 3 to 1.1    Review of Systems   Cardiovascular: Negative for chest pain, irregular heartbeat, leg swelling, orthopnea, palpitations, paroxysmal nocturnal dyspnea and syncope.   Respiratory: Positive for shortness of breath. Negative for cough and wheezing.    Gastrointestinal: Negative for bloating, abdominal pain, nausea and vomiting.   Neurological: Negative for dizziness, headaches and light-headedness.     Objective:     Vital Signs (Most Recent):  Temp: 98.3 °F (36.8 °C) (08/04/18 1708)  Pulse: 102 (08/04/18 1708)  Resp: 18 (08/04/18 1708)  BP: (!) 90/51 (08/04/18 1708)  SpO2: (!) 94 % (08/04/18 1708) Vital Signs (24h Range):  Temp:  [97.3 °F (36.3 °C)-98.4 °F (36.9 °C)] 98.3 °F (36.8 °C)  Pulse:  [] 102  Resp:  [16-20] 18  SpO2:  [90 %-95 %] 94 %  BP: ()/(48-57) 90/51     Weight: 79.1 kg (174 lb 4.8 oz)  Body mass index is 28.13 kg/m².     SpO2: (!) 94 %  O2 Device (Oxygen Therapy): room air      Intake/Output Summary (Last 24 hours) at 08/04/18 3604  Last data filed at 08/04/18 1300   Gross per 24 hour   Intake           376.17 ml   Output             1200 ml   Net          -823.83 ml       Lines/Drains/Airways     Peripheral Intravenous Line                 Peripheral IV - Single Lumen 08/03/18 0746 Left Antecubital 1 day         Peripheral IV - Single Lumen 08/03/18 0754 Right  Antecubital 1 day                Physical Exam   Constitutional: She is oriented to person, place, and time. No distress.   Eyes: Pupils are equal, round, and reactive to light.   Neck: Neck supple. JVD present.   Cardiovascular: Normal rate, regular rhythm and intact distal pulses.    Pulmonary/Chest: She has rales.   Abdominal: Soft. Bowel sounds are normal.   Musculoskeletal: She exhibits no edema.   Neurological: She is alert and oriented to person, place, and time.   Skin: Skin is warm and dry.       Significant Labs:   CMP   Recent Labs  Lab 08/03/18  0749 08/04/18  0816   * 137   K 4.2 4.2    101   CO2 20* 27   * 117*   BUN 20 20   CREATININE 1.3 1.2   CALCIUM 9.0 8.8   PROT 6.6  --    ALBUMIN 3.0*  --    BILITOT 1.1*  --    ALKPHOS 154*  --    AST 56*  --    ALT 21  --    ANIONGAP 12 9   ESTGFRAFRICA 44.5* 49.0*   EGFRNONAA 38.6* 42.5*       Significant Imaging:   CXR (8/3): pulmonary vascular congestion     Assessment and Plan:     Brief HPI: 84 yo F with hx of aortic stenosis s/p BAV, CHFrEF (20%) who presents with CHFe and acute respiratory distress.     * Acute on chronic congestive heart failure    -pt remains warm and wt on exam  -still volume overloaded, +JVD, bilateral crackles in lung fields, requiring supplemental oxygen via NC   -c/w lasix ggt at 10 mg/hr, monitor UOP, strict I/O's and daily weights   -bolus with IV lasix and increase ggt as pt's BP tolerates, consider metolazone as another option as well   -hold off on starting BB till pt is euvolemic             Severe aortic stenosis    -s/p BAV, awaiting O/P TAVR             VTE Risk Mitigation         Ordered     IP VTE HIGH RISK PATIENT  Once      08/03/18 1146     Place CORWIN hose  Until discontinued      08/03/18 1146     Place sequential compression device  Until discontinued      08/03/18 1146     Place CORWIN hose  Until discontinued      08/03/18 1146        Plan was discussed with Dr. Susan Galvin,  MD  Cardiology Fellow, PGY-4   [facility]

## 2018-08-05 NOTE — ASSESSMENT & PLAN NOTE
pt remains warm and wt on exam  -still volume overloaded, +JVD, bilateral crackles in lung fields, requiring supplemental oxygen via NC   -Agree with management: c/w lasix gtt , monitor UOP, strict I/O's and daily weights   --- may bolus with IV lasix when increasing gtt as pt's BP tolerates, consider metolazone if high-dose gtt fails to produce adequate UO  -hold off on starting BB till pt is euvolemic

## 2018-08-06 NOTE — PROGRESS NOTES
Progress Note  Hospital Medicine    Primary Team: [facility] MED C  Admit Date: 8/3/2018   Length of Stay:  LOS: 3 days   SUBJECTIVE:   Reason for Admission:  Acute on chronic congestive heart failure    HPI:  Patient is a 83 y.o. female with PMH of severe AS with SHEA 0.65cm2, mean gradients 75mmhg, peak velocity 5.3m/sec, recent admission for GI bleed (requiring 1 unit of PRBC and EGD showing non-bleeding angioectasia s/p APC), recent admission for NSTEMI (with clean LHC), and discharge yesterday after stay for cardiogenic shock and ADHF, presents to ED today with acute onset of SOB.  Pt reports feeling well upon discharge and in the evening, but awoke at 0100 SOB and gasping for air.  She urinated twice overnight.  Also notes LE edema has developed.    Interval history:    No acute events overnight.  Pt reports feeling significantly better with breathing today.  Weight unchanged.    Review of Systems:  Constitutional: no fever or chills  Respiratory: positive for dyspnea on exertion  Cardiovascular: no chest pain or palpitations  Gastrointestinal: no nausea or vomiting, no abdominal pain or change in bowel habits  Musculoskeletal: no arthralgias or myalgias     OBJECTIVE:     Temp:  [97.9 °F (36.6 °C)-99.7 °F (37.6 °C)]   Pulse:  []   Resp:  [16-20]   BP: ()/(45-54)   SpO2:  [89 %-99 %]  Body mass index is 27.61 kg/m².  Intake/Outake:  This Shift:  I/O this shift:  In: 520 [P.O.:480; I.V.:40]  Out: 500 [Urine:500]    Net I/O past 24h:     Intake/Output Summary (Last 24 hours) at 08/06/18 1525  Last data filed at 08/06/18 1500   Gross per 24 hour   Intake          1101.92 ml   Output             2100 ml   Net          -998.08 ml             Physical Exam:  Gen- well-developed, well-nourished, fatigued  CVS- S1 and S2 present, 3/6 systolic murmur, RRR  Resp- no work of breathing, crackles prison up lungs  Abd- BS+, soft, NT, ND  Ext- 1+ pitting LE edema R>L (baseline), no clubbing or  cyanosis    Laboratory:  CBC/Anemia Labs: Coags:      Recent Labs  Lab 08/02/18  0352 08/02/18  0907 08/03/18  0749 08/04/18  0816   WBC 6.13  --  7.53 6.27   HGB 8.6*  --  9.7* 8.8*   HCT 26.4*  --  30.0* 26.6*     --  257 213   MCV 96  --  97 95   RDW 15.0*  --  15.2* 15.0*   IRON  --  44  --   --    FERRITIN  --  168  --   --       Recent Labs  Lab 08/03/18  0749   INR 1.1        Chemistries:     Recent Labs  Lab 08/03/18  0749 08/04/18  0816 08/05/18  0752 08/06/18  0545   * 137 138 138   K 4.2 4.2 3.3* 4.1    101 101 100   CO2 20* 27 29 29   BUN 20 20 23 25*   CREATININE 1.3 1.2 1.3 1.3   CALCIUM 9.0 8.8 8.5* 8.7   PROT 6.6  --  5.5* 5.6*   BILITOT 1.1*  --  0.9 0.9   ALKPHOS 154*  --  118 122   ALT 21  --  18 19   AST 56*  --  50* 54*   MG 2.1 1.9 1.6 1.7        ABG:     Recent Labs  Lab 07/31/18  0802 08/01/18  0956 08/03/18  0747   PH 7.412 7.413 7.386   PCO2 45.9* 40.9 44.1   PO2 32* 29* 21*   HCO3 29.2* 26.1 26.5   POCSATURATED 62* 57* 33*   BE 5 1 1        Cardiac Enzymes: Ejection Fractions:    No results for input(s): CPK, CPKMB, MB, TROPONINI in the last 72 hours. EF   Date Value Ref Range Status   07/27/2018 20 (A) 55 - 65    07/09/2018 50 55 - 65            Medications:  Scheduled Meds:   albuterol-ipratropium  3 mL Nebulization Q6H WAKE    aspirin  81 mg Oral Daily    atorvastatin  40 mg Oral Daily    insulin aspart U-100  2 Units Subcutaneous TIDWM    insulin detemir U-100  8 Units Subcutaneous QHS                             Continuous Infusions:   furosemide (LASIX) 2 mg/mL infusion (non-titrating) 10 mg/hr (08/05/18 2100)     PRN Meds:.acetaminophen, dextrose 50%, dextrose 50%, glucagon (human recombinant), glucose, glucose, insulin aspart U-100, ondansetron, polyethylene glycol, sodium chloride 0.9%     ASSESSMENT/PLAN:     Active Hospital Problems    Diagnosis  POA    *Acute on chronic congestive heart failure [I50.9]  Yes    Hypokalemia [E87.6]  No     Hypomagnesemia [E83.42]  No    Acute hypoxemic respiratory failure [J96.01]  Yes    Severe aortic stenosis [I35.0]  Yes      Resolved Hospital Problems    Diagnosis Date Resolved POA   No resolved problems to display.     Acute on Chronic Systolic Heart Failure  Acute Hypoxemic Respiratory Failure 2/2 ADHF  -Pt's Lasix had been held for several days during hospitalization due to cardiogenic shock  -Now volume overloaded  -Continue diuresis with Lasix gtt at 10/h; will discuss with Cardiology to increase regimen; limited by blood pressure- consider use of   -strict I/o, low salt diet, fluid restriction, daily weights  -when fully diuresed, will start beta-blocker and ACEI as tolerated by bp     Severe Aortic Stenosis  -during prior hospitalization required IABP for hemodynamic support in the setting of acute respiratory distress from flash pulmonary edema 2/2 severe AV stenosis  -s/p successful balloon aortic valvuloplasty with 24mm true balloon aortic mean gradient improved from 70 to 36mmHg  -currently undergoing TAVR w/u: CT chest performed, LHC negative for obstructive CAD  -PFTs ordered for outpatient  -Pt will f/u with Interventional Cardiology as outpatient     NSTEMI  -Trop lower now that previous hospitalization  -LHC revealed clean coronaries   -continue ASA alone     Right Hepatic Lobe Lesion  Suspected Cirrhosis  -lesion seen incidentally on CT for TAVR  -followed up with US abd- notes 3cm  -triple phase CT shows 2.4 x 1.6cm irregular enhancing mass in right lobe of liver; 1cm focal nonenhancing areas centrally; with anterior washout, arterial enhancement- LI-RADS 5 lesion  -  -Hepatology consulted last admission; will be discussing case in IR conference 8/7 and then f/u in Hepatology clinic 8/8 (appt needs to be rescheduled)  -anti-smooth muscle Ab + 1:80     DM-II  -A1C 7  -per daughter, pt's blood sugar is poorly controlled at home, reaching 300s  - Levemir 8units nightly and Aspart 2units  TIDWM   -needs amb referral to Endocrinology    Hypokalemia  Hypomagnesemia  -Repleted     DVT ppx- no pharmacologic ppx with recent GI Bleed; CORWIN hose  CODE status- FULL     Dispo- home in 3-4 days pending clinical improvement     Neida Davidson MD  Hospital Medicine Staff

## 2018-08-06 NOTE — PLAN OF CARE
Problem: Patient Care Overview  Goal: Plan of Care Review  Outcome: Ongoing (interventions implemented as appropriate)  Plan of care reviewed w/ pt and daughter. No acute events overnight. VSS and AAOx4. Pt remains free of falls/injury. Diuresing pt with Lasix gtt at 10 mg/hr. ACHS accuchecks. NSR/ST per tele. Will continue to monitor pt.

## 2018-08-06 NOTE — PROGRESS NOTES
Paged MD on call for [facility] to notify of pt's mag of 1.7. Awaiting return call.     0630: Spoke with . Awaiting orders.

## 2018-08-06 NOTE — SUBJECTIVE & OBJECTIVE
Interval History: No acute events noted. Patient being diuresed on lasix 10/hr gtt but only -1.5L net negative though documentation missing void urine measurements. Weight 1 kg negative.    Objective:     Vital Signs (Most Recent):  Temp: 99.7 °F (37.6 °C) (08/05/18 1931)  Pulse: 108 (08/05/18 2008)  Resp: 18 (08/05/18 2008)  BP: (!) 111/53 (08/05/18 1931)  SpO2: (!) 93 % (08/05/18 2008) Vital Signs (24h Range):  Temp:  [98.2 °F (36.8 °C)-99.7 °F (37.6 °C)] 99.7 °F (37.6 °C)  Pulse:  [] 108  Resp:  [16-18] 18  SpO2:  [93 %-97 %] 93 %  BP: ()/(50-53) 111/53     Weight: 77.7 kg (171 lb 4.8 oz)  Body mass index is 27.65 kg/m².    SpO2: (!) 93 %  O2 Device (Oxygen Therapy): nasal cannula      Intake/Output Summary (Last 24 hours) at 08/05/18 2025  Last data filed at 08/05/18 1900   Gross per 24 hour   Intake           881.92 ml   Output             1325 ml   Net          -443.08 ml       Lines/Drains/Airways     Peripheral Intravenous Line                 Peripheral IV - Single Lumen 08/05/18 1419 Left;Right;Lateral Antecubital less than 1 day                Physical Exam   Constitutional: She appears well-developed and well-nourished.   HENT:   Head: Normocephalic and atraumatic.   Right Ear: External ear normal.   Left Ear: External ear normal.   Eyes: Conjunctivae and EOM are normal. Pupils are equal, round, and reactive to light.   Neck: Normal range of motion. Neck supple. No JVD present. No thyromegaly present.   Cardiovascular: Normal rate, regular rhythm, S1 normal, S2 normal and intact distal pulses.  Exam reveals no gallop, no S3 and no friction rub.    Murmur heard.  Pulses:       Radial pulses are 2+ on the right side, and 2+ on the left side.        Femoral pulses are 2+ on the right side, and 2+ on the left side.       Dorsalis pedis pulses are 1+ on the right side, and 1+ on the left side.        Posterior tibial pulses are 1+ on the right side, and 1+ on the left side.   Herb haddado  - mid peaking. Absent A2.    2+ LExt edema   Pulmonary/Chest: Effort normal. No stridor. She has no wheezes. She has no rales. She exhibits no tenderness.   Abdominal: Soft. Bowel sounds are normal. She exhibits no distension. There is no tenderness. There is no rebound and no guarding.   Musculoskeletal: Normal range of motion. She exhibits no edema or tenderness.   Skin: Skin is warm.   Psychiatric: She has a normal mood and affect.       Significant Labs: All pertinent lab results from the last 24 hours have been reviewed.

## 2018-08-06 NOTE — PT/OT/SLP EVAL
Physical Therapy Evaluation    Patient Name:  Natalie Parnell   MRN:  [id number]    Recommendations:     Discharge Recommendations:   (home no needs.)   Discharge Equipment Recommendations: none   Barriers to discharge: None    Assessment:     Natalie Parnell is a 83 y.o. female admitted with a medical diagnosis of Acute on chronic congestive heart failure.  She presents with the following impairments/functional limitations:  impaired endurance, gait instability. pt apolinar treatment well and will benefit from a few more treatments for stair training. Pt will be able to discharge home when medically stable and will not need further PT or DME. Pt came to ED with c/o tachycardia..    Rehab Prognosis:  good; patient would benefit from acute skilled PT services to address these deficits and reach maximum level of function.      Recent Surgery: * No surgery found *      Plan:     During this hospitalization, patient to be seen 3 x/week to address the above listed problems via gait training  · Plan of Care Expires:  08/31/18   Plan of Care Reviewed with: patient    Subjective     Communicated with nurse prior to session.  Patient found supine upon PT entry to room, agreeable to evaluation.      Chief Complaint: none  Patient comments/goals: to get better and go home.   Pain/Comfort:  · Pain Rating 1: 0/10  · Pain Rating Post-Intervention 1: 0/10    Patients cultural, spiritual, Congregation conflicts given the current situation: none    Living Environment:  Pt lives with her daughter who works and will assist after work. They live in 1 story with 6 steps and no handrail   Prior to admission, patients level of function was modified Independent using rollator. .  Patient has the following equipment: bedside commode, rollator.  DME owned (not currently used): none.  Upon discharge, patient will have assistance from daughter after work. Daughter is . .    Objective:     Patient found with: peripheral IV, telemetry, oxygen      General Precautions: Standard, fall   Orthopedic Precautions:    Braces:       Exams:  · Cognitive Exam:  Patient is oriented to Person, Place, Time and Situation   ·   · RLE ROM: WFL  · RLE Strength: WFL  · LLE ROM: WFL  · LLE Strength: WFL    Functional Mobility:  · Bed Mobility:     · Rolling Left:  modified independence  · Supine to Sit: modified independence  ·   · Transfers:     · Sit to Stand:  modified independence with 4 wheeled walker  ·   · Gait: pt received gait training ~ 250 ft with rollator and supervision. pt received gait training without O2 and pulse ox was 91% after gait.  pt was returned to O2 in room.     AM-PAC 6 CLICK MOBILITY  Total Score:23         Patient left up in chair with all lines intact and call button in reach.    GOALS:    Physical Therapy Goals        Problem: Physical Therapy Goal    Goal Priority Disciplines Outcome Goal Variances Interventions   Physical Therapy Goal     PT/OT, PT      Description:  Goals to be met by: 18    Patient will increase functional independence with mobility by performin. Ascend/descend 6 stair with right Handrails Contact Guard Assistance -not met                    History:     Past Medical History:   Diagnosis Date    Asthma     Diabetes mellitus     HTN (hypertension)     Hyperlipidemia        Past Surgical History:   Procedure Laterality Date    CATARACT EXTRACTION Bilateral     Dr. Max    COLON SURGERY      ESOPHAGOGASTRODUODENOSCOPY N/A 7/10/2018    Procedure: EGD (ESOPHAGOGASTRODUODENOSCOPY);  Surgeon: Derian Stoner MD;  Location: [facility] ENDO;  Service: Endoscopy;  Laterality: N/A;    HERNIA REPAIR      PERCUTANEOUS TRANSLUMINAL BALLOON ANGIOPLASTY OF AORTA N/A 2018    Procedure: PTA, AORTA, USING BALLOON;  Surgeon: Scott Pruett MD;  Location: [facility] CATH LAB;  Service: Cardiology;  Laterality: N/A;    VARICOSE VEIN SURGERY         Clinical Decision Making:     History  Co-morbidities and personal factors  that may impact the plan of care Examination  Body Structures and Functions, activity limitations and participation restrictions that may impact the plan of care Clinical Presentation   Decision Making/ Complexity Score   Co-morbidities:   [] Time since onset of injury / illness / exacerbation  [] Status of current condition  []Patient's cognitive status and safety concerns    [] Multiple Medical Problems (see med hx)  Personal Factors:   [] Patient's age  [] Prior Level of function   [] Patient's home situation (environment and family support)  [] Patient's level of motivation  [] Expected progression of patient      HISTORY:(criteria)    [] 60963 - no personal factors/history    [] 14919 - has 1-2 personal factor/comorbidity     [] 52075 - has >3 personal factor/comorbidity     Body Regions:  [] Objective examination findings  [] Head     []  Neck  [] Trunk   [] Upper Extremity  [] Lower Extremity    Body Systems:  [] For communication ability, affect, cognition, language, and learning style: the assessment of the ability to make needs known, consciousness, orientation (person, place, and time), expected emotional /behavioral responses, and learning preferences (eg, learning barriers, education  needs)  [] For the neuromuscular system: a general assessment of gross coordinated movement (eg, balance, gait, locomotion, transfers, and transitions) and motor function  (motor control and motor learning)  [] For the musculoskeletal system: the assessment of gross symmetry, gross range of motion, gross strength, height, and weight  [] For the integumentary system: the assessment of pliability(texture), presence of scar formation, skin color, and skin integrity  [] For cardiovascular/pulmonary system: the assessment of heart rate, respiratory rate, blood pressure, and edema     Activity limitations:    [] Patient's cognitive status and saf ety concerns          [] Status of current condition      [] Weight bearing  restriction  [] Cardiopulmunary Restriction    Participation Restrictions:   [] Goals and goal agreement with the patient     [] Rehab potential (prognosis) and probable outcome      Examination of Body System: (criteria)    [] 81407 - addressing 1-2 elements    [] 35944 - addressing a total of 3 or more elements     [] 62945 -  Addressing a total of 4 or more elements         Clinical Presentation: (criteria)  Choose one     On examination of body system using standardized tests and measures patient presents with (CHOOSE ONE) elements from any of the following: body structures and functions, activity limitations, and/or participation restrictions.  Leading to a clinical presentation that is considered (CHOOSE ONE)                              Clinical Decision Making  (Eval Complexity):  Choose One     Time Tracking:     PT Received On: 08/06/18  PT Start Time: 1026     PT Stop Time: 1040  PT Total Time (min): 14 min     Billable Minutes: Evaluation 14 min      Lachelle Nash, PT  08/06/2018

## 2018-08-06 NOTE — ASSESSMENT & PLAN NOTE
Agree with cardiology recommendations with following additional suggestion.    Consider addition of dobutamine with IV lasix for enhanced diuresis.   She should follow up with Dr. Mike Diaz ([facility]) for heart failure/ general cardiology management who is her primary cardiologist.   Patient needs to enhance activity and get stronger to be re-evaluated for RENATE/SAVR in [facility].

## 2018-08-06 NOTE — PLAN OF CARE
Problem: Diabetes, Type 2 (Adult)  Intervention: Optimize Glycemic Control  Remains compliant with therapy.      Problem: Fall Risk (Adult)  Goal: Absence of Falls  Patient will demonstrate the desired outcomes by discharge/transition of care.   Outcome: Ongoing (interventions implemented as appropriate)  Siderails up x 2. Family at bedside. Non-sliop socks on. Call-Light in reach.    Problem: Patient Care Overview  Goal: Plan of Care Review  Outcome: Ongoing (interventions implemented as appropriate)  Continue to review with patient.

## 2018-08-06 NOTE — PLAN OF CARE
Problem: Physical Therapy Goal  Goal: Physical Therapy Goal  Goals to be met by: 18    Patient will increase functional independence with mobility by performin. Ascend/descend 6 stair with right Handrails Contact Guard Assistance -not met  Evaluation completed and goals appropriate. Lachelle Nash, PT 2018

## 2018-08-06 NOTE — PLAN OF CARE
met with pt at the bedside today to complete screening.  Pt is an alert Hungarian speaking lady who lives at home with her daughter and plans to return.  Pt is currently being followed by Anastacio scott.   will resume services when pt is discharged.

## 2018-08-06 NOTE — PROGRESS NOTES
[facility]  Interventional Cardiology/Structural Heart Disease  Consult  Note    Patient Name: Natalie Parnell  MRN: [id number]  Admission Date: 8/3/2018  Hospital Length of Stay: 2 days  Code Status: Full Code   Attending Physician: Natan Brothers MD   Primary Care Physician: Octavio Ferrell MD  Principal Problem:Acute on chronic congestive heart failure    Subjective:     Interval History: No acute events noted. Patient being diuresed on lasix 10/hr gtt but only -1.5L net negative though documentation missing void urine measurements. Weight 1 kg negative.    Objective:     Vital Signs (Most Recent):  Temp: 99.7 °F (37.6 °C) (08/05/18 1931)  Pulse: 108 (08/05/18 2008)  Resp: 18 (08/05/18 2008)  BP: (!) 111/53 (08/05/18 1931)  SpO2: (!) 93 % (08/05/18 2008) Vital Signs (24h Range):  Temp:  [98.2 °F (36.8 °C)-99.7 °F (37.6 °C)] 99.7 °F (37.6 °C)  Pulse:  [] 108  Resp:  [16-18] 18  SpO2:  [93 %-97 %] 93 %  BP: ()/(50-53) 111/53     Weight: 77.7 kg (171 lb 4.8 oz)  Body mass index is 27.65 kg/m².    SpO2: (!) 93 %  O2 Device (Oxygen Therapy): nasal cannula      Intake/Output Summary (Last 24 hours) at 08/05/18 2025  Last data filed at 08/05/18 1900   Gross per 24 hour   Intake           881.92 ml   Output             1325 ml   Net          -443.08 ml       Lines/Drains/Airways     Peripheral Intravenous Line                 Peripheral IV - Single Lumen 08/05/18 1419 Left;Right;Lateral Antecubital less than 1 day                Physical Exam   Constitutional: She appears well-developed and well-nourished.   HENT:  JVD to angle of jaw  Head: Normocephalic and atraumatic.   Right Ear: External ear normal.   Left Ear: External ear normal.   Eyes: Conjunctivae and EOM are normal. Pupils are equal, round, and reactive to light.   Neck: Normal range of motion. Neck supple. No JVD present. No thyromegaly present.   Cardiovascular: Normal rate, regular rhythm, S1 normal, S2 normal and intact  distal pulses.  Exam reveals no gallop, no S3 and no friction rub.    Murmur heard.  Pulses:       Radial pulses are 2+ on the right side, and 2+ on the left side.        Femoral pulses are 2+ on the right side, and 2+ on the left side.       Dorsalis pedis pulses are 1+ on the right side, and 1+ on the left side.        Posterior tibial pulses are 1+ on the right side, and 1+ on the left side.   Crescendo decrescendo - mid peaking. Absent A2.    2+ LExt edema   Pulmonary/Chest: Effort normal. No stridor. She has no wheezes. She has no rales. She exhibits no tenderness.   Abdominal: Soft. Bowel sounds are normal. She exhibits no distension. There is no tenderness. There is no rebound and no guarding.   Musculoskeletal: Normal range of motion. She exhibits no edema or tenderness.   Skin: Skin is warm.   Psychiatric: She has a normal mood and affect.       Significant Labs: All pertinent lab results from the last 24 hours have been reviewed.        Assessment and Plan:     Patient is a 83 y.o. female presenting with:    * Acute on chronic congestive heart failure    Agree with cardiology recommendations with following additional suggestion.    Consider addition of dobutamine with IV lasix for enhanced diuresis.   She should follow up with Dr. Mike Diaz ([facility]) for heart failure/ general cardiology management who is her primary cardiologist.         Severe Aortic Stenosis - symptomatic with NYHA FC IV symptoms      Natalie Parnell is a 83 y.o. female referred by Dr Diaz for evaluation of severe AS (NYHA Class IV sx).     The patient has undergone the following TAVR work-up:   · ECHO (Date 7/9/18 ): SHEA= 0.65cm2, MG= 74mmHg, Peak Jong= 5.25 m/s, EF= 50%.   · LHC (Date 7/27/18): Nonobstructive Disease  · STS: 7.3 %   · Frailty: 1/4(albumin) - needs repetition  · Iliacs are >8mm on R and > 7mm on L   · LVOT area by CTA is 5.02 cm2 (29.9 mm X 21.6 mm) and Avg Diameter is 25.3 per Dr Lord  · Incidental findings on  CT: Liver lesion and cirrhosis which is being worked up by [facility]  · CT Surgery risk assessment: High risk, per Dr Frey due to liver disease, frailty and age.  · Rhythm issues: None  · PFTs: Pt unable to perform  · Comorbidities: Cirrhosis, DM, HTN, AVM's s/p APC 7/9/18      She was found to have a liver lesion on CT and has hx of cirrhosis so hepatology has been consulted and they are working this up. She is going to cont PT/OT. Once her liver lesion has been worked up and she prehab -  will schedule for TAVR as an outpatient.    She is currently cohort C for RENATE        VTE Risk Mitigation         Ordered     Place CORWIN hose  Until discontinued      08/05/18 1428     IP VTE HIGH RISK PATIENT  Once      08/03/18 1146     Place CORWIN hose  Until discontinued      08/03/18 1146     Place sequential compression device  Until discontinued      08/03/18 1146     Place CORWIN hose  Until discontinued      08/03/18 1146          Vincent Elliott MD  Interventional Cardiology  [facility]

## 2018-08-07 NOTE — SUBJECTIVE & OBJECTIVE
Past Medical History:   Diagnosis Date    Asthma     Diabetes mellitus     HTN (hypertension)     Hyperlipidemia        Past Surgical History:   Procedure Laterality Date    CATARACT EXTRACTION Bilateral     Dr. Max    COLON SURGERY      ESOPHAGOGASTRODUODENOSCOPY N/A 7/10/2018    Procedure: EGD (ESOPHAGOGASTRODUODENOSCOPY);  Surgeon: Derian Stoner MD;  Location: [facility] ENDO;  Service: Endoscopy;  Laterality: N/A;    HERNIA REPAIR      PERCUTANEOUS TRANSLUMINAL BALLOON ANGIOPLASTY OF AORTA N/A 7/30/2018    Procedure: PTA, AORTA, USING BALLOON;  Surgeon: Scott Pruett MD;  Location: [facility] CATH LAB;  Service: Cardiology;  Laterality: N/A;    VARICOSE VEIN SURGERY         Review of patient's allergies indicates:  No Known Allergies    No current facility-administered medications on file prior to encounter.      Current Outpatient Prescriptions on File Prior to Encounter   Medication Sig    albuterol 90 mcg/actuation inhaler Inhale 2 puffs into the lungs every 6 (six) hours as needed for Wheezing.    aspirin (ECOTRIN) 81 MG EC tablet Take 1 tablet (81 mg total) by mouth once daily.    atorvastatin (LIPITOR) 40 MG tablet Take 1 tablet (40 mg total) by mouth once daily.    blood sugar diagnostic (ACCU-CHEK SMARTVIEW TEST STRIP) Strp Inject 1 strip into the skin once daily.    blood-glucose meter Misc Use as instructed    furosemide (LASIX) 20 MG tablet Take 1 tablet (20 mg total) by mouth once daily.    insulin aspart U-100 (NOVOLOG) 100 unit/mL InPn pen Inject 3 Units into the skin 3 (three) times daily.    insulin glargine (LANTUS SOLOSTAR) 100 unit/mL (3 mL) InPn pen Inject 12 Units into the skin once daily.    lancets (ACCU-CHEK FASTCLIX) Misc Inject 1 lancet into the skin once daily.    multivitamin capsule Take 1 capsule by mouth once daily.     Family History     None        Social History Main Topics    Smoking status: Never Smoker    Smokeless tobacco: Never Used    Alcohol use No     Drug use: No    Sexual activity: Not on file     Review of Systems   Cardiovascular: Positive for dyspnea on exertion. Negative for chest pain, irregular heartbeat, leg swelling, orthopnea, palpitations, paroxysmal nocturnal dyspnea and syncope.   Respiratory: Positive for shortness of breath.      Objective:     Vital Signs (Most Recent):  Temp: 97.6 °F (36.4 °C) (08/06/18 1950)  Pulse: 101 (08/06/18 1950)  Resp: 16 (08/06/18 1950)  BP: (!) 92/50 (08/06/18 1955)  SpO2: 96 % (08/06/18 1950) Vital Signs (24h Range):  Temp:  [97.6 °F (36.4 °C)-98.6 °F (37 °C)] 97.6 °F (36.4 °C)  Pulse:  [] 101  Resp:  [16-20] 16  SpO2:  [89 %-99 %] 96 %  BP: ()/(45-54) 92/50     Weight: 77.6 kg (171 lb 1.2 oz)  Body mass index is 27.61 kg/m².    SpO2: 96 %  O2 Device (Oxygen Therapy): nasal cannula      Intake/Output Summary (Last 24 hours) at 08/06/18 2004  Last data filed at 08/06/18 1900   Gross per 24 hour   Intake             1020 ml   Output             2100 ml   Net            -1080 ml       Lines/Drains/Airways     Peripheral Intravenous Line                 Peripheral IV - Single Lumen 08/05/18 1419 Left;Right;Lateral Antecubital 1 day                Physical Exam   Constitutional: She is oriented to person, place, and time. She appears well-developed.   Neck: Neck supple.   Cardiovascular: Normal rate and regular rhythm.  Exam reveals no gallop and no friction rub.    No murmur heard.  Pulmonary/Chest: Effort normal. She has rales.   Abdominal: Soft. Bowel sounds are normal.   Musculoskeletal: She exhibits no edema.   Neurological: She is alert and oriented to person, place, and time.   Skin: Skin is warm and dry.   Vitals reviewed.      Significant Labs:   BMP:   Recent Labs  Lab 08/05/18  0752 08/06/18  0545    114*    138   K 3.3* 4.1    100   CO2 29 29   BUN 23 25*   CREATININE 1.3 1.3   CALCIUM 8.5* 8.7   MG 1.6 1.7       Significant Imaging: X-Ray: CXR: X-Ray Chest 1 View (CXR): No  results found for this visit on 08/03/18.

## 2018-08-07 NOTE — ASSESSMENT & PLAN NOTE
pt remains warm and wt on exam  -still volume overloaded, +JVD, bilateral crackles in lung fields, requiring supplemental oxygen via NC   -BNP down-trending from 1,767 to 751  -pt down 4 lbs with diuresis    -Agree with management: c/w lasix gtt , monitor UOP, strict I/O's and daily weights   -hold off on starting BB till pt is euvolemic

## 2018-08-07 NOTE — PROGRESS NOTES
[facility]  Cardiology  Progress Note    Patient Name: Natalie Parnell  MRN: [id number]  Admission Date: 8/3/2018  Hospital Length of Stay: 3 days  Code Status: Full Code   Attending Physician: Neida Davidson MD   Primary Care Physician: Octavio Ferrell MD  Expected Discharge Date: 8/9/2018  Principal Problem:Acute on chronic congestive heart failure    Subjective:     HPI/Hospital Course: 83 y.o. female with PMH of severe AS with SHEA 0.65cm2, mean gradients 75mmhg, peak velocity 5.3m/sec, recent admission for GI bleed (requiring 1 unit of PRBC and EGD showing non-bleeding angioectasia s/p APC), recent admission for NSTEMI (non obstructive disease on LHC), and discharge yesterday after stay for cardiogenic shock and ADHF. Patient was admitted with cardiogenic shock and discharged yesterday. Over the past week patient was in the CCU required IABP. Repeat LHC was performed given drop in her EF and revealed non obstructive disease. She underwent BAV with improvement in her transvalvular mean gradients to 36mmhg. She was stepped down and discharged yesterday, she went home and was SOB and couldn't sleep all night in the AM patient was found to be tachycardic by her daughter who is a nurse and came back to the ER. In the ER patient was volume overloaded, warm and wet on exam and with pulmonary edema on CXR. She was admitted to floor for IV diuresis.     Past Medical History:   Diagnosis Date    Asthma     Diabetes mellitus     HTN (hypertension)     Hyperlipidemia        Past Surgical History:   Procedure Laterality Date    CATARACT EXTRACTION Bilateral     Dr. Max    COLON SURGERY      ESOPHAGOGASTRODUODENOSCOPY N/A 7/10/2018    Procedure: EGD (ESOPHAGOGASTRODUODENOSCOPY);  Surgeon: Derian Stoner MD;  Location: [facility] ENDO;  Service: Endoscopy;  Laterality: N/A;    HERNIA REPAIR      PERCUTANEOUS TRANSLUMINAL BALLOON ANGIOPLASTY OF AORTA N/A 7/30/2018    Procedure: PTA, AORTA, USING  BALLOON;  Surgeon: Scott Pruett MD;  Location: [facility] CATH LAB;  Service: Cardiology;  Laterality: N/A;    VARICOSE VEIN SURGERY         Review of patient's allergies indicates:  No Known Allergies    No current facility-administered medications on file prior to encounter.      Current Outpatient Prescriptions on File Prior to Encounter   Medication Sig    albuterol 90 mcg/actuation inhaler Inhale 2 puffs into the lungs every 6 (six) hours as needed for Wheezing.    aspirin (ECOTRIN) 81 MG EC tablet Take 1 tablet (81 mg total) by mouth once daily.    atorvastatin (LIPITOR) 40 MG tablet Take 1 tablet (40 mg total) by mouth once daily.    blood sugar diagnostic (ACCU-CHEK SMARTVIEW TEST STRIP) Strp Inject 1 strip into the skin once daily.    blood-glucose meter Misc Use as instructed    furosemide (LASIX) 20 MG tablet Take 1 tablet (20 mg total) by mouth once daily.    insulin aspart U-100 (NOVOLOG) 100 unit/mL InPn pen Inject 3 Units into the skin 3 (three) times daily.    insulin glargine (LANTUS SOLOSTAR) 100 unit/mL (3 mL) InPn pen Inject 12 Units into the skin once daily.    lancets (ACCU-CHEK FASTCLIX) Misc Inject 1 lancet into the skin once daily.    multivitamin capsule Take 1 capsule by mouth once daily.     Family History     None        Social History Main Topics    Smoking status: Never Smoker    Smokeless tobacco: Never Used    Alcohol use No    Drug use: No    Sexual activity: Not on file     Review of Systems   Cardiovascular: Positive for dyspnea on exertion. Negative for chest pain, irregular heartbeat, leg swelling, orthopnea, palpitations, paroxysmal nocturnal dyspnea and syncope.   Respiratory: Positive for shortness of breath.      Objective:     Vital Signs (Most Recent):  Temp: 97.6 °F (36.4 °C) (08/06/18 1950)  Pulse: 101 (08/06/18 1950)  Resp: 16 (08/06/18 1950)  BP: (!) 92/50 (08/06/18 1955)  SpO2: 96 % (08/06/18 1950) Vital Signs (24h Range):  Temp:  [97.6 °F (36.4  °C)-98.6 °F (37 °C)] 97.6 °F (36.4 °C)  Pulse:  [] 101  Resp:  [16-20] 16  SpO2:  [89 %-99 %] 96 %  BP: ()/(45-54) 92/50     Weight: 77.6 kg (171 lb 1.2 oz)  Body mass index is 27.61 kg/m².    SpO2: 96 %  O2 Device (Oxygen Therapy): nasal cannula      Intake/Output Summary (Last 24 hours) at 08/06/18 2004  Last data filed at 08/06/18 1900   Gross per 24 hour   Intake             1020 ml   Output             2100 ml   Net            -1080 ml       Lines/Drains/Airways     Peripheral Intravenous Line                 Peripheral IV - Single Lumen 08/05/18 1419 Left;Right;Lateral Antecubital 1 day                Physical Exam   Constitutional: She is oriented to person, place, and time. She appears well-developed.   Neck: Neck supple.   Cardiovascular: Normal rate and regular rhythm.  Exam reveals no gallop and no friction rub.    No murmur heard.  Pulmonary/Chest: Effort normal. She has rales.   Abdominal: Soft. Bowel sounds are normal.   Musculoskeletal: She exhibits no edema.   Neurological: She is alert and oriented to person, place, and time.   Skin: Skin is warm and dry.   Vitals reviewed.      Significant Labs:   BMP:   Recent Labs  Lab 08/05/18  0752 08/06/18  0545    114*    138   K 3.3* 4.1    100   CO2 29 29   BUN 23 25*   CREATININE 1.3 1.3   CALCIUM 8.5* 8.7   MG 1.6 1.7       Significant Imaging: X-Ray: CXR: X-Ray Chest 1 View (CXR): No results found for this visit on 08/03/18.    Assessment and Plan:     Brief HPI: 84 yo F with hx of aortic stenosis s/p BAV, CHFrEF (20%) who presents with CHFe and acute respiratory distress.     * Acute on chronic congestive heart failure    pt remains warm and wt on exam  -still volume overloaded, +JVD, bilateral crackles in lung fields, requiring supplemental oxygen via NC   -BNP down-trending from 1,767 to 751  -pt down 4 lbs with diuresis    -Agree with management: c/w lasix gtt , monitor UOP, strict I/O's and daily weights   -hold off on  starting BB till pt is euvolemic         Severe aortic stenosis    -s/p BAV, awaiting O/P TAVR             VTE Risk Mitigation         Ordered     Place CORWIN hose  Until discontinued      08/05/18 1428     IP VTE HIGH RISK PATIENT  Once      08/03/18 1146     Place CORWIN hose  Until discontinued      08/03/18 1146     Place sequential compression device  Until discontinued      08/03/18 1146     Place CORWIN hose  Until discontinued      08/03/18 1146          Suzan Galvin MD  Cardiology Fellow   [facility]

## 2018-08-07 NOTE — SUBJECTIVE & OBJECTIVE
Past Medical History:   Diagnosis Date    Asthma     Diabetes mellitus     HTN (hypertension)     Hyperlipidemia        Past Surgical History:   Procedure Laterality Date    CATARACT EXTRACTION Bilateral     Dr. Max    COLON SURGERY      ESOPHAGOGASTRODUODENOSCOPY N/A 7/10/2018    Procedure: EGD (ESOPHAGOGASTRODUODENOSCOPY);  Surgeon: Derian Stoner MD;  Location: [facility] ENDO;  Service: Endoscopy;  Laterality: N/A;    HERNIA REPAIR      PERCUTANEOUS TRANSLUMINAL BALLOON ANGIOPLASTY OF AORTA N/A 7/30/2018    Procedure: PTA, AORTA, USING BALLOON;  Surgeon: Scott Pruett MD;  Location: [facility] CATH LAB;  Service: Cardiology;  Laterality: N/A;    VARICOSE VEIN SURGERY         Review of patient's allergies indicates:  No Known Allergies    No current facility-administered medications on file prior to encounter.      Current Outpatient Prescriptions on File Prior to Encounter   Medication Sig    albuterol 90 mcg/actuation inhaler Inhale 2 puffs into the lungs every 6 (six) hours as needed for Wheezing.    aspirin (ECOTRIN) 81 MG EC tablet Take 1 tablet (81 mg total) by mouth once daily.    atorvastatin (LIPITOR) 40 MG tablet Take 1 tablet (40 mg total) by mouth once daily.    blood sugar diagnostic (ACCU-CHEK SMARTVIEW TEST STRIP) Strp Inject 1 strip into the skin once daily.    blood-glucose meter Misc Use as instructed    furosemide (LASIX) 20 MG tablet Take 1 tablet (20 mg total) by mouth once daily.    insulin aspart U-100 (NOVOLOG) 100 unit/mL InPn pen Inject 3 Units into the skin 3 (three) times daily.    insulin glargine (LANTUS SOLOSTAR) 100 unit/mL (3 mL) InPn pen Inject 12 Units into the skin once daily.    lancets (ACCU-CHEK FASTCLIX) Misc Inject 1 lancet into the skin once daily.    multivitamin capsule Take 1 capsule by mouth once daily.     Family History     None        Social History Main Topics    Smoking status: Never Smoker    Smokeless tobacco: Never Used    Alcohol use No     Drug use: No    Sexual activity: Not on file     Review of Systems   Constitution: Negative for chills and fever.   Cardiovascular: Positive for dyspnea on exertion. Negative for chest pain, irregular heartbeat, leg swelling, near-syncope, orthopnea, palpitations, paroxysmal nocturnal dyspnea and syncope.   Respiratory: Positive for shortness of breath.    Musculoskeletal: Positive for back pain.   Gastrointestinal: Negative for constipation, diarrhea, nausea and vomiting.   Neurological: Negative for dizziness, focal weakness, headaches and light-headedness.     Objective:     Vital Signs (Most Recent):  Temp: 96.9 °F (36.1 °C) (08/07/18 1126)  Pulse: 92 (08/07/18 1126)  Resp: 18 (08/07/18 1126)  BP: (!) 93/48 (08/07/18 1126)  SpO2: (!) 93 % (08/07/18 1126) Vital Signs (24h Range):  Temp:  [96.9 °F (36.1 °C)-99 °F (37.2 °C)] 96.9 °F (36.1 °C)  Pulse:  [] 92  Resp:  [16-20] 18  SpO2:  [89 %-99 %] 93 %  BP: (82-97)/(43-55) 93/48     Weight: 76.2 kg (168 lb 1.6 oz)  Body mass index is 27.13 kg/m².    SpO2: (!) 93 %  O2 Device (Oxygen Therapy): room air      Intake/Output Summary (Last 24 hours) at 08/07/18 1206  Last data filed at 08/07/18 0500   Gross per 24 hour   Intake              900 ml   Output             1150 ml   Net             -250 ml       Lines/Drains/Airways     Peripheral Intravenous Line                 Peripheral IV - Single Lumen 08/05/18 1419 Left;Right;Lateral Antecubital 1 day                Physical Exam   Constitutional: She is oriented to person, place, and time.   Eyes: Pupils are equal, round, and reactive to light.   Neck: Neck supple.   Cardiovascular: Normal rate, regular rhythm and intact distal pulses.    Murmur heard.  Pulmonary/Chest: She has rales.   Abdominal: Soft. Bowel sounds are normal.   Musculoskeletal: She exhibits no edema.   Neurological: She is alert and oriented to person, place, and time.   Skin: Skin is warm and dry.       Significant Labs:   CMP   Recent  Labs  Lab 08/06/18  0545 08/07/18  0637    135*   K 4.1 3.9    96   CO2 29 32*   * 123*   BUN 25* 26*   CREATININE 1.3 1.2   CALCIUM 8.7 8.7   PROT 5.6* 5.7*   ALBUMIN 2.6* 2.6*   BILITOT 0.9 1.0   ALKPHOS 122 122   AST 54* 54*   ALT 19 20   ANIONGAP 9 7*   ESTGFRAFRICA 44.5* 49.0*   EGFRNONAA 38.6* 42.5*       Significant Imaging:     CXR (8/7):   Cardiomegaly with hilar prominence and findings of pulmonary vascular congestive changes are demonstrated.  There appears to be reduced amount of right-sided pleural effusion with minimal blunting the left costophrenic angle again demonstrated.  The lungs are otherwise expanded and appear clear.

## 2018-08-07 NOTE — PLAN OF CARE
08/07/18 0903   Discharge Reassessment   Assessment Type Discharge Planning Reassessment   Do you have any problems affording any of your prescribed medications? No   Discharge Plan A Home with family;Home Health   Discharge Plan B Skilled Nursing Facility

## 2018-08-07 NOTE — PT/OT/SLP PROGRESS
Occupational Therapy      Patient Name:  Natalie Parnell   MRN:  [id number]    Received OT evaluation/treatment orders 8/7/2018. Attempted to see patient for OT evaluation in PM, however pt eating lunch and asking OT to return. OT unable to re-attempt later. Will follow up tomorrow.     Jaelyn Hendrix, OT  8/7/2018

## 2018-08-07 NOTE — ASSESSMENT & PLAN NOTE
pt remains warm and wt on exam  -still volume overloaded, +JVD, crackles in right lung fields, requiring supplemental oxygen via NC   -BNP down-trending from 1,767 to 751  -pt UOP in 24 hours is 1 L, weight down by 4+lbs  -CXR this AM shows pleural effusions  -Agree with management: c/w lasix gtt , monitor UOP, BMP, strict I/O's and daily weights   -hold off on starting BB till pt is euvolemic   -wean supplemental oxygen as tolerated  -encourage OOB activities/ambulation

## 2018-08-07 NOTE — PLAN OF CARE
Problem: Patient Care Overview  Goal: Plan of Care Review  Outcome: Ongoing (interventions implemented as appropriate)  Plan of care discussed with patient.  Patient ambulating in hallway independently with walker, stand by assistance provided by family member. Fall precautions in place. Patient has no complaints of pain. Discussed medications and care. Lasix gtt continues, adequate urinary output noted this shift. Patient has no questions at this time. Will continue to monitor.

## 2018-08-07 NOTE — PLAN OF CARE
Problem: Patient Care Overview  Goal: Plan of Care Review  Outcome: Ongoing (interventions implemented as appropriate)  Lasix gtt infusing. Adequate urine output. Pt. Remains free from falls/ injury/trauma. No complaints of CP, SOB, or discomfort. Plan of Care reviewed with patient. VSS and NADN. Will continue to monitor.

## 2018-08-07 NOTE — PROGRESS NOTES
Progress Note  Hospital Medicine    [place] [facility]  Admit Date: 8/3/2018   Length of Stay:  LOS: 4 days   SUBJECTIVE:   Reason for Admission:  Acute on chronic congestive heart failure    HPI:  Patient is a 83F with PMH of chronic systolic HF with EF 20-25%, severe AS with SHEA 0.65cm2, mean gradients 75mmhg, peak velocity 5.3m/sec, recent admission for GI bleed (requiring 1 unit of PRBC and EGD showing non-bleeding angioectasia s/p APC), recent admission for NSTEMI (without evidence CAD on [facility]), and discharge yesterday after stay for cardiogenic shock and ADHF, presented with acute onset of SOB.  Pt reports feeling well upon discharge and in the evening, but awoke at 0100 SOB and gasping for air.  She urinated twice overnight.  Also notes LE edema has developed.  Patient admitted for evaluation of acute on chronic systolic heart failure.    ECHO 7/27/2018  CONCLUSIONS     1 - Moderate left ventricular enlargement.     2 - Severely depressed left ventricular systolic function (EF 20-25%).     3 - Left atrial enlargement.     4 - Low normal to mildly depressed right ventricular systolic function .     5 - Moderate to severe aortic stenosis.     6 - Moderate mitral regurgitation.     7 - Increased central venous pressure.     8 - If clinically indicated, a full Doppler study can be performed.    Interval history:    No acute events overnight.  Pt reports feeling significantly better with breathing today.  Weight - 3 lb. I/O trending appropriately.  D/w patient in Taiwanese, family not at bedside.    Review of Systems:  Constitutional: no fever or chills  Respiratory: positive for dyspnea on exertion  Cardiovascular: no chest pain or palpitations  Gastrointestinal: no nausea or vomiting, no abdominal pain or change in bowel habits  Musculoskeletal: no arthralgias or myalgias     OBJECTIVE:     Temp:  [97.6 °F (36.4 °C)-99 °F (37.2 °C)]   Pulse:  []   Resp:  [16-20]   BP: (82-97)/(43-53)   SpO2:  [89  %-99 %]  Body mass index is 27.13 kg/m².  Intake/Outake:  This Shift:  No intake/output data recorded.    Net I/O past 24h:     Intake/Output Summary (Last 24 hours) at 08/07/18 0830  Last data filed at 08/07/18 0500   Gross per 24 hour   Intake              900 ml   Output             1150 ml   Net             -250 ml             Physical Exam:  Gen- well-developed, well-nourished, fatigued  CVS- S1 and S2 present, 3/6 systolic murmur, RRR  Resp- no work of breathing, crackles MCFP up lungs  Abd- BS+, soft, NT, ND  Ext- 1+ pitting LE edema R>L (baseline), no clubbing or cyanosis    Laboratory:  CBC/Anemia Labs: Coags:      Recent Labs  Lab 08/02/18  0352 08/02/18  0907 08/03/18  0749 08/04/18  0816   WBC 6.13  --  7.53 6.27   HGB 8.6*  --  9.7* 8.8*   HCT 26.4*  --  30.0* 26.6*     --  257 213   MCV 96  --  97 95   RDW 15.0*  --  15.2* 15.0*   IRON  --  44  --   --    FERRITIN  --  168  --   --       Recent Labs  Lab 08/03/18  0749   INR 1.1        Chemistries:     Recent Labs  Lab 08/05/18  0752 08/06/18  0545 08/07/18  0637    138 135*   K 3.3* 4.1 3.9    100 96   CO2 29 29 32*   BUN 23 25* 26*   CREATININE 1.3 1.3 1.2   CALCIUM 8.5* 8.7 8.7   PROT 5.5* 5.6* 5.7*   BILITOT 0.9 0.9 1.0   ALKPHOS 118 122 122   ALT 18 19 20   AST 50* 54* 54*   MG 1.6 1.7 2.0        ABG:     Recent Labs  Lab 08/01/18  0956 08/03/18  0747   PH 7.413 7.386   PCO2 40.9 44.1   PO2 29* 21*   HCO3 26.1 26.5   POCSATURATED 57* 33*   BE 1 1        Cardiac Enzymes: Ejection Fractions:    No results for input(s): CPK, CPKMB, MB, TROPONINI in the last 72 hours. EF   Date Value Ref Range Status   07/27/2018 20 (A) 55 - 65    07/09/2018 50 55 - 65            Medications:  Scheduled Meds:   albuterol-ipratropium  3 mL Nebulization Q6H WAKE    aspirin  81 mg Oral Daily    atorvastatin  40 mg Oral Daily    insulin aspart U-100  2 Units Subcutaneous TIDWM    insulin detemir U-100  8 Units Subcutaneous QHS                              Continuous Infusions:   furosemide (LASIX) 2 mg/mL infusion (non-titrating) 10 mg/hr (08/06/18 1714)     PRN Meds:.acetaminophen, dextrose 50%, dextrose 50%, glucagon (human recombinant), glucose, glucose, insulin aspart U-100, ondansetron, polyethylene glycol, sodium chloride 0.9%     ASSESSMENT/PLAN:     Active Hospital Problems    Diagnosis  POA    *Acute on chronic congestive heart failure [I50.9]  Yes    Hypokalemia [E87.6]  No    Hypomagnesemia [E83.42]  No    Acute hypoxemic respiratory failure [J96.01]  Yes    Severe aortic stenosis [I35.0]  Yes      Resolved Hospital Problems    Diagnosis Date Resolved POA   No resolved problems to display.     Acute on Chronic Systolic Heart Failure  Acute Hypoxemic Respiratory Failure 2/2 ADHF  - Pt's lasix had been held for several days during hospitalization due to cardiogenic shock  - Now volume overloaded  - C/w diuresis with Lasix gtt at 10/h; will discuss with Cardiology to increase regimen; limited by BP  - consider use of   - strict I/o, low salt diet, fluid restriction, daily weights  - when fully diuresed, will start beta-blocker and ACEI as tolerated by BP     Severe Aortic Stenosis  - during prior hospitalization required IABP for hemodynamic support in the setting of acute respiratory distress from flash pulmonary edema 2/2 severe AV stenosis  -vs/p successful balloon aortic valvuloplasty with 24mm true balloon aortic mean gradient improved from 70 to 36mmHg  - currently undergoing TAVR w/u: CT chest performed, [facility] negative for obstructive CAD  - PFTs ordered for outpatient  - Pt will f/u with Interventional Cardiology as outpatient     NSTEMI  - Trop lower now that previous hospitalization  - LHC revealed clean coronaries   - continue ASA alone     Right Hepatic Lobe Lesion  Suspected Cirrhosis  - lesion seen incidentally on CT for TAVR  - followed up with US abd- notes 3cm  - triple phase CT shows 2.4 x 1.6cm irregular enhancing mass in  "right lobe of liver; 1cm focal nonenhancing areas centrally; with anterior washout, arterial enhancement- LI-RADS 5 lesion  -   - Hepatology consulted last admission; will be discussing case in IR conference 8/7 and then f/u in Hepatology clinic 8/8 (appt needs to be rescheduled)  - anti-smooth muscle Ab + 1:80  Per transplant notes:  - "Given the patient's normal LFT, INR, MELD 9, albumin 2.5; her hepatic disease would not contraindicate her from a surgical procedure (TAVR) - pending TAVR outpatient.  - Hepatic lesion concerning for HCC can be evaluated as an outpatient regarding therapeutic options. AFP elevated (572) which supports this as likely HCC. CT 3 phase pending.  - Concern for cirrhosis on imaging likely secondary to HERNANDEZ. We will continue her workup as an outpatient with modalities such as fibroscan. Iron panel negative. , SUGAR pending, ASMA pending.  - if she is discharged before 8/8 she can follow-up in her previously scheduled appointment with Dr. Ashraf. If she is discharged later than this, please inform hepatology so we can reschedule her appointment."     DM-II  - A1C 7  - per daughter, pt's blood sugar is poorly controlled at home, reaching 300s  - Levemir 8units nightly and Aspart 2units TIDWM   - needs amb referral to Endocrinology    Hypokalemia  Hypomagnesemia  -Repleted     DVT ppx- no pharmacologic ppx with recent GI Bleed; CORWIN hose  CODE status- FULL     Dispo- home in 3-4 days pending clinical improvement    Clifton Borrego MD  Hospital Medicine Staff     "

## 2018-08-07 NOTE — CONSULTS
[facility]  Cardiology  Progress Note    Patient Name: Natalie Parnell  MRN: [id number]  Admission Date: 8/3/2018  Hospital Length of Stay: 4 days  Code Status: Full Code   Attending Provider: Clifton Borrego MD   Consulting Provider: Suzan Galvin MD  Primary Care Physician: Octavio Ferrell MD  Principal Problem:Acute on chronic congestive heart failure    Patient information was obtained from patient and ER records.     Consults  Subjective:     Chief Complaint:  Shortness of Breath    HPI/Hospital Course:   83 y.o. female with PMH of severe AS with SHEA 0.65cm2, mean gradients 75mmhg, peak velocity 5.3m/sec, recent admission for GI bleed (requiring 1 unit of PRBC and EGD showing non-bleeding angioectasia s/p APC), recent admission for NSTEMI (non obstructive disease on LHC), and discharge yesterday after stay for cardiogenic shock and ADHF. Patient was admitted with cardiogenic shock and discharged yesterday. Over the past week patient was in the CCU required IABP. Repeat LHC was performed given drop in her EF and revealed non obstructive disease. She underwent BAV with improvement in her transvalvular mean gradients to 36mmhg. She was stepped down and discharged yesterday, she went home and was SOB and couldn't sleep all night in the AM patient was found to be tachycardic by her daughter who is a nurse and came back to the ER. In the ER patient was volume overloaded, warm and wet on exam and with pulmonary edema on CXR. During her hospitalization she has been diuresing well on lasix ggt.     Past Medical History:   Diagnosis Date    Asthma     Diabetes mellitus     HTN (hypertension)     Hyperlipidemia        Past Surgical History:   Procedure Laterality Date    CATARACT EXTRACTION Bilateral     Dr. Max    COLON SURGERY      ESOPHAGOGASTRODUODENOSCOPY N/A 7/10/2018    Procedure: EGD (ESOPHAGOGASTRODUODENOSCOPY);  Surgeon: Derian Stoner MD;  Location: [facility] ENDO;  Service:  Endoscopy;  Laterality: N/A;    HERNIA REPAIR      PERCUTANEOUS TRANSLUMINAL BALLOON ANGIOPLASTY OF AORTA N/A 7/30/2018    Procedure: PTA, AORTA, USING BALLOON;  Surgeon: Scott Pruett MD;  Location: [facility] CATH LAB;  Service: Cardiology;  Laterality: N/A;    VARICOSE VEIN SURGERY         Review of patient's allergies indicates:  No Known Allergies    No current facility-administered medications on file prior to encounter.      Current Outpatient Prescriptions on File Prior to Encounter   Medication Sig    albuterol 90 mcg/actuation inhaler Inhale 2 puffs into the lungs every 6 (six) hours as needed for Wheezing.    aspirin (ECOTRIN) 81 MG EC tablet Take 1 tablet (81 mg total) by mouth once daily.    atorvastatin (LIPITOR) 40 MG tablet Take 1 tablet (40 mg total) by mouth once daily.    blood sugar diagnostic (ACCU-CHEK SMARTVIEW TEST STRIP) Strp Inject 1 strip into the skin once daily.    blood-glucose meter Misc Use as instructed    furosemide (LASIX) 20 MG tablet Take 1 tablet (20 mg total) by mouth once daily.    insulin aspart U-100 (NOVOLOG) 100 unit/mL InPn pen Inject 3 Units into the skin 3 (three) times daily.    insulin glargine (LANTUS SOLOSTAR) 100 unit/mL (3 mL) InPn pen Inject 12 Units into the skin once daily.    lancets (ACCU-CHEK FASTCLIX) Misc Inject 1 lancet into the skin once daily.    multivitamin capsule Take 1 capsule by mouth once daily.     Family History     None        Social History Main Topics    Smoking status: Never Smoker    Smokeless tobacco: Never Used    Alcohol use No    Drug use: No    Sexual activity: Not on file     Review of Systems   Constitution: Negative for chills and fever.   Cardiovascular: Positive for dyspnea on exertion. Negative for chest pain, irregular heartbeat, leg swelling, near-syncope, orthopnea, palpitations, paroxysmal nocturnal dyspnea and syncope.   Respiratory: Positive for shortness of breath.    Musculoskeletal: Positive for back  pain.   Gastrointestinal: Negative for constipation, diarrhea, nausea and vomiting.   Neurological: Negative for dizziness, focal weakness, headaches and light-headedness.     Objective:     Vital Signs (Most Recent):  Temp: 96.9 °F (36.1 °C) (08/07/18 1126)  Pulse: 92 (08/07/18 1126)  Resp: 18 (08/07/18 1126)  BP: (!) 93/48 (08/07/18 1126)  SpO2: (!) 93 % (08/07/18 1126) Vital Signs (24h Range):  Temp:  [96.9 °F (36.1 °C)-99 °F (37.2 °C)] 96.9 °F (36.1 °C)  Pulse:  [] 92  Resp:  [16-20] 18  SpO2:  [89 %-99 %] 93 %  BP: (82-97)/(43-55) 93/48     Weight: 76.2 kg (168 lb 1.6 oz)  Body mass index is 27.13 kg/m².    SpO2: (!) 93 %  O2 Device (Oxygen Therapy): room air      Intake/Output Summary (Last 24 hours) at 08/07/18 1206  Last data filed at 08/07/18 0500   Gross per 24 hour   Intake              900 ml   Output             1150 ml   Net             -250 ml       Lines/Drains/Airways     Peripheral Intravenous Line                 Peripheral IV - Single Lumen 08/05/18 1419 Left;Right;Lateral Antecubital 1 day                Physical Exam   Constitutional: She is oriented to person, place, and time.   Eyes: Pupils are equal, round, and reactive to light.   Neck: Neck supple.   Cardiovascular: Normal rate, regular rhythm and intact distal pulses.    Murmur heard.  Pulmonary/Chest: She has rales.   Abdominal: Soft. Bowel sounds are normal.   Musculoskeletal: She exhibits no edema.   Neurological: She is alert and oriented to person, place, and time.   Skin: Skin is warm and dry.       Significant Labs:   CMP   Recent Labs  Lab 08/06/18  0545 08/07/18  0637    135*   K 4.1 3.9    96   CO2 29 32*   * 123*   BUN 25* 26*   CREATININE 1.3 1.2   CALCIUM 8.7 8.7   PROT 5.6* 5.7*   ALBUMIN 2.6* 2.6*   BILITOT 0.9 1.0   ALKPHOS 122 122   AST 54* 54*   ALT 19 20   ANIONGAP 9 7*   ESTGFRAFRICA 44.5* 49.0*   EGFRNONAA 38.6* 42.5*       Significant Imaging:     CXR (8/7):   Cardiomegaly with hilar  prominence and findings of pulmonary vascular congestive changes are demonstrated.  There appears to be reduced amount of right-sided pleural effusion with minimal blunting the left costophrenic angle again demonstrated.  The lungs are otherwise expanded and appear clear.    Assessment and Plan:     82 yo F with hx of aortic stenosis s/p BAV, CHFrEF (20%) who presents with CHFe and acute respiratory distress.     * Acute on chronic congestive heart failure    pt remains warm and wt on exam  -still volume overloaded, +JVD, crackles in right lung fields, requiring supplemental oxygen via NC   -BNP down-trending from 1,767 to 751  -pt UOP in 24 hours is 1 L, weight down by 4+lbs  -CXR this AM shows pleural effusions  -Agree with management: c/w lasix gtt , monitor UOP, BMP, strict I/O's and daily weights   -hold off on starting BB till pt is euvolemic   -wean supplemental oxygen as tolerated  -encourage OOB activities/ambulation        Severe aortic stenosis    -s/p BAV, awaiting O/P TAVR             VTE Risk Mitigation         Ordered     Place CORWIN hose  Until discontinued      08/05/18 1428     IP VTE HIGH RISK PATIENT  Once      08/03/18 1146     Place CORWIN hose  Until discontinued      08/03/18 1146     Place sequential compression device  Until discontinued      08/03/18 1146     Place CORWIN hose  Until discontinued      08/03/18 1146        Plan was discussed with Dr. Susan Galvin MD  Cardiology Fellow  [facility]

## 2018-08-08 NOTE — PROGRESS NOTES
Progress Note  Hospital Medicine    [place] [facility] [place] [facility]  Admit Date: 8/3/2018   Length of Stay:  LOS: 5 days   SUBJECTIVE:   Reason for Admission:  Acute on chronic congestive heart failure    HPI:  Patient is a 83F with PMH of chronic systolic HF with EF 20-25%, severe AS with SHEA 0.65cm2, mean gradients 75mmhg, peak velocity 5.3m/sec, recent admission for GI bleed (requiring 1 unit of PRBC and EGD showing non-bleeding angioectasia s/p APC), recent admission for NSTEMI (without evidence CAD on [facility]), and discharge yesterday after stay for cardiogenic shock and ADHF, presented with acute onset of SOB.  Pt reports feeling well upon discharge and in the evening, but awoke at 0100 SOB and gasping for air.  She urinated twice overnight.  Also notes LE edema has developed.  Patient admitted for evaluation of acute on chronic systolic heart failure.    ECHO 7/27/2018  CONCLUSIONS     1 - Moderate left ventricular enlargement.     2 - Severely depressed left ventricular systolic function (EF 20-25%).     3 - Left atrial enlargement.     4 - Low normal to mildly depressed right ventricular systolic function .     5 - Moderate to severe aortic stenosis.     6 - Moderate mitral regurgitation.     7 - Increased central venous pressure.     8 - If clinically indicated, a full Doppler study can be performed.    Interval history:    No acute events overnight.  Pt reports feeling significantly better with breathing today.  Weight - 2 lb, -9 lb overall. I/O trending appropriately.  D/w patient in Arabic, family not at bedside.    Review of Systems:  Constitutional: no fever or chills  Respiratory: positive for dyspnea on exertion  Cardiovascular: no chest pain or palpitations  Gastrointestinal: no nausea or vomiting, no abdominal pain or change in bowel habits  Musculoskeletal: no arthralgias or myalgias     OBJECTIVE:     Temp:  [96.9 °F (36.1 °C)-99.7 °F (37.6 °C)]   Pulse:  []   Resp:  [16-18]   BP:  (85-98)/(48-63)   SpO2:  [91 %-97 %]  Body mass index is 26.94 kg/m².  Intake/Outake:  This Shift:  No intake/output data recorded.    Net I/O past 24h:     Intake/Output Summary (Last 24 hours) at 08/08/18 0850  Last data filed at 08/08/18 0500   Gross per 24 hour   Intake              830 ml   Output             1190 ml   Net             -360 ml             Physical Exam:  Gen- well-developed, well-nourished, fatigued  CVS- S1 and S2 present, 3/6 systolic murmur, RRR  Resp- no work of breathing, crackles care home up lungs  Abd- BS+, soft, NT, ND  Ext- 1+ pitting LE edema R>L (baseline), no clubbing or cyanosis    Laboratory:  CBC/Anemia Labs: Coags:      Recent Labs  Lab 08/02/18  0352 08/02/18  0907 08/03/18  0749 08/04/18  0816   WBC 6.13  --  7.53 6.27   HGB 8.6*  --  9.7* 8.8*   HCT 26.4*  --  30.0* 26.6*     --  257 213   MCV 96  --  97 95   RDW 15.0*  --  15.2* 15.0*   IRON  --  44  --   --    FERRITIN  --  168  --   --       Recent Labs  Lab 08/03/18  0749   INR 1.1        Chemistries:     Recent Labs  Lab 08/06/18  0545 08/07/18  0637 08/08/18  0429    135* 135*   K 4.1 3.9 3.6    96 95   CO2 29 32* 30*   BUN 25* 26* 30*   CREATININE 1.3 1.2 1.5*   CALCIUM 8.7 8.7 8.8   PROT 5.6* 5.7* 5.9*   BILITOT 0.9 1.0 1.0   ALKPHOS 122 122 122   ALT 19 20 19   AST 54* 54* 52*   MG 1.7 2.0 2.0        ABG:     Recent Labs  Lab 08/01/18  0956 08/03/18  0747   PH 7.413 7.386   PCO2 40.9 44.1   PO2 29* 21*   HCO3 26.1 26.5   POCSATURATED 57* 33*   BE 1 1        Cardiac Enzymes: Ejection Fractions:    No results for input(s): CPK, CPKMB, MB, TROPONINI in the last 72 hours. EF   Date Value Ref Range Status   07/27/2018 20 (A) 55 - 65    07/09/2018 50 55 - 65            Medications:  Scheduled Meds:   albuterol-ipratropium  3 mL Nebulization Q6H WAKE    aspirin  81 mg Oral Daily    atorvastatin  40 mg Oral Daily    insulin aspart U-100  2 Units Subcutaneous TIDWM    insulin detemir U-100  8 Units  Subcutaneous QHS                             Continuous Infusions:   furosemide (LASIX) 2 mg/mL infusion (non-titrating) 10 mg/hr (08/07/18 1720)     PRN Meds:.acetaminophen, dextrose 50%, dextrose 50%, glucagon (human recombinant), glucose, glucose, insulin aspart U-100, ondansetron, polyethylene glycol, sodium chloride 0.9%     ASSESSMENT/PLAN:     Active Hospital Problems    Diagnosis  POA    *Acute on chronic congestive heart failure [I50.9]  Yes    Hypokalemia [E87.6]  No    Hypomagnesemia [E83.42]  No    Acute hypoxemic respiratory failure [J96.01]  Yes    Severe aortic stenosis [I35.0]  Yes      Resolved Hospital Problems    Diagnosis Date Resolved POA   No resolved problems to display.     Acute on Chronic Systolic Heart Failure  Acute Hypoxemic Respiratory Failure 2/2 ADHF  - Pt's lasix had been held for several days during hospitalization due to cardiogenic shock  - Still volume up now, but contraction alkalosis and KATI - need to pull back  - Transition to 40 IV daily  - Consider use of   - Strict I/o, low salt diet, fluid restriction, daily weights  - When fully diuresed, will start beta-blocker and ACEI as tolerated by BP     Severe Aortic Stenosis  - during prior hospitalization required IABP for hemodynamic support in the setting of acute respiratory distress from flash pulmonary edema 2/2 severe AV stenosis  - s/p successful balloon aortic valvuloplasty with 24mm true balloon aortic mean gradient improved from 70 to 36mmHg  - currently undergoing TAVR w/u: CT chest performed, [facility] negative for obstructive CAD  - PFTs ordered for outpatient  - Pt will f/u with Interventional Cardiology as outpatient    KATI on CKDIII  - This could by hypotensive ATN in etiology, cardiorenal, overdiuresis or other  - Will temporarily dose reduce lasix  - F/u cardiology co-management  - Avoid further antihypertensives  - Avoid nephrotoxic agents  - Consider nephrology consultation     NSTEMI  - Trop lower now that  "previous hospitalization  - [facility] revealed clean coronaries   - continue ASA alone     Right Hepatic Lobe Lesion  Suspected Cirrhosis  - lesion seen incidentally on CT for TAVR  - followed up with US abd- notes 3cm  - triple phase CT shows 2.4 x 1.6cm irregular enhancing mass in right lobe of liver; 1cm focal nonenhancing areas centrally; with anterior washout, arterial enhancement- LI-RADS 5 lesion  -   - Hepatology consulted last admission; will be discussing case in IR conference 8/7 and then f/u in Hepatology clinic 8/8 (appt needs to be rescheduled)  - anti-smooth muscle Ab + 1:80  Per transplant notes:  - "Given the patient's normal LFT, INR, MELD 9, albumin 2.5; her hepatic disease would not contraindicate her from a surgical procedure (TAVR) - pending TAVR outpatient.  - Hepatic lesion concerning for HCC can be evaluated as an outpatient regarding therapeutic options. AFP elevated (572) which supports this as likely HCC. CT 3 phase pending.  - Concern for cirrhosis on imaging likely secondary to HERNANDEZ. We will continue her workup as an outpatient with modalities such as fibroscan. Iron panel negative. , SUGAR pending, ASMA pending.  - if she is discharged before 8/8 she can follow-up in her previously scheduled appointment with Dr. Ashraf. If she is discharged later than this, please inform hepatology so we can reschedule her appointment."     DM-II  - A1C 7.0  - per daughter, pt's blood sugar is poorly controlled at home, reaching 300s  - Levemir 5 units nightly and Aspart 2 units TIDWM   - needs amb referral to Endocrinology    Hypokalemia  Hypomagnesemia  -Replete as needed     DVT ppx- okay for lovenox, recent GIB but CBC stable  CODE status- FULL     Dispo- home in 3-4 days pending clinical improvement    Clifton Borrego MD  Hospital Medicine Staff     "

## 2018-08-08 NOTE — PROGRESS NOTES
[facility]  Cardiology  Progress Note    Patient Name: Natalie Parnell  MRN: [id number]  Admission Date: 8/3/2018  Hospital Length of Stay: 5 days  Code Status: Full Code   Attending Physician: Clifton Borrego MD   Primary Care Physician: Octavio Ferrell MD  Expected Discharge Date: 8/10/2018  Principal Problem:Acute on chronic congestive heart failure    Subjective:     Hospital Course:   During her hospitalization she was diuresed aggressively.     Interval History: Patient is off supplemental oxygen. UOP 1.1L in last 24 hours.     Review of Systems   Cardiovascular: Negative for chest pain, dyspnea on exertion, irregular heartbeat, leg swelling, near-syncope, orthopnea, palpitations, paroxysmal nocturnal dyspnea and syncope.   Respiratory: Negative for shortness of breath.    Neurological: Negative for dizziness, headaches and light-headedness.     Objective:     Vital Signs (Most Recent):  Temp: 98 °F (36.7 °C) (08/08/18 1207)  Pulse: 89 (08/08/18 1207)  Resp: 17 (08/08/18 1207)  BP: (!) 86/48 (08/08/18 1207)  SpO2: (!) 92 % (08/08/18 1207) Vital Signs (24h Range):  Temp:  [97.2 °F (36.2 °C)-99.7 °F (37.6 °C)] 98 °F (36.7 °C)  Pulse:  [] 89  Resp:  [16-18] 17  SpO2:  [91 %-95 %] 92 %  BP: (85-98)/(48-63) 86/48     Weight: 75.7 kg (166 lb 14.2 oz)  Body mass index is 26.94 kg/m².     SpO2: (!) 92 %  O2 Device (Oxygen Therapy): room air      Intake/Output Summary (Last 24 hours) at 08/08/18 1328  Last data filed at 08/08/18 0500   Gross per 24 hour   Intake              270 ml   Output             1190 ml   Net             -920 ml       Lines/Drains/Airways     Peripheral Intravenous Line                 Peripheral IV - Single Lumen 08/05/18 1419 Left;Right;Lateral Antecubital 2 days                Physical Exam   Constitutional: She is oriented to person, place, and time. No distress.   Eyes: Conjunctivae are normal.   Neck: Neck supple.   Cardiovascular:   Murmur  heard.  Pulmonary/Chest: Effort normal. She has rales.   Musculoskeletal: She exhibits no edema.   Neurological: She is alert and oriented to person, place, and time.   Skin: Skin is warm and dry. She is not diaphoretic.       Significant Labs:   BMP:   Recent Labs  Lab 08/07/18  0637 08/08/18  0429   * 112*   * 135*   K 3.9 3.6   CL 96 95   CO2 32* 30*   BUN 26* 30*   CREATININE 1.2 1.5*   CALCIUM 8.7 8.8   MG 2.0 2.0       Significant Imaging:   X-Ray:     FINDINGS:  Cardiomegaly with hilar prominence and findings of pulmonary vascular congestive changes are demonstrated.  There appears to be reduced amount of right-sided pleural effusion with minimal blunting the left costophrenic angle again demonstrated.  The lungs are otherwise expanded and appear clear.    Assessment and Plan:     Brief HPI: 82 yo F with hx of AS s/p BAV, CHFrEF (20%) who presents with respiratory distress due to CHFe.    * Acute on chronic congestive heart failure    pt remains warm and still slightly wet on exam  -still volume overloaded, +bibasilar crackles in lung fields, off supplemental oxygen  -BNP down-trending from 1,767 to 751  -pt UOP in 24 hours is 1.1 L, weight down by 9 lbs  -on labs, evidence of contraction alkalosis and KATI  -stop lasix ggt and start lasix 40 mg IV QD   monitor UOP, BMP, strict I/O's and daily weights   -hold off on starting BB till pt is euvolemic       Severe aortic stenosis    -s/p BAV, awaiting O/P TAVR             VTE Risk Mitigation         Ordered     Place CORWIN hose  Until discontinued      08/05/18 1428     IP VTE HIGH RISK PATIENT  Once      08/03/18 1146     Place CORWIN hose  Until discontinued      08/03/18 1146     Place sequential compression device  Until discontinued      08/03/18 1146     Place CORWIN hose  Until discontinued      08/03/18 1146        Suzan Galvin MD  Cardiology Fellow  [facility]

## 2018-08-08 NOTE — PLAN OF CARE
REF:  [facility]    Mrs. Parnell speaks Italian.  She underwent TAVR evaluation by Dr. Lord and was found to have a liver lesion that needs outpatient evaluation.  She is cohort C because of this and deconditioning.  She was discharged after her BAV on no diuretics and relapsed with CHF from her underlying CMP.    Natalie Parnell is a 83 y.o. female referred by Dr Diaz for evaluation of severe AS (NYHA Class IV sx).     The patient has undergone the following TAVR work-up:   · ECHO (Date 7/9/18 ): SHEA= 0.65cm2, MG= 74mmHg, Peak Jong= 5.25 m/s, EF= 50%.   · LHC (Date 7/27/18): Nonobstructive Disease  · STS: 7.3 %   · Frailty: 1/4(albumin) - needs repetition  · Iliacs are >8mm on R and > 7mm on L   · LVOT area by CTA is 5.02 cm2 (29.9 mm X 21.6 mm) and Avg Diameter is 25.3 per Dr Lord  · Incidental findings on CT: Liver lesion and cirrhosis which is being worked up by [facility]  · CT Surgery risk assessment: High risk, per Dr Frey due to liver disease, frailty and age.  · Rhythm issues: None  · PFTs: Pt unable to perform  · Comorbidities: Cirrhosis, DM, HTN, AVM's s/p APC 7/9/18    When medically cleared and prehabed, she will be a candidate for TF TAVR.        She was found to have a liver lesion on CT and has hx of cirrhosis so hepatology has been consulted and they are working this up. She is going to cont PT/OT. Once her liver lesion has been worked up and she prehab -  will schedule for TAVR as an outpatient.     She is currently cohort C for RENATE

## 2018-08-08 NOTE — ASSESSMENT & PLAN NOTE
pt remains warm and still slightly wet on exam  -still volume overloaded, +bibasilar crackles in lung fields, off supplemental oxygen  -BNP down-trending from 1,767 to 751  -pt UOP in 24 hours is 1.1 L, weight down by 9 lbs  -on labs, evidence of contraction alkalosis and KATI  -stop lasix ggt and start lasix 40 mg IV QD   monitor UOP, BMP, strict I/O's and daily weights   -hold off on starting BB till pt is euvolemic

## 2018-08-08 NOTE — PLAN OF CARE
Problem: Patient Care Overview  Goal: Plan of Care Review  Outcome: Ongoing (interventions implemented as appropriate)  Pt. Remains free from falls/ injury/trauma. Blood glucose monitoring maintained. Lasix gtt infusing.  No complaints of CP, SOB, or discomfort. Pt educated on the importance of turning every two hours while in bed to prevent breakdown. Pt verbalized understanding. Plan of Care reviewed with patient. VSS and NADN. Will continue to monitor.

## 2018-08-08 NOTE — PT/OT/SLP EVAL
Occupational Therapy   Evaluation & Discharge     Name: Natalie Parnell  MRN: [id number]  Admitting Diagnosis:  Acute on chronic congestive heart failure      Recommendations:     Discharge Recommendations: home  Discharge Equipment Recommendations:  none  Barriers to discharge:  None    History:     Occupational Profile:  Living Environment: Pt lives with daughter in a SS duplex with 6STE and L HR's.   Previous level of function: PTA, pt was utilizing a rollator for functional mobility at times and was independent with functional mobility.   Roles and Routines: Pt is a mother.   Equipment Owned: rollator, BSC  Assistance upon Discharge: Pt's daughter able to assist after work.     Past Medical History:   Diagnosis Date    Asthma     Diabetes mellitus     HTN (hypertension)     Hyperlipidemia        Past Surgical History:   Procedure Laterality Date    CATARACT EXTRACTION Bilateral     Dr. Max    COLON SURGERY      ESOPHAGOGASTRODUODENOSCOPY N/A 7/10/2018    Procedure: EGD (ESOPHAGOGASTRODUODENOSCOPY);  Surgeon: Derian Stoner MD;  Location: [facility] ENDO;  Service: Endoscopy;  Laterality: N/A;    HERNIA REPAIR      PERCUTANEOUS TRANSLUMINAL BALLOON ANGIOPLASTY OF AORTA N/A 7/30/2018    Procedure: PTA, AORTA, USING BALLOON;  Surgeon: Scott Pruett MD;  Location: [facility] CATH LAB;  Service: Cardiology;  Laterality: N/A;    VARICOSE VEIN SURGERY         Subjective     Chief Complaint: none reported   Patient/Family stated goals: return home   Communicated with: RN prior to session. Pt agreeable to OT evaluation.   Pain/Comfort:  · Pain Rating 1: 0/10  · Pain Rating Post-Intervention 1: 0/10    Patients cultural, spiritual, Church conflicts given the current situation: none reported     Objective:     Patient found with: peripheral IV, telemetry    General Precautions: Standard, fall   Orthopedic Precautions:N/A   Braces: N/A     Occupational Performance:    Bed Mobility:    · NT-pt found seated  "UIC    Functional Mobility/Transfers:  · Sit<>stand: supervision  · W/o the use of any AD  · From/onto bedside chair   · Functional Mobility: Pt engaging in functional mobility to simulate household distance approx 400ft with supervision utilizing IV pole for support in order to assess functional activity tolerance and standing balance required for engagement in occupations of choice.  · No LOB or instability noted     Activities of Daily Living:  · UB Dressing: setup assistance   · To don back gown while seated in bedside chair   · LB Dressing: SBA  · Ability to access BLE's to adjust socks via anterior trunk flexion  · SBA dynamic standing balance     Cognitive/Visual Perceptual:  · Pt alert and oriented x4  · Pt with good insight into condition and with good motivation to engage in therapy session  · Pt able to follow multi-step commands 100% of the time  · Effective verbal communication-German speaking primarily     · Intact short and long memory  · Intact safety awareness  · No visual deficits present    Physical Exam:  Balance:  · Sitting: independent   · Standing: supervision for static, SBA for dynamic   Postural Examination: no deviations noted   Skin Integrity: intact   Edema: none noted   Sensation: intact to light touch   UE ROM:  · Right: WFL  · Left: WFL  UE Strength:  · Right: WFL  · Left: WFL   Strength:  · Right: WFL  · Left: WFL  Fine Motor Control: WFL  Gross Motor Control: WFL    Patient left up in chair with all lines intact, call button in reach and RN notified    [facility] 6 Click:  AMPA Total Score: 23    Treatment & Education:  Communicated OT POC  Updated communication board  Educated on importance of continued OOB mobility   Education:    Assessment:     Natalie Parnell is a 83 y.o. female with a medical diagnosis of Acute on chronic congestive heart failure.  Pt is at her functional baseline with no further acute OT needs at this time.     Clinical Decision Makin.  OT Low:  "Pt " "evaluation falls under low complexity for evaluation coding due to performance deficits noted in 1-3 areas as stated above and 0 co-morbities affecting current functional status. Data obtained from problem focused assessments. No modifications or assistance was required for completion of evaluation. Only brief occupational profile and history review completed."     Plan:     · Patient d/c'ed 8/8/2018  · Plan of Care Expires: 08/08/18  · Plan of Care Reviewed with: patient    This Plan of care has been discussed with the patient who was involved in its development and understands and is in agreement with the identified goals and treatment plan    GOALS:    Occupational Therapy Goals     Not on file          Multidisciplinary Problems (Resolved)        Problem: Occupational Therapy Goal    Goal Priority Disciplines Outcome Interventions   Occupational Therapy Goal   (Resolved)     OT, PT/OT Outcome(s) achieved                    Time Tracking:     OT Date of Treatment: 08/08/18  OT Start Time: 0900  OT Stop Time: 0908  OT Total Time (min): 8 min    Billable Minutes:Evaluation 8 minutes    Jaelyn Hendrix OT  8/8/2018    "

## 2018-08-08 NOTE — PHYSICIAN QUERY
PT Name: Natalie Parnell  MR #: [id number]     Physician Query Form - Documentation Clarification      CDS/: Nisha Herrera               Contact information: [email or web address]     This form is a permanent document in the medical record.     Query Date: August 8, 2018    By submitting this query, we are merely seeking further clarification of documentation. Please utilize your independent clinical judgment when addressing the question(s) below.    The Medical record reflects the following:    Supporting Clinical Findings Location in Medical Record   83 y.o.female discharged last night after NSTEMI     recent admission for NSTEMI (with clean LHC), and discharge yesterday after stay for cardiogenic shock and ADHF, presents to ED today with acute onset of SOB.    patient has undergone the following TAVR work-up:     ECHO (Date 7/9/18 ): SHEA= 0.65cm2, MG= 74mmHg, Peak Jong= 5.25 m/s, EF= 50%.   LHC (Date 7/27/18): Nonobstructive Disease  STS: 7.3 %  ED Provider Note 8/3    H&P 8/3        Interventional Cardiology PN 8/5                                                                                        Doctor, Please specify diagnosis or diagnoses associated with above clinical findings.    Provider Use Only      ( x ) NSTEMI <4 weeks old     (  ) NSTEMI >4 weeks old     (  ) Other___________________                                                                                                         [  ] Clinically undetermined

## 2018-08-08 NOTE — PLAN OF CARE
Problem: Occupational Therapy Goal  Goal: Occupational Therapy Goal  Outcome: Outcome(s) achieved Date Met: 08/08/18  OT evaluation completed and pt d/c'ed from skilled acute OT 8/8/2018 as pt is at her functional baseline with no further skilled OT needs.

## 2018-08-08 NOTE — SUBJECTIVE & OBJECTIVE
Interval History: Patient is off supplemental oxygen. UOP 1.1L in last 24 hours.     Review of Systems   Cardiovascular: Negative for chest pain, dyspnea on exertion, irregular heartbeat, leg swelling, near-syncope, orthopnea, palpitations, paroxysmal nocturnal dyspnea and syncope.   Respiratory: Negative for shortness of breath.    Neurological: Negative for dizziness, headaches and light-headedness.     Objective:     Vital Signs (Most Recent):  Temp: 98 °F (36.7 °C) (08/08/18 1207)  Pulse: 89 (08/08/18 1207)  Resp: 17 (08/08/18 1207)  BP: (!) 86/48 (08/08/18 1207)  SpO2: (!) 92 % (08/08/18 1207) Vital Signs (24h Range):  Temp:  [97.2 °F (36.2 °C)-99.7 °F (37.6 °C)] 98 °F (36.7 °C)  Pulse:  [] 89  Resp:  [16-18] 17  SpO2:  [91 %-95 %] 92 %  BP: (85-98)/(48-63) 86/48     Weight: 75.7 kg (166 lb 14.2 oz)  Body mass index is 26.94 kg/m².     SpO2: (!) 92 %  O2 Device (Oxygen Therapy): room air      Intake/Output Summary (Last 24 hours) at 08/08/18 1328  Last data filed at 08/08/18 0500   Gross per 24 hour   Intake              270 ml   Output             1190 ml   Net             -920 ml       Lines/Drains/Airways     Peripheral Intravenous Line                 Peripheral IV - Single Lumen 08/05/18 1419 Left;Right;Lateral Antecubital 2 days                Physical Exam   Constitutional: She is oriented to person, place, and time. No distress.   Eyes: Conjunctivae are normal.   Neck: Neck supple.   Cardiovascular:   Murmur heard.  Pulmonary/Chest: Effort normal. She has rales.   Musculoskeletal: She exhibits no edema.   Neurological: She is alert and oriented to person, place, and time.   Skin: Skin is warm and dry. She is not diaphoretic.       Significant Labs:   BMP:   Recent Labs  Lab 08/07/18  0637 08/08/18  0429   * 112*   * 135*   K 3.9 3.6   CL 96 95   CO2 32* 30*   BUN 26* 30*   CREATININE 1.2 1.5*   CALCIUM 8.7 8.8   MG 2.0 2.0       Significant Imaging:   X-Ray:      FINDINGS:  Cardiomegaly with hilar prominence and findings of pulmonary vascular congestive changes are demonstrated.  There appears to be reduced amount of right-sided pleural effusion with minimal blunting the left costophrenic angle again demonstrated.  The lungs are otherwise expanded and appear clear.

## 2018-08-08 NOTE — HOSPITAL COURSE
During her hospitalization she was diuresed aggressively. Her weight has reduced with lasix ggt however started to creep up again when transitioned to IV lasix pushes. She was then given metolazone x1 and started back on a lasix ggt. Per discussion with Interventional Cardiology, it was decided on 8/14 that pt will be placed on dobutamine ggt @ 2.5 mg/hr with up-titration of lasix ggt to augment diuresis. While she was hospitalized, she had episodes of melena. EGD was done that showed esophageal varices and gastric ulcer. She developed contraction alkalosis and KATI while being aggressively diuresed so dobutamine ggt was stopped and she was transitioned to IV lasix pushes. Per discussion with Interventional Cardiology, recommended resuming dobutamine ggt as pt will likely be  dependent. She was given 1 Unit of PRBC with improvement in her Hb. Lasix was then discontinued given her AKTI. Regarding difficulty in achieving euvolemia in the pt, planning for RHC next week to aid in further management steps.

## 2018-08-09 NOTE — PROGRESS NOTES
[facility]  Cardiology  Progress Note    Patient Name: Natalie Parnell  MRN: [id number]  Admission Date: 8/3/2018  Hospital Length of Stay: 6 days  Code Status: Full Code   Attending Physician: Clifton Borrego MD   Primary Care Physician: Octavio Ferrell MD  Expected Discharge Date: 8/10/2018  Principal Problem:Acute on chronic congestive heart failure    Subjective:     Hospital Course:   During her hospitalization she was diuresed aggressively.     Interval History: Patient is off supplemental oxygen. UOP 1.1L in last 24 hours.     Review of Systems   Cardiovascular: Negative for chest pain, dyspnea on exertion, irregular heartbeat, leg swelling, near-syncope, orthopnea, palpitations, paroxysmal nocturnal dyspnea and syncope.   Respiratory: Negative for shortness of breath.    Neurological: Negative for dizziness, headaches and light-headedness.     Objective:     Vital Signs (Most Recent):  Temp: 98.4 °F (36.9 °C) (08/09/18 1124)  Pulse: 100 (08/09/18 1525)  Resp: 16 (08/09/18 1346)  BP: (!) 93/50 (08/09/18 1525)  SpO2: 98 % (08/09/18 1545) Vital Signs (24h Range):  Temp:  [98.1 °F (36.7 °C)-99.1 °F (37.3 °C)] 98.4 °F (36.9 °C)  Pulse:  [] 100  Resp:  [16-18] 16  SpO2:  [88 %-100 %] 98 %  BP: ()/(48-54) 93/50     Weight: 76.8 kg (169 lb 5 oz)  Body mass index is 27.33 kg/m².     SpO2: 98 %  O2 Device (Oxygen Therapy): room air      Intake/Output Summary (Last 24 hours) at 08/09/18 1742  Last data filed at 08/09/18 1600   Gross per 24 hour   Intake             1170 ml   Output             1450 ml   Net             -280 ml       Lines/Drains/Airways     Peripheral Intravenous Line                 Peripheral IV - Single Lumen 08/08/18 1621 Left Forearm 1 day                Physical Exam   Constitutional: She is oriented to person, place, and time. No distress.   Eyes: Conjunctivae are normal.   Neck: Neck supple.   Cardiovascular:   Murmur heard.  Pulmonary/Chest: Effort normal.  She has rales.   Musculoskeletal: She exhibits no edema.   Neurological: She is alert and oriented to person, place, and time.   Skin: Skin is warm and dry. She is not diaphoretic.       Significant Labs:   BMP:     Recent Labs  Lab 08/08/18  0429 08/09/18  0430   * 114*   * 135*   K 3.6 4.0   CL 95 95   CO2 30* 31*   BUN 30* 31*   CREATININE 1.5* 1.3   CALCIUM 8.8 8.7   MG 2.0 2.0       Significant Imaging:   X-Ray:     FINDINGS:  Cardiomegaly with hilar prominence and findings of pulmonary vascular congestive changes are demonstrated.  There appears to be reduced amount of right-sided pleural effusion with minimal blunting the left costophrenic angle again demonstrated.  The lungs are otherwise expanded and appear clear.    Assessment and Plan:     * Acute on chronic congestive heart failure    pt remains warm and still wet on exam  -still volume overloaded, +bibasilar crackles in lung fields, remains off supplemental oxygen  -BNP down-trending from 1,767 to 751  -pt UOP in 24 hours is 1.5 L, weight down by 8 lbs  -recommend increasing IV lasix to 40 mg BID    monitor UOP, BMP, strict I/O's and daily weights   -hold off on starting BB till pt is euvolemic           Severe aortic stenosis    -s/p BAV, awaiting O/P TAVR             VTE Risk Mitigation         Ordered     enoxaparin injection 40 mg  Daily      08/08/18 1457     Place CORWIN hose  Until discontinued      08/05/18 1428     IP VTE HIGH RISK PATIENT  Once      08/03/18 1146     Place CORWIN hose  Until discontinued      08/03/18 1146     Place sequential compression device  Until discontinued      08/03/18 1146     Place CORWIN hose  Until discontinued      08/03/18 1146          Suzan Galvin MD  Cardiology Fellow   [facility]

## 2018-08-09 NOTE — PLAN OF CARE
Problem: Patient Care Overview  Goal: Plan of Care Review  Outcome: Ongoing (interventions implemented as appropriate)  Pt continues diuresis, Lasix drip discontinued, started on Lasix IV 40mg. Urine output adequate. Received 50 mEq K+   Blood glucose levels WNL for pt no additional insulin coverage needed. Pt ambulated to bathroom with stand-by assistance, fall precautions in place, remained free from fall/trauma. Started on Lovenox inj for anticoagulation therapy. . No s/s of distress noted, will continue to monitor.

## 2018-08-09 NOTE — ASSESSMENT & PLAN NOTE
pt remains warm and still wet on exam  -still volume overloaded, +bibasilar crackles in lung fields, remains off supplemental oxygen  -BNP down-trending from 1,767 to 751  -pt UOP in 24 hours is 1.5 L, weight down by 8 lbs  -recommend increasing IV lasix to 40 mg BID    monitor UOP, BMP, strict I/O's and daily weights   -hold off on starting BB till pt is euvolemic

## 2018-08-09 NOTE — PLAN OF CARE
Problem: Physical Therapy Goal  Goal: Physical Therapy Goal  Goals to be met by: 18    Patient will increase functional independence with mobility by performin. Ascend/descend 6 stair with right Handrails Contact Guard Assistance -not met   Outcome: Outcome(s) achieved Date Met: 18    Pt is safe and independent with all mobility. Pt no longer requires PT services.  Appropriate transfer level with nursing staff: Bed <> Chair:  Stand Pivot with Modified Independent with 1 person.    Kalpana Gonzalez PT, DPT  2018  Pager: [phone]

## 2018-08-09 NOTE — PLAN OF CARE
Problem: Patient Care Overview  Goal: Plan of Care Review  Outcome: Ongoing (interventions implemented as appropriate)  Pt. Remains free from falls/ injury/trauma. Blood glucose monitoring maintained. Lasix IVP ordered per MD order.  No complaints of CP, SOB, or discomfort. Pt educated on the importance of turning every two hours while in bed to prevent breakdown. Pt verbalized understanding. Plan of Care reviewed with patient. VSS and NADN. Will continue to monitor.

## 2018-08-09 NOTE — PT/OT/SLP PROGRESS
"Physical Therapy Treatment / Discharge Summary    Patient Name:  Natalie Parnell   MRN:  [id number]  Admitting Diagnosis: Acute on chronic congestive heart failure  Recent Surgery: * No surgery found *      Recommendations:     Discharge Recommendations:  home   Discharge Equipment Recommendations: none   Barriers to discharge: None    Plan:       Pt is safe and independent with all mobility. Pt no longer requires PT services.  · Plan of Care Expires:  08/31/18   Plan of Care Reviewed with: patient, family    This Plan of care has been discussed with the patient who was involved in its development and understands and is in agreement with the identified goals and treatment plan    Subjective     Communicated with RN prior to session.  Patient found seated in bedside chair upon PT entry to room. Pt's daughter present during session.  "I need some music, so I can dance!"    Pain/Comfort:  · Pain Rating 1: 0/10  · Pain Rating Post-Intervention 1: 0/10    Objective:     Patient found with: telemetry   Mental Status: Patient is oriented to AxOx4 and follows all verbal, multi-step commands. Patient is Alert and Cooperative during session.    General Precautions: Standard, Cardiac fall   Orthopedic Precautions:N/A   Braces: N/A   Respiratory Status: room air  Vital Signs (Most Recent):    Temp: 98.4 °F (36.9 °C) (08/09/18 1124)  Pulse: 100 (08/09/18 1525)  Resp: 16 (08/09/18 1346)  BP: (!) 93/50 (08/09/18 1525)  SpO2: 98 % (08/09/18 1545)    Functional Mobility:  Bed Mobility:   · Pt found/returned to bedside chair    Transfers:   · Sit <> Stand Transfer: modified independence with no assistive device   · Stand <> Sit Transfer: modified independence with no assistive device   · x1 from bedside chair      Gait:  · Patient ambulated: 300ft   · Patient required: modified independent  · Patient used:  personal rollator  · Gait Pattern observed: reciprocal gait  · Gait Deviation(s): decreased mariola  · Impairments due to: decreased " endurance    Therapeutic Activities & Exercises:     Education:  Patient provided with daily orientation and goals of this PT session. Patient and family agreed to participate in session. Patient and family aware of patient's deficits and therapy progression. They were educated to transfer with RN/PCT only; supervision of 1 person. Encouraged patient to perform daily exercises & mobility to increase endurance and decrease effects of bedrest.Time provided for therapeutic counseling and discussion of health disposition. All questions answered to patient's and family's satisfaction, within scope of PT practice; voiced no other concerns. White board updated in patient's room, RN notified of session.    Patient left up in chair, with head in midline, neutral pelvis & heels floated for skin protection with all lines intact, call button in reach and RN notified    AM-PAC 6 CLICK MOBILITY  Turning over in bed (including adjusting bedclothes, sheets and blankets)?: 4  Sitting down on and standing up from a chair with arms (e.g., wheelchair, bedside commode, etc.): 4  Moving from lying on back to sitting on the side of the bed?: 4  Moving to and from a bed to a chair (including a wheelchair)?: 4  Need to walk in hospital room?: 4  Climbing 3-5 steps with a railing?: 3  Basic Mobility Total Score: 23     Assessment:     Natalie Parnell is a 83 y.o. female admitted with a medical diagnosis of Acute on chronic congestive heart failure.    Pt is safe and independent with all mobility. Pt no longer requires PT services. Unable to test stairs due to fatigue at end of gait trial, however pt has good mobility, balance and strength in order to participate in stairs.    Problem List: decreased endurance  Rehab Prognosis: good; patient would benefit from acute skilled PT services to address these deficits and reach maximum level of function.      GOALS:    Physical Therapy Goals     Not on file          Multidisciplinary Problems  (Resolved)        Problem: Physical Therapy Goal    Goal Priority Disciplines Outcome Goal Variances Interventions   Physical Therapy Goal   (Resolved)     PT/OT, PT Outcome(s) achieved     Description:  Goals to be met by: 18    Patient will increase functional independence with mobility by performin. Ascend/descend 6 stair with right Handrails Contact Guard Assistance -not met                    Time Tracking:     PT Received On: 18  PT Start Time: 1454     PT Stop Time: 1502  PT Total Time (min): 8 min     Billable Minutes:   · Gait Training 8    Kalpana Gonzalez PT, DPT  2018   Pager: [phone]

## 2018-08-09 NOTE — SUBJECTIVE & OBJECTIVE
Interval History: Patient is off supplemental oxygen. UOP 1.1L in last 24 hours.     Review of Systems   Cardiovascular: Negative for chest pain, dyspnea on exertion, irregular heartbeat, leg swelling, near-syncope, orthopnea, palpitations, paroxysmal nocturnal dyspnea and syncope.   Respiratory: Negative for shortness of breath.    Neurological: Negative for dizziness, headaches and light-headedness.     Objective:     Vital Signs (Most Recent):  Temp: 98.4 °F (36.9 °C) (08/09/18 1124)  Pulse: 100 (08/09/18 1525)  Resp: 16 (08/09/18 1346)  BP: (!) 93/50 (08/09/18 1525)  SpO2: 98 % (08/09/18 1545) Vital Signs (24h Range):  Temp:  [98.1 °F (36.7 °C)-99.1 °F (37.3 °C)] 98.4 °F (36.9 °C)  Pulse:  [] 100  Resp:  [16-18] 16  SpO2:  [88 %-100 %] 98 %  BP: ()/(48-54) 93/50     Weight: 76.8 kg (169 lb 5 oz)  Body mass index is 27.33 kg/m².     SpO2: 98 %  O2 Device (Oxygen Therapy): room air      Intake/Output Summary (Last 24 hours) at 08/09/18 1742  Last data filed at 08/09/18 1600   Gross per 24 hour   Intake             1170 ml   Output             1450 ml   Net             -280 ml       Lines/Drains/Airways     Peripheral Intravenous Line                 Peripheral IV - Single Lumen 08/08/18 1621 Left Forearm 1 day                Physical Exam   Constitutional: She is oriented to person, place, and time. No distress.   Eyes: Conjunctivae are normal.   Neck: Neck supple.   Cardiovascular:   Murmur heard.  Pulmonary/Chest: Effort normal. She has rales.   Musculoskeletal: She exhibits no edema.   Neurological: She is alert and oriented to person, place, and time.   Skin: Skin is warm and dry. She is not diaphoretic.       Significant Labs:   BMP:     Recent Labs  Lab 08/08/18  0429 08/09/18  0430   * 114*   * 135*   K 3.6 4.0   CL 95 95   CO2 30* 31*   BUN 30* 31*   CREATININE 1.5* 1.3   CALCIUM 8.8 8.7   MG 2.0 2.0       Significant Imaging:   X-Ray:     FINDINGS:  Cardiomegaly with hilar  prominence and findings of pulmonary vascular congestive changes are demonstrated.  There appears to be reduced amount of right-sided pleural effusion with minimal blunting the left costophrenic angle again demonstrated.  The lungs are otherwise expanded and appear clear.

## 2018-08-09 NOTE — PROGRESS NOTES
Progress Note  Hospital Medicine    Primary Team: [facility]  Admit Date: 8/3/2018   Length of Stay:  LOS: 6 days   SUBJECTIVE:   Reason for Admission:  Acute on chronic congestive heart failure    HPI:  Patient is a 83F with PMH of chronic systolic HF with EF 20-25%, severe AS with SHEA 0.65cm2, mean gradients 75mmhg, peak velocity 5.3m/sec, recent admission for GI bleed (requiring 1 unit of PRBC and EGD showing non-bleeding angioectasia s/p APC), recent admission for NSTEMI (without evidence CAD on [facility]), and discharge yesterday after stay for cardiogenic shock and ADHF, presented with acute onset of SOB.  Pt reports feeling well upon discharge and in the evening, but awoke at 0100 SOB and gasping for air.  She urinated twice overnight.  Also notes LE edema has developed.  Patient admitted for evaluation of acute on chronic systolic heart failure.    ECHO 7/27/2018  CONCLUSIONS     1 - Moderate left ventricular enlargement.     2 - Severely depressed left ventricular systolic function (EF 20-25%).     3 - Left atrial enlargement.     4 - Low normal to mildly depressed right ventricular systolic function .     5 - Moderate to severe aortic stenosis.     6 - Moderate mitral regurgitation.     7 - Increased central venous pressure.     8 - If clinically indicated, a full Doppler study can be performed.    Interval history:    No acute events overnight.  Pt reports feeling significantly better with breathing today.  Weight - 6 lb overall. I/O trending appropriately.  D/w patient in [place], family not at bedside.    Review of Systems:  Constitutional: no fever or chills  Respiratory: positive for dyspnea on exertion  Cardiovascular: no chest pain or palpitations  Gastrointestinal: no nausea or vomiting, no abdominal pain or change in bowel habits  Musculoskeletal: no arthralgias or myalgias     OBJECTIVE:     Temp:  [98 °F (36.7 °C)-99.1 °F (37.3 °C)]   Pulse:  []   Resp:  [17-18]   BP: ()/(48-54)    SpO2:  [90 %-100 %]  Body mass index is 27.33 kg/m².  Intake/Outake:  This Shift:  No intake/output data recorded.    Net I/O past 24h:     Intake/Output Summary (Last 24 hours) at 08/09/18 0854  Last data filed at 08/09/18 0600   Gross per 24 hour   Intake             1171 ml   Output             1500 ml   Net             -329 ml             Physical Exam:  Gen- well-developed, well-nourished, fatigued  CVS- S1 and S2 present, 3/6 systolic murmur, RRR  Resp- no work of breathing, crackles half-way up lungs  Abd- BS+, soft, NT, ND  Ext- 1+ pitting LE edema R>L (baseline), no clubbing or cyanosis    Laboratory:  CBC/Anemia Labs: Coags:      Recent Labs  Lab 08/02/18  0907 08/03/18  0749 08/04/18  0816 08/09/18  0430   WBC  --  7.53 6.27 5.97   HGB  --  9.7* 8.8* 8.6*   HCT  --  30.0* 26.6* 25.8*   PLT  --  257 213 196   MCV  --  97 95 95   RDW  --  15.2* 15.0* 14.5   IRON 44  --   --   --    FERRITIN 168  --   --   --       Recent Labs  Lab 08/03/18  0749   INR 1.1        Chemistries:     Recent Labs  Lab 08/07/18  0637 08/08/18  0429 08/09/18  0430   * 135* 135*   K 3.9 3.6 4.0   CL 96 95 95   CO2 32* 30* 31*   BUN 26* 30* 31*   CREATININE 1.2 1.5* 1.3   CALCIUM 8.7 8.8 8.7   PROT 5.7* 5.9* 5.8*   BILITOT 1.0 1.0 0.9   ALKPHOS 122 122 131   ALT 20 19 18   AST 54* 52* 51*   MG 2.0 2.0 2.0        ABG:     Recent Labs  Lab 08/03/18  0747   PH 7.386   PCO2 44.1   PO2 21*   HCO3 26.5   POCSATURATED 33*   BE 1        Cardiac Enzymes: Ejection Fractions:    No results for input(s): CPK, CPKMB, MB, TROPONINI in the last 72 hours. EF   Date Value Ref Range Status   07/27/2018 20 (A) 55 - 65    07/09/2018 50 55 - 65            Medications:  Scheduled Meds:   albuterol-ipratropium  3 mL Nebulization Q6H WAKE    aspirin  81 mg Oral Daily    atorvastatin  40 mg Oral Daily    enoxaparin  40 mg Subcutaneous Daily    furosemide  40 mg Intravenous Daily    insulin aspart U-100  2 Units Subcutaneous TIDWM    insulin  detemir U-100  5 Units Subcutaneous QHS                             Continuous Infusions:    PRN Meds:.acetaminophen, dextrose 50%, dextrose 50%, glucagon (human recombinant), glucose, glucose, insulin aspart U-100, ondansetron, polyethylene glycol, sodium chloride 0.9%     ASSESSMENT/PLAN:     Active Hospital Problems    Diagnosis  POA    *Acute on chronic congestive heart failure [I50.9]  Yes    Hypokalemia [E87.6]  No    Hypomagnesemia [E83.42]  No    Acute hypoxemic respiratory failure [J96.01]  Yes    Severe aortic stenosis [I35.0]  Yes      Resolved Hospital Problems    Diagnosis Date Resolved POA   No resolved problems to display.     Acute on Chronic Systolic Heart Failure  Acute Hypoxemic Respiratory Failure 2/2 ADHF  - Pt's lasix had been held for several days during hospitalization due to cardiogenic shock  - Still volume up now, but contraction alkalosis and KATI - need to pull back  - Increase lasix to 40 IV BID  - Consider use of   - Strict I/o, low salt diet, fluid restriction, daily weights  - When fully diuresed, will start beta-blocker and ACEI as tolerated by BP     Severe Aortic Stenosis  - during prior hospitalization required IABP for hemodynamic support in the setting of acute respiratory distress from flash pulmonary edema 2/2 severe AV stenosis  - s/p successful balloon aortic valvuloplasty with 24mm true balloon aortic mean gradient improved from 70 to 36mmHg  - currently undergoing TAVR w/u: CT chest performed, [facility] negative for obstructive CAD  - PFTs ordered for outpatient  - Pt will f/u with Interventional Cardiology as outpatient    KATI on CKDIII  - This could by hypotensive ATN in etiology, cardiorenal, overdiuresis or other  - Will temporarily dose reduce lasix, but now increase from 40 IV qd to BID  - F/u cardiology co-management  - Avoid further antihypertensives  - Avoid nephrotoxic agents  - Consider nephrology consultation    Right Hepatic Lobe Lesion  Suspected  "Cirrhosis  - lesion seen incidentally on CT for TAVR  - followed up with US abd- notes 3cm  - triple phase CT shows 2.4 x 1.6cm irregular enhancing mass in right lobe of liver; 1cm focal nonenhancing areas centrally; with anterior washout, arterial enhancement- LI-RADS 5 lesion  -   - Hepatology consulted last admission; will be discussing case in IR conference 8/7 and then f/u in Hepatology clinic 8/8 (appt needs to be rescheduled)  - anti-smooth muscle Ab + 1:80  Per transplant notes:  - "Given the patient's normal LFT, INR, MELD 9, albumin 2.5; her hepatic disease would not contraindicate her from a surgical procedure (TAVR) - pending TAVR outpatient.  - Hepatic lesion concerning for HCC can be evaluated as an outpatient regarding therapeutic options. AFP elevated (572) which supports this as likely HCC. CT 3 phase pending.  - Concern for cirrhosis on imaging likely secondary to HERNANDEZ. We will continue her workup as an outpatient with modalities such as fibroscan. Iron panel negative. , SUGAR pending, ASMA pending.  - if she is discharged before 8/8 she can follow-up in her previously scheduled appointment with Dr. Ashraf. If she is discharged later than this, please inform hepatology so we can reschedule her appointment."     DM-II  - A1C 7.0  - per daughter, pt's blood sugar is poorly controlled at home, reaching 300s  - Levemir 5 units nightly and Aspart 2 units TIDWM   - needs amb referral to Endocrinology    Hypokalemia  Hypomagnesemia  -Replete as needed     DVT ppx- okay for lovenox, recent GIB but CBC stable  CODE status- FULL     Dispo- home in 1-2 days pending clinical improvement    Clifton Borrego MD  Hospital Medicine Staff     "

## 2018-08-09 NOTE — PLAN OF CARE
Problem: Patient Care Overview  Goal: Plan of Care Review  Outcome: Ongoing (interventions implemented as appropriate)  Discussed Plan of Care with patient. VS stable. Ambulates well with walker and stand-by assist; up in hallways x2 and in room multiple times. Fall precautions in place. Bleeding precautions reviewed and maintained. Pain denied. Blood glucose monitored; Insulin administered with meals. Patient remained free of falls/ injury. All questions and concerns were addressed. Will continue to monitor patient.

## 2018-08-10 NOTE — PROGRESS NOTES
Progress Note  Hospital Medicine    Primary Team: [facility]  Admit Date: 8/3/2018   Length of Stay:  LOS: 7 days   SUBJECTIVE:   Reason for Admission:  Acute on chronic congestive heart failure    HPI:  Patient is a 83F with PMH of chronic systolic HF with EF 20-25%, severe AS with SHEA 0.65cm2, mean gradients 75mmhg, peak velocity 5.3m/sec, recent admission for GI bleed (requiring 1 unit of PRBC and EGD showing non-bleeding angioectasia s/p APC), recent admission for NSTEMI (without evidence CAD on [facility]), and discharge yesterday after stay for cardiogenic shock and ADHF, presented with acute onset of SOB.  Pt reports feeling well upon discharge and in the evening, but awoke at 0100 SOB and gasping for air.  She urinated twice overnight.  Also notes LE edema has developed.  Patient admitted for evaluation of acute on chronic systolic heart failure.    ECHO 7/27/2018  CONCLUSIONS     1 - Moderate left ventricular enlargement.     2 - Severely depressed left ventricular systolic function (EF 20-25%).     3 - Left atrial enlargement.     4 - Low normal to mildly depressed right ventricular systolic function .     5 - Moderate to severe aortic stenosis.     6 - Moderate mitral regurgitation.     7 - Increased central venous pressure.     8 - If clinically indicated, a full Doppler study can be performed.    Interval history:    No acute events overnight.  Pt reports feeling significantly better with breathing today.  Weight - 8 lb overall. I/O trending appropriately.  D/w patient in [place], family not at bedside.    Review of Systems:  Constitutional: no fever or chills  Respiratory: positive for dyspnea on exertion  Cardiovascular: no chest pain or palpitations  Gastrointestinal: no nausea or vomiting, no abdominal pain or change in bowel habits  Musculoskeletal: no arthralgias or myalgias     OBJECTIVE:     Temp:  [97.7 °F (36.5 °C)-98.4 °F (36.9 °C)]   Pulse:  []   Resp:  [16]   BP: ()/(46-58)   SpO2:   [88 %-99 %]  Body mass index is 27.08 kg/m².  Intake/Outake:  This Shift:  No intake/output data recorded.    Net I/O past 24h:     Intake/Output Summary (Last 24 hours) at 08/10/18 0816  Last data filed at 08/10/18 0600   Gross per 24 hour   Intake              660 ml   Output             1450 ml   Net             -790 ml             Physical Exam:  Gen- well-developed, well-nourished, fatigued  CVS- S1 and S2 present, 3/6 systolic murmur, RRR  Resp- no work of breathing, crackles at bases (improving)  Abd- BS+, soft, NT, ND  Ext- 1+ pitting LE edema, no clubbing or cyanosis    Laboratory:  CBC/Anemia Labs: Coags:      Recent Labs  Lab 08/04/18  0816 08/09/18  0430 08/10/18  0501   WBC 6.27 5.97 4.76   HGB 8.8* 8.6* 8.0*   HCT 26.6* 25.8* 24.5*    196 190   MCV 95 95 95   RDW 15.0* 14.5 14.4    No results for input(s): PT, INR, APTT in the last 168 hours.     Chemistries:     Recent Labs  Lab 08/08/18  0429 08/09/18  0430 08/10/18  0501   * 135* 135*   K 3.6 4.0 3.7   CL 95 95 94*   CO2 30* 31* 31*   BUN 30* 31* 29*   CREATININE 1.5* 1.3 1.2   CALCIUM 8.8 8.7 8.8   PROT 5.9* 5.8* 5.8*   BILITOT 1.0 0.9 0.9   ALKPHOS 122 131 125   ALT 19 18 19   AST 52* 51* 49*   MG 2.0 2.0 1.9        ABG:   No results for input(s): PH, PCO2, PO2, HCO3, POCSATURATED, BE in the last 168 hours.     Cardiac Enzymes: Ejection Fractions:    No results for input(s): CPK, CPKMB, MB, TROPONINI in the last 72 hours. EF   Date Value Ref Range Status   07/27/2018 20 (A) 55 - 65    07/09/2018 50 55 - 65            Medications:  Scheduled Meds:   albuterol-ipratropium  3 mL Nebulization Q6H WAKE    aspirin  81 mg Oral Daily    atorvastatin  40 mg Oral Daily    enoxaparin  40 mg Subcutaneous Daily    furosemide  40 mg Intravenous BID    insulin aspart U-100  2 Units Subcutaneous TIDWM    insulin detemir U-100  5 Units Subcutaneous QHS    magnesium sulfate IVPB  2 g Intravenous Once    potassium chloride 10%  40 mEq Oral  Once                             Continuous Infusions:    PRN Meds:.acetaminophen, dextrose 50%, dextrose 50%, glucagon (human recombinant), glucose, glucose, insulin aspart U-100, ondansetron, polyethylene glycol, sodium chloride 0.9%     ASSESSMENT/PLAN:     Active Hospital Problems    Diagnosis  POA    *Acute on chronic congestive heart failure [I50.9]  Yes    Hypokalemia [E87.6]  No    Hypomagnesemia [E83.42]  No    Acute hypoxemic respiratory failure [J96.01]  Yes    Severe aortic stenosis [I35.0]  Yes      Resolved Hospital Problems    Diagnosis Date Resolved POA   No resolved problems to display.     Acute on Chronic Systolic Heart Failure  Acute Hypoxemic Respiratory Failure 2/2 ADHF  - Pt's lasix had been held for several days during hospitalization due to cardiogenic shock  - Still volume up now, but contraction alkalosis and KATI - need to pull back  - C/w lasix to 40 IV BID  - Consider use of   - Strict I/o, low salt diet, fluid restriction, daily weights  - When fully diuresed, will start beta-blocker and ACEI as tolerated by BP     Severe Aortic Stenosis  - during prior hospitalization required IABP for hemodynamic support in the setting of acute respiratory distress from flash pulmonary edema 2/2 severe AV stenosis  - s/p successful balloon aortic valvuloplasty with 24mm true balloon aortic mean gradient improved from 70 to 36mmHg  - currently undergoing TAVR w/u: CT chest performed, [facility] negative for obstructive CAD  - PFTs ordered for outpatient  - Pt will f/u with Interventional Cardiology as outpatient    KATI on CKDIII  - This could by hypotensive ATN in etiology, cardiorenal, overdiuresis or other  - F/u cardiology co-management  - Avoid further antihypertensives  - Avoid nephrotoxic agents  - Consider nephrology consultation    Right Hepatic Lobe Lesion  Suspected Cirrhosis  - lesion seen incidentally on CT for TAVR  - followed up with US abd- notes 3cm  - triple phase CT shows 2.4 x 1.6cm  "irregular enhancing mass in right lobe of liver; 1cm focal nonenhancing areas centrally; with anterior washout, arterial enhancement- LI-RADS 5 lesion  -   - Hepatology consulted last admission; will be discussing case in IR conference 8/7 and then f/u in Hepatology clinic 8/8 (appt needs to be rescheduled)  - anti-smooth muscle Ab + 1:80  Per transplant notes:  - "Given the patient's normal LFT, INR, MELD 9, albumin 2.5; her hepatic disease would not contraindicate her from a surgical procedure (TAVR) - pending TAVR outpatient.  - Hepatic lesion concerning for HCC can be evaluated as an outpatient regarding therapeutic options. AFP elevated (572) which supports this as likely HCC. CT 3 phase pending.  - Concern for cirrhosis on imaging likely secondary to HERNANDEZ. We will continue her workup as an outpatient with modalities such as fibroscan. Iron panel negative. , SUGAR pending, ASMA pending.  - if she is discharged before 8/8 she can follow-up in her previously scheduled appointment with Dr. Ashraf. If she is discharged later than this, please inform hepatology so we can reschedule her appointment."     DM-II  - A1C 7.0  - per daughter, pt's blood sugar is poorly controlled at home, reaching 300s  - Levemir 5 units nightly and Aspart 2 units TIDWM   - needs amb referral to Endocrinology    Hypokalemia  Hypomagnesemia  -Replete as needed     DVT ppx- okay for lovenox, recent GIB but CBC stable  CODE status- FULL     Dispo- home in 1-2 days pending clinical improvement    Clifton Borrego MD  Hospital Medicine Staff     "

## 2018-08-10 NOTE — ASSESSMENT & PLAN NOTE
pt remains warm and still wet on exam  -still volume overloaded, +bibasilar crackles in lung fields, remains off supplemental oxygen  -BNP down-trending from 1,767 to 751  -pt UOP in 24 hours is 1.4 L, weight down by ~8 lbs  -c/w IV lasix to 40 mg BID    monitor UOP, BMP, strict I/O's and daily weights   -hold off on starting BB till pt is euvolemic

## 2018-08-10 NOTE — SUBJECTIVE & OBJECTIVE
Interval History: Last 24 hours, UOP 1.4 L. This AM, pt reports right shoulder pain.     Review of Systems   Constitution: Negative for chills, fever, weakness, malaise/fatigue and weight gain.   Cardiovascular: Negative for chest pain, dyspnea on exertion, irregular heartbeat, leg swelling, near-syncope, orthopnea, palpitations, paroxysmal nocturnal dyspnea and syncope.   Respiratory: Negative for shortness of breath.    Gastrointestinal: Negative for bloating, nausea and vomiting.   Neurological: Negative for dizziness, focal weakness, headaches and light-headedness.     Objective:     Vital Signs (Most Recent):  Temp: 98.2 °F (36.8 °C) (08/10/18 0741)  Pulse: 88 (08/10/18 0741)  Resp: 16 (08/10/18 0741)  BP: (!) 96/49 (08/10/18 0741)  SpO2: (!) 91 % (08/10/18 0741) Vital Signs (24h Range):  Temp:  [97.7 °F (36.5 °C)-98.4 °F (36.9 °C)] 98.2 °F (36.8 °C)  Pulse:  [] 88  Resp:  [16] 16  SpO2:  [88 %-99 %] 91 %  BP: ()/(46-58) 96/49     Weight: 76.1 kg (167 lb 12.3 oz)  Body mass index is 27.08 kg/m².     SpO2: (!) 91 %  O2 Device (Oxygen Therapy): room air      Intake/Output Summary (Last 24 hours) at 08/10/18 0816  Last data filed at 08/10/18 0600   Gross per 24 hour   Intake              660 ml   Output             1450 ml   Net             -790 ml       Lines/Drains/Airways     Peripheral Intravenous Line                 Peripheral IV - Single Lumen 08/08/18 1621 Left Forearm 1 day                Physical Exam   Constitutional: She is oriented to person, place, and time. No distress.   Eyes: Pupils are equal, round, and reactive to light.   Neck: Neck supple. No JVD present.   Cardiovascular: Normal rate and intact distal pulses.    Murmur heard.  Holosystolic murmur 3/6 at RUSB   Pulmonary/Chest: Effort normal. She has rales.   Abdominal: Soft. Bowel sounds are normal.   Musculoskeletal: She exhibits no edema.   Neurological: She is alert and oriented to person, place, and time.   Skin: Skin is warm  and dry. She is not diaphoretic.       Significant Labs:   BMP:   Recent Labs  Lab 08/09/18  0430 08/10/18  0501   * 108   * 135*   K 4.0 3.7   CL 95 94*   CO2 31* 31*   BUN 31* 29*   CREATININE 1.3 1.2   CALCIUM 8.7 8.8   MG 2.0 1.9       Significant Imaging:    X-Ray (8/9/18):   FINDINGS:  Heart size is normal there is mild edema and no change.    TTE (7/27):   CONCLUSIONS     1 - Moderate left ventricular enlargement.     2 - Severely depressed left ventricular systolic function (EF 20-25%).     3 - Left atrial enlargement.     4 - Low normal to mildly depressed right ventricular systolic function .     5 - Moderate to severe aortic stenosis.     6 - Moderate mitral regurgitation.     7 - Increased central venous pressure.     8 - If clinically indicated, a full Doppler study can be performed.

## 2018-08-10 NOTE — PLAN OF CARE
Problem: Patient Care Overview  Goal: Plan of Care Review  Outcome: Ongoing (interventions implemented as appropriate)  Pt educated on fall risks overnight, Pt remained free from falls/trauma/injury. VSS. Denies any CP, SOB, palpitations, and dizziness. Turning/repositioning independently in bed. Plan of care reviewed with patient and all questions answered, verbalizes understanding. No acute distress noted. Will continue to monitor.

## 2018-08-10 NOTE — PROGRESS NOTES
[facility]  Cardiology  Progress Note    Patient Name: Natalie Parnell  MRN: [id number]  Admission Date: 8/3/2018  Hospital Length of Stay: 7 days  Code Status: Full Code   Attending Physician: Clifton Borrego MD   Primary Care Physician: Octavio Ferrell MD  Expected Discharge Date: 8/10/2018  Principal Problem:Acute on chronic congestive heart failure    Subjective:     Hospital Course:   During her hospitalization she was diuresed aggressively.     Interval History: Last 24 hours, UOP 1.4 L. This AM, pt reports right shoulder pain.     Review of Systems   Constitution: Negative for chills, fever, weakness, malaise/fatigue and weight gain.   Cardiovascular: Negative for chest pain, dyspnea on exertion, irregular heartbeat, leg swelling, near-syncope, orthopnea, palpitations, paroxysmal nocturnal dyspnea and syncope.   Respiratory: Negative for shortness of breath.    Gastrointestinal: Negative for bloating, nausea and vomiting.   Neurological: Negative for dizziness, focal weakness, headaches and light-headedness.     Objective:     Vital Signs (Most Recent):  Temp: 98.2 °F (36.8 °C) (08/10/18 0741)  Pulse: 88 (08/10/18 0741)  Resp: 16 (08/10/18 0741)  BP: (!) 96/49 (08/10/18 0741)  SpO2: (!) 91 % (08/10/18 0741) Vital Signs (24h Range):  Temp:  [97.7 °F (36.5 °C)-98.4 °F (36.9 °C)] 98.2 °F (36.8 °C)  Pulse:  [] 88  Resp:  [16] 16  SpO2:  [88 %-99 %] 91 %  BP: ()/(46-58) 96/49     Weight: 76.1 kg (167 lb 12.3 oz)  Body mass index is 27.08 kg/m².     SpO2: (!) 91 %  O2 Device (Oxygen Therapy): room air      Intake/Output Summary (Last 24 hours) at 08/10/18 0816  Last data filed at 08/10/18 0600   Gross per 24 hour   Intake              660 ml   Output             1450 ml   Net             -790 ml       Lines/Drains/Airways     Peripheral Intravenous Line                 Peripheral IV - Single Lumen 08/08/18 1621 Left Forearm 1 day                Physical Exam   Constitutional: She  is oriented to person, place, and time. No distress.   Eyes: Pupils are equal, round, and reactive to light.   Neck: Neck supple. No JVD present.   Cardiovascular: Normal rate and intact distal pulses.    Murmur heard.  Holosystolic murmur 3/6 at RUSB   Pulmonary/Chest: Effort normal. She has rales.   Abdominal: Soft. Bowel sounds are normal.   Musculoskeletal: She exhibits no edema.   Neurological: She is alert and oriented to person, place, and time.   Skin: Skin is warm and dry. She is not diaphoretic.       Significant Labs:   BMP:   Recent Labs  Lab 08/09/18  0430 08/10/18  0501   * 108   * 135*   K 4.0 3.7   CL 95 94*   CO2 31* 31*   BUN 31* 29*   CREATININE 1.3 1.2   CALCIUM 8.7 8.8   MG 2.0 1.9       Significant Imaging:    X-Ray (8/9/18):   FINDINGS:  Heart size is normal there is mild edema and no change.    TTE (7/27):   CONCLUSIONS     1 - Moderate left ventricular enlargement.     2 - Severely depressed left ventricular systolic function (EF 20-25%).     3 - Left atrial enlargement.     4 - Low normal to mildly depressed right ventricular systolic function .     5 - Moderate to severe aortic stenosis.     6 - Moderate mitral regurgitation.     7 - Increased central venous pressure.     8 - If clinically indicated, a full Doppler study can be performed.     Assessment and Plan:     Brief HPI: 82 yo F with hx of HFrEF (20-25%), severe AS, GIB due to angioectasia s/p APC along with recent hospitalization for cardiogenic shock requiring IABP/dobutamine ggt who presented with acute dyspnea.     * Acute on chronic congestive heart failure    pt remains warm and still wet on exam  -still volume overloaded, +bibasilar crackles in lung fields, remains off supplemental oxygen  -BNP down-trending from 1,767 to 751  -pt UOP in 24 hours is 1.4 L, weight down by ~8 lbs  -c/w IV lasix to 40 mg BID    monitor UOP, BMP, strict I/O's and daily weights   -hold off on starting BB till pt is euvolemic          Severe aortic stenosis    -s/p BAV, awaiting O/P TAVR             VTE Risk Mitigation         Ordered     enoxaparin injection 40 mg  Daily      08/08/18 1457     Place CORWIN hose  Until discontinued      08/03/18 1146     Place sequential compression device  Until discontinued      08/03/18 1146        Suzan Galvin MD  Cardiology Fellow  [facility]

## 2018-08-10 NOTE — PLAN OF CARE
Problem: Patient Care Overview  Goal: Plan of Care Review  Outcome: Ongoing (interventions implemented as appropriate)  Pt continues diuresis, adequate urine output noted. Blood glucose levels WNL for pt. Activity encouraged, patient ambulating with standby assistance, fall precautions in place. Patient c/o of mild pain, PRN meds administered . Discussed medications and care. Patient has no questions at this time. Will continue to monitor.

## 2018-08-11 NOTE — PROGRESS NOTES
[facility]  Cardiology  Progress Note    Patient Name: Natalie Parnell  MRN: [id number]  Admission Date: 8/3/2018  Hospital Length of Stay: 8 days  Code Status: Full Code   Attending Physician: Clifton Borrego MD   Primary Care Physician: Octavio Ferrell MD  Expected Discharge Date: 8/12/2018  Principal Problem:Acute on chronic congestive heart failure    Subjective:     HPI:   83 y.o. female with PMH of severe AS with SHEA 0.65cm2, mean gradients 75mmhg, peak velocity 5.3m/sec, recent admission for GI bleed (requiring 1 unit of PRBC and EGD showing non-bleeding angioectasia s/p APC), recent admission for NSTEMI (non obstructive disease on LHC), and discharge yesterday after stay for cardiogenic shock and ADHF. Patient was admitted with cardiogenic shock and discharged yesterday. Over the past week patient was in the CCU required IABP. Repeat LHC was performed given drop in her EF and revealed non obstructive disease. She underwent BAV with improvement in her transvalvular mean gradients to 36mmhg. She was stepped down and discharged yesterday, she went home and was SOB and couldn't sleep all night in the AM patient was found to be tachycardic by her daughter who is a nurse and came back to the ER. In the ER patient was volume overloaded, warm and wet on exam and with pulmonary edema on CXR.     Hospital Course:   During her hospitalization she was diuresed aggressively. Her weight has reduced by 8 lbs. She is net negative 4.6 L since admission.     Interval History: Last 24 hours, UOP 1.4 L. This AM, pt reports right shoulder pain.     Review of Systems   Constitution: Negative for chills, fever, weakness, malaise/fatigue and weight gain.   Cardiovascular: Negative for chest pain, dyspnea on exertion, irregular heartbeat, leg swelling, near-syncope, orthopnea, palpitations, paroxysmal nocturnal dyspnea and syncope.   Respiratory: Negative for shortness of breath.    Gastrointestinal: Negative  for bloating, nausea and vomiting.   Neurological: Negative for dizziness, focal weakness, headaches and light-headedness.     Objective:     Vital Signs (Most Recent):  Temp: 97.5 °F (36.4 °C) (08/11/18 0702)  Pulse: (!) 112 (08/11/18 0738)  Resp: 18 (08/11/18 0738)  BP: (!) 98/53 (08/11/18 0702)  SpO2: (!) 94 % (08/11/18 0738) Vital Signs (24h Range):  Temp:  [97.5 °F (36.4 °C)-98.3 °F (36.8 °C)] 97.5 °F (36.4 °C)  Pulse:  [] 112  Resp:  [16-18] 18  SpO2:  [90 %-96 %] 94 %  BP: ()/(43-58) 98/53     Weight: 77.2 kg (170 lb 3.1 oz)  Body mass index is 27.47 kg/m².     SpO2: (!) 94 %  O2 Device (Oxygen Therapy): room air      Intake/Output Summary (Last 24 hours) at 08/11/18 0853  Last data filed at 08/11/18 0445   Gross per 24 hour   Intake              690 ml   Output             1300 ml   Net             -610 ml       Lines/Drains/Airways     Peripheral Intravenous Line                 Peripheral IV - Single Lumen 08/08/18 1621 Left Forearm 2 days                Physical Exam   Constitutional: She is oriented to person, place, and time. No distress.   Eyes: Pupils are equal, round, and reactive to light.   Neck: Neck supple. No JVD present.   Cardiovascular: Normal rate and intact distal pulses.    Murmur heard.  Holosystolic murmur 3/6 at RUSB   Pulmonary/Chest: Effort normal.   Bibasilar crackles   Abdominal: Soft. Bowel sounds are normal.   Musculoskeletal: She exhibits no edema.   Neurological: She is alert and oriented to person, place, and time.   Skin: Skin is warm and dry. She is not diaphoretic.       Significant Labs:   BMP:     Recent Labs  Lab 08/10/18  0501 08/11/18  0355    127*   * 134*   K 3.7 4.2   CL 94* 96   CO2 31* 29   BUN 29* 30*   CREATININE 1.2 1.2   CALCIUM 8.8 8.9   MG 1.9 2.1       Significant Imaging:    X-Ray (8/9/18):   FINDINGS:  Heart size is normal there is mild edema and no change.    TTE (7/27):   CONCLUSIONS     1 - Moderate left ventricular enlargement.      2 - Severely depressed left ventricular systolic function (EF 20-25%).     3 - Left atrial enlargement.     4 - Low normal to mildly depressed right ventricular systolic function .     5 - Moderate to severe aortic stenosis.     6 - Moderate mitral regurgitation.     7 - Increased central venous pressure.     8 - If clinically indicated, a full Doppler study can be performed.     Assessment and Plan:     Brief HPI: 82 yo F with hx of HFrEF (20-25%), severe AS, GIB due to angioectasia s/p APC along with recent hospitalization for cardiogenic shock requiring IABP/dobutamine ggt who presented with acute dyspnea.     * Acute on chronic systolic congestive heart failure    pt remains warm and still wet on exam  +bibasilar crackles in lung fields  -BNP down-trending from 1,767 to 751  -CXR: 8/9 shows pulmonary edema  -pt UOP in 24 hours is 1.3 L, weight down by ~8 lbs  -still need to continue with diuresis, c/w IV lasix to 40 mg BID   -obtain CXR tomorrow    monitor UOP, BMP, strict I/O's and daily weights   -hold off on starting BB till pt is euvolemic   -avoiding ACE-I given pt's AS         Severe aortic stenosis    -s/p BAV, awaiting O/P TAVR             VTE Risk Mitigation         Ordered     enoxaparin injection 40 mg  Daily      08/08/18 1457     Place CORWIN hose  Until discontinued      08/05/18 1428       Suzan Galvin MD  Cardiology Fellow   [facility]

## 2018-08-11 NOTE — PROGRESS NOTES
Pt hypotensive at 82/45. Notified Lloyd MONTEMAYOR. No new orders given. Will continue to monitor.

## 2018-08-11 NOTE — PLAN OF CARE
Problem: Patient Care Overview  Goal: Plan of Care Review  Outcome: Ongoing (interventions implemented as appropriate)  Pt remains free from falls/trauma/injury. Denies chest pain, SoB, or palpitations. Pt hypotensive at start of shift, Lloyd MONTEMAYOR notified, no new orders given. Other VSS. Reviewed plan of care with pt-- no questions at this time.  Bed in low position, wheels locked, CB in reach. Instruct pt to call for assistance.

## 2018-08-11 NOTE — PROGRESS NOTES
08/11/18 1530 08/11/18 1630   Vital Signs   BP (!) 85/44 (!) 94/45   MAP (mmHg) 58 61   BP Location Right arm Right arm   BP Method --  Automatic   Patient Position Sitting Sitting   Checked patient's vitals and obtained original BP of 85/44.  Patient denied any lightheadedness/dizziness. Rechecked BP one hour later and obtained reading of 94/45.  Notified Dr. Borrego of both results, and inquired to Dr. Borrego about giving 1800 Lasix. Received order to give patient PM dose of Lasix.  Executed order, and will continue to monitor.

## 2018-08-11 NOTE — PLAN OF CARE
Problem: Patient Care Overview  Goal: Plan of Care Review  Outcome: Revised  Plan of care discussed with patient. Patient is free of fall/trauma/injury. Denies CP, SOB.  Pain management with PRN pain medication. Continuing to Diurese with 40mg IVP Lasix BID.   All questions addressed. Will continue to monitor

## 2018-08-11 NOTE — PROGRESS NOTES
Progress Note  Hospital Medicine    Primary Team: [facility]  Admit Date: 8/3/2018   Length of Stay:  LOS: 8 days   SUBJECTIVE:   Reason for Admission:  Acute on chronic congestive heart failure    HPI:  Patient is a 83F with PMH of chronic systolic HF with EF 20-25%, severe AS with SHEA 0.65cm2, mean gradients 75mmhg, peak velocity 5.3m/sec, recent admission for GI bleed (requiring 1 unit of PRBC and EGD showing non-bleeding angioectasia s/p APC), recent admission for NSTEMI (without evidence CAD on [facility]), and discharge yesterday after stay for cardiogenic shock and ADHF, presented with acute onset of SOB.  Pt reports feeling well upon discharge and in the evening, but awoke at 0100 SOB and gasping for air.  She urinated twice overnight.  Also notes LE edema has developed.  Patient admitted for evaluation of acute on chronic systolic heart failure.    ECHO 7/27/2018  CONCLUSIONS     1 - Moderate left ventricular enlargement.     2 - Severely depressed left ventricular systolic function (EF 20-25%).     3 - Left atrial enlargement.     4 - Low normal to mildly depressed right ventricular systolic function .     5 - Moderate to severe aortic stenosis.     6 - Moderate mitral regurgitation.     7 - Increased central venous pressure.     8 - If clinically indicated, a full Doppler study can be performed.    Interval history:    No acute events overnight.  Pt reports feeling significantly better with breathing today.  Weight - 5 lb overall. I/O trending appropriately.  D/w patient in [place], family not at bedside.    Review of Systems:  Constitutional: no fever or chills  Respiratory: positive for dyspnea on exertion  Cardiovascular: no chest pain or palpitations  Gastrointestinal: no nausea or vomiting, no abdominal pain or change in bowel habits  Musculoskeletal: no arthralgias or myalgias     OBJECTIVE:     Temp:  [97.5 °F (36.4 °C)-98.3 °F (36.8 °C)]   Pulse:  []   Resp:  [16-18]   BP: ()/(43-58)    SpO2:  [90 %-96 %]  Body mass index is 27.47 kg/m².  Intake/Outake:  This Shift:  No intake/output data recorded.    Net I/O past 24h:     Intake/Output Summary (Last 24 hours) at 08/11/18 1033  Last data filed at 08/11/18 0445   Gross per 24 hour   Intake              450 ml   Output             1300 ml   Net             -850 ml             Physical Exam:  Gen- well-developed, well-nourished, fatigued  CVS- S1 and S2 present, 3/6 systolic murmur, RRR  Resp- no work of breathing, crackles at bases (improving)  Abd- BS+, soft, NT, ND  Ext- 1+ pitting LE edema, no clubbing or cyanosis    Laboratory:  CBC/Anemia Labs: Coags:      Recent Labs  Lab 08/09/18  0430 08/10/18  0501   WBC 5.97 4.76   HGB 8.6* 8.0*   HCT 25.8* 24.5*    190   MCV 95 95   RDW 14.5 14.4    No results for input(s): PT, INR, APTT in the last 168 hours.     Chemistries:     Recent Labs  Lab 08/09/18  0430 08/10/18  0501 08/11/18  0355   * 135* 134*   K 4.0 3.7 4.2   CL 95 94* 96   CO2 31* 31* 29   BUN 31* 29* 30*   CREATININE 1.3 1.2 1.2   CALCIUM 8.7 8.8 8.9   PROT 5.8* 5.8* 6.0   BILITOT 0.9 0.9 1.0   ALKPHOS 131 125 151*   ALT 18 19 21   AST 51* 49* 53*   MG 2.0 1.9 2.1        ABG:   No results for input(s): PH, PCO2, PO2, HCO3, POCSATURATED, BE in the last 168 hours.     Cardiac Enzymes: Ejection Fractions:    No results for input(s): CPK, CPKMB, MB, TROPONINI in the last 72 hours. EF   Date Value Ref Range Status   07/27/2018 20 (A) 55 - 65    07/09/2018 50 55 - 65            Medications:  Scheduled Meds:   albuterol-ipratropium  3 mL Nebulization Q6H WAKE    aspirin  81 mg Oral Daily    atorvastatin  40 mg Oral Daily    enoxaparin  40 mg Subcutaneous Daily    furosemide  40 mg Intravenous BID    insulin aspart U-100  2 Units Subcutaneous TIDWM    insulin detemir U-100  5 Units Subcutaneous QHS                             Continuous Infusions:    PRN Meds:.acetaminophen, dextrose 50%, dextrose 50%, glucagon (human  recombinant), glucose, glucose, insulin aspart U-100, ondansetron, polyethylene glycol, sodium chloride 0.9%     ASSESSMENT/PLAN:     Active Hospital Problems    Diagnosis  POA    *Acute on chronic congestive heart failure [I50.9]  Yes    Hypokalemia [E87.6]  No    Severe aortic stenosis [I35.0]  Yes    Type 2 diabetes mellitus, without long-term current use of insulin [E11.9]  Yes      Resolved Hospital Problems    Diagnosis Date Resolved POA   No resolved problems to display.     Acute on Chronic Systolic Heart Failure  Acute Hypoxemic Respiratory Failure 2/2 ADHF  - Pt's lasix had been held for several days during hospitalization due to cardiogenic shock  - Still volume up now, but contraction alkalosis and KATI - need to pull back  - C/w lasix to 40 IV BID  - Consider use of   - Strict I/o, low salt diet, fluid restriction, daily weights  - When fully diuresed, will start beta-blocker and ACEI as tolerated by BP     Severe Aortic Stenosis  - during prior hospitalization required IABP for hemodynamic support in the setting of acute respiratory distress from flash pulmonary edema 2/2 severe AV stenosis  - s/p successful balloon aortic valvuloplasty with 24mm true balloon aortic mean gradient improved from 70 to 36mmHg  - currently undergoing TAVR w/u: CT chest performed, [facility] negative for obstructive CAD  - PFTs ordered for outpatient  - Pt will f/u with Interventional Cardiology as outpatient    KATI on CKDIII  - This could by hypotensive ATN in etiology, cardiorenal, overdiuresis or other  - F/u cardiology co-management  - Avoid further antihypertensives  - Avoid nephrotoxic agents  - Consider nephrology consultation    Right Hepatic Lobe Lesion  Suspected Cirrhosis  - lesion seen incidentally on CT for TAVR  - followed up with US abd- notes 3cm  - triple phase CT shows 2.4 x 1.6cm irregular enhancing mass in right lobe of liver; 1cm focal nonenhancing areas centrally; with anterior washout, arterial  "enhancement- LI-RADS 5 lesion  -   - Hepatology consulted last admission; will be discussing case in IR conference 8/7 and then f/u in Hepatology clinic 8/8 (appt needs to be rescheduled)  - anti-smooth muscle Ab + 1:80  Per transplant notes:  - "Given the patient's normal LFT, INR, MELD 9, albumin 2.5; her hepatic disease would not contraindicate her from a surgical procedure (TAVR) - pending TAVR outpatient.  - Hepatic lesion concerning for HCC can be evaluated as an outpatient regarding therapeutic options. AFP elevated (572) which supports this as likely HCC. CT 3 phase pending.  - Concern for cirrhosis on imaging likely secondary to HERNANDEZ. We will continue her workup as an outpatient with modalities such as fibroscan. Iron panel negative. , SUGAR pending, ASMA pending.  - if she is discharged before 8/8 she can follow-up in her previously scheduled appointment with Dr. Ashraf. If she is discharged later than this, please inform hepatology so we can reschedule her appointment."     DM-II  - A1C 7.0  - per daughter, pt's blood sugar is poorly controlled at home, reaching 300s  - Levemir 5 units nightly and Aspart 2 units TIDWM   - needs amb referral to Endocrinology    Hypokalemia  Hypomagnesemia  -Replete as needed     DVT ppx- okay for lovenox, recent GIB but CBC stable  CODE status- FULL     Dispo- home in 1-2 days pending clinical improvement    Clifton Borrego MD  Hospital Medicine Staff     "

## 2018-08-11 NOTE — CARE UPDATE
Chart check completed, abnormal VS noted, bedside RNEmily contacted, Nurse paged primary team, patients BP low at baseline and has remained low during hospital stay.  Erroneus pulse ox charted. RRT RN at bedside and assessed patient sat of 96% and chart corrected.  Instructed to call [phone] for further concerns or assistance.    Vitals:    08/11/18 0455   BP: (!) 80/58   Pulse:    Resp:    Temp:

## 2018-08-11 NOTE — PLAN OF CARE
Problem: Patient Care Overview  Goal: Plan of Care Review  Outcome: Ongoing (interventions implemented as appropriate)  Pt remains free from falls/trauma/injury. Denies chest pain, SoB, or palpitations. VSS. Reviewed plan of care with pt-- no questions at this time.  Bed in low position, wheels locked, CB in reach. Instruct pt to call for assistance.

## 2018-08-11 NOTE — SUBJECTIVE & OBJECTIVE
Interval History: Last 24 hours, UOP 1.4 L. This AM, pt reports right shoulder pain.     Review of Systems   Constitution: Negative for chills, fever, weakness, malaise/fatigue and weight gain.   Cardiovascular: Negative for chest pain, dyspnea on exertion, irregular heartbeat, leg swelling, near-syncope, orthopnea, palpitations, paroxysmal nocturnal dyspnea and syncope.   Respiratory: Negative for shortness of breath.    Gastrointestinal: Negative for bloating, nausea and vomiting.   Neurological: Negative for dizziness, focal weakness, headaches and light-headedness.     Objective:     Vital Signs (Most Recent):  Temp: 97.5 °F (36.4 °C) (08/11/18 0702)  Pulse: (!) 112 (08/11/18 0738)  Resp: 18 (08/11/18 0738)  BP: (!) 98/53 (08/11/18 0702)  SpO2: (!) 94 % (08/11/18 0738) Vital Signs (24h Range):  Temp:  [97.5 °F (36.4 °C)-98.3 °F (36.8 °C)] 97.5 °F (36.4 °C)  Pulse:  [] 112  Resp:  [16-18] 18  SpO2:  [90 %-96 %] 94 %  BP: ()/(43-58) 98/53     Weight: 77.2 kg (170 lb 3.1 oz)  Body mass index is 27.47 kg/m².     SpO2: (!) 94 %  O2 Device (Oxygen Therapy): room air      Intake/Output Summary (Last 24 hours) at 08/11/18 0853  Last data filed at 08/11/18 0445   Gross per 24 hour   Intake              690 ml   Output             1300 ml   Net             -610 ml       Lines/Drains/Airways     Peripheral Intravenous Line                 Peripheral IV - Single Lumen 08/08/18 1621 Left Forearm 2 days                Physical Exam   Constitutional: She is oriented to person, place, and time. No distress.   Eyes: Pupils are equal, round, and reactive to light.   Neck: Neck supple. No JVD present.   Cardiovascular: Normal rate and intact distal pulses.    Murmur heard.  Holosystolic murmur 3/6 at RUSB   Pulmonary/Chest: Effort normal.   Bibasilar crackles   Abdominal: Soft. Bowel sounds are normal.   Musculoskeletal: She exhibits no edema.   Neurological: She is alert and oriented to person, place, and time.    Skin: Skin is warm and dry. She is not diaphoretic.       Significant Labs:   BMP:     Recent Labs  Lab 08/10/18  0501 08/11/18  0355    127*   * 134*   K 3.7 4.2   CL 94* 96   CO2 31* 29   BUN 29* 30*   CREATININE 1.2 1.2   CALCIUM 8.8 8.9   MG 1.9 2.1       Significant Imaging:    X-Ray (8/9/18):   FINDINGS:  Heart size is normal there is mild edema and no change.    TTE (7/27):   CONCLUSIONS     1 - Moderate left ventricular enlargement.     2 - Severely depressed left ventricular systolic function (EF 20-25%).     3 - Left atrial enlargement.     4 - Low normal to mildly depressed right ventricular systolic function .     5 - Moderate to severe aortic stenosis.     6 - Moderate mitral regurgitation.     7 - Increased central venous pressure.     8 - If clinically indicated, a full Doppler study can be performed.

## 2018-08-11 NOTE — ASSESSMENT & PLAN NOTE
pt remains warm and still wet on exam  +bibasilar crackles in lung fields  -BNP down-trending from 1,767 to 751  -CXR: 8/9 shows pulmonary edema  -pt UOP in 24 hours is 1.3 L, weight down by ~8 lbs  -still need to continue with diuresis, c/w IV lasix to 40 mg BID    monitor UOP, BMP, strict I/O's and daily weights   -hold off on starting BB till pt is euvolemic

## 2018-08-12 NOTE — PLAN OF CARE
Problem: Patient Care Overview  Goal: Plan of Care Review  Outcome: Revised  Plan of care discussed with patient. Patient is free of fall/trauma/injury. Denies CP, SOB. Pain managed with PRN pain medication.  All questions addressed. Will continue to monitor

## 2018-08-12 NOTE — CARE UPDATE
Chart check completed, abnormal VS noted, charge RNCaroline. contacted, no concerns verbalized at this time, instructed to call [phone] for further concerns or assistance.    Vitals:    08/12/18 0612   BP: (!) 79/43   Pulse: (!) 111   Resp:    Temp: 98.4 °F (36.9 °C)     0630: Came to bedside, patient sitting on side of bed. Vitals re-checked by charge RN, BP 95/52. Patient only complaining of feet swollen.

## 2018-08-12 NOTE — PROGRESS NOTES
Progress Note  Hospital Medicine    Primary Team: [facility]OSP MED C  Admit Date: 8/3/2018   Length of Stay:  LOS: 9 days   SUBJECTIVE:   Reason for Admission:  Acute on chronic congestive heart failure    HPI:  Patient is a 83F with PMH of chronic systolic HF with EF 20-25%, severe AS with SHEA 0.65cm2, mean gradients 75mmhg, peak velocity 5.3m/sec, recent admission for GI bleed (requiring 1 unit of PRBC and EGD showing non-bleeding angioectasia s/p APC), recent admission for NSTEMI (without evidence CAD on [facility]), and discharge yesterday after stay for cardiogenic shock and ADHF, presented with acute onset of SOB.  Pt reports feeling well upon discharge and in the evening, but awoke at 0100 SOB and gasping for air.  She urinated twice overnight.  Also notes LE edema has developed.  Patient admitted for evaluation of acute on chronic systolic heart failure.    ECHO 7/27/2018  CONCLUSIONS     1 - Moderate left ventricular enlargement.     2 - Severely depressed left ventricular systolic function (EF 20-25%).     3 - Left atrial enlargement.     4 - Low normal to mildly depressed right ventricular systolic function .     5 - Moderate to severe aortic stenosis.     6 - Moderate mitral regurgitation.     7 - Increased central venous pressure.     8 - If clinically indicated, a full Doppler study can be performed.    Interval history:     No acute events overnight. Patient reports that breathing is stable. She has L sided back pain steady since admission. It is point tenderness, pain to palpation. There is no pleuritic component nor is there an exertional component. It only hurts with movement and pushing.    Review of Systems:  Constitutional: no fever or chills, negative for fatigue, fevers, malaise and sweats  Respiratory: no cough or shortness of breath  Cardiovascular: no chest pain or palpitations  Gastrointestinal: no nausea or vomiting, no abdominal pain or change in bowel habits  Musculoskeletal: no arthralgias or  myalgias     OBJECTIVE:     Temp:  [98.1 °F (36.7 °C)-98.6 °F (37 °C)]   Pulse:  []   Resp:  [16-18]   BP: ()/(43-54)   SpO2:  [90 %-96 %]  Body mass index is 27.72 kg/m².  Intake/Outake:  This Shift:  No intake/output data recorded.    Net I/O past 24h:     Intake/Output Summary (Last 24 hours) at 8/12/2018 1250  Last data filed at 8/12/2018 0612  Gross per 24 hour   Intake 1500 ml   Output 900 ml   Net 600 ml             Physical Exam:  Gen- well-developed, well-nourished, fatigued  CVS- S1 and S2 present, 3/6 systolic murmur, RRR  Resp- no work of breathing, crackles at bases (improving)  Abd- BS+, soft, NT, ND  Ext- 1+ pitting LE edema, no clubbing or cyanosis    Laboratory:  CBC/Anemia Labs: Coags:    Recent Labs   Lab  08/09/18   0430  08/10/18   0501   WBC  5.97  4.76   HGB  8.6*  8.0*   HCT  25.8*  24.5*   PLT  196  190   MCV  95  95   RDW  14.5  14.4    No results for input(s): PT, INR, APTT in the last 168 hours.     Chemistries:   Recent Labs   Lab  08/10/18   0501  08/11/18   0355  08/12/18   0424   NA  135*  134*  135*   K  3.7  4.2  4.8   CL  94*  96  97   CO2  31*  29  29   BUN  29*  30*  30*   CREATININE  1.2  1.2  1.3   CALCIUM  8.8  8.9  9.0   PROT  5.8*  6.0  6.0   BILITOT  0.9  1.0  0.8   ALKPHOS  125  151*  135   ALT  19  21  20   AST  49*  53*  53*   MG  1.9  2.1  2.0        ABG:   No results for input(s): PH, PCO2, PO2, HCO3, POCSATURATED, BE in the last 168 hours.     Cardiac Enzymes: Ejection Fractions:    No results for input(s): CPK, CPKMB, MB, TROPONINI in the last 72 hours. EF   Date Value Ref Range Status   07/27/2018 20 (A) 55 - 65    07/09/2018 50 55 - 65            Medications:  Scheduled Meds:   albuterol-ipratropium  3 mL Nebulization Q6H WAKE    aspirin  81 mg Oral Daily    atorvastatin  40 mg Oral Daily    enoxaparin  40 mg Subcutaneous Daily    furosemide  40 mg Intravenous BID    insulin aspart U-100  2 Units Subcutaneous TIDWM    insulin detemir U-100  5  Units Subcutaneous QHS    lidocaine  1 patch Transdermal Q24H                             Continuous Infusions:    PRN Meds:.acetaminophen, dextrose 50%, dextrose 50%, glucagon (human recombinant), glucose, glucose, HYDROcodone-acetaminophen, insulin aspart U-100, ondansetron, polyethylene glycol, sodium chloride 0.9%     ASSESSMENT/PLAN:     Active Hospital Problems    Diagnosis  POA    *Acute on chronic congestive heart failure [I50.9]  Yes    Hypokalemia [E87.6]  No    Severe aortic stenosis [I35.0]  Yes    Type 2 diabetes mellitus, without long-term current use of insulin [E11.9]  Yes      Resolved Hospital Problems   No resolved problems to display.     Acute on Chronic Systolic Heart Failure  Acute Hypoxemic Respiratory Failure 2/2 ADHF  - Pt's lasix had been held for several days during hospitalization due to cardiogenic shock  - Still volume up now, but contraction alkalosis and KATI - need to pull back  - C/w lasix to 40 IV BID  - Consider use of   - Strict I/o, low salt diet, fluid restriction, daily weights  - When fully diuresed, will start beta-blocker and ACEI as tolerated by BP     Severe Aortic Stenosis  - during prior hospitalization required IABP for hemodynamic support in the setting of acute respiratory distress from flash pulmonary edema 2/2 severe AV stenosis  - s/p successful balloon aortic valvuloplasty with 24mm true balloon aortic mean gradient improved from 70 to 36mmHg  - currently undergoing TAVR w/u: CT chest performed, [facility] negative for obstructive CAD  - PFTs ordered for outpatient  - Pt will f/u with Interventional Cardiology as outpatient    KATI on CKDIII  - This could by hypotensive ATN in etiology, cardiorenal, overdiuresis or other  - F/u cardiology co-management  - Avoid further antihypertensives  - Avoid nephrotoxic agents  - Consider nephrology consultation    Right Hepatic Lobe Lesion  Suspected Cirrhosis  - lesion seen incidentally on CT for TAVR  - followed up with US  "abd- notes 3cm  - triple phase CT shows 2.4 x 1.6cm irregular enhancing mass in right lobe of liver; 1cm focal nonenhancing areas centrally; with anterior washout, arterial enhancement- LI-RADS 5 lesion  -   - Hepatology consulted last admission; will be discussing case in IR conference 8/7 and then f/u in Hepatology clinic 8/8 (appt needs to be rescheduled)  - anti-smooth muscle Ab + 1:80  Per transplant notes:  - "Given the patient's normal LFT, INR, MELD 9, albumin 2.5; her hepatic disease would not contraindicate her from a surgical procedure (TAVR) - pending TAVR outpatient.  - Hepatic lesion concerning for HCC can be evaluated as an outpatient regarding therapeutic options. AFP elevated (572) which supports this as likely HCC. CT 3 phase pending.  - Concern for cirrhosis on imaging likely secondary to HERNANDEZ. We will continue her workup as an outpatient with modalities such as fibroscan. Iron panel negative. , SUGAR pending, ASMA pending.  - if she is discharged before 8/8 she can follow-up in her previously scheduled appointment with Dr. Ashraf. If she is discharged later than this, please inform hepatology so we can reschedule her appointment."     DM-II  - A1C 7.0  - per daughter, pt's blood sugar is poorly controlled at home, reaching 300s  - Levemir 5 units nightly and Aspart 2 units TIDWM   - needs amb referral to Endocrinology    Hypokalemia  Hypomagnesemia  -Replete as needed     DVT ppx- okay for lovenox, recent GIB but CBC stable  CODE status- FULL     Dispo- home in 1-2 days pending clinical improvement    Clifton Borrego MD  Hospital Medicine Staff     "

## 2018-08-13 NOTE — PLAN OF CARE
Problem: Patient Care Overview  Goal: Plan of Care Review  Outcome: Ongoing (interventions implemented as appropriate)  Pt. Remains free from falls/ injury/trauma. Blood glucose monitoring maintained. Lasix IVP.  No complaints of CP or SOB. PRN pain medication given for lower back pain. Pt educated on the importance of turning every two hours while in bed to prevent breakdown. Pt verbalized understanding. Plan of Care reviewed with patient. VSS and NADN. Will continue to monitor

## 2018-08-13 NOTE — PLAN OF CARE
08/13/18 0812   Discharge Reassessment   Assessment Type Discharge Planning Reassessment   Do you have any problems affording any of your prescribed medications? No   Discharge Plan A Home with family;Home Health

## 2018-08-13 NOTE — PROGRESS NOTES
" [facility]  Adult Nutrition  Progress Note    SUMMARY       Recommendations    Recommendation/Intervention:   1. Continue current 2000 diabetic, low sodium diet. Pt with good PO intake documented.   2. RD following.    Goals: Intake >50% of meals  Nutrition Goal Status: new  Communication of RD Recs: (POC)    Reason for Assessment    Reason for Assessment: length of stay  Diagnosis: cardiac disease(CHF)  Relevant Medical History: T2DM, HTN, HLD, CHF    General Information Comments: Pt admitted with SOB, edema. In work-up for TAVR. Eating 100% of meals and recently educated on low sodium diet by RD. Mild wt loss noted over past month but likely due to fluid losses rather than true weight loss.    Nutrition Discharge Planning: Adequate PO intake on cardiac diet    Nutrition Risk Screen    Nutrition Risk Screen: no indicators present    Nutrition/Diet History    Do you have any cultural, spiritual, Tenriism conflicts, given your current situation?: none reported   Factors Affecting Nutritional Intake: None identified at this time    Anthropometrics    Temp: 98.5 °F (36.9 °C)  Height Method: Measured  Height: 5' 6" (167.6 cm)  Height (inches): 66 in  Weight Method: Standard Scale  Weight: 78.4 kg (172 lb 13.5 oz)  Weight (lb): 172.84 lb  Ideal Body Weight (IBW), Female: 130 lb  % Ideal Body Weight, Female (lb): 132.95 lb  BMI (Calculated): 28  BMI Grade: 25 - 29.9 - overweight       Lab/Procedures/Meds    Pertinent Labs Reviewed: reviewed  Pertinent Labs Comments: Na 133, BUN 32, GFR 38.6, Glu 112, Alb 2.9, AST 54  Pertinent Medications Reviewed: reviewed  Pertinent Medications Comments: statin, lasix, insulin    Physical Findings/Assessment    Overall Physical Appearance: overweight  Oral/Mouth Cavity: tooth/teeth missing  Skin: intact    Estimated/Assessed Needs    Weight Used For Calorie Calculations: 78.4 kg (172 lb 13.5 oz)  Energy Calorie Requirements (kcal): 1582  Energy Need Method: " [place](x 1.25 (PAL))  Protein Requirements: 78-94 gm (1.0-1.2 gm/kg)  Weight Used For Protein Calculations: 78.4 kg (172 lb 13.5 oz)  Fluid Requirements (mL): per MD     RDA Method (mL): 1582       Nutrition Prescription Ordered    Current Diet Order: 2000 diabetic, low sodium  Nutrition Order Comments: 1500 ml FR    Evaluation of Received Nutrient/Fluid Intake    I/O: -100ml x 24 hrs (-3.9L since admit)  Comments: LBM 8/9  % Intake of Estimated Energy Needs: 75 - 100 %  % Meal Intake: 75 - 100 %    Nutrition Risk    Level of Risk/Frequency of Follow-up: (F/u 1x weekly)     Assessment and Plan    Nutrition Problem  Decreased nutrient needs (sodium)    Related to (etiology):   Heart failure    Signs and Symptoms (as evidenced by):   EF 20%, fluid retention leading to SOB, edema     Interventions/Recommendations (treatment strategy):  See recs above.    Nutrition Diagnosis Status:   New      Monitor and Evaluation    Food and Nutrient Intake: energy intake, food and beverage intake  Food and Nutrient Adminstration: diet order  Knowledge/Beliefs/Attitudes: food and nutrition knowledge/skill  Physical Activity and Function: nutrition-related ADLs and IADLs  Anthropometric Measurements: weight, weight change, body mass index  Biochemical Data, Medical Tests and Procedures: electrolyte and renal panel, gastrointestinal profile, glucose/endocrine profile, inflammatory profile  Nutrition-Focused Physical Findings: overall appearance     Nutrition Follow-Up    RD Follow-up?: Yes

## 2018-08-13 NOTE — NURSING
Attempted to initiate Lasix gtt on pt x2. Pt out of room ambulating in [place] with family. Informed family member that pt needs to be back in room so that lasix gtt can be initiated. Will continue to monitor.

## 2018-08-13 NOTE — ASSESSMENT & PLAN NOTE
pt remains warm and still wet on exam  +bibasilar crackles in lung fields  -BNP was down-trending from 1,767 to 751  -CXR: 8/12 shows pulmonary edema  -unfortunately pt's weight is rising, recommend adding Metolazone 5 mg to current diuresis regimen of IV lasix to 40 mg BID    monitor UOP, BMP, strict I/O's and daily weights   -hold off on starting BB till pt is euvolemic

## 2018-08-13 NOTE — PLAN OF CARE
Problem: Patient Care Overview  Goal: Plan of Care Review    Recommendations     Recommendation/Intervention:   1. Continue current 2000 diabetic, low sodium diet. Pt with good PO intake documented.   2. RD following.     Goals: Intake >50% of meals  Nutrition Goal Status: new

## 2018-08-13 NOTE — PROGRESS NOTES
[facility]  Cardiology  Progress Note    Patient Name: Natalie Parnell  MRN: [id number]  Admission Date: 8/3/2018  Hospital Length of Stay: 10 days  Code Status: Full Code   Attending Physician: Clifton Borrego MD   Primary Care Physician: Octavio Ferrell MD  Expected Discharge Date: 8/15/2018  Principal Problem:Acute on chronic congestive heart failure    Subjective:     HPI:   83 y.o. female with PMH of severe AS with SHEA 0.65cm2, mean gradients 75mmhg, peak velocity 5.3m/sec, recent admission for GI bleed (requiring 1 unit of PRBC and EGD showing non-bleeding angioectasia s/p APC), recent admission for NSTEMI (non obstructive disease on LHC), and discharge yesterday after stay for cardiogenic shock and ADHF. Patient was admitted with cardiogenic shock and discharged yesterday. Over the past week patient was in the CCU required IABP. Repeat LHC was performed given drop in her EF and revealed non obstructive disease. She underwent BAV with improvement in her transvalvular mean gradients to 36mmhg. She was stepped down and discharged yesterday, she went home and was SOB and couldn't sleep all night in the AM patient was found to be tachycardic by her daughter who is a nurse and came back to the ER. In the ER patient was volume overloaded, warm and wet on exam and with pulmonary edema on CXR.     Hospital Course:   During her hospitalization she was diuresed aggressively. Her weight has reduced by 8 lbs. She is net negative 3.8 L since admission.     Interval History: Pt reports shoulder and back pain.     Review of Systems   Constitution: Negative for weakness and malaise/fatigue.   Cardiovascular: Negative for chest pain, dyspnea on exertion, irregular heartbeat, leg swelling, near-syncope, orthopnea, palpitations, paroxysmal nocturnal dyspnea and syncope.   Respiratory: Negative for cough and shortness of breath.      Objective:     Vital Signs (Most Recent):  Temp: 98.5 °F (36.9 °C)  (08/13/18 0738)  Pulse: 109 (08/13/18 1148)  Resp: 18 (08/13/18 1148)  BP: (!) 100/54 (08/13/18 1148)  SpO2: (!) 92 % (08/13/18 1148) Vital Signs (24h Range):  Temp:  [98 °F (36.7 °C)-98.6 °F (37 °C)] 98.5 °F (36.9 °C)  Pulse:  [] 109  Resp:  [16-22] 18  SpO2:  [90 %-97 %] 92 %  BP: ()/(41-57) 100/54     Weight: 78.4 kg (172 lb 13.5 oz)  Body mass index is 27.9 kg/m².     SpO2: (!) 92 %  O2 Device (Oxygen Therapy): room air      Intake/Output Summary (Last 24 hours) at 8/13/2018 1334  Last data filed at 8/13/2018 0500  Gross per 24 hour   Intake 540 ml   Output 550 ml   Net -10 ml       Lines/Drains/Airways     Peripheral Intravenous Line                 Peripheral IV - Single Lumen 08/08/18 1621 Left Forearm 4 days                Physical Exam   Constitutional: She is oriented to person, place, and time.   Eyes: Conjunctivae are normal.   Neck: Neck supple. No JVD present.   Cardiovascular: Normal rate.   Murmur heard.  Pulmonary/Chest: She has rales.   Neurological: She is alert and oriented to person, place, and time.       Significant Labs:   BMP:   Recent Labs   Lab  08/12/18   0424  08/13/18   0437   GLU  112*  112*   NA  135*  133*   K  4.8  4.3   CL  97  95   CO2  29  29   BUN  30*  32*   CREATININE  1.3  1.3   CALCIUM  9.0  9.1   MG  2.0  2.0       Significant Imaging:     CXR (8/12/18):  FINDINGS:  Coarse parenchymal markings identified with lung zones, consistent with chronic interstitial fibrosis.  Superimposed pulmonary edema not excluded.  Blunting costophrenic angles.  No focal lobar consolidation.  Cardiovascular silhouette is borderline prominent.  Osseous thorax intact.      Impression       Suspect chronic interstitial lung disease with superimposed pulmonary edema.       Assessment and Plan:     * Acute on chronic congestive heart failure    pt remains warm and still wet on exam  +bibasilar crackles in lung fields  -BNP was down-trending from 1,767 to 751  -CXR: 8/12 shows pulmonary  edema  -unfortunately pt's weight is rising, recommend giving Metolazone 5 mg x1 and stopping IV lasix bolus, please start IV lasix ggt at 10 mg/hr    monitor UOP, BMP, strict I/O's and daily weights   -hold off on starting BB till pt is euvolemic           Severe aortic stenosis    -s/p BAV, awaiting O/P TAVR             Suzan Galvin MD  Cardiology Fellow   [facility]

## 2018-08-13 NOTE — NURSING
Pt receiving 40mg IV Lasix. BUN 32, BP 88/53. Notified MD Elmo. MD states okay to give Lasix. Will continue to monitor.

## 2018-08-13 NOTE — PROGRESS NOTES
Progress Note  Hospital Medicine    Primary Team: [facility]  Admit Date: 8/3/2018   Length of Stay:  LOS: 10 days   SUBJECTIVE:   Reason for Admission:  Acute on chronic congestive heart failure    HPI:  Patient is a 83F with PMH of chronic systolic HF with EF 20-25%, severe AS with SHEA 0.65cm2, mean gradients 75mmhg, peak velocity 5.3m/sec, recent admission for GI bleed (requiring 1 unit of PRBC and EGD showing non-bleeding angioectasia s/p APC), recent admission for NSTEMI (without evidence CAD on [facility]), and discharge yesterday after stay for cardiogenic shock and ADHF, presented with acute onset of SOB.  Pt reports feeling well upon discharge and in the evening, but awoke at 0100 SOB and gasping for air.  She urinated twice overnight.  Also notes LE edema has developed.  Patient admitted for evaluation of acute on chronic systolic heart failure.    ECHO 7/27/2018  CONCLUSIONS     1 - Moderate left ventricular enlargement.     2 - Severely depressed left ventricular systolic function (EF 20-25%).     3 - Left atrial enlargement.     4 - Low normal to mildly depressed right ventricular systolic function .     5 - Moderate to severe aortic stenosis.     6 - Moderate mitral regurgitation.     7 - Increased central venous pressure.     8 - If clinically indicated, a full Doppler study can be performed.    Interval history:     No acute events overnight. Patient reports that breathing is stable. She has L sided back pain steady since admission. It is point tenderness, pain to palpation. There is no pleuritic component nor is there an exertional component. It only hurts with movement and pushing.    Review of Systems:  Constitutional: no fever or chills, negative for fatigue, fevers, malaise and sweats  Respiratory: no cough or shortness of breath  Cardiovascular: no chest pain or palpitations  Gastrointestinal: no nausea or vomiting, no abdominal pain or change in bowel habits  Musculoskeletal: no arthralgias or  myalgias     OBJECTIVE:     Temp:  [98 °F (36.7 °C)-98.6 °F (37 °C)]   Pulse:  []   Resp:  [16-18]   BP: ()/(41-57)   SpO2:  [90 %-97 %]  Body mass index is 27.9 kg/m².  Intake/Outake:  This Shift:  No intake/output data recorded.    Net I/O past 24h:     Intake/Output Summary (Last 24 hours) at 8/13/2018 0809  Last data filed at 8/13/2018 0500  Gross per 24 hour   Intake 660 ml   Output 1000 ml   Net -340 ml             Physical Exam:  Gen- well-developed, well-nourished, fatigued  CVS- S1 and S2 present, 3/6 systolic murmur, RRR  Resp- no work of breathing, crackles at bases (improving)  Abd- BS+, soft, NT, ND  Ext- 1+ pitting LE edema, no clubbing or cyanosis    Laboratory:  CBC/Anemia Labs: Coags:    Recent Labs   Lab  08/09/18   0430  08/10/18   0501   WBC  5.97  4.76   HGB  8.6*  8.0*   HCT  25.8*  24.5*   PLT  196  190   MCV  95  95   RDW  14.5  14.4    No results for input(s): PT, INR, APTT in the last 168 hours.     Chemistries:   Recent Labs   Lab  08/11/18   0355  08/12/18   0424  08/13/18   0437   NA  134*  135*  133*   K  4.2  4.8  4.3   CL  96  97  95   CO2  29  29  29   BUN  30*  30*  32*   CREATININE  1.2  1.3  1.3   CALCIUM  8.9  9.0  9.1   PROT  6.0  6.0  6.3   BILITOT  1.0  0.8  0.8   ALKPHOS  151*  135  133   ALT  21  20  21   AST  53*  53*  54*   MG  2.1  2.0  2.0        ABG:   No results for input(s): PH, PCO2, PO2, HCO3, POCSATURATED, BE in the last 168 hours.     Cardiac Enzymes: Ejection Fractions:    No results for input(s): CPK, CPKMB, MB, TROPONINI in the last 72 hours. EF   Date Value Ref Range Status   07/27/2018 20 (A) 55 - 65    07/09/2018 50 55 - 65            Medications:  Scheduled Meds:   albuterol-ipratropium  3 mL Nebulization Q6H WAKE    aspirin  81 mg Oral Daily    atorvastatin  40 mg Oral Daily    enoxaparin  40 mg Subcutaneous Daily    furosemide  40 mg Intravenous BID    insulin aspart U-100  2 Units Subcutaneous TIDWM    insulin detemir U-100  5 Units  Subcutaneous QHS    lidocaine  1 patch Transdermal Q24H                             Continuous Infusions:    PRN Meds:.acetaminophen, cyclobenzaprine, dextrose 50%, dextrose 50%, glucagon (human recombinant), glucose, glucose, HYDROcodone-acetaminophen, insulin aspart U-100, ondansetron, polyethylene glycol, sodium chloride 0.9%     ASSESSMENT/PLAN:     Active Hospital Problems    Diagnosis  POA    *Acute on chronic congestive heart failure [I50.9]  Yes    Hypokalemia [E87.6]  No    Severe aortic stenosis [I35.0]  Yes    Type 2 diabetes mellitus, without long-term current use of insulin [E11.9]  Yes      Resolved Hospital Problems   No resolved problems to display.     Acute on Chronic Systolic Heart Failure  Acute Hypoxemic Respiratory Failure 2/2 ADHF  - Pt's lasix had been held for several days during hospitalization due to cardiogenic shock  - Still volume up now, but contraction alkalosis and KATI - need to pull back  - C/w lasix to 40 IV BID   - Add metolazone 5 mg daily  - Consider use of   - Strict I/o, low salt diet, fluid restriction, daily weights  - When fully diuresed, will start beta-blocker and ACEI as tolerated by BP     Severe Aortic Stenosis  - during prior hospitalization required IABP for hemodynamic support in the setting of acute respiratory distress from flash pulmonary edema 2/2 severe AV stenosis  - s/p successful balloon aortic valvuloplasty with 24mm true balloon aortic mean gradient improved from 70 to 36mmHg  - currently undergoing TAVR w/u: CT chest performed, [facility] negative for obstructive CAD  - PFTs ordered for outpatient  - Pt will f/u with Interventional Cardiology as outpatient    KATI on CKDIII  - This could by hypotensive ATN in etiology, cardiorenal, overdiuresis or other  - F/u cardiology co-management  - Avoid further antihypertensives  - Avoid nephrotoxic agents  - Consider nephrology consultation    Right Hepatic Lobe Lesion  Suspected Cirrhosis  - lesion seen  "incidentally on CT for TAVR  - followed up with US abd- notes 3cm  - triple phase CT shows 2.4 x 1.6cm irregular enhancing mass in right lobe of liver; 1cm focal nonenhancing areas centrally; with anterior washout, arterial enhancement- LI-RADS 5 lesion  -   - Hepatology consulted last admission; will be discussing case in IR conference 8/7 and then f/u in Hepatology clinic 8/8 (appt needs to be rescheduled)  - anti-smooth muscle Ab + 1:80  Per transplant notes:  - "Given the patient's normal LFT, INR, MELD 9, albumin 2.5; her hepatic disease would not contraindicate her from a surgical procedure (TAVR) - pending TAVR outpatient.  - Hepatic lesion concerning for HCC can be evaluated as an outpatient regarding therapeutic options. AFP elevated (572) which supports this as likely HCC. CT 3 phase pending.  - Concern for cirrhosis on imaging likely secondary to HERNANDEZ. We will continue her workup as an outpatient with modalities such as fibroscan. Iron panel negative. , SUGAR pending, ASMA pending.  - if she is discharged before 8/8 she can follow-up in her previously scheduled appointment with Dr. Ashraf. If she is discharged later than this, please inform hepatology so we can reschedule her appointment."     DM-II  - A1C 7.0  - per daughter, pt's blood sugar is poorly controlled at home, reaching 300s  - Levemir 5 units nightly and Aspart 2 units TIDWM   - needs amb referral to Endocrinology    Hypokalemia  Hypomagnesemia  -Replete as needed     DVT ppx- okay for lovenox, recent GIB but CBC stable  CODE status- FULL     Dispo- home in 1-2 days pending clinical improvement    Clifton Borrego MD  Hospital Medicine Staff     "

## 2018-08-13 NOTE — SUBJECTIVE & OBJECTIVE
Interval History: Pt reports shoulder and back pain.     Review of Systems   Constitution: Negative for weakness and malaise/fatigue.   Cardiovascular: Negative for chest pain, dyspnea on exertion, irregular heartbeat, leg swelling, near-syncope, orthopnea, palpitations, paroxysmal nocturnal dyspnea and syncope.   Respiratory: Negative for cough and shortness of breath.      Objective:     Vital Signs (Most Recent):  Temp: 98.5 °F (36.9 °C) (08/13/18 0738)  Pulse: 109 (08/13/18 1148)  Resp: 18 (08/13/18 1148)  BP: (!) 100/54 (08/13/18 1148)  SpO2: (!) 92 % (08/13/18 1148) Vital Signs (24h Range):  Temp:  [98 °F (36.7 °C)-98.6 °F (37 °C)] 98.5 °F (36.9 °C)  Pulse:  [] 109  Resp:  [16-22] 18  SpO2:  [90 %-97 %] 92 %  BP: ()/(41-57) 100/54     Weight: 78.4 kg (172 lb 13.5 oz)  Body mass index is 27.9 kg/m².     SpO2: (!) 92 %  O2 Device (Oxygen Therapy): room air      Intake/Output Summary (Last 24 hours) at 8/13/2018 1334  Last data filed at 8/13/2018 0500  Gross per 24 hour   Intake 540 ml   Output 550 ml   Net -10 ml       Lines/Drains/Airways     Peripheral Intravenous Line                 Peripheral IV - Single Lumen 08/08/18 1621 Left Forearm 4 days                Physical Exam   Constitutional: She is oriented to person, place, and time.   Eyes: Conjunctivae are normal.   Neck: Neck supple. No JVD present.   Cardiovascular: Normal rate.   Murmur heard.  Pulmonary/Chest: She has rales.   Neurological: She is alert and oriented to person, place, and time.       Significant Labs:   BMP:   Recent Labs   Lab  08/12/18   0424  08/13/18   0437   GLU  112*  112*   NA  135*  133*   K  4.8  4.3   CL  97  95   CO2  29  29   BUN  30*  32*   CREATININE  1.3  1.3   CALCIUM  9.0  9.1   MG  2.0  2.0       Significant Imaging:     CXR (8/12/18):  FINDINGS:  Coarse parenchymal markings identified with lung zones, consistent with chronic interstitial fibrosis.  Superimposed pulmonary edema not excluded.  Blunting  costophrenic angles.  No focal lobar consolidation.  Cardiovascular silhouette is borderline prominent.  Osseous thorax intact.      Impression       Suspect chronic interstitial lung disease with superimposed pulmonary edema.

## 2018-08-14 NOTE — PROGRESS NOTES
[facility]  Cardiology  Progress Note    Patient Name: Natalie Parnell  MRN: [id number]  Admission Date: 8/3/2018  Hospital Length of Stay: 11 days  Code Status: Full Code   Attending Physician: Evelyn Garcias MD   Primary Care Physician: Octavio Ferrell MD  Expected Discharge Date: 8/15/2018  Principal Problem:Acute on chronic congestive heart failure    Subjective:     Hospital Course:   During her hospitalization she was diuresed aggressively. Her weight has reduced with lasix ggt however started to creep up again when transitioned to IV lasix pushes. She was then given metolazone x1 and started back on a lasix ggt.     Interval History: In the last 24 hours, pt had UOP of 2.2 L and is net negative 1.4 L. Weight is down from 172 lbs to 170 lbs. This AM, pt denies SOB/CP, reports having back and shoulder pain.     Review of Systems   Constitution: Negative for weakness and malaise/fatigue.   Cardiovascular: Negative for chest pain, dyspnea on exertion, irregular heartbeat, leg swelling, near-syncope, orthopnea, palpitations, paroxysmal nocturnal dyspnea and syncope.   Respiratory: Negative for cough and shortness of breath.    Musculoskeletal: Positive for back pain and myalgias.     Objective:     Vital Signs (Most Recent):  Temp: 98.1 °F (36.7 °C) (08/14/18 0731)  Pulse: 96 (08/14/18 0731)  Resp: 18 (08/14/18 0731)  BP: (!) 81/50 (08/14/18 0731)  SpO2: 96 % (08/14/18 0731) Vital Signs (24h Range):  Temp:  [98.1 °F (36.7 °C)-98.8 °F (37.1 °C)] 98.1 °F (36.7 °C)  Pulse:  [] 96  Resp:  [16-22] 18  SpO2:  [91 %-98 %] 96 %  BP: ()/(46-55) 81/50     Weight: 77.5 kg (170 lb 13.7 oz)  Body mass index is 27.58 kg/m².     SpO2: 96 %  O2 Device (Oxygen Therapy): room air      Intake/Output Summary (Last 24 hours) at 8/14/2018 0746  Last data filed at 8/14/2018 0600  Gross per 24 hour   Intake 850 ml   Output 2275 ml   Net -1425 ml       Lines/Drains/Airways     Peripheral Intravenous Line                  Peripheral IV - Single Lumen 08/08/18 1621 Left Forearm 5 days                Physical Exam   Constitutional: She is oriented to person, place, and time.   Eyes: Conjunctivae are normal.   Neck: Neck supple. No JVD present.   Cardiovascular: Normal rate.   Murmur heard.  Pulmonary/Chest: She has rales.   Neurological: She is alert and oriented to person, place, and time.       Significant Labs:   BMP:   Recent Labs   Lab  08/13/18   0437  08/14/18   0500   GLU  112*  162*   NA  133*  133*   K  4.3  3.8   CL  95  94*   CO2  29  29   BUN  32*  37*   CREATININE  1.3  1.3   CALCIUM  9.1  9.1   MG  2.0  1.7       Significant Imaging:     CXR (8/12/18):  FINDINGS:  Coarse parenchymal markings identified with lung zones, consistent with chronic interstitial fibrosis.  Superimposed pulmonary edema not excluded.  Blunting costophrenic angles.  No focal lobar consolidation.  Cardiovascular silhouette is borderline prominent.  Osseous thorax intact.      Impression       Suspect chronic interstitial lung disease with superimposed pulmonary edema.       Assessment and Plan:     Brief HPI: 84 yo F with hx of HFrEF (20%), severe AS, GIB due to angioectasia s/p APC along with recent hospitalization for cardiogenic shock requiring IABP/dobutamine ggt who presented with acute dyspnea.     * Acute on chronic congestive heart failure    -BNP isaiah from 751 to 1,211  -CXR (8/12): shows interstitial edema  -per Interventional Cards rec's, start dobutamine 2.5 mg/hr and increase lasix ggt to 15 mg/hr to augment diuresis  -c/w to monitor UOP and daily weights  -hold off on starting BB till pt is euvolemic         Severe aortic stenosis    -s/p BAV, awaiting O/P TAVR           Suzan Galvin MD  Cardiology  Fellow   [facility]

## 2018-08-14 NOTE — SUBJECTIVE & OBJECTIVE
Interval History: In the last 24 hours, pt had UOP of 2.2 L and is net negative 1.4 L. Weight is down from 172 lbs to 170 lbs. This AM, pt denies SOB/CP, reports having back and shoulder pain.     Review of Systems   Constitution: Negative for weakness and malaise/fatigue.   Cardiovascular: Negative for chest pain, dyspnea on exertion, irregular heartbeat, leg swelling, near-syncope, orthopnea, palpitations, paroxysmal nocturnal dyspnea and syncope.   Respiratory: Negative for cough and shortness of breath.    Musculoskeletal: Positive for back pain and myalgias.     Objective:     Vital Signs (Most Recent):  Temp: 98.1 °F (36.7 °C) (08/14/18 0731)  Pulse: 96 (08/14/18 0731)  Resp: 18 (08/14/18 0731)  BP: (!) 81/50 (08/14/18 0731)  SpO2: 96 % (08/14/18 0731) Vital Signs (24h Range):  Temp:  [98.1 °F (36.7 °C)-98.8 °F (37.1 °C)] 98.1 °F (36.7 °C)  Pulse:  [] 96  Resp:  [16-22] 18  SpO2:  [91 %-98 %] 96 %  BP: ()/(46-55) 81/50     Weight: 77.5 kg (170 lb 13.7 oz)  Body mass index is 27.58 kg/m².     SpO2: 96 %  O2 Device (Oxygen Therapy): room air      Intake/Output Summary (Last 24 hours) at 8/14/2018 0735  Last data filed at 8/14/2018 0600  Gross per 24 hour   Intake 850 ml   Output 2275 ml   Net -1425 ml       Lines/Drains/Airways     Peripheral Intravenous Line                 Peripheral IV - Single Lumen 08/08/18 1621 Left Forearm 5 days                Physical Exam   Constitutional: She is oriented to person, place, and time.   Eyes: Conjunctivae are normal.   Neck: Neck supple. No JVD present.   Cardiovascular: Normal rate.   Murmur heard.  Pulmonary/Chest: She has rales.   Neurological: She is alert and oriented to person, place, and time.       Significant Labs:   BMP:   Recent Labs   Lab  08/13/18   0437  08/14/18   0500   GLU  112*  162*   NA  133*  133*   K  4.3  3.8   CL  95  94*   CO2  29  29   BUN  32*  37*   CREATININE  1.3  1.3   CALCIUM  9.1  9.1   MG  2.0  1.7       Significant Imaging:      CXR (8/12/18):  FINDINGS:  Coarse parenchymal markings identified with lung zones, consistent with chronic interstitial fibrosis.  Superimposed pulmonary edema not excluded.  Blunting costophrenic angles.  No focal lobar consolidation.  Cardiovascular silhouette is borderline prominent.  Osseous thorax intact.      Impression       Suspect chronic interstitial lung disease with superimposed pulmonary edema.

## 2018-08-14 NOTE — PLAN OF CARE
Problem: Patient Care Overview  Goal: Plan of Care Review  Plan of care discussed with patient. Lasix gtt maintained. K and Mag replaced. Breathing tx maintained q6h by respiratory. Pt up in chair and ambulated in room today with nurse. Fall precautions in place. Patient has no complaints of pain. Patient free of falls and injuries. Patient has no questions at this time. Patient is in NAD. White board updated. Bed locked in lowest position. Side rails up x2. Will continue to monitor.

## 2018-08-14 NOTE — PLAN OF CARE
Problem: Patient Care Overview  Goal: Plan of Care Review  Outcome: Ongoing (interventions implemented as appropriate)  Plan of care discussed with patient, no new questions at this time. VSS, denies SOB, no complaint of pain. Lasix gtt @ 10mg/hr continued. I's and O's being measured. Safety precautions taken, no falls/trauma during the shift. Will continue to monitor.

## 2018-08-14 NOTE — PROGRESS NOTES
Progress Note  Hospital Medicine    Primary Team: [facility]  Admit Date: 8/3/2018   Length of Stay:  LOS: 11 days   SUBJECTIVE:   Reason for Admission:  Acute on chronic congestive heart failure    HPI:  Patient is a 83F with PMH of chronic systolic HF with EF 20-25%, severe AS with SHEA 0.65cm2, mean gradients 75mmhg, peak velocity 5.3m/sec, recent admission for GI bleed (requiring 1 unit of PRBC and EGD showing non-bleeding angioectasia s/p APC), recent admission for NSTEMI (without evidence CAD on [facility]), and discharge yesterday after stay for cardiogenic shock and ADHF, presented with acute onset of SOB.  Pt reports feeling well upon discharge and in the evening, but awoke at 0100 SOB and gasping for air.  She urinated twice overnight.  Also notes LE edema has developed.  Patient admitted for evaluation of acute on chronic systolic heart failure.    ECHO 7/27/2018  CONCLUSIONS     1 - Moderate left ventricular enlargement.     2 - Severely depressed left ventricular systolic function (EF 20-25%).     3 - Left atrial enlargement.     4 - Low normal to mildly depressed right ventricular systolic function .     5 - Moderate to severe aortic stenosis.     6 - Moderate mitral regurgitation.     7 - Increased central venous pressure.     8 - If clinically indicated, a full Doppler study can be performed.    Interval history:     No acute events overnight. Patient reports that breathing is actually much improved. She has L sided back pain steady since admission. It is point tenderness, pain to palpation. There is no pleuritic component nor is there an exertional component. It only hurts with movement and pushing.  The pain is improving with her current regimen. Yesterday, the patient was started back on lasix infusion. She made 2.3 L of urine and her weight is now moving in the right direction back to 170 lb from 172.    Review of Systems:  Constitutional: no fever or chills, negative for fatigue, fevers, malaise and  sweats  Respiratory: no cough or shortness of breath  Cardiovascular: no chest pain or palpitations  Gastrointestinal: no nausea or vomiting, no abdominal pain or change in bowel habits  Musculoskeletal: no arthralgias or myalgias     OBJECTIVE:     Temp:  [98.1 °F (36.7 °C)-99.2 °F (37.3 °C)]   Pulse:  []   Resp:  [16-20]   BP: ()/(46-55)   SpO2:  [91 %-100 %]  Body mass index is 27.58 kg/m².  Intake/Outake:  This Shift:  No intake/output data recorded.    Net I/O past 24h:     Intake/Output Summary (Last 24 hours) at 8/14/2018 1126  Last data filed at 8/14/2018 0600  Gross per 24 hour   Intake 510 ml   Output 2075 ml   Net -1565 ml             Physical Exam:  Gen- well-developed, well-nourished, fatigued  CVS- S1 and S2 present, 3/6 systolic murmur, RRR  Resp- no work of breathing, crackles at bases (improving)  Abd- BS+, soft, NT, ND  Ext- 1+ pitting LE edema, no clubbing or cyanosis    Laboratory:  CBC/Anemia Labs: Coags:    Recent Labs   Lab  08/09/18   0430  08/10/18   0501   WBC  5.97  4.76   HGB  8.6*  8.0*   HCT  25.8*  24.5*   PLT  196  190   MCV  95  95   RDW  14.5  14.4    No results for input(s): PT, INR, APTT in the last 168 hours.     Chemistries:   Recent Labs   Lab  08/12/18   0424  08/13/18   0437  08/14/18   0500   NA  135*  133*  133*   K  4.8  4.3  3.8   CL  97  95  94*   CO2  29  29  29   BUN  30*  32*  37*   CREATININE  1.3  1.3  1.3   CALCIUM  9.0  9.1  9.1   PROT  6.0  6.3  5.9*   BILITOT  0.8  0.8  0.8   ALKPHOS  135  133  124   ALT  20  21  20   AST  53*  54*  51*   MG  2.0  2.0  1.7        ABG:   No results for input(s): PH, PCO2, PO2, HCO3, POCSATURATED, BE in the last 168 hours.     Cardiac Enzymes: Ejection Fractions:    No results for input(s): CPK, CPKMB, MB, TROPONINI in the last 72 hours. EF   Date Value Ref Range Status   07/27/2018 20 (A) 55 - 65    07/09/2018 50 55 - 65            Medications:  Scheduled Meds:   albuterol-ipratropium  3 mL Nebulization Q6H WAKE     aspirin  81 mg Oral Daily    atorvastatin  40 mg Oral Daily    enoxaparin  40 mg Subcutaneous Daily    insulin aspart U-100  2 Units Subcutaneous TIDWM    insulin detemir U-100  5 Units Subcutaneous QHS                             Continuous Infusions:   furosemide (LASIX) 2 mg/mL infusion (non-titrating) 10 mg/hr (08/13/18 1644)     PRN Meds:.acetaminophen, cyclobenzaprine, dextrose 50%, dextrose 50%, glucagon (human recombinant), glucose, glucose, HYDROcodone-acetaminophen, insulin aspart U-100, ondansetron, polyethylene glycol, sodium chloride 0.9%     ASSESSMENT/PLAN:     Active Hospital Problems    Diagnosis  POA    *Acute on chronic congestive heart failure [I50.9]  Yes    Hypokalemia [E87.6]  No    Severe aortic stenosis [I35.0]  Yes    Type 2 diabetes mellitus, without long-term current use of insulin [E11.9]  Yes      Resolved Hospital Problems   No resolved problems to display.     Acute on Chronic Systolic Heart Failure  Acute Hypoxemic Respiratory Failure 2/2 ADHF  - C/w lasix infusion  - C/w metolazone daily  - Consider use of   - Strict I/o, low salt diet, fluid restriction, daily weights  - When fully diuresed, will start beta-blocker and ACEI as tolerated by BP     Severe Aortic Stenosis  - during prior hospitalization required IABP for hemodynamic support in the setting of acute respiratory distress from flash pulmonary edema 2/2 severe AV stenosis  - s/p successful balloon aortic valvuloplasty with 24mm true balloon aortic mean gradient improved from 70 to 36mmHg  - currently undergoing TAVR w/u: CT chest performed, [facility] negative for obstructive CAD  - PFTs ordered for outpatient  - Pt will f/u with Interventional Cardiology as outpatient    KATI on CKDIII  - This could by hypotensive ATN in etiology, cardiorenal, overdiuresis or other  - F/u cardiology co-management  - Avoid further antihypertensives  - Avoid nephrotoxic agents    Right Hepatic Lobe Lesion  Suspected Cirrhosis  - lesion  "seen incidentally on CT for TAVR  - followed up with US abd- notes 3cm  - triple phase CT shows 2.4 x 1.6cm irregular enhancing mass in right lobe of liver; 1cm focal nonenhancing areas centrally; with anterior washout, arterial enhancement- LI-RADS 5 lesion  -   - Hepatology consulted last admission; will be discussing case in IR conference 8/7 and then f/u in Hepatology clinic 8/8 (appt needs to be rescheduled)  - anti-smooth muscle Ab + 1:80  Per transplant notes:  - "Given the patient's normal LFT, INR, MELD 9, albumin 2.5; her hepatic disease would not contraindicate her from a surgical procedure (TAVR) - pending TAVR outpatient.  - Hepatic lesion concerning for HCC can be evaluated as an outpatient regarding therapeutic options. AFP elevated (572) which supports this as likely HCC. CT 3 phase pending.  - Concern for cirrhosis on imaging likely secondary to HERNANDEZ. We will continue her workup as an outpatient with modalities such as fibroscan. Iron panel negative. , SUGAR pending, ASMA pending.  - if she is discharged before 8/8 she can follow-up in her previously scheduled appointment with Dr. Ashraf. If she is discharged later than this, please inform hepatology so we can reschedule her appointment."     DM-II  - A1C 7.0  - per daughter, pt's blood sugar is poorly controlled at home, reaching 300s  - Levemir 5 units nightly and Aspart 2 units TIDWM   - needs amb referral to Endocrinology    Hypokalemia  Hypomagnesemia  -Replete as needed     DVT ppx- okay for lovenox, recent GIB but CBC stable  CODE status- FULL     Dispo- home in 1-2 days pending clinical improvement    Clifton Borrego MD  Hospital Medicine Staff     "

## 2018-08-14 NOTE — SIGNIFICANT EVENT
Notified by RN patient report darkened stool over 24 hours. The patient reports no further symptoms. Advised RN to  patient to notify if further episodes so may examine stool. 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 I have stopped lovenox and ordered stat CBC, T&S, and INR. Will need to monitor this issue closely.      Furthermore, given volume status worsening have started dobutamine and increased lasix per interventional cardiology recommendations.

## 2018-08-14 NOTE — ASSESSMENT & PLAN NOTE
-BNP isaiah from 751 to 1,211  -CXR (8/12): shows interstitial edema  -c/w lasix ggt at 10 mg/hr, monitor UOP and daily weights  -hold off on starting BB till pt is euvolemic

## 2018-08-15 NOTE — PROGRESS NOTES
Progress Note  Hospital Medicine    Primary Team: [facility]  Admit Date: 8/3/2018   Length of Stay:  LOS: 12 days    SUBJECTIVE:   Reason for Admission:  Acute on chronic congestive heart failure    HPI:  Patient is a 83F with PMH of chronic systolic HF with EF 20-25%, severe AS with SHEA 0.65cm2, mean gradients 75mmhg, peak velocity 5.3m/sec, recent admission for GI bleed (requiring 1 unit of PRBC and EGD showing non-bleeding angioectasia s/p APC), recent admission for NSTEMI (without evidence CAD on [facility]), and discharge yesterday after stay for cardiogenic shock and ADHF, presented with acute onset of SOB.  Pt reports feeling well upon discharge and in the evening, but awoke at 0100 SOB and gasping for air.  She urinated twice overnight.  Also notes LE edema has developed.  Patient admitted for evaluation of acute on chronic systolic heart failure.    ECHO 7/27/2018  CONCLUSIONS     1 - Moderate left ventricular enlargement.     2 - Severely depressed left ventricular systolic function (EF 20-25%).     3 - Left atrial enlargement.     4 - Low normal to mildly depressed right ventricular systolic function .     5 - Moderate to severe aortic stenosis.     6 - Moderate mitral regurgitation.     7 - Increased central venous pressure.     8 - If clinically indicated, a full Doppler study can be performed.    Interval history:     Started on dobutamine gtt yesterday. Patient reports worse lower extremity swelling today. Down to 165 lb today. Denies shortness of breath but reports fatigue. Denies chest pain. Had another black tarry stool today.     Review of Systems:  Constitutional: + fatigue, no fever or chills, negative for fevers, malaise and sweats  Respiratory: no cough or shortness of breath  Cardiovascular: no chest pain or palpitations  Gastrointestinal: no nausea or vomiting, no abdominal pain or change in bowel habits  Musculoskeletal: no arthralgias or myalgias     OBJECTIVE:     Temp:  [97.9 °F (36.6  °C)-98.5 °F (36.9 °C)]   Pulse:  []   Resp:  [18-20]   BP: ()/(44-62)   SpO2:  [93 %-99 %]  Body mass index is 26.76 kg/m².  Intake/Outake:  This Shift:  I/O this shift:  In: 716 [P.O.:716]  Out: 1050 [Urine:1050]    Net I/O past 24h:     Intake/Output Summary (Last 24 hours) at 8/15/2018 1408  Last data filed at 8/15/2018 1352  Gross per 24 hour   Intake 956 ml   Output 2350 ml   Net -1394 ml             Physical Exam:  Gen- well-developed, well-nourished, fatigued  CVS- S1 and S2 present, 3/6 systolic murmur, RRR  Resp- no work of breathing, crackles at bases (improving)  Abd- BS+, soft, NT, ND  Ext- 1+ pitting LE edema, no clubbing or cyanosis    Laboratory:  CBC/Anemia Labs: Coags:    Recent Labs   Lab  08/10/18   0501  08/14/18   1647  08/15/18   0411   WBC  4.76  5.32  4.62   HGB  8.0*  8.1*  7.7*   HCT  24.5*  25.1*  23.1*   PLT  190  238  202   MCV  95  94  94   RDW  14.4  14.6*  14.6*    Recent Labs   Lab  08/14/18   1647   INR  1.2        Chemistries:   Recent Labs   Lab  08/13/18   0437  08/14/18   0500  08/15/18   0411   NA  133*  133*  134*   K  4.3  3.8  3.2*   CL  95  94*  91*   CO2  29  29  31*   BUN  32*  37*  44*   CREATININE  1.3  1.3  1.3   CALCIUM  9.1  9.1  9.0   PROT  6.3  5.9*  5.8*   BILITOT  0.8  0.8  0.9   ALKPHOS  133  124  107   ALT  21  20  19   AST  54*  51*  48*   MG  2.0  1.7  1.8        ABG:   No results for input(s): PH, PCO2, PO2, HCO3, POCSATURATED, BE in the last 168 hours.     Cardiac Enzymes: Ejection Fractions:    No results for input(s): CPK, CPKMB, MB, TROPONINI in the last 72 hours. EF   Date Value Ref Range Status   07/27/2018 20 (A) 55 - 65    07/09/2018 50 55 - 65            Medications:  Scheduled Meds:   albuterol-ipratropium  3 mL Nebulization Q6H WAKE    atorvastatin  40 mg Oral Daily    insulin aspart U-100  2 Units Subcutaneous TIDWM    insulin detemir U-100  5 Units Subcutaneous QHS    magnesium oxide  400 mg Oral BID    potassium chloride  20  mEq Oral Q2H                             Continuous Infusions:   DOBUTamine 2.5 mcg/kg/min (08/14/18 1721)    furosemide (LASIX) 2 mg/mL infusion (non-titrating) 20 mg/hr (08/15/18 0844)     PRN Meds:.acetaminophen, cyclobenzaprine, dextrose 50%, dextrose 50%, glucagon (human recombinant), glucose, glucose, HYDROcodone-acetaminophen, insulin aspart U-100, ondansetron, polyethylene glycol, sodium chloride 0.9%     ASSESSMENT/PLAN:     Active Hospital Problems    Diagnosis  POA    *Acute on chronic congestive heart failure [I50.9]  Yes    Hypokalemia [E87.6]  No    Severe aortic stenosis [I35.0]  Yes    Type 2 diabetes mellitus, without long-term current use of insulin [E11.9]  Yes      Resolved Hospital Problems   No resolved problems to display.     Acute on Chronic Systolic Heart Failure  Acute Hypoxemic Respiratory Failure 2/2 ADHF  - C/w lasix infusion, increased to 20 mg/hr + IV Lasix 60 mg x 1 per Cardiology recs  - Cont  gtt  - Strict I/o, low salt diet, fluid restriction, daily weights  - When fully diuresed, will start beta-blocker and ACEI as tolerated by BP    Melena  - Had 2 dark BM's yesterday. Had a black tarry stool today.  - History of G1EV and gastric AVM from EGD on 7/10. Also consider Heyde's syndrome in setting of severe AS  - Hb 7.7 --> 7.9 today. FOBT pending  - Holding ASA and Lovenox  - Start IV Protonix BID  - Will place NPO MN pending GI consult  - Monitor H/H    Severe Aortic Stenosis  - During prior hospitalization required IABP for hemodynamic support in the setting of acute respiratory distress from flash pulmonary edema 2/2 severe AV stenosis  - s/p successful balloon aortic valvuloplasty with 24mm true balloon aortic mean gradient improved from 70 to 36mmHg  - currently undergoing TAVR w/u: CT chest performed, [facility] negative for obstructive CAD  - PFTs ordered for outpatient  - Pt will f/u with Interventional Cardiology as outpatient    KATI on CKDIII  - Renal function stable  -  "This could by hypotensive ATN in etiology, cardiorenal, overdiuresis or other  - F/u cardiology co-management  - Avoid further antihypertensives  - Avoid nephrotoxic agents    Right Hepatic Lobe Lesion  Suspected Cirrhosis  - lesion seen incidentally on CT for TAVR  - followed up with US abd- notes 3cm  - triple phase CT shows 2.4 x 1.6cm irregular enhancing mass in right lobe of liver; 1cm focal nonenhancing areas centrally; with anterior washout, arterial enhancement- LI-RADS 5 lesion  -   - Hepatology consulted last admission; will be discussing case in IR conference 8/7 and then f/u in Hepatology clinic 8/8 (appt needs to be rescheduled)  - anti-smooth muscle Ab + 1:80  Per transplant notes:  - "Given the patient's normal LFT, INR, MELD 9, albumin 2.5; her hepatic disease would not contraindicate her from a surgical procedure (TAVR) - pending TAVR outpatient.  - Hepatic lesion concerning for HCC can be evaluated as an outpatient regarding therapeutic options. AFP elevated (572) which supports this as likely HCC. CT 3 phase pending.  - Concern for cirrhosis on imaging likely secondary to HERNANDEZ. We will continue her workup as an outpatient with modalities such as fibroscan. Iron panel negative. , SUGAR pending, ASMA pending.  - if she is discharged before 8/8 she can follow-up in her previously scheduled appointment with Dr. Ashraf. If she is discharged later than this, please inform hepatology so we can reschedule her appointment."  - has hepatology appt scheduled     DM-II  - A1C 7.0  - per daughter, pt's blood sugar is poorly controlled at home, reaching 300s  - Levemir 5 units nightly and Aspart 2 units TIDWM   - Consider amb referral to Endocrinology    Hypokalemia  Hypomagnesemia  -Replete as needed     DVT ppx - SCD/CORWIN  CODE status- FULL     Dispo- home in 1-2 days pending clinical improvement    Evelyn Garcias MD  Hospital Medicine Staff     "

## 2018-08-15 NOTE — PROGRESS NOTES
08/15/18 0426   Vital Signs   O2 Device (Oxygen Therapy) room air   BP (!) 85/50   MAP (mmHg) 64   BP Location Right arm   BP Method Automatic   Patient Position Lying     Notified MD of BP. Patient asymptomatic. No c/o of SOB or pain. MD Hemal made aware, no orders at this time      0540 /55 when lying bed flat

## 2018-08-15 NOTE — NURSING
Pt to receive Lasix IV push. K 3.2. Paged MD x2 for replacement with push. Will continue to monitor.

## 2018-08-15 NOTE — PLAN OF CARE
Problem: Patient Care Overview  Goal: Plan of Care Review  Outcome: Ongoing (interventions implemented as appropriate)  Plan of care discussed with pt. Pt AAOx3.  2.5mcg infusing. Frequent VS completed. SBP . Lasix gtt infusing. BNP 1211. Diuresing well. Educated on strict IOs. Flexeril PRN given for c/o of back pain. Duonebs Q6H while awake. O2 >94% on RA with no c/o of SOB. Plan for outpt TAVR. , covered per [employer]SI. No BM tonight-reminded pt to call if have Dark stool.Pt free of injuries this shift. Pt up w/ Standby assist.  Pt educated to use nearby call light to call for assistance w/ ambulation. All questions addressed.  Will continue to monitor.

## 2018-08-15 NOTE — PLAN OF CARE
Plan of care discussed with patient.  and Lasix gtts maintained. Lasix increased to 20mg/10ml. Occult blood sample collected and sent. Fall precautions in place. Pt to be NPO tonight for EGD tomorrow. Patient complains of R sided back pain relieved by flexeril PO. . Patient free of falls and injuries. Patient has no questions at this time. Patient is in NAD. White board updated. Bed locked in lowest position. Side rails up x2. Will continue to monitor.

## 2018-08-15 NOTE — SUBJECTIVE & OBJECTIVE
Interval History: Overnight pt was started on dobutamine to augment diuresis. This AM, pt denies acute complaints.     Review of Systems   Constitution: Negative for chills and fever.   Cardiovascular: Negative for chest pain, dyspnea on exertion, irregular heartbeat, leg swelling, near-syncope, orthopnea, palpitations, paroxysmal nocturnal dyspnea and syncope.   Respiratory: Negative for shortness of breath.      Objective:     Vital Signs (Most Recent):  Temp: 98.2 °F (36.8 °C) (08/15/18 0723)  Pulse: 90 (08/15/18 0731)  Resp: 18 (08/15/18 0731)  BP: (!) 90/51 (08/15/18 0723)  SpO2: (!) 94 % (08/15/18 0731) Vital Signs (24h Range):  Temp:  [97.9 °F (36.6 °C)-99.2 °F (37.3 °C)] 98.2 °F (36.8 °C)  Pulse:  [] 90  Resp:  [18-20] 18  SpO2:  [93 %-100 %] 94 %  BP: ()/(44-62) 90/51     Weight: 75.2 kg (165 lb 12.6 oz)  Body mass index is 26.76 kg/m².     SpO2: (!) 94 %  O2 Device (Oxygen Therapy): room air      Intake/Output Summary (Last 24 hours) at 8/15/2018 0846  Last data filed at 8/15/2018 0600  Gross per 24 hour   Intake 720 ml   Output 2700 ml   Net -1980 ml       Lines/Drains/Airways     Peripheral Intravenous Line                 Peripheral IV - Single Lumen 08/08/18 1621 Left Forearm 6 days         Peripheral IV - Single Lumen 08/14/18 1800 Right Forearm less than 1 day                Physical Exam   Constitutional: She is oriented to person, place, and time.   Eyes: Pupils are equal, round, and reactive to light.   Neck: Neck supple. No JVD present.   Cardiovascular:   Murmur heard.  Pulmonary/Chest: She has rales.   Abdominal: Soft. Bowel sounds are normal.   Musculoskeletal: She exhibits no edema.   Neurological: She is alert and oriented to person, place, and time.       Significant Labs:   BMP:   Recent Labs   Lab  08/14/18   0500  08/15/18   0411   GLU  162*  129*   NA  133*  134*   K  3.8  3.2*   CL  94*  91*   CO2  29  31*   BUN  37*  44*   CREATININE  1.3  1.3   CALCIUM  9.1  9.0   MG  1.7   1.8       Significant Imaging:   Echocardiogram:   2D echo with color flow doppler:   Results for orders placed or performed during the hospital encounter of 07/27/18   2D echo with color flow doppler   Result Value Ref Range    EF 20 (A) 55 - 65    Mitral Valve Regurgitation MODERATE (A)     Aortic Valve Stenosis MODERATE TO SEVERE (A)

## 2018-08-15 NOTE — PROGRESS NOTES
[facility]  Cardiology  Progress Note    Patient Name: Natalie Parnell  MRN: [id number]  Admission Date: 8/3/2018  Hospital Length of Stay: 12 days  Code Status: Full Code   Attending Physician: Evelyn Garcias MD   Primary Care Physician: Octavio Ferrell MD  Expected Discharge Date: 8/16/2018  Principal Problem:Acute on chronic congestive heart failure    Subjective:     Hospital Course:   During her hospitalization she was diuresed aggressively. Her weight has reduced with lasix ggt however started to creep up again when transitioned to IV lasix pushes. She was then given metolazone x1 and started back on a lasix ggt. Per discussion with Interventional Cardiology, it was decided on 8/14 that pt will be placed on dobutamine ggt @ 2.5 mg/hr with up-titration of lasix ggt to augment diuresis.     Interval History: Overnight pt was started on dobutamine to augment diuresis. This AM, pt denies acute complaints.     Review of Systems   Constitution: Negative for chills and fever.   Cardiovascular: Negative for chest pain, dyspnea on exertion, irregular heartbeat, leg swelling, near-syncope, orthopnea, palpitations, paroxysmal nocturnal dyspnea and syncope.   Respiratory: Negative for shortness of breath.      Objective:     Vital Signs (Most Recent):  Temp: 98.2 °F (36.8 °C) (08/15/18 0723)  Pulse: 90 (08/15/18 0731)  Resp: 18 (08/15/18 0731)  BP: (!) 90/51 (08/15/18 0723)  SpO2: (!) 94 % (08/15/18 0731) Vital Signs (24h Range):  Temp:  [97.9 °F (36.6 °C)-99.2 °F (37.3 °C)] 98.2 °F (36.8 °C)  Pulse:  [] 90  Resp:  [18-20] 18  SpO2:  [93 %-100 %] 94 %  BP: ()/(44-62) 90/51     Weight: 75.2 kg (165 lb 12.6 oz)  Body mass index is 26.76 kg/m².     SpO2: (!) 94 %  O2 Device (Oxygen Therapy): room air      Intake/Output Summary (Last 24 hours) at 8/15/2018 0846  Last data filed at 8/15/2018 0600  Gross per 24 hour   Intake 720 ml   Output 2700 ml   Net -1980 ml       Lines/Drains/Airways      Peripheral Intravenous Line                 Peripheral IV - Single Lumen 08/08/18 1621 Left Forearm 6 days         Peripheral IV - Single Lumen 08/14/18 1800 Right Forearm less than 1 day                Physical Exam   Constitutional: She is oriented to person, place, and time.   Eyes: Pupils are equal, round, and reactive to light.   Neck: Neck supple. No JVD present.   Cardiovascular:   Murmur heard.  Pulmonary/Chest: She has rales.   Abdominal: Soft. Bowel sounds are normal.   Musculoskeletal: She exhibits no edema.   Neurological: She is alert and oriented to person, place, and time.       Significant Labs:   BMP:   Recent Labs   Lab  08/14/18   0500  08/15/18   0411   GLU  162*  129*   NA  133*  134*   K  3.8  3.2*   CL  94*  91*   CO2  29  31*   BUN  37*  44*   CREATININE  1.3  1.3   CALCIUM  9.1  9.0   MG  1.7  1.8       Significant Imaging:   Echocardiogram:   2D echo with color flow doppler:   Results for orders placed or performed during the hospital encounter of 07/27/18   2D echo with color flow doppler   Result Value Ref Range    EF 20 (A) 55 - 65    Mitral Valve Regurgitation MODERATE (A)     Aortic Valve Stenosis MODERATE TO SEVERE (A)      Assessment and Plan:     * Acute on chronic congestive heart failure    -recommend lasix bolus of 60 mg IV once with up-titration of lasix ggt to 20 mg/hr  -c/w dobutamine ggt at 2.5 mg/hr   -continue to monitor UOP and daily weights  -hold off on starting BB till pt is euvolemic           Severe aortic stenosis    -s/p BAV, awaiting O/P TAVR           Suzan Galvin MD  Cardiology Fellow   [facility]

## 2018-08-15 NOTE — ASSESSMENT & PLAN NOTE
-recommend lasix bolus of 60 mg IV once with up-titration of lasix ggt to 20 mg/hr  -c/w dobutamine ggt at 2.5 mg/hr   -continue to monitor UOP and daily weights  -hold off on starting BB till pt is euvolemic

## 2018-08-16 PROBLEM — R19.5 DARK STOOLS: Status: ACTIVE | Noted: 2018-01-01

## 2018-08-16 NOTE — PLAN OF CARE
Problem: Patient Care Overview  Goal: Plan of Care Review  Outcome: Ongoing (interventions implemented as appropriate)  Pt is A, A, Ox4. Calm, cooperative. Free of falls, trauma, and injuries. Skin intact. Pt educated on fall risk, treatment plan. Pt demonstrates and verbalizes understanding. VSS. EGD done today. Cardiac diet.  and lasix infusing. IV protonix q12h. Plan of care reviewed with pt.

## 2018-08-16 NOTE — HPI
Natalie Parnell is a 83 y.o. female with severe AS with SHEA 0.65cm2, liver cirrhosis, likely HERNANDEZ, liver mass, recent admission for GI bleed 7/10/18 (requiring 1 unit of PRBC and EGD showing non-bleeding angioectasia s/p APC), recent admission for NSTEMI (with clean LHC). She presented to ED on 8/3/2018 with acute onset of SOB and reported having dark stools. Hgb is at baseline 8-9 and found to have 8 on admission. No transfusion requirements thus far. GI consulted for further evaluation. No recent colonoscopy. CT A/P in August showed liver mass. Hepatology is following.

## 2018-08-16 NOTE — NURSING TRANSFER
Nursing Transfer Note      8/16/2018     Transfer to 325A    Transfer via stretcher    Transfer with IV pump    Transported by ELIA Queen    Medicines sent: dobutamine and pantoprazole    Chart send with patient: yes      Patient reassessed at: 8/16/2018 at 1452

## 2018-08-16 NOTE — NURSING TRANSFER
Nursing Transfer Note      8/16/2018     Transfer From: endo    Transfer via stretcher    Transfer with 2L to O2, cardiac monitoring    Transported by transport tech    Medicines sent: lasix, , saline, prontonix    Chart send with patient: Yes    Notified: family    Patient reassessed at: 8/16/2018 1529 (date, time)    Upon arrival to floor: cardiac monitor applied, patient oriented to room, call bell in reach and bed in lowest position

## 2018-08-16 NOTE — NURSING TRANSFER
Patient requesting food and drink. Spoke with MD Garcias to have diet ordered. MD stated she will place order.

## 2018-08-16 NOTE — ANESTHESIA RELEASE NOTE
"Anesthesia Release from PACU Note    Patient: Natalie Parnell    Procedure(s) Performed: Procedure(s) (LRB):  ESOPHAGOGASTRODUODENOSCOPY (EGD) (N/A)    Anesthesia type: general    Post pain: Adequate analgesia    Post assessment: no apparent anesthetic complications, tolerated procedure well and no evidence of recall    Last Vitals:   Visit Vitals  BP (!) 94/53 (BP Location: Left arm, Patient Position: Lying)   Pulse 102   Temp 36.4 °C (97.5 °F) (Temporal)   Resp 18   Ht 5' 6" (1.676 m)   Wt 74.5 kg (164 lb 3.9 oz)   SpO2 97%   Breastfeeding? No   BMI 26.51 kg/m²       Post vital signs: stable    Level of consciousness: awake, alert  and oriented    Nausea/Vomiting: no nausea/no vomiting    Complications: none    Airway Patency: patent    Respiratory: unassisted, spontaneous ventilation, room air    Cardiovascular: stable and blood pressure at baseline    Hydration: euvolemic  "

## 2018-08-16 NOTE — NURSING TRANSFER
Nursing Transfer Note      8/16/2018     Transfer To: endo    Transfer via stretcher    Transfer with 2L to O2, cardiac monitoring    Transported by transport tech    Medicines sent: lasix, , protonix    Chart send with patient: Yes

## 2018-08-16 NOTE — CONSULTS
[facility]  Gastroenterology  Consult Note    Patient Name: Natalie Parnell  MRN: [id number]  Admission Date: 8/3/2018  Hospital Length of Stay: 13 days  Code Status: Full Code   Attending Provider: Evelyn Garcias MD   Consulting Provider: Ralph De Souza MD  Primary Care Physician: Octavio Ferrell MD  Principal Problem:Acute on chronic congestive heart failure    Inpatient consult to Gastroenterology  Consult performed by: Ralph De Souza MD  Consult ordered by: Evelyn Garcias MD  Reason for consult: dark stools        Subjective:     HPI:  Natalie Parnell is a 83 y.o. female with severe AS with SHEA 0.65cm2, liver cirrhosis, likely HERNANDEZ, liver mass, recent admission for GI bleed 7/10/18 (requiring 1 unit of PRBC and EGD showing non-bleeding angioectasia s/p APC), recent admission for NSTEMI (with clean LHC). She presented to ED on 8/3/2018 with acute onset of SOB and reported having dark stools. Hgb is at baseline 8-9 and found to have 8 on admission. No transfusion requirements thus far. GI consulted for further evaluation. No recent colonoscopy. CT A/P in August showed liver mass. Hepatology is following.    Past Medical History:   Diagnosis Date    Asthma     Diabetes mellitus     HTN (hypertension)     Hyperlipidemia        Past Surgical History:   Procedure Laterality Date    CATARACT EXTRACTION Bilateral     Dr. Max    COLON SURGERY      HERNIA REPAIR      VARICOSE VEIN SURGERY         Review of patient's allergies indicates:   Allergen Reactions    Lidocaine      Family History     None        Tobacco Use    Smoking status: Never Smoker    Smokeless tobacco: Never Used   Substance and Sexual Activity    Alcohol use: No    Drug use: No    Sexual activity: Not on file     Review of Systems   Constitutional: Positive for activity change, appetite change and fatigue.   HENT: Negative for trouble swallowing and voice change.    Eyes: Negative for photophobia.    Respiratory: Positive for shortness of breath. Negative for choking and chest tightness.    Cardiovascular: Positive for chest pain and leg swelling.   Gastrointestinal: Negative for abdominal distention, abdominal pain, anal bleeding, blood in stool, constipation, diarrhea and nausea.   Endocrine: Negative for polyphagia.   Genitourinary: Negative for dysuria and enuresis.   Musculoskeletal: Negative for arthralgias and back pain.   Neurological: Positive for weakness. Negative for seizures.   Psychiatric/Behavioral: Negative for confusion and hallucinations.     Objective:     Vital Signs (Most Recent):  Temp: 98.7 °F (37.1 °C) (08/16/18 0812)  Pulse: 108 (08/16/18 1053)  Resp: 14 (08/16/18 0841)  BP: (!) 107/54 (08/16/18 1002)  SpO2: (!) 92 % (08/16/18 0841) Vital Signs (24h Range):  Temp:  [98.5 °F (36.9 °C)-98.7 °F (37.1 °C)] 98.7 °F (37.1 °C)  Pulse:  [] 108  Resp:  [14-20] 14  SpO2:  [92 %-99 %] 92 %  BP: ()/(45-54) 107/54     Weight: 74.5 kg (164 lb 3.9 oz) (08/16/18 0600)  Body mass index is 26.51 kg/m².      Intake/Output Summary (Last 24 hours) at 8/16/2018 1114  Last data filed at 8/16/2018 0600  Gross per 24 hour   Intake 956 ml   Output 2650 ml   Net -1694 ml       Lines/Drains/Airways     Peripheral Intravenous Line                 Peripheral IV - Single Lumen 08/15/18 2100 Right Forearm less than 1 day         Peripheral IV - Single Lumen 08/16/18 0114 Left Upper Arm less than 1 day                Physical Exam   Constitutional: She is oriented to person, place, and time. She appears ill.   Eyes: No scleral icterus.   Cardiovascular: Normal rate. Exam reveals no friction rub.   Pulmonary/Chest: Effort normal. No respiratory distress. She has rales.   Abdominal: Soft. Bowel sounds are normal. She exhibits no distension and no mass. There is no tenderness. There is no rebound and no guarding. No hernia.   Musculoskeletal: She exhibits edema.   Neurological: She is alert and oriented to  person, place, and time.   Skin: Skin is warm and dry.   Psychiatric: She has a normal mood and affect. Her behavior is normal.     Lab Results   Component Value Date    WBC 4.91 08/16/2018    HGB 8.0 (L) 08/16/2018    HCT 24.1 (L) 08/16/2018    MCV 94 08/16/2018     08/16/2018     Lab Results   Component Value Date    INR 1.2 08/14/2018     Lab Results   Component Value Date     (L) 08/16/2018    K 3.8 08/16/2018    CREATININE 1.6 (H) 08/16/2018     Lab Results   Component Value Date    ALBUMIN 2.9 (L) 08/16/2018    ALT 19 08/16/2018    AST 52 (H) 08/16/2018    ALKPHOS 125 08/16/2018    BILITOT 0.9 08/16/2018     Lab Results   Component Value Date     (H) 08/01/2018     Lab Results   Component Value Date    LIPASE 84 (H) 07/09/2018     No results found for: TACROLIMUS    Imaging:  Reviewed and as noted in HPI.         Assessment/Plan:     Dark stools    Patient with stable HnH but reported to have dark stools. No melena in the hospital.   Recent EGD showed small varices and gastric AVM.   Plan for EGD today to rule out any UGIB.   Continue PPI   Can consider Octreotide gtt at 50mcg/hr although variceal bleed less liekly  Ceftriaxone 1g IV Q daily for primary SBP prophylaxis  Intravascular resuscitation/support with IVFs   Serial H/H's and pRBCs transfusion as indicated  Discontinue all NSAIDs and Heparin products  Maintain IV access with 2 large bore IVs  Keep NPO  Please notify GI team if there is significant change in patient's clinical status              Thank you for your consult. I will follow-up with patient. Please contact us if you have any additional questions.    Ralph De Souza MD  Gastroenterology  [facility]

## 2018-08-16 NOTE — ANESTHESIA PREPROCEDURE EVALUATION
08/16/2018  Natalie Parnell is a 83 y.o., female.    Anesthesia Evaluation    I have reviewed the Patient Summary Reports.    I have reviewed the Nursing Notes.   I have reviewed the Medications.     Review of Systems  Anesthesia Hx:  No problems with previous Anesthesia  History of prior surgery of interest to airway management or planning: Previous anesthesia: General Denies Family Hx of Anesthesia complications.   Denies Personal Hx of Anesthesia complications.   Social:  Non-Smoker    Hematology/Oncology:  Hematology Normal   Oncology Normal     EENT/Dental:EENT/Dental Normal   Cardiovascular:   Exercise tolerance: good Hypertension Valvular problems/Murmurs, AS, MR Past MI CAD   CHF hyperlipidemia ECG has been reviewed. Echo 7/18:  EF 20-25%; mod-severe AS; moderate MR   Pulmonary:   Asthma mild    Renal/:  Renal/ Normal     Hepatic/GI:   Liver Disease, Cirrhosis   Musculoskeletal:  Musculoskeletal Normal    Neurological:  Neurology Normal    Endocrine:   Diabetes, type 2    Dermatological:  Skin Normal    Psych:  Psychiatric Normal           Physical Exam  General:  Well nourished, Obesity    Airway/Jaw/Neck:  Airway Findings: Mouth Opening: Small, but > 3cm Tongue: Normal  General Airway Assessment: Adult, Average  Mallampati: III  Improves to II with phonation.  TM Distance: 4 - 6 cm        Eyes/Ears/Nose:  EYES/EARS/NOSE FINDINGS: Normal   Dental:  Dental Findings: Edentulous   Chest/Lungs:  Chest/Lungs Findings: Clear to auscultation, Normal Respiratory Rate     Heart/Vascular:  Heart Findings: Rate: Normal  Rhythm: Regular Rhythm  Sounds: Normal  Heart murmur: negative Vascular Findings: Normal    Abdomen:  Abdomen Findings: Normal    Musculoskeletal:  Musculoskeletal Findings: Normal   Skin:  Skin Findings: Normal    Mental Status:  Mental Status Findings:  Cooperative, Alert and Oriented          Anesthesia Plan  Type of Anesthesia, risks & benefits discussed:  Anesthesia Type:  general  Patient's Preference:   Intra-op Monitoring Plan: standard ASA monitors  Intra-op Monitoring Plan Comments:   Post Op Pain Control Plan:   Post Op Pain Control Plan Comments:   Induction:   IV  Beta Blocker:  Patient is on a Beta-Blocker and has received one dose within the past 24 hours (No further documentation required).       Informed Consent: Patient understands risks and agrees with Anesthesia plan.  Questions answered. Anesthesia consent signed with patient.  ASA Score: 4     Day of Surgery Review of History & Physical:            Ready For Surgery From Anesthesia Perspective.

## 2018-08-16 NOTE — PLAN OF CARE
Problem: Patient Care Overview  Goal: Plan of Care Review  Outcome: Ongoing (interventions implemented as appropriate)  Plan of care discussed with pt. Lasix and  infusing per orders. Positive occult blood, PT NPO for EGD tomorrow.  7.9/25.1.  VSS & NADN. Pt denies CP, SOB, or pain/discomfort at this time.Pt free of injuries this shift.  All questions addressed.  Will continue to monitor.

## 2018-08-16 NOTE — TRANSFER OF CARE
"Anesthesia Transfer of Care Note    Patient: Natalie Parnell    Procedure(s) Performed: Procedure(s) (LRB):  ESOPHAGOGASTRODUODENOSCOPY (EGD) (N/A)    Patient location: PACU    Anesthesia Type: general    Transport from OR: Transported from OR on 2-3 L/min O2 by NC with adequate spontaneous ventilation    Post pain: adequate analgesia    Post assessment: no apparent anesthetic complications    Post vital signs: stable    Level of consciousness: awake    Nausea/Vomiting: no nausea/vomiting    Complications: none    Transfer of care protocol was followed      Last vitals:   Visit Vitals  BP (!) 89/54 (BP Location: Left arm, Patient Position: Lying)   Pulse 105   Temp 36.3 °C (97.3 °F) (Temporal)   Resp 18   Ht 5' 6" (1.676 m)   Wt 74.5 kg (164 lb 3.9 oz)   SpO2 99%   Breastfeeding? No   BMI 26.51 kg/m²     "

## 2018-08-16 NOTE — ASSESSMENT & PLAN NOTE
Patient with stable HnH but reported to have dark stools. No melena in the hospital.   Recent EGD showed small varices and gastric AVM.   Plan for EGD today to rule out any UGIB.   Continue PPI   Can consider Octreotide gtt at 50mcg/hr although variceal bleed less liekly  Ceftriaxone 1g IV Q daily for primary SBP prophylaxis  Intravascular resuscitation/support with IVFs   Serial H/H's and pRBCs transfusion as indicated  Discontinue all NSAIDs and Heparin products  Maintain IV access with 2 large bore IVs  Keep NPO  Please notify GI team if there is significant change in patient's clinical status

## 2018-08-16 NOTE — SUBJECTIVE & OBJECTIVE
Past Medical History:   Diagnosis Date    Asthma     Diabetes mellitus     HTN (hypertension)     Hyperlipidemia        Past Surgical History:   Procedure Laterality Date    CATARACT EXTRACTION Bilateral     Dr. Max    COLON SURGERY      HERNIA REPAIR      VARICOSE VEIN SURGERY         Review of patient's allergies indicates:   Allergen Reactions    Lidocaine      Family History     None        Tobacco Use    Smoking status: Never Smoker    Smokeless tobacco: Never Used   Substance and Sexual Activity    Alcohol use: No    Drug use: No    Sexual activity: Not on file     Review of Systems   Constitutional: Positive for activity change, appetite change and fatigue.   HENT: Negative for trouble swallowing and voice change.    Eyes: Negative for photophobia.   Respiratory: Positive for shortness of breath. Negative for choking and chest tightness.    Cardiovascular: Positive for chest pain and leg swelling.   Gastrointestinal: Negative for abdominal distention, abdominal pain, anal bleeding, blood in stool, constipation, diarrhea and nausea.   Endocrine: Negative for polyphagia.   Genitourinary: Negative for dysuria and enuresis.   Musculoskeletal: Negative for arthralgias and back pain.   Neurological: Positive for weakness. Negative for seizures.   Psychiatric/Behavioral: Negative for confusion and hallucinations.     Objective:     Vital Signs (Most Recent):  Temp: 98.7 °F (37.1 °C) (08/16/18 0812)  Pulse: 108 (08/16/18 1053)  Resp: 14 (08/16/18 0841)  BP: (!) 107/54 (08/16/18 1002)  SpO2: (!) 92 % (08/16/18 0841) Vital Signs (24h Range):  Temp:  [98.5 °F (36.9 °C)-98.7 °F (37.1 °C)] 98.7 °F (37.1 °C)  Pulse:  [] 108  Resp:  [14-20] 14  SpO2:  [92 %-99 %] 92 %  BP: ()/(45-54) 107/54     Weight: 74.5 kg (164 lb 3.9 oz) (08/16/18 0600)  Body mass index is 26.51 kg/m².      Intake/Output Summary (Last 24 hours) at 8/16/2018 1114  Last data filed at 8/16/2018 0600  Gross per 24 hour   Intake  956 ml   Output 2650 ml   Net -1694 ml       Lines/Drains/Airways     Peripheral Intravenous Line                 Peripheral IV - Single Lumen 08/15/18 2100 Right Forearm less than 1 day         Peripheral IV - Single Lumen 08/16/18 0114 Left Upper Arm less than 1 day                Physical Exam   Constitutional: She is oriented to person, place, and time. She appears ill.   Eyes: No scleral icterus.   Cardiovascular: Normal rate. Exam reveals no friction rub.   Pulmonary/Chest: Effort normal. No respiratory distress. She has rales.   Abdominal: Soft. Bowel sounds are normal. She exhibits no distension and no mass. There is no tenderness. There is no rebound and no guarding. No hernia.   Musculoskeletal: She exhibits edema.   Neurological: She is alert and oriented to person, place, and time.   Skin: Skin is warm and dry.   Psychiatric: She has a normal mood and affect. Her behavior is normal.     Lab Results   Component Value Date    WBC 4.91 08/16/2018    HGB 8.0 (L) 08/16/2018    HCT 24.1 (L) 08/16/2018    MCV 94 08/16/2018     08/16/2018     Lab Results   Component Value Date    INR 1.2 08/14/2018     Lab Results   Component Value Date     (L) 08/16/2018    K 3.8 08/16/2018    CREATININE 1.6 (H) 08/16/2018     Lab Results   Component Value Date    ALBUMIN 2.9 (L) 08/16/2018    ALT 19 08/16/2018    AST 52 (H) 08/16/2018    ALKPHOS 125 08/16/2018    BILITOT 0.9 08/16/2018     Lab Results   Component Value Date     (H) 08/01/2018     Lab Results   Component Value Date    LIPASE 84 (H) 07/09/2018     No results found for: TACROLIMUS    Imaging:  Reviewed and as noted in HPI.

## 2018-08-16 NOTE — ANESTHESIA POSTPROCEDURE EVALUATION
"Anesthesia Post Evaluation    Patient: Natalie Parnell    Procedure(s) Performed: Procedure(s) (LRB):  ESOPHAGOGASTRODUODENOSCOPY (EGD) (N/A)    Final Anesthesia Type: general  Patient location during evaluation: PACU  Patient participation: Yes- Able to Participate  Level of consciousness: awake and alert and oriented  Post-procedure vital signs: reviewed and stable  Pain management: adequate  Airway patency: patent  PONV status at discharge: No PONV  Anesthetic complications: no      Cardiovascular status: hemodynamically stable  Respiratory status: unassisted, spontaneous ventilation and room air  Hydration status: euvolemic  Follow-up not needed.        Visit Vitals  BP (!) 94/53 (BP Location: Left arm, Patient Position: Lying)   Pulse 102   Temp 36.4 °C (97.5 °F) (Temporal)   Resp 18   Ht 5' 6" (1.676 m)   Wt 74.5 kg (164 lb 3.9 oz)   SpO2 97%   Breastfeeding? No   BMI 26.51 kg/m²       Pain/Idania Score: Pain Assessment Performed: Yes (8/16/2018  2:16 PM)  Presence of Pain: denies (8/16/2018  2:32 PM)  Idania Score: 10 (8/16/2018  2:32 PM)        "

## 2018-08-16 NOTE — PROGRESS NOTES
Progress Note  Hospital Medicine    [place] [facility]  Admit Date: 8/3/2018   Length of Stay:  LOS: 13 days    SUBJECTIVE:   Reason for Admission:  Acute on chronic congestive heart failure    HPI:  Patient is a 83F with PMH of chronic systolic HF with EF 20-25%, severe AS with SHEA 0.65cm2, mean gradients 75mmhg, peak velocity 5.3m/sec, recent admission for GI bleed (requiring 1 unit of PRBC and EGD showing non-bleeding angioectasia s/p APC), recent admission for NSTEMI (without evidence CAD on [facility]), and discharge yesterday after stay for cardiogenic shock and ADHF, presented with acute onset of SOB.  Pt reports feeling well upon discharge and in the evening, but awoke at 0100 SOB and gasping for air.  She urinated twice overnight.  Also notes LE edema has developed.  Patient admitted for evaluation of acute on chronic systolic heart failure.    ECHO 7/27/2018  CONCLUSIONS     1 - Moderate left ventricular enlargement.     2 - Severely depressed left ventricular systolic function (EF 20-25%).     3 - Left atrial enlargement.     4 - Low normal to mildly depressed right ventricular systolic function .     5 - Moderate to severe aortic stenosis.     6 - Moderate mitral regurgitation.     7 - Increased central venous pressure.     8 - If clinically indicated, a full Doppler study can be performed.    Interval history:     Net negative 1.7 L yesterday. NPO for EGD today. No BM's so far today. Feels like her lower extremity swelling is improved. Still with some shortness of breath. Has some nausea this morning,resolved with anti-emetic, but no vomiting.  Down one pound since yesterday.     Review of Systems:  Constitutional: + fatigue, no fever or chills, negative for fevers, malaise and sweats  Respiratory: no cough, +shortness of breath  Cardiovascular: no chest pain or palpitations  Gastrointestinal: no nausea or vomiting, no abdominal pain or change in bowel habits  Musculoskeletal: no arthralgias or  myalgias     OBJECTIVE:     Temp:  [97.3 °F (36.3 °C)-98.7 °F (37.1 °C)]   Pulse:  []   Resp:  [14-20]   BP: ()/(45-54)   SpO2:  [88 %-99 %]  Body mass index is 26.51 kg/m².  Intake/Outake:  This Shift:  No intake/output data recorded.    Net I/O past 24h:     Intake/Output Summary (Last 24 hours) at 8/16/2018 1409  Last data filed at 8/16/2018 0600  Gross per 24 hour   Intake 240 ml   Output 1600 ml   Net -1360 ml             Physical Exam:  Gen- well-developed, well-nourished, fatigued  CVS- S1 and S2 present, 3/6 systolic murmur, RRR  Resp- no work of breathing, bibasilar crackles  Abd- BS+, soft, NT, ND  Ext- mild pitting LE edema improved, no clubbing or cyanosis    Laboratory:  CBC/Anemia Labs: Coags:    Recent Labs   Lab  08/14/18   1647  08/15/18   0411  08/15/18   1358  08/15/18   1435  08/16/18   0452   WBC  5.32  4.62   --    --   4.91   HGB  8.1*  7.7*   --   7.9*  8.0*   HCT  25.1*  23.1*   --    --   24.1*   PLT  238  202   --    --   220   MCV  94  94   --    --   94   RDW  14.6*  14.6*   --    --   14.7*   OCCULTBLOOD   --    --   Positive*   --    --     Recent Labs   Lab  08/14/18   1647   INR  1.2        Chemistries:   Recent Labs   Lab  08/14/18   0500  08/15/18   0411  08/16/18   0452   NA  133*  134*  133*   K  3.8  3.2*  3.8   CL  94*  91*  89*   CO2  29  31*  31*   BUN  37*  44*  43*   CREATININE  1.3  1.3  1.6*   CALCIUM  9.1  9.0  9.2   PROT  5.9*  5.8*  6.2   BILITOT  0.8  0.9  0.9   ALKPHOS  124  107  125   ALT  20  19  19   AST  51*  48*  52*   MG  1.7  1.8  2.0        ABG:   No results for input(s): PH, PCO2, PO2, HCO3, POCSATURATED, BE in the last 168 hours.     Cardiac Enzymes: Ejection Fractions:    No results for input(s): CPK, CPKMB, MB, TROPONINI in the last 72 hours. EF   Date Value Ref Range Status   07/27/2018 20 (A) 55 - 65    07/09/2018 50 55 - 65            Medications:  Scheduled Meds:   albuterol-ipratropium  3 mL Nebulization Q6H WAKE    atorvastatin  40 mg  Oral Daily    insulin aspart U-100  2 Units Subcutaneous TIDWM    insulin detemir U-100  5 Units Subcutaneous QHS    pantoprozole (PROTONIX) 40 mg/100 mL D5W IVPB  40 mg Intravenous Q12H                             Continuous Infusions:   DOBUTamine 2.5 mcg/kg/min (08/14/18 1721)    furosemide (LASIX) 2 mg/mL infusion (non-titrating) 20 mg/hr (08/15/18 2341)     PRN Meds:.acetaminophen, cyclobenzaprine, dextrose 50%, dextrose 50%, glucagon (human recombinant), glucose, glucose, HYDROcodone-acetaminophen, insulin aspart U-100, ondansetron, polyethylene glycol, sodium chloride 0.9%     ASSESSMENT/PLAN:     Active Hospital Problems    Diagnosis  POA    *Acute on chronic congestive heart failure [I50.9]  Yes    Dark stools [R19.5]  Unknown    Hypokalemia [E87.6]  No    Severe aortic stenosis [I35.0]  Yes    Type 2 diabetes mellitus, without long-term current use of insulin [E11.9]  Yes      Resolved Hospital Problems   No resolved problems to display.     Acute on Chronic Systolic Heart Failure  Acute Hypoxemic Respiratory Failure 2/2 ADHF  - On Lasix gtt +  gtt  - Strict I/o, low salt diet, fluid restriction, daily weights  - When fully diuresed, will start beta-blocker and ACEI as tolerated by BP    Melena  - History of G1EV and gastric AVM from EGD on 7/10. Also consider Heyde's syndrome in setting of severe AS  - FOBT+  - Holding ASA and Lovenox  - Started IV Protonix BID  - EGD 8/16: non-bleeding G2EV, non-bleeding gastric ulcer (biopsied)  - F/U biopsy results. Consider repeat EGD in 2 months to check healing if biopsies unrevealing  - Monitor H/H, currently stable.     Severe Aortic Stenosis  - During prior hospitalization required IABP for hemodynamic support in the setting of acute respiratory distress from flash pulmonary edema 2/2 severe AV stenosis  - s/p successful balloon aortic valvuloplasty with 24mm true balloon aortic mean gradient improved from 70 to 36mmHg  - currently undergoing TAVR  "w/u: CT chest performed, [facility] negative for obstructive CAD  - PFTs ordered for outpatient  - Pt will f/u with Interventional Cardiology as outpatient    KATI on CKDIII  - Cr 1.6 today, baseline 1-1.3  - This could by hypotensive ATN in etiology, cardiorenal, overdiuresis or other  - F/u cardiology co-management  - Avoid further antihypertensives  - Avoid nephrotoxic agents    Right Hepatic Lobe Lesion  Suspected Cirrhosis  - lesion seen incidentally on CT for TAVR  - followed up with US abd- notes 3cm  - triple phase CT shows 2.4 x 1.6cm irregular enhancing mass in right lobe of liver; 1cm focal nonenhancing areas centrally; with anterior washout, arterial enhancement- LI-RADS 5 lesion  -   - Hepatology consulted last admission; will be discussing case in IR conference 8/7 and then f/u in Hepatology clinic 8/8 (appt needs to be rescheduled)  - anti-smooth muscle Ab + 1:80  Per transplant notes:  - "Given the patient's normal LFT, INR, MELD 9, albumin 2.5; her hepatic disease would not contraindicate her from a surgical procedure (TAVR) - pending TAVR outpatient.  - Hepatic lesion concerning for HCC can be evaluated as an outpatient regarding therapeutic options. AFP elevated (572) which supports this as likely HCC. CT 3 phase pending.  - Concern for cirrhosis on imaging likely secondary to HERNANDEZ. We will continue her workup as an outpatient with modalities such as fibroscan. Iron panel negative. , SUGAR pending, ASMA pending.  - if she is discharged before 8/8 she can follow-up in her previously scheduled appointment with Dr. Ashraf. If she is discharged later than this, please inform hepatology so we can reschedule her appointment."  - has hepatology appt scheduled     DM-II  - A1C 7.0  - per daughter, pt's blood sugar is poorly controlled at home, reaching 300s  - Levemir 5 units nightly and Aspart 2 units TIDWM   - Consider amb referral to Endocrinology    Hypokalemia  Hypomagnesemia  -Replete as " needed     DVT ppx - SCD/CORWIN  CODE status- FULL     Dispo- home in 1-2 days pending clinical improvement    Evelyn Garcias MD  Hospital Medicine Staff

## 2018-08-16 NOTE — TREATMENT PLAN
GI Progress note    EGD done - please see full report for details.     Clean based ulcer. Biopsied.     Recommend:  Resume diet and advance as tolerated  Continue once daily PPI  Please call GI with questions/updates.    Ralph De Souza MD  Gastroenterology & Hepatology Fellow  Pager: [phone]

## 2018-08-17 PROBLEM — I50.23 ACUTE ON CHRONIC SYSTOLIC CONGESTIVE HEART FAILURE: Status: ACTIVE | Noted: 2018-01-01

## 2018-08-17 NOTE — PROGRESS NOTES
08/17/18 1539   Vital Signs   Pulse (!) 111   SpO2 (!) 85 %   BP (!) 75/36   MAP (mmHg) 48   Notified Dr Garcias of BP. Will continue to monitor.

## 2018-08-17 NOTE — ASSESSMENT & PLAN NOTE
-recommend discontinuing dobutamine and lasix ggt as pt is getting KATI and contraction alkalosis  -start IV lasix 60 mg BID  -monitor daily weights, strict I/O's, and follow renal function and electrolytes (replace as needed)

## 2018-08-17 NOTE — PROGRESS NOTES
[facility]  Cardiology  Progress Note    Patient Name: Natalie Parnell  MRN: [id number]  Admission Date: 8/3/2018  Hospital Length of Stay: 14 days  Code Status: Full Code   Attending Physician: Evelyn Garcias MD   Primary Care Physician: Octavio Ferrell MD  Expected Discharge Date: 8/19/2018  Principal Problem:Acute on chronic congestive heart failure    Subjective:     Hospital Course:   During her hospitalization she was diuresed aggressively. Her weight has reduced with lasix ggt however started to creep up again when transitioned to IV lasix pushes. She was then given metolazone x1 and started back on a lasix ggt. Per discussion with Interventional Cardiology, it was decided on 8/14 that pt will be placed on dobutamine ggt @ 2.5 mg/hr with up-titration of lasix ggt to augment diuresis. While she was hospitalized, she had episodes of melena. EGD was done that showed esophageal varices and gastric ulcer. She developed contraction alkalosis and KATI while being aggressively diuresed so dobutamine ggt was stopped and she was transitioned to IV lasix pushes.     Interval History: This AM, pt complains of back pain.     Review of Systems   Constitution: Negative for chills and fever.   HENT: Negative for congestion.    Cardiovascular: Negative for chest pain, dyspnea on exertion, irregular heartbeat, leg swelling, near-syncope, orthopnea, palpitations, paroxysmal nocturnal dyspnea and syncope.   Respiratory: Negative for shortness of breath.    Gastrointestinal: Negative for abdominal pain, nausea and vomiting.   Neurological: Negative for dizziness and headaches.     Objective:     Vital Signs (Most Recent):  Temp: 98.4 °F (36.9 °C) (08/17/18 1137)  Pulse: (!) 111 (08/17/18 1539)  Resp: 18 (08/17/18 0744)  BP: (!) 75/36 (08/17/18 1539)  SpO2: (!) 85 % (08/17/18 1539) Vital Signs (24h Range):  Temp:  [97.4 °F (36.3 °C)-98.7 °F (37.1 °C)] 98.4 °F (36.9 °C)  Pulse:  [] 111  Resp:  [18-19]  18  SpO2:  [78 %-98 %] 85 %  BP: ()/(36-54) 75/36     Weight: 74 kg (163 lb 2.3 oz)  Body mass index is 26.33 kg/m².     SpO2: (!) 85 %  O2 Device (Oxygen Therapy): nasal cannula      Intake/Output Summary (Last 24 hours) at 8/17/2018 1546  Last data filed at 8/17/2018 0600  Gross per 24 hour   Intake 300 ml   Output 1025 ml   Net -725 ml       Lines/Drains/Airways     Peripheral Intravenous Line                 Peripheral IV - Single Lumen 08/15/18 2100 Right Forearm 1 day         Peripheral IV - Single Lumen 08/16/18 0114 Left Upper Arm 1 day                Physical Exam   Constitutional: She is oriented to person, place, and time. No distress.   Neck: No JVD present.   Cardiovascular:   Murmur heard.  Pulmonary/Chest: She has rales.   Abdominal: Soft. Bowel sounds are normal.   Musculoskeletal: She exhibits tenderness. She exhibits no edema.   Neurological: She is alert and oriented to person, place, and time.   Skin: Skin is warm and dry. She is not diaphoretic.       Significant Labs:   BMP:   Recent Labs   Lab  08/16/18   0452  08/17/18   0421   GLU  169*  159*   NA  133*  132*   K  3.8  3.9   CL  89*  89*   CO2  31*  32*   BUN  43*  43*   CREATININE  1.6*  1.8*   CALCIUM  9.2  9.1   MG  2.0  2.0       Significant Imaging:   X-Ray:   FINDINGS:  The cardiac size is slightly smaller than previous.  Interstitial markings the lungs are increased and there is probably a little pleural fluid present on the right but the lungs have improved over the last 4 days with less evidence of pulmonary edema.      Impression       Interval improvement     Assessment and Plan:     Brief HPI: 82 yo F with hx of AS s/p BAV, being worked up for O/P TAVR, HFrEF (20-25%) p/w dCHFe. Hospital course complicated by GIB.     * Acute on chronic congestive heart failure    -recommend discontinuing dobutamine and lasix ggt as pt is getting AKTI and contraction alkalosis  -start IV lasix 60 mg BID  -monitor daily weights, strict I/O's,  and follow renal function and electrolytes (replace as needed)           Severe aortic stenosis    -s/p BAV, awaiting O/P TAVR             Suzan Galvin MD  Cardiology Fellow   [facility]

## 2018-08-17 NOTE — PROGRESS NOTES
Progress Note  Hospital Medicine    Primary Team: [facility]  Admit Date: 8/3/2018   Length of Stay:  LOS: 14 days    SUBJECTIVE:   Reason for Admission:  Acute on chronic congestive heart failure    HPI:  Patient is a 83F with PMH of chronic systolic HF with EF 20-25%, severe AS with SHEA 0.65cm2, mean gradients 75mmhg, peak velocity 5.3m/sec, recent admission for GI bleed (requiring 1 unit of PRBC and EGD showing non-bleeding angioectasia s/p APC), recent admission for NSTEMI (without evidence CAD on [facility]), and discharge yesterday after stay for cardiogenic shock and ADHF, presented with acute onset of SOB.  Pt reports feeling well upon discharge and in the evening, but awoke at 0100 SOB and gasping for air.  She urinated twice overnight.  Also notes LE edema has developed.  Patient admitted for evaluation of acute on chronic systolic heart failure.    ECHO 7/27/2018  CONCLUSIONS     1 - Moderate left ventricular enlargement.     2 - Severely depressed left ventricular systolic function (EF 20-25%).     3 - Left atrial enlargement.     4 - Low normal to mildly depressed right ventricular systolic function .     5 - Moderate to severe aortic stenosis.     6 - Moderate mitral regurgitation.     7 - Increased central venous pressure.     8 - If clinically indicated, a full Doppler study can be performed.    Interval history:     Net negative 1.3 L yesterday. Received EGD yesterday with nonbleeding varices, clean-based ulcer, no active bleeding. No BM's so far today. Shortness of breath improved. Complaining of left leg pain that she attributes to CORWIN hose. Also complaining of back pain    Review of Systems:  Constitutional: no fatigue, no fever or chills, negative for fevers, malaise and sweats  Respiratory: no cough, no shortness of breath  Cardiovascular: no chest pain or palpitations  Gastrointestinal: no nausea or vomiting, no abdominal pain or change in bowel habits  Musculoskeletal: no arthralgias or myalgias      OBJECTIVE:     Temp:  [98.1 °F (36.7 °C)-98.7 °F (37.1 °C)]   Pulse:  []   Resp:  [18-19]   BP: ()/(36-54)   SpO2:  [78 %-98 %]  Body mass index is 26.33 kg/m².  Intake/Outake:  This Shift:  No intake/output data recorded.    Net I/O past 24h:     Intake/Output Summary (Last 24 hours) at 8/17/2018 1557  Last data filed at 8/17/2018 0600  Gross per 24 hour   Intake 300 ml   Output 1025 ml   Net -725 ml             Physical Exam:  Gen- well-developed, well-nourished, fatigued  CVS- S1 and S2 present, 3/6 systolic murmur, RRR  Resp- no work of breathing, bibasilar crackles  Abd- BS+, soft, NT, ND  Ext- mild pitting LE edema improved, no clubbing or cyanosis    Laboratory:  CBC/Anemia Labs: Coags:    Recent Labs   Lab  08/15/18   0411  08/15/18   1358  08/15/18   1435  08/16/18   0452  08/17/18   0421   WBC  4.62   --    --   4.91  5.71   HGB  7.7*   --   7.9*  8.0*  7.4*   HCT  23.1*   --    --   24.1*  22.3*   PLT  202   --    --   220  210   MCV  94   --    --   94  94   RDW  14.6*   --    --   14.7*  14.7*   OCCULTBLOOD   --   Positive*   --    --    --     Recent Labs   Lab  08/14/18   1647   INR  1.2        Chemistries:   Recent Labs   Lab  08/15/18   0411  08/16/18   0452  08/17/18   0421   NA  134*  133*  132*   K  3.2*  3.8  3.9   CL  91*  89*  89*   CO2  31*  31*  32*   BUN  44*  43*  43*   CREATININE  1.3  1.6*  1.8*   CALCIUM  9.0  9.2  9.1   PROT  5.8*  6.2  5.7*   BILITOT  0.9  0.9  1.1*   ALKPHOS  107  125  106   ALT  19  19  20   AST  48*  52*  56*   MG  1.8  2.0  2.0        ABG:   No results for input(s): PH, PCO2, PO2, HCO3, POCSATURATED, BE in the last 168 hours.     Cardiac Enzymes: Ejection Fractions:    No results for input(s): CPK, CPKMB, MB, TROPONINI in the last 72 hours. EF   Date Value Ref Range Status   07/27/2018 20 (A) 55 - 65    07/09/2018 50 55 - 65            Medications:  Scheduled Meds:   atorvastatin  40 mg Oral Daily    furosemide  60 mg Intravenous BID    insulin  aspart U-100  3 Units Subcutaneous TIDWM    insulin detemir U-100  8 Units Subcutaneous QHS    pantoprazole  40 mg Oral Daily                             Continuous Infusions:    PRN Meds:.acetaminophen, cyclobenzaprine, dextrose 50%, dextrose 50%, glucagon (human recombinant), glucose, glucose, HYDROcodone-acetaminophen, insulin aspart U-100, ondansetron, polyethylene glycol, sodium chloride 0.9%, traMADol     ASSESSMENT/PLAN:     Active Hospital Problems    Diagnosis  POA    *Acute on chronic congestive heart failure [I50.9]  Yes    Dark stools [R19.5]  Unknown    Hypokalemia [E87.6]  No    Severe aortic stenosis [I35.0]  Yes    Type 2 diabetes mellitus, without long-term current use of insulin [E11.9]  Yes      Resolved Hospital Problems   No resolved problems to display.     Acute on Chronic Systolic Heart Failure  Acute Hypoxemic Respiratory Failure 2/2 ADHF  - D/C Lasix gtt +  gtt. Transition to IV Lasix 60 bid  - Strict I/O, low salt diet, fluid restriction, daily weights  - When fully diuresed, will start beta-blocker and ACEI as tolerated by BP    Melena  - History of G1EV and gastric AVM from EGD on 7/10. Also consider Heyde's syndrome in setting of severe AS  - FOBT+  - Holding ASA and Lovenox  - EGD 8/16: non-bleeding G2EV, non-bleeding gastric ulcer (biopsied)  - F/U biopsy results. Consider repeat EGD in 2 months to check healing if biopsies unrevealing  - No episodes of melena today.  - Monitor H/H, slowly downtrending. Transfuse slowly if Hb < 7    Severe Aortic Stenosis  - During prior hospitalization required IABP for hemodynamic support in the setting of acute respiratory distress from flash pulmonary edema 2/2 severe AV stenosis  - s/p successful balloon aortic valvuloplasty with 24mm true balloon aortic mean gradient improved from 70 to 36mmHg  - currently undergoing TAVR w/u: CT chest performed, [facility] negative for obstructive CAD  - PFTs ordered for outpatient  - Pt will f/u with  "Interventional Cardiology as outpatient    KATI on CKDIII  - Cr 1.8 today, baseline 1-1.3  - This could by hypotensive ATN in etiology, cardiorenal, overdiuresis or other  - Transition from Lasix gtt to IV push  - Avoid further antihypertensives  - Avoid nephrotoxic agents    Right Hepatic Lobe Lesion  Suspected Cirrhosis  - lesion seen incidentally on CT for TAVR  - followed up with US abd- notes 3cm  - triple phase CT shows 2.4 x 1.6cm irregular enhancing mass in right lobe of liver; 1cm focal nonenhancing areas centrally; with anterior washout, arterial enhancement- LI-RADS 5 lesion  -   - Hepatology consulted last admission; will be discussing case in IR conference 8/7 and then f/u in Hepatology clinic 8/8 (appt needs to be rescheduled)  - anti-smooth muscle Ab + 1:80  Per transplant notes:  - "Given the patient's normal LFT, INR, MELD 9, albumin 2.5; her hepatic disease would not contraindicate her from a surgical procedure (TAVR) - pending TAVR outpatient.  - Hepatic lesion concerning for HCC can be evaluated as an outpatient regarding therapeutic options. AFP elevated (572) which supports this as likely HCC. CT 3 phase pending.  - Concern for cirrhosis on imaging likely secondary to HERNANDEZ. We will continue her workup as an outpatient with modalities such as fibroscan. Iron panel negative. , SUGAR pending, ASMA pending.  - if she is discharged before 8/8 she can follow-up in her previously scheduled appointment with Dr. Ashraf. If she is discharged later than this, please inform hepatology so we can reschedule her appointment."  - has hepatology appt scheduled     DM-II  - A1C 7.0  - per daughter, pt's blood sugar is poorly controlled at home, reaching 300s  - Levemir 8 units nightly and Aspart 3 units TIDWM, titrate accordingly    Hypokalemia  Hypomagnesemia  -Replete as needed     DVT ppx - SCD/CORWIN  CODE status- FULL     Dispo- home in 1-2 days pending clinical improvement    Evelyn Garcias, " MD  [facility] Medicine Staff

## 2018-08-17 NOTE — SUBJECTIVE & OBJECTIVE
Interval History: This AM, pt complains of back pain.     Review of Systems   Constitution: Negative for chills and fever.   HENT: Negative for congestion.    Cardiovascular: Negative for chest pain, dyspnea on exertion, irregular heartbeat, leg swelling, near-syncope, orthopnea, palpitations, paroxysmal nocturnal dyspnea and syncope.   Respiratory: Negative for shortness of breath.    Gastrointestinal: Negative for abdominal pain, nausea and vomiting.   Neurological: Negative for dizziness and headaches.     Objective:     Vital Signs (Most Recent):  Temp: 98.4 °F (36.9 °C) (08/17/18 1137)  Pulse: (!) 111 (08/17/18 1539)  Resp: 18 (08/17/18 0744)  BP: (!) 75/36 (08/17/18 1539)  SpO2: (!) 85 % (08/17/18 1539) Vital Signs (24h Range):  Temp:  [97.4 °F (36.3 °C)-98.7 °F (37.1 °C)] 98.4 °F (36.9 °C)  Pulse:  [] 111  Resp:  [18-19] 18  SpO2:  [78 %-98 %] 85 %  BP: ()/(36-54) 75/36     Weight: 74 kg (163 lb 2.3 oz)  Body mass index is 26.33 kg/m².     SpO2: (!) 85 %  O2 Device (Oxygen Therapy): nasal cannula      Intake/Output Summary (Last 24 hours) at 8/17/2018 1546  Last data filed at 8/17/2018 0600  Gross per 24 hour   Intake 300 ml   Output 1025 ml   Net -725 ml       Lines/Drains/Airways     Peripheral Intravenous Line                 Peripheral IV - Single Lumen 08/15/18 2100 Right Forearm 1 day         Peripheral IV - Single Lumen 08/16/18 0114 Left Upper Arm 1 day                Physical Exam   Constitutional: She is oriented to person, place, and time. No distress.   Neck: No JVD present.   Cardiovascular:   Murmur heard.  Pulmonary/Chest: She has rales.   Abdominal: Soft. Bowel sounds are normal.   Musculoskeletal: She exhibits tenderness. She exhibits no edema.   Neurological: She is alert and oriented to person, place, and time.   Skin: Skin is warm and dry. She is not diaphoretic.       Significant Labs:   BMP:   Recent Labs   Lab  08/16/18   4272  08/17/18   0421   GLU  169*  159*   NA  133*   132*   K  3.8  3.9   CL  89*  89*   CO2  31*  32*   BUN  43*  43*   CREATININE  1.6*  1.8*   CALCIUM  9.2  9.1   MG  2.0  2.0       Significant Imaging:   X-Ray:   FINDINGS:  The cardiac size is slightly smaller than previous.  Interstitial markings the lungs are increased and there is probably a little pleural fluid present on the right but the lungs have improved over the last 4 days with less evidence of pulmonary edema.      Impression       Interval improvement

## 2018-08-17 NOTE — PLAN OF CARE
Problem: Patient Care Overview  Goal: Plan of Care Review  Outcome: Ongoing (interventions implemented as appropriate)  Pt is ALOLI, Ox4. Calm, cooperative. Free of falls, trauma, and injuries. Skin intact. Pt educated on pain management, frequent weight shifts, BG management. Pt demonstrates and verbalizes understanding. VSS.  and Lasix DC'd. 1200cc fluid restriction. BG achs. Insulin dosing adjusted. Plan of care reviewed with pt.

## 2018-08-17 NOTE — PLAN OF CARE
Problem: Patient Care Overview  Goal: Plan of Care Review  Outcome: Ongoing (interventions implemented as appropriate)  Plan of care discussed with pt. Pt AAOx3.  EGD completed-nonbleeding ulcer(biospy pending). Protonix PO daily. No BM tonight. Lasix gtt rate change to 10mg/hr.  infusing. TEDS hose in place for an hour but pt refuses to wear due to leg pain.  Per pt, Leg pain resolves after Nima hose removed. SCD on back order.Encouraging ambulation. VSS & NADN. Pt denies CP, SOB, or pain/discomfort at this time.Pt free of injuries this shift.  All questions addressed.  Will continue to monitor.

## 2018-08-18 PROBLEM — D62 ACUTE BLOOD LOSS ANEMIA: Status: ACTIVE | Noted: 2018-01-01

## 2018-08-18 PROBLEM — N18.30 ACUTE RENAL FAILURE SUPERIMPOSED ON STAGE 3 CHRONIC KIDNEY DISEASE: Status: ACTIVE | Noted: 2018-01-01

## 2018-08-18 PROBLEM — N17.9 ACUTE RENAL FAILURE SUPERIMPOSED ON STAGE 3 CHRONIC KIDNEY DISEASE: Status: ACTIVE | Noted: 2018-01-01

## 2018-08-18 PROBLEM — E87.1 HYPONATREMIA: Status: ACTIVE | Noted: 2018-01-01

## 2018-08-18 PROBLEM — R57.0 CARDIOGENIC SHOCK: Status: ACTIVE | Noted: 2018-01-01

## 2018-08-18 NOTE — PLAN OF CARE
Problem: Occupational Therapy Goal  Goal: Occupational Therapy Goal  Goals to be met by: 7 days 8/26/18     Patient will increase functional independence with ADLs by performing:    Pt to complete standing g/h skills with supervision  Pt to complete toileting skills with supervision.  Pt/fly to demo understanding of encouraging pt to complete mobility/ADL skills daily with rollator to maintain functional independence as able.     Goals updated this date.

## 2018-08-18 NOTE — PLAN OF CARE
Problem: Patient Care Overview  Goal: Plan of Care Review  Outcome: Ongoing (interventions implemented as appropriate)  Pt is A, A, Ox4. Calm, cooperative. Free of falls, trauma, and injuries. Skin intact. Pt educated on benefit of walking, medication therapy. Pt demonstrates and verbalizes understanding. VSS.  DC'd and then resumed per Cardiology. Fluid restriction changed to 2L. Pt ambulated in melchor with OT today. Lasix DC'd. Troponin trending. Insulin adjusted. Plan of care reviewed with pt.

## 2018-08-18 NOTE — PROGRESS NOTES
08/18/18 0840   Vital Signs   Temp 99.2 °F (37.3 °C)   Temp src Oral   Pulse 104   Resp 18   SpO2 (!) 91 %   O2 Device (Oxygen Therapy) nasal cannula   BP (!) 111/57   MAP (mmHg) 75   BP Location Right arm   Patient Position Lying   Pt c/o CP 6/10. Pt had CP overnight as well and CXR was neg and EKG neg. Dr Garcias paged and came to bedside. Vitals as above. Per Dr Garcias, pain is likely not cardiac. No new orders. Will continue to monitor.

## 2018-08-18 NOTE — PROGRESS NOTES
[facility]  Cardiology  Progress Note    Patient Name: Natalie Parnell  MRN: [id number]  Admission Date: 8/3/2018  Hospital Length of Stay: 15 days  Code Status: Full Code   Attending Physician: Evelyn Garcias MD   Primary Care Physician: Octavio Ferrell MD  Expected Discharge Date: 8/19/2018  Principal Problem:Acute on chronic systolic congestive heart failure    Subjective:     HPI:   83 y.o. female with PMH of severe AS with SHEA 0.65cm2, mean gradients 75mmhg, peak velocity 5.3m/sec, recent admission for GI bleed (requiring 1 unit of PRBC and EGD showing non-bleeding angioectasia s/p APC), recent admission for NSTEMI (non obstructive disease on LHC), and discharge yesterday after stay for cardiogenic shock and ADHF. Patient was admitted with cardiogenic shock and discharged yesterday. Over the past week patient was in the CCU required IABP. Repeat LHC was performed given drop in her EF and revealed non obstructive disease. She underwent BAV with improvement in her transvalvular mean gradients to 36mmhg. She was stepped down and discharged yesterday, she went home and was SOB and couldn't sleep all night in the AM patient was found to be tachycardic by her daughter who is a nurse and came back to the ER. In the ER patient was volume overloaded, warm and wet on exam and with pulmonary edema on CXR.     Hospital Course:   During her hospitalization she was diuresed aggressively. Her weight has reduced with lasix ggt however started to creep up again when transitioned to IV lasix pushes. She was then given metolazone x1 and started back on a lasix ggt. Per discussion with Interventional Cardiology, it was decided on 8/14 that pt will be placed on dobutamine ggt @ 2.5 mg/hr with up-titration of lasix ggt to augment diuresis. While she was hospitalized, she had episodes of melena. EGD was done that showed esophageal varices and gastric ulcer. She developed contraction alkalosis and KATI while being  aggressively diuresed so dobutamine ggt was stopped and she was transitioned to IV lasix pushes. Per discussion with Interventional Cardiology, they recommended resuming dobutamine ggt as pt will likely be  dependent. She was given 1 Unit of PRBC with improvement in her Hb. Lasix was then discontinued given her KATI. Regarding difficulty in achieving euvolemia in the pt, planning for RHC next week to aid in further management steps.    Interval History: This AM, pt complained of CP. EKG, Ddimer and troponin were checked.     Review of Systems   Constitution: Positive for weakness and malaise/fatigue. Negative for chills and fever.   Cardiovascular: Positive for chest pain and dyspnea on exertion.   Respiratory: Positive for shortness of breath.    Gastrointestinal: Negative for abdominal pain, nausea and vomiting.   Neurological: Negative for dizziness, focal weakness, headaches and light-headedness.     Objective:     Vital Signs (Most Recent):  Temp: 98.7 °F (37.1 °C) (08/18/18 1203)  Pulse: 104 (08/18/18 1203)  Resp: 18 (08/18/18 0840)  BP: (!) 86/49 (08/18/18 1203)  SpO2: (!) 90 % (08/18/18 1203) Vital Signs (24h Range):  Temp:  [97.3 °F (36.3 °C)-99.8 °F (37.7 °C)] 98.7 °F (37.1 °C)  Pulse:  [] 104  Resp:  [16-18] 18  SpO2:  [85 %-96 %] 90 %  BP: ()/(36-62) 86/49     Weight: 72.6 kg (160 lb 0.9 oz)  Body mass index is 25.83 kg/m².     SpO2: (!) 90 %  O2 Device (Oxygen Therapy): nasal cannula      Intake/Output Summary (Last 24 hours) at 8/18/2018 1510  Last data filed at 8/18/2018 1400  Gross per 24 hour   Intake 710 ml   Output 750 ml   Net -40 ml       Lines/Drains/Airways     Peripheral Intravenous Line                 Peripheral IV - Single Lumen 08/15/18 2100 Right Forearm 2 days         Peripheral IV - Single Lumen 08/16/18 0114 Left Upper Arm 2 days                Physical Exam   Constitutional: She is oriented to person, place, and time.   Eyes: Pupils are equal, round, and reactive to  light.   Neck: Neck supple.   Cardiovascular:   Murmur heard.  Pulmonary/Chest: She has rales.   Abdominal: Soft. Bowel sounds are normal.   Neurological: She is alert and oriented to person, place, and time.   Skin: Skin is warm and dry.       Significant Labs:   BMP:   Recent Labs   Lab  08/17/18   0421  08/18/18   0510  08/18/18   1148   GLU  159*  179*  213*   NA  132*  130*  127*   K  3.9  3.8  4.0   CL  89*  86*  85*   CO2  32*  32*  30*   BUN  43*  47*  50*   CREATININE  1.8*  2.0*  2.0*   CALCIUM  9.1  9.5  9.3   MG  2.0  2.2   --        Significant Imaging:   Echocardiogram:   2D echo with color flow doppler:   Results for orders placed or performed during the hospital encounter of 07/27/18   2D echo with color flow doppler   Result Value Ref Range    EF 20 (A) 55 - 65    Mitral Valve Regurgitation MODERATE (A)     Aortic Valve Stenosis MODERATE TO SEVERE (A)      Assessment and Plan:     Brief HPI: 84 yo F with hx of AS s/p BAV, being worked up for O/P TAVR, HFrEF (20-25%) p/w dCHFe. Hospital course complicated by GIB and difficulty with diuresis.     * Acute on chronic systolic congestive heart failure    -continue with  ggt, lasix stopped because of KATI  -planning for RHC next week to help determine fluid status by proxy of cardiac filling pressures       Severe aortic stenosis    -s/p BAV, awaiting O/P TAVR       Chest pain    -non-cardiac in nature  -pt had recent LHC with no evidence of obstructive CAD  -beside echo performed today, did not show evidence of pericardial effusion  -CP is pleuritic in nature and MSK related  -no need for checking CE's        Suzan Galvin MD  Cardiology Fellow   [facility]

## 2018-08-18 NOTE — PT/OT/SLP RE-EVAL
Occupational Therapy   Re-evaluation    Name: Natalie Parnell  MRN: [id number]  Admitting Diagnosis:  Acute on chronic systolic congestive heart failure 2 Days Post-Op   Pt with recent admit with NSTEMI. Pt returned next day with SOB and HF. OT completed eval and d/c 8/8/18 due to functional performance. Pt is s/p EGD 8/16/18. New orders received for re-eval     Recommendations:     Discharge Recommendations: home with home health      History:     Past Medical History:   Diagnosis Date    Asthma     Diabetes mellitus     HTN (hypertension)     Hyperlipidemia        Past Surgical History:   Procedure Laterality Date    CATARACT EXTRACTION Bilateral     Dr. Max    COLON SURGERY      HERNIA REPAIR      VARICOSE VEIN SURGERY         Subjective     Pt c/o of chest pain for which MD and nsg aware.   Communicated with: nsg prior to session.  Pain/Comfort:  · Pain Rating 1: 4/10  · Location 1: chest  · Pain Addressed 1: Distraction, Reposition    Objective:     OT attempted this AM and pt declined due to fatigue and increased pain. Fly asked for therapy to return this date. OT returned in PM and found pt supine in bed with MD present. MD requesting ambulation on RA with monitoring of oxygen saturation       General Precautions: Standard, fall   Orthopedic Precautions:N/A     Occupational Performance:    Bed Mobility:    · Patient completed Supine to Sit with stand by assistance    Functional Mobility/Transfers:  · Patient completed Sit <> Stand Transfer with stand by assistance  with  4 wheeled walker   · Patient completed Bed <> Chair Transfer using Stand Pivot technique with SBA with 4 wheeled walker    Activities of Daily Living:  Feeding: independent  G/H: pt demo ability to complete this task with set-up; however, pt's family report pt has been declined participation with this task.  UE dressing: MOD A   LE dressing: set-up manage footwear    Cognitive/Visual Perceptual:  Pt awake, alert and following basic  commands in English. Yi is primary language and pt's daughter and granddaughter both present in room to assist with translation       Physical Exam:  Pt is right hand dominant and demo WFL UE strength/ROM, coordination and sensation.       Patient left up in chair with all lines intact, call button in reach, nsg notified and fly present    [facility] 6 Click:  [facility] Total Score: 17    Treatment & Education:  Pt demo tolerated sitting EOB approx 8 min with Fair+ sitting balance. Pt with minimal c/o of SOB at rest and with activity. Pt remained on RA per MD request with oxygen saturation remaining approx 88-89% at rest and with activity.  Pt completed approx 80 feet functional mobility in room and hallway with SBA with rollator. Pt moving more slowly as she continued to ambulate but did not require seated rest break.  OT provided education to pt/fly re: reason for prior d/c of therapy services given her functional performance on eval. Pts daughter reports that pt has not been ambulating or performing ADL's since recent EGD and fears she is not strong enough for d/c home.  OT provided education re: current OT POC as well as importance of mobility/ADL performance when therapy not present in room with fly and nsg support. OT provided education to [facility] staff re: current mobility and family concern.  Education:    Assessment:     Natalie Parnell is a 83 y.o. female with a medical diagnosis of Acute on chronic systolic congestive heart failure. Performance deficits affecting function are weakness, impaired self care skills, impaired functional mobilty, impaired endurance, gait instability.  Pt tolerated session fairly well with good effort. Pt does demo impaired functional endurance and to benefit from therapy to address stated goals.     Rehab Prognosis:  Good ; patient would benefit from acute skilled OT services to address these deficits and reach maximum level of function.       Plan:     Patient to be seen 3 x/week to  address the above listed problems via self-care/home management, therapeutic activities, therapeutic exercises  · Plan of Care Expires: 08/08/18  · Plan of Care Reviewed with: patient, daughter, grandchild(lana)    This Plan of care has been discussed with the patient who was involved in its development and understands and is in agreement with the identified goals and treatment plan    GOALS:   Multidisciplinary Problems     Occupational Therapy Goals        Problem: Occupational Therapy Goal    Goal Priority Disciplines Outcome Interventions   Occupational Therapy Goal     OT, PT/OT     Description:  Goals to be met by: 7 days 8/26/18     Patient will increase functional independence with ADLs by performing:    Pt to complete standing g/h skills with supervision  Pt to complete toileting skills with supervision.  Pt/fly to demo understanding of encouraging pt to complete mobility/ADL skills daily with rollator to maintain functional independence as able.                       Time Tracking:     OT Date of Treatment: 08/18/18  OT Start Time: 1345  OT Stop Time: 1410  OT Total Time (min): 25 min    Billable Minutes:Re-eval 10  Therapeutic Activity 15    CLARISSA Pate  8/18/2018

## 2018-08-18 NOTE — PLAN OF CARE
Problem: Patient Care Overview  Goal: Plan of Care Review  Received in chair family in respirations even and unlabored no distress noted call bell in reach. Discussed plan of care with patient. Patient did have one episode of chest pain during the night MD notified and orders carried out.

## 2018-08-18 NOTE — SIGNIFICANT EVENT
Notified by nurse of patient experiencing chest pain across middle of chest, which was increased with breathing and chest wall palpation.  She appeared uncomfortable and reported feeling that she couldn't get a good breath in from the pain.  EKG obtained and personally reviewed which morphologically appeared unchanged from prior.  Troponin and d-dimer ordered.  Due to lower suspicion of this being cardiac chest pain and low BPs I did not attempt nitrogyclerin, but she felt better after 2mg IV morphine.  CXR obtained showing increased pulmonary edema; patient did get transfused a unit PRBCs overnight.  Troponin came back elevated to 1.588.  I contacted Cardiology fellow to discuss stopping dobutamine, who agreed with this but advised against starting ACS protocol.

## 2018-08-18 NOTE — ASSESSMENT & PLAN NOTE
-continue with  ggt, lasix stopped because of KATI  -planning for RHC next week to help determine fluid status by proxy of cardiac filling pressures

## 2018-08-18 NOTE — SUBJECTIVE & OBJECTIVE
Interval History: This AM, pt complained of CP. EKG, Ddimer and troponin were checked.     Review of Systems   Constitution: Positive for weakness and malaise/fatigue. Negative for chills and fever.   Cardiovascular: Positive for chest pain and dyspnea on exertion.   Respiratory: Positive for shortness of breath.    Gastrointestinal: Negative for abdominal pain, nausea and vomiting.   Neurological: Negative for dizziness, focal weakness, headaches and light-headedness.     Objective:     Vital Signs (Most Recent):  Temp: 98.7 °F (37.1 °C) (08/18/18 1203)  Pulse: 104 (08/18/18 1203)  Resp: 18 (08/18/18 0840)  BP: (!) 86/49 (08/18/18 1203)  SpO2: (!) 90 % (08/18/18 1203) Vital Signs (24h Range):  Temp:  [97.3 °F (36.3 °C)-99.8 °F (37.7 °C)] 98.7 °F (37.1 °C)  Pulse:  [] 104  Resp:  [16-18] 18  SpO2:  [85 %-96 %] 90 %  BP: ()/(36-62) 86/49     Weight: 72.6 kg (160 lb 0.9 oz)  Body mass index is 25.83 kg/m².     SpO2: (!) 90 %  O2 Device (Oxygen Therapy): nasal cannula      Intake/Output Summary (Last 24 hours) at 8/18/2018 1510  Last data filed at 8/18/2018 1400  Gross per 24 hour   Intake 710 ml   Output 750 ml   Net -40 ml       Lines/Drains/Airways     Peripheral Intravenous Line                 Peripheral IV - Single Lumen 08/15/18 2100 Right Forearm 2 days         Peripheral IV - Single Lumen 08/16/18 0114 Left Upper Arm 2 days                Physical Exam   Constitutional: She is oriented to person, place, and time.   Eyes: Pupils are equal, round, and reactive to light.   Neck: Neck supple.   Cardiovascular:   Murmur heard.  Pulmonary/Chest: She has rales.   Abdominal: Soft. Bowel sounds are normal.   Neurological: She is alert and oriented to person, place, and time.   Skin: Skin is warm and dry.       Significant Labs:   BMP:   Recent Labs   Lab  08/17/18   0421  08/18/18   0510  08/18/18   1148   GLU  159*  179*  213*   NA  132*  130*  127*   K  3.9  3.8  4.0   CL  89*  86*  85*   CO2  32*  32*   30*   BUN  43*  47*  50*   CREATININE  1.8*  2.0*  2.0*   CALCIUM  9.1  9.5  9.3   MG  2.0  2.2   --        Significant Imaging:   Echocardiogram:   2D echo with color flow doppler:   Results for orders placed or performed during the hospital encounter of 07/27/18   2D echo with color flow doppler   Result Value Ref Range    EF 20 (A) 55 - 65    Mitral Valve Regurgitation MODERATE (A)     Aortic Valve Stenosis MODERATE TO SEVERE (A)

## 2018-08-18 NOTE — NURSING
Patient called out states that she is having chest pain and pressure. Into room v/s stable. Pain is reproducible with mild tactile stimulation. Patient says pain worse on inspiration and that it makes it difficult to take a deep breath. Dr. ennis paged.

## 2018-08-18 NOTE — PROGRESS NOTES
Patient BP 78/44. Patient asymptomatic. Awaiting type and screen to start blood. MD Jose C notified. Orders to continue with plan to hang blood and then start Dobutamine. No further orders given at this time. Will continue to monitor.

## 2018-08-18 NOTE — PROGRESS NOTES
Progress Note  Hospital Medicine    Primary Team: [facility] MED C  Admit Date: 8/3/2018   Length of Stay:  LOS: 15 days    SUBJECTIVE:   Reason for Admission:  Acute on chronic systolic congestive heart failure    HPI:  Patient is a 83F with PMH of chronic systolic HF with EF 20-25%, severe AS with SHEA 0.65cm2, mean gradients 75mmhg, peak velocity 5.3m/sec, recent admission for GI bleed (requiring 1 unit of PRBC and EGD showing non-bleeding angioectasia s/p APC), recent admission for NSTEMI (without evidence CAD on [facility]), and discharge yesterday after stay for cardiogenic shock and ADHF, presented with acute onset of SOB.  Pt reports feeling well upon discharge and in the evening, but awoke at 0100 SOB and gasping for air.  She urinated twice overnight.  Also notes LE edema has developed.  Patient admitted for evaluation of acute on chronic systolic heart failure.    ECHO 7/27/2018  CONCLUSIONS     1 - Moderate left ventricular enlargement.     2 - Severely depressed left ventricular systolic function (EF 20-25%).     3 - Left atrial enlargement.     4 - Low normal to mildly depressed right ventricular systolic function .     5 - Moderate to severe aortic stenosis.     6 - Moderate mitral regurgitation.     7 - Increased central venous pressure.     8 - If clinically indicated, a full Doppler study can be performed.    Interval history:     Transfused 1 unit overnight without issues. Complaining of bilateral chest pain worse with inspiration and reproducible on exam.     Review of Systems:  Constitutional: +fatigue, no fever or chills, negative for fevers, malaise and sweats  Respiratory: no cough, no shortness of breath  Cardiovascular: +chest pain. no palpitations  Gastrointestinal: no nausea or vomiting, no abdominal pain or change in bowel habits  Musculoskeletal: no arthralgias or myalgias     OBJECTIVE:     Temp:  [97.3 °F (36.3 °C)-99.8 °F (37.7 °C)]   Pulse:  []   Resp:  [16-18]   BP: ()/(36-62)    SpO2:  [85 %-96 %]  Body mass index is 25.83 kg/m².  Intake/Outake:  This Shift:  No intake/output data recorded.    Net I/O past 24h:     Intake/Output Summary (Last 24 hours) at 8/18/2018 1420  Last data filed at 8/18/2018 0600  Gross per 24 hour   Intake 710 ml   Output 500 ml   Net 210 ml             Physical Exam:  Gen- well-developed, well-nourished, fatigued  CVS- S1 and S2 present, 3/6 systolic murmur, RRR, chest wall very tender to palpation  Resp- no work of breathing, bibasilar crackles improved  Abd- BS+, soft, NT, ND  Ext- no edema, no clubbing or cyanosis    Laboratory:  CBC/Anemia Labs: Coags:    Recent Labs   Lab  08/15/18   1358   08/16/18   0452  08/17/18   0421  08/18/18   0510   WBC   --    --   4.91  5.71  6.59   HGB   --    < >  8.0*  7.4*  9.1*   HCT   --    --   24.1*  22.3*  27.2*   PLT   --    --   220  210  214   MCV   --    --   94  94  92   RDW   --    --   14.7*  14.7*  14.9*   OCCULTBLOOD  Positive*   --    --    --    --     < > = values in this interval not displayed.    Recent Labs   Lab  08/14/18   1647   INR  1.2        Chemistries:   Recent Labs   Lab  08/16/18   0452  08/17/18   0421  08/18/18   0510  08/18/18   1148   NA  133*  132*  130*  127*   K  3.8  3.9  3.8  4.0   CL  89*  89*  86*  85*   CO2  31*  32*  32*  30*   BUN  43*  43*  47*  50*   CREATININE  1.6*  1.8*  2.0*  2.0*   CALCIUM  9.2  9.1  9.5  9.3   PROT  6.2  5.7*  6.3   --    BILITOT  0.9  1.1*  1.3*   --    ALKPHOS  125  106  116   --    ALT  19  20  21   --    AST  52*  56*  55*   --    MG  2.0  2.0  2.2   --         ABG:   No results for input(s): PH, PCO2, PO2, HCO3, POCSATURATED, BE in the last 168 hours.     Cardiac Enzymes: Ejection Fractions:    Recent Labs      08/18/18   0510  08/18/18   1148   TROPONINI  1.588*  1.102*    EF   Date Value Ref Range Status   07/27/2018 20 (A) 55 - 65    07/09/2018 50 55 - 65            Medications:  Scheduled Meds:   atorvastatin  40 mg Oral Daily    insulin aspart  U-100  4 Units Subcutaneous TIDWM    insulin detemir U-100  12 Units Subcutaneous QHS    pantoprazole  40 mg Oral Daily                             Continuous Infusions:   DOBUTamine       PRN Meds:.sodium chloride, acetaminophen, cyclobenzaprine, dextrose 50%, dextrose 50%, glucagon (human recombinant), glucose, glucose, HYDROcodone-acetaminophen, insulin aspart U-100, ondansetron, polyethylene glycol, sodium chloride 0.9%, sodium chloride 0.9%, traMADol     ASSESSMENT/PLAN:     Active Hospital Problems    Diagnosis  POA    *Acute on chronic systolic congestive heart failure [I50.23]  Yes    Dark stools [R19.5]  No    Hypokalemia [E87.6]  No    Severe aortic stenosis [I35.0]  Yes    Type 2 diabetes mellitus, without long-term current use of insulin [E11.9]  Yes      Resolved Hospital Problems   No resolved problems to display.     Acute on Chronic Systolic Heart Failure  Acute Hypoxemic Respiratory Failure 2/2 ADHF  Cardiogenic shock  - Cardiology recommends continuing dobutamine gtt, believe that she will likely need to be discharged with it. Will eventually need PICC line if this is the case.  - Cardiology considering [place] [facility] on Monday  - Bedside echo 8/18 by Cardiology without pericardial effusion, EF similar to previous.  - Hold diuresis today due to worsening renal function and hyponatremia. Will be more liberal with fluid intake today (2 liters)  - Wean O2 as tolerated  - Continue strict I/O, low salt diet, daily weights, fluid restriction  - When euvolemic, will start beta-blocker and ACEI if tolerated by BP    KATI on CKDIII  - Cr 2 today, baseline 1-1.3. Repeat renal function today looks stable  - This could by hypotensive ATN in etiology, cardiorenal, overdiuresis or other  - Discontinue diuretics altogether today  - Avoid further antihypertensives  - Avoid nephrotoxic agents    Hyponatremia  - Secondary to diuresis  - Holding diuresis today  - Monitor daily.   - May need gentle hydration with  "normal saline if does not improve and if volume status allows    Chest pain  - Musculoskeletal. Reproducible on exam. Chest wall very tender to palpation.  - No ECG changes.  - Troponin elevated but in setting of cardiogenic shock, downtrended.    Melena  Acute blood loss anemia  - History of G1EV and gastric AVM from EGD on 7/10. Also consider Heyde's syndrome in setting of severe AS  - FOBT+  - Holding ASA and Lovenox  - EGD 8/16: non-bleeding G2EV, non-bleeding gastric ulcer (biopsied)  - F/U biopsy results. Consider repeat EGD in 2 months to check healing if biopsies unrevealing  - No further episodes of melena  - Transfused 1 unit 8/17  - Cont daily PPI  - Monitor H/H, stable today    Severe Aortic Stenosis  - During prior hospitalization required IABP for hemodynamic support in the setting of acute respiratory distress from flash pulmonary edema 2/2 severe AV stenosis  - s/p successful balloon aortic valvuloplasty with 24mm true balloon aortic mean gradient improved from 70 to 36mmHg  - currently undergoing TAVR w/u: CT chest performed, [facility] negative for obstructive CAD  - PFTs ordered for outpatient  - Pt will f/u with Interventional Cardiology as outpatient    Right Hepatic Lobe Lesion  Suspected Cirrhosis  - lesion seen incidentally on CT for TAVR  - followed up with US abd- notes 3cm  - triple phase CT shows 2.4 x 1.6cm irregular enhancing mass in right lobe of liver; 1cm focal nonenhancing areas centrally; with anterior washout, arterial enhancement- LI-RADS 5 lesion  -   - Hepatology consulted last admission; will be discussing case in IR conference 8/7 and then f/u in Hepatology clinic 8/8 (appt needs to be rescheduled)  - anti-smooth muscle Ab + 1:80  Per transplant notes:  - "Given the patient's normal LFT, INR, MELD 9, albumin 2.5; her hepatic disease would not contraindicate her from a surgical procedure (TAVR) - pending TAVR outpatient.  - Hepatic lesion concerning for HCC can be evaluated as " "an outpatient regarding therapeutic options. AFP elevated (572) which supports this as likely HCC. CT 3 phase pending.  - Concern for cirrhosis on imaging likely secondary to HERNANDEZ. We will continue her workup as an outpatient with modalities such as fibroscan. Iron panel negative. , SUGAR pending, ASMA pending.  - if she is discharged before 8/8 she can follow-up in her previously scheduled appointment with Dr. Ashraf. If she is discharged later than this, please inform hepatology so we can reschedule her appointment."  - has hepatology appt scheduled     DM-II  - A1C 7.0  - per daughter, pt's blood sugar is poorly controlled at home, reaching 300s  - Levemir 12 units nightly and Aspart 4 units TIDWM, titrate accordingly    Hypokalemia  Hypomagnesemia  -Replete as needed     DVT ppx - SCD/CORWIN  CODE status- FULL     Dispo- possible [facility] on Monday, anticipate several day admission for above issues    Evelyn Garcias MD  Hospital Medicine Staff     "

## 2018-08-18 NOTE — ASSESSMENT & PLAN NOTE
-non-cardiac in nature  -pt had recent LHC with no evidence of obstructive CAD  -beside echo performed today, did not show evidence of pericardial effusion  -CP is pleuritic in nature and MSK related  -no need for checking CE's

## 2018-08-19 NOTE — PLAN OF CARE
Problem: Physical Therapy Goal  Goal: Physical Therapy Goal  Goals to be met by:      Patient will increase functional independence with mobility by performin. Sit to stand transfer with Modified [place]  2. Gait  x 400 feet with Modified [place] using rollator.   3. Ascend/descend 6 stair with right Handrails Supervision .     Outcome: Ongoing (interventions implemented as appropriate)  PT re-eval completed.  Pt performing mobility safely with RW.  PT to see pt 2-3x/week to work on high level balance and gait over stairs.    Andrez Mason, PT  2018

## 2018-08-19 NOTE — SUBJECTIVE & OBJECTIVE
Interval History: No acute events overnight. Pt is feeling much better this morning, chest wall discomfort has significantly improved today. Pt remains on dobutamine gtt and received light hydration with 1 L NS. Denies SOB, hemodynamically stable and satting 91% on RA.    Review of Systems   Constitution: Positive for weakness and malaise/fatigue. Negative for chills and fever.   HENT: Negative for ear pain and sore throat.    Eyes: Negative for blurred vision and pain.   Cardiovascular: Negative for chest pain and dyspnea on exertion.   Respiratory: Positive for shortness of breath.    Hematologic/Lymphatic: Negative for adenopathy.   Musculoskeletal: Positive for back pain.   Gastrointestinal: Negative for abdominal pain, constipation, heartburn, hematemesis, nausea and vomiting.   Neurological: Negative for dizziness, focal weakness, headaches and light-headedness.   Psychiatric/Behavioral: Negative for altered mental status.     Objective:     Vital Signs (Most Recent):  Temp: 98.3 °F (36.8 °C) (08/19/18 1150)  Pulse: 101 (08/19/18 1200)  Resp: 18 (08/19/18 1150)  BP: (!) 82/46 (08/19/18 1150)  SpO2: (!) 90 % (08/19/18 1150) Vital Signs (24h Range):  Temp:  [98.3 °F (36.8 °C)-98.8 °F (37.1 °C)] 98.3 °F (36.8 °C)  Pulse:  [] 101  Resp:  [16-18] 18  SpO2:  [90 %-97 %] 90 %  BP: ()/(46-53) 82/46     Weight: 77.7 kg (171 lb 3 oz)  Body mass index is 27.63 kg/m².     SpO2: (!) 90 %  O2 Device (Oxygen Therapy): room air      Intake/Output Summary (Last 24 hours) at 8/19/2018 1446  Last data filed at 8/19/2018 0600  Gross per 24 hour   Intake 888.84 ml   Output 150 ml   Net 738.84 ml       Lines/Drains/Airways     Peripheral Intravenous Line                 Peripheral IV - Single Lumen 08/16/18 0114 Left Upper Arm 3 days                Physical Exam   Constitutional: She is oriented to person, place, and time. She appears well-developed and well-nourished. No distress.   HENT:   Head: Normocephalic and  atraumatic.   Mouth/Throat: Oropharynx is clear and moist.   Eyes: EOM are normal. Pupils are equal, round, and reactive to light.   Neck: Normal range of motion. Neck supple.   Cardiovascular: Normal rate and regular rhythm.   Murmur heard.  Pulmonary/Chest: Effort normal and breath sounds normal.   Abdominal: Soft. Bowel sounds are normal. She exhibits no distension. There is no tenderness.   Lymphadenopathy:     She has no cervical adenopathy.   Neurological: She is alert and oriented to person, place, and time.   Skin: Skin is warm and dry. She is not diaphoretic.       Significant Labs:   CMP   Recent Labs   Lab  08/18/18   1756  08/19/18   0417  08/19/18   0827   NA  125*  126*  128*   K  4.2  3.9  4.0   CL  84*  85*  87*   CO2  29  31*  32*   GLU  244*  176*  150*   BUN  55*  59*  59*   CREATININE  2.4*  2.2*  2.1*   CALCIUM  9.5  8.8  9.0   PROT  6.7  5.9*  6.1   ALBUMIN  3.1*  2.7*  2.8*   BILITOT  1.6*  1.1*  1.2*   ALKPHOS  112  110  115   AST  57*  49*  49*   ALT  22  20  21   ANIONGAP  12  10  9   ESTGFRAFRICA  21.2*  23.5*  24.9*   EGFRNONAA  18.4*  20.4*  21.6*   , CBC   Recent Labs   Lab  08/18/18   0510  08/19/18   0417   WBC  6.59  6.68   HGB  9.1*  8.1*   HCT  27.2*  25.0*   PLT  214  194    and   Recent Lab Results       08/19/18  1217 08/19/18  0827 08/19/18  0808 08/19/18  0417 08/18/18  2132      Immature Granulocytes    0.1      Immature Grans (Abs)    0.01  Comment:  Mild elevation in immature granulocytes is non specific and   can be seen in a variety of conditions including stress response,   acute inflammation, trauma and pregnancy. Correlation with other   laboratory and clinical findings is essential.        Albumin  2.8  2.7      Alkaline Phosphatase  115  110      ALT  21  20      Anion Gap  9  10      AST  49  49      Baso #    0.02      Basophil%    0.3      Total Bilirubin  1.2  Comment:  For infants and newborns, interpretation of results should be based  on gestational age,  weight and in agreement with clinical  observations.  Premature Infant recommended reference ranges:  Up to 24 hours.............<8.0 mg/dL  Up to 48 hours............<12.0 mg/dL  3-5 days..................<15.0 mg/dL  6-29 days.................<15.0 mg/dL    1.1  Comment:  For infants and newborns, interpretation of results should be based  on gestational age, weight and in agreement with clinical  observations.  Premature Infant recommended reference ranges:  Up to 24 hours.............<8.0 mg/dL  Up to 48 hours............<12.0 mg/dL  3-5 days..................<15.0 mg/dL  6-29 days.................<15.0 mg/dL        BUN, Bld  59  59      Calcium  9.0  8.8      Chloride  87  85      CO2  32  31      Creatinine  2.1  2.2      Differential Method    Automated      eGFR if   24.9  23.5      eGFR if non   21.6  Comment:  Calculation used to obtain the estimated glomerular filtration  rate (eGFR) is the CKD-EPI equation.     20.4  Comment:  Calculation used to obtain the estimated glomerular filtration  rate (eGFR) is the CKD-EPI equation.         Eos #    0.1      Eosinophil%    1.2      Glucose  150  176      Gran # (ANC)    4.7      Gran%    70.1      Hematocrit    25.0      Hemoglobin    8.1      Lymph #    1.2      Lymph%    17.2      Magnesium    2.4      MCH    30.0      MCHC    32.4      MCV    93      Mono #    0.7      Mono%    11.1      MPV    9.4      nRBC    0      Platelets    194      POCT Glucose 230  175  225     Potassium  4.0  3.9      Total Protein  6.1  5.9      RBC    2.70      RDW    14.9      Sodium  128  126      WBC    6.68                  08/18/18  1756 08/18/18  1712      Immature Granulocytes       Immature Grans (Abs)       Albumin 3.1      Alkaline Phosphatase 112      ALT 22      Anion Gap 12      AST 57      Baso #       Basophil%       Total Bilirubin 1.6  Comment:  For infants and newborns, interpretation of results should be based  on gestational  age, weight and in agreement with clinical  observations.  Premature Infant recommended reference ranges:  Up to 24 hours.............<8.0 mg/dL  Up to 48 hours............<12.0 mg/dL  3-5 days..................<15.0 mg/dL  6-29 days.................<15.0 mg/dL        BUN, Bld 55      Calcium 9.5      Chloride 84      CO2 29      Creatinine 2.4      Differential Method       eGFR if African American 21.2      eGFR if non  18.4  Comment:  Calculation used to obtain the estimated glomerular filtration  rate (eGFR) is the CKD-EPI equation.         Eos #       Eosinophil%       Glucose 244      Gran # (ANC)       Gran%       Hematocrit       Hemoglobin       Lymph #       Lymph%       Magnesium       MCH       MCHC       MCV       Mono #       Mono%       MPV       nRBC       Platelets       POCT Glucose  249     Potassium 4.2      Total Protein 6.7      RBC       RDW       Sodium 125      WBC             Significant Imaging: All imaging within last 24 hours has been reveiewed.

## 2018-08-19 NOTE — ASSESSMENT & PLAN NOTE
-non-cardiac in nature  -pt had recent LHC with no evidence of obstructive CAD  -beside echo performed today, did not show evidence of pericardial effusion  -CP is pleuritic in nature and MSK related, the pain is much improved today  -no need for checking CE's

## 2018-08-19 NOTE — PT/OT/SLP RE-EVAL
Physical Therapy Re-evaluation    Patient Name:  Natalie Parnell   MRN:  [id number]    Recommendations:     Discharge Recommendations:  home health PT   Discharge Equipment Recommendations: none   Barriers to discharge: None    Assessment:     Natalie Parnell is a 83 y.o. female admitted with a medical diagnosis of Acute on chronic systolic congestive heart failure.  She presents with the following impairments/functional limitations:  weakness, impaired endurance, impaired balance. Pt currently ambulating at a SBA/Supervision level with Rollator.  At this time, pt will benefit from HHPT and PT to work on endurance and stiair mobiltiy and balance.    Rehab Prognosis:  good; patient would benefit from acute skilled PT services to address these deficits and reach maximum level of function.      Recent Surgery: Procedure(s) (LRB):  ESOPHAGOGASTRODUODENOSCOPY (EGD) (N/A) 3 Days Post-Op    Plan:     During this hospitalization, patient to be seen 2 x/week to address the above listed problems via gait training, therapeutic activities, therapeutic exercises  · Plan of Care Expires:  09/18/18   Plan of Care Reviewed with: patient    Subjective     Communicated with RN prior to session.  Patient found supine upon PT entry to room, agreeable to evaluation.      Chief Complaint: fatigue in upper body with mobility  Patient comments/goals: desire to return to her PLOF  Pain/Comfort:  · Pain Rating 1: 0/10  · Pain Rating Post-Intervention 1: 0/10    Patients cultural, spiritual, Shinto conflicts given the current situation: none      Objective:     Patient found with: telemetry     General Precautions: Standard, fall   Orthopedic Precautions:N/A   Braces: N/A     Exams:  · RLE ROM: WFL  · RLE Strength: WFL  · LLE ROM: WFL  · LLE Strength: WFL    Functional Mobility:  · Bed Mobility:     · Supine to Sit: independence  · Transfers:     · Sit to Stand:  supervision with 4 wheeled walker  · Gait: 300ft.  slow gait speed with 3 standing  rest breaks and rollator.  no LOB    AM-PAC 6 CLICK MOBILITY  Total Score:22       Therapeutic Activities and Exercises:   pt educated on safety and need for regular mobility  Pt educated on need for intermittent support at d/c    Patient left up in chair with all lines intact and call button in reach.    GOALS:   Multidisciplinary Problems     Physical Therapy Goals        Problem: Physical Therapy Goal    Goal Priority Disciplines Outcome Goal Variances Interventions   Physical Therapy Goal     PT, PT/OT Ongoing (interventions implemented as appropriate)     Description:  Goals to be met by:      Patient will increase functional independence with mobility by performin. Sit to stand transfer with Modified [place]  2. Gait  x 400 feet with Modified [place] using rollator.   3. Ascend/descend 6 stair with right Handrails Supervision .                       History:     Past Medical History:   Diagnosis Date    Asthma     Diabetes mellitus     HTN (hypertension)     Hyperlipidemia        Past Surgical History:   Procedure Laterality Date    CATARACT EXTRACTION Bilateral     Dr. Max    COLON SURGERY      HERNIA REPAIR      VARICOSE VEIN SURGERY           Time Tracking:     PT Received On: 18  PT Start Time: 1335     PT Stop Time: 1358  PT Total Time (min): 23 min     Billable Minutes: Re-eval 15 and Gait Training 8      Andrez Mason, PT  2018

## 2018-08-19 NOTE — PROGRESS NOTES
Progress Note  Hospital Medicine    Primary Team: [facility] HOSP MED C  Admit Date: 8/3/2018   Length of Stay:  LOS: 16 days    SUBJECTIVE:   Reason for Admission:  Acute on chronic systolic congestive heart failure    HPI:  Patient is a 83F with PMH of chronic systolic HF with EF 20-25%, severe AS with SEHA 0.65cm2, mean gradients 75mmhg, peak velocity 5.3m/sec, recent admission for GI bleed (requiring 1 unit of PRBC and EGD showing non-bleeding angioectasia s/p APC), recent admission for NSTEMI (without evidence CAD on [facility]), and discharge yesterday after stay for cardiogenic shock and ADHF, presented with acute onset of SOB.  Pt reports feeling well upon discharge and in the evening, but awoke at 0100 SOB and gasping for air.  She urinated twice overnight.  Also notes LE edema has developed.  Patient admitted for evaluation of acute on chronic systolic heart failure.    ECHO 7/27/2018  CONCLUSIONS     1 - Moderate left ventricular enlargement.     2 - Severely depressed left ventricular systolic function (EF 20-25%).     3 - Left atrial enlargement.     4 - Low normal to mildly depressed right ventricular systolic function .     5 - Moderate to severe aortic stenosis.     6 - Moderate mitral regurgitation.     7 - Increased central venous pressure.     8 - If clinically indicated, a full Doppler study can be performed.    Interval history:     Remains on dobutamine gtt. Received gentle hydration overnight with one liter of normal saline. Patient in much better spirits this morning. Sitting up in her chair. Feels a lot better after having a shower last night. Chest wall tenderness is improving today. Denies SOB. Satting 90% on room air.     Review of Systems:  Constitutional: +fatigue, no fever or chills, negative for fevers, malaise and sweats  Respiratory: no cough, no shortness of breath  Cardiovascular: + chest wall pain, no palpitations  Gastrointestinal: no nausea or vomiting, no abdominal pain or change in bowel  habits  Musculoskeletal: no arthralgias or myalgias     OBJECTIVE:     Temp:  [98.3 °F (36.8 °C)-98.8 °F (37.1 °C)]   Pulse:  []   Resp:  [16-18]   BP: ()/(46-53)   SpO2:  [90 %-97 %]  Body mass index is 25.83 kg/m².  Intake/Outake:  This Shift:  No intake/output data recorded.    Net I/O past 24h:     Intake/Output Summary (Last 24 hours) at 8/19/2018 1140  Last data filed at 8/19/2018 0600  Gross per 24 hour   Intake 1318.84 ml   Output 400 ml   Net 918.84 ml             Physical Exam:  Gen- well-developed, well-nourished, in good spirits, no distress  CVS- S1 and S2 present, 3/6 systolic murmur, RRR, chest wall tenderness to palpation (improved)  Resp- no work of breathing, baseline crackles present  Abd- BS+, soft, NT, ND  Ext- no edema, no clubbing or cyanosis    Laboratory:  CBC/Anemia Labs: Coags:    Recent Labs   Lab  08/15/18   1358   08/17/18   0421  08/18/18   0510  08/19/18   0417   WBC   --    < >  5.71  6.59  6.68   HGB   --    < >  7.4*  9.1*  8.1*   HCT   --    < >  22.3*  27.2*  25.0*   PLT   --    < >  210  214  194   MCV   --    < >  94  92  93   RDW   --    < >  14.7*  14.9*  14.9*   OCCULTBLOOD  Positive*   --    --    --    --     < > = values in this interval not displayed.    Recent Labs   Lab  08/14/18   1647   INR  1.2        Chemistries:   Recent Labs   Lab  08/17/18   0421  08/18/18   0510  08/18/18   1148  08/18/18   1756  08/19/18   0417   NA  132*  130*  127*  125*  126*   K  3.9  3.8  4.0  4.2  3.9   CL  89*  86*  85*  84*  85*   CO2  32*  32*  30*  29  31*   BUN  43*  47*  50*  55*  59*   CREATININE  1.8*  2.0*  2.0*  2.4*  2.2*   CALCIUM  9.1  9.5  9.3  9.5  8.8   PROT  5.7*  6.3   --   6.7  5.9*   BILITOT  1.1*  1.3*   --   1.6*  1.1*   ALKPHOS  106  116   --   112  110   ALT  20  21   --   22  20   AST  56*  55*   --   57*  49*   MG  2.0  2.2   --    --   2.4        ABG:   No results for input(s): PH, PCO2, PO2, HCO3, POCSATURATED, BE in the last 168 hours.     Cardiac  Enzymes: Ejection Fractions:    Recent Labs      08/18/18   0510  08/18/18   1148   TROPONINI  1.588*  1.102*    EF   Date Value Ref Range Status   07/27/2018 20 (A) 55 - 65    07/09/2018 50 55 - 65            Medications:  Scheduled Meds:   sodium chloride 0.9%   Intravenous Once    atorvastatin  40 mg Oral Daily    insulin aspart U-100  4 Units Subcutaneous TIDWM    insulin detemir U-100  12 Units Subcutaneous QHS    pantoprazole  40 mg Oral Daily                             Continuous Infusions:   DOBUTamine 2.5 mcg/kg/min (08/18/18 1524)     PRN Meds:.sodium chloride, acetaminophen, dextrose 50%, dextrose 50%, glucagon (human recombinant), glucose, glucose, HYDROcodone-acetaminophen, insulin aspart U-100, methocarbamol, ondansetron, polyethylene glycol, sodium chloride 0.9%, sodium chloride 0.9%, traMADol     ASSESSMENT/PLAN:     Active Hospital Problems    Diagnosis  POA    *Acute on chronic systolic congestive heart failure [I50.23]  Yes    Acute blood loss anemia [D62]  No    Hyponatremia [E87.1]  No    Acute renal failure superimposed on stage 3 chronic kidney disease [N17.9, N18.3]  No    Cardiogenic shock [R57.0]  Yes    Dark stools [R19.5]  No    Hypokalemia [E87.6]  No    Chest pain [R07.9]  No    Severe aortic stenosis [I35.0]  Yes    Type 2 diabetes mellitus, without long-term current use of insulin [E11.9]  Yes      Resolved Hospital Problems   No resolved problems to display.     Acute on Chronic Systolic Heart Failure  Acute Hypoxemic Respiratory Failure 2/2 ADHF  Cardiogenic shock  - Cardiology recommends continuing dobutamine gtt, believe that she will likely need to be discharged with it. Will eventually need PICC line if this is the case.  - Cardiology considering RHC on Monday  - Bedside echo 8/18 by Cardiology without pericardial effusion, EF similar to previous.  - Continue holding diuresis due to KATI and hyponatremia. Be more liberal with fluid intake (2 liters)  - Wean O2 as  tolerated  - Continue strict I/O, low salt diet, daily weights, fluid restriction  - When euvolemic, will start beta-blocker and ACEI if tolerated by BP    KATI on CKDIII  - Cr 2.2 today, peaked at 2.4, improved with NS overnight. Baseline 1-1.3.   - Monitor renal function BID  - This could by hypotensive ATN in etiology, cardiorenal, overdiuresis or other  - Cont holding diuretics  - Avoid further antihypertensives  - Avoid nephrotoxic agents    Hyponatremia  - Stable today. Secondary to diuresis  - Hold diuresis  - Continue gentle hydration for one liter NS today, may need additional IVF's  - Repeat BMP later today    Chest pain  - Musculoskeletal. Reproducible on exam. Chest wall very tender to palpation.  - No ECG changes.  - Troponin elevated but in setting of cardiogenic shock, downtrended.    Melena  Acute blood loss anemia  - History of G1EV and gastric AVM from EGD on 7/10. Also consider Heyde's syndrome in setting of severe AS  - FOBT+  - Holding ASA and Lovenox  - EGD 8/16: non-bleeding G2EV, non-bleeding gastric ulcer (biopsied)  - F/U biopsy results. Consider repeat EGD in 2 months to check healing if biopsies unrevealing  - No further episodes of melena  - Transfused 1 unit 8/17  - Cont daily PPI  - Monitor H/H, stable    Severe Aortic Stenosis  - During prior hospitalization required IABP for hemodynamic support in the setting of acute respiratory distress from flash pulmonary edema 2/2 severe AV stenosis  - s/p successful balloon aortic valvuloplasty with 24mm true balloon aortic mean gradient improved from 70 to 36mmHg  - currently undergoing TAVR w/u: CT chest performed, [facility] negative for obstructive CAD  - PFTs ordered for outpatient  - Pt will f/u with Interventional Cardiology as outpatient    Right Hepatic Lobe Lesion  Suspected Cirrhosis  - lesion seen incidentally on CT for TAVR  - followed up with US abd- notes 3cm  - triple phase CT shows 2.4 x 1.6cm irregular enhancing mass in right lobe of  "liver; 1cm focal nonenhancing areas centrally; with anterior washout, arterial enhancement- LI-RADS 5 lesion  -   - Hepatology consulted last admission; will be discussing case in IR conference 8/7 and then f/u in Hepatology clinic 8/8 (appt needs to be rescheduled)  - anti-smooth muscle Ab + 1:80  Per transplant notes:  - "Given the patient's normal LFT, INR, MELD 9, albumin 2.5; her hepatic disease would not contraindicate her from a surgical procedure (TAVR) - pending TAVR outpatient.  - Hepatic lesion concerning for HCC can be evaluated as an outpatient regarding therapeutic options. AFP elevated (572) which supports this as likely HCC. CT 3 phase pending.  - Concern for cirrhosis on imaging likely secondary to HERNANDEZ. We will continue her workup as an outpatient with modalities such as fibroscan. Iron panel negative. , SUGAR pending, ASMA pending.  - if she is discharged before 8/8 she can follow-up in her previously scheduled appointment with Dr. Ashraf. If she is discharged later than this, please inform hepatology so we can reschedule her appointment."  - has hepatology appt scheduled     DM-II  - A1C 7.0  - per daughter, pt's blood sugar is poorly controlled at home, reaching 300s  - Levemir 12 units nightly and Aspart 4 units TIDWM, titrate accordingly    Hypokalemia  Hypomagnesemia  -Replete as needed     DVT ppx - SCD/CORWIN  CODE status- FULL     Dispo- possible RHC on Monday, anticipate several day admission for above issues    Evelyn Garcias MD  Hospital Medicine Staff     "

## 2018-08-19 NOTE — ASSESSMENT & PLAN NOTE
-continue with  ggt, lasix stopped because of KATI  -planning for RHC next week as outpatient to help determine fluid status by proxy of cardiac filling pressures  -Plan to discharge patient home tomorrow  -Patient will likely need to be discharged home on dobutamine, we will subsequently figure out the appropriate dose of diuretic tomorrow based on renal function labs and how patient feels. BUN/Cr improving slowly, today 59/2.1  -PICC line to be placed tomorrow.    -Hold off on starting BB d/t pt's stable but low blood pressures.

## 2018-08-19 NOTE — PROGRESS NOTES
[facility]  Cardiology  Progress Note    Patient Name: Natalie Parnell  MRN: [id number]  Admission Date: 8/3/2018  Hospital Length of Stay: 16 days  Code Status: Full Code   Attending Physician: Evelyn Garcias MD   Primary Care Physician: Octavio Ferrell MD  Expected Discharge Date: 8/19/2018  Principal Problem:Acute on chronic systolic congestive heart failure    Subjective:     Hospital Course:   During her hospitalization she was diuresed aggressively. Her weight has reduced with lasix ggt however started to creep up again when transitioned to IV lasix pushes. She was then given metolazone x1 and started back on a lasix ggt. Per discussion with Interventional Cardiology, it was decided on 8/14 that pt will be placed on dobutamine ggt @ 2.5 mg/hr with up-titration of lasix ggt to augment diuresis. While she was hospitalized, she had episodes of melena. EGD was done that showed esophageal varices and gastric ulcer. She developed contraction alkalosis and KATI while being aggressively diuresed so dobutamine ggt was stopped and she was transitioned to IV lasix pushes. Per discussion with Interventional Cardiology, recommended resuming dobutamine ggt as pt will likely be  dependent. She was given 1 Unit of PRBC with improvement in her Hb. Lasix was then discontinued given her KATI. Regarding difficulty in achieving euvolemia in the pt, planning for RHC next week to aid in further management steps.    Interval History: No acute events overnight. Pt is feeling much better this morning, chest wall discomfort has significantly improved today. Pt remains on dobutamine gtt and received light hydration with 1 L NS. Denies SOB, hemodynamically stable and satting 91% on RA.    Review of Systems   Constitution: Positive for weakness and malaise/fatigue. Negative for chills and fever.   HENT: Negative for ear pain and sore throat.    Eyes: Negative for blurred vision and pain.   Cardiovascular: Negative for  chest pain and dyspnea on exertion.   Respiratory: Positive for shortness of breath.    Hematologic/Lymphatic: Negative for adenopathy.   Musculoskeletal: Positive for back pain.   Gastrointestinal: Negative for abdominal pain, constipation, heartburn, hematemesis, nausea and vomiting.   Neurological: Negative for dizziness, focal weakness, headaches and light-headedness.   Psychiatric/Behavioral: Negative for altered mental status.     Objective:     Vital Signs (Most Recent):  Temp: 98.3 °F (36.8 °C) (08/19/18 1150)  Pulse: 101 (08/19/18 1200)  Resp: 18 (08/19/18 1150)  BP: (!) 82/46 (08/19/18 1150)  SpO2: (!) 90 % (08/19/18 1150) Vital Signs (24h Range):  Temp:  [98.3 °F (36.8 °C)-98.8 °F (37.1 °C)] 98.3 °F (36.8 °C)  Pulse:  [] 101  Resp:  [16-18] 18  SpO2:  [90 %-97 %] 90 %  BP: ()/(46-53) 82/46     Weight: 77.7 kg (171 lb 3 oz)  Body mass index is 27.63 kg/m².     SpO2: (!) 90 %  O2 Device (Oxygen Therapy): room air      Intake/Output Summary (Last 24 hours) at 8/19/2018 1446  Last data filed at 8/19/2018 0600  Gross per 24 hour   Intake 888.84 ml   Output 150 ml   Net 738.84 ml       Lines/Drains/Airways     Peripheral Intravenous Line                 Peripheral IV - Single Lumen 08/16/18 0114 Left Upper Arm 3 days                Physical Exam   Constitutional: She is oriented to person, place, and time. She appears well-developed and well-nourished. No distress.   HENT:   Head: Normocephalic and atraumatic.   Mouth/Throat: Oropharynx is clear and moist.   Eyes: EOM are normal. Pupils are equal, round, and reactive to light.   Neck: Normal range of motion. Neck supple.   Cardiovascular: Normal rate and regular rhythm.   Murmur heard.  Pulmonary/Chest: Effort normal and breath sounds normal.   Abdominal: Soft. Bowel sounds are normal. She exhibits no distension. There is no tenderness.   Lymphadenopathy:     She has no cervical adenopathy.   Neurological: She is alert and oriented to person, place,  and time.   Skin: Skin is warm and dry. She is not diaphoretic.       Significant Labs:   CMP   Recent Labs   Lab  08/18/18   1756  08/19/18   0417  08/19/18   0827   NA  125*  126*  128*   K  4.2  3.9  4.0   CL  84*  85*  87*   CO2  29  31*  32*   GLU  244*  176*  150*   BUN  55*  59*  59*   CREATININE  2.4*  2.2*  2.1*   CALCIUM  9.5  8.8  9.0   PROT  6.7  5.9*  6.1   ALBUMIN  3.1*  2.7*  2.8*   BILITOT  1.6*  1.1*  1.2*   ALKPHOS  112  110  115   AST  57*  49*  49*   ALT  22  20  21   ANIONGAP  12  10  9   ESTGFRAFRICA  21.2*  23.5*  24.9*   EGFRNONAA  18.4*  20.4*  21.6*   , CBC   Recent Labs   Lab  08/18/18   0510  08/19/18   0417   WBC  6.59  6.68   HGB  9.1*  8.1*   HCT  27.2*  25.0*   PLT  214  194    and   Recent Lab Results       08/19/18  1217 08/19/18  0827 08/19/18  0808 08/19/18  0417 08/18/18  2132      Immature Granulocytes    0.1      Immature Grans (Abs)    0.01  Comment:  Mild elevation in immature granulocytes is non specific and   can be seen in a variety of conditions including stress response,   acute inflammation, trauma and pregnancy. Correlation with other   laboratory and clinical findings is essential.        Albumin  2.8  2.7      Alkaline Phosphatase  115  110      ALT  21  20      Anion Gap  9  10      AST  49  49      Baso #    0.02      Basophil%    0.3      Total Bilirubin  1.2  Comment:  For infants and newborns, interpretation of results should be based  on gestational age, weight and in agreement with clinical  observations.  Premature Infant recommended reference ranges:  Up to 24 hours.............<8.0 mg/dL  Up to 48 hours............<12.0 mg/dL  3-5 days..................<15.0 mg/dL  6-29 days.................<15.0 mg/dL    1.1  Comment:  For infants and newborns, interpretation of results should be based  on gestational age, weight and in agreement with clinical  observations.  Premature Infant recommended reference ranges:  Up to 24 hours.............<8.0 mg/dL  Up to 48  hours............<12.0 mg/dL  3-5 days..................<15.0 mg/dL  6-29 days.................<15.0 mg/dL        BUN, Bld  59  59      Calcium  9.0  8.8      Chloride  87  85      CO2  32  31      Creatinine  2.1  2.2      Differential Method    Automated      eGFR if   24.9  23.5      eGFR if non   21.6  Comment:  Calculation used to obtain the estimated glomerular filtration  rate (eGFR) is the CKD-EPI equation.     20.4  Comment:  Calculation used to obtain the estimated glomerular filtration  rate (eGFR) is the CKD-EPI equation.         Eos #    0.1      Eosinophil%    1.2      Glucose  150  176      Gran # (ANC)    4.7      Gran%    70.1      Hematocrit    25.0      Hemoglobin    8.1      Lymph #    1.2      Lymph%    17.2      Magnesium    2.4      MCH    30.0      MCHC    32.4      MCV    93      Mono #    0.7      Mono%    11.1      MPV    9.4      nRBC    0      Platelets    194      POCT Glucose 230  175  225     Potassium  4.0  3.9      Total Protein  6.1  5.9      RBC    2.70      RDW    14.9      Sodium  128  126      WBC    6.68                  08/18/18  1756 08/18/18  1712      Immature Granulocytes       Immature Grans (Abs)       Albumin 3.1      Alkaline Phosphatase 112      ALT 22      Anion Gap 12      AST 57      Baso #       Basophil%       Total Bilirubin 1.6  Comment:  For infants and newborns, interpretation of results should be based  on gestational age, weight and in agreement with clinical  observations.  Premature Infant recommended reference ranges:  Up to 24 hours.............<8.0 mg/dL  Up to 48 hours............<12.0 mg/dL  3-5 days..................<15.0 mg/dL  6-29 days.................<15.0 mg/dL        BUN, Bld 55      Calcium 9.5      Chloride 84      CO2 29      Creatinine 2.4      Differential Method       eGFR if African American 21.2      eGFR if non  18.4  Comment:  Calculation used to obtain the estimated glomerular  filtration  rate (eGFR) is the CKD-EPI equation.         Eos #       Eosinophil%       Glucose 244      Gran # (ANC)       Gran%       Hematocrit       Hemoglobin       Lymph #       Lymph%       Magnesium       MCH       MCHC       MCV       Mono #       Mono%       MPV       nRBC       Platelets       POCT Glucose  249     Potassium 4.2      Total Protein 6.7      RBC       RDW       Sodium 125      WBC             Significant Imaging: All imaging within last 24 hours has been reveiewed.    Assessment and Plan:     Brief HPI: 82 yo F with hx of AS s/p BAV, being worked up for O/P TAVR, HFrEF (20-25%) p/w dCHFe. Hospital course complicated by GIB and difficulty with diuresis    * Acute on chronic systolic congestive heart failure    -continue with  ggt, lasix stopped because of KATI  -planning for RHC next week as outpatient to help determine fluid status by proxy of cardiac filling pressures  -Plan to discharge patient home tomorrow  -Patient will likely need to be discharged home on dobutamine, we will subsequently figure out the appropriate dose of diuretic tomorrow based on renal function labs and how patient feels. BUN/Cr improving slowly, today 59/2.1  -PICC line to be placed tomorrow.    -Hold off on starting BB d/t pt's stable but low blood pressures.          Chest pain    -non-cardiac in nature  -pt had recent LHC with no evidence of obstructive CAD  -beside echo performed today, did not show evidence of pericardial effusion  -CP is pleuritic in nature and MSK related, the pain is much improved today  -no need for checking CE's          Severe aortic stenosis    -s/p BAV, awaiting O/P TAVR             VTE Risk Mitigation (From admission, onward)        Ordered     Place CORWIN hose  Until discontinued      08/05/18 1428     IP VTE HIGH RISK PATIENT  Once      08/03/18 1146     Place CORWIN hose  Until discontinued      08/03/18 1146     Place sequential compression device  Until discontinued      08/03/18  1146     Place CORWIN hose  Until discontinued      08/03/18 1146          Mike Bojorquez MD  Cardiology  [facility]

## 2018-08-19 NOTE — PLAN OF CARE
Problem: Patient Care Overview  Goal: Plan of Care Review  Outcome: Ongoing (interventions implemented as appropriate)  Pt is A, A, Ox4. Calm, cooperative. Free of falls, trauma, and injuries. Skin intact. Pt educated on fall risk. Pt demonstrates and verbalizes understanding. VSS.  infusing. Pt ambulated in melchor x2. PICC order placed. 1200cc fluid restriction. Plan of care reviewed with pt.

## 2018-08-20 PROBLEM — B96.81 ACUTE H. PYLORI GASTRIC ULCER WITH HEMORRHAGE: Status: ACTIVE | Noted: 2018-01-01

## 2018-08-20 PROBLEM — K25.0 ACUTE H. PYLORI GASTRIC ULCER WITH HEMORRHAGE: Status: ACTIVE | Noted: 2018-01-01

## 2018-08-20 NOTE — PLAN OF CARE
[facility]    HOME HEALTH ORDERS  FACE TO FACE ENCOUNTER    Patient Name: Natalie Parnell  YOB: 1935    PCP: Octavio Ferrell MD   PCP Address: [address] [facility] / [place] [address]  PCP Phone Number: [phone]  PCP Fax: [phone]    Encounter Date: 08/21/2018    Admit to Home Health    Diagnoses:  Active Hospital Problems    Diagnosis  POA    *Acute on chronic systolic congestive heart failure [I50.23]  Yes    Acute H. pylori gastric ulcer with hemorrhage [K25.0, B96.81]  No    Melena [K92.1]  No    Tachycardia [R00.0]  Yes    Acute blood loss anemia [D62]  No    Hyponatremia [E87.1]  No    Acute renal failure superimposed on stage 3 chronic kidney disease [N17.9, N18.3]  No    Cardiogenic shock [R57.0]  Yes    Dark stools [R19.5]  No    Hypokalemia [E87.6]  No    Chest pain [R07.9]  No    Severe aortic stenosis [I35.0]  Yes    Type 2 diabetes mellitus, without long-term current use of insulin [E11.9]  Yes      Resolved Hospital Problems   No resolved problems to display.       Future Appointments   Date Time Provider Department Center   8/28/2018  9:00 AM Jim Garcia MD [facility] CARDIO [place]   9/4/2018  2:20 PM Mike Diaz MD [facility] CARDIO [facility]   9/11/2018  2:20 PM Shanice Ashraf MD [facility] HEPAT [facility]   10/9/2018 10:20 AM Octavio Ferrell MD [facility]     Follow-up Information     Schedule an appointment as soon as possible for a visit on 8/15/2018 to follow up.               I have seen and examined this patient face to face today. My clinical findings that support the need for the home health skilled services and home bound status are the following:  Weakness/numbness causing balance and gait disturbance due to Heart Failure, Liver Disease and Weakness/Debility making it taxing to leave home.  Medical restrictions requiring assistance of another human to leave home due to  Dyspnea on exertion (SOB), Fluid/volume  overload, Unstable ambulation and IV infusion Needs.    Allergies:  Review of patient's allergies indicates:   Allergen Reactions    Lidocaine        Diet: cardiac diet, diabetic diet: 2000 calorie, fluid restriction: 2 liters daily and 2 gram sodium diet    Activities: activity as tolerated    Nursing:   SN to complete comprehensive assessment including routine vital signs. Instruct on disease process and s/s of complications to report to MD. Review/verify medication list sent home with the patient at time of discharge  and instruct patient/caregiver as needed. Frequency may be adjusted depending on start of care date.    Notify MD if SBP > 160 or < 75; DBP > 90 or < 40; HR > 120 or < 50; Temp > 101    CONSULTS:    Physical Therapy to evaluate and treat. Evaluate for home safety and equipment needs; Establish/upgrade home exercise program. Perform / instruct on therapeutic exercises, gait training, transfer training, and Range of Motion.  Occupational Therapy to evaluate and treat. Evaluate home environment for safety and equipment needs. Perform/Instruct on transfers, ADL training, ROM, and therapeutic exercises.    MISCELLANEOUS CARE:  Check BMP with magnesium, BNP, and CBC weekly, starting 8/22/18. Send results to Dr Octavio Washington and Dr Jim Garcia.     Home Infusion Therapy:   SN to perform Infusion Therapy/Central Line Care.  Review Central Line Care & Central Line Flush with patient.    Administer (drug and dose): Dobutamine 2.5 mcg/kg/min with dosing weight 72.6 kg by continuous infusion    Scrub the Hub: Prior to accessing the line, always perform a 30 second alcohol scrub  Each lumen of the central line is to be flushed at least daily with 10 mL Normal Saline and 3 mL Heparin flush (10 units/mL)  Skilled Nurse (SN) may draw blood from IV access  Blood Draw Procedure:   - Aspirate at least 5 mL of blood   - Discard   - Obtain specimen   - Change injection cap   - Flush with 20 mL Normal Saline  followed by a                 3-5 mL Heparin flush (10 units/mL)  Central :   - Sterile dressing changes are done weekly and as needed.   - Use chlor-hexadine scrub to cleanse site, apply Biopatch to insertion site,       apply securement device dressing   - Injection caps are changed weekly and after EVERY lab draw.   - If sterile gauze is under dressing to control oozing,                 dressing change must be performed every 24 hours until gauze is not needed.  Diabetic Care:   SN to perform and educate Diabetic management with blood glucose monitoring:, Fingerstick blood sugar AC and HS and Report CBG < 60 or > 350 to physician.  Heart Failure:      SN to instruct on the following:    Instruct on the definition of CHF.   Instruct on the signs/sympoms of CHF to be reported.   Instruct on and monitor daily weights.   Instruct on factors that cause exacerbation.   Instruct on action, dose, schedule, and side effects of medications.   Instruct on diet as prescribed.   Instruct on activity allowed.   Instruct on life-style modifications for life long management of CHF   SN to assess compliance with daily weights, diet, medications, fluid retention,    safety precautions, activities permitted and life-style modifications.   Additional 1-2 SN visits per week as needed for signs and symptoms     of CHF exacerbation.      For Weight Gain > 2-3 lbs in 1 day or 4-6 lbs over 1 week notify PCP:  Obtain BMP lab test in 3 days  Take an additional Bumex 2 mg daily until weight returns to baseline.    Medications: Review discharge medications with patient and family and provide education.      Current Discharge Medication List      START taking these medications    Details   amoxicillin (AMOXIL) 500 MG capsule Take 2 capsules (1,000 mg total) by mouth every 12 (twelve) hours. for 13 days  Qty: 52 capsule, Refills: 0      bumetanide (BUMEX) 1 MG tablet Take 1 tablet (1 mg total) by mouth 2 (two) times daily.  "START WITH WEIGHT GAIN OF 2 LB.  Qty: 60 tablet, Refills: 11      clarithromycin (BIAXIN) 500 MG tablet Take 1 tablet (500 mg total) by mouth once daily. for 13 days  Qty: 13 tablet, Refills: 0      DOBUTamine (DOBUTREX) 500 mg/250 mL (2,000 mcg/mL) Inject 181.5 mcg/min into the vein continuous.  Qty: 250 mL      pantoprazole (PROTONIX) 40 MG tablet Take 40 mg bid po x 13 days, then take 40 mg po daily.  Qty: 60 tablet, Refills: 3         CONTINUE these medications which have CHANGED    Details   insulin aspart U-100 (NOVOLOG) 100 unit/mL InPn pen Inject 4 Units into the skin 3 (three) times daily.  Qty: 15 mL, Refills: 2         CONTINUE these medications which have NOT CHANGED    Details   albuterol 90 mcg/actuation inhaler Inhale 2 puffs into the lungs every 6 (six) hours as needed for Wheezing.  Qty: 1 each, Refills: 11    Associated Diagnoses: Bronchospasm      aspirin (ECOTRIN) 81 MG EC tablet Take 1 tablet (81 mg total) by mouth once daily.  Qty: 90 tablet, Refills: 3      atorvastatin (LIPITOR) 40 MG tablet Take 1 tablet (40 mg total) by mouth once daily.  Qty: 90 tablet, Refills: 1      blood sugar diagnostic (ACCU-CHEK SMARTVIEW TEST STRIP) Strp Inject 1 strip into the skin once daily.  Qty: 100 strip, Refills: 6    Associated Diagnoses: Type 2 diabetes mellitus with hyperglycemia, without long-term current use of insulin      blood-glucose meter Misc Use as instructed  Qty: 1 each, Refills: 0      insulin glargine (LANTUS SOLOSTAR) 100 unit/mL (3 mL) InPn pen Inject 12 Units into the skin once daily.  Qty: 15 mL, Refills: 2      lancets (ACCU-CHEK FASTCLIX) Misc Inject 1 lancet into the skin once daily.  Qty: 100 each, Refills: 6    Associated Diagnoses: Type 2 diabetes mellitus with hyperglycemia, without long-term current use of insulin      multivitamin capsule Take 1 capsule by mouth once daily.      pen needle, diabetic 32 gauge x 5/32" Ndle Use to inject insulin 4 times daily  Qty: 100 each, " Refills: 0         STOP taking these medications       losartan (COZAAR) 25 MG tablet Comments:   Reason for Stopping:         metFORMIN (GLUCOPHAGE) 500 MG tablet Comments:   Reason for Stopping:         metoprolol succinate (TOPROL-XL) 25 MG 24 hr tablet Comments:   Reason for Stopping:         furosemide (LASIX) 20 MG tablet Comments:   Reason for Stopping:               I certify that this patient is confined to her home and needs intermittent skilled nursing care, physical therapy and occupational therapy.

## 2018-08-20 NOTE — PLAN OF CARE
PLAN IS FOR PT TO DISCHARGE HOME WITH IV  AND HOME HEALTH WHEN MEDICALLY READY     08/20/18 1253   Discharge Reassessment   Assessment Type Discharge Planning Reassessment   Provided patient/caregiver education on the expected discharge date and the discharge plan No

## 2018-08-20 NOTE — PROGRESS NOTES
Progress Note  Hospital Medicine    Primary Team: [facility]  Admit Date: 8/3/2018   Length of Stay:  LOS: 17 days    SUBJECTIVE:   Reason for Admission:  Acute on chronic systolic congestive heart failure    HPI:  Patient is a 83F with PMH of chronic systolic HF with EF 20-25%, severe AS with SHEA 0.65cm2, mean gradients 75mmhg, peak velocity 5.3m/sec, recent admission for GI bleed (requiring 1 unit of PRBC and EGD showing non-bleeding angioectasia s/p APC), recent admission for NSTEMI (without evidence CAD on [facility]), and discharge yesterday after stay for cardiogenic shock and ADHF, presented with acute onset of SOB.  Pt reports feeling well upon discharge and in the evening, but awoke at 0100 SOB and gasping for air.  She urinated twice overnight.  Also notes LE edema has developed.  Patient admitted for evaluation of acute on chronic systolic heart failure.    ECHO 7/27/2018  CONCLUSIONS     1 - Moderate left ventricular enlargement.     2 - Severely depressed left ventricular systolic function (EF 20-25%).     3 - Left atrial enlargement.     4 - Low normal to mildly depressed right ventricular systolic function .     5 - Moderate to severe aortic stenosis.     6 - Moderate mitral regurgitation.     7 - Increased central venous pressure.     8 - If clinically indicated, a full Doppler study can be performed.    Interval history:     No AEON. Ambulated with PT without issues. Symptoms stable.     Review of Systems:  Constitutional: +fatigue, no fever or chills, negative for fevers, malaise and sweats  Respiratory: no cough, no shortness of breath  Cardiovascular: +chest wall pain, no palpitations  Gastrointestinal: no nausea or vomiting, no abdominal pain or change in bowel habits  Musculoskeletal: no arthralgias or myalgias     OBJECTIVE:     Temp:  [97 °F (36.1 °C)-98.5 °F (36.9 °C)]   Pulse:  [88-99]   Resp:  [18]   BP: (79-92)/(45-54)   SpO2:  [91 %-98 %]  Body mass index is 27.76 kg/m².   Intake/Outake:  This Shift:  No intake/output data recorded.    Net I/O past 24h:     Intake/Output Summary (Last 24 hours) at 8/20/2018 1505  Last data filed at 8/20/2018 0600  Gross per 24 hour   Intake 849.6 ml   Output 550 ml   Net 299.6 ml             Physical Exam:  Gen- well-developed, well-nourished, no distress  CVS- S1 and S2 present, 3/6 systolic murmur, RRR, chest wall tenderness to palpation (improved)  Resp- no work of breathing, baseline crackles present  Abd- BS+, soft, NT, ND  Ext- no edema, no clubbing or cyanosis    Laboratory:  CBC/Anemia Labs: Coags:    Recent Labs   Lab  08/15/18   1358   08/18/18   0510  08/19/18   0417  08/20/18   0514   WBC   --    < >  6.59  6.68  6.05   HGB   --    < >  9.1*  8.1*  8.8*   HCT   --    < >  27.2*  25.0*  25.8*   PLT   --    < >  214  194  214   MCV   --    < >  92  93  92   RDW   --    < >  14.9*  14.9*  14.6*   OCCULTBLOOD  Positive*   --    --    --    --     < > = values in this interval not displayed.    Recent Labs   Lab  08/14/18   1647   INR  1.2        Chemistries:   Recent Labs   Lab  08/18/18   0510   08/19/18   0417  08/19/18   0827  08/19/18   1444  08/20/18   0514   NA  130*   < >  126*  128*  124*  125*   K  3.8   < >  3.9  4.0  4.2  3.7   CL  86*   < >  85*  87*  87*  87*   CO2  32*   < >  31*  32*  24  28   BUN  47*   < >  59*  59*  59*  58*   CREATININE  2.0*   < >  2.2*  2.1*  2.0*  1.9*   CALCIUM  9.5   < >  8.8  9.0  8.6*  8.5*   PROT  6.3   < >  5.9*  6.1   --   6.1   BILITOT  1.3*   < >  1.1*  1.2*   --   1.0   ALKPHOS  116   < >  110  115   --   109   ALT  21   < >  20  21   --   19   AST  55*   < >  49*  49*   --   45*   MG  2.2   --   2.4   --    --   2.5    < > = values in this interval not displayed.        ABG:   No results for input(s): PH, PCO2, PO2, HCO3, POCSATURATED, BE in the last 168 hours.     Cardiac Enzymes: Ejection Fractions:    Recent Labs      08/18/18   0510  08/18/18   1148   TROPONINI  1.588*  1.102*    EF   Date  Value Ref Range Status   07/27/2018 20 (A) 55 - 65    07/09/2018 50 55 - 65            Medications:  Scheduled Meds:   amoxicillin  1,000 mg Oral Q12H    atorvastatin  40 mg Oral Daily    clarithromycin  500 mg Oral Daily    insulin aspart U-100  4 Units Subcutaneous TIDWM    insulin detemir U-100  12 Units Subcutaneous QHS    pantoprazole  40 mg Oral BID AC                             Continuous Infusions:   DOBUTamine 2.5 mcg/kg/min (08/20/18 0211)     PRN Meds:.sodium chloride, acetaminophen, dextrose 50%, dextrose 50%, glucagon (human recombinant), glucose, glucose, HYDROcodone-acetaminophen, insulin aspart U-100, methocarbamol, ondansetron, polyethylene glycol, sodium chloride 0.9%, sodium chloride 0.9%, traMADol     ASSESSMENT/PLAN:     Active Hospital Problems    Diagnosis  POA    *Acute on chronic systolic congestive heart failure [I50.23]  Yes    Melena [K92.1]  No    Tachycardia [R00.0]  Yes    Acute blood loss anemia [D62]  No    Hyponatremia [E87.1]  No    Acute renal failure superimposed on stage 3 chronic kidney disease [N17.9, N18.3]  No    Cardiogenic shock [R57.0]  Yes    Dark stools [R19.5]  No    Hypokalemia [E87.6]  No    Chest pain [R07.9]  No    Severe aortic stenosis [I35.0]  Yes    Type 2 diabetes mellitus, without long-term current use of insulin [E11.9]  Yes      Resolved Hospital Problems   No resolved problems to display.     Acute on Chronic Systolic Heart Failure  Acute Hypoxemic Respiratory Failure 2/2 ADHF  Cardiogenic shock  - Per Cardiology, will need home dobutamine gtt. PICC placed  - Cardiology considering RHC as outpatient.   - Bedside echo 8/18 by Cardiology without pericardial effusion, EF similar to previous.  - Continue holding diuresis due to KATI and hyponatremia  - Wean O2 as tolerated  - Continue strict I/O, low salt diet, daily weights, fluid restriction  - Holding on starting BB & ACEI due to low blood pressures  - On room air  - Cards recommending  Bumex 2 mg bid in two days if weight gain 2 lbs.   - Dr Garcia to schedule follow up appt on discharge.     KATI on CKDIII  - Cr 1.9 today, improving, peaked at 2.4, received normal saline x 1 liter. Baseline 1-1.3.   - Monitor renal function closely  - Etiology: hypotension/ATN vs cardiorenal vs overdiuresis  - Cont holding diuretics  - Avoid further antihypertensives  - Avoid nephrotoxic agents    Hyponatremia  - When corrected for glucose, sodium trends have been: 132 (on 8/18) --> 128 (8/19) --> 125 (8/20)  - Serum osm low, 276  - Likely multifactorial, secondary to renal failure vs diuresis vs heart failure vs cirrhosis, but will evaluate for other causes: check cortisol, TSH, urine sodium/osm    Musculoskeletal chest pain  - No ECG changes.  - Troponin elevated but in setting of cardiogenic shock, downtrended.  - Etiology is musculoskeletal. Reproducible on exam. Chest wall very tender to palpation, now improved.    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 Also consider Heyde's syndrome in setting of severe AS  - FOBT+  - Will resume ASA today as no longer with evidence of bleeding and stable H/H. Continue holding heparin for now; patient has been ambulating; continue to encourage ambulation with CORWIN/SCDs.  - EGD 8/16: non-bleeding G2EV, non-bleeding gastric ulcer (biopsied, shows H pylori)  - F/U biopsy results. Consider repeat EGD in 2 months to check healing if biopsies unrevealing  - No further episodes of melena  - Transfused 1 unit 8/17  - Started on triple therapy for H pylori, will need 2 weeks of treatment, then test for eradication.   - Monitor H/H daily.    Severe Aortic Stenosis  - During prior hospitalization required IABP for hemodynamic support in the setting of acute respiratory distress from flash pulmonary edema 2/2 severe AV stenosis  - s/p successful balloon aortic valvuloplasty with 24mm true balloon aortic mean gradient  "improved from 70 to 36mmHg  - currently undergoing TAVR w/u: CT chest performed, C negative for obstructive CAD  - PFTs ordered for outpatient  - Pt will f/u with Interventional Cardiology as outpatient    Right Hepatic Lobe Lesion  Suspected Cirrhosis  - lesion seen incidentally on CT for TAVR  - followed up with US abd- notes 3cm  - triple phase CT shows 2.4 x 1.6cm irregular enhancing mass in right lobe of liver; 1cm focal nonenhancing areas centrally; with anterior washout, arterial enhancement- LI-RADS 5 lesion  -   - Hepatology consulted last admission; will be discussing case in IR conference 8/7 and then f/u in Hepatology clinic 8/8 (appt needs to be rescheduled)  - anti-smooth muscle Ab + 1:80  Per transplant notes:  - "Given the patient's normal LFT, INR, MELD 9, albumin 2.5; her hepatic disease would not contraindicate her from a surgical procedure (TAVR) - pending TAVR outpatient.  - Hepatic lesion concerning for HCC can be evaluated as an outpatient regarding therapeutic options. AFP elevated (572) which supports this as likely HCC. CT 3 phase pending.  - Concern for cirrhosis on imaging likely secondary to HERNANDEZ. We will continue her workup as an outpatient with modalities such as fibroscan. Iron panel negative. , SUGAR pending, ASMA pending.  - if she is discharged before 8/8 she can follow-up in her previously scheduled appointment with Dr. Ashraf. If she is discharged later than this, please inform hepatology so we can reschedule her appointment."  - has hepatology appt scheduled     DM-II  - A1C 7.0  - per daughter, pt's blood sugar is poorly controlled at home, reaching 300s  - Levemir 12 units nightly and Aspart 4 units TIDWM, titrate accordingly    Hypokalemia  Hypomagnesemia  -Replete as needed     DVT ppx - SCD/CORWIN  CODE status- FULL     Dispo- anticipate discharge home on dobutamine gtt with HHPTOT in 24-48 hours    Evelyn Garcias MD  Hospital Medicine Staff     "

## 2018-08-20 NOTE — PROGRESS NOTES
Dr Luque notified of placement reading of PICC- stated to notify PICC team    PICC team Crow CARLSON notified of chest XRAY reading of placement of picc. Stated he would review and follow up

## 2018-08-20 NOTE — PROCEDURES
"Natalie Parnell is a 83 y.o. female patient.    Temp: 97 °F (36.1 °C) (08/20/18 0808)  Pulse: 88 (08/20/18 0808)  Resp: 18 (08/20/18 0808)  BP: (!) 92/49 (08/20/18 0808)  SpO2: (!) 91 % (08/20/18 0808)  Weight: 78.3 kg (172 lb 9.9 oz) (08/20/18 0400)  Height: 5' 6" (167.6 cm) (08/13/18 0500)    PICC  Date/Time: 8/20/2018 9:19 AM  Performed by: Crow Bentley RN  Consent Done: Yes  Time out: Immediately prior to procedure a time out was called to verify the correct patient, procedure, equipment, support staff and site/side marked as required  Indications: med administration and vascular access  Anesthesia: local infiltration  Local anesthetic: lidocaine 2% with epinephrine  Anesthetic Total (mL): 2  Preparation: skin prepped with ChloraPrep  Skin prep agent dried: skin prep agent completely dried prior to procedure  Sterile barriers: all five maximum sterile barriers used - cap, mask, sterile gown, sterile gloves, and large sterile sheet  Hand hygiene: hand hygiene performed prior to central venous catheter insertion  Location details: right brachial  Catheter type: double lumen  Catheter size: 5 Fr  Catheter Length: 37cm    Ultrasound guidance: yes  Vessel Caliber: medium and patent, compressibility normal  Vascular Doppler: not done  Needle advanced into vessel with real time Ultrasound guidance.  Guidewire confirmed in vessel.  Image recorded and saved.  Sterile sheath used.  no esophageal manometryNumber of attempts: 1  Post-procedure: blood return through all ports, chlorhexidine patch and sterile dressing applied  Specimens: No  Implants: No  Assessment: placement verified by x-ray  Complications: other          Miroslava Henson  8/20/2018  "

## 2018-08-20 NOTE — CONSULTS
Double lumen PICC placed to (R) BRACHIAL vein.   37 cm in length, 0 cm exposed, and 32 cm arm circumference.  Lot # [id number]

## 2018-08-20 NOTE — PLAN OF CARE
received home health orders for possible discharge tomorrow to include dobutamine.  Referral given to [employer] via [facility] and [facility] agency.  sw will follow in the morning.

## 2018-08-21 NOTE — PLAN OF CARE
Problem: Patient Care Overview  Goal: Plan of Care Review  Outcome: Revised  Plan of care discussed with patient. Patient is free of fall/trauma/injury. Denies CP, SOB, or pain/discomfort. Remains on dobutamine gtt.  All questions addressed. Encouraged use of I.S.-500. Needs reminder to use. Will continue to monitor

## 2018-08-21 NOTE — PROGRESS NOTES
" [facility]  Adult Nutrition  Progress Note    SUMMARY       Recommendations    Recommendation/Intervention:   1. Continue current 2000 diabetic, cardiac diet. Pt with good PO intake documented.   2. RD following.    Goals: Intake >50% of meals  Nutrition Goal Status: goal met  Communication of RD Recs: (POC)    Reason for Assessment    Reason for Assessment: RD follow-up  Diagnosis: cardiac disease(CHF)  Relevant Medical History: T2DM, HTN, HLD, CHF    General Information Comments: Pt continues with good PO intake. Likely discharge tomorrow.    Nutrition Discharge Planning: Adequate PO intake on cardiac diet    Nutrition Risk Screen    Nutrition Risk Screen: no indicators present    Nutrition/Diet History    Do you have any cultural, spiritual, Faith conflicts, given your current situation?: none reported   Factors Affecting Nutritional Intake: None identified at this time    Anthropometrics    Temp: 97.3 °F (36.3 °C)  Height Method: Measured  Height: 5' 6" (167.6 cm)  Height (inches): 66 in  Weight Method: Standard Scale  Weight: 78.7 kg (173 lb 8 oz)  Weight (lb): 173.5 lb  Ideal Body Weight (IBW), Female: 130 lb  % Ideal Body Weight, Female (lb): 133.46 lb  BMI (Calculated): 28.1  BMI Grade: 25 - 29.9 - overweight       Lab/Procedures/Meds    Pertinent Labs Reviewed: reviewed  Pertinent Labs Comments: Na 127, BUN 54, Crt 1.6, GFR 30.0, Alb 2.6, AST 45, POCT 177-236  Pertinent Medications Reviewed: reviewed  Pertinent Medications Comments: statin, insulin, pantoprazole, dobutamine    Physical Findings/Assessment    Overall Physical Appearance: overweight  Oral/Mouth Cavity: tooth/teeth missing  Skin: intact    Estimated/Assessed Needs    Weight Used For Calorie Calculations: 78.4 kg (172 lb 13.5 oz)  Energy Calorie Requirements (kcal): 1582  Energy Need Method: [place]-St Jeor(x 1.25 (PAL))  Protein Requirements: 78-94 gm (1.0-1.2 gm/kg)  Weight Used For Protein Calculations: 78.4 kg (172 " lb 13.5 oz)  Fluid Requirements (mL): per MD     RDA Method (mL): 1582       Nutrition Prescription Ordered    Current Diet Order: 2000 diabetic, cardiac  Nutrition Order Comments: 1500 ml FR    Evaluation of Received Nutrient/Fluid Intake    Comments: LBM 8/17  % Intake of Estimated Energy Needs: 75 - 100 %  % Meal Intake: 75 - 100 %    Nutrition Risk    Level of Risk/Frequency of Follow-up: (F/u 1x weekly)     Assessment and Plan    Nutrition Problem  Decreased nutrient needs (sodium)     Related to (etiology):   Heart failure     Signs and Symptoms (as evidenced by):   EF 20%, fluid retention leading to SOB, edema      Interventions/Recommendations (treatment strategy):  See recs above.     Nutrition Diagnosis Status:   Continues       Monitor and Evaluation    Food and Nutrient Intake: energy intake, food and beverage intake  Food and Nutrient Adminstration: diet order  Knowledge/Beliefs/Attitudes: food and nutrition knowledge/skill  Physical Activity and Function: nutrition-related ADLs and IADLs  Anthropometric Measurements: weight, weight change, body mass index  Biochemical Data, Medical Tests and Procedures: electrolyte and renal panel, gastrointestinal profile, glucose/endocrine profile, inflammatory profile  Nutrition-Focused Physical Findings: overall appearance     Nutrition Follow-Up    RD Follow-up?: Yes

## 2018-08-21 NOTE — PROCEDURES
Six Minute Walk  Date/Time: 8/20/2018 8:32 PM  Performed by: Patrica Worrell MD  Authorized by: Mike Bojorquez MD       Natalie Parnell is a 83 y.o.  female patient, who presents for a 6 minute walk test ordered by [place] YOO  The diagnosis is Congestive Heart Failure.  The patient's BMI is 27.8 kg/m2.  Predicted distance (lower limit of normal) is 232.3 meters.      Test Results:    The test was completed without stopping.   The total time walked was 360 seconds.  During walking, the patient reported:  No complaints. The patient used a walker  during testing.     08/20/2018---------Distance: 160.32 meters (526 feet)     O2 Sat % Supplemental Oxygen Heart Rate Blood Pressure Rita Scale   Pre-exercise  (Resting) 94 % Room Air 96 bpm (!) 86/53 mmHg 0   During Exercise 95 % Room Air 101 bpm (!) 87/52 mmHg 0   Post-exercise  (Recovery) 95 % Room Air  102 bpm   mmHg       Recovery Time: 88 seconds    Performing nurse/tech: Gautam FIGUEROA      PREVIOUS STUDY:   The patient had a previous study.  03/23/2018---------Distance: 243.84 meters (800 feet)       O2 Sat % Supplemental Oxygen Heart Rate Blood Pressure Rita Scale   Pre-exercise  (Resting) 97 % Room Air 82 bpm 130/63 mmHg 0   During Exercise 97 % Room Air 112 bpm 137/63 mmHg 3   Post-exercise  (Recovery) 99 % Room Air  96 bpm   mmHg           CLINICAL INTERPRETATION:  Six minute walk distance is 160.32 meters (526 feet) with no dyspnea.  During exercise, there was no significant desaturation while breathing room air.  Both blood pressure and heart rate remained stable with walking.  Hypotension and Tachycardia was present prior to exercise.  The patient did not report non-pulmonary symptoms during exercise.  Significant exercise impairment is likely due to cardiovascular causes and subjective symptoms.  Since the previous study in March 2018, exercise capacity is significantly worse.  Based upon age and body mass index, exercise capacity is less than  predicted.

## 2018-08-21 NOTE — TELEPHONE ENCOUNTER
Contacted Caroline. Nurse was advised that Dr. Diaz will not be following the pt w/ the Dobutamine medication as that is not something he prescribes.

## 2018-08-21 NOTE — TELEPHONE ENCOUNTER
----- Message from Ya Boo sent at 8/21/2018  9:06 AM CDT -----  Contact: Caroline aguilar/ [facility] [phone]  Pharmacy would like to speak with you about DOBUTamine (DOBUTREX) 500 mg/250 mL (2,000 mcg/mL). Will the dr follow her with this medication. Please call and advise.

## 2018-08-21 NOTE — PLAN OF CARE
met with pt in room and spoke to granddaughter per telephone.  [employer] will meet with pts daughter today for 3:00 to educate on dobutamine.  [facility] will provide nursing care.  Pt will return home with her family.

## 2018-08-21 NOTE — TELEPHONE ENCOUNTER
----- Message from Alexa Henderson MA sent at 8/21/2018  9:04 AM CDT -----  Contact: Caroline Velazquez from  [facility] would like to talk to you about the patient medication Dobutamine. Please call [phone]. Thank you.

## 2018-08-21 NOTE — DISCHARGE SUMMARY
Discharge Summary  Hospital Medicine    Attending Provider on Discharge: Clifton Borrego     Discharging Team:    [facility] HOSP MED C  [facility] CARDIOLOGY TEAM A - CARDIOLOGY CONSULT STEPDOWN     Date of Admission:  8/3/2018         Date of Discharge:  8/21/2018      Diagnoses:     Principal Problem(s):   Acute on chronic systolic congestive heart failure     Secondary Problems:  Active Hospital Problems    Diagnosis    *Acute on chronic systolic congestive heart failure    Acute H. pylori gastric ulcer with hemorrhage    Melena    Tachycardia    Acute blood loss anemia    Hyponatremia    Acute renal failure superimposed on stage 3 chronic kidney disease    Cardiogenic shock    Dark stools    Hypokalemia    Chest pain    Severe aortic stenosis    Type 2 diabetes mellitus, without long-term current use of insulin        Hospital Course:      HPI:  Patient is a 83F with PMH of chronic systolic HF with EF 20-25%, severe AS with SHEA 0.65cm2, mean gradients 75mmhg, peak velocity 5.3m/sec, recent admission for GI bleed (requiring 1 unit of PRBC and EGD showing non-bleeding angioectasia s/p APC), recent admission for NSTEMI (without evidence CAD on [facility]), and discharge yesterday after stay for cardiogenic shock and ADHF, presented with acute onset of SOB.  Pt reports feeling well upon discharge and in the evening, but awoke at 0100 SOB and gasping for air.  She urinated twice overnight.  Also notes LE edema has developed.  Patient admitted for evaluation of acute on chronic systolic heart failure.     ECHO 7/27/2018  CONCLUSIONS     1 - Moderate left ventricular enlargement.     2 - Severely depressed left ventricular systolic function (EF 20-25%).     3 - Left atrial enlargement.     4 - Low normal to mildly depressed right ventricular systolic function .     5 - Moderate to severe aortic stenosis.     6 - Moderate mitral regurgitation.     7 - Increased central venous pressure.     8 - If clinically indicated, a full  Doppler study can be performed.     Hospital course and interval history:  The patient was on IV lasix gtt for management, and then failed transition from IV lasix gtt to IVP. She was placed back on lasix infusion and dobutamine was initiated. The patient was determined to be dobutamine dependent. The patient's course was c/b KATI and hyponatremia; diuresis needed to be held.  Cardiology has been following closely. Patient's course was also c/b melena. GI was consulted and EGD performed 8/16 week which showed nonbleeding G2EV and nonbleeding gastric ulcer. Ulcer biopsy returned with H pylori patient started on triple therapy. Since requiring 1 U pRBC, H/H has been stable and there has not been more melena. The patient's antiplatelet therapy was resumed. On 8/21 patient had instructions on home dobutamine and was deemed stable for d/c.  Please see below for further details:      Acute on Chronic Systolic Heart Failure  Acute Hypoxemic Respiratory Failure 2/2 ADHF  Cardiogenic shock  - Per Cardiology, will need home dobutamine gtt. PICC placed  - Cardiology considering RHC as outpatient.   - Bedside echo 8/18 by Cardiology without pericardial effusion, EF similar to previous.  - Continue holding diuresis due to KATI and hyponatremia  - Wean O2 as tolerated  - Continue strict I/O, low salt diet, daily weights, fluid restriction  - Holding on starting BB & ACEI due to low blood pressures  - On room air  - Cards recommending Bumex 2 mg bid in two days if weight gain 2 lbs.   - Dr Garcia to schedule follow up appt on discharge.      KATI on CKDIII  - Cr 1.6 today, improving, peaked at 2.4, received normal saline x 1 liter. Baseline 1-1.3.   - Monitor renal function closely  - Etiology: hypotension/ATN vs cardiorenal vs overdiuresis  - Cont holding diuretics  - Avoid further antihypertensives  - Avoid nephrotoxic agents     Hyponatremia  - Likely multifactorial, secondary to renal failure vs diuresis vs heart failure vs  cirrhosis  - Cautious resumption of diuresis PRN weight gain     Musculoskeletal chest pain  - No ECG changes.  - Troponin elevated but in setting of cardiogenic shock, downtrended  - Etiology is musculoskeletal. Reproducible on exam. Chest wall very tender to palpation, now improved.     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 Also consider Heyde's syndrome in setting of severe AS  - FOBT+  - Resumed as no longer with evidence of bleeding and stable H/H. Continue holding heparin for now; patient has been ambulating; continue to encourage ambulation with CORWIN/SCDs.  - EGD 8/16: non-bleeding G2EV, non-bleeding gastric ulcer (biopsied, shows H pylori)  - F/U biopsy results. Consider repeat EGD in 2 months to check healing if biopsies unrevealing  - No further episodes of melena  - Transfused 1 unit 8/17  - Started on triple therapy for H pylori, will need 2 weeks of treatment, then test for eradication.   - Monitor H/H daily.     Severe Aortic Stenosis  - During prior hospitalization required IABP for hemodynamic support in the setting of acute respiratory distress from flash pulmonary edema 2/2 severe AV stenosis  - s/p successful balloon aortic valvuloplasty with 24mm true balloon aortic mean gradient improved from 70 to 36mmHg  - currently undergoing TAVR w/u: CT chest performed, [facility] negative for obstructive CAD  - PFTs ordered for outpatient  - Pt will f/u with Interventional Cardiology as outpatient     Right Hepatic Lobe Lesion  Suspected Cirrhosis  - lesion seen incidentally on CT for TAVR  - followed up with US abd- notes 3cm  - triple phase CT shows 2.4 x 1.6cm irregular enhancing mass in right lobe of liver; 1cm focal nonenhancing areas centrally; with anterior washout, arterial enhancement- LI-RADS 5 lesion  -   - Hepatology consulted last admission; will be discussing case in IR conference 8/7 and then f/u in Hepatology clinic 8/8  "(appt needs to be rescheduled)  - anti-smooth muscle Ab + 1:80  Per transplant notes:  - "Given the patient's normal LFT, INR, MELD 9, albumin 2.5; her hepatic disease would not contraindicate her from a surgical procedure (TAVR) - pending TAVR outpatient.  - Hepatic lesion concerning for HCC can be evaluated as an outpatient regarding therapeutic options. AFP elevated (572) which supports this as likely HCC. CT 3 phase pending.  - Concern for cirrhosis on imaging likely secondary to HERNANDEZ. We will continue her workup as an outpatient with modalities such as fibroscan. Iron panel negative. , SUGAR pending, ASMA pending.  - if she is discharged before 8/8 she can follow-up in her previously scheduled appointment with Dr. Ashraf. If she is discharged later than this, please inform hepatology so we can reschedule her appointment."  - has hepatology appt scheduled     DM-II  - A1C 7.0  - per daughter, pt's blood sugar is poorly controlled at home, reaching 300s  - Levemir 12 units nightly and Aspart 4 units TIDWM, titrate accordingly     Hypokalemia  Hypomagnesemia  -Replete as needed    Significant Diagnostic Studies:   Labs:   Recent Labs      08/21/18   0500   WBC  5.03   RBC  2.60*   HGB  7.8*   HCT  23.5*   PLT  202   MCV  90   MCH  30.0   MCHC  33.2   GRAN  58.2  2.9   LYMPH  23.3  1.2   MONO  11.9  0.6   EOS  0.3      Recent Labs      08/21/18   0500   GLU  72   NA  127*   K  3.5   CL  88*   CO2  30*   BUN  54*   CREATININE  1.6*   CALCIUM  8.5*   ANIONGAP  9   MG  2.4        Microbiology:   Blood Culture, Routine   Date Value Ref Range Status   08/03/2018 No growth after 5 days.  Final   08/03/2018 No growth after 5 days.  Final     Urine Culture, Routine   Date Value Ref Range Status   05/16/2005   Final    MULTIPLE ORGANISMS ISOLATED. NONE IN PREDOMINANCE REPEAT IF CLINICALLY NECESSARY.     No results found for: LABAERO  No results found for: LABANAE    Radiology:   Results for orders placed during the " hospital encounter of 08/03/18   X-Ray Chest 1 View    Narrative EXAMINATION:  XR CHEST 1 VIEW    CLINICAL HISTORY:  pic-line placement;    FINDINGS:  Central line mid SVC.  There is cardiomegaly moderate edema pleural fluid and no change.      Impression See above      Electronically signed by: Bethel Summers MD  Date:    08/20/2018  Time:    18:23      Results for orders placed during the hospital encounter of 03/08/17   X-Ray Chest PA And Lateral    Narrative History: Bronchitis.    Procedure: Chest 2 views    Findings:    The examination is compared with a study performed on 6/7/16.    There is a poor inspiratory when compared to the previous study.  There is no pulmonary consolidations or pneumothorax or pulmonary vascular congestion.  Slight increased markings in the lungs possibly related to poor inspiratory effort.    Heart is within normal limits.  There is faint atherosclerosis of the aortic arch.  No tracheal abnormalities.  There is spondylosis of the dorsal spine.  Surgical clips in the right upper quadrant from previous cholecystectomy.    Impression No acute pulmonary disease.      Electronically signed by: GAURANG MONTEIRO MD  Date:     03/08/17  Time:    14:17       Results for orders placed during the hospital encounter of 07/27/18   CT Abdomen Pelvis With Contrast    Narrative EXAMINATION:  CT ABDOMEN PELVIS WITH CONTRAST    CLINICAL HISTORY:  Neoplasm: liver/biliary, suspected;    TECHNIQUE:  Low dose axial images, sagittal and coronal reformations were obtained from the thoracic inlet to the pubic synthesis . 100 mL Omnipaque 350 IV contrast was administered.  Arterial, portal venous, and delayed imaging was performed.    COMPARISON:  Limited abdominal ultrasound 08/01/2018, CTA cardiac TAVR 07/31/2018    FINDINGS:  Heart and lung bases: Redemonstration of aortic valve calcification.  Coronary artery atherosclerosis.  Bilateral pleural effusions.  Associated compressive atelectasis again  noted.    Liver: Cirrhotic margin of the liver noted with a acute 2.4 x 1.6 cm irregularly enhancing mass which demonstrates washout on delayed phase imaging.  This lesion has an irregular contour and enhancement pattern with redemonstration of a 1.0 cm focal nonenhancing area centrally.  LI rads 5.  No other lesions or focal area of washout are seen.    Gallbladder: Surgically absent.    Bile Ducts: No evidence of dilated ducts.    Pancreas: No mass or peripancreatic fat stranding.    Spleen: Unremarkable.    Adrenals: Unremarkable.    Kidneys/ Ureters: Normal in size and location. Normal concentration and excretion of contrast. No hydronephrosis or nephrolithiasis. No ureteral dilatation.    Bladder: No evidence of wall thickening.    Reproductive organs: Unremarkable.    GI Tract/Mesentery: No evidence of bowel obstruction or inflammation.    Peritoneal Space: Trace ascites.  No free air.    Retroperitoneum: No significant adenopathy.    Abdominal wall: Calcified granulomas in the posterior body wall.    Vasculature: No aneurysm.  Moderate arterial atherosclerosis.  There is ectasia of the left ovarian vein measuring 0.9 cm.  Consideration for pelvic congestion syndrome should be made.    Bones: No acute fracture. Multilevel degenerative changes with disc bulge and vacuum disc phenomenon.  No high-grade spinal canal stenosis.      Impression There is a 2.4 x 1.6 cm irregular enhancing mass in the right lobe of the liver, segment 5 which demonstrates washout on delayed phase imaging.  There is a 1.0 cm focal nonenhancing areas centrally.  Based on anterior washout, arterial enhancement, and size this is a LI-RADS 5 lesion.    Ectasia of the left ovarian vein measuring 0.9 cm.  Consideration for pelvic congestion syndrome should be made.    Otherwise unchanged in comparison to the recent TAVR study of 07/31/2018.    Electronically signed by resident: Natan  Anna  Date:    08/02/2018  Time:    09:39    Electronically signed by: Jefry Duarte MD  Date:    08/02/2018  Time:    13:41     Cardiac Studies: None    Significant Treatments/Procedures:   IV diuresis  IV dobutamine  EGD 8/16  Placement of PICC 8/20    Consults while in Hospital:   Cardiology and Gastroenterology    Discharge Medications:      Current Discharge Medication List      START taking these medications    Details   amoxicillin (AMOXIL) 500 MG capsule Take 2 capsules (1,000 mg total) by mouth every 12 (twelve) hours. for 13 days  Qty: 52 capsule, Refills: 0      bumetanide (BUMEX) 1 MG tablet Take 1 tablet (1 mg total) by mouth 2 (two) times daily. START WITH WEIGHT GAIN OF 2 LB.  Qty: 60 tablet, Refills: 11      clarithromycin (BIAXIN) 500 MG tablet Take 1 tablet (500 mg total) by mouth once daily. for 13 days  Qty: 13 tablet, Refills: 0      DOBUTamine (DOBUTREX) 500 mg/250 mL (2,000 mcg/mL) Inject 181.5 mcg/min into the vein continuous.  Qty: 250 mL      pantoprazole (PROTONIX) 40 MG tablet Take 40 mg bid po x 13 days, then take 40 mg po daily.  Qty: 60 tablet, Refills: 3         CONTINUE these medications which have CHANGED    Details   insulin aspart U-100 (NOVOLOG) 100 unit/mL InPn pen Inject 4 Units into the skin 3 (three) times daily.  Qty: 15 mL, Refills: 2         CONTINUE these medications which have NOT CHANGED    Details   albuterol 90 mcg/actuation inhaler Inhale 2 puffs into the lungs every 6 (six) hours as needed for Wheezing.  Qty: 1 each, Refills: 11    Associated Diagnoses: Bronchospasm      aspirin (ECOTRIN) 81 MG EC tablet Take 1 tablet (81 mg total) by mouth once daily.  Qty: 90 tablet, Refills: 3      atorvastatin (LIPITOR) 40 MG tablet Take 1 tablet (40 mg total) by mouth once daily.  Qty: 90 tablet, Refills: 1      blood sugar diagnostic (ACCU-CHEK SMARTVIEW TEST STRIP) Strp Inject 1 strip into the skin once daily.  Qty: 100 strip, Refills: 6    Associated Diagnoses: Type 2  "diabetes mellitus with hyperglycemia, without long-term current use of insulin      blood-glucose meter Misc Use as instructed  Qty: 1 each, Refills: 0      insulin glargine (LANTUS SOLOSTAR) 100 unit/mL (3 mL) InPn pen Inject 12 Units into the skin once daily.  Qty: 15 mL, Refills: 2      lancets (ACCU-CHEK FASTCLIX) Misc Inject 1 lancet into the skin once daily.  Qty: 100 each, Refills: 6    Associated Diagnoses: Type 2 diabetes mellitus with hyperglycemia, without long-term current use of insulin      multivitamin capsule Take 1 capsule by mouth once daily.      pen needle, diabetic 32 gauge x 5/32" Ndle Use to inject insulin 4 times daily  Qty: 100 each, Refills: 0         STOP taking these medications       losartan (COZAAR) 25 MG tablet Comments:   Reason for Stopping:         metFORMIN (GLUCOPHAGE) 500 MG tablet Comments:   Reason for Stopping:         metoprolol succinate (TOPROL-XL) 25 MG 24 hr tablet Comments:   Reason for Stopping:         furosemide (LASIX) 20 MG tablet Comments:   Reason for Stopping:                Discharge Diet:cardiac diet, diabetic diet: 2000 calorie and fluid restriction: 1500 cc    Activity: activity as tolerated    Discharged Condition: Stable    Discharge Disposition: Home-Health Care [facility]    Follow-up:   Future Appointments   Date Time Provider Department Center   8/28/2018  9:00 AM Jim Garcia MD [facility] CARDIO [place]   9/4/2018  2:20 PM Mike Diaz MD [facility] CARDIO [place] [facility]   9/11/2018  2:20 PM Shanice Ashraf MD [facility] HEPAT [address]   10/9/2018 10:20 AM Octavio Ferrell MD [facility]       Studies/Results pending on discharge:   None    Clifton Borrego MD  Hospital Medicine  "

## 2018-08-21 NOTE — TELEPHONE ENCOUNTER
Caroline notified that pt will be followed by heart transplant at the infusion suite for Dobutamine per Dr. Birmingham. ( I spoke w/Dr. Birmingham concerning this). Caroline verbalized understanding.

## 2018-08-21 NOTE — PHYSICIAN QUERY
PT Name: Natalie Parnell  MR #: [id number]     Physician Query Form - Diagnosis Clarification      CDS/: Nisha Herrera               Contact information: [email or web address]     This form is a permanent document in the medical record.     Query Date: August 21, 2018    By submitting this query, we are merely seeking further clarification of documentation.  Please utilize your independent clinical judgment when addressing the question(s) below.     The medical record contains the following:      Findings Supporting Clinical Information Location in Medical Record   This could by hypotensive ATN in etiology, cardiorenal, overdiuresis or other          Acute renal failure superimposed on stage 3 chronic kidney disease     CR 1.3-2.4-1.6  BUN 20-55-54  GFR 38.6 -18.-4-30    Hyaline Casts, UA  1    Hospital medicine PN 8/16    Hospital medicine PN 8/20    Labs 8/3-8/21          Lab 8/3     Please clarify if the ___ATN______ diagnosis has been:    [ x ] Ruled In  [  ] Ruled In, Now Resolved  [  ] Ruled Out  [  ] Clinically insignificant  [  ] Clinically undetermined  [  ] Other/Clarification of findings (please specify)_______________________________    Please document in your progress notes daily for the duration of treatment, until resolved, and include in your discharge summary.

## 2018-08-21 NOTE — PROGRESS NOTES
Provided bedside education on insulin administration with kwikpen.  Family at the bedside observing.  Discussed indications for insulin, anatomy of insulin pen, sites for subcutaneous insulin administration, and steps for preparing and administering insulin.  Daughter able to return demonstrate preparation and administration.     Pt discharged per MD orders.  Tele and IV discontinued.  Catheter tip intact x1. DONNIE PICC left in place and pt hooked to home  per [facility].  Daughter and granddaughter at the bedside.  Medication list and prescriptions reviewed; prescriptions sent to pt preferred pharmacy.  Pt and family verbalize understanding of all written and verbal discharge instructions.  English and Tajik AVS packets provided.  Unable to perform MIS d/t language barrier.  Pt going to eat dinner before leaving.  Escort ordered.  Will continue to monitor.

## 2018-08-21 NOTE — PROGRESS NOTES
Progress Note  Hospital Medicine    Primary Team: [facility]  Admit Date: 8/3/2018   Length of Stay:  LOS: 18 days    SUBJECTIVE:   Reason for Admission:  Acute on chronic systolic congestive heart failure    HPI:  Patient is a 83F with PMH of chronic systolic HF with EF 20-25%, severe AS with SHEA 0.65cm2, mean gradients 75mmhg, peak velocity 5.3m/sec, recent admission for GI bleed (requiring 1 unit of PRBC and EGD showing non-bleeding angioectasia s/p APC), recent admission for NSTEMI (without evidence CAD on [facility]), and discharge yesterday after stay for cardiogenic shock and ADHF, presented with acute onset of SOB.  Pt reports feeling well upon discharge and in the evening, but awoke at 0100 SOB and gasping for air.  She urinated twice overnight.  Also notes LE edema has developed.  Patient admitted for evaluation of acute on chronic systolic heart failure.    ECHO 7/27/2018  CONCLUSIONS     1 - Moderate left ventricular enlargement.     2 - Severely depressed left ventricular systolic function (EF 20-25%).     3 - Left atrial enlargement.     4 - Low normal to mildly depressed right ventricular systolic function .     5 - Moderate to severe aortic stenosis.     6 - Moderate mitral regurgitation.     7 - Increased central venous pressure.     8 - If clinically indicated, a full Doppler study can be performed.    Hospital course and interval history:  The patient was on IV lasix gtt for management, and then failed transition from IV lasix gtt to IVP. She was placed back on lasix infusion and dobutamine was initiated. The patient was determined to be dobutamine dependent. The patient's course was c/b KATI and hyponatremia; diuresis needed to be held.  Cardiology has been following closely. Patient's course was also c/b melena. GI was consulted and EGD performed 8/16 week which showed nonbleeding G2EV and nonbleeding gastric ulcer. Ulcer biopsy returned with H pylori patient started on triple therapy. Since  requiring 1 U pRBC, H/H has been stable and there has not been more melena. The patient's antiplatelet therapy was resumed. On 8/21 patient had instructions on home dobutamine and was deemed stable for d/c.    No AEON. Ambulated with PT without issues. Symptoms stable.     Review of Systems:  Constitutional: +fatigue, no fever or chills, negative for fevers, malaise and sweats  Respiratory: no cough, no shortness of breath  Cardiovascular: +chest wall pain, no palpitations  Gastrointestinal: no nausea or vomiting, no abdominal pain or change in bowel habits  Musculoskeletal: no arthralgias or myalgias     OBJECTIVE:     Temp:  [97.9 °F (36.6 °C)-98.6 °F (37 °C)]   Pulse:  [85-97]   Resp:  [16-18]   BP: (84-98)/(44-54)   SpO2:  [90 %-95 %]  Body mass index is 28 kg/m².  Intake/Outake:  This Shift:  No intake/output data recorded.    Net I/O past 24h:     Intake/Output Summary (Last 24 hours) at 8/21/2018 1058  Last data filed at 8/21/2018 0700  Gross per 24 hour   Intake 865.6 ml   Output 650 ml   Net 215.6 ml             Physical Exam:  Gen- well-developed, well-nourished, no distress  CVS- S1 and S2 present, 3/6 systolic murmur, RRR, chest wall tenderness to palpation (improved)  Resp- no work of breathing, baseline crackles present  Abd- BS+, soft, NT, ND  Ext- no edema, no clubbing or cyanosis    Laboratory:  CBC/Anemia Labs: Coags:    Recent Labs   Lab  08/15/18   1358   08/19/18   0417  08/20/18   0514  08/21/18   0500   WBC   --    < >  6.68  6.05  5.03   HGB   --    < >  8.1*  8.8*  7.8*   HCT   --    < >  25.0*  25.8*  23.5*   PLT   --    < >  194  214  202   MCV   --    < >  93  92  90   RDW   --    < >  14.9*  14.6*  14.6*   OCCULTBLOOD  Positive*   --    --    --    --     < > = values in this interval not displayed.    Recent Labs   Lab  08/14/18   1647   INR  1.2        Chemistries:   Recent Labs   Lab  08/19/18   0417  08/19/18   0827  08/19/18   1444  08/20/18   0514  08/21/18   0500   NA  126*  128*   124*  125*  127*   K  3.9  4.0  4.2  3.7  3.5   CL  85*  87*  87*  87*  88*   CO2  31*  32*  24  28  30*   BUN  59*  59*  59*  58*  54*   CREATININE  2.2*  2.1*  2.0*  1.9*  1.6*   CALCIUM  8.8  9.0  8.6*  8.5*  8.5*   PROT  5.9*  6.1   --   6.1  5.7*   BILITOT  1.1*  1.2*   --   1.0  0.9   ALKPHOS  110  115   --   109  114   ALT  20  21   --   19  19   AST  49*  49*   --   45*  45*   MG  2.4   --    --   2.5  2.4        ABG:   No results for input(s): PH, PCO2, PO2, HCO3, POCSATURATED, BE in the last 168 hours.     Cardiac Enzymes: Ejection Fractions:    Recent Labs      08/18/18   1148   TROPONINI  1.102*    EF   Date Value Ref Range Status   07/27/2018 20 (A) 55 - 65    07/09/2018 50 55 - 65            Medications:  Scheduled Meds:   amoxicillin  1,000 mg Oral Q12H    aspirin  81 mg Oral Daily    atorvastatin  40 mg Oral Daily    clarithromycin  500 mg Oral Daily    insulin aspart U-100  4 Units Subcutaneous TIDWM    insulin detemir U-100  12 Units Subcutaneous QHS    pantoprazole  40 mg Oral BID AC                             Continuous Infusions:   DOBUTamine 2.5 mcg/kg/min (08/20/18 0211)     PRN Meds:.sodium chloride, acetaminophen, dextrose 50%, dextrose 50%, glucagon (human recombinant), glucose, glucose, HYDROcodone-acetaminophen, insulin aspart U-100, methocarbamol, ondansetron, polyethylene glycol, sodium chloride 0.9%, sodium chloride 0.9%, traMADol     ASSESSMENT/PLAN:     Active Hospital Problems    Diagnosis  POA    *Acute on chronic systolic congestive heart failure [I50.23]  Yes    Acute H. pylori gastric ulcer with hemorrhage [K25.0, B96.81]  No    Melena [K92.1]  No    Tachycardia [R00.0]  Yes    Acute blood loss anemia [D62]  No    Hyponatremia [E87.1]  No    Acute renal failure superimposed on stage 3 chronic kidney disease [N17.9, N18.3]  No    Cardiogenic shock [R57.0]  Yes    Dark stools [R19.5]  No    Hypokalemia [E87.6]  No    Chest pain [R07.9]  No    Severe aortic  stenosis [I35.0]  Yes    Type 2 diabetes mellitus, without long-term current use of insulin [E11.9]  Yes      Resolved Hospital Problems   No resolved problems to display.     Acute on Chronic Systolic Heart Failure  Acute Hypoxemic Respiratory Failure 2/2 ADHF  Cardiogenic shock  - Per Cardiology, will need home dobutamine gtt. PICC placed  - Cardiology considering RHC as outpatient.   - Bedside echo 8/18 by Cardiology without pericardial effusion, EF similar to previous.  - Continue holding diuresis due to KATI and hyponatremia  - Wean O2 as tolerated  - Continue strict I/O, low salt diet, daily weights, fluid restriction  - Holding on starting BB & ACEI due to low blood pressures  - On room air  - Cards recommending Bumex 2 mg bid in two days if weight gain 2 lbs.   - Dr Garcia to schedule follow up appt on discharge.     KATI on CKDIII  - Cr 1.6 today, improving, peaked at 2.4, received normal saline x 1 liter. Baseline 1-1.3.   - Monitor renal function closely  - Etiology: hypotension/ATN vs cardiorenal vs overdiuresis  - Cont holding diuretics  - Avoid further antihypertensives  - Avoid nephrotoxic agents    Hyponatremia  - Likely multifactorial, secondary to renal failure vs diuresis vs heart failure vs cirrhosis  - Cautious resumption of diuresis PRN weight gain    Musculoskeletal chest pain  - No ECG changes.  - Troponin elevated but in setting of cardiogenic shock, downtrended  - Etiology is musculoskeletal. Reproducible on exam. Chest wall very tender to palpation, now improved.    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 Also consider Heyde's syndrome in setting of severe AS  - FOBT+  - Resumed as no longer with evidence of bleeding and stable H/H. Continue holding heparin for now; patient has been ambulating; continue to encourage ambulation with CORWIN/SCDs.  - EGD 8/16: non-bleeding G2EV, non-bleeding gastric ulcer (biopsied, shows H  "pylori)  - F/U biopsy results. Consider repeat EGD in 2 months to check healing if biopsies unrevealing  - No further episodes of melena  - Transfused 1 unit 8/17  - Started on triple therapy for H pylori, will need 2 weeks of treatment, then test for eradication.   - Monitor H/H daily.    Severe Aortic Stenosis  - During prior hospitalization required IABP for hemodynamic support in the setting of acute respiratory distress from flash pulmonary edema 2/2 severe AV stenosis  - s/p successful balloon aortic valvuloplasty with 24mm true balloon aortic mean gradient improved from 70 to 36mmHg  - currently undergoing TAVR w/u: CT chest performed, [facility] negative for obstructive CAD  - PFTs ordered for outpatient  - Pt will f/u with Interventional Cardiology as outpatient    Right Hepatic Lobe Lesion  Suspected Cirrhosis  - lesion seen incidentally on CT for TAVR  - followed up with US abd- notes 3cm  - triple phase CT shows 2.4 x 1.6cm irregular enhancing mass in right lobe of liver; 1cm focal nonenhancing areas centrally; with anterior washout, arterial enhancement- LI-RADS 5 lesion  -   - Hepatology consulted last admission; will be discussing case in IR conference 8/7 and then f/u in Hepatology clinic 8/8 (appt needs to be rescheduled)  - anti-smooth muscle Ab + 1:80  Per transplant notes:  - "Given the patient's normal LFT, INR, MELD 9, albumin 2.5; her hepatic disease would not contraindicate her from a surgical procedure (TAVR) - pending TAVR outpatient.  - Hepatic lesion concerning for HCC can be evaluated as an outpatient regarding therapeutic options. AFP elevated (572) which supports this as likely HCC. CT 3 phase pending.  - Concern for cirrhosis on imaging likely secondary to HERNANDEZ. We will continue her workup as an outpatient with modalities such as fibroscan. Iron panel negative. , SUGAR pending, ASMA pending.  - if she is discharged before 8/8 she can follow-up in her previously scheduled " "appointment with Dr. Ashraf. If she is discharged later than this, please inform hepatology so we can reschedule her appointment."  - has hepatology appt scheduled     DM-II  - A1C 7.0  - per daughter, pt's blood sugar is poorly controlled at home, reaching 300s  - Levemir 12 units nightly and Aspart 4 units TIDWM, titrate accordingly    Hypokalemia  Hypomagnesemia  -Replete as needed     DVT ppx - SCD/CORWIN  CODE status- FULL     Dispo- discharge home on dobutamine gtt with HHPTOT today; 35 minutes spent on discharge planning and counseling alone    Clifton Borrego MD  Hospital Medicine Staff     "

## 2018-08-21 NOTE — TELEPHONE ENCOUNTER
----- Message from Candice Solis sent at 8/21/2018 12:45 PM CDT -----  Contact: Cyndy [phone]  Nicolle please call Cyndy in ref to the pt, she wants to ask some questions about Dr. Garcia following the pt.    Thanks

## 2018-08-22 NOTE — PATIENT INSTRUCTIONS
Left- or Right- Side Congestive Heart Failure (CHF)    The heart is a large muscle. It is a pump that circulates blood throughout the body. Blood carries oxygen to all of the organs, including the brain, muscles, and skin. After your body takes the oxygen out of the blood, the blood returns to the heart. The right side of the heart collects the blood from the body and pumps it to the lungs. In the lungs, it gets fresh oxygen and gives up carbon dioxide. The oxygen-rich blood from the lungs then returns to the left side of the heart, where it is pumped back out to the rest of your body, starting the process all over.  Congestive heart failure (CHF) occurs when the heart muscle is weakened. This affects the pumping action of the heart. Heart failure can affect the right side of the heart or the left side. But heart failure may affect not only the right side of the heart or only the left side. Although it may have started on one side, it can and often eventually does affect both sides.  Right-side heart failure  When the right side of the heart is weakened, it cant handle the blood it is getting from the rest of the body. This blood returns to the heart through veins. When too much pressure builds up in the veins, fluid leaks out into the tissues. Gravity then causes that fluid to move to those parts of the body that are the lowest. So one of the first symptoms of right-side CHF can include swelling in the feet and ankles. If the condition gets worse, the swelling can even go up past the knees. Sometimes it gets so severe, the liver can get congested as well.  Left-side heart failure  When the left side of the heart is weakened, it cant handle the blood it gets from the lungs. Pressure then builds up in the veins of the lungs, causing fluid to leak into the lung tissues. This may cause CHF and pulmonary edema. This causes you to feel short of breath, weak, or dizzy. These symptoms are often worse with exertion,  such as when climbing stairs or walking up hills. Lying with your head flat is uncomfortable and can make your breathing worse. This may make sleeping difficult. You may need to use extra pillows to elevate your upper body to sleep well. The same is true when just resting during the daytime.  There are many causes of heart failure including:  · Coronary artery disease  · Past heart attack (also known as acute myocardial infarction, or AMI)  · High blood pressure  · Damaged heart valve  · Diabetes  · Obesity  · Cigarette smoking  · Alcohol abuse  Heart failure is a chronic condition. There is no cure. The purpose of medical treatment is to improve the pumping action of the heart. The main way to do this is to remove excess water from the body. A number of medicines can help reach this goal, improve symptoms, and prevent the heart from becoming weaker. Sometimes, heart failure can become so severe that a device is placed in the heart to help with pumping. Another major goal is to better treat the causes of heart failure, such as diabetes and high blood pressure, by making changes in your lifestyle and maximizing medical control when needed.  Home care  Follow these guidelines when caring for yourself at home:  · Check your weight every day. This is very important because a sudden increase in weight gain could mean worsening heart failure. Keep these things in mind:  ¨ Use the same scale every day.  ¨ Weigh yourself at the same time every day.  ¨ Make sure the scale is on a hard floor surface, not on a rug or carpet.  ¨ Keep a record of your weight every day so your healthcare provider can see it. If you are not given a log sheet for this, keep a separate journal for this purpose.   · Cut back on the amount of salt (sodium) you eat. Follow your healthcare provider's recommendation on how much salt or sodium you should have each day.  ¨ Avoid high-salt foods. These include olives, pickles, smoked meats, salted potato  chips, and most prepared foods.  ¨ Don't add salt to your food at the table. Use only small amounts of salt when cooking.  ¨ Read the labels carefully on food packages to learn how much salt or sodium is in each serving in the package. Remember, a can or package of food may contain more than 1 serving. So if you eat all the food in the package, you may be getting more salt than you think.  · Follow your healthcare provider's recommendations about how much fluid you should have. Be aware that some foods, such as soup, pudding, and juicy fruits like oranges or melons, contain liquid. You'll need to count the liquid in those foods as part of your daily fluid intake. Your provider can help you with this.  · Stop smoking.  · Cut back on how much alcohol you drink.  · Lose weight if you are overweight. The excess weight adds a lot of stress on the workload of the heart.  · Stay active. Talk with your provider about an exercise program that is safe for your heart.  · Keep your feet elevated to reduce swelling. Ask your provider about support hose as a preventive treatment for daytime leg swelling.  Besides taking your medicine as instructed, an important part of treatment is lifestyle changes. These include diet, physical activity, stopping smoking, and weight control.  Improve your diet by including more fresh foods, cutting back on how much sugar and saturated fat you eat, and eating fewer processed foods and less salt.  Follow-up care  Follow up with your healthcare provider, or as advised.  Make sure to keep any appointments that were made for you. These can help better control your congestive heart failure. You will need to follow up with your provider on a routine basis to make sure your heart failure is well managed.  If an X-ray, ECG, or other tests were done, you will be told of any new findings that may affect your care.  Call 911  Call 911 if you:  · Become severely short of breath  · Feel lightheaded, or feel  like you might pass out or faint  · Have chest pain or discomfort that is different than usual, the medicines your doctor told you to use for this don't help, or the pain lasts longer than 10 to 15 minutes  · You suddenly develop a rapid heart rate  When to seek medical advice  The following may be signs that your heart failure is getting worse. Call your healthcare provider right away if any of these happen:  · Sudden weight gain. This means 3 or more pounds in one day, or 5 or more pounds in 1 week.  · Trouble breathing not related to being active  · New or increased swelling of your legs or ankles  · Swelling or pain in your abdomen  · Breathing trouble at night. This means waking up short of breath or needing more pillows to breathe.  · Frequent coughing that doesnt go away  · Feeling much more tired than usual  Date Last Reviewed: 1/4/2016  © 4332-0345 [employer]. [address] [place] [address], [place] [address]. All rights reserved. This information is not intended as a substitute for professional medical care. Always follow your healthcare professional's instructions.

## 2018-08-22 NOTE — PROGRESS NOTES
Requested call back - [phone]  C3 nurse attempted to contact patient. The following occurred:   C3 nurse attempted to contact Natalie Parnell for a TCC post hospital discharge follow up call. The patient is unable to conduct the call @ this time. The patient requested a callback.    The patient does not have a scheduled HOSFU appointment within 7-14 days post hospital discharge date 08/21/18. Message sent to Physician staff to assist with HOSFU appointment scheduling.

## 2018-08-24 NOTE — TELEPHONE ENCOUNTER
Pt's daughter, Mrs. Rocha notified. Daughter states pt's weight was up 2 lbs today, gave pt Bumex as ordered.  notified, advised if pt's weight is down tomorrow, hold Bumex.  notified, verbalized understanding.

## 2018-08-24 NOTE — TELEPHONE ENCOUNTER
----- Message from Jim Garcia MD sent at 8/24/2018  2:59 PM CDT -----  I reviewed the labs from today and once done on 8/22.  The sodium level has improved from 128 to130. Kidney function has also improved BUN from 50s to 36 and creatinine from around 2 to 1.4-1.5 mg/dl.   Has she resumed the the diuretic?  She was supposed to started if her weight increased 2 lbs since discharge from hospital

## 2018-08-28 NOTE — PT/OT/SLP DISCHARGE
Occupational Therapy Discharge Summary    Natalie Parnell  MRN: [id number]   Principal Problem: Acute on chronic systolic congestive heart failure      Patient Discharged from acute Occupational Therapy on 8/28/2018  .  Please refer to prior OT note dated 8/18/18 for functional status.    Assessment:      Patient appropriate for care in another setting.    Objective:     GOALS:   Multidisciplinary Problems     Occupational Therapy Goals        Problem: Occupational Therapy Goal    Goal Priority Disciplines Outcome Interventions   Occupational Therapy Goal     OT, PT/OT     Description:  Goals to be met by: 7 days 8/26/18     Patient will increase functional independence with ADLs by performing:    Pt to complete standing g/h skills with supervision  Pt to complete toileting skills with supervision.  Pt/fly to demo understanding of encouraging pt to complete mobility/ADL skills daily with rollator to maintain functional independence as able.                   pt seen for OT re-eval and then pt was d/c home prior to additional OT services. Thus, goals not yet achieved upon hospital d/c.     Reasons for Discontinuation of Therapy Services  Transfer to alternate level of care.      Plan:     Patient Discharged to: Home with Home Health Service    CLARISSA Pate  8/28/2018

## 2018-08-28 NOTE — PROGRESS NOTES
Subjective:   Patient ID:  Natalie Parnell is a 83 y.o. female who presents for follow-up of Hospital Follow Up      Problem List:      HPI:   Natalie Parnell is Dr. Diaz's patient. I took care of her during her last hospitalization and her granddaughter requested that I see her in clinic at least once for continuity of care.  She has severe aortic stenosis with previously normal LV systolic function.  She was admitted to hospital with cardiogenic shock and underwent a balloon valvuloplasty.  LVEF was down to 25%.  Her heart failure did not improve immediately and she required IV dobutamine. She developed azotemia and for several days prior to discharge did not receive diuretics.  She was discharged home on IV dobutamine with instructions to use bumetanide in p.r.n. for fluid retention.  She has required few met edema only once since discharge from hospital last week.  Her weight at the time of discharge was 173 lb.  It increased to 177 lb and following 2 doses of bumetanide in decrease to 172 lb.  Her systolic blood pressure has been  mmHg and heart rate  bpm.  She uses a wedge to keep her head propped up at night and does not report PND.  She has edema, worsen in the right lower extremity.      ROS    Current Outpatient Medications   Medication Sig    albuterol 90 mcg/actuation inhaler Inhale 2 puffs into the lungs every 6 (six) hours as needed for Wheezing.    amoxicillin (AMOXIL) 500 MG capsule Take 2 capsules (1,000 mg total) by mouth every 12 (twelve) hours. for 13 days    aspirin (ECOTRIN) 81 MG EC tablet Take 1 tablet (81 mg total) by mouth once daily.    atorvastatin (LIPITOR) 40 MG tablet Take 1 tablet (40 mg total) by mouth once daily.    blood sugar diagnostic (ACCU-CHEK SMARTVIEW TEST STRIP) Strp Inject 1 strip into the skin once daily.    blood-glucose meter Misc Use as instructed    bumetanide (BUMEX) 1 MG tablet Take 1 tablet (1 mg total) by mouth 2 (two) times daily. START WITH  "WEIGHT GAIN OF 2 LB. (Patient taking differently: Take 1 mg by mouth 2 (two) times daily as needed. START WITH WEIGHT GAIN OF 2 LB.)    clarithromycin (BIAXIN) 500 MG tablet Take 1 tablet (500 mg total) by mouth once daily. for 13 days    DOBUTamine (DOBUTREX) 500 mg/250 mL (2,000 mcg/mL) Inject 181.5 mcg/min into the vein continuous.    insulin aspart U-100 (NOVOLOG) 100 unit/mL InPn pen Inject 4 Units into the skin 3 (three) times daily.    insulin glargine (LANTUS SOLOSTAR) 100 unit/mL (3 mL) InPn pen Inject 12 Units into the skin once daily.    lancets (ACCU-CHEK FASTCLIX) Misc Inject 1 lancet into the skin once daily.    multivitamin capsule Take 1 capsule by mouth once daily.    pantoprazole (PROTONIX) 40 MG tablet Take 40 mg bid po x 13 days, then take 40 mg po daily.    pen needle, diabetic 32 gauge x 5/32" Ndle Use to inject insulin 4 times daily     No current facility-administered medications for this visit.        Social History     Tobacco Use    Smoking status: Never Smoker    Smokeless tobacco: Never Used   Substance Use Topics    Alcohol use: No    Drug use: No         Objective:     Physical Exam   Constitutional: She is oriented to person, place, and time. She appears well-developed and well-nourished.   BP (!) 84/48   Pulse 103   Ht 5' 5" (1.651 m)   Wt 79.4 kg (175 lb 0.7 oz)   BMI 29.13 kg/m²      Neck: No JVD present.   Cardiovascular: Normal rate and regular rhythm.   Murmur heard.   Harsh midsystolic murmur is present with a grade of 3/6 at the upper right sternal border.  Pulses:       Radial pulses are 2+ on the right side.        Posterior tibial pulses are 1+ on the right side, and 1+ on the left side.   Pulmonary/Chest: She has no decreased breath sounds. She has no wheezes. She has no rales. Chest wall is not dull to percussion.   Abdominal: Soft. Normal appearance. There is no splenomegaly or hepatomegaly. There is no tenderness.   Musculoskeletal:        Right lower " leg: She exhibits edema (mild).        Left lower leg: She exhibits edema (trace).   Neurological: She is alert and oriented to person, place, and time.   Skin: Skin is warm. No bruising noted. Nails show no clubbing.   Psychiatric: Her speech is normal and behavior is normal. Cognition and memory are normal.                   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   Continue current meds.  Bumex today.  Follow-up with Dr. Diaz at [place] and Dr. Lord in the [facility] clinic.    No Follow-up on file.

## 2018-08-28 NOTE — PATIENT INSTRUCTIONS
Salt restriction. Daily weights. Bumex (bumetinide) if weight increases 2 or more lbs. in a 24 hr period, or 3-4 lbs. over a 3-4 day period.  Let us know if she has to take it for 3 days in a row.     Restrict fluids to 6 cups a day ie 1.5 liters.    LOW-SALT DIET (1.5 grams/day)   This diet eliminates foods that are high in salt and restricts the amount of salt that you cook with. It is most often used for patients with high blood pressure, edema (fluid retention), kidney, liver, and heart disease.   Table salt contains the mineral sodium. The body needs a little bit of sodium to work normally. But too much sodium can make your health problems worse. Your total daily allowance of salt (sodium) is 1.5 grams. This equals 1500 milligrams (mg). This is about 3/4 of a teaspoon of salt. This means you can have only about 500 mg of sodium at each meal. With snacks look for sodium levels at or around 250 mg per serving and do not eat more than 1 serving. Really low-sodium snacks contain less than 100 mg per serving.      When you cook, limit the salt you use. And if you can avoid using salt, even better. Do not add salt at the table. So, throw away the saltshaker!   When shopping, read the package labels. Salt is often called sodium on the label. Choose foods that are Salt-Free, Low Salt, or Very Low Salt. Note that foods with Reduced Salt may not lower your salt intake enough.     BEVERAGES OK: Tea, coffee, carbonated beverages, juices   AVOID: Flavored international coffees, electrolyte replacement drinks, sports beverages     BREAD & CEREALS OK: All regular bread, rolls, cereals, cakes; low-salt crackers, matzoh crackers   AVOID: Salted crackers, pretzels, popcorn; Hungarian toast, pancakes, muffins     FRUITS & DESSERTS OK: Ice cream, frozen yogurt, juice bars, gelatin (Jell-O), cookies and pies, sugar, honey, jelly, hard candy   AVOID: Most pies, cakes and cookies prepared or processed with salt, instant pudding      MEATS OK: All fresh meat, fish, poultry, low-salt tuna   AVOID: Smoked, pickled, brine-cured, or salted meats or fish. This includes garcia, chipped beef, corned beef, hot dogs, luncheon meats, ham, kosher meats, salt pork, sausage, canned tuna, salted codfish, smoked salmon, herring, sardines, or anchovies.     DAIRY OK: Milk, chocolate milk, hot chocolate mix; eggs, Low Salt cheeses, yogurt, egg substitute   AVOID: Processed cheese, cheese spreads, Roquefort, Camembert, and cottage cheese, buttermilk, instant breakfast drink     BEANS, POTATOES & PASTA OK: Dry beans, split peas, lentils, potatoes, rice, macaroni, noodles, spaghetti without added salt   AVOID: Potato chips, tortilla chips, and similar products     SOUPS OK: Low-salt soups and broths made with allowed foods   AVOID: Bouillon cubes, soups with smoked or salted meats, regular soup and broth     VEGETABLES OK: Most are okay; low-salt tomato and vegetable juices   AVOID: Sauerkraut and other brine-soaked vegetables, pickles and other pickled vegetables, tomato juice, olives       SEASONING & SPICES: OK: Most seasonings are okay. Good substitutes for salt include: fresh herb blends, Tabasco, lemon, garlic, sheppard, vinegar, dry mustard, parsley, cilantro, horseradish, tomato paste, regular margarine, mayonnaise, butter, cream cheese, vegetable oil, cream, low-salt salad dressing and gravy   AVOID: Regular ketchup, relishes, pickles, soy sauce, teriyaki sauce, Worcestershire sauce, BBQ sauce, tartar sauce, meat tenderizer, chili sauce, regular gravy, regular salad dressing   © 6753-3202 [facility], [address] [place] [address], [place] [address]. All rights reserved. This information is not intended as a substitute for professional medical care. Always follow your healthcare professional's instructions.

## 2018-08-31 NOTE — TELEPHONE ENCOUNTER
Continue with current regimen.    Restrict fluid intake to 1 and 0.5 L per day for 3 days.  Repeat sodium next week.    Please notify the patient with appointment.

## 2018-08-31 NOTE — TELEPHONE ENCOUNTER
----- Message from Tasneem Mccormack sent at 8/31/2018  9:22 AM CDT -----  Contact: [facility]/[place]ov[place]/[phone] ext [phone]  [facility] is confirming you have received a fax she sent over. Critical NA level she wants you to see.

## 2018-09-04 NOTE — ED PROVIDER NOTES
Encounter Date: 9/4/2018       History     Chief Complaint   Patient presents with    Fatigue     Patient was sent to ED by dr. Diaz.  Patient states MD told her she is retaining fluid     This is a 83-year-old female history of mitral and aortic valve stenosis status post intra-aortic balloon pump status post recent admission for cardiogenic shock discharged on dobutamine infusion come here for evaluation of increased fluid to bilateral lower extremities as well as low blood pressure.  Patient is followed by Dr. Diaz  Who saw patient in clinic and noted that she had increased leg swelling,crackles on lung examinoat and was hypotensive a recommended that she come here for further treatment.  Patient endorses shortness of breath for the past couple of days as well as bilateral lower extremity swelling during this time she  She has had intermittent chest pain during this time denies fevers nausea vomiting diarrhea or abdominal pain.           Review of patient's allergies indicates:   Allergen Reactions    Lidocaine      Past Medical History:   Diagnosis Date    Asthma     Diabetes mellitus     HTN (hypertension)     Hyperlipidemia      Past Surgical History:   Procedure Laterality Date    CATARACT EXTRACTION Bilateral     Dr. Max    COLON SURGERY      HERNIA REPAIR      VARICOSE VEIN SURGERY       No family history on file.  Social History     Tobacco Use    Smoking status: Never Smoker    Smokeless tobacco: Never Used   Substance Use Topics    Alcohol use: No    Drug use: No     Review of Systems   Constitutional: Negative for chills and fever.   HENT: Negative for rhinorrhea, sore throat and trouble swallowing.    Eyes: Negative for visual disturbance.   Respiratory: Positive for shortness of breath. Negative for cough.    Cardiovascular: Positive for chest pain.   Gastrointestinal: Negative for abdominal pain, diarrhea and vomiting.   Genitourinary: Negative for dysuria and hematuria.    Musculoskeletal: Negative for back pain.   Skin: Negative for rash.   Neurological: Negative for numbness and headaches.   Psychiatric/Behavioral: Negative for suicidal ideas.       Physical Exam     Initial Vitals [09/04/18 1547]   BP Pulse Resp Temp SpO2   (!) 79/51 97 18 99.2 °F (37.3 °C) 96 %      MAP       --         Physical Exam    Constitutional: She is not diaphoretic.  Non-toxic appearance.   HENT:   Head: Normocephalic and atraumatic.   Eyes: Conjunctivae are normal. Pupils are equal, round, and reactive to light. Right eye exhibits no nystagmus. Left eye exhibits no nystagmus.   Neck: Neck supple.   Cardiovascular: Regular rhythm, S1 normal and S2 normal. Exam reveals no gallop.    No murmur ( upper extremity) heard.   holosystolicc murmur   Pulmonary/Chest: She has rales.   Crackles   Abdominal: Soft. She exhibits no distension. There is no tenderness.   Neurological: She is alert. No cranial nerve deficit. GCS eye subscore is 4. GCS verbal subscore is 5. GCS motor subscore is 6.   Skin: Skin is warm and dry. No rash noted.   2+ pitting pretibial edema    Patient with right upper extremity PICC line insertion site was clean dry and intact   Psychiatric: She has a normal mood and affect. Her behavior is normal.         ED Course   Procedures  Labs Reviewed   CBC W/ AUTO DIFFERENTIAL - Abnormal; Notable for the following components:       Result Value    RBC 2.80 (*)     Hemoglobin 7.9 (*)     Hematocrit 24.0 (*)     RDW 15.7 (*)     MPV 8.8 (*)     Lymph # 0.6 (*)     Gran% 78.9 (*)     Lymph% 9.1 (*)     All other components within normal limits   COMPREHENSIVE METABOLIC PANEL - Abnormal; Notable for the following components:    Sodium 124 (*)     Potassium 5.2 (*)     Chloride 87 (*)     Glucose 137 (*)     BUN, Bld 56 (*)     Creatinine 2.7 (*)     Albumin 3.0 (*)     Total Bilirubin 1.4 (*)     Alkaline Phosphatase 148 (*)     AST 50 (*)     eGFR if  18 (*)     eGFR if non   American 16 (*)     All other components within normal limits   TROPONIN I - Abnormal; Notable for the following components:    Troponin I 0.334 (*)     All other components within normal limits   B-TYPE NATRIURETIC PEPTIDE - Abnormal; Notable for the following components:    BNP 3,014 (*)     All other components within normal limits   PROTIME-INR - Abnormal; Notable for the following components:    Prothrombin Time 13.4 (*)     INR 1.3 (*)     All other components within normal limits   LACTIC ACID, PLASMA - Abnormal; Notable for the following components:    Lactate (Lactic Acid) 2.4 (*)     All other components within normal limits   POCT GLUCOSE - Abnormal; Notable for the following components:    POCT Glucose 157 (*)     All other components within normal limits   CULTURE, BLOOD   CULTURE, BLOOD   POCT GLUCOSE MONITORING CONTINUOUS     EKG Readings: (Independently Interpreted)   Rhythm: Normal Sinus Rhythm. Heart Rate:  98.    Normal sinus rhythm I isolated biphasic T-wave in V2 no additional ST changes appreciated normal intervals       Imaging Results          X-Ray Chest AP Portable (Final result)  Result time 09/04/18 16:27:41    Final result by Camacho Avilez MD (09/04/18 16:27:41)                 Impression:      CHF pattern edema with small bilateral effusions.    Central line terminates over the SVC.  This appears slightly retracted when compared to recent prior exam.      Electronically signed by: Camacho Avilez MD  Date:    09/04/2018  Time:    16:27             Narrative:    EXAMINATION:  XR CHEST AP PORTABLE    CLINICAL HISTORY:  CHF;    TECHNIQUE:  Single frontal view of the chest was performed.    COMPARISON:  08/20/2018    FINDINGS:  Central line terminates over the SVC.  There is cardiomegaly.  Central vascular congestion, interstitial edema with small bilateral effusions similar to prior.  No acute osseous findings.                                 Medical Decision Making:   Initial  Assessment:    Patient arrived hypotensive with systolics in the 70s however patient was GCS 15 insert questions appropriately and in no apparent distress.      Patient has dobutamine infusion through PICC line to the  Right upper extremity we will increase the infusion rate and continue to monitor same labs EKG chest x-ray evaluate for ACS infection volume overload causes.  Will refrain from administering IV fluids given patient's history of cardiogenic shock  Differential Diagnosis:    Cardiogenic shock kidney failure liver failure sepsis,   Clinical Tests:   Lab Tests: Reviewed       <> Summary of Lab:  Initial troponin was positive without EKG changes concerning for STEMI suspect type 2 NSTEMI given patient's recent history.  Will refrain from aspirin as patient has had a history of upper GI bleed requiring transfusion recently.  Will obtain 2nd troponin    Labs consistent with chf elevated lactate and ckd  Radiological Study: Reviewed  Medical Tests: Reviewed  ED Management:  Pt with gcs 15 re-evaluated several times throughout my shift with improvement of BP after starting small dose of levofed.     At time of shift change, patient's ED workup incomplete. Oncoming ED physician to continue care. All relevant details were discussed, including pending workup and planned disposition. See summary below.    HPI:  Patient with fluid overload and history of aortic stenosis on dobutamine infusion with hypotension  Workup:  Labs EKG chest x-ray  Pendinnd troponin  Disposition:  ICU - under cardiology service     Other:   I have discussed this case with another health care provider.       <> Summary of the Discussion: Dr. Diaz: concerned for fluid overload and worsening hypotension would like patient admitted to ICU under cardiology service. Levofed on standby    Dr. Aponte: cardiology will admit to ICU               Attending Attestation:         Attending Critical Care:   Critical Care Times:   Direct Patient  Care (initial evaluation, reassessments, and time considering the case)................................................................10 minutes.   Additional History from reviewing old medical records or taking additional history from the family, EMS, PCP, etc.......................5 minutes.   Documentation..................................................................................................................................................................................10 minutes.   Consultation with other Physicians. .................................................................................................................................................5 minutes.   ==============================================================  · Total Critical Care Time - exclusive of procedural time: 30 minutes.  ==============================================================  Critical care was necessary to treat or prevent imminent or life-threatening deterioration of the following conditions: hypotension and congestive heart failure.                  Clinical Impression:   The primary encounter diagnosis was Cardiogenic shock. Diagnoses of Shortness of breath, Type 2 diabetes mellitus with complication, without long-term current use of insulin, A-fib, Acute renal failure superimposed on stage 3 chronic kidney disease, unspecified acute renal failure type, and Acute blood loss anemia were also pertinent to this visit.      Disposition:   Disposition: Admitted  Condition: Critical                        Kolton Raphael Jr., MD  09/06/18 [id number]

## 2018-09-04 NOTE — ED NOTES
"Daughter-Evangelina RochaSrldicpa-[phone];  Niece--Kelsie Evans--[phone].   PASSWORD--"paloma"  "

## 2018-09-04 NOTE — LETTER
September 11, 2018         [address] [place] [address]  Phone: [phone]  Fax: [phone]       Patient: Natalie Parnell   YOB: 1935  Date of Visit: 09/11/2018    To Whom It May Concern:    Indu Thapa was at [facility] on 09/11/2018. She may return to work/school on 9/12/18 with no restrictions. If you have any questions or concerns, or if I can be of further assistance, please do not hesitate to contact me.    Sincerely,    Lorraine Silva RN

## 2018-09-04 NOTE — ED NOTES
Pt here sent by Ian for low BP. Pt has not felt good since Saturday-middle lateral back pain as her complaint. Denies fevers,urinary changes or bowel changes. Denies SOB. Lower legs with large amount of edema. Breath sounds are clear throughout. Tele shows SR rate 90s. Pt is AAOx4,clear speech. Skin coloring is at baseline and is warm and dry. No wounds. Daughter at side.

## 2018-09-04 NOTE — PROGRESS NOTES
Subjective:    Patient ID:  Natalie Parnell is a 83 y.o. female who presents for follow-up of Congestive Heart Failure and Valvular Heart Disease      HPI  84 y/o Persian speaking female with hx of severe AS (SHEA 0.69 cm2, AVAi 0.37 cm2/m2, peak aortic jet velocity 4.3 m/s,MG 50 mmHg, EF 20-25%) s/p recent BAV, HFrEF (EF previously 50-55%, now 20-25%), LE DVT previously on Xarelto, DM, hx of GIB, possible liver cirrhosis who presents for f/u.   Last clinic visit was seen after hospitalization for GIB with acute blood loss anemia requiring PRBC transfusion. Trop elevated and peaked at >7. 2DE with normal EF and no WMA's. Was discharged and hospitalized again for ADHF. Diuresed, [facility] with nonobstructive CAD, improved, and discharged home.   Had recent admission to [facility] for ADHF, found to be in cardiogenic shock, newly depressed EF, IV diuresis, BAV with improvement in MG and symptoms and discharged home. A couple of days later came back due to not using diuretics (had ARF) and volume overloaded. Diuresed, improved and discharged home on Bumex. Was seen by Dr Garcia as outpatient as was doing OK. Since then she has gained weight, had worsening bilateral leg edema, SOB, despite increased Bumex doses to 2 mg TID. Also complaining of bilateral flank pain and weakness. Has to sleep propped up. Denies PND, syncope, LE edema. Compliant with meds. Weight gain 172-176. SBP in clinic 67. Has continuous infusion  via right arm PICC line.    Review of Systems   Constitution: Positive for weakness and malaise/fatigue.   HENT: Negative for congestion.    Eyes: Negative for blurred vision.   Cardiovascular: Positive for dyspnea on exertion, leg swelling and orthopnea. Negative for chest pain, claudication, cyanosis, irregular heartbeat, near-syncope, palpitations, paroxysmal nocturnal dyspnea and syncope.   Respiratory: Positive for shortness of breath.    Endocrine: Negative for polyuria.   Hematologic/Lymphatic: Negative  for bleeding problem.   Skin: Negative for itching and rash.   Musculoskeletal: Positive for back pain, joint pain, joint swelling, muscle cramps and muscle weakness.   Gastrointestinal: Positive for abdominal pain. Negative for hematemesis, hematochezia, melena, nausea and vomiting.   Genitourinary: Negative for dysuria and hematuria.   Neurological: Negative for dizziness, focal weakness, headaches, light-headedness and loss of balance.   Psychiatric/Behavioral: Negative for depression. The patient is not nervous/anxious.         Objective:    Physical Exam   Constitutional: She is oriented to person, place, and time. She appears well-developed and well-nourished.   HENT:   Head: Normocephalic and atraumatic.   Neck: Neck supple. JVD present.   Cardiovascular: Normal rate.   Murmur heard.   Medium-pitched blowing holosystolic murmur is present with a grade of 4/6.  Pulses:       Carotid pulses are 2+ on the right side, and 2+ on the left side.       Radial pulses are 2+ on the right side, and 2+ on the left side.        Femoral pulses are 2+ on the right side, and 2+ on the left side.  Pulmonary/Chest: Effort normal. She has rales.   Abdominal: Soft. Bowel sounds are normal.   Musculoskeletal: She exhibits edema.   Neurological: She is alert and oriented to person, place, and time.   Skin: Skin is warm and dry.   Psychiatric: She has a normal mood and affect. Her behavior is normal. Thought content normal.         Assessment:       1. Acute on chronic systolic congestive heart failure    2. Cardiogenic shock    3. Coronary artery disease involving native coronary artery of native heart without angina pectoris    4. Heart failure with preserved ejection fraction    5. Nonrheumatic aortic valve stenosis    6. NSTEMI (non-ST elevated myocardial infarction)    7. Tachycardia    8. Elevated troponin    9. Acute renal failure superimposed on stage 3 chronic kidney disease, unspecified acute renal failure type    10.  Acute blood loss anemia    11. Type 2 diabetes mellitus with complication, without long-term current use of insulin    12. Acute H. pylori gastric ulcer with hemorrhage    13. Cirrhosis of liver without ascites, unspecified hepatic cirrhosis type    14. H/O: GI bleed      82 y/o pt with hx and presentation as above. Clinically decompensated from a HF perspective and currently with NYHA FC IV symptoms and hypotensive. Clinically volume overloaded and has been taking increased doses of Bumex with limited effect and weight gain. Will send to ED for admission. Will need IV diuresis (likely IV Bumex) and ikely will require pressor support and may require IABP placement.        Plan:       -Sent to ED for admission for ADHF and possibly cardiogenic shock

## 2018-09-05 NOTE — HPI
82 y/o Occitan speaking female with hx of severe AS (SHEA 0.69 cm2, AVAi 0.37 cm2/m2, peak aortic jet velocity 4.3 m/s,MG 50 mmHg, EF 20-25%) s/p recent BAV, HFrEF (EF previously 50-55%, now 20-25%), LE DVT previously on Xarelto, DM, hx of GIB, possible liver cirrhosis who presents to the ER with complaints of SOB, abdominal and LE swelling. She was seen by Dr. Diaz in the clinic yesterday and was sent to the ER for admission due to ADHF.   Last clinic visit was seen after hospitalization for GIB with acute blood loss anemia requiring PRBC transfusion. Trop elevated and peaked at >7. 2DE with normal EF and no WMA's. Was discharged and hospitalized again for ADHF. Diuresed, [facility] with nonobstructive CAD, improved, and discharged home.   Had recent admission to [facility] for ADHF, found to be in cardiogenic shock, newly depressed EF, IV diuresis, BAV with improvement in MG and symptoms and discharged home. A couple of days later came back due to not using diuretics (had ARF) and volume overloaded. Diuresed, improved and discharged home on Bumex. Was seen by Dr Garcia as outpatient as was doing OK. Since then she has gained weight, had worsening bilateral leg edema, SOB, despite increased Bumex doses to 2 mg TID. Also complaining of bilateral flank pain and weakness. Has to sleep propped up. Denies PND, syncope, LE edema. Compliant with meds. Weight gain 172-176. SBP in clinic 67. Has continuous infusion  via right arm PICC line.

## 2018-09-05 NOTE — ASSESSMENT & PLAN NOTE
-related to hypervolemic hyponatremia  -will monitor Na closely while receiving IV Bumex  -if urine output does not increase and creatinine remains unchanged will consider addition of Samsca

## 2018-09-05 NOTE — ASSESSMENT & PLAN NOTE
-recent echo with severely depressed LV function with EF 20-25% and related to severe AS  -volume overload on exam with pulmonary edema on CXR along with rales, JVD and peripheral edema  -continue IV Dobutamine drip along with IV Bumex Q8hrs for volume removal  -no BB due to hypotension and Dobutamine use; no ACEI due to pressor use and KATI on CKD  -will place on 1 liter fluid restriction; monitor strict I&O and daily weights

## 2018-09-05 NOTE — SUBJECTIVE & OBJECTIVE
"Past Medical History:   Diagnosis Date    Asthma     Diabetes mellitus     HTN (hypertension)     Hyperlipidemia        Past Surgical History:   Procedure Laterality Date    CATARACT EXTRACTION Bilateral     Dr. Max    COLON SURGERY      HERNIA REPAIR      VARICOSE VEIN SURGERY         Review of patient's allergies indicates:   Allergen Reactions    Lidocaine        No current facility-administered medications on file prior to encounter.      Current Outpatient Medications on File Prior to Encounter   Medication Sig    albuterol 90 mcg/actuation inhaler Inhale 2 puffs into the lungs every 6 (six) hours as needed for Wheezing.    aspirin (ECOTRIN) 81 MG EC tablet Take 1 tablet (81 mg total) by mouth once daily.    atorvastatin (LIPITOR) 40 MG tablet Take 1 tablet (40 mg total) by mouth once daily.    blood sugar diagnostic (ACCU-CHEK SMARTVIEW TEST STRIP) Strp Inject 1 strip into the skin once daily.    blood-glucose meter Misc Use as instructed    bumetanide (BUMEX) 1 MG tablet Take 1 tablet (1 mg total) by mouth 2 (two) times daily. START WITH WEIGHT GAIN OF 2 LB. (Patient taking differently: Take 1 mg by mouth 2 (two) times daily as needed. START WITH WEIGHT GAIN OF 2 LB.)    DOBUTamine (DOBUTREX) 500 mg/250 mL (2,000 mcg/mL) Inject 181.5 mcg/min into the vein continuous.    insulin aspart U-100 (NOVOLOG) 100 unit/mL InPn pen Inject 4 Units into the skin 3 (three) times daily.    insulin glargine (LANTUS SOLOSTAR) 100 unit/mL (3 mL) InPn pen Inject 12 Units into the skin once daily.    lancets (ACCU-CHEK FASTCLIX) Misc Inject 1 lancet into the skin once daily.    multivitamin capsule Take 1 capsule by mouth once daily.    pantoprazole (PROTONIX) 40 MG tablet Take 40 mg bid po x 13 days, then take 40 mg po daily.    pen needle, diabetic 32 gauge x 5/32" Ndle Use to inject insulin 4 times daily     Family History     None        Tobacco Use    Smoking status: Never Smoker    Smokeless " tobacco: Never Used   Substance and Sexual Activity    Alcohol use: No    Drug use: No    Sexual activity: Not on file     Review of Systems   Constitution: Negative for chills, decreased appetite, diaphoresis, fever and weakness.   Cardiovascular: Positive for chest pain, dyspnea on exertion and leg swelling. Negative for claudication, cyanosis, irregular heartbeat, near-syncope, orthopnea, palpitations, paroxysmal nocturnal dyspnea and syncope.   Respiratory: Negative for cough, hemoptysis, shortness of breath and wheezing.    Gastrointestinal: Negative for bloating, abdominal pain, constipation, diarrhea, melena, nausea and vomiting.   Neurological: Negative for dizziness.     Objective:     Vital Signs (Most Recent):  Temp: 98 °F (36.7 °C) (09/05/18 0701)  Pulse: 101 (09/05/18 0805)  Resp: (!) 27 (09/05/18 0805)  BP: (!) 93/50 (09/05/18 0805)  SpO2: 95 % (09/05/18 0805) Vital Signs (24h Range):  Temp:  [98 °F (36.7 °C)-99.2 °F (37.3 °C)] 98 °F (36.7 °C)  Pulse:  [] 101  Resp:  [18-34] 27  SpO2:  [93 %-98 %] 95 %  BP: ()/(44-69) 93/50     Weight: 83 kg (182 lb 15.7 oz)  Body mass index is 30.45 kg/m².    SpO2: 95 %  O2 Device (Oxygen Therapy): nasal cannula      Intake/Output Summary (Last 24 hours) at 9/5/2018 1101  Last data filed at 9/5/2018 0734  Gross per 24 hour   Intake 1319 ml   Output 380 ml   Net 939 ml       Lines/Drains/Airways     Peripherally Inserted Central Catheter Line                 PICC Double Lumen 08/20/18 0935 right brachial 16 days          Drain                 Urethral Catheter 09/04/18 2236 Latex less than 1 day          Peripheral Intravenous Line                 Peripheral IV - Single Lumen 09/05/18 0018 Left Hand less than 1 day                Physical Exam   Constitutional: She is oriented to person, place, and time. She appears well-developed and well-nourished. No distress.   Cardiovascular: Normal rate and regular rhythm. Exam reveals no gallop.   Murmur  heard.  Pulmonary/Chest: Effort normal. Tachypnea noted. No respiratory distress. She has no wheezes. She has rales.   Abdominal: Soft. Bowel sounds are normal. She exhibits no distension. There is no tenderness.   Musculoskeletal: She exhibits edema (trace BLE edema ).   Neurological: She is alert and oriented to person, place, and time.   Skin: Skin is warm and dry.       Significant Labs:     Recent Labs   Lab  09/05/18   0545   WBC  10.99   RBC  2.85*   HGB  7.9*   HCT  24.3*   PLT  291   MCV  85   MCH  27.7   MCHC  32.5     Recent Labs   Lab  09/05/18   0545   NA  121*   K  4.9   CL  85*   CO2  26   BUN  55*   CREATININE  2.8*   MG  2.0     Recent Labs   Lab  09/04/18   2247   TROPONINI  0.230*       Significant Imaging: Echocardiogram:   2D echo with color flow doppler:   Results for orders placed or performed in visit on 08/28/18   2D echo with color flow doppler   Result Value Ref Range    EF 20 (A) 55 - 65    Mitral Valve Regurgitation MODERATE (A)     Diastolic Dysfunction Yes (A)     Aortic Valve Regurgitation MILD     Aortic Valve Stenosis SEVERE (A)     Est. PA Systolic Pressure 61.58 (A)     Mitral Valve Mobility NORMAL     Tricuspid Valve Regurgitation MODERATE (A)

## 2018-09-05 NOTE — PLAN OF CARE
Problem: Fall Risk (Adult)  Goal: Identify Related Risk Factors and Signs and Symptoms  Related risk factors and signs and symptoms are identified upon initiation of Human Response Clinical Practice Guideline (CPG)  Outcome: Ongoing (interventions implemented as appropriate)   09/05/18 0042   Fall Risk   Related Risk Factors (Fall Risk) fatigue/slow reaction;sleep pattern alteration;environment unfamiliar   Signs and Symptoms (Fall Risk) presence of risk factors       Problem: Cardiac Output Decreased (Adult)  Intervention: Optimize/Manage Energy Expenditure   09/05/18 0042   Coping/Psychosocial Interventions   Environmental Support calm environment promoted;distractions minimized;personal routine supported   Pain/Comfort/Sleep Interventions   Sleep/Rest Enhancement awakenings minimized;consistent schedule promoted;family presence promoted;regular sleep/rest pattern promoted         Problem: Fluid Volume Excess (Adult,Obstetrics,Pediatric)  Goal: Identify Related Risk Factors and Signs and Symptoms  Related risk factors and signs and symptoms are identified upon initiation of Human Response Clinical Practice Guideline (CPG)   09/05/18 0042   Fluid Volume Excess   Related Risk Factors (Fluid Volume Excess) cardiac changes   Signs and Symptoms (Fluid Volume Excess) activity intolerance;blood pressure/heart rate changes;edema;jugular vein distention;pulmonary congestion/pleural effusion

## 2018-09-05 NOTE — ASSESSMENT & PLAN NOTE
-severe per last echo on 8/28/18 -SHEA .69cm2 with MG 50mmHg  -recent BAV 7/30/2018  -repeat echocardiogram pending today  -followed by Dr. Lord in valve clinic at [facility]; currently not a candidate for TAVR given acute decompensation and debility

## 2018-09-05 NOTE — ASSESSMENT & PLAN NOTE
-related to severely depressed systolic dysfunction and  AS  -continue Dobutamine at home dose and Levophed for BP support  -will attempt to diurese in attempt to improve pump function

## 2018-09-05 NOTE — H&P
[facility]  Cardiology  History and Physical     Patient Name: Natalie Parnell  MRN: [id number]  Admission Date: 9/4/2018  Code Status: Full Code   Attending Provider: Phillip Aponte MD   Primary Care Physician: Octavio Ferrell MD  Principal Problem:Cardiogenic shock    Patient information was obtained from patient and past medical records.     Subjective:     Chief Complaint:  Chest pain and SOB; ADHF      HPI:  82 y/o Slovak speaking female with hx of severe AS (SHEA 0.69 cm2, AVAi 0.37 cm2/m2, peak aortic jet velocity 4.3 m/s,MG 50 mmHg, EF 20-25%) s/p recent BAV, HFrEF (EF previously 50-55%, now 20-25%), LE DVT previously on Xarelto, DM, hx of GIB, possible liver cirrhosis who presents to the ER with complaints of SOB, abdominal and LE swelling. She was seen by Dr. Diaz in the clinic yesterday and was sent to the ER for admission due to ADHF.   Last clinic visit was seen after hospitalization for GIB with acute blood loss anemia requiring PRBC transfusion. Trop elevated and peaked at >7. 2DE with normal EF and no WMA's. Was discharged and hospitalized again for ADHF. Diuresed, [facility] with nonobstructive CAD, improved, and discharged home.   Had recent admission to [facility] for ADHF, found to be in cardiogenic shock, newly depressed EF, IV diuresis, BAV with improvement in MG and symptoms and discharged home. A couple of days later came back due to not using diuretics (had ARF) and volume overloaded. Diuresed, improved and discharged home on Bumex. Was seen by Dr Garcia as outpatient as was doing OK. Since then she has gained weight, had worsening bilateral leg edema, SOB, despite increased Bumex doses to 2 mg TID. Also complaining of bilateral flank pain and weakness. Has to sleep propped up. Denies PND, syncope, LE edema. Compliant with meds. Weight gain 172-176. SBP in clinic 67. Has continuous infusion  via right arm PICC line.    Hospital Course    9/4/2018 Admitted for ADHF from  cardiology clinic. Continued on home Dobutamine and placed on IV Levophed due to hypotension. Creatinine 2.7. Admitted to ICU under care of [facility]  9/5/2018 Remains on IV Dobutamine and Levophed and will change Dobutamine to nontitrating @3mcg/kg/min. On IV Bumex Q8hrs with only 340cc out overnight. BUN 55 creatinine 2.8 total bili 2.4. Troponin .334-.230 and BNP 3014 Lactic acid 2.4. Plans to continue Dobutamine, Levophed and IV Bumex with close monitoring of intake and output. If urine output does not increase and creatinine remains unchanged will consider use of Samsca tomorrow        Past Medical History:   Diagnosis Date    Asthma     Diabetes mellitus     HTN (hypertension)     Hyperlipidemia        Past Surgical History:   Procedure Laterality Date    CATARACT EXTRACTION Bilateral     Dr. Max    COLON SURGERY      HERNIA REPAIR      VARICOSE VEIN SURGERY         Review of patient's allergies indicates:   Allergen Reactions    Lidocaine        No current facility-administered medications on file prior to encounter.      Current Outpatient Medications on File Prior to Encounter   Medication Sig    albuterol 90 mcg/actuation inhaler Inhale 2 puffs into the lungs every 6 (six) hours as needed for Wheezing.    aspirin (ECOTRIN) 81 MG EC tablet Take 1 tablet (81 mg total) by mouth once daily.    atorvastatin (LIPITOR) 40 MG tablet Take 1 tablet (40 mg total) by mouth once daily.    blood sugar diagnostic (ACCU-CHEK SMARTVIEW TEST STRIP) Strp Inject 1 strip into the skin once daily.    blood-glucose meter Misc Use as instructed    bumetanide (BUMEX) 1 MG tablet Take 1 tablet (1 mg total) by mouth 2 (two) times daily. START WITH WEIGHT GAIN OF 2 LB. (Patient taking differently: Take 1 mg by mouth 2 (two) times daily as needed. START WITH WEIGHT GAIN OF 2 LB.)    DOBUTamine (DOBUTREX) 500 mg/250 mL (2,000 mcg/mL) Inject 181.5 mcg/min into the vein continuous.    insulin aspart U-100  "(NOVOLOG) 100 unit/mL InPn pen Inject 4 Units into the skin 3 (three) times daily.    insulin glargine (LANTUS SOLOSTAR) 100 unit/mL (3 mL) InPn pen Inject 12 Units into the skin once daily.    lancets (ACCU-CHEK FASTCLIX) Misc Inject 1 lancet into the skin once daily.    multivitamin capsule Take 1 capsule by mouth once daily.    pantoprazole (PROTONIX) 40 MG tablet Take 40 mg bid po x 13 days, then take 40 mg po daily.    pen needle, diabetic 32 gauge x 5/32" Ndle Use to inject insulin 4 times daily     Family History     None        Tobacco Use    Smoking status: Never Smoker    Smokeless tobacco: Never Used   Substance and Sexual Activity    Alcohol use: No    Drug use: No    Sexual activity: Not on file     Review of Systems   Constitution: Negative for chills, decreased appetite, diaphoresis, fever and weakness.   Cardiovascular: Positive for chest pain, dyspnea on exertion and leg swelling. Negative for claudication, cyanosis, irregular heartbeat, near-syncope, orthopnea, palpitations, paroxysmal nocturnal dyspnea and syncope.   Respiratory: Negative for cough, hemoptysis, shortness of breath and wheezing.    Gastrointestinal: Negative for bloating, abdominal pain, constipation, diarrhea, melena, nausea and vomiting.   Neurological: Negative for dizziness.     Objective:     Vital Signs (Most Recent):  Temp: 98 °F (36.7 °C) (09/05/18 0701)  Pulse: 101 (09/05/18 0805)  Resp: (!) 27 (09/05/18 0805)  BP: (!) 93/50 (09/05/18 0805)  SpO2: 95 % (09/05/18 0805) Vital Signs (24h Range):  Temp:  [98 °F (36.7 °C)-99.2 °F (37.3 °C)] 98 °F (36.7 °C)  Pulse:  [] 101  Resp:  [18-34] 27  SpO2:  [93 %-98 %] 95 %  BP: ()/(44-69) 93/50     Weight: 83 kg (182 lb 15.7 oz)  Body mass index is 30.45 kg/m².    SpO2: 95 %  O2 Device (Oxygen Therapy): nasal cannula      Intake/Output Summary (Last 24 hours) at 9/5/2018 1101  Last data filed at 9/5/2018 0734  Gross per 24 hour   Intake 1319 ml   Output 380 ml "   Net 939 ml       Lines/Drains/Airways     Peripherally Inserted Central Catheter Line                 PICC Double Lumen 08/20/18 0935 right brachial 16 days          Drain                 Urethral Catheter 09/04/18 2236 Latex less than 1 day          Peripheral Intravenous Line                 Peripheral IV - Single Lumen 09/05/18 0018 Left Hand less than 1 day                Physical Exam   Constitutional: She is oriented to person, place, and time. She appears well-developed and well-nourished. No distress.   Cardiovascular: Normal rate and regular rhythm. Exam reveals no gallop.   Murmur heard.  Pulmonary/Chest: Effort normal. Tachypnea noted. No respiratory distress. She has no wheezes. She has rales.   Abdominal: Soft. Bowel sounds are normal. She exhibits no distension. There is no tenderness.   Musculoskeletal: She exhibits edema (trace BLE edema ).   Neurological: She is alert and oriented to person, place, and time.   Skin: Skin is warm and dry.       Significant Labs:     Recent Labs   Lab  09/05/18   0545   WBC  10.99   RBC  2.85*   HGB  7.9*   HCT  24.3*   PLT  291   MCV  85   MCH  27.7   MCHC  32.5     Recent Labs   Lab  09/05/18   0545   NA  121*   K  4.9   CL  85*   CO2  26   BUN  55*   CREATININE  2.8*   MG  2.0     Recent Labs   Lab  09/04/18   2247   TROPONINI  0.230*       Significant Imaging: Echocardiogram:   2D echo with color flow doppler:   Results for orders placed or performed in visit on 08/28/18   2D echo with color flow doppler   Result Value Ref Range    EF 20 (A) 55 - 65    Mitral Valve Regurgitation MODERATE (A)     Diastolic Dysfunction Yes (A)     Aortic Valve Regurgitation MILD     Aortic Valve Stenosis SEVERE (A)     Est. PA Systolic Pressure 61.58 (A)     Mitral Valve Mobility NORMAL     Tricuspid Valve Regurgitation MODERATE (A)      Assessment and Plan:     * Cardiogenic shock    -related to severely depressed systolic dysfunction and  AS  -continue Dobutamine at home dose  and Levophed for BP support  -will attempt to diurese in attempt to improve pump function        Acute renal failure superimposed on stage 3 chronic kidney disease    -creatinine 2.8 this AM up slightly from 2.7 yesterday   -baseline creatinine 1.2-1.5  -felt to be related to cardiorenal etiology  -continue to monitor closely with IV diuresis         Hyponatremia    -related to hypervolemic hyponatremia  -will monitor Na closely while receiving IV Bumex  -if urine output does not increase and creatinine remains unchanged will consider addition of Samsca        Acute on chronic systolic congestive heart failure    -recent echo with severely depressed LV function with EF 20-25% and related to severe AS  -volume overload on exam with pulmonary edema on CXR along with rales, JVD and peripheral edema  -continue IV Dobutamine drip along with IV Bumex Q8hrs for volume removal  -no BB due to hypotension and Dobutamine use; no ACEI due to pressor use and KATI on CKD  -will place on 1 liter fluid restriction; monitor strict I&O and daily weights         Coronary artery disease involving native coronary artery of native heart without angina pectoris    -nonobstructive per [facility]  -continue ASA and statin therapy         Nonrheumatic aortic valve stenosis    -severe per last echo on 8/28/18 -SHEA .69cm2 with MG 50mmHg  -recent BAV 7/30/2018  -repeat echocardiogram pending today  -followed by Dr. Lord in valve clinic at [facility]; currently not a candidate for TAVR given acute decompensation and debility             VTE Risk Mitigation (From admission, onward)        Ordered     Place CORWIN hose  Until discontinued      09/04/18 1736     Place sequential compression device  Until discontinued      09/04/18 1736     IP VTE HIGH RISK PATIENT  Once      09/04/18 1736          Kriss Hill APRN, ANP  Cardiology   [facility]

## 2018-09-05 NOTE — PLAN OF CARE
Problem: Fall Risk (Adult)  Goal: Absence of Falls  Patient will demonstrate the desired outcomes by discharge/transition of care.  Outcome: Ongoing (interventions implemented as appropriate)  No falls noted, continue safety protocol

## 2018-09-05 NOTE — ED NOTES
at bedside.  Pt states she feels like she has to urinate but she cannot go.  States she has not urinated since this morning.

## 2018-09-05 NOTE — PLAN OF CARE
"   09/05/18 1146   Readmission Questionnaire   At the time of your discharge, did someone talk to you about what your health problems were? Yes   At the time of discharge, did someone talk to you about what to watch out for regarding worsening of your health problem? Yes   At the time of discharge, did someone talk to you about what to do if you experienced worsening of your health problem? Yes   At the time of discharge, did someone talk to you about which medication to take when you left the hospital and which ones to stop taking? Yes   At the time of discharge, did someone talk to you about when and where to follow up with a doctor after you left the hospital? Yes   What do you believe caused you to be sick enough to be re-admitted? "Cardiologist office sent her here from appointment visit."   Do you have problems taking your medications as prescribed? No   Do you have problems obtaining/receiving your medications? No   Does the patient have transportation to healthcare appointments? Yes   Lives With child(lana), adult   Living Arrangements house   Does the patient have family/friends to help with healtcare needs after discharge? yes   Who are your caregiver(s) and their phone number(s)? Anabel Parnell Daughter [phone]    Does your caregiver provide all the help you need? Yes   If no, what kind of help do you need at home? N/A   Are you currently feeling confused? No     Jaleesa Irvin RN  Transition Navigator  [phone]  "

## 2018-09-05 NOTE — HOSPITAL COURSE
9/4/2018 Admitted for ADHF from cardiology clinic. Continued on home Dobutamine and placed on IV Levophed due to hypotension. Creatinine 2.7. Admitted to ICU under care of [facility]  9/5/2018 Remains on IV Dobutamine and Levophed and will change Dobutamine to nontitrating @3mcg/kg/min. On IV Bumex Q8hrs with only 340cc out overnight. BUN 55 creatinine 2.8 total bili 2.4. Troponin .334-.230 and BNP 3014 Lactic acid 2.4. Plans to continue Dobutamine, Levophed and IV Bumex with close monitoring of intake and output. If urine output does not increase and creatinine remains unchanged will consider use of Samsca tomorrow  9/6/2018 Remains on IV Dobutamine and Levophed. Up titration of Levophed overnight due to hypotension. HR up to 140s afib this AM with return to ST with down titration of Levophed. HR currently 104 and SBP . Creatinine 2.9 this AM up slightly from yesterday. Na 126 up from 121. Total bili 1.5. Remains on IV Bumex Q8hrs with 1.8 liters out overnight and negative 146 since admission. Rales remain on PE. Will continue IV Bumex for now. Consult Nephrology   9/7/2018 Reviewed Nephrology recommendations and appreciate assistance. Was on IV Bumex TID with 1215 out overnight and barely net negative. Negative 319cc since admission. Transitioned to IV Lasix drip along with daily Metolazone. Remains in NSR with no recurrent afib with RVR overnight-on IV Amiodarone drip. Will transition to oral Amiodarone. Remains on Levophed and Dobutamine and will continue for now. K+ 5.0 this AM with Mg 1.8 and total bili 1.5. Creatinine 2.6 and essentially unchanged since admission 2.7-2.9  9/8/2018 Overnight no acute events. Had some tachycardia to the 130's, now resolved. Currently in SR this AM and BP stable on pressor and . Started PO Amio last night. Cr stable and nephrology following with net neg 1015 ON. Na 125.   9/9/2018 Overnight no acute events. BP remains low stable. Net neg 899 ON and 2183 total. Labs  stable. Cr 2.7, Na 126, TB 1.6  9/10/2018 Remains on IV Dobutamine, Levophed and Lasix drip along with daily Metolazone. 1.0 liters out overnight and negative 2.1 liters since admission. Na 127 K+ 4.0 total bili 1.8. Rales remain on PE with no improvement. Will continue current medical regimen and await Nephrology recs-apprehensive to up titrate IV Lasix due to creatinine   9/11/2018 Creatinine up to 3.5 this AM with total bili up to 2.1. HR 80s-110s SBP 90s-120s. Lethargic with altered mental status today with ABG checked with pO2 63 and pH, pCO2 and HCO3 not grossly abnormal. Further decompensation with multiorgan failure given severe AS and CHF. Family discussion on cards NP rounds as well as family discussion with cards staff MD followed by Palliative Care consult in regards to goals of care with decision on DNR and comfort measures only-discharge readmit orders written for Palliative Medicine to assume care upon transfer out of ICU

## 2018-09-05 NOTE — ASSESSMENT & PLAN NOTE
-creatinine 2.8 this AM up slightly from 2.7 yesterday   -baseline creatinine 1.2-1.5  -felt to be related to cardiorenal etiology  -continue to monitor closely with IV diuresis

## 2018-09-05 NOTE — ED NOTES
Resting quietly.  Respirations even and unlabored.  Lungs auscultated clear.  Pt denies any pain.  Family members at bedside.

## 2018-09-05 NOTE — PLAN OF CARE
TN went to meet with patient, family at bedside. Patient is primarily Yoruba speaking.  services offered. Family refused. They speak English as well. Patient lives with her daughter Evangelina. She has home health with [facility]. She also has a dobutamine pump at home from [facility]. Patient uses a walker at home. Her cardiologist is Dr. Diaz. TN will continue to follow.    Facesheet with clinicals sent to [facility] and [facility]--Patient is current with them.    Future Appointments   Date Time Provider Department Center   9/11/2018  2:20 PM Shanice Ashraf MD [facility]PAT [facility]   10/5/2018  1:40 PM Scott Pruett MD [facility] CARDVAL [facility]   10/9/2018 10:20 AM Octavio Ferrell MD [facility]        09/05/18 1141   Discharge Assessment   Assessment Type Discharge Planning Assessment   Confirmed/corrected address and phone number on facesheet? Yes   Assessment information obtained from? Patient;Caregiver;Medical Record   Prior to hospitilization cognitive status: Alert/Oriented   Prior to hospitalization functional status: Assistive Equipment   Current cognitive status: Alert/Oriented   Current Functional Status: Assistive Equipment   Facility Arrived From: Home   Lives With child(lana), adult   Able to Return to Prior Arrangements yes   Is patient able to care for self after discharge? Yes   Who are your caregiver(s) and their phone number(s)? Anabel Parnell Daughter [phone]    Patient's perception of discharge disposition home health   Readmission Within The Last 30 Days unable to assess   Patient currently being followed by outpatient case management? No   Equipment Currently Used at Home walker, rolling;medication pump   Is the patient taking medications as prescribed? yes   Does the patient have transportation home? Yes   Transportation Available family or friend will provide   Dialysis Name and Scheduled days N/A   Discharge Plan A Home with family;Home  Health   Discharge Plan B Home with family   Patient/Family In Agreement With Plan yes     Jaleesa Irvin RN  Transition Navigator  [phone]

## 2018-09-06 PROBLEM — I48.0 PAROXYSMAL ATRIAL FIBRILLATION: Status: ACTIVE | Noted: 2018-01-01

## 2018-09-06 NOTE — SUBJECTIVE & OBJECTIVE
Review of Systems   Constitution: Positive for malaise/fatigue. Negative for chills, decreased appetite, diaphoresis, fever and weakness.   Cardiovascular: Positive for dyspnea on exertion. Negative for chest pain, claudication, cyanosis, irregular heartbeat, leg swelling, near-syncope, orthopnea, palpitations, paroxysmal nocturnal dyspnea and syncope.   Respiratory: Negative for cough, hemoptysis, shortness of breath and wheezing.    Gastrointestinal: Negative for bloating, abdominal pain, constipation, diarrhea, melena, nausea and vomiting.   Neurological: Negative for dizziness.     Objective:     Vital Signs (Most Recent):  Temp: 97.5 °F (36.4 °C) (09/06/18 1145)  Pulse: 100 (09/06/18 1230)  Resp: 20 (09/06/18 1230)  BP: (!) 91/51 (09/06/18 1230)  SpO2: 98 % (09/06/18 1230) Vital Signs (24h Range):  Temp:  [97.3 °F (36.3 °C)-98.7 °F (37.1 °C)] 97.5 °F (36.4 °C)  Pulse:  [100-155] 100  Resp:  [17-38] 20  SpO2:  [94 %-100 %] 98 %  BP: ()/(50-61) 91/51     Weight: 83.1 kg (183 lb 3.2 oz)  Body mass index is 30.49 kg/m².     SpO2: 98 %  O2 Device (Oxygen Therapy): nasal cannula      Intake/Output Summary (Last 24 hours) at 9/6/2018 1249  Last data filed at 9/6/2018 1200  Gross per 24 hour   Intake 709.2 ml   Output 2130 ml   Net -1420.8 ml       Lines/Drains/Airways     Peripherally Inserted Central Catheter Line                 PICC Double Lumen 08/20/18 0935 right brachial 17 days          Drain                 Urethral Catheter 09/04/18 2236 Latex 1 day                Physical Exam   Constitutional: She is oriented to person, place, and time. She appears well-developed and well-nourished. No distress.   Cardiovascular: Normal rate and regular rhythm. Exam reveals no gallop.   Murmur heard.  Pulmonary/Chest: Effort normal. No respiratory distress. She has no wheezes. She has rales.   Abdominal: Soft. Bowel sounds are normal. She exhibits no distension. There is no tenderness.   Neurological: She is alert  and oriented to person, place, and time.   Skin: Skin is warm and dry.       Significant Labs:     Recent Labs   Lab  09/06/18   0334   NA  126*   K  5.2*   CL  87*   CO2  28   BUN  52*   CREATININE  2.9*   MG  1.9     Recent Labs   Lab  09/06/18   0334   WBC  12.38   RBC  2.95*   HGB  8.1*   HCT  24.8*   PLT  283   MCV  84   MCH  27.5   MCHC  32.7       Significant Imaging: Echocardiogram:   2D echo with color flow doppler:   Results for orders placed or performed during the hospital encounter of 09/04/18   2D echo with color flow doppler   Result Value Ref Range    EF 15 (A) 55 - 65    Mitral Valve Regurgitation MODERATE (A)     Diastolic Dysfunction Yes (A)     Aortic Valve Regurgitation MODERATE (A)     Aortic Valve Stenosis SEVERE (A)     Est. PA Systolic Pressure 47.94 (A)     Mitral Valve Mobility NORMAL     Tricuspid Valve Regurgitation MILD

## 2018-09-06 NOTE — NURSING
Cariology team at bedside. Pt has gone into Afib for the second time today. Another EKG and Amio ordered

## 2018-09-06 NOTE — ASSESSMENT & PLAN NOTE
-afib with RVR this AM with HR up to 140s  -HR down to 100s ST with down titration of IV Levophed  -continue to monitor HR closely; if recurrent afib with RVR with no restoration to NSR/ST would consider use of Amiodarone  -continue ASA only; no chronic AC given recent GIB

## 2018-09-06 NOTE — NURSING
Patient oriented x4, calm and cooperative. No complaint of pain and no distress noted. Family member at bedside. See flowsheet for further assessment.

## 2018-09-06 NOTE — PROGRESS NOTES
[facility]  Cardiology  Progress Note    Patient Name: Natalie Parnell  MRN: [id number]  Admission Date: 9/4/2018  Hospital Length of Stay: 2 days  Code Status: Full Code   Attending Physician: Phillip Aponte MD   Primary Care Physician: Octavio Ferrell MD  Expected Discharge Date:   Principal Problem:Cardiogenic shock    Subjective:     Hospital Course:   9/4/2018 Admitted for ADHF from cardiology clinic. Continued on home Dobutamine and placed on IV Levophed due to hypotension. Creatinine 2.7. Admitted to ICU under care of [facility]  9/5/2018 Remains on IV Dobutamine and Levophed and will change Dobutamine to nontitrating @3mcg/kg/min. On IV Bumex Q8hrs with only 340cc out overnight. BUN 55 creatinine 2.8 total bili 2.4. Troponin .334-.230 and BNP 3014 Lactic acid 2.4. Plans to continue Dobutamine, Levophed and IV Bumex with close monitoring of intake and output. If urine output does not increase and creatinine remains unchanged will consider use of Samsca tomorrow  9/6/2018 Remains on IV Dobutamine and Levophed. Up titration of Levophed overnight due to hypotension. HR up to 140s afib this AM with return to ST with down titration of Levophed. HR currently 104 and SBP . Creatinine 2.9 this AM up slightly from yesterday. Na 126 up from 121. Total bili 1.5. Remains on IV Bumex Q8hrs with 1.8 liters out overnight and negative 146 since admission. Rales remain on PE. Will continue IV Bumex for now. Consult Nephrology         Review of Systems   Constitution: Positive for malaise/fatigue. Negative for chills, decreased appetite, diaphoresis, fever and weakness.   Cardiovascular: Positive for dyspnea on exertion. Negative for chest pain, claudication, cyanosis, irregular heartbeat, leg swelling, near-syncope, orthopnea, palpitations, paroxysmal nocturnal dyspnea and syncope.   Respiratory: Negative for cough, hemoptysis, shortness of breath and wheezing.    Gastrointestinal: Negative for  bloating, abdominal pain, constipation, diarrhea, melena, nausea and vomiting.   Neurological: Negative for dizziness.     Objective:     Vital Signs (Most Recent):  Temp: 97.5 °F (36.4 °C) (09/06/18 1145)  Pulse: 100 (09/06/18 1230)  Resp: 20 (09/06/18 1230)  BP: (!) 91/51 (09/06/18 1230)  SpO2: 98 % (09/06/18 1230) Vital Signs (24h Range):  Temp:  [97.3 °F (36.3 °C)-98.7 °F (37.1 °C)] 97.5 °F (36.4 °C)  Pulse:  [100-155] 100  Resp:  [17-38] 20  SpO2:  [94 %-100 %] 98 %  BP: ()/(50-61) 91/51     Weight: 83.1 kg (183 lb 3.2 oz)  Body mass index is 30.49 kg/m².     SpO2: 98 %  O2 Device (Oxygen Therapy): nasal cannula      Intake/Output Summary (Last 24 hours) at 9/6/2018 1249  Last data filed at 9/6/2018 1200  Gross per 24 hour   Intake 709.2 ml   Output 2130 ml   Net -1420.8 ml       Lines/Drains/Airways     Peripherally Inserted Central Catheter Line                 PICC Double Lumen 08/20/18 0935 right brachial 17 days          Drain                 Urethral Catheter 09/04/18 2236 Latex 1 day                Physical Exam   Constitutional: She is oriented to person, place, and time. She appears well-developed and well-nourished. No distress.   Cardiovascular: Normal rate and regular rhythm. Exam reveals no gallop.   Murmur heard.  Pulmonary/Chest: Effort normal. No respiratory distress. She has no wheezes. She has rales.   Abdominal: Soft. Bowel sounds are normal. She exhibits no distension. There is no tenderness.   Neurological: She is alert and oriented to person, place, and time.   Skin: Skin is warm and dry.       Significant Labs:     Recent Labs   Lab  09/06/18   0334   NA  126*   K  5.2*   CL  87*   CO2  28   BUN  52*   CREATININE  2.9*   MG  1.9     Recent Labs   Lab  09/06/18   0334   WBC  12.38   RBC  2.95*   HGB  8.1*   HCT  24.8*   PLT  283   MCV  84   MCH  27.5   MCHC  32.7       Significant Imaging: Echocardiogram:   2D echo with color flow doppler:   Results for orders placed or performed  during the hospital encounter of 09/04/18   2D echo with color flow doppler   Result Value Ref Range    EF 15 (A) 55 - 65    Mitral Valve Regurgitation MODERATE (A)     Diastolic Dysfunction Yes (A)     Aortic Valve Regurgitation MODERATE (A)     Aortic Valve Stenosis SEVERE (A)     Est. PA Systolic Pressure 47.94 (A)     Mitral Valve Mobility NORMAL     Tricuspid Valve Regurgitation MILD      Assessment and Plan:     Brief HPI: Seen this morning on AM NP rounds with daughter at the bedside. Patient complains of fatigue but denies SOB. Appears SOB on PE. Discussed POC with patient and family member as detailed below. Translation provided by daughter and both verbalized understanding of POC. Patient does not appear to fully grasp current severity of situation and will attempt to continue to educate-could be related to hypoperfusion     * Cardiogenic shock    -related to severely depressed systolic dysfunction and  AS  -continue Dobutamine at home dose and Levophed for BP support  -will attempt to diurese in attempt to improve pump function        Paroxysmal atrial fibrillation    -afib with RVR this AM with HR up to 140s  -HR down to 100s ST with down titration of IV Levophed  -continue to monitor HR closely; if recurrent afib with RVR with no restoration to NSR/ST would consider use of Amiodarone  -continue ASA only; no chronic AC given recent GIB         Acute renal failure superimposed on stage 3 chronic kidney disease    -creatinine 2.9 this AM up slightly from 2.8 yesterday and 2.7 on admit  -baseline creatinine 1.2-1.5  -felt to be related to cardiorenal etiology vs hepatorenal   -continue to monitor closely with IV diuresis   -will consult Nephrology         Hyponatremia    -related to hypervolemic hyponatremia  -Na 126 this AM up from 121 yesterday  -will monitor Na closely while receiving IV Bumex  -will hold off on Samsca for now         Acute on chronic systolic congestive heart failure    -echo 8/28/18  with severely depressed LV function with EF 20-25% and related to severe AS  -repeat echocardiogram yesterday with EF 10-15%  -continued volume overload on exam with pulmonary edema on CXR along with rales, JVD and peripheral edema  -will continue IV Dobutamine drip along with IV Bumex Q8hrs for volume removal  -1.8 liters; negative 146cc since admission  -continue to hold BB and ACEI  -will place on 1 liter fluid restriction; monitor strict I&O and daily weights         Coronary artery disease involving native coronary artery of native heart without angina pectoris    -nonobstructive per [facility]  -continue ASA and statin therapy         Nonrheumatic aortic valve stenosis    -severe per last echo on 8/28/18 -SHEA .69cm2 with MG 50mmHg  -recent BAV 7/30/2018AVA   -repeat echocardiogram yesterday- SHEA 0.51 cm2 & mean gradient 40 mmHg  -followed by Dr. Lord in valve clinic at [facility]; currently not a candidate for TAVR given acute decompensation and debility             VTE Risk Mitigation (From admission, onward)        Ordered     Place CORWIN hose  Until discontinued      09/04/18 1736     Place sequential compression device  Until discontinued      09/04/18 1736     IP VTE HIGH RISK PATIENT  Once      09/04/18 1736          BIANKA Booker, ANP  Cardiology  [facility]

## 2018-09-06 NOTE — ASSESSMENT & PLAN NOTE
-echo 8/28/18 with severely depressed LV function with EF 20-25% and related to severe AS  -repeat echocardiogram yesterday with EF 10-15%  -continued volume overload on exam with pulmonary edema on CXR along with rales, JVD and peripheral edema  -will continue IV Dobutamine drip along with IV Bumex Q8hrs for volume removal  -1.8 liters; negative 146cc since admission  -continue to hold BB and ACEI  -will place on 1 liter fluid restriction; monitor strict I&O and daily weights

## 2018-09-06 NOTE — ASSESSMENT & PLAN NOTE
-severe per last echo on 8/28/18 -SHEA .69cm2 with MG 50mmHg  -recent BAV 7/30/2018AVA   -repeat echocardiogram yesterday- SHEA 0.51 cm2 & mean gradient 40 mmHg  -followed by Dr. Lord in valve clinic at [facility]; currently not a candidate for TAVR given acute decompensation and debility

## 2018-09-06 NOTE — CONSULTS
NEPHROLOGY CONSULT NOTE    HPI & INTERVAL HISTORY:    Past Medical History:   Diagnosis Date    Asthma     Diabetes mellitus     HTN (hypertension)     Hyperlipidemia       Past Surgical History:   Procedure Laterality Date    CATARACT EXTRACTION Bilateral     Dr. Max    COLON SURGERY      HERNIA REPAIR      VARICOSE VEIN SURGERY        Review of patient's allergies indicates:   Allergen Reactions    Lidocaine       Medications Prior to Admission   Medication Sig Dispense Refill Last Dose    albuterol 90 mcg/actuation inhaler Inhale 2 puffs into the lungs every 6 (six) hours as needed for Wheezing. 1 each 11 Taking    [] amoxicillin (AMOXIL) 500 MG capsule Take 2 capsules (1,000 mg total) by mouth every 12 (twelve) hours. for 13 days 52 capsule 0 Taking    aspirin (ECOTRIN) 81 MG EC tablet Take 1 tablet (81 mg total) by mouth once daily. 90 tablet 3 Taking    atorvastatin (LIPITOR) 40 MG tablet Take 1 tablet (40 mg total) by mouth once daily. 90 tablet 1 Taking    blood sugar diagnostic (ACCU-CHEK SMARTVIEW TEST STRIP) Strp Inject 1 strip into the skin once daily. 100 strip 6 Taking    blood-glucose meter Misc Use as instructed 1 each 0 Taking    bumetanide (BUMEX) 1 MG tablet Take 1 tablet (1 mg total) by mouth 2 (two) times daily. START WITH WEIGHT GAIN OF 2 LB. (Patient taking differently: Take 1 mg by mouth 2 (two) times daily as needed. START WITH WEIGHT GAIN OF 2 LB.) 60 tablet 11 Taking    [] clarithromycin (BIAXIN) 500 MG tablet Take 1 tablet (500 mg total) by mouth once daily. for 13 days 13 tablet 0 Taking    DOBUTamine (DOBUTREX) 500 mg/250 mL (2,000 mcg/mL) Inject 181.5 mcg/min into the vein continuous. 250 mL  Taking    insulin aspart U-100 (NOVOLOG) 100 unit/mL InPn pen Inject 4 Units into the skin 3 (three) times daily. 15 mL 2 Taking    insulin glargine (LANTUS SOLOSTAR) 100 unit/mL (3 mL) InPn pen Inject 12 Units into the skin once daily. 15 mL 2 Taking     "lancets (ACCU-CHEK FASTCLIX) Misc Inject 1 lancet into the skin once daily. 100 each 6 Taking    multivitamin capsule Take 1 capsule by mouth once daily.   Taking    pantoprazole (PROTONIX) 40 MG tablet Take 40 mg bid po x 13 days, then take 40 mg po daily. 60 tablet 3 Taking    pen needle, diabetic 32 gauge x 5/32" Ndle Use to inject insulin 4 times daily 100 each 0 Taking       Social History     Socioeconomic History    Marital status: Single     Spouse name: Not on file    Number of children: Not on file    Years of education: Not on file    Highest education level: Not on file   Social Needs    Financial resource strain: Not on file    Food insecurity - worry: Not on file    Food insecurity - inability: Not on file    Transportation needs - medical: Not on file    Transportation needs - non-medical: Not on file   Occupational History    Not on file   Tobacco Use    Smoking status: Never Smoker    Smokeless tobacco: Never Used   Substance and Sexual Activity    Alcohol use: No    Drug use: No    Sexual activity: Not on file   Other Topics Concern    Not on file   Social History Narrative    Not on file        MEDS   aspirin  81 mg Oral Daily    atorvastatin  40 mg Oral Daily    bumetanide  2 mg Intravenous TID    pantoprazole  40 mg Oral Daily    sodium chloride 0.9%  3 mL Intravenous Q8H    traZODone  25 mg Oral QHS                CONTINOUS INFUSIONS:      Intake/Output Summary (Last 24 hours) at 9/6/2018 1124  Last data filed at 9/6/2018 1100  Gross per 24 hour   Intake 709.2 ml   Output 2155 ml   Net -1445.8 ml        HEMODYNAMICS:    Temp:  [97.3 °F (36.3 °C)-98.7 °F (37.1 °C)] 98.7 °F (37.1 °C)  Pulse:  [] 103  Resp:  [17-38] 20  SpO2:  [94 %-100 %] 98 %  BP: ()/(50-61) 91/50   Gen: NAD  No SOB  No cough  No CP  No fever  No chills  No nausea  No vomiting  No diarrhea  Cards: Pulse 97  Pul: diminished breath sounds  Abdomen soft   Ext: no edema   Skin: dry   LABS   Lab " Results   Component Value Date    WBC 12.38 09/06/2018    HGB 8.1 (L) 09/06/2018    HCT 24.8 (L) 09/06/2018    MCV 84 09/06/2018     09/06/2018        Recent Labs   Lab  09/06/18   0334   GLU  141*   CALCIUM  9.2   ALBUMIN  2.7*   PROT  6.4   NA  126*   K  5.2*   CO2  28   CL  87*   BUN  52*   CREATININE  2.9*   ALKPHOS  144*   ALT  25   AST  46*   BILITOT  1.5*      Lab Results   Component Value Date    CALCIUM 9.2 09/06/2018    PHOS 4.4 09/06/2018      Lab Results   Component Value Date    IRON 44 08/02/2018    TIBC 305 08/02/2018    FERRITIN 168 08/02/2018        ABG  No results for input(s): PH, PO2, PCO2, HCO3, BE in the last 168 hours.      IMAGING:  CXR    ASSESSMENT / PLAN  Acute on chronic systolic congestive heart failure .    Echo 9/5/18    1 - Severely depressed left ventricular systolic function (EF 15-20%).     2 - Restrictive LV filling pattern, indicating markedly elevated LAP (grade 3 diastolic dysfunction).     3 - Pulmonary hypertension. The estimated PA systolic pressure is 48 mmHg.     4 - Low normal right ventricular systolic function .     5 - Low flow, low gradient aortic valve stenosis with reduced EF ((SHEA 0.51 cm2, AVAi 0.27 cm2/m2, peak aortic jet velocity 4.0 m/s,MG 40 mmHg, EF 15%,SVi 18 mL/m2).     6 - Moderate aortic regurgitation.     7 - Moderate mitral regurgitation.     8 - Intermediate central venous pressure.     9 - Severe left atrial enlargement.   Acute kidney injury on Chronic kidney disease 3b.   cc today.  Creatinine 1-1.3 in 2018.  Creatinine 2.4 on 8/18/18.  Creatinine 1.5 on 8/24/18.  Creatinine 2.9, BUN 52 today.  UA no protein on 8/3/18.  CT 8/2/18  Kidneys/ Ureters: Normal in size and location. Normal concentration and excretion of contrast. No hydronephrosis or nephrolithiasis. No ureteral dilatation.  Hyponatremia 126. Improving with diuresis.  Potassium 5.2.  Metabolic alkalosis.   Anemia. Hb 8.1.  Iron study.  Poor nutrition.  Albumin 2.7.  Nepro 1  can tid.  9/4/18-  CHF pattern edema with small bilateral effusions.  Bumex 2 mg tid IV.  Blood pressure 92/52.  On norepinephrine.  Weight daily.  I and O.  Avoid nephrotoxic agents, hypotension.  Will follow up.

## 2018-09-06 NOTE — PLAN OF CARE
Problem: Patient Care Overview  Goal: Plan of Care Review  Outcome: Ongoing (interventions implemented as appropriate)  Patient on oxygen with documented flow.  Will attempt to wean per O2 order protocol. Ambu bag and mask at the bedside. Will continue to monitor.

## 2018-09-06 NOTE — ASSESSMENT & PLAN NOTE
-related to hypervolemic hyponatremia  -Na 126 this AM up from 121 yesterday  -will monitor Na closely while receiving IV Bumex  -will hold off on Samsca for now

## 2018-09-06 NOTE — ASSESSMENT & PLAN NOTE
-creatinine 2.9 this AM up slightly from 2.8 yesterday and 2.7 on admit  -baseline creatinine 1.2-1.5  -felt to be related to cardiorenal etiology vs hepatorenal   -continue to monitor closely with IV diuresis   -will consult Nephrology

## 2018-09-06 NOTE — PLAN OF CARE
Notified Dr. Aponte of patient's HR sustaining 140-150s Afib. BP with MAP in 70s. EKG ordered and will attempt to wean levo. ALDO Freeman updated on plan of care. MD to come to bedside this morning.  Sofia Raphael RN

## 2018-09-07 NOTE — SUBJECTIVE & OBJECTIVE
Review of Systems   Constitution: Positive for malaise/fatigue. Negative for chills, decreased appetite, diaphoresis, fever and weakness.   Cardiovascular: Positive for dyspnea on exertion. Negative for chest pain, claudication, cyanosis, irregular heartbeat, leg swelling, near-syncope, orthopnea, palpitations, paroxysmal nocturnal dyspnea and syncope.   Respiratory: Negative for cough, hemoptysis, shortness of breath and wheezing.    Gastrointestinal: Negative for bloating, abdominal pain, constipation, diarrhea, melena, nausea and vomiting.   Neurological: Negative for dizziness.     Objective:     Vital Signs (Most Recent):  Temp: 98.3 °F (36.8 °C) (09/07/18 1115)  Pulse: 82 (09/07/18 1530)  Resp: 18 (09/07/18 1530)  BP: (!) 90/53 (09/07/18 1530)  SpO2: 99 % (09/07/18 1530) Vital Signs (24h Range):  Temp:  [97.7 °F (36.5 °C)-98.4 °F (36.9 °C)] 98.3 °F (36.8 °C)  Pulse:  [] 82  Resp:  [16-37] 18  SpO2:  [94 %-100 %] 99 %  BP: ()/(47-63) 90/53     Weight: 82.9 kg (182 lb 12.2 oz)  Body mass index is 30.41 kg/m².     SpO2: 99 %  O2 Device (Oxygen Therapy): nasal cannula      Intake/Output Summary (Last 24 hours) at 9/7/2018 1547  Last data filed at 9/7/2018 1500  Gross per 24 hour   Intake 1193.28 ml   Output 1025 ml   Net 168.28 ml       Lines/Drains/Airways     Peripherally Inserted Central Catheter Line                 PICC Double Lumen 08/20/18 0935 right brachial 18 days          Drain                 Urethral Catheter 09/04/18 2236 Latex 2 days          Peripheral Intravenous Line                 Peripheral IV - Single Lumen 09/07/18 1440 Anterior;Left Hand less than 1 day                Physical Exam   Constitutional: She is oriented to person, place, and time. She appears well-developed and well-nourished. She has a sickly appearance. No distress.   Cardiovascular: Normal rate and regular rhythm. Exam reveals no gallop.   Murmur heard.  Pulmonary/Chest: Effort normal. Tachypnea noted. No  respiratory distress. She has no wheezes. She has rales.   Abdominal: Soft. Bowel sounds are normal. She exhibits no distension. There is no tenderness.   Neurological: She is alert and oriented to person, place, and time.   Skin: Skin is warm and dry.       Significant Labs:     Recent Labs   Lab  09/07/18   0557  09/07/18   1106   CALCIUM  8.8  9.1   PROT  6.1   --    NA  122*  125*   K  5.0  5.1   CO2  23  29   CL  87*  85*   BUN  53*  54*   CREATININE  2.6*  2.7*   ALKPHOS  146*   --    ALT  24   --    AST  41*   --    BILITOT  1.4*   --      Recent Labs   Lab  09/07/18   0557   WBC  8.53   RBC  3.12*   HGB  8.6*   HCT  25.8*   PLT  222   MCV  83   MCH  27.6   MCHC  33.3       Significant Imaging: Echocardiogram:   2D echo with color flow doppler:   Results for orders placed or performed during the hospital encounter of 09/04/18   2D echo with color flow doppler   Result Value Ref Range    EF 15 (A) 55 - 65    Mitral Valve Regurgitation MODERATE (A)     Diastolic Dysfunction Yes (A)     Aortic Valve Regurgitation MODERATE (A)     Aortic Valve Stenosis SEVERE (A)     Est. PA Systolic Pressure 47.94 (A)     Mitral Valve Mobility NORMAL     Tricuspid Valve Regurgitation MILD

## 2018-09-07 NOTE — ASSESSMENT & PLAN NOTE
-creatinine 2.6 this AM; 2.7-2.9 since admission   -baseline creatinine 1.2-1.5  -felt to be related to cardiorenal etiology vs hepatorenal   -continue to monitor closely with IV diuresis   -Nephrology on board

## 2018-09-07 NOTE — ASSESSMENT & PLAN NOTE
-severe per last echo on 8/28/18 -SHEA .69cm2 with MG 50mmHg  -recent BAV 7/30/2018AVA   -repeat echocardiogram yesterday- SHEA 0.51 cm2 & mean gradient 40 mmHg  -followed by Dr. Lord in valve clinic at [facility]; currently not a candidate for TAVR given acute decompensation and debility

## 2018-09-07 NOTE — ASSESSMENT & PLAN NOTE
-echo 8/28/18 with severely depressed LV function with EF 20-25% and related to severe AS  -repeat echocardiogram yesterday with EF 10-15%  -continued volume overload on exam with pulmonary edema on CXR along with rales, JVD and peripheral edema  -will continue IV Dobutamine drip; transitioned from IV Bumex Q8hrs to Lasix drip with daily Metolazone per Nephrology for continued volume removal  -1.2 liters; negative 319cc since admission  -continue to hold BB and ACEI  -will place on 1 liter fluid restriction; monitor strict I&O and daily weights

## 2018-09-07 NOTE — ASSESSMENT & PLAN NOTE
-recurrent afib with RVR yesterday with initiation of IV Amiodarone  -converted to NSR; will transition to oral Amiodarone BID  -continue ASA only; no chronic AC given recent GIB

## 2018-09-07 NOTE — PLAN OF CARE
Problem: Patient Care Overview  Goal: Plan of Care Review  Outcome: Ongoing (interventions implemented as appropriate)  Pt has been on Amioderone 0.5 for 18 hrs. Notified NP Cardiology team to evaluate is ready for PO. Pt and family educated on 800 ml fluid restriction. The Norepinephrine was changed to high concentration with normal Saline. Pt denies pain or needs

## 2018-09-07 NOTE — PLAN OF CARE
Problem: Patient Care Overview  Goal: Plan of Care Review  Outcome: Ongoing (interventions implemented as appropriate)  Pt's SpO2 100% on 2 lpm NC. No respiratory distress noted. Will continue to monitor SpO2.

## 2018-09-07 NOTE — PROGRESS NOTES
Progress Note  Nephrology      Consult Requested By: Phillip Aponte MD      SUBJECTIVE:     Overnight events  Patient is a 83 y.o. female     Patient Active Problem List   Diagnosis    Type 2 diabetes mellitus, without long-term current use of insulin    Elevated troponin    Cirrhosis of liver without ascites    Nonrheumatic aortic valve stenosis    Heart failure with preserved ejection fraction    H/O: GI bleed    Coronary artery disease involving native coronary artery of native heart without angina pectoris    Chest pain    Sepsis    NSTEMI (non-ST elevated myocardial infarction)    Acute on chronic systolic congestive heart failure    Acute hypoxemic respiratory failure    Hypokalemia    Hypomagnesemia    Dark stools    Acute blood loss anemia    Hyponatremia    Acute renal failure superimposed on stage 3 chronic kidney disease    Cardiogenic shock    Melena    Tachycardia    Acute H. pylori gastric ulcer with hemorrhage    Shortness of breath    Paroxysmal atrial fibrillation     Past Medical History:   Diagnosis Date    Asthma     Diabetes mellitus     HTN (hypertension)     Hyperlipidemia               OBJECTIVE:     Vitals:    09/07/18 1245 09/07/18 1300 09/07/18 1315 09/07/18 1330   BP: (!) 92/52 (!) 101/57 (!) 95/52 (!) 98/54   BP Location:       Patient Position:       Pulse:  82 82 82   Resp:  20  20   Temp:       TempSrc:       SpO2: 100% 100% 100% 100%   Weight:       Height:           Temp: 98.3 °F (36.8 °C) (09/07/18 1115)  Pulse: 82 (09/07/18 1330)  Resp: 20 (09/07/18 1330)  BP: (!) 98/54 (09/07/18 1330)  SpO2: 100 % (09/07/18 1330)    Date 09/07/18 0700 - 09/08/18 0659   Shift 5536-0516 7344-4040 8966-2741 24 Hour Total   INTAKE   P.O. 350   350   Shift Total(mL/kg) 350(4.2)   350(4.2)   OUTPUT   Urine(mL/kg/hr) 280   280   Shift Total(mL/kg) 280(3.4)   280(3.4)   Weight (kg) 82.9 82.9 82.9 82.9             Medications:   aspirin  81 mg Oral Daily    atorvastatin  40  mg Oral Daily    bumetanide  2 mg Intravenous TID    pantoprazole  40 mg Oral Daily    sodium chloride 0.9%  3 mL Intravenous Q8H    traZODone  25 mg Oral QHS      amiodarone in dextrose 5% 0.5 mg/min (09/07/18 0631)    DOBUTamine 3 mcg/kg/min (09/06/18 1500)    norepinephrine bitartrate-D5W 0.35 mcg/kg/min (09/07/18 1023)               Physical Exam:  General appearance: NAD  Weak  SOB  Lungs: diminished breath sounds  Rales at bases  Heart: pulse 82  Abdomen: soft  Extremities: edema  Laboratory:  ABG  Labs reviewed  Recent Results (from the past 336 hour(s))   Basic metabolic panel    Collection Time: 09/07/18 11:06 AM   Result Value Ref Range    Sodium 125 (L) 136 - 145 mmol/L    Potassium 5.1 3.5 - 5.1 mmol/L    Chloride 85 (L) 95 - 110 mmol/L    CO2 29 23 - 29 mmol/L    BUN, Bld 54 (H) 8 - 23 mg/dL    Creatinine 2.7 (H) 0.5 - 1.4 mg/dL    Calcium 9.1 8.7 - 10.5 mg/dL    Anion Gap 11 8 - 16 mmol/L   Basic metabolic panel    Collection Time: 09/05/18 12:01 PM   Result Value Ref Range    Sodium 121 (L) 136 - 145 mmol/L    Potassium 5.1 3.5 - 5.1 mmol/L    Chloride 85 (L) 95 - 110 mmol/L    CO2 24 23 - 29 mmol/L    BUN, Bld 54 (H) 8 - 23 mg/dL    Creatinine 2.8 (H) 0.5 - 1.4 mg/dL    Calcium 8.9 8.7 - 10.5 mg/dL    Anion Gap 12 8 - 16 mmol/L     Recent Results (from the past 336 hour(s))   CBC auto differential    Collection Time: 09/07/18  5:57 AM   Result Value Ref Range    WBC 8.53 3.90 - 12.70 K/uL    Hemoglobin 8.6 (L) 12.0 - 16.0 g/dL    Hematocrit 25.8 (L) 37.0 - 48.5 %    Platelets 222 150 - 350 K/uL   CBC auto differential    Collection Time: 09/06/18  3:34 AM   Result Value Ref Range    WBC 12.38 3.90 - 12.70 K/uL    Hemoglobin 8.1 (L) 12.0 - 16.0 g/dL    Hematocrit 24.8 (L) 37.0 - 48.5 %    Platelets 283 150 - 350 K/uL   CBC auto differential    Collection Time: 09/05/18  5:45 AM   Result Value Ref Range    WBC 10.99 3.90 - 12.70 K/uL    Hemoglobin 7.9 (L) 12.0 - 16.0 g/dL    Hematocrit 24.3 (L)  37.0 - 48.5 %    Platelets 291 150 - 350 K/uL     Urinalysis  No results for input(s): COLORU, CLARITYU, SPECGRAV, PHUR, PROTEINUA, GLUCOSEU, BILIRUBINCON, BLOODU, WBCU, RBCU, BACTERIA, MUCUS, NITRITE, LEUKOCYTESUR, UROBILINOGEN, HYALINECASTS in the last 24 hours.    Diagnostic Results:  X-Ray: Reviewed  US: Reviewed  Echo: Reviewed  ACCESS    ASSESSMENT/PLAN:   Acute on chronic systolic congestive heart failure .    Echo 9/5/18    1 - Severely depressed left ventricular systolic function (EF 15-20%).     2 - Restrictive LV filling pattern, indicating markedly elevated LAP (grade 3 diastolic dysfunction).     3 - Pulmonary hypertension. The estimated PA systolic pressure is 48 mmHg.     4 - Low normal right ventricular systolic function .     5 - Low flow, low gradient aortic valve stenosis with reduced EF ((SHEA 0.51 cm2, AVAi 0.27 cm2/m2, peak aortic jet velocity 4.0 m/s,MG 40 mmHg, EF 15%,SVi 18 mL/m2).     6 - Moderate aortic regurgitation.     7 - Moderate mitral regurgitation.     8 - Intermediate central venous pressure.     9 - Severe left atrial enlargement.   Pulmonary edema.  Acute kidney injury on Chronic kidney disease 3b.  UO 1215 cc /24 h.  Creatinine 1-1.3 in 2018.  Creatinine 2.4 on 8/18/18.  Creatinine 1.5 on 8/24/18.  Creatinine 2.7, BUN 54 today.  UA no protein on 8/3/18.  CT 8/2/18  Kidneys/ Ureters: Normal in size and location. Normal concentration and excretion of contrast. No hydronephrosis or nephrolithiasis. No ureteral dilatation.  Hyponatremia 125.  Potassium 5.1.  Metabolic alkalosis.   Anemia. Hb 8.6.  Iron study.  Blood pressure 102/55.  On norepinephrine.  Poor nutrition.  Albumin 2.7.  Nepro 1 can tid.  Weight daily.  I and O.  Avoid nephrotoxic agents, hypotension.  Will follow up.  Free water restriction.  Mix all drips in NS if possible, concentrate.  Lasix drip 10 mg /h.  Add zaroxolyn 10 mg po daily.

## 2018-09-07 NOTE — PLAN OF CARE
Problem: Patient Care Overview  Goal: Plan of Care Review  Outcome: Ongoing (interventions implemented as appropriate)  Pt lying in bed resting comfortably. Slept well. Converted to NSR at 2200 with HR 80's. Titrating up on levo to maintain MAP greater than 65. Marginal urine output. No needs or c/o reported. Will continue to monitor.

## 2018-09-07 NOTE — PROGRESS NOTES
[facility]  Cardiology  Progress Note    Patient Name: Natalie Parnell  MRN: [id number]  Admission Date: 9/4/2018  Hospital Length of Stay: 3 days  Code Status: Full Code   Attending Physician: Phillip Aponte MD   Primary Care Physician: Octavio Ferrell MD  Expected Discharge Date:   Principal Problem:Cardiogenic shock    Subjective:     Hospital Course:   9/4/2018 Admitted for ADHF from cardiology clinic. Continued on home Dobutamine and placed on IV Levophed due to hypotension. Creatinine 2.7. Admitted to ICU under care of [facility]  9/5/2018 Remains on IV Dobutamine and Levophed and will change Dobutamine to nontitrating @3mcg/kg/min. On IV Bumex Q8hrs with only 340cc out overnight. BUN 55 creatinine 2.8 total bili 2.4. Troponin .334-.230 and BNP 3014 Lactic acid 2.4. Plans to continue Dobutamine, Levophed and IV Bumex with close monitoring of intake and output. If urine output does not increase and creatinine remains unchanged will consider use of Samsca tomorrow  9/6/2018 Remains on IV Dobutamine and Levophed. Up titration of Levophed overnight due to hypotension. HR up to 140s afib this AM with return to ST with down titration of Levophed. HR currently 104 and SBP . Creatinine 2.9 this AM up slightly from yesterday. Na 126 up from 121. Total bili 1.5. Remains on IV Bumex Q8hrs with 1.8 liters out overnight and negative 146 since admission. Rales remain on PE. Will continue IV Bumex for now. Consult Nephrology   9/7/2018 Reviewed Nephrology recommendations and appreciate assistance. Was on IV Bumex TID with 1215 out overnight and barely net negative. Negative 319cc since admission. Transitioned to IV Lasix drip along with daily Metolazone. Remains in NSR with no recurrent afib with RVR overnight-on IV Amiodarone drip. Will transition to oral Amiodarone. Remains on Levophed and Dobutamine and will continue for now. K+ 5.0 this AM with Mg 1.8 and total bili 1.5. Creatinine 2.6 and  essentially unchanged since admission 2.7-2.9        Review of Systems   Constitution: Positive for malaise/fatigue. Negative for chills, decreased appetite, diaphoresis, fever and weakness.   Cardiovascular: Positive for dyspnea on exertion. Negative for chest pain, claudication, cyanosis, irregular heartbeat, leg swelling, near-syncope, orthopnea, palpitations, paroxysmal nocturnal dyspnea and syncope.   Respiratory: Negative for cough, hemoptysis, shortness of breath and wheezing.    Gastrointestinal: Negative for bloating, abdominal pain, constipation, diarrhea, melena, nausea and vomiting.   Neurological: Negative for dizziness.     Objective:     Vital Signs (Most Recent):  Temp: 98.3 °F (36.8 °C) (09/07/18 1115)  Pulse: 82 (09/07/18 1530)  Resp: 18 (09/07/18 1530)  BP: (!) 90/53 (09/07/18 1530)  SpO2: 99 % (09/07/18 1530) Vital Signs (24h Range):  Temp:  [97.7 °F (36.5 °C)-98.4 °F (36.9 °C)] 98.3 °F (36.8 °C)  Pulse:  [] 82  Resp:  [16-37] 18  SpO2:  [94 %-100 %] 99 %  BP: ()/(47-63) 90/53     Weight: 82.9 kg (182 lb 12.2 oz)  Body mass index is 30.41 kg/m².     SpO2: 99 %  O2 Device (Oxygen Therapy): nasal cannula      Intake/Output Summary (Last 24 hours) at 9/7/2018 1547  Last data filed at 9/7/2018 1500  Gross per 24 hour   Intake 1193.28 ml   Output 1025 ml   Net 168.28 ml       Lines/Drains/Airways     Peripherally Inserted Central Catheter Line                 PICC Double Lumen 08/20/18 0935 right brachial 18 days          Drain                 Urethral Catheter 09/04/18 2236 Latex 2 days          Peripheral Intravenous Line                 Peripheral IV - Single Lumen 09/07/18 1440 Anterior;Left Hand less than 1 day                Physical Exam   Constitutional: She is oriented to person, place, and time. She appears well-developed and well-nourished. She has a sickly appearance. No distress.   Cardiovascular: Normal rate and regular rhythm. Exam reveals no gallop.   Murmur  heard.  Pulmonary/Chest: Effort normal. Tachypnea noted. No respiratory distress. She has no wheezes. She has rales.   Abdominal: Soft. Bowel sounds are normal. She exhibits no distension. There is no tenderness.   Neurological: She is alert and oriented to person, place, and time.   Skin: Skin is warm and dry.       Significant Labs:     Recent Labs   Lab  09/07/18   0557  09/07/18   1106   CALCIUM  8.8  9.1   PROT  6.1   --    NA  122*  125*   K  5.0  5.1   CO2  23  29   CL  87*  85*   BUN  53*  54*   CREATININE  2.6*  2.7*   ALKPHOS  146*   --    ALT  24   --    AST  41*   --    BILITOT  1.4*   --      Recent Labs   Lab  09/07/18   0557   WBC  8.53   RBC  3.12*   HGB  8.6*   HCT  25.8*   PLT  222   MCV  83   MCH  27.6   MCHC  33.3       Significant Imaging: Echocardiogram:   2D echo with color flow doppler:   Results for orders placed or performed during the hospital encounter of 09/04/18   2D echo with color flow doppler   Result Value Ref Range    EF 15 (A) 55 - 65    Mitral Valve Regurgitation MODERATE (A)     Diastolic Dysfunction Yes (A)     Aortic Valve Regurgitation MODERATE (A)     Aortic Valve Stenosis SEVERE (A)     Est. PA Systolic Pressure 47.94 (A)     Mitral Valve Mobility NORMAL     Tricuspid Valve Regurgitation MILD      Assessment and Plan:       * Cardiogenic shock    -related to severely depressed systolic dysfunction and  AS  -continue Dobutamine at home dose and Levophed for BP support  -will attempt to diurese in attempt to improve pump function        Paroxysmal atrial fibrillation    -recurrent afib with RVR yesterday with initiation of IV Amiodarone  -converted to NSR; will transition to oral Amiodarone BID  -continue ASA only; no chronic AC given recent GIB         Acute renal failure superimposed on stage 3 chronic kidney disease    -creatinine 2.6 this AM; 2.7-2.9 since admission   -baseline creatinine 1.2-1.5  -felt to be related to cardiorenal etiology vs hepatorenal   -continue to  monitor closely with IV diuresis   -Nephrology on board         Hyponatremia    -related to hypervolemic hyponatremia vs IV drips  -Na 122 this AM down from 126 yesterday  -Drips max concentrated and to be changed D5  -Nephrology on board          Acute on chronic systolic congestive heart failure    -echo 8/28/18 with severely depressed LV function with EF 20-25% and related to severe AS  -repeat echocardiogram yesterday with EF 10-15%  -continued volume overload on exam with pulmonary edema on CXR along with rales, JVD and peripheral edema  -will continue IV Dobutamine drip; transitioned from IV Bumex Q8hrs to Lasix drip with daily Metolazone per Nephrology for continued volume removal  -1.2 liters; negative 319cc since admission  -continue to hold BB and ACEI  -will place on 1 liter fluid restriction; monitor strict I&O and daily weights         Coronary artery disease involving native coronary artery of native heart without angina pectoris    -nonobstructive per [facility]  -continue ASA and statin therapy         Nonrheumatic aortic valve stenosis    -severe per last echo on 8/28/18 -SHEA .69cm2 with MG 50mmHg  -recent BAV 7/30/2018AVA   -repeat echocardiogram yesterday- SHEA 0.51 cm2 & mean gradient 40 mmHg  -followed by Dr. Lord in valve clinic at [facility]; currently not a candidate for TAVR given acute decompensation and debility             VTE Risk Mitigation (From admission, onward)        Ordered     Place CORWIN hose  Until discontinued      09/04/18 1736     Place sequential compression device  Until discontinued      09/04/18 1736     IP VTE HIGH RISK PATIENT  Once      09/04/18 1736          BIANKA Booker, ANP  Cardiology  [facility]

## 2018-09-07 NOTE — ASSESSMENT & PLAN NOTE
-related to hypervolemic hyponatremia vs IV drips  -Na 122 this AM down from 126 yesterday  -Drips max concentrated and to be changed D5  -Nephrology on board

## 2018-09-08 NOTE — PLAN OF CARE
Problem: Pressure Ulcer Risk (Emiliano Scale) (Adult,Obstetrics,Pediatric)  Goal: Identify Related Risk Factors and Signs and Symptoms  Related risk factors and signs and symptoms are identified upon initiation of Human Response Clinical Practice Guideline (CPG)  Outcome: Ongoing (interventions implemented as appropriate)  meplex placed to sacrum prophylactic.

## 2018-09-08 NOTE — PLAN OF CARE
(1845) Bedside report received from off going nurse, patient care assumed.  (1855) HOB @ a 40 degree angle in lowest position with wheels locked, side rails up times 3; continuous cardiac monitoring in progress with alarms set, Atrial Fib on the monitor, HR low 120's; 2L NC in place via wall O2, O2 Sat 99%; (L) UA PICC has dressing CDI, no S/S of infection or hematoma noted at the insertion site, infusing via pump is 500mg/250ml Dobutamine @ 3mcg/kg/min, at the y-port is 32mg/250ml Levophed @ 0.15mcg/kg/min, 200mg/100ml Lasix is infusing via pump @ a rate of 10mg/hour via the 2nd port; (L) hand 22G HL has dressing CDI flushes easily and is without S/S of infection or infiltration; CORWIN hose and SCD's to (B) LE's removed prior to the physical assessment and replaced upon completion; Mepilex dressing to the sacrum dressing for precautionary matters is CDI; patient is primarily [place] speaking with daughter and sister @ the bedside, denies any form of pain or discomfort, is SSOX4, no swallowing problems identified, plan of care reviewed with patient and family in addition to fall risk precautions, will continue to monitor.  (2018) NIBP 78/50, Levophed infusion increased to 0.2mcg/kg/min.  (2049) NIBP 125/58 Levophed infusion decreased to 0.15mcg/kg/min, scheduled PO meds administered, no swallowing problems identified, bedpan provided per patient request.  (2100) OOB to beside commode with assistance times 2 providers, observed weak/unsteady gait.  (2139) 17g MiraLax PO administered for complaint of constipation.  (2156) , no action taken per Insulin sliding scale.  (2200) Assistance times 2 providers to bed, no BM @ this time, fluctuance noted. (2239) Dr. Tamy Coker updated via telephone of patient's latest lab results, new orders received noted, and read back verbatim for confirmation.  (2245) PRN MiraLax noted to be mildly effective, patient is now passing gas.  (2300) Resting quietly with eyes closed,  observed even unlabored respirations, easily arouses with verbal stimuli, no changes in initial shift physical assessment, reports passing gas at this time, no signs of distress noted.  (2335) 20meq Potassium chloride and 400mg Magnesium oxide PO administered per MD orders, no swallowing problem identified.  (0100) Resting quietly with eyes closed, observed even unlabored respirations, O2 Sat 99%, A Fib on the monitor, , no signs of distress noted.  (0300) Easily arouses with verbal stimuli, continues to deny pain or discomfort, no changes in previous physical assessment, assistance provided with repositioning.  (0342) Presently in a Normal Sinus rhythm with a HR in the 80's.  (0500) Patient presently complaining of bilateral foot pain rated 2/10, CORWIN hose removed upon request, Vicks Sab ointment massaged into bilateral feet as requested, patient verbalized relief, denies need for pain medication.  (0650) , no action required per Insulin sliding scale.  (0655) Bedside report given to oncoming nurse, time allowed for questions.

## 2018-09-08 NOTE — PROGRESS NOTES
Progress Note  Nephrology      Consult Requested By: Phillip Aponte MD      SUBJECTIVE:     Overnight events  Patient is a 83 y.o. female     Patient Active Problem List   Diagnosis    Type 2 diabetes mellitus, without long-term current use of insulin    Elevated troponin    Cirrhosis of liver without ascites    Nonrheumatic aortic valve stenosis    Heart failure with preserved ejection fraction    H/O: GI bleed    Coronary artery disease involving native coronary artery of native heart without angina pectoris    Chest pain    Sepsis    NSTEMI (non-ST elevated myocardial infarction)    Acute on chronic systolic congestive heart failure    Acute hypoxemic respiratory failure    Hypokalemia    Hypomagnesemia    Dark stools    Acute blood loss anemia    Hyponatremia    Acute renal failure superimposed on stage 3 chronic kidney disease    Cardiogenic shock    Melena    Tachycardia    Acute H. pylori gastric ulcer with hemorrhage    Shortness of breath    Paroxysmal atrial fibrillation     Past Medical History:   Diagnosis Date    Asthma     Diabetes mellitus     HTN (hypertension)     Hyperlipidemia               OBJECTIVE:     Vitals:    09/08/18 0930 09/08/18 0945 09/08/18 1000 09/08/18 1100   BP: (!) 95/54 (!) 88/52 (!) 91/54 (!) 97/51   Pulse: 91 90 87 (!) 116   Resp: (!) 24 (!) 22 (!) 24 (!) 22   Temp:       TempSrc:       SpO2: 99% 98% 98% 98%   Weight:       Height:           Temp: 98 °F (36.7 °C) (09/08/18 0701)  Pulse: (!) 116 (09/08/18 1100)  Resp: (!) 22 (09/08/18 1100)  BP: (!) 97/51 (09/08/18 1100)  SpO2: 98 % (09/08/18 1100)    Date 09/08/18 0700 - 09/09/18 0659   Shift 3396-9914 0865-3323 8781-1041 24 Hour Total   INTAKE   P.O. 100   100   I.V.(mL/kg) 91(1.1)   91(1.1)   Shift Total(mL/kg) 191(2.3)   191(2.3)   OUTPUT   Urine(mL/kg/hr) 685   685   Shift Total(mL/kg) 685(8.3)   685(8.3)   Weight (kg) 82.6 82.6 82.6 82.6             Medications:   amiodarone  400 mg Oral BID     aspirin  81 mg Oral Daily    atorvastatin  40 mg Oral Daily    metOLazone  10 mg Oral Daily    pantoprazole  40 mg Oral Daily    sodium chloride 0.9%  3 mL Intravenous Q8H    traZODone  25 mg Oral QHS      DOBUTamine 3 mcg/kg/min (09/07/18 1724)    furosemide (LASIX) 2 mg/mL infusion (non-titrating) 10 mg/hr (09/08/18 0851)    norepinephrine bitartrate-D5W 0.15 mcg/kg/min (09/08/18 0852)               Physical Exam:  General appearance:NAD  SOB  Weak  Lungs: diminished breath sounds, rales at bases  Heart: pulse 116  Abdomen: soft  Extremities: edema  Skin: dry    Laboratory:  ABG  Labs reviewed  Recent Results (from the past 336 hour(s))   Basic metabolic panel    Collection Time: 09/07/18 11:06 AM   Result Value Ref Range    Sodium 125 (L) 136 - 145 mmol/L    Potassium 5.1 3.5 - 5.1 mmol/L    Chloride 85 (L) 95 - 110 mmol/L    CO2 29 23 - 29 mmol/L    BUN, Bld 54 (H) 8 - 23 mg/dL    Creatinine 2.7 (H) 0.5 - 1.4 mg/dL    Calcium 9.1 8.7 - 10.5 mg/dL    Anion Gap 11 8 - 16 mmol/L   Basic metabolic panel    Collection Time: 09/05/18 12:01 PM   Result Value Ref Range    Sodium 121 (L) 136 - 145 mmol/L    Potassium 5.1 3.5 - 5.1 mmol/L    Chloride 85 (L) 95 - 110 mmol/L    CO2 24 23 - 29 mmol/L    BUN, Bld 54 (H) 8 - 23 mg/dL    Creatinine 2.8 (H) 0.5 - 1.4 mg/dL    Calcium 8.9 8.7 - 10.5 mg/dL    Anion Gap 12 8 - 16 mmol/L     Recent Results (from the past 336 hour(s))   CBC auto differential    Collection Time: 09/08/18  4:03 AM   Result Value Ref Range    WBC 9.56 3.90 - 12.70 K/uL    Hemoglobin 8.4 (L) 12.0 - 16.0 g/dL    Hematocrit 24.7 (L) 37.0 - 48.5 %    Platelets 234 150 - 350 K/uL   CBC auto differential    Collection Time: 09/07/18  5:57 AM   Result Value Ref Range    WBC 8.53 3.90 - 12.70 K/uL    Hemoglobin 8.6 (L) 12.0 - 16.0 g/dL    Hematocrit 25.8 (L) 37.0 - 48.5 %    Platelets 222 150 - 350 K/uL   CBC auto differential    Collection Time: 09/06/18  3:34 AM   Result Value Ref Range    WBC  12.38 3.90 - 12.70 K/uL    Hemoglobin 8.1 (L) 12.0 - 16.0 g/dL    Hematocrit 24.8 (L) 37.0 - 48.5 %    Platelets 283 150 - 350 K/uL     Urinalysis  No results for input(s): COLORU, CLARITYU, SPECGRAV, PHUR, PROTEINUA, GLUCOSEU, BILIRUBINCON, BLOODU, WBCU, RBCU, BACTERIA, MUCUS, NITRITE, LEUKOCYTESUR, UROBILINOGEN, HYALINECASTS in the last 24 hours.    Diagnostic Results:  X-Ray: Reviewed  US: Reviewed  Echo: Reviewed  ACCESS    ASSESSMENT/PLAN:     Acute on chronic systolic congestive heart failure .    Echo 9/5/18    1 - Severely depressed left ventricular systolic function (EF 15-20%).     2 - Restrictive LV filling pattern, indicating markedly elevated LAP (grade 3 diastolic dysfunction).     3 - Pulmonary hypertension. The estimated PA systolic pressure is 48 mmHg.     4 - Low normal right ventricular systolic function .     5 - Low flow, low gradient aortic valve stenosis with reduced EF ((SHEA 0.51 cm2, AVAi 0.27 cm2/m2, peak aortic jet velocity 4.0 m/s,MG 40 mmHg, EF 15%,SVi 18 mL/m2).     6 - Moderate aortic regurgitation.     7 - Moderate mitral regurgitation.     8 - Intermediate central venous pressure.     9 - Severe left atrial enlargement.   Pulmonary edema.  Acute kidney injury on Chronic kidney disease 3b.  UO 2250 cc /24 h.  Creatinine 1-1.3 in 2018.  Creatinine 2.4 on 8/18/18.  Creatinine 1.5 on 8/24/18.  Creatinine 2.5, BUN 55 today.  UA no protein .  CT 8/2/18  Kidneys/ Ureters: Normal in size and location. Normal concentration and excretion of contrast. No hydronephrosis or nephrolithiasis. No ureteral dilatation.  Hyponatremia 125.  Potassium 3.9. Replace prn.  Metabolic alkalosis.   Metabolic bone disease.  Check Vit D.  Anemia. Hb 8.4.  Iron low.  Ferrous sulfate.  Renal cap.  Blood pressure  97/51.  On norepinephrine.  Poor nutrition.  Albumin 2.4.  Nepro 1 can tid.  Weight daily.  I and O.  Avoid nephrotoxic agents, hypotension.  Will follow up.  Free water restriction.  Mix all drips in NS  if possible, concentrate.  Lasix drip 10 mg /h.  Zaroxolyn 10 mg po daily.  Replace low magnesium prn.

## 2018-09-08 NOTE — NURSING
Pt HR going into the 120s, sometimes breaking into the 110s. Appears to be afibb on the monitor. MAP 67. Asymptomatic. Notified Dr Diaz. Said this is okay. No new orders for now.

## 2018-09-08 NOTE — PROGRESS NOTES
[facility]  Cardiology  Progress Note     Patient Name: Natalie Parnell  MRN: [id number]  Admission Date: 9/4/2018  Hospital Length of Stay: 4 days  Code Status: Full Code   Attending Physician: Phillip Aponte MD   Primary Care Physician: Octavio Ferrell MD  Expected Discharge Date:   Principal Problem:Cardiogenic shock     Subjective:      Hospital Course:   9/4/2018 Admitted for ADHF from cardiology clinic. Continued on home Dobutamine and placed on IV Levophed due to hypotension. Creatinine 2.7. Admitted to ICU under care of [facility]  9/5/2018 Remains on IV Dobutamine and Levophed and will change Dobutamine to nontitrating @3mcg/kg/min. On IV Bumex Q8hrs with only 340cc out overnight. BUN 55 creatinine 2.8 total bili 2.4. Troponin .334-.230 and BNP 3014 Lactic acid 2.4. Plans to continue Dobutamine, Levophed and IV Bumex with close monitoring of intake and output. If urine output does not increase and creatinine remains unchanged will consider use of Samsca tomorrow  9/6/2018 Remains on IV Dobutamine and Levophed. Up titration of Levophed overnight due to hypotension. HR up to 140s afib this AM with return to ST with down titration of Levophed. HR currently 104 and SBP . Creatinine 2.9 this AM up slightly from yesterday. Na 126 up from 121. Total bili 1.5. Remains on IV Bumex Q8hrs with 1.8 liters out overnight and negative 146 since admission. Rales remain on PE. Will continue IV Bumex for now. Consult Nephrology   9/7/2018 Reviewed Nephrology recommendations and appreciate assistance. Was on IV Bumex TID with 1215 out overnight and barely net negative. Negative 319cc since admission. Transitioned to IV Lasix drip along with daily Metolazone. Remains in NSR with no recurrent afib with RVR overnight-on IV Amiodarone drip. Will transition to oral Amiodarone. Remains on Levophed and Dobutamine and will continue for now. K+ 5.0 this AM with Mg 1.8 and total bili 1.5. Creatinine 2.6  and essentially unchanged since admission 2.7-2.9   9/8/2018  Overnight no acute events. Had some tachycardia to the 130's, now resolved. Currently in SR this AM and BP stable on pressor and . Started PO Amio last night. Cr stable and nephrology following with net neg 1015 ON. Na 125.      Review of Systems   Constitution: Positive for malaise/fatigue. Negative for chills, decreased appetite, diaphoresis, fever and weakness.   Cardiovascular: Positive for dyspnea on exertion. Negative for chest pain, claudication, cyanosis, irregular heartbeat, leg swelling, near-syncope, orthopnea, palpitations, paroxysmal nocturnal dyspnea and syncope.   Respiratory: Negative for cough, hemoptysis, shortness of breath and wheezing.    Gastrointestinal: Negative for bloating, abdominal pain, constipation, diarrhea, melena, nausea and vomiting.   Neurological: Negative for dizziness.      Objective:      Vitals:    09/08/18 1100 09/08/18 1101 09/08/18 1130 09/08/18 1200   BP: (!) 97/51  (!) 91/50 (!) 100/53   Pulse: (!) 116  (!) 119 (!) 121   Resp: (!) 22  (!) 23 (!) 26   Temp:  99.5 °F (37.5 °C)     TempSrc:  Oral     SpO2: 98%  97% 97%   Weight:       Height:         Intake/Output - Last 3 Shifts       09/06 0700 - 09/07 0659 09/07 0700 - 09/08 0659 09/08 0700 - 09/09 0659    P.O. 50 565 100    I.V. (mL/kg) 1052 (12.7) 720.5 (8.7) 109.2 (1.3)    Total Intake(mL/kg) 1102 (13.3) 1285.5 (15.6) 209.2 (2.5)    Urine (mL/kg/hr) 1275 (0.6) 2250 (1.1) 745 (1.6)    Emesis/NG output 0      Stool 0      Total Output 1275 2250 745    Net -173 -964.5 -535.8           Stool Occurrence 0 x      Emesis Occurrence 0 x                   Lines/Drains/Airways            Peripherally Inserted Central Catheter Line                          PICC Double Lumen 08/20/18 0935 right brachial 18 days                   Drain                    Urethral Catheter 09/04/18 1158 Latex 2 days                   Peripheral Intravenous Line                           Peripheral IV - Single Lumen 09/07/18 1440 Anterior;Left Hand less than 1 day                     Physical Exam   Constitutional: She is oriented to person, place, and time. She appears well-developed and well-nourished. She has a sickly appearance. No distress.   Cardiovascular: Normal rate and regular rhythm. Exam reveals no gallop.   Murmur heard.  Pulmonary/Chest: Effort normal. Tachypnea noted. No respiratory distress. She has no wheezes. She has rales.   Abdominal: Soft. Bowel sounds are normal. She exhibits no distension. There is no tenderness.   Neurological: She is alert and oriented to person, place, and time.   Skin: Skin is warm and dry.         Significant Labs:             Recent Labs      09/08/18   0403   HGB  8.4*   HCT  24.7*   NA  125*   K  3.9   BUN  55*   CREATININE  2.5*   ]     Significant Imaging: Echocardiogram:   2D echo with color flow doppler:         Results for orders placed or performed during the hospital encounter of 09/04/18   2D echo with color flow doppler   Result Value Ref Range     EF 15 (A) 55 - 65     Mitral Valve Regurgitation MODERATE (A)       Diastolic Dysfunction Yes (A)       Aortic Valve Regurgitation MODERATE (A)       Aortic Valve Stenosis SEVERE (A)       Est. PA Systolic Pressure 47.94 (A)       Mitral Valve Mobility NORMAL       Tricuspid Valve Regurgitation MILD        Assessment and Plan:             * Cardiogenic shock     -related to severely depressed systolic dysfunction and  AS  -continue Dobutamine at home dose and Levophed for BP support  -Diuresis per nephrology - appreciate recs          Paroxysmal atrial fibrillation     -recurrent afib with RVR with initiation of IV Amiodarone  -converted to NSR; transitioned to oral Amiodarone BID  -continue ASA only; no chronic AC given recent GIB           Acute renal failure superimposed on stage 3 chronic kidney disease     -creatinine 2.5 this AM; 2.7-2.9 since admission   -baseline creatinine 1.2-1.5  -felt to  be related to cardiorenal etiology vs hepatorenal   -continue to monitor closely with IV diuresis   -Nephrology on board           Hyponatremia     -related to hypervolemic hyponatremia vs IV drips  -Na 125 this AM   -Drips max concentrated and to be changed D5  -Nephrology on board            Acute on chronic systolic congestive heart failure     -echo 8/28/18 with severely depressed LV function with EF 20-25% and related to severe AS  -repeat echocardiogram with EF 10-15%  -continued volume overload on exam with pulmonary edema on CXR along with rales, JVD and peripheral edema  -will continue IV Dobutamine drip; transitioned from IV Bumex Q8hrs to Lasix drip with daily Metolazone per Nephrology for continued volume removal  -continue to hold BB and ACEI  - 1 liter fluid restriction; monitor strict I&O and daily weights           Coronary artery disease involving native coronary artery of native heart without angina pectoris     -nonobstructive per [facility]  -continue ASA and statin therapy           Nonrheumatic aortic valve stenosis     -severe per last echo on 8/28/18 -SHEA .69cm2 with MG 50mmHg  -recent BAV 7/30/2018AVA   -repeat echocardiogram - SHEA 0.51 cm2 & mean gradient 40 mmHg  -followed by Dr. Lord in valve clinic at [facility]; currently not a candidate for TAVR given acute decompensation and debility                       VTE Risk Mitigation (From admission, onward)         Ordered       Place CORWIN hose  Until discontinued      09/04/18 1736       Place sequential compression device  Until discontinued      09/04/18 1736       IP VTE HIGH RISK PATIENT  Once      09/04/18 1736             Mike Diaz MD  Cardiology  [facility]

## 2018-09-08 NOTE — PLAN OF CARE
Problem: Patient Care Overview  Goal: Plan of Care Review   09/08/18 0451   Coping/Psychosocial   Plan Of Care Reviewed With patient;daughter   Patient resting in bed. No complaints of pain. Slept in between care during shift without complaint. Daughter at bedside earlier this shift. No SOB or CP. Paroxysmal afibb during the night with HR now in the 80s with NSR. Able to titrate down on levophed and maintain MAP >65. Remains on dobutamine and lasix gtts. Diuresed approx 1.5 liters. Continuous monitoring maintained.

## 2018-09-09 NOTE — PROGRESS NOTES
[facility]  Cardiology  Progress Note     Patient Name: Natalie Parnell  MRN: [id number]  Admission Date: 9/4/2018  Hospital Length of Stay: 4 days  Code Status: Full Code   Attending Physician: Phillip Aponte MD   Primary Care Physician: Octavio Ferrell MD  Expected Discharge Date:   Principal Problem:Cardiogenic shock     Subjective:      Hospital Course:   9/4/2018 Admitted for ADHF from cardiology clinic. Continued on home Dobutamine and placed on IV Levophed due to hypotension. Creatinine 2.7. Admitted to ICU under care of [facility]  9/5/2018 Remains on IV Dobutamine and Levophed and will change Dobutamine to nontitrating @3mcg/kg/min. On IV Bumex Q8hrs with only 340cc out overnight. BUN 55 creatinine 2.8 total bili 2.4. Troponin .334-.230 and BNP 3014 Lactic acid 2.4. Plans to continue Dobutamine, Levophed and IV Bumex with close monitoring of intake and output. If urine output does not increase and creatinine remains unchanged will consider use of Samsca tomorrow  9/6/2018 Remains on IV Dobutamine and Levophed. Up titration of Levophed overnight due to hypotension. HR up to 140s afib this AM with return to ST with down titration of Levophed. HR currently 104 and SBP . Creatinine 2.9 this AM up slightly from yesterday. Na 126 up from 121. Total bili 1.5. Remains on IV Bumex Q8hrs with 1.8 liters out overnight and negative 146 since admission. Rales remain on PE. Will continue IV Bumex for now. Consult Nephrology   9/7/2018 Reviewed Nephrology recommendations and appreciate assistance. Was on IV Bumex TID with 1215 out overnight and barely net negative. Negative 319cc since admission. Transitioned to IV Lasix drip along with daily Metolazone. Remains in NSR with no recurrent afib with RVR overnight-on IV Amiodarone drip. Will transition to oral Amiodarone. Remains on Levophed and Dobutamine and will continue for now. K+ 5.0 this AM with Mg 1.8 and total bili 1.5. Creatinine 2.6  and essentially unchanged since admission 2.7-2.9   9/8/2018  Overnight no acute events. Had some tachycardia to the 130's, now resolved. Currently in SR this AM and BP stable on pressor and . Started PO Amio last night. Cr stable and nephrology following with net neg 1015 ON. Na 125.   9/9/2018  Overnight no acute events. BP remains low stable. Net neg 899 ON and 2183 total. Labs stable. Cr 2.7, Na 126, TB 1.6     Review of Systems   Constitution: Positive for malaise/fatigue. Negative for chills, decreased appetite, diaphoresis, fever and weakness.   Cardiovascular: Positive for dyspnea on exertion. Negative for chest pain, claudication, cyanosis, irregular heartbeat, leg swelling, near-syncope, orthopnea, palpitations, paroxysmal nocturnal dyspnea and syncope.   Respiratory: Negative for cough, hemoptysis, shortness of breath and wheezing.    Gastrointestinal: Negative for bloating, abdominal pain, constipation, diarrhea, melena, nausea and vomiting.   Neurological: Negative for dizziness.      Objective:      Vitals:    09/09/18 0900 09/09/18 0930 09/09/18 1000 09/09/18 1030   BP: (!) 111/52 (!) 98/54 (!) 92/50 (!) 86/52   BP Location:       Patient Position:       Pulse: 85 84 85 82   Resp: (!) 33 (!) 25 (!) 21 (!) 28   Temp:       TempSrc:       SpO2: 100% 100% 99% 99%   Weight:       Height:         Intake/Output - Last 3 Shifts       09/07 0700 - 09/08 0659 09/08 0700 - 09/09 0659 09/09 0700 - 09/10 0659    P.O. 565 755 525    I.V. (mL/kg) 720.5 (8.7) 437.9 (5.3) 91 (1.1)    Total Intake(mL/kg) 1285.5 (15.6) 1192.9 (14.4) 616 (7.5)    Urine (mL/kg/hr) 2250 (1.1) 2175 (1.1) 280 (0.8)    Emesis/NG output       Stool       Total Output 2250 2175 280    Net -964.5 -982.2 +336                        Lines/Drains/Airways            Peripherally Inserted Central Catheter Line                          PICC Double Lumen 08/20/18 0935 right brachial 18 days                   Drain                          Urethral  Catheter 09/04/18 2236 Latex 2 days                   Peripheral Intravenous Line                          Peripheral IV - Single Lumen 09/07/18 1440 Anterior;Left Hand less than 1 day                     Physical Exam   Constitutional: She is oriented to person, place, and time. She appears well-developed and well-nourished. She has a sickly appearance. No distress.   Cardiovascular: Normal rate and regular rhythm. Exam reveals no gallop.   Murmur heard.  Pulmonary/Chest: Effort normal. Tachypnea noted. No respiratory distress. She has no wheezes. She has rales.   Abdominal: Soft. Bowel sounds are normal. She exhibits no distension. There is no tenderness.   Neurological: She is alert and oriented to person, place, and time.   Skin: Skin is warm and dry.         Significant Labs:             Recent Labs      09/09/18   0409   HGB  7.8*   HCT  22.8*   NA  126*   K  3.6   BUN  59*   CREATININE  2.7*   ]     Significant Imaging: Echocardiogram:   2D echo with color flow doppler:         Results for orders placed or performed during the hospital encounter of 09/04/18   2D echo with color flow doppler   Result Value Ref Range     EF 15 (A) 55 - 65     Mitral Valve Regurgitation MODERATE (A)       Diastolic Dysfunction Yes (A)       Aortic Valve Regurgitation MODERATE (A)       Aortic Valve Stenosis SEVERE (A)       Est. PA Systolic Pressure 47.94 (A)       Mitral Valve Mobility NORMAL       Tricuspid Valve Regurgitation MILD        Assessment and Plan:             * Cardiogenic shock     -related to severely depressed systolic dysfunction and  AS  -continue Dobutamine at home dose and Levophed for BP support  -Diuresis per nephrology - appreciate recs          Paroxysmal atrial fibrillation     -recurrent afib with RVR with initiation of IV Amiodarone  -converted to NSR; transitioned to oral Amiodarone BID  -continue ASA only; no chronic AC given recent GIB           Acute renal failure superimposed on stage 3 chronic  kidney disease     -creatinine 2.5 this AM; 2.7-2.9 since admission   -baseline creatinine 1.2-1.5  -felt to be related to cardiorenal etiology vs hepatorenal   -continue to monitor closely with IV diuresis   -Nephrology on board           Hyponatremia     -related to hypervolemic hyponatremia vs IV drips  -Na 125 this AM   -Drips max concentrated and to be changed D5  -Nephrology on board            Acute on chronic systolic congestive heart failure     -echo 8/28/18 with severely depressed LV function with EF 20-25% and related to severe AS  -repeat echocardiogram with EF 10-15%  -continued volume overload on exam with pulmonary edema on CXR along with rales, JVD and peripheral edema  -will continue IV Dobutamine drip; transitioned from IV Bumex Q8hrs to Lasix drip with daily Metolazone per Nephrology for continued volume removal  -continue to hold BB and ACEI  - 1 liter fluid restriction; monitor strict I&O and daily weights           Coronary artery disease involving native coronary artery of native heart without angina pectoris     -nonobstructive per [facility]  -continue ASA and statin therapy           Nonrheumatic aortic valve stenosis     -severe per last echo on 8/28/18 -SHEA .69cm2 with MG 50mmHg  -recent BAV 7/30/2018AVA   -repeat echocardiogram - SHEA 0.51 cm2 & mean gradient 40 mmHg  -followed by Dr. Lord in valve clinic at [facility]; currently not a candidate for TAVR given acute decompensation and debility                       VTE Risk Mitigation (From admission, onward)         Ordered       Place CORWIN hose  Until discontinued      09/04/18 1736       Place sequential compression device  Until discontinued      09/04/18 1736       IP VTE HIGH RISK PATIENT  Once      09/04/18 1736             Mike Diaz MD  Cardiology  [facility]

## 2018-09-09 NOTE — PROGRESS NOTES
Progress Note  Nephrology      Consult Requested By: Phillip Aponte MD      SUBJECTIVE:     Overnight events  Patient is a 83 y.o. female     Patient Active Problem List   Diagnosis    Type 2 diabetes mellitus, without long-term current use of insulin    Elevated troponin    Cirrhosis of liver without ascites    Nonrheumatic aortic valve stenosis    Heart failure with preserved ejection fraction    H/O: GI bleed    Coronary artery disease involving native coronary artery of native heart without angina pectoris    Chest pain    Sepsis    NSTEMI (non-ST elevated myocardial infarction)    Acute on chronic systolic congestive heart failure    Acute hypoxemic respiratory failure    Hypokalemia    Hypomagnesemia    Dark stools    Acute blood loss anemia    Hyponatremia    Acute renal failure superimposed on stage 3 chronic kidney disease    Cardiogenic shock    Melena    Tachycardia    Acute H. pylori gastric ulcer with hemorrhage    Shortness of breath    Paroxysmal atrial fibrillation     Past Medical History:   Diagnosis Date    Asthma     Diabetes mellitus     HTN (hypertension)     Hyperlipidemia               OBJECTIVE:     Vitals:    09/09/18 1330 09/09/18 1345 09/09/18 1400 09/09/18 1415   BP: (!) 96/57 103/63 (!) 90/51 (!) 89/48   BP Location:       Patient Position:       Pulse: 87 83 82 83   Resp: (!) 24 (!) 27 (!) 25 (!) 25   Temp:       TempSrc:       SpO2: 97% 99% 99% 98%   Weight:       Height:           Temp: 97.7 °F (36.5 °C) (09/09/18 1130)  Pulse: 83 (09/09/18 1415)  Resp: (!) 25 (09/09/18 1415)  BP: (!) 89/48 (09/09/18 1415)  SpO2: 98 % (09/09/18 1415)    Date 09/09/18 0700 - 09/10/18 0659   Shift 3530-6435 6260-7209 4148-6828 24 Hour Total   INTAKE   P.O. 575   575   I.V.(mL/kg) 149.7(1.8)   149.7(1.8)   Shift Total(mL/kg) 724.7(8.8)   724.7(8.8)   OUTPUT   Urine(mL/kg/hr) 370(0.6)   370   Shift Total(mL/kg) 370(4.5)   370(4.5)   Weight (kg) 82.6 82.6 82.6 82.6              Medications:   amiodarone  400 mg Oral BID    aspirin  81 mg Oral Daily    atorvastatin  40 mg Oral Daily    ferrous sulfate  325 mg Oral Daily    metOLazone  10 mg Oral Daily    pantoprazole  40 mg Oral Daily    sodium chloride 0.9%  3 mL Intravenous Q8H    traZODone  25 mg Oral QHS    vitamin renal formula (B-complex-vitamin c-folic acid)  1 capsule Oral Daily      DOBUTamine 3 mcg/kg/min (09/09/18 0600)    furosemide (LASIX) 2 mg/mL infusion (non-titrating) 10 mg/hr (09/09/18 0600)    norepinephrine bitartrate-D5W 0.25 mcg/kg/min (09/09/18 1300)               Physical Exam:  General appearance: Weak  Poor intake  SOB with exertion  Lungs: Bibasilar rales   Heart: pulse 83  Abdomen: soft  Extremities: edema  Skin: dry  Laboratory:  ABG  Labs reviewed  Recent Results (from the past 336 hour(s))   Basic metabolic panel    Collection Time: 09/07/18 11:06 AM   Result Value Ref Range    Sodium 125 (L) 136 - 145 mmol/L    Potassium 5.1 3.5 - 5.1 mmol/L    Chloride 85 (L) 95 - 110 mmol/L    CO2 29 23 - 29 mmol/L    BUN, Bld 54 (H) 8 - 23 mg/dL    Creatinine 2.7 (H) 0.5 - 1.4 mg/dL    Calcium 9.1 8.7 - 10.5 mg/dL    Anion Gap 11 8 - 16 mmol/L   Basic metabolic panel    Collection Time: 09/05/18 12:01 PM   Result Value Ref Range    Sodium 121 (L) 136 - 145 mmol/L    Potassium 5.1 3.5 - 5.1 mmol/L    Chloride 85 (L) 95 - 110 mmol/L    CO2 24 23 - 29 mmol/L    BUN, Bld 54 (H) 8 - 23 mg/dL    Creatinine 2.8 (H) 0.5 - 1.4 mg/dL    Calcium 8.9 8.7 - 10.5 mg/dL    Anion Gap 12 8 - 16 mmol/L     Recent Results (from the past 336 hour(s))   CBC auto differential    Collection Time: 09/09/18  4:09 AM   Result Value Ref Range    WBC 8.37 3.90 - 12.70 K/uL    Hemoglobin 7.8 (L) 12.0 - 16.0 g/dL    Hematocrit 22.8 (L) 37.0 - 48.5 %    Platelets 212 150 - 350 K/uL   CBC auto differential    Collection Time: 09/08/18  4:03 AM   Result Value Ref Range    WBC 9.56 3.90 - 12.70 K/uL    Hemoglobin 8.4 (L) 12.0 - 16.0  g/dL    Hematocrit 24.7 (L) 37.0 - 48.5 %    Platelets 234 150 - 350 K/uL   CBC auto differential    Collection Time: 09/07/18  5:57 AM   Result Value Ref Range    WBC 8.53 3.90 - 12.70 K/uL    Hemoglobin 8.6 (L) 12.0 - 16.0 g/dL    Hematocrit 25.8 (L) 37.0 - 48.5 %    Platelets 222 150 - 350 K/uL     Urinalysis  No results for input(s): COLORU, CLARITYU, SPECGRAV, PHUR, PROTEINUA, GLUCOSEU, BILIRUBINCON, BLOODU, WBCU, RBCU, BACTERIA, MUCUS, NITRITE, LEUKOCYTESUR, UROBILINOGEN, HYALINECASTS in the last 24 hours.    Diagnostic Results:  X-Ray: Reviewed  US: Reviewed  Echo: Reviewed  ACCESS    ASSESSMENT/PLAN:     Acute on chronic systolic congestive heart failure .    Echo 9/5/18    1 - Severely depressed left ventricular systolic function (EF 15-20%).     2 - Restrictive LV filling pattern, indicating markedly elevated LAP (grade 3 diastolic dysfunction).     3 - Pulmonary hypertension. The estimated PA systolic pressure is 48 mmHg.     4 - Low normal right ventricular systolic function .     5 - Low flow, low gradient aortic valve stenosis with reduced EF ((SHEA 0.51 cm2, AVAi 0.27 cm2/m2, peak aortic jet velocity 4.0 m/s,MG 40 mmHg, EF 15%,SVi 18 mL/m2).     6 - Moderate aortic regurgitation.     7 - Moderate mitral regurgitation.     8 - Intermediate central venous pressure.     9 - Severe left atrial enlargement.   Pulmonary edema.  Acute kidney injury on Chronic kidney disease 3b.  UO 2235 cc /24 h.  Creatinine 1-1.3 in 2018.  Creatinine 2.4 on 8/18/18.  Creatinine 1.5 on 8/24/18.  Creatinine 2.7, BUN 59 today.  UA no protein .  CT 8/2/18  Kidneys/ Ureters: Normal in size and location. Normal concentration and excretion of contrast. No hydronephrosis or nephrolithiasis. No ureteral dilatation.  Hyponatremia 126.  Potassium 3.6. Replace prn.  Metabolic alkalosis.   Metabolic bone disease.  Vit D deficiency.  Ergocalciferol.  Hyperphosphatemia.  Binder.  Hypomagnesemia 1.7.  Replace  Anemia. Hb 7.8.  Iron  low.  Ferrous sulfate.  Renal cap.  Blood pressure  89/48.  On norepinephrine.  On dobutamine.  Poor nutrition.  Albumin 2.4.  Nepro 1 can tid.  Weight daily.  I and O.  Avoid nephrotoxic agents, hypotension.  Will follow up.  Free water restriction.  Mix all drips in NS if possible, concentrate.  Lasix drip 5 mg /h.  Zaroxolyn 10 mg po daily.

## 2018-09-10 PROBLEM — I95.9 HYPOTENSION: Status: ACTIVE | Noted: 2018-01-01

## 2018-09-10 PROBLEM — R17 TOTAL BILIRUBIN, ELEVATED: Status: ACTIVE | Noted: 2018-01-01

## 2018-09-10 NOTE — PLAN OF CARE
Problem: Physical Therapy Goal  Goal: Physical Therapy Goal  Goals to be met by: 10/10/2018     Patient will increase functional independence with mobility by performin. Supine to sit with Modified [place]  2. Sit to supine with Modified [place]  3. Sit to stand transfer with Supervision and Stand-by Assistance  4. Gait  x 75 feet with Supervision and Stand-by Assistance with RW  5. Ascend/descend 7 stairs with bilateral Handrails Stand-by Assistance   6. Lower extremity exercise program x10 reps with supervision     Outcome: Ongoing (interventions implemented as appropriate)  Patient with low BP; however, able to tolerate sitting at EOB and transfer OOB to chair; initial BP supine 94/55/ HR 83 SpO2 93%; in sitting EOB, BP 92/54 HR 92% SpO2 92%; sitting in chair, BP 92/50 HR 83 SpO2 94%; will cont with POC; recommend bath bench and SNF post acute care hospital stay.

## 2018-09-10 NOTE — ASSESSMENT & PLAN NOTE
-recurrent afib with RVR requiring initiation of IV Amiodarone  -converted to NSR; transitioned to oral Amiodarone BID; episode of afib with RVR overnight with conversion to NSR this AM   -continue ASA only; no chronic AC given recent GIB

## 2018-09-10 NOTE — PROGRESS NOTES
[facility]  Cardiology  Progress Note    Patient Name: Natalie Parnell  MRN: [id number]  Admission Date: 9/4/2018  Hospital Length of Stay: 6 days  Code Status: Full Code   Attending Physician: Phillip Aponte MD   Primary Care Physician: Octavio Ferrell MD  Expected Discharge Date:   Principal Problem:Cardiogenic shock    Subjective:     Hospital Course:   9/4/2018 Admitted for ADHF from cardiology clinic. Continued on home Dobutamine and placed on IV Levophed due to hypotension. Creatinine 2.7. Admitted to ICU under care of [facility]  9/5/2018 Remains on IV Dobutamine and Levophed and will change Dobutamine to nontitrating @3mcg/kg/min. On IV Bumex Q8hrs with only 340cc out overnight. BUN 55 creatinine 2.8 total bili 2.4. Troponin .334-.230 and BNP 3014 Lactic acid 2.4. Plans to continue Dobutamine, Levophed and IV Bumex with close monitoring of intake and output. If urine output does not increase and creatinine remains unchanged will consider use of Samsca tomorrow  9/6/2018 Remains on IV Dobutamine and Levophed. Up titration of Levophed overnight due to hypotension. HR up to 140s afib this AM with return to ST with down titration of Levophed. HR currently 104 and SBP . Creatinine 2.9 this AM up slightly from yesterday. Na 126 up from 121. Total bili 1.5. Remains on IV Bumex Q8hrs with 1.8 liters out overnight and negative 146 since admission. Rales remain on PE. Will continue IV Bumex for now. Consult Nephrology   9/7/2018 Reviewed Nephrology recommendations and appreciate assistance. Was on IV Bumex TID with 1215 out overnight and barely net negative. Negative 319cc since admission. Transitioned to IV Lasix drip along with daily Metolazone. Remains in NSR with no recurrent afib with RVR overnight-on IV Amiodarone drip. Will transition to oral Amiodarone. Remains on Levophed and Dobutamine and will continue for now. K+ 5.0 this AM with Mg 1.8 and total bili 1.5. Creatinine 2.6 and  essentially unchanged since admission 2.7-2.9  9/8/2018 Overnight no acute events. Had some tachycardia to the 130's, now resolved. Currently in SR this AM and BP stable on pressor and . Started PO Amio last night. Cr stable and nephrology following with net neg 1015 ON. Na 125.   9/9/2018 Overnight no acute events. BP remains low stable. Net neg 899 ON and 2183 total. Labs stable. Cr 2.7, Na 126, TB 1.6  9/10/2018 Remains on IV Dobutamine, Levophed and Lasix drip along with daily Metolazone. 1.0 liters out overnight and negative 2.1 liters since admission. Na 127 K+ 4.0 total bili 1.8. Rales remain on PE with no improvement. Will continue current medical regimen and await Nephrology recs-apprehensive to up titrate IV Lasix due to creatinine             Review of Systems   Constitution: Positive for malaise/fatigue. Negative for chills, decreased appetite, diaphoresis, fever and weakness.   Cardiovascular: Positive for chest pain and dyspnea on exertion. Negative for claudication, cyanosis, irregular heartbeat, leg swelling, near-syncope, orthopnea, palpitations, paroxysmal nocturnal dyspnea and syncope.   Respiratory: Negative for cough, hemoptysis, shortness of breath and wheezing.    Gastrointestinal: Negative for bloating, abdominal pain, constipation, diarrhea, melena, nausea and vomiting.   Neurological: Negative for dizziness.     Objective:     Vital Signs (Most Recent):  Temp: 98.2 °F (36.8 °C) (09/10/18 0745)  Pulse: 83 (09/10/18 0915)  Resp: (!) 27 (09/10/18 0915)  BP: (!) 94/52 (09/10/18 0915)  SpO2: 98 % (09/10/18 0915) Vital Signs (24h Range):  Temp:  [97.7 °F (36.5 °C)-98.6 °F (37 °C)] 98.2 °F (36.8 °C)  Pulse:  [] 83  Resp:  [13-37] 27  SpO2:  [89 %-100 %] 98 %  BP: ()/(39-64) 94/52     Weight: 84 kg (185 lb 3 oz)  Body mass index is 30.82 kg/m².     SpO2: 98 %  O2 Device (Oxygen Therapy): nasal cannula      Intake/Output Summary (Last 24 hours) at 9/10/2018 1000  Last data filed at  9/10/2018 0900  Gross per 24 hour   Intake 724.96 ml   Output 810 ml   Net -85.04 ml       Lines/Drains/Airways     Peripherally Inserted Central Catheter Line                 PICC Double Lumen 08/20/18 0935 right brachial 21 days          Drain                 Urethral Catheter 09/04/18 2236 Latex 5 days          Peripheral Intravenous Line                 Peripheral IV - Single Lumen 09/07/18 1440 Anterior;Left Hand 2 days                Physical Exam   Constitutional: She is oriented to person, place, and time. She appears well-developed and well-nourished. No distress.   Cardiovascular: Normal rate and regular rhythm. Exam reveals no gallop.   Murmur heard.  Pulmonary/Chest: Effort normal. No respiratory distress. She has no wheezes. She has rales.   Abdominal: Soft. Bowel sounds are normal. She exhibits no distension. There is no tenderness.   Neurological: She is alert and oriented to person, place, and time.   Skin: Skin is warm and dry.       Significant Labs:     Recent Labs   Lab  09/10/18   0347   WBC  8.48   RBC  3.07*   HGB  8.4*   HCT  25.8*   PLT  231   MCV  84   MCH  27.4   MCHC  32.6     Recent Labs   Lab  09/10/18   0347   CALCIUM  9.0   PROT  6.0   NA  127*   K  4.0   CO2  28   CL  84*   BUN  67*   CREATININE  3.0*   ALKPHOS  174*   ALT  24   AST  45*   BILITOT  1.8*       Significant Imaging: Echocardiogram:   2D echo with color flow doppler:   Results for orders placed or performed during the hospital encounter of 09/04/18   2D echo with color flow doppler   Result Value Ref Range    EF 15 (A) 55 - 65    Mitral Valve Regurgitation MODERATE (A)     Diastolic Dysfunction Yes (A)     Aortic Valve Regurgitation MODERATE (A)     Aortic Valve Stenosis SEVERE (A)     Est. PA Systolic Pressure 47.94 (A)     Mitral Valve Mobility NORMAL     Tricuspid Valve Regurgitation MILD      Assessment and Plan:     Brief HPI: Seen on AM rounds with Dr. Diaz with family at the bedside. Reviewed chart and  discussed need for continued volume removal with patient. Appears to understand but every day discussion in regards to volume overload appears to astound patient-will continue to update and educate.     * Cardiogenic shock    -related to severely depressed systolic dysfunction and  AS  -continue Dobutamine at home dose and Levophed for BP support  -will attempt to diurese in attempt to improve pump function        Hypotension    -related to cardiogenic shock  -remains on IV Levophed; will titrate for SBP greater than 100mmHg         Paroxysmal atrial fibrillation    -recurrent afib with RVR requiring initiation of IV Amiodarone  -converted to NSR; transitioned to oral Amiodarone BID; episode of afib with RVR overnight with conversion to NSR this AM   -continue ASA only; no chronic AC given recent GIB         Acute renal failure superimposed on stage 3 chronic kidney disease    -creatinine 3.0 this AM; 2.7-2.9 since admission   -baseline creatinine 1.2-1.5  -felt to be related to cardiorenal etiology  -continue to monitor closely with IV diuresis   -continue to follow Nephrology recs         Hyponatremia    -related to hypervolemic hyponatremia vs IV drips  -Na 127 this AM trending up from last week of 121-126   -Drips max concentrated and to be changed D5  -Nephrology on board          Acute on chronic systolic congestive heart failure    -echo 8/28/18 with severely depressed LV function with EF 20-25% and related to severe AS  -repeat echocardiogram with EF 10-15%  -continued volume overload on exam with pulmonary edema  -on IV Dobutamine drip and transitioned to IV Lasix drip with daily Metolazone per Nephrology for continued volume removal  -1.0 liters out overnight but barely net negative in 24 hours; negative 2.1 liters  since admission; discussed with staff up titration of IV Lasix drip but apprehensive given creatinined; will await Nephrology evaluation and recs today   -continue to hold BB and ACEI  -on 1  liter fluid restriction; monitor strict I&O and daily weights         Coronary artery disease involving native coronary artery of native heart without angina pectoris    -nonobstructive per [facility]  -continue ASA and statin therapy         Nonrheumatic aortic valve stenosis    -severe per last echo on 8/28/18 -SHEA .69cm2 with MG 50mmHg  -recent BAV 7/30/2018  -repeat echocardiogram- SHEA 0.51 cm2 & mean gradient 40 mmHg  -followed by Dr. Lord in valve clinic [facility]; currently not a candidate for TAVR given acute decompensation and debility             VTE Risk Mitigation (From admission, onward)        Ordered     Place CORWIN hose  Until discontinued      09/10/18 0832     Place sequential compression device  Until discontinued      09/10/18 0832     Place CORWIN hose  Until discontinued      09/04/18 1736     Place sequential compression device  Until discontinued      09/04/18 1736     IP VTE HIGH RISK PATIENT  Once      09/04/18 1736          BIANKA Booker, ANP  Cardiology  [facility]

## 2018-09-10 NOTE — SUBJECTIVE & OBJECTIVE
Review of Systems   Constitution: Positive for malaise/fatigue. Negative for chills, decreased appetite, diaphoresis, fever and weakness.   Cardiovascular: Positive for chest pain and dyspnea on exertion. Negative for claudication, cyanosis, irregular heartbeat, leg swelling, near-syncope, orthopnea, palpitations, paroxysmal nocturnal dyspnea and syncope.   Respiratory: Negative for cough, hemoptysis, shortness of breath and wheezing.    Gastrointestinal: Negative for bloating, abdominal pain, constipation, diarrhea, melena, nausea and vomiting.   Neurological: Negative for dizziness.     Objective:     Vital Signs (Most Recent):  Temp: 98.2 °F (36.8 °C) (09/10/18 0745)  Pulse: 83 (09/10/18 0915)  Resp: (!) 27 (09/10/18 0915)  BP: (!) 94/52 (09/10/18 0915)  SpO2: 98 % (09/10/18 0915) Vital Signs (24h Range):  Temp:  [97.7 °F (36.5 °C)-98.6 °F (37 °C)] 98.2 °F (36.8 °C)  Pulse:  [] 83  Resp:  [13-37] 27  SpO2:  [89 %-100 %] 98 %  BP: ()/(39-64) 94/52     Weight: 84 kg (185 lb 3 oz)  Body mass index is 30.82 kg/m².     SpO2: 98 %  O2 Device (Oxygen Therapy): nasal cannula      Intake/Output Summary (Last 24 hours) at 9/10/2018 1000  Last data filed at 9/10/2018 0900  Gross per 24 hour   Intake 724.96 ml   Output 810 ml   Net -85.04 ml       Lines/Drains/Airways     Peripherally Inserted Central Catheter Line                 PICC Double Lumen 08/20/18 0935 right brachial 21 days          Drain                 Urethral Catheter 09/04/18 2236 Latex 5 days          Peripheral Intravenous Line                 Peripheral IV - Single Lumen 09/07/18 1440 Anterior;Left Hand 2 days                Physical Exam   Constitutional: She is oriented to person, place, and time. She appears well-developed and well-nourished. No distress.   Cardiovascular: Normal rate and regular rhythm. Exam reveals no gallop.   Murmur heard.  Pulmonary/Chest: Effort normal. No respiratory distress. She has no wheezes. She has rales.    Abdominal: Soft. Bowel sounds are normal. She exhibits no distension. There is no tenderness.   Neurological: She is alert and oriented to person, place, and time.   Skin: Skin is warm and dry.       Significant Labs:     Recent Labs   Lab  09/10/18   0347   WBC  8.48   RBC  3.07*   HGB  8.4*   HCT  25.8*   PLT  231   MCV  84   MCH  27.4   MCHC  32.6     Recent Labs   Lab  09/10/18   0347   CALCIUM  9.0   PROT  6.0   NA  127*   K  4.0   CO2  28   CL  84*   BUN  67*   CREATININE  3.0*   ALKPHOS  174*   ALT  24   AST  45*   BILITOT  1.8*       Significant Imaging: Echocardiogram:   2D echo with color flow doppler:   Results for orders placed or performed during the hospital encounter of 09/04/18   2D echo with color flow doppler   Result Value Ref Range    EF 15 (A) 55 - 65    Mitral Valve Regurgitation MODERATE (A)     Diastolic Dysfunction Yes (A)     Aortic Valve Regurgitation MODERATE (A)     Aortic Valve Stenosis SEVERE (A)     Est. PA Systolic Pressure 47.94 (A)     Mitral Valve Mobility NORMAL     Tricuspid Valve Regurgitation MILD

## 2018-09-10 NOTE — ASSESSMENT & PLAN NOTE
-creatinine 3.0 this AM; 2.7-2.9 since admission   -baseline creatinine 1.2-1.5  -felt to be related to cardiorenal etiology  -continue to monitor closely with IV diuresis   -continue to follow Nephrology recs

## 2018-09-10 NOTE — PLAN OF CARE
Problem: Patient Care Overview  Goal: Plan of Care Review  Outcome: Ongoing (interventions implemented as appropriate)  Afebrile throughout the night. HR SR at beginning of shift. Around 2200 converted to a.fib, controlled 100-120's. BP fluctuating depending on titration of Levo. Remains on Levo, Dobutamine, and Lasix. Urine output about 400cc. Paused Lasix gtt for 6 hours per nephrology. Restarted at 0300. Blood glucose high. Covered with PRN insulin. Awake, alert, oriented. Safety precautions in place.

## 2018-09-10 NOTE — PLAN OF CARE
Problem: Diabetes, Type 2 (Adult)  Goal: Signs and Symptoms of Listed Potential Problems Will be Absent, Minimized or Managed (Diabetes, Type 2)  Signs and symptoms of listed potential problems will be absent, minimized or managed by discharge/transition of care (reference Diabetes, Type 2 (Adult) CPG).  Outcome: Ongoing (interventions implemented as appropriate)   09/10/18 0437   Diabetes, Type 2   Problems Assessed (Type 2 Diabetes) all   Problems Present (Type 2 Diabetes) hyperglycemia       Problem: Fall Risk (Adult)  Goal: Identify Related Risk Factors and Signs and Symptoms  Related risk factors and signs and symptoms are identified upon initiation of Human Response Clinical Practice Guideline (CPG)  Outcome: Ongoing (interventions implemented as appropriate)   09/10/18 0437   Fall Risk   Related Risk Factors (Fall Risk) age-related changes;confusion/agitation;gait/mobility problems;fear of falling;fatigue/slow reaction;depression/anxiety;sleep pattern alteration;objects hard to reach   Signs and Symptoms (Fall Risk) presence of risk factors     Goal: Absence of Falls  Patient will demonstrate the desired outcomes by discharge/transition of care.  Outcome: Ongoing (interventions implemented as appropriate)   09/10/18 0437   Fall Risk (Adult)   Absence of Falls achieves outcome       Problem: Infection, Risk/Actual (Adult)  Goal: Identify Related Risk Factors and Signs and Symptoms  Related risk factors and signs and symptoms are identified upon initiation of Human Response Clinical Practice Guideline (CPG)  Outcome: Ongoing (interventions implemented as appropriate)   09/10/18 0437   Infection, Risk/Actual   Related Risk Factors (Infection, Risk/Actual) chronic illness/condition;sleep disturbance;tissue perfusion altered   Signs and Symptoms (Infection, Risk/Actual) blood glucose changes;chills       Problem: Cardiac: ACS (Acute Coronary Syndrome) (Adult)  Goal: Signs and Symptoms of Listed Potential Problems  Will be Absent, Minimized or Managed (Cardiac: ACS)  Signs and symptoms of listed potential problems will be absent, minimized or managed by discharge/transition of care (reference Cardiac: ACS (Acute Coronary Syndrome) (Adult) CPG).  Outcome: Ongoing (interventions implemented as appropriate)   09/10/18 0437   Cardiac: ACS (Acute Coronary Syndrome)   Problems Assessed (Acute Coronary Syndrome) all   Problems Present (Acute Coronary Syndrome) dysrhythmia/arrhythmia;hemodynamic instability;situational response       Problem: Cardiac Output Decreased (Adult)  Goal: Identify Related Risk Factors and Signs and Symptoms  Related risk factors and signs and symptoms are identified upon initiation of Human Response Clinical Practice Guideline (CPG)  Outcome: Ongoing (interventions implemented as appropriate)   09/10/18 0437   Cardiac Output Decreased   Related Risk Factors (Cardiac Output Decreased) afterload altered;cardiac muscle disease;disease process;preload altered   Signs and Symptoms (Cardiac Output Decreased) adventitious breath sounds;cold/clammy skin;edema;restlessness;weight gain       Problem: Fluid Volume Excess (Adult,Obstetrics,Pediatric)  Goal: Identify Related Risk Factors and Signs and Symptoms  Related risk factors and signs and symptoms are identified upon initiation of Human Response Clinical Practice Guideline (CPG)  Outcome: Ongoing (interventions implemented as appropriate)   09/10/18 0437   Fluid Volume Excess   Related Risk Factors (Fluid Volume Excess) cirrhosis;fluid intake excessive   Signs and Symptoms (Fluid Volume Excess) activity intolerance;acute weight gain;anxiety;blood pressure/heart rate changes;mental status changes       Problem: Cardiac: Heart Failure (Adult)  Goal: Signs and Symptoms of Listed Potential Problems Will be Absent, Minimized or Managed (Cardiac: Heart Failure)  Signs and symptoms of listed potential problems will be absent, minimized or managed by discharge/transition  of care (reference Cardiac: Heart Failure (Adult) CPG).  Outcome: Ongoing (interventions implemented as appropriate)   09/10/18 2817   Cardiac: Heart Failure   Problems Assessed (Heart Failure) all   Problems Present (Heart Failure) decreased quality of life;dysrhythmia/arrhythmia;situational response

## 2018-09-10 NOTE — PLAN OF CARE
Problem: SLP Goal  Goal: SLP Goal  Short Term Goals:  1. Pt will participate in BSS to determine least restrictive diet.- MET 9/10  2. Pt will tolerate regular/thin liquids PO diet with no overt s/s of aspiration.  Outcome: Ongoing (interventions implemented as appropriate)  9/10: Pt participated in clinical swallow eval this AM. Pt tolerated thin liquids, puree, and hard solid textures with no overt s/s of aspiration, at bedside. SLP recs: Regular/thin liquids PO diet with standard swallow precautions, crush PO meds and bury in pudding/applesauce, as needed. SLP will f/u to ensure safety with rec'd diet/swallow precautions.  MICHAELLE Mendoza., CCC-SLP  Speech-Language Pathologist

## 2018-09-10 NOTE — PLAN OF CARE
TN went to meet with patient, daughter and granddaughter at bedside. Patient speaks some English. TN reviewed patient's clinicals. TN will continue to follow.    Per MD note: Continue current care for now and will begin discussions with goals of care with patient and family with advanced and possibly end stage HF    Future Appointments   Date Time Provider Department Center   9/11/2018  2:20 PM Shanice Ashraf MD [facility]   10/5/2018  1:40 PM [facility]   10/9/2018 10:20 AM Octavio Ferrell MD [facility]        09/10/18 2622   Discharge Reassessment   Assessment Type Discharge Planning Reassessment   Provided patient/caregiver education on the expected discharge date and the discharge plan No   Discharge Plan A Other  (To be determined)   Discharge Plan B Home with family;Home Health   Patient choice form signed by patient/caregiver N/A   Can the patient answer the patient profile reliably? Yes, cognitively intact     Jaleesa Irvin RN  Transition Navigator  [phone]

## 2018-09-10 NOTE — ASSESSMENT & PLAN NOTE
-echo 8/28/18 with severely depressed LV function with EF 20-25% and related to severe AS  -repeat echocardiogram with EF 10-15%  -continued volume overload on exam with pulmonary edema  -on IV Dobutamine drip and transitioned to IV Lasix drip with daily Metolazone per Nephrology for continued volume removal  -1.0 liters out overnight but barely net negative in 24 hours; negative 2.1 liters  since admission; discussed with staff up titration of IV Lasix drip but apprehensive given creatinined; will await Nephrology evaluation and recs today   -continue to hold BB and ACEI  -on 1 liter fluid restriction; monitor strict I&O and daily weights

## 2018-09-10 NOTE — PLAN OF CARE
Pt still in a.fib. Rate controlled 120's. Sometimes spikes to 130-140's. Asymptomatic. Dr. Diaz called twice. Left messages. Awaiting call back.

## 2018-09-10 NOTE — ASSESSMENT & PLAN NOTE
-related to hypervolemic hyponatremia vs IV drips  -Na 127 this AM trending up from last week of 121-126   -Drips max concentrated and to be changed D5  -Nephrology on board

## 2018-09-10 NOTE — PROGRESS NOTES
Progress Note  Nephrology      Consult Requested By: Phillip Aponte MD  Reason for Consult: ARF    SUBJECTIVE:     Review of Systems   Constitutional: Negative for chills and fever.   Respiratory: Positive for shortness of breath. Negative for cough.    Cardiovascular: Positive for leg swelling. Negative for chest pain.   Gastrointestinal: Negative for abdominal pain, nausea and vomiting.     Patient Active Problem List   Diagnosis    Type 2 diabetes mellitus, without long-term current use of insulin    Elevated troponin    Cirrhosis of liver without ascites    Nonrheumatic aortic valve stenosis    Heart failure with preserved ejection fraction    H/O: GI bleed    Coronary artery disease involving native coronary artery of native heart without angina pectoris    Chest pain    Sepsis    NSTEMI (non-ST elevated myocardial infarction)    Acute on chronic systolic congestive heart failure    Acute hypoxemic respiratory failure    Hypokalemia    Hypomagnesemia    Dark stools    Acute blood loss anemia    Hyponatremia    Acute renal failure superimposed on stage 3 chronic kidney disease    Cardiogenic shock    Melena    Tachycardia    Acute H. pylori gastric ulcer with hemorrhage    Shortness of breath    Paroxysmal atrial fibrillation       OBJECTIVE:     Medications:   amiodarone  400 mg Oral BID    aspirin  81 mg Oral Daily    atorvastatin  40 mg Oral Daily    ergocalciferol  50,000 Units Oral Q7 Days    ferrous sulfate  325 mg Oral Daily    metOLazone  10 mg Oral Daily    pantoprazole  40 mg Oral Daily    sodium chloride 0.9%  3 mL Intravenous Q8H    traZODone  25 mg Oral QHS    vitamin renal formula (B-complex-vitamin c-folic acid)  1 capsule Oral Daily      DOBUTamine 3 mcg/kg/min (09/09/18 1900)    furosemide (LASIX) 2 mg/mL infusion (non-titrating) 5 mg/hr (09/10/18 0300)    norepinephrine bitartrate-D5W       Vitals:    09/10/18 0915   BP: (!) 94/52   Pulse: 83   Resp: (!) 27    Temp:      I/O last 3 completed shifts:  In: 1535.2 [P.O.:830; I.V.:705.2]  Out: 2000 [Urine:2000]  Physical Exam   Constitutional: She is oriented to person, place, and time. She appears well-developed and well-nourished. No distress.   HENT:   Head: Normocephalic and atraumatic.   Eyes: EOM are normal. No scleral icterus.   Neck: Neck supple. No JVD present.   Cardiovascular: Normal rate and regular rhythm.   No murmur heard.  Pulmonary/Chest: Effort normal. No respiratory distress. She has no wheezes. She has rales.   On 6L O2   Abdominal: Soft. Bowel sounds are normal. She exhibits no distension. There is no tenderness.   Musculoskeletal: She exhibits edema (1+ BLE).   Neurological: She is alert and oriented to person, place, and time.   Skin: Skin is warm and dry. No erythema. No pallor.   Psychiatric: She has a normal mood and affect. Judgment normal.     Laboratory:  Recent Labs   Lab  09/08/18   0403  09/09/18   0409  09/10/18   0347   WBC  9.56  8.37  8.48   HGB  8.4*  7.8*  8.4*   HCT  24.7*  22.8*  25.8*   PLT  234  212  231   MONO  7.7  0.7  8.4  0.7  6.8  0.6     Recent Labs   Lab  09/08/18   0403  09/09/18   0409  09/09/18   1750  09/10/18   0347   NA  125*  126*  126*  127*   K  3.9  3.6  4.5  4.0   CL  86*  83*  83*  84*   CO2  27  30*  30*  28   BUN  55*  59*  64*  67*   CREATININE  2.5*  2.7*  2.9*  3.0*   CALCIUM  8.8  8.7  9.0  9.0   PHOS  5.0*  5.6*   --   5.1*     Labs reviewed  Diagnostic Results:  X-Ray: Reviewed  US: Reviewed  Echo: Reviewed      ASSESSMENT/PLAN:     1. ARF on CKD 3 - baseline cr 1.3-1.5  Cardiorenal   on metolazone 10 + Lasix 5 mg/hour  UO only 1L  Worsening Cr --> increase LAsix to 10 mg/hour      2. Cardiogenic shock due to Acute on Chronic combined  S+D HF, EF 15-20%,  pulm HTN with PA pressure 48 by ECHO  On dobutamine and levophed    3.ANemia of CKD + NEYMAR - start Venofer 100 daily x 7 days    Will start epogen once iron repleted    Recent Labs   Lab  09/08/18    0403  09/09/18   0409  09/10/18   0347   WBC  9.56  8.37  8.48   HGB  8.4*  7.8*  8.4*   HCT  24.7*  22.8*  25.8*   PLT  234  212  231       Lab Results   Component Value Date    IRON 15 (L) 09/08/2018    TIBC 305 08/02/2018    FERRITIN 168 08/02/2018     Results for XAVIER ABEL (MRN [id number]) as of 9/10/2018 22:07   Ref. Range 8/2/2018 09:07   Saturated Iron Latest Ref Range: 20 - 50 % 14 (L)     4. MBD/2HPTH - borderline phos, low phos diet, cont Ergocalciferol 50K  Lab Results   Component Value Date    CALCIUM 9.0 09/10/2018    PHOS 5.1 (H) 09/10/2018     Recent Labs   Lab  09/09/18   0409  09/09/18   1750  09/10/18   0347   MG  1.7  2.0  2.0     Lab Results   Component Value Date    [id number] 22 (L) 09/09/2018     Lab Results   Component Value Date    CO2 29 09/10/2018           5. Hyponatremia, hypervolemic, worsening despite fluid restriction -> max concentrate levophed and allIVPB in NS when possible give 1 dose tolvaptan    Critical condition, poor prognosis  Consider palliative care consult  Might need SCUF or CVVHD soon      Total critical care time 35 minutes: included management of organ failures related to critical illness, titration of continuous infusions, review of pertinent labs and imaging studies, discussion with primary team       Thank you for allowing me to participate in the care of your patients  With any question please call [phone]  Odilia Merino    [facility]  JUAN MIGUEL Juan MD, FACP,   SALVADOR Coker MD,   MD NATACHA Macias, NP  [address]  [place], [address] [phone]

## 2018-09-10 NOTE — PROGRESS NOTES
09/09/18 2115   Vital Signs   Pulse (!) 127   Resp (!) 26   SpO2 97 %   BP (!) 103/59   MAP (mmHg) 78     Pt converted to a.fib. PO amiodarone scheduled for 2100 given. Will continue to monitor. If continues will notify MD. HR sustaining 100-120's.

## 2018-09-10 NOTE — PLAN OF CARE
Problem: Occupational Therapy Goal  Goal: Occupational Therapy Goal  Goals to be met by: 10/10/18     Patient will increase functional independence with ADLs by performing:    UE Dressing with Supervision.  LE Dressing with Stand-by Assistance.  Grooming while standing at sink with Supervision.  Toileting from toilet with Supervision for hygiene and clothing management.   Toilet transfer to toilet with Supervision.  Increased functional strength to WFL for ADLs.    Outcome: Ongoing (interventions implemented as appropriate)  OT/PT eval/tx completed this date. Lao speaking therapist on site & pt's fly member able to translate. Pt w/ mult recent hospitalizations for cardiac issues. Lives w/ JASPER & dgtr in SSduplex w/ 7STE & BHR; T/S combo. PLOF: Mod (I) via rollator w/ all fxnl tasks except A w/ sponge bathing. Currently, pt req's Mod A sup-->EOB & apolinar'd ~8min w/ Sup-SBA & no SOB. Stand w/ Min HHA x 2 for SPT->b/s chair w/ Mod A. Pt w/o c/o pain or SOB. No signif changes in BP throughout. Edu/tx re: HEP w/ deep/pursed lip breathing techs. Via pt's fly, pt acknowledged understanding.    Pt presents w/ decreased overall endurance/conditioning, balance/mobility & coordination/respiration w/ subsequent decline in (I)/safety w/ BADLs, fxnl mobility & fxnl t/f's. OT 5x/wk to increase phys/fxnl status & maximize potential to achieve established goals for d/c-->SNF & rec TTB.

## 2018-09-10 NOTE — ASSESSMENT & PLAN NOTE
-related to cardiogenic shock  -remains on IV Levophed; will titrate for SBP greater than 100mmHg

## 2018-09-10 NOTE — PT/OT/SLP EVAL
Speech Language Pathology Evaluation  Bedside Swallow    Patient Name:  Natalie Parnell   MRN:  [id number]  Admitting Diagnosis: Cardiogenic shock    Recommendations:                 General Recommendations:  Dysphagia therapy  Diet recommendations:  Regular, Thin   Aspiration Precautions: Crushed PO meds in puree textures as needed, 1 bite/sip at a time, Alternating bites/sips, Check for pocketing/oral residue, Eliminate distractions, Feed only when awake/alert, Frequent oral care, HOB to 90 degrees, Meds whole 1 at a time, Monitor for s/s of aspiration, Remain upright 30 minutes post meal, Small bites/sips and Standard aspiration precautions   General Precautions: Standard, fall  Communication strategies:  pt requires  or language line for interpretation    History:     Past Medical History:   Diagnosis Date    Asthma     Diabetes mellitus     HTN (hypertension)     Hyperlipidemia        Past Surgical History:   Procedure Laterality Date    CATARACT EXTRACTION Bilateral     Dr. Max    COLON SURGERY      EGD (ESOPHAGOGASTRODUODENOSCOPY) N/A 7/10/2018    Performed [facility] at [facility]    ESOPHAGOGASTRODUODENOSCOPY N/A 7/10/2018    Procedure: EGD (ESOPHAGOGASTRODUODENOSCOPY);  Surgeon: Derian Stoner MD;  Location: [facility];  Service: Endoscopy;  Laterality: N/A;    ESOPHAGOGASTRODUODENOSCOPY N/A 8/16/2018    Procedure: ESOPHAGOGASTRODUODENOSCOPY (EGD);  Surgeon: Moy Varma MD;  Location: [facility] ([address] [place] [address]);  Service: Endoscopy;  Laterality: N/A;    ESOPHAGOGASTRODUODENOSCOPY (EGD) N/A 8/16/2018    [facility]    HERNIA REPAIR      PERCUTANEOUS TRANSLUMINAL BALLOON ANGIOPLASTY OF AORTA N/A 7/30/2018    Procedure: PTA, AORTA, USING BALLOON;  Surgeon: Scott Pruett MD;  Location: [facility] CATH LAB;  Service: Cardiology;  Laterality: N/A;    PTA, AORTA, USING BALLOON N/A 7/30/2018    Performed by Scott Pruett MD at [facility] CATH  LAB    VARICOSE VEIN SURGERY     HPI:  84 y/o Sri Lankan speaking female with hx of severe AS (SHEA 0.69 cm2, AVAi 0.37 cm2/m2, peak aortic jet velocity 4.3 m/s,MG 50 mmHg, EF 20-25%) s/p recent BAV, HFrEF (EF previously 50-55%, now 20-25%), LE DVT previously on Xarelto, DM, hx of GIB, possible liver cirrhosis who presents to the ER with complaints of SOB, abdominal and LE swelling. She was seen by Dr. Diaz in the clinic yesterday and was sent to the ER for admission due to ADHF.   Last clinic visit was seen after hospitalization for GIB with acute blood loss anemia requiring PRBC transfusion. Trop elevated and peaked at >7. 2DE with normal EF and no WMA's. Was discharged and hospitalized again for ADHF. Diuresed, [facility] with nonobstructive CAD, improved, and discharged home.   Had recent admission to [facility] for ADHF, found to be in cardiogenic shock, newly depressed EF, IV diuresis, BAV with improvement in MG and symptoms and discharged home. A couple of days later came back due to not using diuretics (had ARF) and volume overloaded. Diuresed, improved and discharged home on Bumex. Was seen by Dr Garcia as outpatient as was doing OK. Since then she has gained weight, had worsening bilateral leg edema, SOB, despite increased Bumex doses to 2 mg TID. Also complaining of bilateral flank pain and weakness. Has to sleep propped up. Denies PND, syncope, LE edema. Compliant with meds. Weight gain 172-176. SBP in clinic 67. Has continuous infusion  via right arm PICC line.    Prior Intubation HX:  N/A    Modified Barium Swallow: None on file    Chest X-Rays: CHF pattern edema with small bilateral effusions.    Central line terminates over the SVC.  This appears slightly retracted when compared to recent prior exam.    Prior diet: Per pt, regular/thin liquids .      Subjective     SLP consulted for clinical swallow eval. SLP confirmed with RN prior to entry.  Pt Scott montes (PCP), who is a certified  ", present in room to interpret. Pt agreeable to participate in swallow eval. Pt reported no issues with swallowing. However, pt RN Natalie, pt with difficulty swallowing pills (1 by1 with overt s/s of aspiration)  this AM    Patient goals: "I swallow fine!" per pt     Pain/Comfort:  · Pain Rating 1: 0/10    Objective:     Oral Musculature Evaluation  · Oral Musculature: WFL  · Dentition: upper and lower dentures  · Mucosal Quality: adequate  · Mandibular Strength and Mobility: WFL  · Oral Labial Strength and Mobility: WFL  · Lingual Strength and Mobility: WFL  · Buccal Strength and Mobility: WFL    Bedside Swallow Eval: Pt participated in clinical swallow eval this AM. Pt tolerated thin liquids, puree, and hard solid textures with no overt s/s of aspiration, at bedside.   Consistencies Assessed:  · Thin liquids -via cup sips x5, straw x5  · Puree -(applesauce) x4  · Solids -(khalida cracker) x3     Oral Phase:   · WFL    Pharyngeal Phase:   · no overt clinical signs/symptoms of aspiration  · no overt clinical signs/symptoms of pharyngeal dysphagia    Compensatory Strategies  · None    Treatment: SLP will f/u to ensure safety with rec'd diet/swallow precautions.  SLP educated pt and pt's family members, via  (Scott), on role of SLP, clinical swallow eval, diet recs/swallow precautions and POC. Pt and pt;s family acknowledged and confirmed understanding. However, pt and pt's family will require reinforcement of all education provided.    Assessment:     Natalie Parnell is a 83 y.o. female admitted with Cardiogenic shock.  She presents with no overt s/s of aspiration at bedside across all consistencies.   SLP recs: Regular/thin liquids PO diet with standard swallow precautions, crush PO meds and bury in pudding/applesauce, as needed. SLP will f/u to ensure safety with rec'd diet/swallow precautions. SLP notified ALDO Estrada of results/recs.    Goals:   Multidisciplinary Problems     SLP Goals        Problem: " SLP Goal    Goal Priority Disciplines Outcome   SLP Goal     SLP Ongoing (interventions implemented as appropriate)   Description:  Short Term Goals:  1. Pt will participate in BSS to determine least restrictive diet.- MET 9/10  2. Pt will tolerate regular/thin liquids PO diet with no overt s/s of aspiration.                    Plan:     · Patient to be seen:  3 x/week   · Plan of Care expires:  10/10/18  · Plan of Care reviewed with:  patient, family   · SLP Follow-Up:  Yes       Discharge recommendations:  (TBD pending pt's progress)     Time Tracking:     SLP Treatment Date:   09/10/18  Speech Start Time:  1030  Speech Stop Time:  1044     Speech Total Time (min):  14 min    Billable Minutes: Eval Swallow and Oral Function 14    MAXX Mendoza, CCC-SLP  09/10/2018

## 2018-09-10 NOTE — PT/OT/SLP EVAL
Physical Therapy Evaluation    Patient Name:  Natalie Parnell   MRN:  [id number]    Recommendations:     Discharge Recommendations:  nursing facility, skilled   Discharge Equipment Recommendations: bath bench   Barriers to discharge: Inaccessible home    Assessment:     Natalie Parnell is a 83 y.o. female admitted with a medical diagnosis of Cardiogenic shock.  She presents with the following impairments/functional limitations:  weakness, impaired endurance, gait instability, impaired functional mobilty, impaired self care skills, impaired balance, decreased upper extremity function, decreased lower extremity function, decreased safety awareness, edema, impaired cardiopulmonary response to activity Patient with low BP; however, able to tolerate sitting at EOB and transfer OOB to chair; initial BP supine 94/55/ HR 83 SpO2 93%; in sitting EOB, BP 92/54 HR 92% SpO2 92%; sitting in chair, BP 92/50 HR 83 SpO2 94%; will cont with POC; recommend bath bench and SNF post acute care hospital stay.     Rehab Prognosis:  good; patient would benefit from acute skilled PT services to address these deficits and reach maximum level of function.      Recent Surgery: * No surgery found *      Plan:     During this hospitalization, patient to be seen 6 x/week to address the above listed problems via gait training, therapeutic activities, therapeutic exercises  · Plan of Care Expires:  10/10/18   Plan of Care Reviewed with: patient, family    Subjective     Communicated with nurse prior to session.  Patient found supine with HOB elevated upon PT entry to room, agreeable to evaluation.      Chief Complaint: SOB  Patient comments/goals: return to PLOF  Pain/Comfort:  · Pain Rating 1: 0/10  · Pain Rating Post-Intervention 1: 0/10    Patients cultural, spiritual, Restoration conflicts given the current situation: none reported    Living Environment:  Pt lives with dtr and JASPER in SS duplex with 7 SARAH, tub/shower combo  Prior to admission, patients  level of function was Mavis with rollator except assist for sponge bathing; able to perform light IADLs .  Patient has the following equipment: rollator, bedside commode. Upon discharge, patient will have assistance from family-24 hr supervision and assist.    Objective:     Patient found with: gonzalez catheter, PICC line, oxygen(ICU monitoring)     General Precautions: Standard, fall   Orthopedic Precautions:N/A   Braces: N/A     Exams:  · Cognitive Exam:  Patient is oriented to Person, Place, Time, Situation and family reports mild confusion  · Gross Motor Coordination:  WFL  · Postural Exam:  Patient presented with the following abnormalities:    · -       Rounded shoulders  · Sensation:    · -       Intact  · Edema: Minimal to Moderate R thigh/leg  · RLE ROM: WFL  · RLE Strength: ~4- to 4 grossly  · LLE ROM: WFL  · LLE Strength: ~-4 to 4 grossly    Functional Mobility:  · Bed Mobility:     · Supine to Sit: moderate assistance  · Transfers:     · Sit to Stand:  minimum assistance and of 2 persons with hand-held assist  · Bed to Chair: moderate assistance and of 2 persons with  hand-held assist  using  Step Transfer  · Balance:  Sit static~fair+; sit dynamic~fair; static stand~fair-; dynamic stand/amb~poor+    AM-PAC 6 CLICK MOBILITY  Total Score:11       Therapeutic Activities and Exercises:   Educated on role of PT/POC; Georgian speaking therapist present and pt's family member agreed to translate though offered translation line for at no cost; pt moved to EOB with modA; sat for 8 min with sup/SBA and no dizziness of SOB; refer to vitals above; pt transferred to chair with Sanju x 2; advised on LE ex    Patient left up in chair with all lines intact, call button in reach, nurse notified and family present.    GOALS:   Multidisciplinary Problems     Physical Therapy Goals        Problem: Physical Therapy Goal    Goal Priority Disciplines Outcome Goal Variances Interventions   Physical Therapy Goal     PT, PT/OT  Ongoing (interventions implemented as appropriate)     Description:  Goals to be met by: 10/10/2018     Patient will increase functional independence with mobility by performin. Supine to sit with Modified [place]  2. Sit to supine with Modified [place]  3. Sit to stand transfer with Supervision and Stand-by Assistance  4. Gait  x 75 feet with Supervision and Stand-by Assistance with RW  5. Ascend/descend 7 stairs with bilateral Handrails Stand-by Assistance   6. Lower extremity exercise program x10 reps with supervision                      History:     Past Medical History:   Diagnosis Date    Asthma     Diabetes mellitus     HTN (hypertension)     Hyperlipidemia        Past Surgical History:   Procedure Laterality Date    CATARACT EXTRACTION Bilateral     Dr. Max    COLON SURGERY      EGD (ESOPHAGOGASTRODUODENOSCOPY) N/A 7/10/2018    Per[facility] by [facility] at [facility]    ESOPHAGOGASTRODUODENOSCOPY N/A 7/10/2018    Procedure: EGD (ESOPHAGOGASTRODUODENOSCOPY);  Surgeon: Derian Stoner MD;  Location: [facility];  Service: Endoscopy;  Laterality: N/A;    ESOPHAGOGASTRODUODENOSCOPY N/A 2018    Procedure: ESOPHAGOGASTRODUODENOSCOPY (EGD);  Surgeon: Moy Varma MD;  Location: [facility] ([address] [place] [address]);  Service: Endoscopy;  Laterality: N/A;    ESOPHAGOGASTRODUODENOSCOPY (EGD) N/A 2018    Performed by Moy Varma MD at [facility] ([address] [place] [address])    HERNIA REPAIR      PERCUTANEOUS TRANSLUMINAL BALLOON ANGIOPLASTY OF AORTA N/A 2018    Procedure: PTA, AORTA, USING BALLOON;  Surgeon: Scott Pruett MD;  Location: [facility] CATH LAB;  Service: Cardiology;  Laterality: N/A;    PTA, AORTA, USING BALLOON N/A 2018    Performed by Scott Pruett MD at [facility] CATH LAB    VARICOSE VEIN SURGERY         Clinical Decision Making:     History  Co-morbidities and personal factors that may impact the plan of care Examination  Body Structures and Functions, activity limitations  and participation restrictions that may impact the plan of care Clinical Presentation   Decision Making/ Complexity Score   Co-morbidities:   [] Time since onset of injury / illness / exacerbation  [x] Status of current condition  []Patient's cognitive status and safety concerns    [x] Multiple Medical Problems (see med hx)  Personal Factors:   [] Patient's age  [] Prior Level of function   [x] Patient's home situation (environment and family support)  [] Patient's level of motivation  [] Expected progression of patient      HISTORY:(criteria)    [] 13870 - no personal factors/history    [] 99623 - has 1-2 personal factor/comorbidity     [x] 76170 - has >3 personal factor/comorbidity     Body Regions:  [x] Objective examination findings  [] Head     []  Neck  [] Trunk   [] Upper Extremity  [] Lower Extremity    Body Systems:  [x] For communication ability, affect, cognition, language, and learning style: the assessment of the ability to make needs known, consciousness, orientation (person, place, and time), expected emotional /behavioral responses, and learning preferences (eg, learning barriers, education  needs)  [x] For the neuromuscular system: a general assessment of gross coordinated movement (eg, balance, gait, locomotion, transfers, and transitions) and motor function  (motor control and motor learning)  [x] For the musculoskeletal system: the assessment of gross symmetry, gross range of motion, gross strength, height, and weight  [x] For the integumentary system: the assessment of pliability(texture), presence of scar formation, skin color, and skin integrity  [x] For cardiovascular/pulmonary system: the assessment of heart rate, respiratory rate, blood pressure, and edema     Activity limitations:    [] Patient's cognitive status and saf ety concerns          [x] Status of current condition      [] Weight bearing restriction  [] Cardiopulmunary Restriction    Participation Restrictions:   [] Goals and goal  agreement with the patient     [] Rehab potential (prognosis) and probable outcome      Examination of Body System: (criteria)    [] 53766 - addressing 1-2 elements    [] 64945 - addressing a total of 3 or more elements     [x] 53579 -  Addressing a total of 4 or more elements         Clinical Presentation: (criteria)  Stable - 19128     On examination of body system using standardized tests and measures patient presents with 4 or more elements from any of the following: body structures and functions, activity limitations, and/or participation restrictions.  Leading to a clinical presentation that is considered stable and/or uncomplicated                              Clinical Decision Making  (Eval Complexity):  Low- 05367     Time Tracking:     PT Received On: 09/10/18  PT Start Time: 1518     PT Stop Time: 1549  PT Total Time (min): 31 min with OT    Billable Minutes: Evaluation 31 min      Shala Ponce, PT  09/10/2018

## 2018-09-10 NOTE — ASSESSMENT & PLAN NOTE
-severe per last echo on 8/28/18 -SHEA .69cm2 with MG 50mmHg  -recent BAV 7/30/2018  -repeat echocardiogram- SHEA 0.51 cm2 & mean gradient 40 mmHg  -followed by Dr. Lord in valve clinic at [facility]; currently not a candidate for TAVR given acute decompensation and debility

## 2018-09-10 NOTE — PT/OT/SLP EVAL
Occupational Therapy   Evaluation & tx    Name: Natalie Parnell  MRN: [id number]  Admitting Diagnosis:  Cardiogenic shock      Recommendations:     Discharge Recommendations: nursing facility, skilled  Discharge Equipment Recommendations:  bath bench  Barriers to discharge:  Inaccessible home environment    History:     Occupational Profile:  Living Environment: w/ JASPER  & dgtr in [place]dup[place] w/ 7STE & BHR; T/S combo  Previous level of function: MI via rollator except A for sponge bathing  Roles and Routines: light IADLs  Equipment Used at Home:  rollator, bedside commode  Assistance upon Discharge: 24hr S/A    Past Medical History:   Diagnosis Date    Asthma     Diabetes mellitus     HTN (hypertension)     Hyperlipidemia        Past Surgical History:   Procedure Laterality Date    CATARACT EXTRACTION Bilateral     Dr. Max    COLON SURGERY      EGD (ESOPHAGOGASTRODUODENOSCOPY) N/A 7/10/2018    Performed by Derian Stoner MD at [facility]    ESOPHAGOGASTRODUODENOSCOPY N/A 7/10/2018    Procedure: EGD (ESOPHAGOGASTRODUODENOSCOPY);  Surgeon: Derian Stoner MD;  Location: [facility];  Service: Endoscopy;  Laterality: N/A;    ESOPHAGOGASTRODUODENOSCOPY N/A 8/16/2018    Procedure: ESOPHAGOGASTRODUODENOSCOPY (EGD);  Surgeon: Moy Varma MD;  Location: [facility] ([address] [place] [address]);  Service: Endoscopy;  Laterality: N/A;    ESOPHAGOGASTRODUODENOSCOPY (EGD) N/A 8/16/2018    Performed by Moy Varma MD at [facility] ([address] [place] [address])    HERNIA REPAIR      PERCUTANEOUS TRANSLUMINAL BALLOON ANGIOPLASTY OF AORTA N/A 7/30/2018    Procedure: PTA, AORTA, USING BALLOON;  Surgeon: Scott Pruett MD;  Location: [facility] CATH LAB;  Service: Cardiology;  Laterality: N/A;    PTA, AORTA, USING BALLOON N/A 7/30/2018    Performed by Scott Pruett MD at [facility] C[facility] LAB    VARICOSE VEIN SURGERY         Subjective     Chief Complaint: SOB  Patient/Family Comments/goals: return to PLOF    Pain/Comfort:  · Pain Rating 1: 0/10  · Pain Rating  Post-Intervention 1: 0/10    Patients cultural, spiritual, Druze conflicts given the current situation:      Objective:     Communicated with: nsg prior to session.  Patient found  and blood pressure cuff, telemetry, oxygen, pulse ox (continuous), PICC line, gonzalez catheter upon OT entry to room.    General Precautions: Standard, fall   Orthopedic Precautions:N/A   Braces: N/A     Occupational Performance:    Bed Mobility:    · Patient completed Supine to Sit with moderate assistance    Functional Mobility/Transfers:  · Patient completed Sit <> Stand Transfer with minimum assistance and of 2 persons  with  hand-held assist   · Patient completed Bed <> Chair Transfer using Stand Pivot technique with moderate assistance with hand-held assist  · Functional Mobility:     Activities of Daily Living:  ·     Cognitive/Visual Perceptual:  AOx4  Fly reports pt w/ mild confusion not noted during eval    Physical Exam:  B UEs WFL at least 3/5    Sit balance: F to F+  Stand balance: P+ to F-    AMPAC 6 Click ADL:  AMPAC Total Score: 16    Treatment & Education:  OT/PT eval/tx completed this date. Upper sorbian speaking therapist on site & pt's fly member able to translate. Pt w/ mult recent hospitalizations for cardiac issues. Lives w/ JASPER & dgtr in [place] w/ 7STE & BHR; T/S combo. PLOF: Mod (I) via rollator w/ all fxnl tasks except A w/ sponge bathing. Currently, pt req's Mod A sup-->EOB & apolinar'd ~8min w/ Sup-SBA & no SOB. Stand w/ Min HHA x 2 for SPT->b/s chair w/ Mod A. Pt w/o c/o pain or SOB. No signif changes in BP throughout. Edu/tx re: HEP w/ deep/pursed lip breathing techs. Via pt's fly, pt acknowledged understanding.    Patient left up in chair with all lines intact, call button in reach, nsg notified and fly present    Assessment:   Pt presents w/ decreased overall endurance/conditioning, balance/mobility & coordination/respiration w/ subsequent decline in (I)/safety w/ BADLs, fxnl mobility & fxnl t/f's. OT 5x/wk to  "increase phys/fxnl status & maximize potential to achieve established goals for d/c-->SNF & rec TTB.  Education:      Natalie Parnell is a 83 y.o. female with a medical diagnosis of Cardiogenic shock.  She presents with the following performance deficits affecting function: weakness, impaired endurance, gait instability, impaired functional mobilty, impaired self care skills, impaired balance, decreased lower extremity function, decreased upper extremity function, decreased coordination, decreased safety awareness, edema, impaired cardiopulmonary response to activity.      Rehab Prognosis: Good; patient would benefit from acute skilled OT services to address these deficits and reach maximum level of function.         Clinical Decision Makin.  OT Low:  "Pt evaluation falls under low complexity for evaluation coding due to performance deficits noted in 1-3 areas as stated above and 0 co-morbities affecting current functional status. Data obtained from problem focused assessments. No modifications or assistance was required for completion of evaluation. Only brief occupational profile and history review completed."     Plan:     Patient to be seen 5 x/week to address the above listed problems via self-care/home management, therapeutic activities, therapeutic exercises  · Plan of Care Expires: 10/10/18  · Plan of Care Reviewed with: patient, family    This Plan of care has been discussed with the patient who was involved in its development and understands and is in agreement with the identified goals and treatment plan    GOALS:   Multidisciplinary Problems     Occupational Therapy Goals        Problem: Occupational Therapy Goal    Goal Priority Disciplines Outcome Interventions   Occupational Therapy Goal     OT, PT/OT Ongoing (interventions implemented as appropriate)    Description:  Goals to be met by: 10/10/18     Patient will increase functional independence with ADLs by performing:    UE Dressing with " Supervision.  LE Dressing with Stand-by Assistance.  Grooming while standing at sink with Supervision.  Toileting from toilet with Supervision for hygiene and clothing management.   Toilet transfer to toilet with Supervision.  Increased functional strength to WFL for ADLs.                      Time Tracking:     OT Date of Treatment: 09/10/18  OT Start Time: 1518  OT Stop Time: 1549  OT Total Time (min): 31 min    Billable Minutes:Evaluation 10  Therapeutic Activity 21  Total Time 31    CLARISSA Shin  9/10/2018

## 2018-09-11 PROBLEM — Z51.5 PALLIATIVE CARE ENCOUNTER: Status: RESOLVED | Noted: 2018-01-01 | Resolved: 2018-01-01

## 2018-09-11 PROBLEM — E83.42 HYPOMAGNESEMIA: Status: RESOLVED | Noted: 2018-01-01 | Resolved: 2018-01-01

## 2018-09-11 PROBLEM — Z71.89 GOALS OF CARE, COUNSELING/DISCUSSION: Status: RESOLVED | Noted: 2018-01-01 | Resolved: 2018-01-01

## 2018-09-11 PROBLEM — I48.0 PAROXYSMAL ATRIAL FIBRILLATION: Status: RESOLVED | Noted: 2018-01-01 | Resolved: 2018-01-01

## 2018-09-11 PROBLEM — I25.2 OLD MI (MYOCARDIAL INFARCTION): Status: ACTIVE | Noted: 2018-01-01

## 2018-09-11 PROBLEM — E87.1 HYPONATREMIA: Status: RESOLVED | Noted: 2018-01-01 | Resolved: 2018-01-01

## 2018-09-11 PROBLEM — I50.23 ACUTE ON CHRONIC SYSTOLIC CONGESTIVE HEART FAILURE: Status: RESOLVED | Noted: 2018-01-01 | Resolved: 2018-01-01

## 2018-09-11 PROBLEM — Z71.89 COUNSELING REGARDING ADVANCED CARE PLANNING AND GOALS OF CARE: Status: RESOLVED | Noted: 2018-01-01 | Resolved: 2018-01-01

## 2018-09-11 PROBLEM — R19.5 DARK STOOLS: Status: RESOLVED | Noted: 2018-01-01 | Resolved: 2018-01-01

## 2018-09-11 PROBLEM — J96.01 ACUTE HYPOXEMIC RESPIRATORY FAILURE: Status: RESOLVED | Noted: 2018-01-01 | Resolved: 2018-01-01

## 2018-09-11 PROBLEM — N17.9 ACUTE RENAL FAILURE SUPERIMPOSED ON STAGE 3 CHRONIC KIDNEY DISEASE: Status: RESOLVED | Noted: 2018-01-01 | Resolved: 2018-01-01

## 2018-09-11 PROBLEM — I50.9 HEART FAILURE: Status: ACTIVE | Noted: 2018-01-01

## 2018-09-11 PROBLEM — R57.0 CARDIOGENIC SHOCK: Status: RESOLVED | Noted: 2018-01-01 | Resolved: 2018-01-01

## 2018-09-11 PROBLEM — N18.30 ACUTE RENAL FAILURE SUPERIMPOSED ON STAGE 3 CHRONIC KIDNEY DISEASE: Status: RESOLVED | Noted: 2018-01-01 | Resolved: 2018-01-01

## 2018-09-11 PROBLEM — Z71.89 GOALS OF CARE, COUNSELING/DISCUSSION: Status: ACTIVE | Noted: 2018-01-01

## 2018-09-11 PROBLEM — I95.9 HYPOTENSION: Status: RESOLVED | Noted: 2018-01-01 | Resolved: 2018-01-01

## 2018-09-11 PROBLEM — Z51.5 COMFORT MEASURES ONLY STATUS: Status: ACTIVE | Noted: 2018-01-01

## 2018-09-11 PROBLEM — Z71.89 COUNSELING REGARDING ADVANCED CARE PLANNING AND GOALS OF CARE: Status: ACTIVE | Noted: 2018-01-01

## 2018-09-11 PROBLEM — E87.6 HYPOKALEMIA: Status: RESOLVED | Noted: 2018-01-01 | Resolved: 2018-01-01

## 2018-09-11 PROBLEM — R17 TOTAL BILIRUBIN, ELEVATED: Status: RESOLVED | Noted: 2018-01-01 | Resolved: 2018-01-01

## 2018-09-11 PROBLEM — A41.9 SEPSIS: Status: RESOLVED | Noted: 2018-01-01 | Resolved: 2018-01-01

## 2018-09-11 PROBLEM — N18.5 CHRONIC KIDNEY DISEASE, STAGE V: Status: ACTIVE | Noted: 2018-01-01

## 2018-09-11 PROBLEM — I50.9 HEART FAILURE: Status: RESOLVED | Noted: 2018-01-01 | Resolved: 2018-01-01

## 2018-09-11 PROBLEM — Z51.5 PALLIATIVE CARE ENCOUNTER: Status: ACTIVE | Noted: 2018-01-01

## 2018-09-11 PROBLEM — N18.5 CHRONIC KIDNEY DISEASE, STAGE V: Status: RESOLVED | Noted: 2018-01-01 | Resolved: 2018-01-01

## 2018-09-11 PROBLEM — Z87.19 H/O: GI BLEED: Status: RESOLVED | Noted: 2018-01-01 | Resolved: 2018-01-01

## 2018-09-11 PROBLEM — R07.9 CHEST PAIN: Status: RESOLVED | Noted: 2018-01-01 | Resolved: 2018-01-01

## 2018-09-11 PROBLEM — I21.4 NSTEMI (NON-ST ELEVATED MYOCARDIAL INFARCTION): Status: RESOLVED | Noted: 2018-01-01 | Resolved: 2018-01-01

## 2018-09-11 PROBLEM — R79.89 ELEVATED TROPONIN: Status: RESOLVED | Noted: 2018-01-01 | Resolved: 2018-01-01

## 2018-09-11 PROBLEM — I35.0 NONRHEUMATIC AORTIC VALVE STENOSIS: Status: RESOLVED | Noted: 2018-01-01 | Resolved: 2018-01-01

## 2018-09-11 PROBLEM — Z51.5 COMFORT MEASURES ONLY STATUS: Status: RESOLVED | Noted: 2018-01-01 | Resolved: 2018-01-01

## 2018-09-11 PROBLEM — B96.81 ACUTE H. PYLORI GASTRIC ULCER WITH HEMORRHAGE: Status: RESOLVED | Noted: 2018-01-01 | Resolved: 2018-01-01

## 2018-09-11 PROBLEM — K25.0 ACUTE H. PYLORI GASTRIC ULCER WITH HEMORRHAGE: Status: RESOLVED | Noted: 2018-01-01 | Resolved: 2018-01-01

## 2018-09-11 PROBLEM — D62 ACUTE BLOOD LOSS ANEMIA: Status: RESOLVED | Noted: 2018-01-01 | Resolved: 2018-01-01

## 2018-09-11 PROBLEM — I25.2 OLD MI (MYOCARDIAL INFARCTION): Status: RESOLVED | Noted: 2018-01-01 | Resolved: 2018-01-01

## 2018-09-11 PROBLEM — I25.10 CORONARY ARTERY DISEASE INVOLVING NATIVE CORONARY ARTERY OF NATIVE HEART WITHOUT ANGINA PECTORIS: Status: RESOLVED | Noted: 2018-01-01 | Resolved: 2018-01-01

## 2018-09-11 NOTE — SUBJECTIVE & OBJECTIVE
Review of Systems   Unable to perform ROS: mental status change     Objective:     Vital Signs (Most Recent):  Temp: 98.2 °F (36.8 °C) (09/11/18 1101)  Pulse: 80 (09/11/18 1400)  Resp: 13 (09/11/18 1400)  BP: (!) 104/55 (09/11/18 1400)  SpO2: 95 % (09/11/18 1400) Vital Signs (24h Range):  Temp:  [97.1 °F (36.2 °C)-98.2 °F (36.8 °C)] 98.2 °F (36.8 °C)  Pulse:  [] 80  Resp:  [11-63] 13  SpO2:  [92 %-98 %] 95 %  BP: ()/(45-69) 104/55     Weight: 84 kg (185 lb 3 oz)  Body mass index is 30.82 kg/m².     SpO2: 95 %  O2 Device (Oxygen Therapy): nasal cannula      Intake/Output Summary (Last 24 hours) at 9/11/2018 1440  Last data filed at 9/11/2018 1300  Gross per 24 hour   Intake 478.31 ml   Output 513 ml   Net -34.69 ml       Lines/Drains/Airways     Peripherally Inserted Central Catheter Line                 PICC Double Lumen 08/20/18 0935 right brachial 22 days          Drain                 Urethral Catheter 09/04/18 2236 Latex 6 days          Peripheral Intravenous Line                 Peripheral IV - Single Lumen 09/07/18 1440 Anterior;Left Hand 4 days                Physical Exam   Constitutional: She appears lethargic. She has a sickly appearance. She appears ill.   Cardiovascular:   Murmur heard.  Neurological: She appears lethargic.       Significant Labs:     Recent Labs   Lab  09/11/18   0530   WBC  12.44   RBC  2.94*   HGB  7.9*   HCT  23.8*   PLT  263   MCV  81*   MCH  26.9*   MCHC  33.2     Recent Labs   Lab  09/11/18   0530   CALCIUM  9.2   PROT  6.2   NA  126*   K  4.3   CO2  29   CL  82*   BUN  81*   CREATININE  3.5*   ALKPHOS  178*   ALT  25   AST  50*   BILITOT  2.1*       Significant Imaging: Echocardiogram:   2D echo with color flow doppler:   Results for orders placed or performed during the hospital encounter of 09/04/18   2D echo with color flow doppler   Result Value Ref Range    EF 15 (A) 55 - 65    Mitral Valve Regurgitation MODERATE (A)     Diastolic Dysfunction Yes (A)      Aortic Valve Regurgitation MODERATE (A)     Aortic Valve Stenosis SEVERE (A)     Est. PA Systolic Pressure 47.94 (A)     Mitral Valve Mobility NORMAL     Tricuspid Valve Regurgitation MILD

## 2018-09-11 NOTE — PROGRESS NOTES
Progress Note  Nephrology      Consult Requested By: Phillip Aponte MD  Reason for Consult: ARF    SUBJECTIVE:     Review of Systems   Constitutional: Negative for chills and fever.   Respiratory: Positive for shortness of breath. Negative for cough.    Cardiovascular: Positive for leg swelling. Negative for chest pain.   Gastrointestinal: Negative for abdominal pain, nausea and vomiting.     Patient Active Problem List   Diagnosis    Type 2 diabetes mellitus, without long-term current use of insulin    Elevated troponin    Cirrhosis of liver without ascites    Nonrheumatic aortic valve stenosis    Heart failure with preserved ejection fraction    H/O: GI bleed    Coronary artery disease involving native coronary artery of native heart without angina pectoris    Chest pain    Sepsis    NSTEMI (non-ST elevated myocardial infarction)    Acute on chronic systolic congestive heart failure    Acute hypoxemic respiratory failure    Hypokalemia    Hypomagnesemia    Dark stools    Acute blood loss anemia    Hyponatremia    Acute renal failure superimposed on stage 3 chronic kidney disease    Cardiogenic shock    Melena    Tachycardia    Acute H. pylori gastric ulcer with hemorrhage    Shortness of breath    Paroxysmal atrial fibrillation    Hypotension    Total bilirubin, elevated    Palliative care encounter    Goals of care, counseling/discussion    Counseling regarding advanced care planning and goals of care       OBJECTIVE:     Medications:   amiodarone  400 mg Oral BID    aspirin  81 mg Oral Daily    atorvastatin  40 mg Oral Daily    ergocalciferol  50,000 Units Oral Q7 Days    ferrous sulfate  325 mg Oral Daily    iron sucrose (VENOFER) IVPB  100 mg Intravenous Daily    pantoprazole  40 mg Oral Daily    sodium chloride 0.9%  3 mL Intravenous Q8H    traZODone  25 mg Oral QHS    vitamin renal formula (B-complex-vitamin c-folic acid)  1 capsule Oral Daily      DOBUTamine 3  mcg/kg/min (09/11/18 1300)    furosemide (LASIX) 2 mg/mL infusion (non-titrating) 10 mg/hr (09/11/18 1300)    norepinephrine bitartrate-D5W 0.3 mcg/kg/min (09/11/18 1300)     Vitals:    09/11/18 1330   BP:    Pulse: 83   Resp: (!) 30   Temp:      I/O last 3 completed shifts:  In: 851.6 [P.O.:60; I.V.:791.6]  Out: 1033 [Urine:1033]  Physical Exam   Constitutional: She is oriented to person, place, and time. She appears well-developed and well-nourished. No distress.   HENT:   Head: Normocephalic and atraumatic.   Eyes: EOM are normal. No scleral icterus.   Neck: Neck supple. No JVD present.   Cardiovascular: Normal rate and regular rhythm.   No murmur heard.  Pulmonary/Chest: Effort normal. No respiratory distress. She has no wheezes. She has rales.   On 6L O2   Abdominal: Soft. Bowel sounds are normal. She exhibits no distension. There is no tenderness.   Musculoskeletal: She exhibits edema (1+ BLE).   Neurological: She is alert and oriented to person, place, and time.   Skin: Skin is warm and dry. No erythema. No pallor.   Psychiatric: She has a normal mood and affect. Judgment normal.     Laboratory:  Recent Labs   Lab  09/09/18   0409  09/10/18   0347  09/11/18   0530   WBC  8.37  8.48  12.44   HGB  7.8*  8.4*  7.9*   HCT  22.8*  25.8*  23.8*   PLT  212  231  263   MONO  8.4  0.7  6.8  0.6  6.4  0.8     Recent Labs   Lab  09/09/18   0409   09/10/18   0347  09/10/18   1635  09/11/18   0530   NA  126*   < >  127*  125*  126*   K  3.6   < >  4.0  4.5  4.3   CL  83*   < >  84*  82*  82*   CO2  30*   < >  28  29  29   BUN  59*   < >  67*  73*  81*   CREATININE  2.7*   < >  3.0*  3.3*  3.5*   CALCIUM  8.7   < >  9.0  9.0  9.2   PHOS  5.6*   --   5.1*   --   6.0*    < > = values in this interval not displayed.     Labs reviewed  Diagnostic Results:  X-Ray: Reviewed  US: Reviewed  Echo: Reviewed      ASSESSMENT/PLAN:     1. ARF on CKD 3 - baseline cr 1.3-1.5  Cardiorenal   on metolazone 10 + Lasix 5 mg/hour  UO only  1L  Worsening Cr --> increased LAsix to 10 mg/hour, now oliguric      2. Cardiogenic shock due to Acute on Chronic combined  S+D HF, EF 15-20%,  pulm HTN with PA pressure 48 by ECHO  On dobutamine and levophed    3.ANemia of CKD + NEYMAR - started Venofer 100 daily x 7 days    Will start epogen once iron repleted    Recent Labs   Lab  09/09/18   0409  09/10/18   0347  09/11/18   0530   WBC  8.37  8.48  12.44   HGB  7.8*  8.4*  7.9*   HCT  22.8*  25.8*  23.8*   PLT  212  231  263       Lab Results   Component Value Date    IRON 15 (L) 09/08/2018    TIBC 305 08/02/2018    FERRITIN 168 08/02/2018     Results for XAVIER ABEL (MRN [id number]) as of 9/10/2018 22:07   Ref. Range 8/2/2018 09:07   Saturated Iron Latest Ref Range: 20 - 50 % 14 (L)     4. MBD/2HPTH - borderline phos, low phos diet, cont Ergocalciferol 50K  Lab Results   Component Value Date    CALCIUM 9.2 09/11/2018    PHOS 6.0 (H) 09/11/2018     Recent Labs   Lab  09/09/18   1750  09/10/18   0347  09/11/18   0530   MG  2.0  2.0  2.2     Lab Results   Component Value Date    [id number] 22 (L) 09/09/2018     Lab Results   Component Value Date    CO2 29 09/11/2018           5. Hyponatremia, hypervolemic, worsening despite fluid restriction -> max concentrate levophed and allIVPB in NS when possible gave 1 dose tolvaptan yesterday and no improvement, now oliguric    Critical condition, poor prognosis   palliative care consult: spoke myself for ~ 35 minutes with niece, daughter sister and 4 other family members discussing end of life plan. Explained multiorgan failure and critical condition, explained prognosis, niece is the nurse and understands situation very well. All family members present today are in agreement that Ms. Abel wouldn't have wanted to be on HD, have code or be intubated with no good prognosis. Everyone is still waiting for 1 more son who is out of town to say his goodbyes. For now continue everything we are doing but no further escalation af  care and DNR/DNI             Total critical care time 35 minutes: included management of organ failures related to critical illness, titration of continuous infusions, review of pertinent labs and imaging studies, discussion with primary team       Thank you for allowing me to participate in the care of your patients  With any question please call [phone]  Odilia Merino    [facility]  JUAN MIGUEL Juan MD, FACCATRACHITO,   SALVADOR Coker MD,   MD NATACHA Macias, NP  [address]  [place], [address]  [phone]

## 2018-09-11 NOTE — PROGRESS NOTES
[facility]  Cardiology  Progress Note    Patient Name: Natalie Parnell  MRN: [id number]  Admission Date: 9/4/2018  Hospital Length of Stay: 7 days  Code Status: DNR   Attending Physician: Phillip Aponte MD   Primary Care Physician: Octavio Ferrell MD  Expected Discharge Date: 9/11/2018  Principal Problem:Cardiogenic shock    Subjective:     Hospital Course:   9/4/2018 Admitted for ADHF from cardiology clinic. Continued on home Dobutamine and placed on IV Levophed due to hypotension. Creatinine 2.7. Admitted to ICU under care of [facility]  9/5/2018 Remains on IV Dobutamine and Levophed and will change Dobutamine to nontitrating @3mcg/kg/min. On IV Bumex Q8hrs with only 340cc out overnight. BUN 55 creatinine 2.8 total bili 2.4. Troponin .334-.230 and BNP 3014 Lactic acid 2.4. Plans to continue Dobutamine, Levophed and IV Bumex with close monitoring of intake and output. If urine output does not increase and creatinine remains unchanged will consider use of Samsca tomorrow  9/6/2018 Remains on IV Dobutamine and Levophed. Up titration of Levophed overnight due to hypotension. HR up to 140s afib this AM with return to ST with down titration of Levophed. HR currently 104 and SBP . Creatinine 2.9 this AM up slightly from yesterday. Na 126 up from 121. Total bili 1.5. Remains on IV Bumex Q8hrs with 1.8 liters out overnight and negative 146 since admission. Rales remain on PE. Will continue IV Bumex for now. Consult Nephrology   9/7/2018 Reviewed Nephrology recommendations and appreciate assistance. Was on IV Bumex TID with 1215 out overnight and barely net negative. Negative 319cc since admission. Transitioned to IV Lasix drip along with daily Metolazone. Remains in NSR with no recurrent afib with RVR overnight-on IV Amiodarone drip. Will transition to oral Amiodarone. Remains on Levophed and Dobutamine and will continue for now. K+ 5.0 this AM with Mg 1.8 and total bili 1.5. Creatinine 2.6  and essentially unchanged since admission 2.7-2.9  9/8/2018 Overnight no acute events. Had some tachycardia to the 130's, now resolved. Currently in SR this AM and BP stable on pressor and . Started PO Amio last night. Cr stable and nephrology following with net neg 1015 ON. Na 125.   9/9/2018 Overnight no acute events. BP remains low stable. Net neg 899 ON and 2183 total. Labs stable. Cr 2.7, Na 126, TB 1.6  9/10/2018 Remains on IV Dobutamine, Levophed and Lasix drip along with daily Metolazone. 1.0 liters out overnight and negative 2.1 liters since admission. Na 127 K+ 4.0 total bili 1.8. Rales remain on PE with no improvement. Will continue current medical regimen and await Nephrology recs-apprehensive to up titrate IV Lasix due to creatinine   9/11/2018 Creatinine up to 3.5 this AM with total bili up to 2.1. HR 80s-110s SBP 90s-120s. Lethargic with altered mental status today with ABG checked with pO2 63 and pH, pCO2 and HCO3 not grossly abnormal. Further decompensation with multiorgan failure given severe AS and CHF. Family discussion on cards NP rounds as well as family discussion with [facility] staff MD followed by Palliative Care consult in regards to goals of care with decision on DNR and comfort measures only-discharge readmit orders written for Palliative Medicine to assume care upon transfer out of ICU           Review of Systems   Unable to perform ROS: mental status change     Objective:     Vital Signs (Most Recent):  Temp: 98.2 °F (36.8 °C) (09/11/18 1101)  Pulse: 80 (09/11/18 1400)  Resp: 13 (09/11/18 1400)  BP: (!) 104/55 (09/11/18 1400)  SpO2: 95 % (09/11/18 1400) Vital Signs (24h Range):  Temp:  [97.1 °F (36.2 °C)-98.2 °F (36.8 °C)] 98.2 °F (36.8 °C)  Pulse:  [] 80  Resp:  [11-63] 13  SpO2:  [92 %-98 %] 95 %  BP: ()/(45-69) 104/55     Weight: 84 kg (185 lb 3 oz)  Body mass index is 30.82 kg/m².     SpO2: 95 %  O2 Device (Oxygen Therapy): nasal cannula      Intake/Output Summary (Last  24 hours) at 9/11/2018 1440  Last data filed at 9/11/2018 1300  Gross per 24 hour   Intake 478.31 ml   Output 513 ml   Net -34.69 ml       Lines/Drains/Airways     Peripherally Inserted Central Catheter Line                 PICC Double Lumen 08/20/18 0935 right brachial 22 days          Drain                 Urethral Catheter 09/04/18 2236 Latex 6 days          Peripheral Intravenous Line                 Peripheral IV - Single Lumen 09/07/18 1440 Anterior;Left Hand 4 days                Physical Exam   Constitutional: She appears lethargic. She has a sickly appearance. She appears ill.   Cardiovascular:   Murmur heard.  Neurological: She appears lethargic.       Significant Labs:     Recent Labs   Lab  09/11/18   0530   WBC  12.44   RBC  2.94*   HGB  7.9*   HCT  23.8*   PLT  263   MCV  81*   MCH  26.9*   MCHC  33.2     Recent Labs   Lab  09/11/18   0530   CALCIUM  9.2   PROT  6.2   NA  126*   K  4.3   CO2  29   CL  82*   BUN  81*   CREATININE  3.5*   ALKPHOS  178*   ALT  25   AST  50*   BILITOT  2.1*       Significant Imaging: Echocardiogram:   2D echo with color flow doppler:   Results for orders placed or performed during the hospital encounter of 09/04/18   2D echo with color flow doppler   Result Value Ref Range    EF 15 (A) 55 - 65    Mitral Valve Regurgitation MODERATE (A)     Diastolic Dysfunction Yes (A)     Aortic Valve Regurgitation MODERATE (A)     Aortic Valve Stenosis SEVERE (A)     Est. PA Systolic Pressure 47.94 (A)     Mitral Valve Mobility NORMAL     Tricuspid Valve Regurgitation MILD      Assessment and Plan:     Brief HPI: Seen on AM NP rounds with family in the waiting room. Patient lethargic and difficult to arouse. Seen on rounds again with staff cardiologist with discussion of goals of care with decision for DNR with comfort measure. Palliative care consult     Assessment:    * Cardiogenic shock   Hypotension   Paroxysmal atrial fibrillation   Acute renal failure superimposed on stage 3  chronic kidney disease   Hyponatremia   Acute on chronic systolic congestive heart failure   Coronary artery disease involving native coronary artery of native heart without angina pectoris   Nonrheumatic aortic valve stenosis        Plan:    Severe decompensated HF related to AS and systolic heart failure with no major improvement with Dobutamine and IV Lasix. Multiorgan failure worsening and currently in heart, renal and liver failure. Long discussion with family by staff cardiologists and Palliative care in regards to overall poor prognosis with decision for DNR and comfort measures. Will initiate comfort measures and transfer to floor for continued Palliative comfort measures    VTE Risk Mitigation (From admission, onward)        Ordered     Place CORWIN hose  Until discontinued      09/10/18 0832     Place sequential compression device  Until discontinued      09/10/18 0832     Place CORWIN hose  Until discontinued      09/04/18 1736     Place sequential compression device  Until discontinued      09/04/18 1736     IP VTE HIGH RISK PATIENT  Once      09/04/18 1736          BIANKA Booker, ANP  Cardiology  [facility]

## 2018-09-11 NOTE — PT/OT/SLP DISCHARGE
Physical Therapy Discharge Summary    Name: Natalie Parnell  MRN: [id number]   Principal Problem: Cardiogenic shock     Patient Discharged from acute Physical Therapy on 2018.  Please refer to prior PT noted date on 8/10/2018 for functional status.     Assessment:     Patient appropriate for care in another setting.    Objective:     GOALS:   Multidisciplinary Problems     Physical Therapy Goals        Problem: Physical Therapy Goal    Goal Priority Disciplines Outcome Goal Variances Interventions   Physical Therapy Goal     PT, PT/OT Ongoing (interventions implemented as appropriate)     Description:  Goals to be met by: 10/10/2018     Patient will increase functional independence with mobility by performin. Supine to sit with Modified Miner  2. Sit to supine with Modified Miner  3. Sit to stand transfer with Supervision and Stand-by Assistance  4. Gait  x 75 feet with Supervision and Stand-by Assistance with RW  5. Ascend/descend 7 stairs with bilateral Handrails Stand-by Assistance   6. Lower extremity exercise program x10 reps with supervision                      Reasons for Discontinuation of Therapy Services  Transfer to alternate level of care.      Plan:     Patient Discharged to: Palliative Care/Hospice.    Shala Ponce, PT  2018

## 2018-09-11 NOTE — HPI
Ms. Parnell 84 y/o Yi speaking female was admitted on 9/4/2018. PMHx of severe AS (SHEA 0.69 cm2, AVAi 0.37 cm2/m2, peak aortic jet velocity 4.3 m/s,MG 50 mmHg, EF 20-25%) s/p recent BAV, HFrEF (EF previously 50-55%, now 20-25%), LE DVT previously on Xarelto, DM, hx of GIB, possible liver cirrhosis who presents to the ER with complaints of SOB, abdominal and LE swelling. She was seen by Dr. Diaz in the clinic yesterday and was sent to the ER for admission due to ADHF.   Last clinic visit was seen after hospitalization for GIB with acute blood loss anemia requiring PRBC transfusion. Trop elevated and peaked at >7. 2DE with normal EF and no WMA's. Was discharged and hospitalized again for ADHF. Diuresed, [facility] with nonobstructive CAD, improved, and discharged home.   Had recent admission to [facility] for ADHF, found to be in cardiogenic shock, newly depressed EF, IV diuresis, BAV with improvement in MG and symptoms and discharged home. A couple of days later came back due to not using diuretics (had ARF) and volume overloaded. Diuresed, improved and discharged home on Bumex. Was seen by Dr Garcia as outpatient as was doing OK. Since then she has gained weight, had worsening bilateral leg edema, SOB, despite increased Bumex doses to 2 mg TID. Also complaining of bilateral flank pain and weakness. Has to sleep propped up. Denies PND, syncope, LE edema. Compliant with meds. Weight gain 172-176. SBP in clinic 67. Has continuous infusion  via right arm PICC line    Palliative Medicine met with family to discuss GOC. Family consensus to move towards comfort care. Patient wishes not to be artificially kept alive. Patient unstable for transfer to home for home hospice. [facility] was consulted for inpatient hospice services. All questions and concerns addressed.     All drips (Levophed, Dobutamine, and Lasix) turned off per family request and started on morphine for pain and ativan for anxiety. Family by  bedside and emotional comfort provided.   Patient progressively became braadycardic with HR in the 20s. Called to the room with patient with no spontaneous respiration, no heart sound. Patient rhythm was asystole. Patient pronounced dead on 9/11/2018 @ 1912

## 2018-09-11 NOTE — PLAN OF CARE
Problem: Patient Care Overview  Goal: Plan of Care Review  Outcome: Revised  Pt VSS for night. Afebrile. Pt complained of nausea with no vomiting,at beginning of shift, relieved by gas reliever. Pt slept well through night. PICC dressing CDI no blood return from either port, but flushes fine. Pt still stable on drips at current rates. Urinary output decreased. MD aware. Will continue to monitor.

## 2018-09-11 NOTE — PLAN OF CARE
TN reviewed patient's clinicals. Palliative Care consulted. MD team speaking with family at this time. TN will follow-up.     Future Appointments   Date Time Provider Department Center   10/5/2018  1:40 PM Scott Pruett MD [facility] RENETTA Quinonez   10/9/2018 10:20 AM Octavio Ferrell MD [facility]        09/11/18 1144   Discharge Reassessment   Assessment Type Discharge Planning Reassessment   Provided patient/caregiver education on the expected discharge date and the discharge plan No   Discharge Plan A Other  (To Be Determined)   Discharge Plan B Other  (To Be Determined)     Jaleesa Irvin RN  Transition Navigator  [phone]

## 2018-09-11 NOTE — PT/OT/SLP PROGRESS
Speech Language Pathology  Attempted Visit and Discharge from ST Services    Natalie Parnell  MRN: [id number]    Patient not seen today secondary to pt somnolent and not appropriate to participate in skilled ST session. SLP will discontinue ST orders, as pt is being discharged to inpatient hospice care.     MAXX Mendoza, CCC-SLP  9/11/2018

## 2018-09-11 NOTE — CONSULTS
Consult Note  Palliative Care      Consult Requested By: Phillip Aponte MD  Reason for Consult: Goals of Care    Thank you for the consult and the opportunity to participate in Ms. Parnell's care.         SUBJECTIVE:     History of Present Illness:  Disease Process: Advanced Cardiac Disease    Ms. Parnell 84 y/o [place] speaking female was admitted on 9/4/2018. PMHx of severe AS (SHEA 0.69 cm2, AVAi 0.37 cm2/m2, peak aortic jet velocity 4.3 m/s,MG 50 mmHg, EF 20-25%) s/p recent BAV, HFrEF (EF previously 50-55%, now 20-25%), LE DVT previously on Xarelto, DM, hx of GIB, possible liver cirrhosis who presents to the ER with complaints of SOB, abdominal and LE swelling. She was seen by Dr. Diaz in the clinic yesterday and was sent to the ER for admission due to ADHF.   Last clinic visit was seen after hospitalization for GIB with acute blood loss anemia requiring PRBC transfusion. Trop elevated and peaked at >7. 2DE with normal EF and no WMA's. Was discharged and hospitalized again for ADHF. Diuresed, [facility] with nonobstructive CAD, improved, and discharged home.   Had recent admission to [facility] for ADHF, found to be in cardiogenic shock, newly depressed EF, IV diuresis, BAV with improvement in MG and symptoms and discharged home. A couple of days later came back due to not using diuretics (had ARF) and volume overloaded. Diuresed, improved and discharged home on Bumex. Was seen by Dr Garcia as outpatient as was doing OK. Since then she has gained weight, had worsening bilateral leg edema, SOB, despite increased Bumex doses to 2 mg TID. Also complaining of bilateral flank pain and weakness. Has to sleep propped up. Denies PND, syncope, LE edema. Compliant with meds. Weight gain 172-176. SBP in clinic 67. Has continuous infusion  via right arm PICC line.     Palliative Medicine met family to discuss GOC. Family verbalized understanding of Ms. Parnell's current diagnosis and prognosis. Patient is DNR. Palliative  "Medicine educated family on comfort care and hospice services. Family stated, "My mama did not want to be on machines."  It was the family consensus that patient be placed on comfort care. All questions and concerns addressed. Patient is unstable to be transferred to another facility for comfort care. Will consult [facility] for inpatient hospice flip.     Kriss Hill NP for cardiology notified of family decision of comfort care. Case management notified.     Past Medical History:   Diagnosis Date    Asthma     Diabetes mellitus     HTN (hypertension)     Hyperlipidemia      Past Surgical History:   Procedure Laterality Date    CATARACT EXTRACTION Bilateral     Dr. Max    COLON SURGERY      EGD (ESOPHAGOGASTRODUODENOSCOPY) N/A 7/10/2018    Performed by Derian Stoner MD at [facility] ENDO    ESOPHAGOGASTRODUODENOSCOPY N/A 7/10/2018    Procedure: EGD (ESOPHAGOGASTRODUODENOSCOPY);  Surgeon: Derian Stoner MD;  Location: [facility];  Service: Endoscopy;  Laterality: N/A;    ESOPHAGOGASTRODUODENOSCOPY N/A 8/16/2018    Procedure: ESOPHAGOGASTRODUODENOSCOPY (EGD);  Surgeon: Moy Varma MD;  Location: [facility] ([address] [place] [address]);  Service: Endoscopy;  Laterality: N/A;    ESOPHAGOGASTRODUODENOSCOPY (EGD) N/A 8/16/2018    Performed by Moy Varma MD at [facility] ([address] [place] [address])    HERNIA REPAIR      PERCUTANEOUS TRANSLUMINAL BALLOON ANGIOPLASTY OF AORTA N/A 7/30/2018    Procedure: PTA, AORTA, USING BALLOON;  Surgeon: Scott Pruett MD;  Location: [facility] CATH LAB;  Service: Cardiology;  Laterality: N/A;    PTA, AORTA, USING BALLOON N/A 7/30/2018    Performed by Scott [facility]ruett MD at [facility] CATH LAB    VARICOSE VEIN SURGERY       No family history on file.  Social History     Tobacco Use    Smoking status: Never Smoker    Smokeless tobacco: Never Used   Substance Use Topics    Alcohol use: No    Drug use: No       Mental Status: Non-responsive    ECOG Performance Status Grade: 4 - Completely " disabled    Review of Systems:  Review of systems not obtained due to patient factors unreponsive.    OBJECTIVE:     Pain Assessment: No pain reported at this time    Decision-Making Capacity: Family answered questions, Patient unable to communicate due to disease severity/cognitive impairment    Advanced Directives:  Living Will: No  Do Not Resuscitate Status: Yes  Medical Power of : No  Registered Organ Donor: No    Living Arrangements: Lives with family        ASSESSMENT/PLAN:     Recommendations:  Comfort Care  Code status: DNR  Inpatient Hospice Consult with [facility].  Spiritual consult  Palliative care will continue to assist patient and family with the goals of care.     Palliative care will continue to follow.     Thank you for the consult and the opportunity to participate in Ms. Parnell's care.       Leatha Berry, MSN, APRN, NP-C   Palliative Medicine   [facility]  [phone] or [phone]        >50% of  90  min visit spent in chart review, face to face discussion of goals of care with patient, family, symptom assessment, coordination of care and emotional support.

## 2018-09-11 NOTE — PT/OT/SLP DISCHARGE
Occupational Therapy Discharge Summary    Natalie Parnell  MRN: [id number]   Principal Problem: Cardiogenic shock      Patient Discharged from acute Occupational Therapy on 9/11/18.  Please refer to prior OT note dated 9/10/18 for functional status.    Assessment:      Patient appropriate for care in another setting.    Objective:     GOALS:   Multidisciplinary Problems     Occupational Therapy Goals        Problem: Occupational Therapy Goal    Goal Priority Disciplines Outcome Interventions   Occupational Therapy Goal     OT, PT/OT Ongoing (interventions implemented as appropriate)    Description:  Goals to be met by: 10/10/18     Patient will increase functional independence with ADLs by performing:    UE Dressing with Supervision.  LE Dressing with Stand-by Assistance.  Grooming while standing at sink with Supervision.  Toileting from toilet with Supervision for hygiene and clothing management.   Toilet transfer to toilet with Supervision.  Increased functional strength to WFL for ADLs.                      Reasons for Discontinuation of Therapy Services  Transfer to alternate level of care.      Plan:     Patient Discharged to: inpatient hospice care.     Jeanine Cochran, OT  9/11/2018

## 2018-09-11 NOTE — NURSING
Spoke with patient and family regarding code status. Suggested to wait till tomorrow during the day to have a discussion with Dr. Aponte who can answer all medical related questions in order to make decision about code status. Will consult Dr. Baker for palliative/hospice care. Informed family and patient that Dr. Baker can also answer questions they may have. Reassured patient and family that patient is stable at this time and can wait till all family is present to have discussion with doctors. Patient states she is waiting on family to arrive from [place] tomorrow and would like them present in the discussion. All questions answered at this time. Patient and family agreed to postpone code status discussion/change until tomorrow. ALDO Mckeon at bedside and updated on discussion.

## 2018-09-11 NOTE — PLAN OF CARE
Problem: Patient Care Overview  Goal: Plan of Care Review  Outcome: Revised   09/11/18 1752   Coping/Psychosocial   Plan Of Care Reviewed With patient;family     Pt transitioned to comfort measures following family decision to make patient a DNR. NP to arrive to bedside to re discuss goals following family's desire to leave drips on and decrease slowly.Awaiting MARIANNE [place]'s arrival to the bedside for order clarification. All safety and comfort measures ensured. RN to the bedside to monitor.     Problem: Dying Patient, Actively (Adult)  Goal: Identify Related Risk Factors and Signs and Symptoms  Related risk factors and signs and symptoms are identified upon initiation of Human Response Clinical Practice Guideline (CPG)  Outcome: Revised   09/11/18 1752   Dying Patient, Actively   Related Risk Factors (Actively Dying Patient) dyspnea   Signs and Symptoms (Actively Dying Patient) agitation/restlessness;decreased attention span;awareness reduced;frequently disoriented     Goal: Comfort/Pain Control  Patient will demonstrate the desired outcomes by discharge/transition of care.  Outcome: Revised   09/11/18 1752   Dying Patient, Actively (Adult)   Comfort/Pain Control making progress toward outcome     Goal: Dying Process, Peace and Dignity  Patient will demonstrate the desired outcomes by discharge/transition of care.  Outcome: Revised   09/11/18 1752   Dying Patient, Actively (Adult)   Dying Process, Peace and Dignity making progress toward outcome

## 2018-09-11 NOTE — PLAN OF CARE
Spoke with AMBIKA Monterroso regarding patient's mental status. Pt more lethargic, only able to state her name but moves all extremities with no facial droop or pupillary changes. NP will round on patient and order ABG and lactic acid. ALDO Peters updated on plan of care.  Sofia Raphael RN

## 2018-09-11 NOTE — PLAN OF CARE
Problem: Patient Care Overview  Goal: Plan of Care Review  Outcome: Ongoing (interventions implemented as appropriate)  Pt is AAOX4. VSS. NSR. Pt has maintained a map>65 while on .40 of Levo. HR has been stable while on dobutamine. While on lasix the urine output has been 270ml the day. Pt worked with speech today and it was recommended that she have her medications crushed in apple sauce. Pt has been up in the chair for most of the day and worked with PT/OT. Family has been at the bedside. Safety maintained.

## 2018-09-11 NOTE — PLAN OF CARE
TN received call from Leatha with Palliative Care today. Patient will be placed on comfort care. She will transition to inpatient hospice with [facility]. TN and Palliative Care have already contacted [facility]. (Alexa), to work on a contract.     Future Appointments   Date Time Provider Department Center   10/5/2018  1:40 PM Scott Pruett MD [facility] RENETTA Quinonez   10/9/2018 10:20 AM Octavio Ferrell MD [facility]        09/11/18 1510   Final Note   Assessment Type Final Discharge Note   Discharge Disposition [facility]     Jaleesa Irvin RN  Transition Navigator  [phone]

## 2018-09-11 NOTE — H&P
[facility]  Palliative Medicine  Progress Note    Patient Name: Natalie Parnell  MRN: [id number]  Admission Date: 9/11/2018  Hospital Length of Stay: 0 days  Code Status: DNR   Attending Provider: Lisa Chew*  Consulting Provider: Leatha Berry NP  Primary Care Physician: Octavio Ferrell MD  Principal Problem:Heart failure    Patient information was obtained from relative(s), past medical records and ER records.      Assessment/Plan:     No new Assessment & Plan notes have been filed under this hospital service since the last note was generated.  Service: Palliative Medicine          Subjective:     Chief Complaint: No chief complaint on file.      HPI:   Ms. Parnell 82 y/o Korean speaking female was admitted on 9/4/2018. PMHx of severe AS (SHEA 0.69 cm2, AVAi 0.37 cm2/m2, peak aortic jet velocity 4.3 m/s,MG 50 mmHg, EF 20-25%) s/p recent BAV, HFrEF (EF previously 50-55%, now 20-25%), LE DVT previously on Xarelto, DM, hx of GIB, possible liver cirrhosis who presents to the ER with complaints of SOB, abdominal and LE swelling. She was seen by Dr. Diaz in the clinic yesterday and was sent to the ER for admission due to ADHF.   Last clinic visit was seen after hospitalization for GIB with acute blood loss anemia requiring PRBC transfusion. Trop elevated and peaked at >7. 2DE with normal EF and no WMA's. Was discharged and hospitalized again for ADHF. Diuresed, [facility] with nonobstructive CAD, improved, and discharged home.   Had recent admission to [facility] for ADHF, found to be in cardiogenic shock, newly depressed EF, IV diuresis, BAV with improvement in MG and symptoms and discharged home. A couple of days later came back due to not using diuretics (had ARF) and volume overloaded. Diuresed, improved and discharged home on Bumex. Was seen by Dr Garcia as outpatient as was doing OK. Since then she has gained weight, had worsening bilateral leg edema, SOB, despite increased Bumex  doses to 2 mg TID. Also complaining of bilateral flank pain and weakness. Has to sleep propped up. Denies PND, syncope, LE edema. Compliant with meds. Weight gain 172-176. SBP in clinic 67. Has continuous infusion  via right arm PICC line    Palliative Medicine met with family to discuss GOC. Family consensus to move towards comfort care. Patient wishes not to be artificially kept alive. Patient unstable for transfer to home for home hospice. [facility] was consulted for inpatient hospice services. All questions and concerns addressed.     Hospital Course:  No notes on file        Medications:  Continuous Infusions:  Scheduled Meds:  PRN Meds:LORazepam, morphine, morphine    Objective:     Vital Signs (Most Recent):   ..p Vital Signs (24h Range):  Temp:  [97.1 °F (36.2 °C)-98.2 °F (36.8 °C)] 98.2 °F (36.8 °C)  Pulse:  [] 123  Resp:  [11-63] 41  SpO2:  [88 %-98 %] 89 %  BP: ()/(45-69) 93/56        There is no height or weight on file to calculate BMI.    Review of Symptoms  Symptom Assessment (ESAS 0-10 scale)  ESAS 0 1 2 3 4 5 6 7 8 9 10   Pain              Dyspnea              Anxiety              Nausea              Depression               Anorexia              Fatigue              Insomnia              Restlessness               Agitation              CAM / Delirium __ --  ___+   Constipation     __ --  ___+   Diarrhea           __ --  ___+  Bowel Management Plan (BMP): Yes        Pain Assessment: Yes; Morphine given    Physical Exam  Constitutional: She appears lethargic. She has a sickly appearance. She appears ill.   Cardiovascular:   Murmur heard.  Neurological: She appears lethargic.           Performance Status: 20    ECOG Performance Status Grade: 4 - Completely disabled      Significant Labs: All pertinent labs within the past 24 hours have been reviewed.  CBC:   Recent Labs   Lab  09/11/18   0530   WBC  12.44   HGB  7.9*   HCT  23.8*   MCV  81*   PLT  263     BMP:  Recent Labs   Lab   09/11/18   0530   GLU  244*   NA  126*   K  4.3   CL  82*   CO2  29   BUN  81*   CREATININE  3.5*   CALCIUM  9.2   MG  2.2     LFT:  Lab Results   Component Value Date    AST 50 (H) 09/11/2018    ALKPHOS 178 (H) 09/11/2018    BILITOT 2.1 (H) 09/11/2018     Albumin:   Albumin   Date Value Ref Range Status   09/11/2018 2.5 (L) 3.5 - 5.2 g/dL Final     Protein:   Total Protein   Date Value Ref Range Status   09/11/2018 6.2 6.0 - 8.4 g/dL Final     Lactic acid:   Lab Results   Component Value Date    LACTATE 1.6 09/11/2018    LACTATE 2.4 (H) 09/04/2018       Significant Imaging: I have reviewed all pertinent imaging results/findings within the past 24 hours.    Advanced Directives::  Living Will: No  [place]: Yes  Do Not Resuscitate Status: Yes  Medical Power of : No    Decision-Making Capacity: Family answered questions, Patient unable to communicate due to disease severity/cognitive impairment    Living Arrangements: Lives with family          > 50% of 60 min visit spent in chart review, face to face discussion of goals of care,  symptom assessment, coordination of care and emotional support.    Leatha Berry NP  Palliative Medicine  [facility]  [phone]

## 2018-09-12 NOTE — PLAN OF CARE
09/12/18 0821   Final Note   Assessment Type Final Discharge Note   Discharge Disposition Hospice     Jaleesa Irvin RN  Transition Navigator  [phone]

## 2018-09-12 NOTE — PHYSICIAN QUERY
PT Name: Natalie Parnell  MR #: [id number]    Physician Query Form - CardioPulmonary Clarification      CDS/: Estefanía Cunningham               Contact information:  [email or web address]      This form is a permanent document in the medical record.    Query Date: September 12, 2018    By submitting this query, we are merely seeking further clarification of documentation. Please utilize your independent clinical judgment when addressing the question(s) below.    The Medical record contains the following:   Indicators   Supporting Clinical Findings Location in Medical Record   x Pulmonary Hypertension documented pulm HTN with PA pressure 48 by ECHO     Nephrology notes    x Acute/Chronic Illness ARF on CKD 3 - baseline cr 1.3-1.5  * Cardiogenic shock  Acute on chronic systolic congestive heart failure   Nephrology Notes   Cardiology note 9/10    x Echo and/or Heart Cath Findings 2D echo with color flow doppler  Result Value Ref Range   EF 15 (A) 55 - 65   Mitral Valve Regurgitation MODERATE (A)     Diastolic Dysfunction Yes (A)     Aortic Valve Regurgitation MODERATE (A)     Aortic Valve Stenosis SEVERE (A)     Est. PA Systolic Pressure 47.94 (A)     Mitral Valve Mobility NORMAL     Tricuspid Valve Regurgitation MILD        Cardiology Note 9/10     BiPAP/Intubation/Supplemental O2      SOB, DURHAM, Fatigue, Dizziness, LE Edema, Cyanosis, Chest Pain, Respiratory Distress, Hypoxia, etc.      Treatment         Medication      Other     Provider, please specify the type of pulmonary hypertension:    [ x  ]  Group 2:  Pulmonary Hypertension due to Left Heart Disease, including left heart failure and/or left heart valve disease    [   ]  Group 5:  Pulmonary Hypertension due to other, multifactorial, or unclear mechanisms    [   ]  Pulmonary Hypertension, unspecified    [   ]  Other Cardiopulmonary Condition (please specify):  _____________________________________    [   ]  Clinically Undetermined    Please document in your  progress notes daily for the duration of treatment, until resolved, and include in your discharge summary.

## 2018-09-12 NOTE — PROGRESS NOTES
[facility]  Palliative Medicine  Discharge Summary  Patient Name: Natalie Parnell  MRN: [id number]  Admission Date: 9/11/2018  Hospital Length of Stay: 0 days  Code Status: DNR   Attending Provider: Lisa Chew*  Consulting Provider: Lisa Chew MD  Primary Care Physician: Octavio Ferrell MD  Principal Problem:Heart failure    Patient information was obtained from relative(s) and ER records.      Assessment/Plan:     No new Assessment & Plan notes have been filed under this hospital service since the last note was generated.  Service: Palliative Medicine      none    Subjective:     Chief Complaint: No chief complaint on file.      HPI:   Ms. Parnell 84 y/o Greenlandic speaking female was admitted on 9/4/2018. PMHx of severe AS (SHEA 0.69 cm2, AVAi 0.37 cm2/m2, peak aortic jet velocity 4.3 m/s,MG 50 mmHg, EF 20-25%) s/p recent BAV, HFrEF (EF previously 50-55%, now 20-25%), LE DVT previously on Xarelto, DM, hx of GIB, possible liver cirrhosis who presents to the ER with complaints of SOB, abdominal and LE swelling. She was seen by Dr. Diaz in the clinic yesterday and was sent to the ER for admission due to ADHF.   Last clinic visit was seen after hospitalization for GIB with acute blood loss anemia requiring PRBC transfusion. Trop elevated and peaked at >7. 2DE with normal EF and no WMA's. Was discharged and hospitalized again for ADHF. Aileen, [facility] with nonobstructive CAD, improved, and discharged home.   Had recent admission to [facility] for ADHF, found to be in cardiogenic shock, newly depressed EF, IV diuresis, BAV with improvement in MG and symptoms and discharged home. A couple of days later came back due to not using diuretics (had ARF) and volume overloaded. Diuresed, improved and discharged home on Bumex. Was seen by Dr Garcia as outpatient as was doing OK. Since then she has gained weight, had worsening bilateral leg edema, SOB, despite increased Bumex doses to  2 mg TID. Also complaining of bilateral flank pain and weakness. Has to sleep propped up. Denies PND, syncope, LE edema. Compliant with meds. Weight gain 172-176. SBP in clinic 67. Has continuous infusion  via right arm PICC line    Palliative Medicine met with family to discuss GOC. Family consensus to move towards comfort care. Patient wishes not to be artificially kept alive. Patient unstable for transfer to home for home hospice. [facility] was consulted for inpatient hospice services. All questions and concerns addressed.     All drips (Levophed, Dobutamine, and Lasix) turned off per family request and started on morphine for pain and ativan for anxiety. Family by bedside and emotional comfort provided.   Patient progressively became braadycardic with HR in the 20s. Called to the room with patient with no spontaneous respiration, no heart sound. Patient rhythm was asystole. Patient pronounced dead on 9/11/2018 @ 1912        Hospital Course:  No notes on file    No new subjective & objective note has been filed under this hospital service since the last note was generated.      > 50% of 45 min visit spent in chart review, face to face discussion of goals of care,  symptom assessment, coordination of care and emotional support.    Lisa Chew MD  Palliative Medicine  [facility]

## 2018-09-12 NOTE — PLAN OF CARE
(1855) Report received from off going nurse, patient care assued.  (1912) Asystole verified in 3 leads, Dr. Baker with Palliative care notified via telephone. (1925) Dr. Baker on the unit speaking with immediate family.  (1956) [facility] nurse Reena notified via telephone of patient death.  (2300) Immediate family members completed final goodbyes.  (2330) Post mortem care completed, security paged for transport of patient remains.

## 2018-09-12 NOTE — DISCHARGE SUMMARY
[facility]  Cardiology  Discharge Summary      Patient Name: Natalie Parnell  MRN: [id number]  Admission Date: 9/4/2018  Hospital Length of Stay: 7 days  Discharge Date and Time: 9/11/2018  4:28 PM  Attending Physician: Yoanna att. providers found  Discharging Provider: BIANKA Booker, ANP  Primary Care Physician: Octavio Ferrell MD    HPI: 82 y/o Kiswahili speaking female with hx of severe AS (SHEA 0.69 cm2, AVAi 0.37 cm2/m2, peak aortic jet velocity 4.3 m/s,MG 50 mmHg, EF 20-25%) s/p recent BAV, HFrEF (EF previously 50-55%, now 20-25%), LE DVT previously on Xarelto, DM, hx of GIB, possible liver cirrhosis who presents to the ER with complaints of SOB, abdominal and LE swelling. She was seen by Dr. Diaz in the clinic yesterday and was sent to the ER for admission due to ADHF.   Last clinic visit was seen after hospitalization for GIB with acute blood loss anemia requiring PRBC transfusion. Trop elevated and peaked at >7. 2DE with normal EF and no WMA's. Was discharged and hospitalized again for ADHF. Diuresed, [facility] with nonobstructive CAD, improved, and discharged home.   Had recent admission to [place] [facility] for ADHF, found to be in cardiogenic shock, newly depressed EF, IV diuresis, BAV with improvement in MG and symptoms and discharged home. A couple of days later came back due to not using diuretics (had ARF) and volume overloaded. Diuresed, improved and discharged home on Bumex. Was seen by Dr Garcia as outpatient as was doing OK. Since then she has gained weight, had worsening bilateral leg edema, SOB, despite increased Bumex doses to 2 mg TID. Also complaining of bilateral flank pain and weakness. Has to sleep propped up. Denies PND, syncope, LE edema. Compliant with meds. Weight gain 172-176. SBP in clinic 67. Has continuous infusion  via right arm PICC line.        * No surgery found *     Indwelling Lines/Drains at time of discharge:  Lines/Drains/Airways     Peripherally  Inserted Central Catheter Line                 PICC Double Lumen 08/20/18 0935 right brachial 23 days          Drain                 Urethral Catheter 09/04/18 2236 Latex 7 days                Hospital Course     9/4/2018 Admitted for ADHF from cardiology clinic. Continued on home Dobutamine and placed on IV Levophed due to hypotension. Creatinine 2.7. Admitted to ICU under care of [facility]  9/5/2018 Remains on IV Dobutamine and Levophed and will change Dobutamine to nontitrating @3mcg/kg/min. On IV Bumex Q8hrs with only 340cc out overnight. BUN 55 creatinine 2.8 total bili 2.4. Troponin .334-.230 and BNP 3014 Lactic acid 2.4. Plans to continue Dobutamine, Levophed and IV Bumex with close monitoring of intake and output. If urine output does not increase and creatinine remains unchanged will consider use of Samsca tomorrow  9/6/2018 Remains on IV Dobutamine and Levophed. Up titration of Levophed overnight due to hypotension. HR up to 140s afib this AM with return to ST with down titration of Levophed. HR currently 104 and SBP . Creatinine 2.9 this AM up slightly from yesterday. Na 126 up from 121. Total bili 1.5. Remains on IV Bumex Q8hrs with 1.8 liters out overnight and negative 146 since admission. Rales remain on PE. Will continue IV Bumex for now. Consult Nephrology   9/7/2018 Reviewed Nephrology recommendations and appreciate assistance. Was on IV Bumex TID with 1215 out overnight and barely net negative. Negative 319cc since admission. Transitioned to IV Lasix drip along with daily Metolazone. Remains in NSR with no recurrent afib with RVR overnight-on IV Amiodarone drip. Will transition to oral Amiodarone. Remains on Levophed and Dobutamine and will continue for now. K+ 5.0 this AM with Mg 1.8 and total bili 1.5. Creatinine 2.6 and essentially unchanged since admission 2.7-2.9  9/8/2018 Overnight no acute events. Had some tachycardia to the 130's, now resolved. Currently in SR this AM and BP  stable on pressor and . Started PO Amio last night. Cr stable and nephrology following with net neg 1015 ON. Na 125.   9/9/2018 Overnight no acute events. BP remains low stable. Net neg 899 ON and 2183 total. Labs stable. Cr 2.7, Na 126, TB 1.6  9/10/2018 Remains on IV Dobutamine, Levophed and Lasix drip along with daily Metolazone. 1.0 liters out overnight and negative 2.1 liters since admission. Na 127 K+ 4.0 total bili 1.8. Rales remain on PE with no improvement. Will continue current medical regimen and await Nephrology recs-apprehensive to up titrate IV Lasix due to creatinine   9/11/2018 Creatinine up to 3.5 this AM with total bili up to 2.1. HR 80s-110s SBP 90s-120s. Lethargic with altered mental status today with ABG checked with pO2 63 and pH, pCO2 and HCO3 not grossly abnormal. Further decompensation with multiorgan failure given severe AS and CHF. Family discussion on cards NP rounds as well as family discussion with [facility] staff MD followed by Palliative Care consult in regards to goals of care with decision on DNR and comfort measures only-discharge readmit orders written for Palliative Medicine to assume care upon transfer out of ICU           Consults:   Consults (From admission, onward)        Status Ordering Provider     Inpatient consult to [facility])  Once     Provider:  Oneal Juan MD    Completed PILAR HURT     Inpatient consult to Palliative Care  Once     Provider:  Lisa Chew MD    Completed PILAR HURT          Significant Diagnostic Studies: Cardiac Graphics: Echocardiogram:   2D echo with color flow doppler:   Results for orders placed or performed during the hospital encounter of 09/04/18   2D echo with color flow doppler   Result Value Ref Range    EF 15 (A) 55 - 65    Mitral Valve Regurgitation MODERATE (A)     Diastolic Dysfunction Yes (A)     Aortic Valve Regurgitation MODERATE (A)     Aortic Valve Stenosis  SEVERE (A)     Est. PA Systolic Pressure 47.94 (A)     Mitral Valve Mobility NORMAL     Tricuspid Valve Regurgitation MILD        Pending Diagnostic Studies:     Procedure Component Value Units Date/Time    Comprehensive metabolic panel - if not done in ED [[id number]] Collected:  09/07/18 0445    Order Status:  Sent Lab Status:  No result     Specimen:  Blood     Magnesium - if not done in ED [[id number]] Collected:  09/07/18 0445    Order Status:  Sent Lab Status:  No result     Specimen:  Blood     Phosphorus - if not done in ED [[id number]] Collected:  09/07/18 0445    Order Status:  Sent Lab Status:  No result     Specimen:  Blood           Final Active Diagnoses:      Problems Resolved During this Admission:    Diagnosis Date Noted Date Resolved POA    PRINCIPAL PROBLEM:  Cardiogenic shock [R57.0] 08/18/2018 09/11/2018 Yes    Palliative care encounter [Z51.5] 09/11/2018 09/11/2018 Not Applicable    Goals of care, counseling/discussion [Z71.89] 09/11/2018 09/11/2018 Not Applicable    Counseling regarding advanced care planning and goals of care [Z71.89] 09/11/2018 09/11/2018 Not Applicable    Hypotension [I95.9] 09/10/2018 09/11/2018 Yes    Total bilirubin, elevated [R17] 09/10/2018 09/11/2018 Yes    Paroxysmal atrial fibrillation [I48.0] 09/06/2018 09/11/2018 Yes    Acute renal failure superimposed on stage 3 chronic kidney disease [N17.9, N18.3] 08/18/2018 09/11/2018 Yes    Hyponatremia [E87.1] 08/18/2018 09/11/2018 Yes    Acute on chronic systolic congestive heart failure [I50.23] 08/03/2018 09/11/2018 Yes    Coronary artery disease involving native coronary artery of native heart without angina pectoris [I25.10] 07/24/2018 09/11/2018 Yes    Nonrheumatic aortic valve stenosis [I35.0] 03/02/2018 09/11/2018 Yes    Type 2 diabetes mellitus, without long-term current use of insulin [E11.9] 10/08/2014 09/11/2018 Yes       Discharged Condition: poor    Follow Up:    Patient Instructions:   No discharge  procedures on file.  Medications:  Transfer Medications (for Discharge Readmit only):   No current facility-administered medications for this encounter.      No current outpatient medications on file.       Time spent on the discharge of patient: 45 minutes    BIANKA Booker, ANP  Cardiology  [facility]

## 2018-09-12 NOTE — PHYSICIAN QUERY
PT Name: Natalie Parnell  MR #: [id number]    Physician Query Form - Emergency Medicine Diagnosis Clarification     CDS/: Estefanía Cunningham               Contact information: [email or web address]    This form is a permanent document in the medical record.     Query Date: September 12, 2018    By submitting this query, we are merely seeking further clarification of documentation.  Please utilize your independent clinical judgment when addressing the question(s) below.      The Medical record contains the following:     Diagnosis Supporting Clinical Information Location in Medical Record   NSTEMI type 2  <> Summary of Lab:  Initial troponin was positive without EKG changes concerning for STEMI suspect type 2 NSTEMI given patient's recent history.  Will refrain from aspirin as patient has had a history of upper GI bleed requiring transfusion recently.  Will obtain 2nd troponin    Troponin 0.334 - 0.230  ED provider note               Labs 9/4     Do you agree with the Emergency Medicine diagnosis of __NSTEMI type 2 ___?    [ x ] Yes  [  ] Yes, but it resolved prior to my assessment of the patient  [  ] No  [  ] Clinically Insignificant  [  ] Other/Clarification of Findings:_________________________________  [  ] Clinically Undetermined    Please document in your progress notes daily for the duration of treatment until resolved and include in your discharge summary.

## 2018-09-12 NOTE — PHYSICIAN QUERY
PT Name: Natalie Parnell  MR #: [id number]     Physician Query Form - Documentation Clarification      CDS/: Estefaína Cunningham               Contact information: [email or web address]      This form is a permanent document in the medical record.     Query Date: September 12, 2018    By submitting this query, we are merely seeking further clarification of documentation. Please utilize your independent clinical judgment when addressing the question(s) below.    The Medical record reflects the following:    Supporting Clinical Findings Location in Medical Record     Multiorgan failure worsening and currently in heart, renal and liver failure    ALK Phos 178 - 134  Total Bili  1.4 - 2.1   AST  37 - 50   ALT 27 - 23        Progress note 9/11       Labs 9/4 - 9/11                                                                            Doctor, Please specify diagnosis or diagnoses associated with above clinical findings.    Provider Use Only      [     ] Acute liver failure   [     ] Chronic liver failure   [   x  ] Hepatic congestion secondary to heart failure   [     ] Other:________________                                                                                                             [  ] Clinically undetermined

## 2018-09-13 NOTE — DISCHARGE SUMMARY
[facility]  Palliative Medicine  Discharge Summary  Patient Name: Natalie Parnell  MRN: [id number]  Admission Date: 9/11/2018  Hospital Length of Stay: 0 days  Code Status: DNR   Attending Provider: Lisa Chew*  Consulting Provider: Lisa Chew MD  Primary Care Physician: Octavio Ferrell MD  Principal Problem:Heart failure     Patient information was obtained from relative(s) and ER records.       Assessment/Plan:      No new Assessment & Plan notes have been filed under this hospital service since the last note was generated.  Service: Palliative Medicine        none     Subjective:      Chief Complaint: No chief complaint on file.        HPI:   Ms. Parnell 84 y/o Wolof speaking female was admitted on 9/4/2018. PMHx of severe AS (SHEA 0.69 cm2, AVAi 0.37 cm2/m2, peak aortic jet velocity 4.3 m/s,MG 50 mmHg, EF 20-25%) s/p recent BAV, HFrEF (EF previously 50-55%, now 20-25%), LE DVT previously on Xarelto, DM, hx of GIB, possible liver cirrhosis who presents to the ER with complaints of SOB, abdominal and LE swelling. She was seen by Dr. Diaz in the clinic yesterday and was sent to the ER for admission due to ADHF.   Last clinic visit was seen after hospitalization for GIB with acute blood loss anemia requiring PRBC transfusion. Trop elevated and peaked at >7. 2DE with normal EF and no WMA's. Was discharged and hospitalized again for ADHF. Aileen, [facility] with nonobstructive CAD, improved, and discharged home.   Had recent admission to [facility] for ADHF, found to be in cardiogenic shock, newly depressed EF, IV diuresis, BAV with improvement in MG and symptoms and discharged home. A couple of days later came back due to not using diuretics (had ARF) and volume overloaded. Diuresed, improved and discharged home on Bumex. Was seen by Dr Garcia as outpatient as was doing OK. Since then she has gained weight, had worsening bilateral leg edema, SOB, despite increased Bumex  doses to 2 mg TID. Also complaining of bilateral flank pain and weakness. Has to sleep propped up. Denies PND, syncope, LE edema. Compliant with meds. Weight gain 172-176. SBP in clinic 67. Has continuous infusion  via right arm PICC line     Palliative Medicine met with family to discuss GOC. Family consensus to move towards comfort care. Patient wishes not to be artificially kept alive. Patient unstable for transfer to home for home hospice. [facility] was consulted for inpatient hospice services. All questions and concerns addressed.      All drips (Levophed, Dobutamine, and Lasix) turned off per family request and started on morphine for pain and ativan for anxiety. Family by bedside and emotional comfort provided.   Patient progressively became braadycardic with HR in the 20s. Called to the room with patient with no spontaneous respiration, no heart sound. Patient rhythm was asystole. Patient pronounced dead on 9/11/2018 @ 1912           Hospital Course:  No notes on file     No new subjective & objective note has been filed under this hospital service since the last note was generated.        > 50% of 45 min visit spent in chart review, face to face discussion of goals of care,  symptom assessment, coordination of care and emotional support.     Lisa Chew MD  Palliative Medicine  [facility]

## 2019-09-14 NOTE — ASSESSMENT & PLAN NOTE
Admit to team C for decompensated heart failure  Warm and wet on exam  -Recommend IV diuresis, agree with lasix ggt.   -If still oliguric, will benefit from addition of metolazone to improve diuresis.   -Will benefit from ACE for afterload reduction.  -Do not start BB till she is euvolemic.     Discussed with Dr Davis, staff Cardiologist.    no

## 2019-10-03 NOTE — ED PROVIDER NOTES
[facility] Internal Medicine Progress Note    CC:  Patient presents with:  Rash: itchy rash on right cheek  Imm/Inj: flu shot      HPI:   HPI  Patient is a 21-year-old male here to discuss a rash on his face that started about 1 week ago.   He sta Encounter Date: 7/27/2018       History     Chief Complaint   Patient presents with    Shortness of Breath     chest pain, + cardiac hx     This is an 83 year old female with a PMH of HTN, DM, asthma/COPD, severe aortic stenosis (under consideration for TAVR) who presents to the ED with a chief complaint of shortness of breath. History is complicated by a language barrier and the majority of the hx was provided by the patient's daughter who was offered but declined a . She reports midsternal chest pain associated with shortness of breath and orthopnea since last night. She was unable to sleep secondary to this. Symptoms are exacerbated by lying flat. The chest pain is pleuritic in nature. She reports a nonproductive cough that sounds wet. 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 At that time she was anticoagulated on Eliquis for RLE DVT, which was discontinued. She continues to remain on ASA 81mg. Patient also with NSTEMI s/p LHC via right radial acess on 7/16/18 showing nonobstructive CAD. Currently denies fever, vomiting, abdominal pain, changes in urination, melena or BRBPR. Grandson noticed right leg swelling today.          Review of patient's allergies indicates:  No Known Allergies  Past Medical History:   Diagnosis Date    Asthma     Diabetes mellitus     HTN (hypertension)     Hyperlipidemia      Past Surgical History:   Procedure Laterality Date    CATARACT EXTRACTION Bilateral     Dr. Max    COLON SURGERY      ESOPHAGOGASTRODUODENOSCOPY N/A 7/10/2018    Procedure: EGD (ESOPHAGOGASTRODUODENOSCOPY);  Surgeon: Derian Stoner MD;  Location: [facility];  Service: Endoscopy;  Laterality: N/A;    HERNIA REPAIR      VARICOSE VEIN SURGERY       History reviewed. No pertinent family history.  Social History   Substance Use Topics     Smoking status: Never Smoker    Smokeless tobacco: Never Used    Alcohol use No     Review of Systems   Constitutional: Negative for chills and fever.   HENT: Negative for sore throat.    Respiratory: Positive for shortness of breath.    Cardiovascular: Positive for chest pain and leg swelling.   Gastrointestinal: Negative for nausea and vomiting.   Genitourinary: Negative for dysuria.   Musculoskeletal: Negative for back pain.   Skin: Negative for rash.   Neurological: Negative for weakness.   Hematological: Does not bruise/bleed easily.       Physical Exam     Initial Vitals [07/27/18 1412]   BP Pulse Resp Temp SpO2   (!) 100/52 (!) 115 (!) 24 98.6 °F (37 °C) (!) 88 %      MAP       --         Physical Exam    Constitutional: She appears well-developed and well-nourished. No distress.   HENT:   Head: Atraumatic.   Eyes: Conjunctivae and EOM are normal. Pupils are equal, round, and reactive to light.   Cardiovascular: Normal rate, regular rhythm, normal heart sounds and intact distal pulses.   Pulses:       Dorsalis pedis pulses are 2+ on the right side, and 2+ on the left side.   +Right lower leg swelling, calf tenderness.   Pulmonary/Chest: No respiratory distress. She has no wheezes. She has no rhonchi. She has rales. She exhibits no tenderness.   Abdominal: Soft. Bowel sounds are normal. There is no tenderness.   Neurological: She is alert and oriented to person, place, and time.   Skin: Skin is warm and dry. No rash noted.         ED Course   Procedures  Labs Reviewed   CBC W/ AUTO DIFFERENTIAL - Abnormal; Notable for the following:        Result Value    RBC 3.57 (*)     Hemoglobin 11.5 (*)     Hematocrit 34.5 (*)     MCH 32.2 (*)     RDW 14.7 (*)     Gran% 77.1 (*)     Lymph% 14.3 (*)     All other components within normal limits   COMPREHENSIVE METABOLIC PANEL - Abnormal; Notable for the following:     Sodium 131 (*)     Glucose 220 (*)     BUN, Bld 27 (*)     Albumin 3.2 (*)     Total Bilirubin 1.2 (*)   Sulfate 325 (65 FE) MG Oral Tab  Disp:  Rfl:    ACCU-CHEK COMPACT PLUS In Vitro Strip bid  Disp:  Rfl:    LANTUS SOLOSTAR 100 UNIT/ML Subcutaneous Solution Pen-injector 68-70 units in the AM  Disp:  Rfl:    APIDRA SOLOSTAR 100 UNIT/ML Subcutaneous Solution    Alkaline Phosphatase 198 (*)     AST 76 (*)     eGFR if  44.5 (*)     eGFR if non  38.6 (*)     All other components within normal limits   LACTIC ACID, PLASMA - Abnormal; Notable for the following:     Lactate (Lactic Acid) 3.3 (*)     All other components within normal limits   TROPONIN I - Abnormal; Notable for the following:     Troponin I 2.776 (*)     All other components within normal limits   B-TYPE NATRIURETIC PEPTIDE - Abnormal; Notable for the following:     BNP 1,767 (*)     All other components within normal limits   CULTURE, BLOOD   CULTURE, BLOOD   PROTIME-INR   URINALYSIS, REFLEX TO URINE CULTURE   LACTIC ACID, PLASMA   ANTI-XA HEPARIN MONITORING   CBC W/ AUTO DIFFERENTIAL   TYPE & SCREEN          Imaging Results          X-Ray Chest AP Portable (Final result)  Result time 07/27/18 15:29:05    Final result by Moy Gonzalez MD (07/27/18 15:29:05)                 Impression:      See above      Electronically signed by: Moy Gonzalez MD  Date:    07/27/2018  Time:    15:29             Narrative:    EXAMINATION:  XR CHEST AP PORTABLE    CLINICAL HISTORY:  Sepsis;    TECHNIQUE:  Single frontal view of the chest was performed.    COMPARISON:  Non 07/15/2018 e    FINDINGS:  Mild cardiomegaly, pulmonary vascular congestion and edema.  Airspace consolidation the right lung base.  Slightly blunted costophrenic angle.                                       APC / Resident Notes:   83 year old female presents with pleuritic chest pain and SOB.  On exam she is afebrile, , Resp 24, /52, O2 88% on room air. She appears uncomfortable. She has a heart murmur consistent with hx of AS, there are rales at the bases bilaterally. Right leg with swelling and +calf tenderness. Extremities warm to touch.    DDx includes but is not limited to ACS, PE, post cath pericarditis, pneumonia, sepsis, COPD/asthma exacerbation.    Chest x-ray with diffuse edema and a consolidation of the  noted. There is erythema. No pallor. + small erythematous plaques on R mandibular area, about 5 altogether  No crusting, vesicles, swelling, TTP   Psychiatric: Mood and affect normal.   Vitals reviewed.         Assessment and Plan:  Dermatitis  (primary e right lower lobe.  Antibiotics given in the ED per sepsis protocol for healthcare acquired pneumonia. She received a 250cc fluid bolus (charted saline of 2367ccs not given).  No leukocytosis, additional labs pending.     4:52PM Her labs indicate NSTEMI with significant troponin elevation of 2.77, BNP 1767.  Cardiology was consulted to evaluate the patient - appreciate consult.  She became increasingly short of breath and hypoxic to 85% on 12L via nonrebreather. Duoneb provided little benefit. She was transitioned to BiPAP with significant improvement in her symptoms. ASA and IV lasix given.    5:39 PM Discussed with cardiology - they requested orders for type and screen, plavix load, heparin bolus and infusion for treatment of NSTEMI. Cardiology will admit patient to CCU - plan for LHC and IABP. Plan of care and patient's critical condition discussed with the family. I discussed the care of this patient with my supervising MD, , who also evaluated and reassessed the patient at the bedside.     6:03 PM Patient signed over to ED attending, .                 Clinical Impression:   The primary encounter diagnosis was NSTEMI (non-ST elevated myocardial infarction). Diagnoses of Chest pain, Pneumonia of right lung due to infectious organism, unspecified part of lung, and Acute heart failure, unspecified heart failure type were also pertinent to this visit.      Disposition:   Disposition: Admitted  Condition: Critical                        Jo-Ann Monique PA-C  07/27/18 [id number]

## 2020-01-09 NOTE — PLAN OF CARE
Problem: Patient Care Overview  Goal: Plan of Care Review  Outcome: Ongoing (interventions implemented as appropriate)  No respiratory distress noted at this time. Will continue to monitor pt.       Antonio Howell)  Orthopaedic Surgery Surgery  [address] [place] [address]  Phone: [phone]  Fax: [phone]  Follow Up Time:

## 2022-03-03 NOTE — CONSULTS
[facility]  Cardiology  Consult Note    Patient Name: Natalie Parnell  MRN: [id number]  Admission Date: 7/9/2018  Hospital Length of Stay: 0 days  Code Status: Full Code   Attending Provider: Salvatore Knapp MD   Consulting Provider: BIANKA Booker, ANP  Primary Care Physician: Octavio Ferrell MD  Principal Problem:GI bleed    Patient information was obtained from patient, relative(s) and past medical records.     Inpatient consult to Cardiology  Consult performed by: PILAR HURT  Consult ordered by: SALVATORE KNAPP  Reason for consult: AS and chest pain         Subjective:     Chief Complaint:  melena    HPI:   84yo [place]ian speaking female with history of severe AS, HTN, nonocclusive right CFV DVT 3/1/2018 (on Eliquis), DMII and cirrhosis likely secondary to HERNANDEZ who presented to the ER with complaints of dark tarry stools. She is [place]ian speaking only therefore translation was provided by her daughter. She complains of SOB with fast paced walking but never with mild exertion. She denies any dizziness but reports intermittent chest pain recently. Her daughter reports a syncopal episode within the past month while in [place]. She is followed by Dr. Diaz and was in process of TAVR workup for severe AS     Hospital Course:  7/9/2018 Presented with complaints of melena and intermittent chest pain. H&H 10.0&30.8 down from 12.1&36.8 in March 2018. Troponin .021 and . HR stable. BP marginal with SBP 90s. CXR suggestive of pulmonary edema/CHF pattern without focal consolidation-image compared to CXR from 3/2018 with similar appearance.   Echocardiogram today with pending results. Repeat BLE venous ultrasound ordered. GI consulted with plans for EGD tomorrow per daughter. Cardiology consulted due to history of AS and complaints of chest pain   Past Medical History:   Diagnosis Date    Asthma     Diabetes mellitus     HTN (hypertension)        Past Surgical History:    Procedure Laterality Date    CATARACT EXTRACTION Bilateral     Dr. Max    HERNIA REPAIR      VARICOSE VEIN SURGERY         Review of patient's allergies indicates:  No Known Allergies    No current facility-administered medications on file prior to encounter.      Current Outpatient Prescriptions on File Prior to Encounter   Medication Sig    albuterol 90 mcg/actuation inhaler Inhale 2 puffs into the lungs every 6 (six) hours as needed for Wheezing.    ammonium lactate 12 % Crea Apply twice daily to lower extremities    apixaban (ELIQUIS) 5 mg Tab Take 1 tablet (5 mg total) by mouth 2 (two) times daily.    atorvastatin (LIPITOR) 40 MG tablet Take 1 tablet (40 mg total) by mouth once daily.    azelastine (OPTIVAR) 0.05 % ophthalmic solution Place 1 drop into both eyes 2 (two) times daily.    blood sugar diagnostic (ACCU-CHEK SMARTVIEW TEST STRIP) Strp Inject 1 strip into the skin once daily.    blood-glucose meter Misc 1 Units by Misc.(Non-Drug; Combo Route) route once daily.    budesonide-formoterol 160-4.5 mcg (SYMBICORT) 160-4.5 mcg/actuation HFAA Inhale 2 puffs into the lungs every 12 (twelve) hours. Controller    gabapentin (NEURONTIN) 100 MG capsule Take 1 capsule (100 mg total) by mouth 2 (two) times daily.    lancets (ACCU-CHEK FASTCLIX) Misc Inject 1 lancet into the skin once daily.    losartan (COZAAR) 25 MG tablet Take 1 tablet (25 mg total) by mouth once daily.    metFORMIN (GLUCOPHAGE) 500 MG tablet Take 1 tablet (500 mg total) by mouth 2 (two) times daily with meals.    metoprolol succinate (TOPROL-XL) 25 MG 24 hr tablet Take 1 tablet (25 mg total) by mouth once daily.     Family History     Problem Relation (Age of Onset)    No Known Problems Mother, Father, Sister, Brother, Maternal Aunt, Maternal Uncle, Paternal Aunt, Paternal Uncle, Maternal Grandmother, Maternal Grandfather, Paternal Grandmother, Paternal Grandfather        Social History Main Topics    Smoking status: Never  Smoker    Smokeless tobacco: Never Used    Alcohol use No    Drug use: No    Sexual activity: Not on file     Review of Systems   Constitution: Negative for chills, decreased appetite, diaphoresis, fever and weakness.   Cardiovascular: Positive for chest pain and dyspnea on exertion. Negative for claudication, cyanosis, irregular heartbeat, leg swelling, near-syncope, orthopnea, palpitations, paroxysmal nocturnal dyspnea and syncope.   Respiratory: Negative for cough, hemoptysis, shortness of breath and wheezing.    Gastrointestinal: Positive for melena. Negative for bloating, abdominal pain, constipation, diarrhea, nausea and vomiting.   Neurological: Negative for dizziness.     Objective:     Vital Signs (Most Recent):  Temp: 98.2 °F (36.8 °C) (07/09/18 1409)  Pulse: 101 (07/09/18 1556)  Resp: 18 (07/09/18 1528)  BP: (!) 98/47 (07/09/18 1556)  SpO2: 98 % (07/09/18 1528) Vital Signs (24h Range):  Temp:  [98.2 °F (36.8 °C)-98.3 °F (36.8 °C)] 98.2 °F (36.8 °C)  Pulse:  [] 101  Resp:  [18-26] 18  SpO2:  [94 %-98 %] 98 %  BP: ()/(42-94) 98/47     Weight: 80.7 kg (178 lb)  Body mass index is 30.55 kg/m².    SpO2: 98 %  O2 Device (Oxygen Therapy): nasal cannula      Intake/Output Summary (Last 24 hours) at 07/09/18 1640  Last data filed at 07/09/18 1348   Gross per 24 hour   Intake             1100 ml   Output                0 ml   Net             1100 ml       Lines/Drains/Airways     Peripheral Intravenous Line                 Peripheral IV - Single Lumen 07/09/18 1421 Left Hand less than 1 day         Peripheral IV - Single Lumen 07/09/18 1422 Right Antecubital less than 1 day                Physical Exam   Constitutional: She is oriented to person, place, and time. She appears well-developed and well-nourished. No distress.   Cardiovascular: Normal rate and regular rhythm.  Exam reveals no gallop.    Murmur heard.  Pulmonary/Chest: Effort normal and breath sounds normal. No respiratory distress. She  has no wheezes.   Abdominal: Soft. Bowel sounds are normal. She exhibits no distension. There is no tenderness.   Neurological: She is alert and oriented to person, place, and time.   Skin: Skin is warm and dry.       Significant Labs:       Recent Labs  Lab 07/09/18  1201   CALCIUM 9.1   PROT 6.3   *   K 4.9   CO2 21*      BUN 36*   CREATININE 1.1   ALKPHOS 156*   ALT 30   AST 52*   BILITOT 1.3*       Recent Labs  Lab 07/09/18  1201   WBC 6.62   RBC 3.28*   HGB 10.0*   HCT 30.8*      MCV 94   MCH 30.5   MCHC 32.5       Recent Labs  Lab 07/09/18  1201   TROPONINI 0.021       Significant Imaging: Echocardiogram:   2D echo with color flow doppler:   Results for orders placed or performed during the hospital encounter of 03/01/18   2D echo with color flow doppler   Result Value Ref Range    EF 50 55 - 65    Mitral Valve Regurgitation MILD     Diastolic Dysfunction No     Aortic Valve Stenosis SEVERE (A)     Est. PA Systolic Pressure 40.95 (A)     Tricuspid Valve Regurgitation TRIVIAL TO MILD      Assessment and Plan:     * GI bleed    -reports of melena  -H&H 10.0&30.8 down from 12.1&36.8 in March  -GI on board with plans for scope in AM per daughter  -management per Hospital Medicine and GI; if PRBC transfusion decided may need IV Lasix to prevent volume overload         Aortic valve stenosis    -severe per echo in March with SHEA .58cm2 and MG 58mmHg  -repeat echo pending  -syncopal episode reported per daughter in [place] recently  -complaints of intermittent chest pain as well possibly related to AS etiology vs anemia vs ischemic etiology   -may proceed with EGD evaluation given concern for AVMs in setting of AS   -bleeding issues need to be fully evaluated before proceeding with TAVR workup         DVT of deep femoral vein, right    -per LE venous ultrasound in 3/2018-nonocclusive right CFV DVT present  -treated with Eliquis  -on hold given GIB upon presentation  -will repeat BLE venous  ultrasound for re evaluation for DVT  -will re evaluation after GI studies and venous ultrasound completed for final AC recommendations         Chest pain    -complaints of intermittent chest pain   -initial troponin .021 and EKG with nonspecific STTWC noted; not present on previous EKG  -chest pain multifactoral and likely related to anemia vs AS vs ischemic etiology  -continue to trend CE and EKG; if H&H trends down further would benefit from PRBC transfusion  -repeat echo pending             VTE Risk Mitigation     None          Thank you for your consult. I will follow-up with patient. Please contact us if you have any additional questions.    BIANKA Booker, ANP  Cardiology   [facility]     Drysol Pregnancy And Lactation Text: This medication is considered safe during pregnancy and breast feeding.

## 2022-03-15 NOTE — SUBJECTIVE & OBJECTIVE
Medications:  Continuous Infusions:  Scheduled Meds:  PRN Meds:LORazepam, morphine, morphine    Objective:     Vital Signs (Most Recent):   ..p Vital Signs (24h Range):  Temp:  [97.1 °F (36.2 °C)-98.2 °F (36.8 °C)] 98.2 °F (36.8 °C)  Pulse:  [] 123  Resp:  [11-63] 41  SpO2:  [88 %-98 %] 89 %  BP: ()/(45-69) 93/56        There is no height or weight on file to calculate BMI.    Review of Symptoms  Symptom Assessment (ESAS 0-10 scale)  ESAS 0 1 2 3 4 5 6 7 8 9 10   Pain              Dyspnea              Anxiety              Nausea              Depression               Anorexia              Fatigue              Insomnia              Restlessness               Agitation              CAM / Delirium __ --  ___+   Constipation     __ --  ___+   Diarrhea           __ --  ___+  Bowel Management Plan (BMP): Yes        Pain Assessment: Yes; Morphine given    Physical Exam  Constitutional: She appears lethargic. She has a sickly appearance. She appears ill.   Cardiovascular:   Murmur heard.  Neurological: She appears lethargic.           Performance Status: 20    ECOG Performance Status Grade: 4 - Completely disabled      Significant Labs: All pertinent labs within the past 24 hours have been reviewed.  CBC:   Recent Labs   Lab  09/11/18   0530   WBC  12.44   HGB  7.9*   HCT  23.8*   MCV  81*   PLT  263     BMP:  Recent Labs   Lab  09/11/18   0530   GLU  244*   NA  126*   K  4.3   CL  82*   CO2  29   BUN  81*   CREATININE  3.5*   CALCIUM  9.2   MG  2.2     LFT:  Lab Results   Component Value Date    AST 50 (H) 09/11/2018    ALKPHOS 178 (H) 09/11/2018    BILITOT 2.1 (H) 09/11/2018     Albumin:   Albumin   Date Value Ref Range Status   09/11/2018 2.5 (L) 3.5 - 5.2 g/dL Final     Protein:   Total Protein   Date Value Ref Range Status   09/11/2018 6.2 6.0 - 8.4 g/dL Final     Lactic acid:   Lab Results   Component Value Date    LACTATE 1.6 09/11/2018    LACTATE 2.4 (H) 09/04/2018       Significant Imaging: I have  reviewed all pertinent imaging results/findings within the past 24 hours.    Advanced Directives::  Living Will: No  [place]: Yes  Do Not Resuscitate Status: Yes  Medical Power of : No    Decision-Making Capacity: Family answered questions, Patient unable to communicate due to disease severity/cognitive impairment    Living Arrangements: Lives with family

## 2023-10-11 NOTE — PATIENT INSTRUCTIONS
Contracción De Espalda [Sin Lesión] [Back Spasm, No Injury]    La contracción de los músculos de la espalda puede ocurrir luego de ian torcedura mauro de ligamento o torcedura muscular por un movimiento de giro o ian agachada brusca. También la causa puede ser dormir en ian posición deep o en un colchón de deep calidad. Algunas personas responden a la tensión emocional tensando los músculos de la espalda.  El tratamiento descrito abajo generalmente contribuirá que el dolor desaparezca en 5-7 días. El dolor que continua puede requerir ian evaluación mayor u otro tipo de tratamiento jessica la terapia física.  A menos que usted haya tenido ian lesión física (por ejemplo, ian caída o un accidente de automóvil), no suelen pedirse radiografías (X-rays) para la evaluación inicial del dolor de espalda. Si el dolor persiste y no responde al tratamiento médico, es posible que le soliciten radiografías (X-rays) y otras pruebas más adelante.  Cuidado En [place]:  1. Es posible que necesite permanecer en cama los primeros días. Vivian, tan pronto jessica le sea posible, comience a sentarse o pararse para evitar los problemas que pueden aparecer cuando mesha está en cama mucho tiempo (debilidad de los músculos, mayor dolor y rigidez de la espalda, coágulos de purvi en las piernas).    2. Mientras esté en cama, intente buscar ian posición que le resulte cómoda. Lo mejor es utilizar un colchón firme. Intente acostarse de espalda con almohadas debajo de las rodillas. También puede intentar acostarse de costado con las rodillas flexionadas cerca del pecho y ian almohada entre las rodillas.    3. Evite estar mucho tiempo sentado. Eso causa más tensión en la parte inferior de la espalda que estando de pie o caminando.      4. Chucho los dos primeros días después de la lesión, aplíquese ian COMPRESA DE HIELO sobre el área dolorida chucho 20 minutos cada 2 a 4 horas. Ello reducirá la hinchazón y el dolor. El CALOR (ian ducha caliente, un baño  Phoned patient back and let him know that we don't have any other sensors to give out. He reports he got a new phone and had to switch over his sensor since it wouldn't connect- leaving him a few weeks short.     Leonardo has denied sending him a new sensor to be replaced due to this, will connect him with our rep Yohan to see if he can help- otherwise he knows to finger stick until he receives shipment November 6th.    caliente o ian almohadilla térmica) funciona sean para los espasmos musculares. Puede comenzar con el hielo y luego pasar al calor después de dos días. Algunos pacientes se sienten mejor alternando los tratamientos con hielo y calor. Emplee el método que mejor le resulte.    5. Puede usar acetaminofén (acetaminophen) [Tylenol] o ibuprofeno (ibuprofen) [Motrin o Advil] para controlar el dolor, a menos que le hayan recetado otro medicamento. [NOTA: Si tiene ian enfermedad hepática o renal crónica (chronic liver or kidney disease), o ha tenido alguna vez ian úlcera estomacal (stomach ulcer) o sangrado gastrointestinal (GI bleeding), consulte con yancey médico antes de prem estos medicamentos.]    6. Los estiramientos suaves ayudarán a que yancey espalda sane más rápido. Surendra esta simple rutina de 2-3 veces al día hasta que sienta que yancey espalda está mejor.  ¨ ESTIRAMIENTOS DE LA PARTE BAJA DE LA ESPALDA  § Acuéstese boca ariba con rosa rodillas dobladas y ambos pies sobre el piso.  § Lentamente levante yancey rodilla izquierda hacia yancey pecho aplanando la parte baja de yancey espalda sobre el piso. Sostenga chucho 5 segundos.  § Relájese y repita el ejercicio con yancey rodilla derecha.  § Surendra 10 de estos ejercicios con cada pierna.  § Repita abrazando ambas rodillas y llevándolas hacia yancey pecho al mismo tiempo.      7. Infórmese sobre los métodos que se utilizan para levantar cosas pesadas de manera campuzano y utilícelos. No levante nada que pese más de 15 libras (7 kilos) hasta que haya desaparecido el dolor.  Seguimiento  con yancey médico o en esta institución si rosa síntomas no empiezan a mejorar luego de ian semana. Puede necesitar terapia física.  [NOTA: Si le hicieron ian radiografía, la va a revisar un radiólogo. Le avisarán si encuentran algo que pueda afectar yancey atención]  Busque Prontamente Atención Médica  si algo de lo siguiente ocurre:  · El dolor empeora o se extiende a rosa piernas  · Debilidad o entumecimiento en ian o ambas  heaven  · Pérdida del control intestinal o de la vejiga  · Entumecimiento en el área de la paul  · Fiebre repentina superior a 100.4°F (38.0°C)  · Brenden o adonay johnson  © 9796-3095 [employer]. [address] [place] [address], [place] [address]. Todos los derechos reservados. Esta información no pretende sustituir la atención médica profesional. Sólo yancey médico puede diagnosticar y tratar un problema de marcos.

## 2023-11-01 NOTE — PROGRESS NOTES
Please note the following patient was not enrolled into [facility] at this time. Patient is currently inpatient status since 8/2/18.    Please send new referral upon discharge to home.     Please contact [facility] at ext. [phone] with any questions.    Thank you,    Ellie Gutiérrez    Outpatient Case Management       Consistent Carbohydrate Diabetic Diets Consistent Carbohydrate Diabetic Diets/Other Diet Instructions DASH Diet/Consistent Carbohydrate Diabetic Diets/Other Diet Instructions

## 2025-05-12 NOTE — SUBJECTIVE & OBJECTIVE
No current facility-administered medications on file prior to encounter.      Current Outpatient Prescriptions on File Prior to Encounter   Medication Sig    albuterol 90 mcg/actuation inhaler Inhale 2 puffs into the lungs every 6 (six) hours as needed for Wheezing.    aspirin (ECOTRIN) 81 MG EC tablet Take 1 tablet (81 mg total) by mouth once daily.    atorvastatin (LIPITOR) 40 MG tablet Take 1 tablet (40 mg total) by mouth once daily.    azelastine (OPTIVAR) 0.05 % ophthalmic solution Place 1 drop into both eyes 2 (two) times daily.    benzonatate (TESSALON) 100 MG capsule Take 1 capsule (100 mg total) by mouth 3 (three) times daily as needed for Cough.    budesonide-formoterol 160-4.5 mcg (SYMBICORT) 160-4.5 mcg/actuation HFAA Inhale 2 puffs into the lungs every 12 (twelve) hours. Controller    furosemide (LASIX) 20 MG tablet Take 1 tablet (20 mg total) by mouth once daily.    metFORMIN (GLUCOPHAGE) 500 MG tablet Take 1 tablet (500 mg total) by mouth 2 (two) times daily with meals.    metoprolol succinate (TOPROL-XL) 25 MG 24 hr tablet Take 1 tablet (25 mg total) by mouth once daily.    ammonium lactate 12 % Crea Apply twice daily to lower extremities    blood sugar diagnostic (ACCU-CHEK SMARTVIEW TEST STRIP) Strp Inject 1 strip into the skin once daily.    blood-glucose meter Misc 1 Units by Misc.(Non-Drug; Combo Route) route once daily.    lancets (ACCU-CHEK FASTCLIX) Misc Inject 1 lancet into the skin once daily.       Review of patient's allergies indicates:  No Known Allergies    Past Medical History:   Diagnosis Date    Asthma     Diabetes mellitus     HTN (hypertension)     Hyperlipidemia      Past Surgical History:   Procedure Laterality Date    CATARACT EXTRACTION Bilateral     Dr. Max    COLON SURGERY      ESOPHAGOGASTRODUODENOSCOPY N/A 7/10/2018    Procedure: EGD (ESOPHAGOGASTRODUODENOSCOPY);  Surgeon: Derian Stoner MD;  [facility];  Service: Endoscopy;  Laterality:  N/A;    HERNIA REPAIR      PERCUTANEOUS TRANSLUMINAL BALLOON ANGIOPLASTY OF AORTA N/A 7/30/2018    Procedure: PTA, AORTA, USING BALLOON;  Surgeon: Scott Pruett MD;  Location: [facility] CATH LAB;  Service: Cardiology;  Laterality: N/A;    VARICOSE VEIN SURGERY       Family History     None        Social History Main Topics    Smoking status: Never Smoker    Smokeless tobacco: Never Used    Alcohol use No    Drug use: No    Sexual activity: Not on file     Review of Systems   Constitutional: Negative for activity change and fatigue.   Respiratory: Positive for cough and shortness of breath.    Cardiovascular: Positive for chest pain. Negative for palpitations and leg swelling.   Gastrointestinal: Negative for abdominal pain, nausea and vomiting.   Endocrine: Negative for polydipsia, polyphagia and polyuria.   Genitourinary: Negative for dysuria.   Musculoskeletal: Negative for gait problem.   Skin: Negative for rash.   Allergic/Immunologic: Negative for immunocompromised state.   Neurological: Negative for dizziness, syncope and weakness.   Hematological: Does not bruise/bleed easily.   Psychiatric/Behavioral: Negative for behavioral problems.     Objective:     Vital Signs (Most Recent):  Temp: 98.4 °F (36.9 °C) (07/31/18 0315)  Pulse: 93 (07/31/18 1212)  Resp: (!) 22 (07/31/18 1212)  BP: 112/87 (07/31/18 1212)  SpO2: 97 % (07/31/18 1212) Vital Signs (24h Range):  Temp:  [98.2 °F (36.8 °C)-98.4 °F (36.9 °C)] 98.4 °F (36.9 °C)  Pulse:  [76-94] 93  Resp:  [12-38] 22  SpO2:  [94 %-100 %] 97 %  BP: ()/(43-87) 112/87     Weight: 78.9 kg (173 lb 15.1 oz)  Body mass index is 28.08 kg/m².    SpO2: 97 %  O2 Device (Oxygen Therapy): room air     Intake/Output - Last 3 Shifts       07/29 0700 - 07/30 0659 07/30 0700 - 07/31 0659 07/31 0700 - 08/01 0659    P.O. 660      I.V. (mL/kg) 833.8 (10.6) 89.6 (1.1)     IV Piggyback 450 750     Total Intake(mL/kg) 1943.8 (24.6) 839.6 (10.6)     Urine (mL/kg/hr) 1250 (0.7)  610 (0.3) 350 (0.6)    Total Output 1250 610 350    Net +693.8 +229.6 -350           Urine Occurrence  3 x     Stool Occurrence 1 x 3 x 1 x           Lines/Drains/Airways     Central Venous Catheter Line                 Percutaneous Central Line Insertion/Assessment - triple lumen  07/28/18 1430 left internal jugular 2 days          Peripheral Intravenous Line                 Peripheral IV - Single Lumen 07/31/18 0806 Left Forearm less than 1 day         Peripheral IV - Single Lumen 07/31/18 0807 Right Forearm less than 1 day                 STS Risk Score:     Physical Exam   Constitutional: She is oriented to person, place, and time. She appears well-developed and well-nourished.   Cardiovascular: Normal rate and regular rhythm.    Murmur heard.  Pulmonary/Chest: Effort normal and breath sounds normal.   Abdominal: Soft.   Neurological: She is alert and oriented to person, place, and time.   Skin: Skin is warm, dry and intact.   Psychiatric: She has a normal mood and affect.       Significant Labs:  BMP:   Recent Labs  Lab 07/31/18  0302   *   *   K 3.8      CO2 26   BUN 16   CREATININE 1.0   CALCIUM 8.0*   MG 1.7     CBC:   Recent Labs  Lab 07/31/18  0300   WBC 5.81   RBC 2.63*   HGB 8.2*   HCT 25.1*      MCV 95   MCH 31.2*   MCHC 32.7     Significant Diagnostics:  [facility] Angiographic Results Diagnostic:      - Left Main Coronary Artery:             The LM has luminal irregularities. There is OSCAR 3 flow.     - Left Anterior Descending Artery:             The LAD has luminal irregularities. There is OSCAR 3 flow.     - Left Circumflex Artery:             The LCX has luminal irregularities. There is OSCAR 3 flow.     - Right Coronary Artery:             The RCA was not studied.    ECHO CONCLUSIONS     1 - Moderate left ventricular enlargement.     2 - Severely depressed left ventricular systolic function (EF 20-25%).     3 - Left atrial enlargement.     4 - Low normal to mildly depressed right  ventricular systolic function .     5 - Moderate to severe aortic stenosis.     6 - Moderate mitral regurgitation.     7 - Increased central venous pressure.     8 - If clinically indicated, a full Doppler study can be performed.    Home meds: carvedilol 6.25mg BID, lisinopril 10mg QD    - c/w home lisinopril  - hold home coreg iso asthma exacerbation.